# Patient Record
Sex: FEMALE | Race: BLACK OR AFRICAN AMERICAN | NOT HISPANIC OR LATINO | Employment: OTHER | URBAN - METROPOLITAN AREA
[De-identification: names, ages, dates, MRNs, and addresses within clinical notes are randomized per-mention and may not be internally consistent; named-entity substitution may affect disease eponyms.]

---

## 2017-01-03 DIAGNOSIS — M16.32 OSTEOARTHRITIS RESULTING FROM LEFT HIP DYSPLASIA: Primary | ICD-10-CM

## 2017-02-14 ENCOUNTER — TRANSFERRED RECORDS (OUTPATIENT)
Dept: HEALTH INFORMATION MANAGEMENT | Facility: CLINIC | Age: 37
End: 2017-02-14

## 2017-02-14 ENCOUNTER — PRE VISIT (OUTPATIENT)
Dept: ORTHOPEDICS | Facility: CLINIC | Age: 37
End: 2017-02-14

## 2017-02-14 NOTE — TELEPHONE ENCOUNTER
1.  Date/reason for appt: 3/23/17 5:30PM L HIP DYSPLASIA, OSTEOARTHRITIS,   2.  Referring provider: DR. SOULEYMANE TOMAS  3.  Call to patient (Yes / No - short description): Yes, call was transferred from .   4.  Previous care at / records requested from:   [facility] - Need DI     XR & MRI (Pt is mailing in a disc to [facility].)     Still need Radiology Reports

## 2017-02-22 NOTE — TELEPHONE ENCOUNTER
Called pt, she states she will drop off CD to the Administrative office  on 2nd floor. No need for DI forms from [facility] since Dr. Jimenez states they are not important. We only need the CD that was made and pt states she will drop it off Monday or Friday of next week. - 2nd attempt.

## 2017-02-24 NOTE — TELEPHONE ENCOUNTER
Records received from Referrals Team from [facility], Dr. Jimenez - forwarded to [facility].      Records include:  -Referral letter 2/14/17 to [facility]  -One office note with Dr. Jimenez @ [facility] on 2/14/17   -Radiology reports   -MRI Pelvis 2/16/16   -MRI Pelvis 10/6/15    -MRI L Hip/Pelvis 6/9/15       The records below were received from [facility] on 11/25/16 and already forwarded to [facility] on 11/25/16:  Included:   - Progress notes with Dr. Alden Jimenez  - Left Hip Injections  - [facility] Notes

## 2017-02-27 NOTE — TELEPHONE ENCOUNTER
Will attempt to fax cover sheet to [facility] to obtain missing recs. Injection 1/27/16

## 2017-02-27 NOTE — TELEPHONE ENCOUNTER
[facility]- Faxed back stating they need DI from pt  Will mail out DI form for [facility] for pt to sign and return to me.

## 2017-02-27 NOTE — TELEPHONE ENCOUNTER
Records received from [facility]. Waiting for images.   Included:  Office notes: 12/3/15, 10/1/15

## 2017-03-03 ENCOUNTER — TRANSFERRED RECORDS (OUTPATIENT)
Dept: HEALTH INFORMATION MANAGEMENT | Facility: CLINIC | Age: 37
End: 2017-03-03

## 2017-03-13 NOTE — TELEPHONE ENCOUNTER
Received 1 Disc from [facility] & completed DI for [facility] - Will send to Film room & fax cover sheet to req. recs Included: XR Pelvis 2/14/17

## 2017-03-13 NOTE — TELEPHONE ENCOUNTER
Called and spoke with pt regarding her DI form for [facility], pt states she completed her DI form and sent a CD along with her form to us. - 3rd attempt

## 2017-03-14 NOTE — TELEPHONE ENCOUNTER
Called [facility] julio cesar Newby from medical recs, she states there are recs and she will fax the recs today.

## 2017-03-14 NOTE — TELEPHONE ENCOUNTER
Records received from [facility]/ [facility].  Included  Office notes:   3/3/17 (not related to hip) & 3/1/17  Other: Labs 3/3/17    Missing/needed records:  1/27/16 injection was not included in these records (injection may have been done with his ortho doctor who per records - gives her injections every 3 months)

## 2017-03-14 NOTE — TELEPHONE ENCOUNTER
Add't rec's below received from [facility]/ [facility].  Office Note: 2/13/15 - had cortisone injection during this appt

## 2017-03-23 ENCOUNTER — OFFICE VISIT (OUTPATIENT)
Dept: ORTHOPEDICS | Facility: CLINIC | Age: 37
End: 2017-03-23

## 2017-03-23 VITALS — WEIGHT: 289.5 LBS | HEIGHT: 65 IN | BODY MASS INDEX: 48.23 KG/M2

## 2017-03-23 DIAGNOSIS — M25.551 PAIN OF BOTH HIP JOINTS: Primary | ICD-10-CM

## 2017-03-23 DIAGNOSIS — E66.01 MORBID OBESITY DUE TO EXCESS CALORIES (H): ICD-10-CM

## 2017-03-23 DIAGNOSIS — M25.552 PAIN OF BOTH HIP JOINTS: Primary | ICD-10-CM

## 2017-03-23 PROBLEM — F50.9 EATING DISORDER: Status: ACTIVE | Noted: 2017-03-13

## 2017-03-23 RX ORDER — PRAZOSIN HYDROCHLORIDE 1 MG/1
2 CAPSULE ORAL AT BEDTIME
Status: ON HOLD | COMMUNITY
End: 2017-10-09

## 2017-03-23 RX ORDER — DIVALPROEX SODIUM 500 MG/1
250 TABLET, DELAYED RELEASE ORAL
Status: ON HOLD | COMMUNITY
Start: 2016-05-31 | End: 2017-10-09

## 2017-03-23 RX ORDER — CETIRIZINE HYDROCHLORIDE 10 MG/1
TABLET ORAL
COMMUNITY
Start: 2017-03-03 | End: 2017-10-04

## 2017-03-23 RX ORDER — ACETAMINOPHEN AND CODEINE PHOSPHATE 120; 12 MG/5ML; MG/5ML
SOLUTION ORAL
COMMUNITY
End: 2017-10-04

## 2017-03-23 RX ORDER — LURASIDONE HYDROCHLORIDE 40 MG/1
80 TABLET, FILM COATED ORAL DAILY
Status: ON HOLD | COMMUNITY
End: 2017-10-09

## 2017-03-23 RX ORDER — OXYBUTYNIN CHLORIDE 10 MG/1
10 TABLET, EXTENDED RELEASE ORAL DAILY
COMMUNITY
Start: 2015-08-12 | End: 2018-11-28

## 2017-03-23 RX ORDER — CLONAZEPAM 0.5 MG/1
0.5 TABLET ORAL AT BEDTIME
Status: ON HOLD | COMMUNITY
Start: 2015-08-12 | End: 2017-10-09

## 2017-03-23 RX ORDER — BUPROPION HYDROCHLORIDE 300 MG/1
300 TABLET ORAL EVERY MORNING
COMMUNITY
Start: 2015-08-12 | End: 2018-11-28

## 2017-03-23 RX ORDER — LISINOPRIL 10 MG/1
20 TABLET ORAL DAILY
COMMUNITY
Start: 2015-08-12 | End: 2018-11-28

## 2017-03-23 RX ORDER — ALBUTEROL SULFATE 90 UG/1
1-2 AEROSOL, METERED RESPIRATORY (INHALATION) EVERY 4 HOURS PRN
COMMUNITY
Start: 2015-08-12 | End: 2023-06-22

## 2017-03-23 RX ORDER — CITALOPRAM HYDROBROMIDE 10 MG/1
20 TABLET ORAL DAILY
COMMUNITY
Start: 2016-12-06 | End: 2018-03-26

## 2017-03-23 RX ORDER — PRAVASTATIN SODIUM 20 MG
10 TABLET ORAL DAILY
COMMUNITY
Start: 2015-08-12 | End: 2020-11-19

## 2017-03-23 ASSESSMENT — ENCOUNTER SYMPTOMS
INSOMNIA: 1
TASTE DISTURBANCE: 0
SORE THROAT: 0
SPEECH CHANGE: 0
MEMORY LOSS: 0
LOSS OF CONSCIOUSNESS: 0
BLOOD IN STOOL: 0
RECTAL BLEEDING: 0
DECREASED CONCENTRATION: 0
WEAKNESS: 1
VOMITING: 0
DECREASED APPETITE: 0
WEIGHT GAIN: 0
DEPRESSION: 1
TINGLING: 1
SMELL DISTURBANCE: 0
NAUSEA: 0
NIGHT SWEATS: 0
FEVER: 0
DISTURBANCES IN COORDINATION: 1
CHILLS: 0
SINUS CONGESTION: 0
NECK MASS: 0
SINUS PAIN: 0
HEADACHES: 0
HEARTBURN: 0
RECTAL PAIN: 0
PARALYSIS: 0
CONSTIPATION: 1
NUMBNESS: 1
HOARSE VOICE: 0
PANIC: 0
BOWEL INCONTINENCE: 0
DIZZINESS: 1
DECREASED LIBIDO: 0
TROUBLE SWALLOWING: 0
JAUNDICE: 0
TREMORS: 0
DIARRHEA: 0
BLOATING: 0
SEIZURES: 0
ALTERED TEMPERATURE REGULATION: 1
WEIGHT LOSS: 0
ABDOMINAL PAIN: 0
HALLUCINATIONS: 0
POLYDIPSIA: 0
INCREASED ENERGY: 1
NERVOUS/ANXIOUS: 0
FATIGUE: 0
HOT FLASHES: 0
POLYPHAGIA: 0

## 2017-03-23 ASSESSMENT — HOOS S4: HOW SEVERE IS YOUR HIP JOINT STIFFNESS AFTER FIRST WAKENING IN THE MORNING?: MODERATE

## 2017-03-23 ASSESSMENT — ACTIVITIES OF DAILY LIVING (ADL)
ADL_SUBSCALE_SCORE: 25
ADL_MEAN: 3
ADL_SUM: 51

## 2017-03-23 NOTE — NURSING NOTE
"Reason For Visit:   Chief Complaint   Patient presents with     Consult     Pt. states that she is here today for Left Hip Pain. She been having ongoing pain for years. Her last injection was on 02/2017, it was affected for 3-4 weeks. She had tried Pool Therapy in the past, it was affected for certain time. Referring:  SOULEYMANE TOMAS        Pain Assessment  Patient Currently in Pain: Yes  0-10 Pain Scale: 8  Primary Pain Location: Hip  Pain Orientation: Left  Pain Descriptors: Discomfort  Alleviating Factors: Rest  Aggravating Factors: Movement, Sitting, Standing, Walking               HEIGHT: 5' 4.567\", WEIGHT: 289 lbs 8 oz, BMI: Body mass index is 48.82 kg/(m^2).      Current Outpatient Prescriptions   Medication Sig Dispense Refill     albuterol (PROAIR HFA/PROVENTIL HFA/VENTOLIN HFA) 108 (90 BASE) MCG/ACT Inhaler Inhale 1-2 puffs into the lungs       buPROPion (WELLBUTRIN XL) 300 MG 24 hr tablet Take 300 mg by mouth       cetirizine (ZYRTEC) 10 MG tablet        clonazePAM (KLONOPIN) 0.5 MG tablet Take 0.5 mg by mouth       divalproex (DEPAKOTE) 500 MG EC tablet Take 250 mg by mouth       lisinopril (PRINIVIL/ZESTRIL) 10 MG tablet Take 10 mg by mouth       lurasidone (LATUDA) 40 MG TABS tablet Take 80 mg by mouth       norethindrone (MICRONOR) 0.35 MG per tablet        oxybutynin (DITROPAN-XL) 10 MG 24 hr tablet Take 10 mg by mouth       pravastatin (PRAVACHOL) 20 MG tablet Take 10 mg by mouth       prazosin (MINIPRESS) 1 MG capsule Take 2 mg by mouth       citalopram (CELEXA) 10 MG tablet Take 10 mg by mouth            Allergies   Allergen Reactions     Cephalexin Hives     Other reaction(s): *Unknown     Erythromycin Anaphylaxis and Hives     Other reaction(s): Unknown     Penicillins Hives     hives  Other reaction(s): Unknown       "

## 2017-03-23 NOTE — PROGRESS NOTES
Chief Complaint: Consult (Pt. states that she is here today for Left Hip Pain. She been having ongoing pain for years. Her last injection was on 02/2017, it was affected for 3-4 weeks. She had tried Pool Therapy in the past, it was affected for certain time. Referring:  SOULEYMANE TOMAS )    Physician:  Souleymane Tomas    HPI: Ronel Ryan is a 36 year old female who presents today for evaluation of her hips.     Symptom Profile  Location of symptoms:  Left groin greater than right.  Onset: insidious  Duration of symptoms: greater than 5 years   Quality of symptoms: aching and sharp/stabbing  Severity:severe  Alleviate:activity modificatin   Exacerbating:activities    Previous Treatments: Previous treatments include activity modification, oral pain medication. Has had both intra-articular steroid injection with about a week of symptom improvement and lateral steroid injection.     Current Status:  Results of the patient s Hip Disability and Osteoarthritis Outcome Score (HOOS)  are as follows (0-100 scales with 100 being the theoretical best):  Pain: 12.5  Symptoms: 35  ADLs:25  Sports/Recreation:0  Quality of Life:18.75  (http://koos.nu/)    MEDICAL HISTORY: No past medical history on file.    Pertinent negatives:  Patient has no history of DVT or PE. Discussed risk factors.    Medications:     Current Outpatient Prescriptions:      albuterol (PROAIR HFA/PROVENTIL HFA/VENTOLIN HFA) 108 (90 BASE) MCG/ACT Inhaler, Inhale 1-2 puffs into the lungs, Disp: , Rfl:      buPROPion (WELLBUTRIN XL) 300 MG 24 hr tablet, Take 300 mg by mouth, Disp: , Rfl:      cetirizine (ZYRTEC) 10 MG tablet, , Disp: , Rfl:      clonazePAM (KLONOPIN) 0.5 MG tablet, Take 0.5 mg by mouth, Disp: , Rfl:      divalproex (DEPAKOTE) 500 MG EC tablet, Take 250 mg by mouth, Disp: , Rfl:      lisinopril (PRINIVIL/ZESTRIL) 10 MG tablet, Take 10 mg by mouth, Disp: , Rfl:      lurasidone (LATUDA) 40 MG TABS tablet, Take 80 mg by mouth, Disp: ,  "Rfl:      norethindrone (MICRONOR) 0.35 MG per tablet, , Disp: , Rfl:      oxybutynin (DITROPAN-XL) 10 MG 24 hr tablet, Take 10 mg by mouth, Disp: , Rfl:      pravastatin (PRAVACHOL) 20 MG tablet, Take 10 mg by mouth, Disp: , Rfl:      prazosin (MINIPRESS) 1 MG capsule, Take 2 mg by mouth, Disp: , Rfl:      citalopram (CELEXA) 10 MG tablet, Take 10 mg by mouth, Disp: , Rfl:     Allergies: Cephalexin; Erythromycin; and Penicillins    SURGICAL HISTORY: No past surgical history on file.    FAMILY HISTORY: No family history on file.    SOCIAL HISTORY:   Social History   Substance Use Topics     Smoking status: Never Smoker     Smokeless tobacco: Not on file     Alcohol use Yes       REVIEW OF SYSTEMS:  The comprehensive review of systems from the intake form was reviewed with the patient.  No fever, weight change or fatigue. No dry eyes. No oral ulcers, sore throat or voice change. No palpitations, syncope, angina or edema.  No chest pain, excessive sleepiness, shortness of breath or hemoptysis.   No abdominal pain, nausea, vomiting, diarrhea or heartburn.  No skin rash. No focal weakness or numbness. No bleeding or lymphadenopathy. No rhinitis or hives.     Exam:  On physical examination the patient appears the stated age, is in no acute distress, affectThe is appropriate, and breathing is non-labored.  Vitals are documented in the EMR and have been reviewed:    Ht 1.64 m (5' 4.57\")  Wt 131.3 kg (289 lb 8 oz)  BMI 48.82 kg/m2  5' 4.567\"  Body mass index is 48.82 kg/(m^2).    Rises from chair: with some effort   Gait: significant limp on the left   Trendelenburg test:unable   Gains the exam table: with effort     RIGHT hip subjective: not irritated   Abd: 30  Add: 10  PFC:  Flexion: 100  IRF:1  ERF:30  Impingement test: + mild    LEFT hip subjective: irritated  Abd: 20  Add:  PFC:  Flexion: 80  IRF: 0  ERF:25  Impingement test: ++ and reproduces her groin pain    Distally, the circulatory, motor, and sensation exam is " intact with 5/5 EHL, gastroc-soleus, and tibialis anterior.  Sensation to light touch is intact.  Dorsalis pedis and posterior tibialis pulses are palpable.  There are no sores on the feet, no bruising, and no lymphedema.    X-rays:   Left hip osteoarthritis, advnaced, associated with severe acetabular dysplasia. Right hip also with dysplasia though the Tonnis grade remains 1-2    Assessment and Plan: This is a 36 year old with Class III+ obesity and severe left hip OA assocaited with dysplasia and severe pain. Discussed walking program with assist device. PT for assist device training. Risks and benefits of surgery and how they are influenced with BMI.  Plan: continued non-operative management          Answers for HPI/ROS submitted by the patient on 3/23/2017   General Symptoms: Yes  Skin Symptoms: No  HENT Symptoms: Yes  EYE SYMPTOMS: No  HEART SYMPTOMS: No  LUNG SYMPTOMS: No  INTESTINAL SYMPTOMS: Yes  URINARY SYMPTOMS: No  GYNECOLOGIC SYMPTOMS: Yes  BREAST SYMPTOMS: No  SKELETAL SYMPTOMS: No  BLOOD SYMPTOMS: No  NERVOUS SYSTEM SYMPTOMS: Yes  MENTAL HEALTH SYMPTOMS: Yes  Fever: No  Loss of appetite: No  Weight loss: No  Weight gain: No  Fatigue: No  Night sweats: No  Chills: No  Increased stress: Yes  Excessive hunger: No  Excessive thirst: No  Feeling hot or cold when others believe the temperature is normal: Yes  Loss of height: No  Post-operative complications: No  Surgical site pain: No  Hallucinations: No  Change in or Loss of Energy: Yes  Hyperactivity: No  Confusion: No  Ear pain: No  Ear discharge: No  Hearing loss: No  Tinnitus: No  Nosebleeds: No  Congestion: No  Sinus pain: No  Trouble swallowing: No   Voice hoarseness: No  Mouth sores: No  Sore throat: No  Tooth pain: No  Gum tenderness: No  Bleeding gums: No  Change in taste: No  Change in sense of smell: No  Dry mouth: No  Hearing aid used: No  Neck lump: No  Heart burn or indigestion: No  Nausea: No  Vomiting: No  Abdominal pain: No  Bloating:  No  Constipation: Yes  Diarrhea: No  Blood in stool: No  Black stools: No  Rectal or Anal pain: No  Fecal incontinence: No  Rectal bleeding: No  Yellowing of skin or eyes: No  Vomit with blood: No  Change in stools: No  Hemorrhoids: No  Trouble with coordination: Yes  Dizziness or trouble with balance: Yes  Fainting or black-out spells: No  Memory loss: No  Headache: No  Seizures: No  Speech problems: No  Tingling: Yes  Tremor: No  Weakness: Yes  Difficulty walking: Yes  Paralysis: No  Numbness: Yes  Bleeding or spotting between periods: No  Heavy or painful periods: No  Irregular periods: No  Vaginal discharge: Yes  Hot flashes: No  Vaginal dryness: No  Genital ulcers: No  Reduced libido: No  Painful intercourse: No  Difficulty with sexual arousal: No  Post-menopausal bleeding: No  Nervous or Anxious: No  Depression: Yes  Trouble sleeping: Yes  Trouble thinking or concentrating: No  Mood changes: Yes  Panic attacks: No

## 2017-03-23 NOTE — MR AVS SNAPSHOT
After Visit Summary   3/23/2017    Ronel Ryan    MRN: [id number]           Patient Information     Date Of Birth          1980        Visit Information        Provider Department      3/23/2017 5:30 PM Ender Joaquin MD [facility]        Today's Diagnoses     Pain of both hip joints    -  1    Morbid obesity due to excess calories (H)           Follow-ups after your visit        Additional Services     PHYSICAL THERAPY REFERRAL (External-Prints)       Physical Therapy Referral for assistive device teaching.                  Who to contact     Please call your clinic at [phone] to:    Ask questions about your health    Make or cancel appointments    Discuss your medicines    Learn about your test results    Speak to your doctor   If you have compliments or concerns about an experience at your clinic, or if you wish to file a complaint, please contact [facility] Patient Relations at [phone] or email us at [email or web address]         Additional Information About Your Visit        MyChart Information     [employer] is an electronic gateway that provides easy, online access to your medical records. With [employer], you can request a clinic appointment, read your test results, renew a prescription or communicate with your care team.     To sign up for [employer] visit the website at [email or web address]   You will be asked to enter the access code listed below, as well as some personal information. Please follow the directions to create your username and password.     Your access code is: [email or web address]  Expires: 2017  7:30 AM     Your access code will  in 90 days. If you need help or a new code, please contact your [facility] Clinic or call [phone] for assistance.        Care EveryWhere ID     This is your Care EveryWhere ID. This could be used by other organizations to access your [place]  "medical records  [id number]        Your Vitals Were     Height BMI (Body Mass Index)                1.64 m (5' 4.57\") 48.82 kg/m2           Blood Pressure from Last 3 Encounters:   No data found for BP    Weight from Last 3 Encounters:   03/23/17 131.3 kg (289 lb 8 oz)              We Performed the Following     PHYSICAL THERAPY REFERRAL (External-Prints)        Primary Care Provider    None Specified       No primary provider on file.        Thank you!     Thank you for choosing [facility]  for your care. Our goal is always to provide you with excellent care. Hearing back from our patients is one way we can continue to improve our services. Please take a few minutes to complete the written survey that you may receive in the mail after your visit with us. Thank you!             Your Updated Medication List - Protect others around you: Learn how to safely use, store and throw away your medicines at www.disposemymeds.org.          This list is accurate as of: 3/23/17  6:30 PM.  Always use your most recent med list.                   Brand Name Dispense Instructions for use    albuterol 108 (90 BASE) MCG/ACT Inhaler    PROAIR HFA/PROVENTIL HFA/VENTOLIN HFA     Inhale 1-2 puffs into the lungs       buPROPion 300 MG 24 hr tablet    WELLBUTRIN XL     Take 300 mg by mouth       cetirizine 10 MG tablet    zyrTEC         citalopram 10 MG tablet    celeXA     Take 10 mg by mouth       clonazePAM 0.5 MG tablet    klonoPIN     Take 0.5 mg by mouth       divalproex 500 MG EC tablet    DEPAKOTE     Take 250 mg by mouth       lisinopril 10 MG tablet    PRINIVIL/ZESTRIL     Take 10 mg by mouth       lurasidone 40 MG Tabs tablet    LATUDA     Take 80 mg by mouth       norethindrone 0.35 MG per tablet    MICRONOR         oxybutynin 10 MG 24 hr tablet    DITROPAN-XL     Take 10 mg by mouth       pravastatin 20 MG tablet    PRAVACHOL     Take 10 mg by mouth       prazosin 1 MG capsule    MINIPRESS     Take 2 mg by " mouth

## 2017-03-23 NOTE — LETTER
3/23/2017       RE: Ronel Ryan  [address]    [place] [address]     Dear Colleague,    Thank you for referring your patient, Ronel Ryan, to the [facility] at [facility]. Please see a copy of my visit note below.    Chief Complaint: Consult (Pt. states that she is here today for Left Hip Pain. She been having ongoing pain for years. Her last injection was on 02/2017, it was affected for 3-4 weeks. She had tried Pool Therapy in the past, it was affected for certain time. Referring:  SOULEYMANE TOMAS )    Physician:  Souleymane Tomas    HPI: Ronel Ryan is a 36 year old female who presents today for evaluation of her hips.     Symptom Profile  Location of symptoms:  Left groin greater than right.  Onset: insidious  Duration of symptoms: greater than 5 years   Quality of symptoms: aching and sharp/stabbing  Severity:severe  Alleviate:activity modificatin   Exacerbating:activities    Previous Treatments: Previous treatments include activity modification, oral pain medication. Has had both intra-articular steroid injection with about a week of symptom improvement and lateral steroid injection.     Current Status:  Results of the patient s Hip Disability and Osteoarthritis Outcome Score (HOOS)  are as follows (0-100 scales with 100 being the theoretical best):  Pain: 12.5  Symptoms: 35  ADLs:25  Sports/Recreation:0  Quality of Life:18.75  (http://koos.nu/)    MEDICAL HISTORY: No past medical history on file.    Pertinent negatives:  Patient has no history of DVT or PE. Discussed risk factors.    Medications:     Current Outpatient Prescriptions:      albuterol (PROAIR HFA/PROVENTIL HFA/VENTOLIN HFA) 108 (90 BASE) MCG/ACT Inhaler, Inhale 1-2 puffs into the lungs, Disp: , Rfl:      buPROPion (WELLBUTRIN XL) 300 MG 24 hr tablet, Take 300 mg by mouth, Disp: , Rfl:      cetirizine (ZYRTEC) 10 MG tablet, , Disp: , Rfl:      clonazePAM  "(KLONOPIN) 0.5 MG tablet, Take 0.5 mg by mouth, Disp: , Rfl:      divalproex (DEPAKOTE) 500 MG EC tablet, Take 250 mg by mouth, Disp: , Rfl:      lisinopril (PRINIVIL/ZESTRIL) 10 MG tablet, Take 10 mg by mouth, Disp: , Rfl:      lurasidone (LATUDA) 40 MG TABS tablet, Take 80 mg by mouth, Disp: , Rfl:      norethindrone (MICRONOR) 0.35 MG per tablet, , Disp: , Rfl:      oxybutynin (DITROPAN-XL) 10 MG 24 hr tablet, Take 10 mg by mouth, Disp: , Rfl:      pravastatin (PRAVACHOL) 20 MG tablet, Take 10 mg by mouth, Disp: , Rfl:      prazosin (MINIPRESS) 1 MG capsule, Take 2 mg by mouth, Disp: , Rfl:      citalopram (CELEXA) 10 MG tablet, Take 10 mg by mouth, Disp: , Rfl:     Allergies: Cephalexin; Erythromycin; and Penicillins    SURGICAL HISTORY: No past surgical history on file.    FAMILY HISTORY: No family history on file.    SOCIAL HISTORY:   Social History   Substance Use Topics     Smoking status: Never Smoker     Smokeless tobacco: Not on file     Alcohol use Yes       REVIEW OF SYSTEMS:  The comprehensive review of systems from the intake form was reviewed with the patient.  No fever, weight change or fatigue. No dry eyes. No oral ulcers, sore throat or voice change. No palpitations, syncope, angina or edema.  No chest pain, excessive sleepiness, shortness of breath or hemoptysis.   No abdominal pain, nausea, vomiting, diarrhea or heartburn.  No skin rash. No focal weakness or numbness. No bleeding or lymphadenopathy. No rhinitis or hives.     Exam:  On physical examination the patient appears the stated age, is in no acute distress, affectThe is appropriate, and breathing is non-labored.  Vitals are documented in the EMR and have been reviewed:    Ht 1.64 m (5' 4.57\")  Wt 131.3 kg (289 lb 8 oz)  BMI 48.82 kg/m2  5' 4.567\"  Body mass index is 48.82 kg/(m^2).    Rises from chair: with some effort   Gait: significant limp on the left   Trendelenburg test:unable   Gains the exam table: with effort     RIGHT hip " subjective: not irritated   Abd: 30  Add: 10  PFC:  Flexion: 100  IRF:1  ERF:30  Impingement test: + mild    LEFT hip subjective: irritated  Abd: 20  Add:  PFC:  Flexion: 80  IRF: 0  ERF:25  Impingement test: ++ and reproduces her groin pain    Distally, the circulatory, motor, and sensation exam is intact with 5/5 EHL, gastroc-soleus, and tibialis anterior.  Sensation to light touch is intact.  Dorsalis pedis and posterior tibialis pulses are palpable.  There are no sores on the feet, no bruising, and no lymphedema.    X-rays:   Left hip osteoarthritis, advnaced, associated with severe acetabular dysplasia. Right hip also with dysplasia though the Tonnis grade remains 1-2    Assessment and Plan: This is a 36 year old with Class III+ obesity and severe left hip OA assocaited with dysplasia and severe pain. Discussed walking program with assist device. PT for assist device training. Risks and benefits of surgery and how they are influenced with BMI.  Plan: continued non-operative management          Again, thank you for allowing me to participate in the care of your patient.      Sincerely,    Ender Joaquin MD

## 2017-03-30 ENCOUNTER — THERAPY VISIT (OUTPATIENT)
Dept: PHYSICAL THERAPY | Facility: CLINIC | Age: 37
End: 2017-03-30
Payer: MEDICARE

## 2017-03-30 ENCOUNTER — MEDICAL CORRESPONDENCE (OUTPATIENT)
Dept: HEALTH INFORMATION MANAGEMENT | Facility: CLINIC | Age: 37
End: 2017-03-30

## 2017-03-30 DIAGNOSIS — M25.552 HIP PAIN, LEFT: Primary | ICD-10-CM

## 2017-03-30 PROCEDURE — 97162 PT EVAL MOD COMPLEX 30 MIN: CPT | Mod: GP | Performed by: PHYSICAL THERAPIST

## 2017-03-30 PROCEDURE — G8978 MOBILITY CURRENT STATUS: HCPCS | Mod: GP | Performed by: PHYSICAL THERAPIST

## 2017-03-30 PROCEDURE — 97110 THERAPEUTIC EXERCISES: CPT | Mod: GP | Performed by: PHYSICAL THERAPIST

## 2017-03-30 PROCEDURE — G8979 MOBILITY GOAL STATUS: HCPCS | Mod: GP | Performed by: PHYSICAL THERAPIST

## 2017-03-30 PROCEDURE — 97530 THERAPEUTIC ACTIVITIES: CPT | Mod: GP | Performed by: PHYSICAL THERAPIST

## 2017-03-30 NOTE — LETTER
DEPARTMENT OF HEALTH AND HUMAN SERVICES  CENTERS FOR MEDICARE & MEDICAID SERVICES    PLAN/UPDATED PLAN OF PROGRESS FOR OUTPATIENT REHABILITATION    PATIENTS NAME:  Ronel Ryan     : 1980    PROVID[facility] NUMBER:    [id number]    [facility]  [id number]     PROVIDER NAME: [facility] PHYSICAL THERAPY    MEDICAL RECORD NUMBER: [id number]     START OF CARE DATE:  SOC Date: 17   TYPE:  PT    PRIMARY/TREATMENT DIAGNOSIS: (Pertinent Medical Diagnosis)  Hip pain, left    VISITS FROM START OF CARE:  Rxs Used: 1   Physical Therapy Initial Evaluation   17   Precautions/Restrictions/MD instructions:    Therapist Impression:   Pt is a 37 y/o female. Pt presents with L hip pain, strength and ROM deficits. These impairments limit their ability to ambulate and negotiate stairs. Skilled PT services necessary in order to reduce impairments and improve independent function.  Subjective:   Chief Complaint: severe L hip pain secondary to hip dysplasia   DOI/onset: 2015 DOS:   Location: L groin Quality: burning, pin/needles/sharp  Frequency: constant  Radiates: localized at hip   Pain scale: 9/10   Time of day: activity dependent  Sleeping: unable to sleep secondary to pain   Exacerbated by: WB activities Relieved by: n/a Progression: unchanged  Previous Treatment: n/a Effect of prior treatment: n/a   PMH and/or surgical history:    Imaging:     Occupation:  Job duties:    Current HEP/exercise regimen:  Patient's goals:   Medications:   General health as reported by patient:   Return to MD:   Pertinent medical history includes:  Osteoarthritis, history of fractures, diabetes, overweight, high blood pressure, mental illness, depression, asthma, incontinence and sleep disorder/apnea.  Medical allergies: no.  Other surgeries include:  Orthopedic surgery.  Current medications:  High blood pressure medication and anti-depressants.  Current occupation is .    Primary job tasks  include:  Prolonged sitting, lifting and repetitive tasks.    Hip Examination  Gait: Severely antalgic gait   Hip PROM:    Flex  Abd  IR  ER    Left  50% loss 20% loss 10% loss 50% loss     Hip Strength:    Flex  Ext  Abd  ER    Left  unable to assess secondary to pain and guarding unable to assess secondary to pain and guarding unable to assess secondary to pain and guarding unable to assess secondary to pain and guarding     Special Testing:   MITCHELL HERNANDEZ   Left  + +     Assessment/Plan:      Patient is a 36 year old female with left side hip complaints.    Patient has the following significant findings with corresponding treatment plan.                Diagnosis 1:  L hip pain  Pain -  hot/cold therapy, electric stimulation, manual therapy, splint/taping/bracing/orthotics, self management, education, directional preference exercise and home program  Decreased ROM/flexibility - manual therapy and therapeutic exercise  Decreased joint mobility - manual therapy and therapeutic exercise  Decreased strength - therapeutic exercise and therapeutic activities  Impaired gait - gait training  Impaired muscle performance - neuro re-education  Decreased function - therapeutic activities    Therapy Evaluation Codes:   1) History comprised of:   Personal factors that impact the plan of care:      Time since onset of symptoms.    Comorbidity factors that impact the plan of care are:      Asthma, Bowel/bladder changes, Diabetes, Depression, Dizziness, Mental illness, Numbness/tingling, Osteoarthritis, Overweight, Pain at night/rest, Sleep disorder/apnea and Weakness.     Medications impacting care: Anti-depressant and High blood pressure.  2) Examination of Body Systems comprised of:   Body structures and functions that impact the plan of care:      Hip.   Activity limitations that impact the plan of care are:      Bending, Lifting, Squatting/kneeling, Stairs, Standing, Walking and Sleeping.  3) Clinical presentation  "characteristics are:   Evolving/Changing.  4) Decision-Making    Moderate complexity using standardized patient assessment instrument and/or measureable assessment of functional outcome.  Cumulative Therapy Evaluation is: Moderate complexity.  Previous and current functional limitations:  (See Goal Flow Sheet for this information)    Short term and Long term goals: (See Goal Flow Sheet for this information)   Communication ability:  Patient appears to be able to clearly communicate and understand verbal and written communication and follow directions correctly.  Treatment Explanation - The following has been discussed with the patient:   RX ordered/plan of care  Anticipated outcomes  Possible risks and side effects  This patient would benefit from PT intervention to resume normal activities.   Rehab potential is fair.  Frequency:  1 X week, once daily  Duration:  for 4 weeks  Discharge Plan:  Achieve all LTG.  Independent in home treatment program.  Reach maximal therapeutic benefit.                      Caregiver Signature/Credentials _____________________________ Date ________       Treating Provider: Margarita Combs PT    I have reviewed and certified the need for these services and plan of treatment while under my care.        PHYSICIAN'S SIGNATURE:   _________________________________________  Date___________   Ender Joaquin    Certification period:  Beginning of Cert date period: 03/30/17 to  End of Cert period date: 06/27/17     Functional Level Progress Report: Please see attached \"Goal Flow sheet for Functional level.\"    ____X____ Continue Services or       ________ DC Services                Service dates: From  SOC Date: 03/30/17 date to present                         "

## 2017-03-30 NOTE — MR AVS SNAPSHOT
"              After Visit Summary   3/30/2017    Ronel Ryan    MRN: [id number]           Patient Information     Date Of Birth          1980        Visit Information        Provider Department      3/30/2017 2:00 PM Margarita Combs, PT [facility] Physical [facility]        Today's Diagnoses     Hip pain, left    -  1       Follow-ups after your visit        Your next 10 appointments already scheduled     Apr 05, 2017  8:50 AM CDT   RHONA Extremity with Leana Geller PTA   [facility] Physical [facility] ([address] [address] [phone]            Apr 14, 2017  8:50 AM CDT   RHONA Extremity with Leana Geller PTA   [facility] Physical Therapy ([address] [address] [phone]              Who to contact     If you have questions or need follow up information about today's clinic visit or your schedule please contact [facility] PHYSICAL [facility] directly at [phone].  Normal or non-critical lab and imaging results will be communicated to you by [employer], letter or phone within 4 business days after the clinic has received the results. If you do not hear from us within 7 days, please contact the clinic through [employer] or phone. If you have a critical or abnormal lab result, we will notify you by phone as soon as possible.  Submit refill requests through [employer] or call your pharmacy and they will forward the refill request to us. Please allow 3 business days for your refill to be completed.          Additional Information About Your Visit        MyChart Information     [employer] lets you send messages to your doctor, view your test results, renew your prescriptions, schedule appointments and more. To sign up, go to [email or web address] . Click on \"Log in\" on the left side of the screen, which " "will take you to the Welcome page. Then click on \"Sign up Now\" on the right side of the page.     You will be asked to enter the access code listed below, as well as some personal information. Please follow the directions to create your username and password.     Your access code is: [email or web address]  Expires: 2017  7:30 AM     Your access code will  in 90 days. If you need help or a new code, please call your [facility] clinic or [phone].        Care EveryWhere ID     This is your Care EveryWhere ID. This could be used by other organizations to access your [facility] medical records  [id number]         Blood Pressure from Last 3 Encounters:   No data found for BP    Weight from Last 3 Encounters:   17 131.3 kg (289 lb 8 oz)              We Performed the Following     HC PT EVAL, MODERATE COMPLEXITY     RHONA CERT REPORT     RHONA INITIAL EVAL REPORT     THERAPEUTIC ACTIVITIES     THERAPEUTIC EXERCISES        Primary Care Provider    None Specified       No primary provider on file.        Thank you!     Thank you for choosing [facility] PHYSICAL THERAP[facility]  for your care. Our goal is always to provide you with excellent care. Hearing back from our patients is one way we can continue to improve our services. Please take a few minutes to complete the written survey that you may receive in the mail after your visit with us. Thank you!             Your Updated Medication List - Protect others around you: Learn how to safely use, store and throw away your medicines at www.disposemymeds.org.          This list is accurate as of: 3/30/17 11:59 PM.  Always use your most recent med list.                   Brand Name Dispense Instructions for use    albuterol 108 (90 BASE) MCG/ACT Inhaler    PROAIR HFA/PROVENTIL HFA/VENTOLIN HFA     Inhale 1-2 puffs into the lungs       buPROPion 300 MG 24 hr tablet    WELLBUTRIN XL     Take 300 mg by mouth       cetirizine 10 MG tablet    zyrTEC      "    citalopram 10 MG tablet    celeXA     Take 10 mg by mouth       clonazePAM 0.5 MG tablet    klonoPIN     Take 0.5 mg by mouth       divalproex 500 MG EC tablet    DEPAKOTE     Take 250 mg by mouth       lisinopril 10 MG tablet    PRINIVIL/ZESTRIL     Take 10 mg by mouth       lurasidone 40 MG Tabs tablet    LATUDA     Take 80 mg by mouth       norethindrone 0.35 MG per tablet    MICRONOR         oxybutynin 10 MG 24 hr tablet    DITROPAN-XL     Take 10 mg by mouth       pravastatin 20 MG tablet    PRAVACHOL     Take 10 mg by mouth       prazosin 1 MG capsule    MINIPRESS     Take 2 mg by mouth

## 2017-03-30 NOTE — PROGRESS NOTES
Physical Therapy Initial Evaluation   03/30/17   Precautions/Restrictions/MD instructions:    Therapist Impression:   Pt is a 37 y/o female. Pt presents with L hip pain, strength and ROM deficits. These impairments limit their ability to ambulate and negotiate stairs. Skilled PT services necessary in order to reduce impairments and improve independent function.    Subjective:   Chief Complaint: severe L hip pain secondary to hip dysplasia   DOI/onset: MD order: 3/23/17 DOS:   Location: L groin Quality: burning, pin/needles/sharp  Frequency: constant  Radiates: localized at hip   Pain scale: 9/10   Time of day: activity dependent  Sleeping: unable to sleep secondary to pain   Exacerbated by: WB activities Relieved by: n/a Progression: unchanged  Previous Treatment: n/a Effect of prior treatment: n/a   PMH and/or surgical history:    Imaging:     Occupation:  Job duties:    Current HEP/exercise regimen:  Patient's goals:   Medications:   General health as reported by patient:   Return to MD:     Hip Examination  Gait: Severely antalgic gait       Hip PROM:    Flex  Abd  IR  ER    Left  50% loss 20% loss 10% loss 50% loss     Hip Strength:    Flex  Ext  Abd  ER    Left  unable to assess secondary to pain and guarding unable to assess secondary to pain and guarding unable to assess secondary to pain and guarding unable to assess secondary to pain and guarding     Special Testing:   GRAZYNAIR  MARY   Left  + +           Subjective:    HPI                    Objective:    System    Physical Exam    General     ROS    Assessment/Plan:      Patient is a 36 year old female with left side hip complaints.    Patient has the following significant findings with corresponding treatment plan.                Diagnosis 1:  L hip pain  Pain -  hot/cold therapy, electric stimulation, manual therapy, splint/taping/bracing/orthotics, self management, education, directional preference exercise and home program  Decreased ROM/flexibility -  manual therapy and therapeutic exercise  Decreased joint mobility - manual therapy and therapeutic exercise  Decreased strength - therapeutic exercise and therapeutic activities  Impaired gait - gait training  Impaired muscle performance - neuro re-education  Decreased function - therapeutic activities    Therapy Evaluation Codes:   1) History comprised of:   Personal factors that impact the plan of care:      Time since onset of symptoms.    Comorbidity factors that impact the plan of care are:      Asthma, Bowel/bladder changes, Diabetes, Depression, Dizziness, Mental illness, Numbness/tingling, Osteoarthritis, Overweight, Pain at night/rest, Sleep disorder/apnea and Weakness.     Medications impacting care: Anti-depressant and High blood pressure.  2) Examination of Body Systems comprised of:   Body structures and functions that impact the plan of care:      Hip.   Activity limitations that impact the plan of care are:      Bending, Lifting, Squatting/kneeling, Stairs, Standing, Walking and Sleeping.  3) Clinical presentation characteristics are:   Evolving/Changing.  4) Decision-Making    Moderate complexity using standardized patient assessment instrument and/or measureable assessment of functional outcome.  Cumulative Therapy Evaluation is: Moderate complexity.    Previous and current functional limitations:  (See Goal Flow Sheet for this information)    Short term and Long term goals: (See Goal Flow Sheet for this information)     Communication ability:  Patient appears to be able to clearly communicate and understand verbal and written communication and follow directions correctly.  Treatment Explanation - The following has been discussed with the patient:   RX ordered/plan of care  Anticipated outcomes  Possible risks and side effects  This patient would benefit from PT intervention to resume normal activities.   Rehab potential is fair.    Frequency:  1 X week, once daily  Duration:  for 4 weeks  Discharge  Plan:  Achieve all LTG.  Independent in home treatment program.  Reach maximal therapeutic benefit.    Please refer to the daily flowsheet for treatment today, total treatment time and time spent performing 1:1 timed codes.

## 2017-04-03 PROBLEM — M25.552 HIP PAIN, LEFT: Status: ACTIVE | Noted: 2017-04-03

## 2017-04-03 ASSESSMENT — ACTIVITIES OF DAILY LIVING (ADL)
PUTTING_ON_SOCKS_AND_SHOES: EXTREME DIFFICULTY
HOS_ADL_SCORE(%): 35.29
WALKING_INITIALLY: MODERATE DIFFICULTY
HOS_ADL_HIGHEST_POTENTIAL_SCORE: 68
STEPPING_UP_AND_DOWN_CURBS: MODERATE DIFFICULTY
STANDING_FOR_15_MINUTES: MODERATE DIFFICULTY
HOS_ADL_ITEM_SCORE_TOTAL: 24
RECREATIONAL_ACTIVITIES: EXTREME DIFFICULTY
TWISTING/PIVOTING_ON_INVOLVED_LEG: EXTREME DIFFICULTY
WALKING_UP_STEEP_HILLS: EXTREME DIFFICULTY
HEAVY_WORK: MODERATE DIFFICULTY
WALKING_APPROXIMATELY_10_MINUTES: MODERATE DIFFICULTY
GETTING_INTO_AND_OUT_OF_AN_AVERAGE_CAR: MODERATE DIFFICULTY
GETTING_INTO_AND_OUT_OF_A_BATHTUB: EXTREME DIFFICULTY
DEEP_SQUATTING: EXTREME DIFFICULTY
WALKING_15_MINUTES_OR_GREATER: EXTREME DIFFICULTY
WALKING_DOWN_STEEP_HILLS: EXTREME DIFFICULTY
GOING_DOWN_1_FLIGHT_OF_STAIRS: EXTREME DIFFICULTY
ROLLING_OVER_IN_BED: EXTREME DIFFICULTY
LIGHT_TO_MODERATE_WORK: MODERATE DIFFICULTY
GOING_UP_1_FLIGHT_OF_STAIRS: EXTREME DIFFICULTY
HOS_ADL_COUNT: 17
SITTING_FOR_15_MINUTES: NO DIFFICULTY AT ALL

## 2017-04-03 NOTE — PROGRESS NOTES
Subjective:                                       Pertinent medical history includes:  Osteoarthritis, history of fractures, diabetes, overweight, high blood pressure, mental illness, depression, asthma, incontinence and sleep disorder/apnea.  Medical allergies: no.  Other surgeries include:  Orthopedic surgery.  Current medications:  High blood pressure medication and anti-depressants.  Current occupation is .    Primary job tasks include:  Prolonged sitting, lifting and repetitive tasks.                                Objective:    System    Physical Exam    General     ROS    Assessment/Plan:

## 2017-04-05 ENCOUNTER — THERAPY VISIT (OUTPATIENT)
Dept: PHYSICAL THERAPY | Facility: CLINIC | Age: 37
End: 2017-04-05
Payer: MEDICARE

## 2017-04-05 DIAGNOSIS — M25.552 HIP PAIN, LEFT: ICD-10-CM

## 2017-04-05 PROCEDURE — 97110 THERAPEUTIC EXERCISES: CPT | Mod: GP

## 2017-04-05 PROCEDURE — 97530 THERAPEUTIC ACTIVITIES: CPT | Mod: GP

## 2017-04-14 ENCOUNTER — THERAPY VISIT (OUTPATIENT)
Dept: PHYSICAL THERAPY | Facility: CLINIC | Age: 37
End: 2017-04-14
Payer: MEDICARE

## 2017-04-14 DIAGNOSIS — M25.552 HIP PAIN, LEFT: Primary | ICD-10-CM

## 2017-04-14 PROCEDURE — 97112 NEUROMUSCULAR REEDUCATION: CPT | Mod: GP

## 2017-04-14 PROCEDURE — 97110 THERAPEUTIC EXERCISES: CPT | Mod: GP

## 2017-04-21 ENCOUNTER — THERAPY VISIT (OUTPATIENT)
Dept: PHYSICAL THERAPY | Facility: CLINIC | Age: 37
End: 2017-04-21
Payer: MEDICARE

## 2017-04-21 DIAGNOSIS — M25.552 HIP PAIN, LEFT: ICD-10-CM

## 2017-04-21 PROCEDURE — 97530 THERAPEUTIC ACTIVITIES: CPT | Mod: GP

## 2017-04-21 PROCEDURE — 97112 NEUROMUSCULAR REEDUCATION: CPT | Mod: GP

## 2017-04-21 PROCEDURE — 97110 THERAPEUTIC EXERCISES: CPT | Mod: GP

## 2017-04-21 NOTE — MR AVS SNAPSHOT
"              After Visit Summary   2017    Ronel Ryan    MRN: [id number]           Patient Information     Date Of Birth          1980        Visit Information        Provider Department      2017 10:10 AM Leana Geller PTA [facility] Physical Therapy        Today's Diagnoses     Hip pain, left           Follow-ups after your visit        Who to contact     If you have questions or need follow up information about today's clinic visit or your schedule please [facility] directly at [phone].  Normal or non-critical lab and imaging results will be communicated to you by [employer], letter or phone within 4 business days after the clinic has received the results. If you do not hear from us within 7 days, please contact the clinic through [employer] or phone. If you have a critical or abnormal lab result, we will notify you by phone as soon as possible.  Submit refill requests through [employer] or call your pharmacy and they will forward the refill request to us. Please allow 3 business days for your refill to be completed.          Additional Information About Your Visit        MyChart Information     [employer] lets you send messages to your doctor, view your test results, renew your prescriptions, schedule appointments and more. To sign up, go to [email or web address] . Click on \"Log in\" on the left side of the screen, which will take you to the Welcome page. Then click on \"Sign up Now\" on the right side of the page.     You will be asked to enter the access code listed below, as well as some personal information. Please follow the directions to create your username and password.     Your access code is: [email or web address]  Expires: 2017  7:30 AM     Your access code will  in 90 days. If you need help or a new code, please call your [facility] clinic or [phone].        Care EveryWhere ID     This is your " Care EveryWhere ID. This could be used by other organizations to access your [facility] medical records  [id number]         Blood Pressure from Last 3 Encounters:   No data found for BP    Weight from Last 3 Encounters:   03/23/17 131.3 kg (289 lb 8 oz)              We Performed the Following     Neuromuscular Re-Education     Therapeutic Activities     Therapeutic Exercises        Primary Care Provider    None Specified       No primary provider on file.        Thank you!     Thank you for [facility] PHYSICAL THERAPY  for your care. Our goal is always to provide you with excellent care. Hearing back from our patients is one way we can continue to improve our services. Please take a few minutes to complete the written survey that you may receive in the mail after your visit with us. Thank you!             Your Updated Medication List - Protect others around you: Learn how to safely use, store and throw away your medicines at www.disposemymeds.org.          This list is accurate as of: 4/21/17 10:59 AM.  Always use your most recent med list.                   Brand Name Dispense Instructions for use    albuterol 108 (90 BASE) MCG/ACT Inhaler    PROAIR HFA/PROVENTIL HFA/VENTOLIN HFA     Inhale 1-2 puffs into the lungs       buPROPion 300 MG 24 hr tablet    WELLBUTRIN XL     Take 300 mg by mouth       cetirizine 10 MG tablet    zyrTEC         citalopram 10 MG tablet    celeXA     Take 10 mg by mouth       clonazePAM 0.5 MG tablet    klonoPIN     Take 0.5 mg by mouth       divalproex 500 MG EC tablet    DEPAKOTE     Take 250 mg by mouth       lisinopril 10 MG tablet    PRINIVIL/ZESTRIL     Take 10 mg by mouth       lurasidone 40 MG Tabs tablet    LATUDA     Take 80 mg by mouth       norethindrone 0.35 MG per tablet    MICRONOR         oxybutynin 10 MG 24 hr tablet    DITROPAN-XL     Take 10 mg by mouth       pravastatin 20 MG tablet    PRAVACHOL     Take 10 mg by mouth        prazosin 1 MG capsule    MINIPRESS     Take 2 mg by mouth

## 2017-04-21 NOTE — PROGRESS NOTES
Subjective:    HPI                    Objective:    System    Physical Exam    General     ROS    Assessment/Plan:      PROGRESS  REPORT    Progress reporting period is from 3/30/17 to 4/21/17.       SUBJECTIVE  Subjective changes noted by patient:   A few days ago pt had spent more time on feet/walking and had a hard time lifting leg into car due to pain. Then couldn't lift it into bed. Still limps and is sore.  Notes pain in L groin on bike every time leg extends down. Hip pain is better for 1 day following PT sessions.     Current pain level is 6/10  .     Previous pain level was  7/10  .   Changes in function:  None  Adverse reaction to treatment or activity: None    OBJECTIVE  Changes noted in objective findings:  Yes,   Objective: L hip flexion 90 pain, ER ~10 pain, IR 45-50 in supine. Deconditioned, puts forth good effort in PT. L hip flexor 3/5, quad 4-/5. Good glute max and med contraction in standing and SL.       ASSESSMENT/PLAN  Updated problem list and treatment plan: Diagnosis 1:  L hip pain, dysplasia  Pain -  hot/cold therapy, manual therapy, education and home program  Decreased ROM/flexibility - manual therapy, therapeutic exercise and home program  Decreased strength - therapeutic exercise, therapeutic activities and home program  Impaired muscle performance - neuro re-education and home program  STG/LTGs have been met or progress has been made towards goals:  Yes (See Goal flow sheet completed today.)  Assessment of Progress: The patient's condition has potential to improve.  Self Management Plans:  Patient has been instructed in a home treatment program.  I have re-evaluated this patient and find that the nature, scope, duration and intensity of the therapy is appropriate for the medical condition of the patient.  Ronel continues to require the following intervention to meet STG and LTG's:  PT    Recommendations:  This patient would benefit from continued therapy.     Frequency:  1 X week, once  daily  Duration:  for 4-6 visits      The progress note/discharge summary was written in collaboration with and reviewed by the physical therapist.    Please refer to the daily flowsheet for treatment today, total treatment time and time spent performing 1:1 timed codes.

## 2017-08-03 ENCOUNTER — HOSPITAL ENCOUNTER (EMERGENCY)
Facility: CLINIC | Age: 37
Discharge: HOME | End: 2017-08-03
Attending: EMERGENCY MEDICINE | Admitting: EMERGENCY MEDICINE
Payer: MEDICARE

## 2017-08-03 VITALS
OXYGEN SATURATION: 99 % | DIASTOLIC BLOOD PRESSURE: 94 MMHG | TEMPERATURE: 98.6 F | HEIGHT: 64 IN | SYSTOLIC BLOOD PRESSURE: 140 MMHG | WEIGHT: 293 LBS | HEART RATE: 92 BPM | RESPIRATION RATE: 16 BRPM | BODY MASS INDEX: 50.02 KG/M2

## 2017-08-03 DIAGNOSIS — L50.9 HIVES: ICD-10-CM

## 2017-08-03 PROCEDURE — 25000125 ZZHC RX 250: Performed by: EMERGENCY MEDICINE

## 2017-08-03 PROCEDURE — 96374 THER/PROPH/DIAG INJ IV PUSH: CPT

## 2017-08-03 PROCEDURE — 25000128 H RX IP 250 OP 636: Performed by: EMERGENCY MEDICINE

## 2017-08-03 PROCEDURE — 96375 TX/PRO/DX INJ NEW DRUG ADDON: CPT

## 2017-08-03 PROCEDURE — 99284 EMERGENCY DEPT VISIT MOD MDM: CPT | Mod: 25

## 2017-08-03 PROCEDURE — S0028 INJECTION, FAMOTIDINE, 20 MG: HCPCS | Performed by: EMERGENCY MEDICINE

## 2017-08-03 RX ORDER — DIPHENHYDRAMINE HYDROCHLORIDE 50 MG/ML
50 INJECTION INTRAMUSCULAR; INTRAVENOUS ONCE
Status: COMPLETED | OUTPATIENT
Start: 2017-08-03 | End: 2017-08-03

## 2017-08-03 RX ORDER — PREDNISONE 20 MG/1
60 TABLET ORAL ONCE
Status: COMPLETED | OUTPATIENT
Start: 2017-08-03 | End: 2017-08-03

## 2017-08-03 RX ORDER — EPINEPHRINE 0.3 MG/.3ML
0.3 INJECTION SUBCUTANEOUS
Qty: 0.6 ML | Refills: 0 | Status: SHIPPED | OUTPATIENT
Start: 2017-08-03

## 2017-08-03 RX ORDER — CETIRIZINE HYDROCHLORIDE 10 MG/1
10 TABLET ORAL DAILY
Qty: 7 TABLET | Refills: 0 | Status: SHIPPED | OUTPATIENT
Start: 2017-08-03 | End: 2018-11-28

## 2017-08-03 RX ORDER — PREDNISONE 50 MG/1
50 TABLET ORAL DAILY
Qty: 4 TABLET | Refills: 0 | Status: SHIPPED | OUTPATIENT
Start: 2017-08-04 | End: 2017-08-08

## 2017-08-03 RX ORDER — METHYLPREDNISOLONE SODIUM SUCCINATE 125 MG/2ML
125 INJECTION, POWDER, LYOPHILIZED, FOR SOLUTION INTRAMUSCULAR; INTRAVENOUS ONCE
Status: COMPLETED | OUTPATIENT
Start: 2017-08-03 | End: 2017-08-03

## 2017-08-03 RX ADMIN — METHYLPREDNISOLONE SODIUM SUCCINATE 125 MG: 125 INJECTION, POWDER, FOR SOLUTION INTRAMUSCULAR; INTRAVENOUS at 13:38

## 2017-08-03 RX ADMIN — PREDNISONE 60 MG: 20 TABLET ORAL at 16:04

## 2017-08-03 RX ADMIN — FAMOTIDINE 20 MG: 10 INJECTION, SOLUTION INTRAVENOUS at 13:38

## 2017-08-03 RX ADMIN — DIPHENHYDRAMINE HYDROCHLORIDE 50 MG: 50 INJECTION, SOLUTION INTRAMUSCULAR; INTRAVENOUS at 13:38

## 2017-08-03 ASSESSMENT — ENCOUNTER SYMPTOMS
HEADACHES: 1
SHORTNESS OF BREATH: 1

## 2017-08-03 NOTE — ED PROVIDER NOTES
"  History     Chief Complaint:  Allergic Reaction    HPI   Ronel Ryan is a 36 year old female who presents to the emergency department today for evaluation of an allergic reaction. The patient states that she noticed she had hives around 0900 this morning. She states that she went to work, and around 1230  She additionally started experiencing a headache and trouble breathing so she used her Epi Pen for the first time and she was still experiencing allergic symptoms so she came to the ED . She denies taking anything for her symptoms. The patient notes that she has had a lot of allergic reactions over the course of the past 2 months. Last week she went to [facility] for lip swelling and she was given Benadryl and Steroids and sent home with this Epi Pen.     Allergies:  Cephalexin, hives  Erythromycin, anaphylaxis and hives  Penicillins, hives     Medications:    Albuterol Inhaler  Wellbutrin  Zyrtec  Klonopin  Depakote  Lisinopril  Norethindrone  Oxybutynin  Pravastatin  Minipress  Celexa     Past Medical History:    Arthralgia of the left hip  Trochanteric Bursitis  Asthma  Hyperlipidemia  Hypertension  Depression   Morbid Obesity    Past Surgical History:    History reviewed.  No significant past surgical history.     Family History:    History reviewed.  No significant family history.     Social History:  Smoking Status: negative    Marital Status:  Unknown [6]     Review of Systems   Respiratory: Positive for shortness of breath.    Skin:        Positive for hives on the chest, face, and abdomen.   Neurological: Positive for headaches.   All other systems reviewed and are negative.      Physical Exam   First Vitals:  BP: 140/94  Temp: 98.6  F (37  C)  Temp src: Oral  Pulse: 92  Resp: 16  SpO2: 99 %  Height: 162.6 cm (5' 4\")  Weight: 145.2 kg (320 lb) (08/03 1317-08/03 1551)     Physical Exam  Nursing note and vitals reviewed.  Constitutional:  Appears well-developed and well-nourished.   HENT:   Head: "    Atraumatic.   Mouth/Throat:   Oropharynx is clear and moist. No oropharyngeal exudate.      No lip, tongue, or oral swelling. Uvula appears normal.   Eyes:    Pupils are equal, round, and reactive to light.   Neck:    Normal range of motion. Neck supple.      No tracheal deviation present. No thyromegaly present.   Cardiovascular:  Normal rate, regular rhythm, no murmur   Pulmonary/Chest: Breath sounds are clear and equal without wheezes or crackles.  Abdominal:   Soft. Bowel sounds are normal. Exhibits no distension and      no mass. There is no tenderness.      There is no rebound and no guarding.   Musculoskeletal:  Exhibits no edema.   Lymphadenopathy:  No cervical adenopathy.   Neurological:   Alert and oriented to person, place, and time.   Skin:    Erythematous raised hives over the faces, upper chest, and upper arms.     Emergency Department Course   Interventions:  1338 Benadryl 50 mg IV   Pepcid 20 mg IV   Methylprednisolone Sodium Succinate 125 mg IV    Emergency Department Course:  Nursing notes and vitals reviewed. I performed an exam of the patient as documented above.     The patient felt better with the above interventions administered here in the ED.    1550 I reevaluated the patient and provided an update in regards to her ED course.      Findings and plan explained to the Patient. Patient discharged home with instructions regarding supportive care, medications, and reasons to return. The importance of close follow-up was reviewed.       Impression & Plan    Medical Decision Making:  Ronel Ryan is a 36 year old female who presents with an allergic reaction. I found the patient to have hives consistent with possible allergic reaction. She had improvement after using her own Epi Pen. She was feeling much improved, and I felt that she could be safely discharged with the medications below and to continue Benadryl. She was instructed to return if symptoms worsen. She has her appointment with her  doctor to have her current hives worked up.      Critical Care time:  none    Diagnosis:    ICD-10-CM    1. Hives L50.9        Disposition:  Discharged to home.     Discharge Medications:  New Prescriptions    CETIRIZINE (ZYRTEC) 10 MG TABLET    Take 1 tablet (10 mg) by mouth daily    EPINEPHRINE (EPIPEN/ADRENACLICK/OR ANY BX GENERIC EQUIV) 0.3 MG/0.3ML INJECTION 2-PACK    Inject 0.3 mLs (0.3 mg) into the muscle once as needed for anaphylaxis    PREDNISONE (DELTASONE) 50 MG TABLET    Take 1 tablet (50 mg) by mouth daily for 4 days    RANITIDINE (ZANTAC) 150 MG TABLET    Take 1 tablet (150 mg) by mouth 2 times daily for 5 days     Cherelle MALDONADO, steve serving as a scribe on 8/3/2017 at 1:17 PM to personally document services performed by Neva Ramirez MD based on my observations and the provider's statements to me.     Cherelle Patricio  8/3/2017    EMERGENCY DEPARTMENT       Neva Ramirez MD  08/03/17 3680

## 2017-08-03 NOTE — DISCHARGE INSTRUCTIONS
Continue taking Benadryl 50 mg (2 tablets) every 4 hours until tomorrow, then if rash stays resolved you can decrease to 50 mg every 6 hours, then 25 mg (1 tablet) every 6 hours then stop it.

## 2017-08-03 NOTE — ED AVS SNAPSHOT
Emergency Department    [address] [facility] [address]    Phone:  [phone]    Fax:  [phone]                                       Ronel Ryan   MRN: [id number]    Department:   Emergency Department   Date of Visit:  8/3/2017           Patient Information     Date Of Birth          1980        Your diagnoses for this visit were:     Hives        You were seen by Neva Ramirez MD.      Follow-up Information     Follow up with  Emergency Department.    Specialty:  EMERGENCY MEDICINE    Why:  As needed, If symptoms worsen    Contact information:    [address] [facility] [address] [phone]        Discharge Instructions       Continue taking Benadryl 50 mg (2 tablets) every 4 hours until tomorrow, then if rash stays resolved you can decrease to 50 mg every 6 hours, then 25 mg (1 tablet) every 6 hours then stop it.    Discharge References/Attachments     ALLERGIC REACTION, OTHER (GENERAL) (ENGLISH)    HIVES (ADULT) (ENGLISH)      24 Hour Appointment Hotline       To make an appointment at any [facility] clinic, call [phone] ([phone]). If you don't have a family doctor or clinic, we will help you find one. [facility] clinics are conveniently located to serve the needs of you and your family.             Review of your medicines      START taking        Dose / Directions Last dose taken    EPINEPHrine 0.3 MG/0.3ML injection 2-pack   Commonly known as:  EPIPEN/ADRENACLICK/or ANY BX GENERIC EQUIV   Dose:  0.3 mg   Quantity:  0.6 mL        Inject 0.3 mLs (0.3 mg) into the muscle once as needed for anaphylaxis   Refills:  0        predniSONE 50 MG tablet   Commonly known as:  DELTASONE   Dose:  50 mg   Quantity:  4 tablet   Start taking on:  8/4/2017        Take 1 tablet (50 mg) by mouth daily for 4 days   Refills:  0        ranitidine 150 MG tablet   Commonly known as:  ZANTAC   Dose:  150 mg   Quantity:  10 tablet        Take 1 tablet (150 mg)  by mouth 2 times daily for 5 days   Refills:  0          Our records show that you are taking the medicines listed below. If these are incorrect, please call your family doctor or clinic.        Dose / Directions Last dose taken    albuterol 108 (90 BASE) MCG/ACT Inhaler   Commonly known as:  PROAIR HFA/PROVENTIL HFA/VENTOLIN HFA   Dose:  1-2 puff        Inhale 1-2 puffs into the lungs   Refills:  0        buPROPion 300 MG 24 hr tablet   Commonly known as:  WELLBUTRIN XL   Dose:  300 mg        Take 300 mg by mouth   Refills:  0        citalopram 10 MG tablet   Commonly known as:  celeXA   Dose:  10 mg        Take 10 mg by mouth   Refills:  0        clonazePAM 0.5 MG tablet   Commonly known as:  klonoPIN   Dose:  0.5 mg        Take 0.5 mg by mouth   Refills:  0        divalproex 500 MG EC tablet   Commonly known as:  DEPAKOTE   Dose:  250 mg        Take 250 mg by mouth   Refills:  0        lisinopril 10 MG tablet   Commonly known as:  PRINIVIL/ZESTRIL   Dose:  10 mg        Take 10 mg by mouth   Refills:  0        lurasidone 40 MG Tabs tablet   Commonly known as:  LATUDA   Dose:  80 mg        Take 80 mg by mouth   Refills:  0        norethindrone 0.35 MG per tablet   Commonly known as:  MICRONOR        Refills:  0        oxybutynin 10 MG 24 hr tablet   Commonly known as:  DITROPAN-XL   Dose:  10 mg        Take 10 mg by mouth   Refills:  0        pravastatin 20 MG tablet   Commonly known as:  PRAVACHOL   Dose:  10 mg        Take 10 mg by mouth   Refills:  0        prazosin 1 MG capsule   Commonly known as:  MINIPRESS   Dose:  2 mg        Take 2 mg by mouth   Refills:  0          ASK your doctor about these medications        Dose / Directions Last dose taken    * cetirizine 10 MG tablet   Commonly known as:  zyrTEC   What changed:  Another medication with the same name was added. Make sure you understand how and when to take each.   Ask about: Which instructions should I use?        Refills:  0        * cetirizine 10  MG tablet   Commonly known as:  zyrTEC   Dose:  10 mg   What changed:  You were already taking a medication with the same name, and this prescription was added. Make sure you understand how and when to take each.   Quantity:  7 tablet   Ask about: Which instructions should I use?        Take 1 tablet (10 mg) by mouth daily   Refills:  0        * Notice:  This list has 2 medication(s) that are the same as other medications prescribed for you. Read the directions carefully, and ask your doctor or other care provider to review them with you.            Prescriptions were sent or printed at these locations (4 Prescriptions)                   Other Prescriptions                Printed at Department/Unit printer (4 of 4)         predniSONE (DELTASONE) 50 MG tablet               EPINEPHrine (EPIPEN/ADRENACLICK/OR ANY BX GENERIC EQUIV) 0.3 MG/0.3ML injection 2-pack               cetirizine (ZYRTEC) 10 MG tablet               ranitidine (ZANTAC) 150 MG tablet                Orders Needing Specimen Collection     None      Pending Results     No orders found from 8/1/2017 to 8/4/2017.            Pending Culture Results     No orders found from 8/1/2017 to 8/4/2017.            Pending Results Instructions     If you had any lab results that were not finalized at the time of your Discharge, you can call the ED Lab Result RN at [phone]. You will be contacted by this team for any positive Lab results or changes in treatment. The nurses are available 7 days a week from 10A to 6:30P.  You can leave a message 24 hours per day and they will return your call.        Test Results From Your Hospital Stay               Clinical Quality Measure: Blood Pressure Screening     Your blood pressure was checked while you were in the emergency department today. The last reading we obtained was  BP: (!) 140/94 . Please read the guidelines below about what these numbers mean and what you should do about them.  If your systolic blood pressure  "(the top number) is less than 120 and your diastolic blood pressure (the bottom number) is less than 80, then your blood pressure is normal. There is nothing more that you need to do about it.  If your systolic blood pressure (the top number) is 120-139 or your diastolic blood pressure (the bottom number) is 80-89, your blood pressure may be higher than it should be. You should have your blood pressure rechecked within a year by a primary care provider.  If your systolic blood pressure (the top number) is 140 or greater or your diastolic blood pressure (the bottom number) is 90 or greater, you may have high blood pressure. High blood pressure is treatable, but if left untreated over time it can put you at risk for heart attack, stroke, or kidney failure. You should have your blood pressure rechecked by a primary care provider within the next 4 weeks.  If your provider in the emergency department today gave you specific instructions to follow-up with your doctor or provider even sooner than that, you should follow that instruction and not wait for up to 4 weeks for your follow-up visit.        Thank you for choosing [facility]       Thank you for choosing [facility] for your care. Our goal is always to provide you with excellent care. Hearing back from our patients is one way we can continue to improve our services. Please take a few minutes to complete the written survey that you may receive in the mail after you visit with us. Thank you!        [employer] Information     Stand Offer lets you send messages to your doctor, view your test results, renew your prescriptions, schedule appointments and more. To sign up, go to [email or web address] . Click on \"Log in\" on the left side of the screen, which will take you to the Welcome page. Then click on \"Sign up Now\" on the right side of the page.     You will be asked to enter the access code listed below, as well as some personal information. Please follow the directions to " create your username and password.     Your access code is: [email or web address]  Expires: 2017  4:09 PM     Your access code will  in 90 days. If you need help or a new code, please call your [facility] clinic or [phone].        Care EveryWhere ID     This is your Care EveryWhere ID. This could be used by other organizations to access your [facility] medical records  [id number]        Equal Access to Services     DAINA LOPEZ : Hadii mary yao hadasho Soomaali, waaxda luqadaha, qaybta kaalmada adeegyada, bola wilkins . So [facility] [phone].    ATENCIÓN: Si habla español, tiene a hussein disposición servicios gratuitos de asistencia lingüística. Llame al [phone].    We comply with applicable federal civil rights laws and [place] laws. We do not discriminate on the basis of race, color, national origin, age, disability sex, sexual orientation or gender identity.            After Visit Summary       This is your record. Keep this with you and show to your community pharmacist(s) and doctor(s) at your next visit.

## 2017-08-03 NOTE — LETTER
To Whom it may concern:      Ronel Ryan was seen in our Emergency Department today, 08/03/17.  Please excuse her from work for the next 2 days.    Sincerely,      Neva Ramirez MD

## 2017-08-03 NOTE — ED AVS SNAPSHOT
Emergency Department    [address] [place] [address]    Phone:  [phone]    Fax:  [phone]                                       Ronel Ryan   MRN: [id number]    Department:   Emergency Department   Date of Visit:  8/3/2017           After Visit Summary Signature Page     I have received my discharge instructions, and my questions have been answered. I have discussed any challenges I see with this plan with the nurse or doctor.    ..........................................................................................................................................  Patient/Patient Representative Signature      ..........................................................................................................................................  Patient Representative Print Name and Relationship to Patient    ..................................................               ................................................  Date                                            Time    ..........................................................................................................................................  Reviewed by Signature/Title    ...................................................              ..............................................  Date                                                            Time

## 2017-08-17 ENCOUNTER — HOSPITAL ENCOUNTER (EMERGENCY)
Facility: CLINIC | Age: 37
Discharge: HOME | End: 2017-08-17
Attending: EMERGENCY MEDICINE | Admitting: EMERGENCY MEDICINE
Payer: MEDICARE

## 2017-08-17 ENCOUNTER — APPOINTMENT (OUTPATIENT)
Dept: GENERAL RADIOLOGY | Facility: CLINIC | Age: 37
End: 2017-08-17
Attending: EMERGENCY MEDICINE
Payer: MEDICARE

## 2017-08-17 VITALS
SYSTOLIC BLOOD PRESSURE: 150 MMHG | RESPIRATION RATE: 22 BRPM | BODY MASS INDEX: 54.93 KG/M2 | TEMPERATURE: 97.1 F | DIASTOLIC BLOOD PRESSURE: 80 MMHG | WEIGHT: 293 LBS | OXYGEN SATURATION: 99 %

## 2017-08-17 DIAGNOSIS — K21.9 GASTROESOPHAGEAL REFLUX DISEASE WITHOUT ESOPHAGITIS: ICD-10-CM

## 2017-08-17 DIAGNOSIS — R07.9 CHEST PAIN, UNSPECIFIED TYPE: ICD-10-CM

## 2017-08-17 LAB
ALBUMIN SERPL-MCNC: 2.9 G/DL (ref 3.4–5)
ALP SERPL-CCNC: 53 U/L (ref 40–150)
ALT SERPL W P-5'-P-CCNC: 19 U/L (ref 0–50)
ANION GAP SERPL CALCULATED.3IONS-SCNC: 7 MMOL/L (ref 3–14)
AST SERPL W P-5'-P-CCNC: 14 U/L (ref 0–45)
BILIRUB SERPL-MCNC: 0.5 MG/DL (ref 0.2–1.3)
BUN SERPL-MCNC: 7 MG/DL (ref 7–30)
CALCIUM SERPL-MCNC: 7.9 MG/DL (ref 8.5–10.1)
CHLORIDE SERPL-SCNC: 105 MMOL/L (ref 94–109)
CO2 SERPL-SCNC: 26 MMOL/L (ref 20–32)
CREAT SERPL-MCNC: 0.87 MG/DL (ref 0.52–1.04)
ERYTHROCYTE [DISTWIDTH] IN BLOOD BY AUTOMATED COUNT: 13.2 % (ref 10–15)
GFR SERPL CREATININE-BSD FRML MDRD: 73 ML/MIN/1.7M2
GLUCOSE SERPL-MCNC: 125 MG/DL (ref 70–99)
HCG SERPL QL: NEGATIVE
HCT VFR BLD AUTO: 36.3 % (ref 35–47)
HGB BLD-MCNC: 12.1 G/DL (ref 11.7–15.7)
INTERPRETATION ECG - MUSE: NORMAL
LIPASE SERPL-CCNC: 136 U/L (ref 73–393)
MCH RBC QN AUTO: 29.2 PG (ref 26.5–33)
MCHC RBC AUTO-ENTMCNC: 33.3 G/DL (ref 31.5–36.5)
MCV RBC AUTO: 88 FL (ref 78–100)
PLATELET # BLD AUTO: 265 10E9/L (ref 150–450)
POTASSIUM SERPL-SCNC: 3.5 MMOL/L (ref 3.4–5.3)
PROT SERPL-MCNC: 6.2 G/DL (ref 6.8–8.8)
RBC # BLD AUTO: 4.14 10E12/L (ref 3.8–5.2)
SODIUM SERPL-SCNC: 138 MMOL/L (ref 133–144)
TROPONIN I SERPL-MCNC: <0.015 UG/L (ref 0–0.04)
WBC # BLD AUTO: 8.2 10E9/L (ref 4–11)

## 2017-08-17 PROCEDURE — 99285 EMERGENCY DEPT VISIT HI MDM: CPT | Mod: 25

## 2017-08-17 PROCEDURE — 85027 COMPLETE CBC AUTOMATED: CPT | Performed by: EMERGENCY MEDICINE

## 2017-08-17 PROCEDURE — 84703 CHORIONIC GONADOTROPIN ASSAY: CPT | Performed by: EMERGENCY MEDICINE

## 2017-08-17 PROCEDURE — S0028 INJECTION, FAMOTIDINE, 20 MG: HCPCS | Performed by: EMERGENCY MEDICINE

## 2017-08-17 PROCEDURE — 71020 XR CHEST 2 VW: CPT

## 2017-08-17 PROCEDURE — 93005 ELECTROCARDIOGRAM TRACING: CPT

## 2017-08-17 PROCEDURE — 25000128 H RX IP 250 OP 636: Performed by: EMERGENCY MEDICINE

## 2017-08-17 PROCEDURE — 25000125 ZZHC RX 250: Performed by: EMERGENCY MEDICINE

## 2017-08-17 PROCEDURE — 96375 TX/PRO/DX INJ NEW DRUG ADDON: CPT

## 2017-08-17 PROCEDURE — 84484 ASSAY OF TROPONIN QUANT: CPT | Performed by: EMERGENCY MEDICINE

## 2017-08-17 PROCEDURE — 83690 ASSAY OF LIPASE: CPT | Performed by: EMERGENCY MEDICINE

## 2017-08-17 PROCEDURE — 25000132 ZZH RX MED GY IP 250 OP 250 PS 637: Mod: GY | Performed by: EMERGENCY MEDICINE

## 2017-08-17 PROCEDURE — 80053 COMPREHEN METABOLIC PANEL: CPT | Performed by: EMERGENCY MEDICINE

## 2017-08-17 PROCEDURE — A9270 NON-COVERED ITEM OR SERVICE: HCPCS | Mod: GY | Performed by: EMERGENCY MEDICINE

## 2017-08-17 PROCEDURE — 96374 THER/PROPH/DIAG INJ IV PUSH: CPT

## 2017-08-17 RX ORDER — MORPHINE SULFATE 4 MG/ML
4 INJECTION, SOLUTION INTRAMUSCULAR; INTRAVENOUS ONCE
Status: COMPLETED | OUTPATIENT
Start: 2017-08-17 | End: 2017-08-17

## 2017-08-17 RX ADMIN — FAMOTIDINE 20 MG: 10 INJECTION, SOLUTION INTRAVENOUS at 09:16

## 2017-08-17 RX ADMIN — MORPHINE SULFATE 4 MG: 4 INJECTION, SOLUTION INTRAMUSCULAR; INTRAVENOUS at 09:45

## 2017-08-17 RX ADMIN — LIDOCAINE HYDROCHLORIDE 30 ML: 20 SOLUTION ORAL; TOPICAL at 09:21

## 2017-08-17 ASSESSMENT — ENCOUNTER SYMPTOMS
ABDOMINAL PAIN: 1
ARTHRALGIAS: 1
NAUSEA: 0

## 2017-08-17 NOTE — ED PROVIDER NOTES
History   Chief Complaint:  Chest Pain     HPI   Ronel yRan is a 36 year old female with a history of hypertension, hyperlipidemia, asthma, obesity, GERD, diabetes, and hip pain who comes to the ED today for evaluation of epigastric pain and bilateral hip pain. The patient reports she began having a burning sensation in her epigastric region; she decided to wait it out and took benadryl and her night medication and went to sleep, but when she woke up with morning the burning sensation was still present. She states she has been on 2 courses of prednisone; her last dose of prednisone was last week. The patient most recently saw an allergist who prescribed her prednisone, epi pen and zyrtec; the allergist wants the patient back on an 8 day prednisone course for her hives. Here in the ED, the patient currently has hives. She notes she has been using her inhaler more often. She denies ibuprofen abuse, smoking, recent illness, nausea, or history of heart problems/blood clots.     The patient is also complaining of bilateral hip pain. She states she has been having hip pain for some time and was recently referred to a rheumatologist to evaluate for rheumatoid arthritis. The patient states she purchased a cane because she is having difficulty lifting her legs due to the pain. No change in chronic hip pain. No numbness/tingling.    CARDIAC RISK FACTORS:  Sex:    female  Tobacco:   no  Hypertension:   yes  Hyperlipidemia:  yes  Diabetes:   yes  Family History:  no    PE/DVT RISK FACTORS:  Sex:    female  Hormones:   no  Tobacco:   no  Cancer:   no  Travel:   no  Surgery:   no  Other immobilization: no  Personal history:  no  Family history:  no    Allergies:  Cephalexin  Erythromycin  Penicillins    Medications:    EPINEPHrine (EPIPEN/ADRENACLICK/OR ANY BX GENERIC EQUIV) 0.3 MG/0.3ML injection 2-pack   cetirizine (ZYRTEC) 10 MG tablet   albuterol (PROAIR HFA/PROVENTIL HFA/VENTOLIN HFA) 108 (90 BASE) MCG/ACT Inhaler    buPROPion (WELLBUTRIN XL) 300 MG 24 hr tablet   cetirizine (ZYRTEC) 10 MG tablet   clonazePAM (KLONOPIN) 0.5 MG tablet   divalproex (DEPAKOTE) 500 MG EC tablet   lisinopril (PRINIVIL/ZESTRIL) 10 MG tablet   lurasidone (LATUDA) 40 MG TABS tablet   norethindrone (MICRONOR) 0.35 MG per tablet   oxybutynin (DITROPAN-XL) 10 MG 24 hr tablet   pravastatin (PRAVACHOL) 20 MG tablet   prazosin (MINIPRESS) 1 MG capsule   citalopram (CELEXA) 10 MG tablet     Past Medical History:    Asthma  Morbid obesity  ADD  Binge eating  Trochanteric bursitis  Hypertension  Major depressive disorder  Hyperlipidemia  Hip pain  GERD    Past Surgical History:    cholecystectomy    Family History:    History reviewed. No pertinent family history.     Social History:  Marital Status:  Single [1]  Smoking status: no  Alcohol use: yes    Review of Systems   Cardiovascular: Positive for chest pain.   Gastrointestinal: Positive for abdominal pain. Negative for nausea.   Musculoskeletal: Positive for arthralgias.   All other systems reviewed and are negative.    Physical Exam     Patient Vitals for the past 24 hrs:   BP Temp Temp src Heart Rate Resp SpO2 Weight   08/17/17 0816 164/86 97.1  F (36.2  C) Oral 95 22 98 % (!) 145.2 kg (320 lb)     Physical Exam   General: Resting comfortably on the gurney  Eyes:  The pupils are equal and round  ENT:    Moist mucous membranes  Neck:  Normal range of motion  CV:  Regular rate and rhythm    Lower mid line chest tenderness    Skin warm and well perfused   Resp:  Lungs are clear    Non-labored    No rales    No wheezing   GI:  Abdomen is soft, there is no rigidity    No distension    No rebound tenderness     Minimal epigastric tenderness  MS:  Normal muscular tone    Moving bilateral LE  Skin:  No rash or acute skin lesions noted  Neuro:   Awake, alert.      Speech is normal and fluent.    Face is symmetric.     Moves all extremities  Psych:  Normal affect.  Appropriate interactions.    Emergency Department  Course   ECG (8:23:10):  Rate 93 bpm. AR interval 152. QRS duration 86. QT/QTc 384/477. P-R-T axes 53 -10 3. Normal sinus rhythm. Minimal voltage criteria for LVH, may be normal variant. Borderline ECG. Interpreted at 825 by Odalys Romero MD*.    Imaging:  Radiographic findings were communicated with the patient who voiced understanding of the findings.  Chest XR, PA & LAT:  PA and lateral views of the chest. Lungs are clear. Heart  is normal in size. No effusions are evident. No pneumothorax.  As read by Radiology.    Laboratory:  CBC: WNL (WBC 8.2, HGB 12.1, )   CMP: glucose 125(H), calcium 7.9(L), albumin 2.9(L), protein total 6.2(L) o/w WNL (Creatinine 0.87)  HCG qual: Negative  Lipase: 136  Troponin: <0.015    Interventions:  0916 Pepcide 20 mg IV  0921 GI Cocktail - Maalox 15 mL, Viscous Lidocaine 15 mL, 30 mL suspension PO  0945 morphine 4 mg IV    Emergency Department Course:  Past medical records, nursing notes, and vitals reviewed.  0823 ECG was obtained  0830: I performed an exam of the patient and obtained history, as documented above.  0850 IV inserted and blood drawn. The patient was placed on continuous cardiac monitoring and pulse oximetry.  0850 The patient was sent for a chest xray while in the emergency department, findings above.  0938: I rechecked the patient. Explained findings to labs and imaging.   0916 pepcide 20 mg IV  0921 GI Cocktail - Maalox 15 mL, Viscous Lidocaine 15 mL, 30 mL suspension PO  0945 morphine 4 mg IV  1038 I rechecked the patient and she feels good.   1045: I rechecked the patient.  Findings and plan explained to the Patient. Patient discharged home with instructions regarding supportive care, medications, and reasons to return. The importance of close follow-up was reviewed.     Impression & Plan    Medical Decision Making:  Ronel Ryan is a 36 year old female who comes to the emergency department with abdominal pain. minimally tender in epigastric  region. No peritoneal signs. Given her description of the pain, I do think this is consistent with a GI pathology such as GERD, gastritis, esophagitis, possibly ulcer. She has been on several different courses of prednisone for her allergies and I suspect this is related to this pain. She does have a history of acid reflux as well, but is not taking this medication. Given her medical problems, I did pursue further work up such as EKG, labs, and troponin which were all unremarkable. She was feeling better after interventions here in the ED. I have low suspicion for PE and do not think further work up for this is indicated. Doubt dissection and do not think this is indicated to be worked up today. On repeat exam, feeling better with minimal epigastric tenderness. Don't think imaging of abdomen is indicated given normal labs, already has had cholecystectomy, and no lower abdominal tenderness to suggest appendicitis, ovarian torsion or other acute intra-abdominal pathology. I recommended that she start PPI and use TUMS and Maalox as well and follow up with primary care provider. I disused that she should avoid steroid use as much as possible unless absolutely needed. She understands these instructions. Reasons to return to the ED discussed with patient.       Diagnosis:    ICD-10-CM   1. Chest pain, unspecified type R07.9   2. Gastroesophageal reflux disease without esophagitis K21.9       Disposition:  Discharged to home    Discharge Medications:  New Prescriptions    OMEPRAZOLE (PRILOSEC) 20 MG CR CAPSULE    Take 1 capsule (20 mg) by mouth daily Take 30-60 minutes before a meal.     Ana Melgar  8/17/2017    EMERGENCY DEPARTMENT    IAna, steve serving as a scribe at 8:28 AM on 8/17/2017 to document services personally performed by Odalys Romero MD* based on my observations and the provider's statements to me.        Odalys Romero MD  08/17/17 1551

## 2017-08-17 NOTE — ED AVS SNAPSHOT
Emergency Department    [address] [facility] [address]    Phone:  [phone]    Fax:  [phone]                                       Ronel Ryan   MRN: [id number]    Department:   Emergency Department   Date of Visit:  8/17/2017           Patient Information     Date Of Birth          1980        Your diagnoses for this visit were:     Chest pain, unspecified type     Gastroesophageal reflux disease without esophagitis        You were seen by Odalys Romero MD.      Follow-up Information     Schedule an appointment as soon as possible for a visit with Clinic, [facility].    Contact information:    [address] [address] [phone]          Follow up with  Emergency Department.    Specialty:  EMERGENCY MEDICINE    Why:  If symptoms worsen    Contact information:    [address] [facility] [address] [phone]        Discharge Instructions         Follow up with primary care provider if not improving  The steroids may be worsening this pain so try not to take regularly  Start the omeprazole daily  Can also try tums or maalox as needed  Tips to Control Acid Reflux    To control acid reflux, you ll need to make some basic diet and lifestyle changes. The simple steps outlined below may be all you ll need to ease discomfort.  Watch what you eat    Avoid fatty foods and spicy foods.    Eat fewer acidic foods, such as citrus and tomato-based foods. These can increase symptoms.    Limit drinking alcohol, caffeine, and fizzy beverages. All increase acid reflux.    Try limiting chocolate, peppermint, and spearmint. These can worsen acid reflux in some people.  Watch when you eat    Avoid lying down for 3 hours after eating.    Do not snack before going to bed.  Raise your head  Raising your head and upper body by 4 to 6 inches helps limit reflux when you re lying down. Put blocks under the head of your bed frame to raise it.  Other  changes    Lose weight, if you need to    Don t exercise near bedtime    Avoid tight-fitting clothes    Limit aspirin and ibuprofen    Stop smoking   Date Last Reviewed: 7/1/2016 2000-2017 [employer]. [address] [place] [address], [place] [address]. All rights reserved. This information is not intended as a substitute for professional medical care. Always follow your healthcare professional's instructions.          24 Hour Appointment Hotline       To make an appointment at any [facility], call [phone] ([phone]). If you don't have a family doctor or clinic, we will help you find one. [facility] clinics are conveniently located to serve the needs of you and your family.             Review of your medicines      START taking        Dose / Directions Last dose taken    omeprazole 20 MG CR capsule   Commonly known as:  priLOSEC   Dose:  20 mg   Quantity:  30 capsule        Take 1 capsule (20 mg) by mouth daily Take 30-60 minutes before a meal.   Refills:  0          Our records show that you are taking the medicines listed below. If these are incorrect, please call your family doctor or clinic.        Dose / Directions Last dose taken    albuterol 108 (90 BASE) MCG/ACT Inhaler   Commonly known as:  PROAIR HFA/PROVENTIL HFA/VENTOLIN HFA   Dose:  1-2 puff        Inhale 1-2 puffs into the lungs   Refills:  0        buPROPion 300 MG 24 hr tablet   Commonly known as:  WELLBUTRIN XL   Dose:  300 mg        Take 300 mg by mouth   Refills:  0        * cetirizine 10 MG tablet   Commonly known as:  zyrTEC        Refills:  0        * cetirizine 10 MG tablet   Commonly known as:  zyrTEC   Dose:  10 mg   Quantity:  7 tablet        Take 1 tablet (10 mg) by mouth daily   Refills:  0        citalopram 10 MG tablet   Commonly known as:  celeXA   Dose:  10 mg        Take 10 mg by mouth   Refills:  0        clonazePAM 0.5 MG tablet   Commonly known as:  klonoPIN   Dose:  0.5 mg        Take 0.5 mg by mouth    Refills:  0        divalproex 500 MG EC tablet   Commonly known as:  DEPAKOTE   Dose:  250 mg        Take 250 mg by mouth   Refills:  0        EPINEPHrine 0.3 MG/0.3ML injection 2-pack   Commonly known as:  EPIPEN/ADRENACLICK/or ANY BX GENERIC EQUIV   Dose:  0.3 mg   Quantity:  0.6 mL        Inject 0.3 mLs (0.3 mg) into the muscle once as needed for anaphylaxis   Refills:  0        lisinopril 10 MG tablet   Commonly known as:  PRINIVIL/ZESTRIL   Dose:  10 mg        Take 10 mg by mouth   Refills:  0        lurasidone 40 MG Tabs tablet   Commonly known as:  LATUDA   Dose:  80 mg        Take 80 mg by mouth   Refills:  0        norethindrone 0.35 MG per tablet   Commonly known as:  MICRONOR        Refills:  0        oxybutynin 10 MG 24 hr tablet   Commonly known as:  DITROPAN-XL   Dose:  10 mg        Take 10 mg by mouth   Refills:  0        pravastatin 20 MG tablet   Commonly known as:  PRAVACHOL   Dose:  10 mg        Take 10 mg by mouth   Refills:  0        prazosin 1 MG capsule   Commonly known as:  MINIPRESS   Dose:  2 mg        Take 2 mg by mouth   Refills:  0        * Notice:  This list has 2 medication(s) that are the same as other medications prescribed for you. Read the directions carefully, and ask your doctor or other care provider to review them with you.            Prescriptions were sent or printed at these locations (1 Prescription)                   Other Prescriptions                Printed at Department/Unit printer (1 of 1)         omeprazole (PRILOSEC) 20 MG CR capsule                Procedures and tests performed during your visit     CBC (+ platelets, no diff)    Chest XR,  PA & LAT    Comprehensive metabolic panel    EKG 12-lead, tracing only    HCG qualitative    Lipase    Troponin I (now)      Orders Needing Specimen Collection     None      Pending Results     Date and Time Order Name Status Description    8/17/2017 0817 EKG 12-lead, tracing only Preliminary             Pending Culture Results      No orders found from 8/15/2017 to 8/18/2017.            Pending Results Instructions     If you had any lab results that were not finalized at the time of your Discharge, you can call the ED Lab Result RN at [phone]. You will be contacted by this team for any positive Lab results or changes in treatment. The nurses are available 7 days a week from 10A to 6:30P.  You can leave a message 24 hours per day and they will return your call.        Test Results From Your Hospital Stay        8/17/2017  9:33 AM      Component Results     Component Value Ref Range & Units Status    Sodium 138 133 - 144 mmol/L Final    Potassium 3.5 3.4 - 5.3 mmol/L Final    Chloride 105 94 - 109 mmol/L Final    Carbon Dioxide 26 20 - 32 mmol/L Final    Anion Gap 7 3 - 14 mmol/L Final    Glucose 125 (H) 70 - 99 mg/dL Final    Urea Nitrogen 7 7 - 30 mg/dL Final    Creatinine 0.87 0.52 - 1.04 mg/dL Final    GFR Estimate 73 >60 mL/min/1.7m2 Final    Non  GFR Calc    GFR Estimate If Black 88 >60 mL/min/1.7m2 Final    African American GFR Calc    Calcium 7.9 (L) 8.5 - 10.1 mg/dL Final    Bilirubin Total 0.5 0.2 - 1.3 mg/dL Final    Albumin 2.9 (L) 3.4 - 5.0 g/dL Final    Protein Total 6.2 (L) 6.8 - 8.8 g/dL Final    Alkaline Phosphatase 53 40 - 150 U/L Final    ALT 19 0 - 50 U/L Final    AST 14 0 - 45 U/L Final         8/17/2017  9:22 AM      Component Results     Component Value Ref Range & Units Status    WBC 8.2 4.0 - 11.0 10e9/L Final    RBC Count 4.14 3.8 - 5.2 10e12/L Final    Hemoglobin 12.1 11.7 - 15.7 g/dL Final    Hematocrit 36.3 35.0 - 47.0 % Final    MCV 88 78 - 100 fl Final    MCH 29.2 26.5 - 33.0 pg Final    MCHC 33.3 31.5 - 36.5 g/dL Final    RDW 13.2 10.0 - 15.0 % Final    Platelet Count 265 150 - 450 10e9/L Final         8/17/2017  9:28 AM      Component Results     Component Value Ref Range & Units Status    Lipase 136 73 - 393 U/L Final         8/17/2017  9:33 AM      Component Results     Component Value  Ref Range & Units Status    Troponin I ES <0.015 0.000 - 0.045 ug/L Final    The 99th percentile for upper reference range is 0.045 ug/L.  Troponin values   in the range of 0.045 - 0.120 ug/L may be associated with risks of adverse   clinical events.           8/17/2017  9:30 AM      Narrative     CHEST TWO VIEWS 8/17/2017 9:12 AM     HISTORY: Chest pain.         Impression     IMPRESSION: PA and lateral views of the chest. Lungs are clear. Heart  is normal in size. No effusions are evident. No pneumothorax.    LILIANA ALEMAN MD         8/17/2017  9:21 AM      Component Results     Component Value Ref Range & Units Status    HCG Qualitative Serum Negative NEG^Negative Final    This test is for screening purposes.  Results should be interpreted along with   the clinical picture.  Confirmation testing is available if warranted by   ordering AIF359, HCG Quantitative Pregnancy.                  Clinical Quality Measure: Blood Pressure Screening     Your blood pressure was checked while you were in the emergency department today. The last reading we obtained was  BP: 150/80 . Please read the guidelines below about what these numbers mean and what you should do about them.  If your systolic blood pressure (the top number) is less than 120 and your diastolic blood pressure (the bottom number) is less than 80, then your blood pressure is normal. There is nothing more that you need to do about it.  If your systolic blood pressure (the top number) is 120-139 or your diastolic blood pressure (the bottom number) is 80-89, your blood pressure may be higher than it should be. You should have your blood pressure rechecked within a year by a primary care provider.  If your systolic blood pressure (the top number) is 140 or greater or your diastolic blood pressure (the bottom number) is 90 or greater, you may have high blood pressure. High blood pressure is treatable, but if left untreated over time it can put you at risk for heart  "attack, stroke, or kidney failure. You should have your blood pressure rechecked by a primary care provider within the next 4 weeks.  If your provider in the emergency department today gave you specific instructions to follow-up with your doctor or provider even sooner than that, you should follow that instruction and not wait for up to 4 weeks for your follow-up visit.        Thank you for choosing [facility]       Thank you for choosing [facility] for your care. Our goal is always to provide you with excellent care. Hearing back from our patients is one way we can continue to improve our services. Please take a few minutes to complete the written survey that you may receive in the mail after you visit with us. Thank you!        [facility] Information     [facility] lets you send messages to your doctor, view your test results, renew your prescriptions, schedule appointments and more. To sign up, go to [email or web address] . Click on \"Log in\" on the left side of the screen, which will take you to the Welcome page. Then click on \"Sign up Now\" on the right side of the page.     You will be asked to enter the access code listed below, as well as some personal information. Please follow the directions to create your username and password.     Your access code is: [email or web address]  Expires: 2017  4:09 PM     Your access code will  in 90 days. If you need help or a new code, please call your [facility] clinic or [phone].        Care EveryWhere ID     This is your Care EveryWhere ID. This could be used by other organizations to access your [facility] medical records  [id number]        Equal Access to Services     DAINA LOPEZ : Hadii mary Brooke, waaxda arpan, qaybta kaalbola [place]. So [facility] [phone].    ATENCIÓN: Si habla español, tiene a hussein disposición servicios gratuitos de asistencia lingüística. Llame al [phone].    We comply with applicable " federal civil rights laws and [place] laws. We do not discriminate on the basis of race, color, national origin, age, disability sex, sexual orientation or gender identity.            After Visit Summary       This is your record. Keep this with you and show to your community pharmacist(s) and doctor(s) at your next visit.

## 2017-08-17 NOTE — LETTER
To Whom it may concern:      Ronel Ryan was seen in our Emergency Department today, 08/17/17. She may return to work on 8/18/17.    Sincerely,  Odalys Romero MD

## 2017-08-17 NOTE — DISCHARGE INSTRUCTIONS
Follow up with primary care provider if not improving  The steroids may be worsening this pain so try not to take regularly  Start the omeprazole daily  Can also try tums or maalox as needed  Tips to Control Acid Reflux    To control acid reflux, you ll need to make some basic diet and lifestyle changes. The simple steps outlined below may be all you ll need to ease discomfort.  Watch what you eat    Avoid fatty foods and spicy foods.    Eat fewer acidic foods, such as citrus and tomato-based foods. These can increase symptoms.    Limit drinking alcohol, caffeine, and fizzy beverages. All increase acid reflux.    Try limiting chocolate, peppermint, and spearmint. These can worsen acid reflux in some people.  Watch when you eat    Avoid lying down for 3 hours after eating.    Do not snack before going to bed.  Raise your head  Raising your head and upper body by 4 to 6 inches helps limit reflux when you re lying down. Put blocks under the head of your bed frame to raise it.  Other changes    Lose weight, if you need to    Don t exercise near bedtime    Avoid tight-fitting clothes    Limit aspirin and ibuprofen    Stop smoking   Date Last Reviewed: 7/1/2016 2000-2017 [employer]. [address] [place] [address], [place] [address]. All rights reserved. This information is not intended as a substitute for professional medical care. Always follow your healthcare professional's instructions.

## 2017-08-17 NOTE — ED AVS SNAPSHOT
Emergency Department    [address] [place] [address]    Phone:  [phone]    Fax:  [phone]                                       Ronel Ryan   MRN: [id number]    Department:   Emergency Department   Date of Visit:  8/17/2017           After Visit Summary Signature Page     I have received my discharge instructions, and my questions have been answered. I have discussed any challenges I see with this plan with the nurse or doctor.    ..........................................................................................................................................  Patient/Patient Representative Signature      ..........................................................................................................................................  Patient Representative Print Name and Relationship to Patient    ..................................................               ................................................  Date                                            Time    ..........................................................................................................................................  Reviewed by Signature/Title    ...................................................              ..............................................  Date                                                            Time

## 2017-09-26 ENCOUNTER — APPOINTMENT (OUTPATIENT)
Dept: GENERAL RADIOLOGY | Facility: CLINIC | Age: 37
End: 2017-09-26
Attending: EMERGENCY MEDICINE
Payer: MEDICARE

## 2017-09-26 ENCOUNTER — HOSPITAL ENCOUNTER (EMERGENCY)
Facility: CLINIC | Age: 37
Discharge: HOME | End: 2017-09-26
Attending: EMERGENCY MEDICINE | Admitting: EMERGENCY MEDICINE
Payer: MEDICARE

## 2017-09-26 ENCOUNTER — APPOINTMENT (OUTPATIENT)
Dept: ULTRASOUND IMAGING | Facility: CLINIC | Age: 37
End: 2017-09-26
Attending: EMERGENCY MEDICINE
Payer: MEDICARE

## 2017-09-26 VITALS
SYSTOLIC BLOOD PRESSURE: 136 MMHG | OXYGEN SATURATION: 99 % | WEIGHT: 293 LBS | RESPIRATION RATE: 30 BRPM | DIASTOLIC BLOOD PRESSURE: 81 MMHG | TEMPERATURE: 99 F | HEART RATE: 68 BPM | HEIGHT: 64 IN | BODY MASS INDEX: 50.02 KG/M2

## 2017-09-26 DIAGNOSIS — R07.89 ATYPICAL CHEST PAIN: ICD-10-CM

## 2017-09-26 LAB
ALBUMIN SERPL-MCNC: 3.3 G/DL (ref 3.4–5)
ALP SERPL-CCNC: 55 U/L (ref 40–150)
ALT SERPL W P-5'-P-CCNC: 20 U/L (ref 0–50)
ANION GAP SERPL CALCULATED.3IONS-SCNC: 5 MMOL/L (ref 3–14)
AST SERPL W P-5'-P-CCNC: 20 U/L (ref 0–45)
BASOPHILS # BLD AUTO: 0 10E9/L (ref 0–0.2)
BASOPHILS NFR BLD AUTO: 0.2 %
BILIRUB SERPL-MCNC: 0.3 MG/DL (ref 0.2–1.3)
BUN SERPL-MCNC: 9 MG/DL (ref 7–30)
CALCIUM SERPL-MCNC: 7.9 MG/DL (ref 8.5–10.1)
CHLORIDE SERPL-SCNC: 105 MMOL/L (ref 94–109)
CO2 SERPL-SCNC: 30 MMOL/L (ref 20–32)
CREAT SERPL-MCNC: 0.95 MG/DL (ref 0.52–1.04)
DIFFERENTIAL METHOD BLD: NORMAL
EOSINOPHIL # BLD AUTO: 0.5 10E9/L (ref 0–0.7)
EOSINOPHIL NFR BLD AUTO: 5.9 %
ERYTHROCYTE [DISTWIDTH] IN BLOOD BY AUTOMATED COUNT: 13.1 % (ref 10–15)
GFR SERPL CREATININE-BSD FRML MDRD: 66 ML/MIN/1.7M2
GLUCOSE BLDC GLUCOMTR-MCNC: 113 MG/DL (ref 70–99)
GLUCOSE SERPL-MCNC: 94 MG/DL (ref 70–99)
HCT VFR BLD AUTO: 39.9 % (ref 35–47)
HGB BLD-MCNC: 13.5 G/DL (ref 11.7–15.7)
IMM GRANULOCYTES # BLD: 0 10E9/L (ref 0–0.4)
IMM GRANULOCYTES NFR BLD: 0.4 %
INTERPRETATION ECG - MUSE: NORMAL
INTERPRETATION ECG - MUSE: NORMAL
LIPASE SERPL-CCNC: 124 U/L (ref 73–393)
LYMPHOCYTES # BLD AUTO: 2.5 10E9/L (ref 0.8–5.3)
LYMPHOCYTES NFR BLD AUTO: 31.1 %
MCH RBC QN AUTO: 29 PG (ref 26.5–33)
MCHC RBC AUTO-ENTMCNC: 33.8 G/DL (ref 31.5–36.5)
MCV RBC AUTO: 86 FL (ref 78–100)
MONOCYTES # BLD AUTO: 0.6 10E9/L (ref 0–1.3)
MONOCYTES NFR BLD AUTO: 7.2 %
NEUTROPHILS # BLD AUTO: 4.5 10E9/L (ref 1.6–8.3)
NEUTROPHILS NFR BLD AUTO: 55.2 %
NRBC # BLD AUTO: 0 10*3/UL
NRBC BLD AUTO-RTO: 0 /100
PLATELET # BLD AUTO: 291 10E9/L (ref 150–450)
POTASSIUM SERPL-SCNC: 3.4 MMOL/L (ref 3.4–5.3)
PROT SERPL-MCNC: 6.8 G/DL (ref 6.8–8.8)
RBC # BLD AUTO: 4.65 10E12/L (ref 3.8–5.2)
SODIUM SERPL-SCNC: 140 MMOL/L (ref 133–144)
TROPONIN I SERPL-MCNC: <0.015 UG/L (ref 0–0.04)
WBC # BLD AUTO: 8.1 10E9/L (ref 4–11)

## 2017-09-26 PROCEDURE — A9270 NON-COVERED ITEM OR SERVICE: HCPCS | Mod: GY | Performed by: EMERGENCY MEDICINE

## 2017-09-26 PROCEDURE — 93005 ELECTROCARDIOGRAM TRACING: CPT

## 2017-09-26 PROCEDURE — 25000132 ZZH RX MED GY IP 250 OP 250 PS 637: Mod: GY | Performed by: EMERGENCY MEDICINE

## 2017-09-26 PROCEDURE — 80053 COMPREHEN METABOLIC PANEL: CPT | Performed by: EMERGENCY MEDICINE

## 2017-09-26 PROCEDURE — 00000146 ZZHCL STATISTIC GLUCOSE BY METER IP

## 2017-09-26 PROCEDURE — 84484 ASSAY OF TROPONIN QUANT: CPT | Performed by: EMERGENCY MEDICINE

## 2017-09-26 PROCEDURE — 25000125 ZZHC RX 250: Performed by: EMERGENCY MEDICINE

## 2017-09-26 PROCEDURE — 71020 XR CHEST 2 VW: CPT

## 2017-09-26 PROCEDURE — 85025 COMPLETE CBC W/AUTO DIFF WBC: CPT | Performed by: EMERGENCY MEDICINE

## 2017-09-26 PROCEDURE — 83690 ASSAY OF LIPASE: CPT | Performed by: EMERGENCY MEDICINE

## 2017-09-26 PROCEDURE — 76705 ECHO EXAM OF ABDOMEN: CPT

## 2017-09-26 PROCEDURE — 99285 EMERGENCY DEPT VISIT HI MDM: CPT | Mod: 25

## 2017-09-26 RX ORDER — ASPIRIN 81 MG/1
324 TABLET, CHEWABLE ORAL ONCE
Status: COMPLETED | OUTPATIENT
Start: 2017-09-26 | End: 2017-09-26

## 2017-09-26 RX ADMIN — ASPIRIN 81 MG 324 MG: 81 TABLET ORAL at 17:30

## 2017-09-26 RX ADMIN — LIDOCAINE HYDROCHLORIDE 30 ML: 20 SOLUTION ORAL; TOPICAL at 19:26

## 2017-09-26 ASSESSMENT — ENCOUNTER SYMPTOMS
FREQUENCY: 1
SHORTNESS OF BREATH: 1

## 2017-09-26 NOTE — ED PROVIDER NOTES
"  History     Chief Complaint:  Chest Pain     HPI   Ronel Ryan is a 37 year old female who presents to the emergency department today for evaluation of chest pain.    (Pt reports she was sitting at work when she developed sudden onset chest pain 20 minutes prior to arrival. Pt reports crushing feeling at center of chest and pain radiates down into R side. Pt reports hx of htn)    Allergies:  Cephalexin  Erythromycin  Penicillins    Medications:    ***    EPINEPHrine (EPIPEN/ADRENACLICK/OR ANY BX GENERIC EQUIV) 0.3 MG/0.3ML injection 2-pack   cetirizine (ZYRTEC) 10 MG tablet   albuterol (PROAIR HFA/PROVENTIL HFA/VENTOLIN HFA) 108 (90 BASE) MCG/ACT Inhaler   buPROPion (WELLBUTRIN XL) 300 MG 24 hr tablet   cetirizine (ZYRTEC) 10 MG tablet   clonazePAM (KLONOPIN) 0.5 MG tablet   divalproex (DEPAKOTE) 500 MG EC tablet   lisinopril (PRINIVIL/ZESTRIL) 10 MG tablet   lurasidone (LATUDA) 40 MG TABS tablet   norethindrone (MICRONOR) 0.35 MG per tablet   oxybutynin (DITROPAN-XL) 10 MG 24 hr tablet   pravastatin (PRAVACHOL) 20 MG tablet   prazosin (MINIPRESS) 1 MG capsule   citalopram (CELEXA) 10 MG tablet     Past Medical History:    Allergic state   Anxiety   Depressive disorder   Elevated cholesterol   Hypertension   Uncomplicated asthma     Past Surgical History:    Cholecystectomy  Orthopedic surgery    Family History:    ***  No family history on file.     Social History:  The patient was accompanied to the ED by ***.  Smoking Status: Never Smoker***  Smokeless Tobacco: Never Used***  Alcohol Use: Positive***  Marital Status:  Single [1]  Social History   Substance Use Topics     Smoking status: Never Smoker     Smokeless tobacco: Never Used     Alcohol use No        Review of Systems  ***    Physical Exam     Patient Vitals for the past 24 hrs:   BP Temp Temp src Pulse Heart Rate Resp SpO2 Height Weight   09/26/17 1644 140/70 99  F (37.2  C) Oral 91 91 18 99 % 1.626 m (5' 4\") (!) 147.4 kg (325 lb)       Physical " "Exam  ***    Emergency Department Course     ECG:  ECG taken at ***, ECG read at ***  Normal sinus rhythm***  Normal ECG***  Rate *** bpm. NJ interval *** ms. QRS duration *** ms. QT/QTc *** ms. P-R-T axes ***.    Imaging:  Radiology findings were communicated with the *** who voiced understanding of the findings.    No orders to display     Laboratory:  Laboratory findings were communicated with the *** who voiced understanding of the findings.    ***  CBC: WBC ***, HGB ***, PLT ***  ***MP: *** o/w WNL (Creatinine ***)  ***    Procedures:  ***    Interventions:  ***  Medications - No data to display  Emergency Department Course:    *** Nursing notes and vitals reviewed.    *** I performed an exam of the patient as documented above.     I personally reviewed the *** results with the *** and answered all related questions prior to ***.    Impression & Plan      Medical Decision Making:  Ronel Ryan is a 37 year old female who presents to the emergency department today for evaluation of ***    Diagnosis:  ***  No diagnosis found.  Disposition:   ***    Critical Care time was *** minutes for this patient excluding procedures.     CMS Diagnoses: {Sepsis/Septic Shock/Stemi/Stroke:746011::\" \"}      {trauma activation?:005048::\" \"}    Discharge Medications:  ***  New Prescriptions    No medications on file       Scribe Disclosure:  IRadha, am serving as a scribe at 4:58 PM on 9/26/2017 to document services personally performed by Deanna Quiroz MD*** based on my observations and the provider's statements to me.       [facility] EMERGENCY DEPARTMENT    "

## 2017-09-26 NOTE — ED PROVIDER NOTES
History     Chief Complaint:  Chest Pain    HPI   Ronel yRan is a 37 year old female who presents with chest pain. The patient reports she developed some chest pressure last night, that lasted for 20 minutes. The pain went away, but returned at 1400 while the patient was taking a break at work. She describes the pain as stabbing, and is located at her mid chest. The episode today lasted about 10 minutes. The pain radiated to her right side, and she describes this as a sharp pain under her ribs. The chest pressure is currently better than it was earlier. Patient was short of breath during this episode. She also reports two of her brothers have had a history of heart disease. She states she had diabetes in the past, but has not been on meds for about 2 years. She has been urinating frequently. Of note, the patient has episodes of chest pain every week or so.     The patient reports she also has a history of depression. She has had no recent thoughts of hurting herself or others. She has cut herself in the past, though not recently. She does think she gets chest pain more frequently when she feels depressed.     Allergies:  Cephalexin  Erythromycin  Penicillins     Medications:    Epinephrine  Zyrtec  Albuterol  Wellbutrin  Zyrtec  Klonopin  Depakote  Lisinopril  Latuda  Micronor  Ditropan  Pravachol  Minipress  Celexa     Past Medical History:    Anxiety  Depression  HLD  HTN  Asthma  DM    Past Surgical History:    Cholecystectomy  Ankle surgery  ACL surgery    Family History:    CAD    Social History:  Relationship status: Single  Tobacco use: Negative  Alcohol use: Negative  The patient presents alone.     Review of Systems   Respiratory: Positive for shortness of breath.    Cardiovascular: Positive for chest pain.   Genitourinary: Positive for frequency.   All other systems reviewed and are negative.      Physical Exam   First Vitals:  BP: 140/70  Pulse: 91  Heart Rate: 91  Temp: 99  F (37.2  C)  Resp:  "18  Height: 162.6 cm (5' 4\")  Weight: (!) 147.4 kg (325 lb)  SpO2: 99 %    Physical Exam  Physical Exam   General: Resting on the bed.  Head: No obvious trauma to head.  Ears, Nose, Throat:  External ears normal.  Nose normal.    Eyes:  Conjunctivae and EOM are normal.  Pupils are equal, round, and reactive.   Neck: Normal range of motion.  Neck supple.   CV: Regular rate and rhythm.  No murmurs.      Respiratory: Effort normal and breath sounds normal.  No wheezing or crackles.   Gastrointestinal: Soft.  No distension. There is no tenderness.   Neuro: Alert. Moving all extremities appropriately.  Normal speech.    MSK: soft posterior calves, non tender  Skin: Skin is warm and dry.  No rash noted.   Psych: tearful, mood flat but appropriate.  No SI or HI. Behavior is normal.       Emergency Department Course   ECG (18:00:41):  Indication: Chest pain.   Rate 72 bpm. MO interval 162. QRS duration 88. QT/QTc 424/464. P-R-T axes 1.   Interpretation: Normal sinus rhythm. Cannot rule out anterior infarct, age undetermined. III and aVL flattening seen previously.  Agree with computer interpretation.   Interpreted at 1802 by Dr. Quiroz.     Imaging:  Radiographic findings were communicated with the patient who voiced understanding of the findings.    Chest XR, per radiology:  Negative.    Abdomen US, per radiology:   1 Prior cholecystectomy but no dilated bile ducts.  2. Diffuse fatty infiltration of a normal-sized liver.    Laboratory:  CBC: WNL (WBC 8.1, HGB 13.5, )  CMP: Calcium 7.9 (L), Albumin 3.3 (L), o/w WNL (Creatinine 0.95)  1730: Troponin I: <0.015  1932: Troponin I: <0.015  2133: Troponin I: <0.015   Lipase: 124  1714: Glucose by Meter: 113 (H)     Interventions:  1730: Aspirin, 324 mg, PO   1926: GI cocktail, 30 mL, PO    Emergency Department Course:  Nursing notes and vitals reviewed.  I performed an exam of the patient as documented above.  The above workup was undertaken.   I rechecked the patient and " discussed results.    Findings and plan explained to the Patient. Patient discharged home, status improved, with instructions regarding supportive care, medications, and reasons to return as well as the importance of close follow-up was reviewed.      Impression & Plan      Medical Decision Making:  Ronel Ryan is a 37 year old female with a history of HTN, HLD, obesity, who presents with chest pain. Vitals reviewed and unremarkable. Broad differential pursued, and included, but not limited to, pancreatitis, gastritis, cholecystitis, hepatitis, ACS, PE, pneumonia, pneumothorax, dissection, anxiety, depression, etc. Patient has multitude of signs and symptoms. She has some focal RUQ and epigastric pain to palpation. Lipase WNL. Hepatic function WNL. Does not suggest acute obstructive biliary pathology or pancreatitis. Remainder of electrolytes WNL. No acute renal or metabolic abnormality. CBC unremarkable, without acute leukocytosis or anemia. RUQ ultrasound shows fatty liver, but no bile duct dilatation or evidence of obstruction. Chest XR obtained without abnormality of mediastinum, pneumothorax, pneumonia, or other acute etiologies. She has symmetric pulses, with normal mediastinum, and no neurologic symptoms, do not suspect dissection. Considered PE, patient is PERC negative, overall very low concern for PE at this time. No pleuritic pain or hypoxia. Do not feel that further workup with PE is necessary. EKG reviewed. No acute ST changes or ischemic changes. This was repeated twice in the ED, as initial one was difficult to read. Repeat was improved, and showed no acute changes from prior. Troponin x3 were negative (additionally obtained incidentally prior to delta 3 hour being obtained). With significant family history, patient is a heart score of 3. Therefore plan to do outpatient stress. Unfortunately, patient is unable to ambulate due to hip problems, therefore she will require dobutamine stress test. This  will be ordered. Discussed she needs PCP follow up to discuss results.    Of note, she also was rather tearful on assessment, and having some difficulty with depression. DEC assessment performed. Denies suicidal or homicidal ideation. She does not appear to be an acute safety risk to herself or others. Therefore, she seems appropriate for discharge home. She reports compliance with mental health medications. I encouraged her to continue using these, and follow up closely with her therapist. She voiced understanding, and was discharged in stable condition.       Diagnosis:     ICD-10-CM   1. Atypical chest pain R07.89     Disposition:  Discharge to home with primary care and stress test follow up.    I, Adriano Cabrera, am serving as a scribe on 9/26/2017 at 4:58 PM to personally document services performed by Deanna Quiroz MD, based on my observations and the provider's statements to me.     EMERGENCY DEPARTMENT       Deanna Quiroz MD  09/26/17 3547

## 2017-09-26 NOTE — ED AVS SNAPSHOT
Emergency Department    [address] [facility] [address]    Phone:  [phone]    Fax:  [phone]                                       Ronel Ryan   MRN: [id number]    Department:   Emergency Department   Date of Visit:  9/26/2017           Patient Information     Date Of Birth          1980        Your diagnoses for this visit were:     Atypical chest pain        You were seen by Deanna Quiroz MD.      Follow-up Information     Follow up with Clinic, [facility]. Schedule an appointment as soon as possible for a visit in 2 days.    Contact information:    [address] [address] [phone]          Discharge Instructions       Please return to the ED if you have active chest pain, shortness of breath, nausea, sweatiness, or other acute changes.  Please follow up with your PCP in the next 2-3 days.      Discharge Instructions  Chest Pain    You have been seen today for chest pain or discomfort.  At this time, your provider has found no signs that your chest pain is due to a serious or life-threatening condition, (or you have declined more testing and/or admission to the hospital). However, sometimes there is a serious problem that does not show up right away. Your evaluation today may not be complete and you may need further testing and evaluation.     Generally, every Emergency Department visit should have a follow-up clinic visit with either a primary or a specialty clinic/provider. Please follow-up as instructed by your emergency provider today.  Return to the Emergency Department if:    Your chest pain changes, gets worse, starts to happen more often, or comes with less activity.    You are newly short of breath.    You get very weak or tired.    You pass out or faint.    You have any new symptoms, like fever, cough, numb legs, or you cough up blood.    You have anything else that worries you.    Until you follow-up with your regular provider,  please do the following:    Take one aspirin daily unless you have an allergy or are told not to by your provider.    If a stress test appointment has been made, go to the appointment.    If you have questions, contact your regular provider.    Follow-up with your regular provider/clinic as directed; this is very important.    If you were given a prescription for medicine here today, be sure to read all of the information (including the package insert) that comes with your prescription.  This will include important information about the medicine, its side effects, and any warnings that you need to know about.  The pharmacist who fills the prescription can provide more information and answer questions you may have about the medicine.  If you have questions or concerns that the pharmacist cannot address, please call or return to the Emergency Department.       Remember that you can always come back to the Emergency Department if you are not able to see your regular provider in the amount of time listed above, if you get any new symptoms, or if there is anything that worries you.      24 Hour Appointment Hotline       To make an appointment at any [facility], call [phone] ([phone]). If you don't have a family doctor or clinic, we will help you find one. [facility] clinics are conveniently located to serve the needs of you and your family.          ED Discharge Orders     Dobutamine Stress Echocardiogram       Administration of IV contrast will be tailored to this examination per the appropriate written protocol listed in the Echocardiography department Protocol Book, or by the supervising Cardiologist. This may result in an order change.    Use of contrast is at the discretion of the supervising Cardiologist.                     Review of your medicines      Our records show that you are taking the medicines listed below. If these are incorrect, please call your family doctor or clinic.        Dose /  Directions Last dose taken    albuterol 108 (90 BASE) MCG/ACT Inhaler   Commonly known as:  PROAIR HFA/PROVENTIL HFA/VENTOLIN HFA   Dose:  1-2 puff        Inhale 1-2 puffs into the lungs   Refills:  0        buPROPion 300 MG 24 hr tablet   Commonly known as:  WELLBUTRIN XL   Dose:  300 mg        Take 300 mg by mouth   Refills:  0        * cetirizine 10 MG tablet   Commonly known as:  zyrTEC        Refills:  0        * cetirizine 10 MG tablet   Commonly known as:  zyrTEC   Dose:  10 mg   Quantity:  7 tablet        Take 1 tablet (10 mg) by mouth daily   Refills:  0        citalopram 10 MG tablet   Commonly known as:  celeXA   Dose:  10 mg        Take 10 mg by mouth   Refills:  0        clonazePAM 0.5 MG tablet   Commonly known as:  klonoPIN   Dose:  0.5 mg        Take 0.5 mg by mouth   Refills:  0        divalproex 500 MG EC tablet   Commonly known as:  DEPAKOTE   Dose:  250 mg        Take 250 mg by mouth   Refills:  0        EPINEPHrine 0.3 MG/0.3ML injection 2-pack   Commonly known as:  EPIPEN/ADRENACLICK/or ANY BX GENERIC EQUIV   Dose:  0.3 mg   Quantity:  0.6 mL        Inject 0.3 mLs (0.3 mg) into the muscle once as needed for anaphylaxis   Refills:  0        lisinopril 10 MG tablet   Commonly known as:  PRINIVIL/ZESTRIL   Dose:  10 mg        Take 10 mg by mouth   Refills:  0        lurasidone 40 MG Tabs tablet   Commonly known as:  LATUDA   Dose:  80 mg        Take 80 mg by mouth   Refills:  0        norethindrone 0.35 MG per tablet   Commonly known as:  MICRONOR        Refills:  0        oxybutynin 10 MG 24 hr tablet   Commonly known as:  DITROPAN-XL   Dose:  10 mg        Take 10 mg by mouth   Refills:  0        pravastatin 20 MG tablet   Commonly known as:  PRAVACHOL   Dose:  10 mg        Take 10 mg by mouth   Refills:  0        prazosin 1 MG capsule   Commonly known as:  MINIPRESS   Dose:  2 mg        Take 2 mg by mouth   Refills:  0        * Notice:  This list has 2 medication(s) that are the same as other  medications prescribed for you. Read the directions carefully, and ask your doctor or other care provider to review them with you.            Procedures and tests performed during your visit     Procedure/Test Number of Times Performed    Abdomen US, limited (RUQ only) 1    CBC with platelets differential 1    Comprehensive metabolic panel 1    EKG 12 lead 1    EKG 12-lead, tracing only 1    Glucose by meter 1    Lipase 1    Troponin I 1    Troponin I (now) 2    XR Chest 2 Views 1      Orders Needing Specimen Collection     None      Pending Results     No orders found from 9/24/2017 to 9/27/2017.            Pending Culture Results     No orders found from 9/24/2017 to 9/27/2017.            Pending Results Instructions     If you had any lab results that were not finalized at the time of your Discharge, you can call the ED Lab Result RN at [phone]. You will be contacted by this team for any positive Lab results or changes in treatment. The nurses are available 7 days a week from 10A to 6:30P.  You can leave a message 24 hours per day and they will return your call.        Test Results From Your Hospital Stay        9/26/2017  5:58 PM      Component Results     Component Value Ref Range & Units Status    WBC 8.1 4.0 - 11.0 10e9/L Final    RBC Count 4.65 3.8 - 5.2 10e12/L Final    Hemoglobin 13.5 11.7 - 15.7 g/dL Final    Hematocrit 39.9 35.0 - 47.0 % Final    MCV 86 78 - 100 fl Final    MCH 29.0 26.5 - 33.0 pg Final    MCHC 33.8 31.5 - 36.5 g/dL Final    RDW 13.1 10.0 - 15.0 % Final    Platelet Count 291 150 - 450 10e9/L Final    Diff Method Automated Method  Final    % Neutrophils 55.2 % Final    % Lymphocytes 31.1 % Final    % Monocytes 7.2 % Final    % Eosinophils 5.9 % Final    % Basophils 0.2 % Final    % Immature Granulocytes 0.4 % Final    Nucleated RBCs 0 0 /100 Final    Absolute Neutrophil 4.5 1.6 - 8.3 10e9/L Final    Absolute Lymphocytes 2.5 0.8 - 5.3 10e9/L Final    Absolute Monocytes 0.6 0.0 - 1.3  10e9/L Final    Absolute Eosinophils 0.5 0.0 - 0.7 10e9/L Final    Absolute Basophils 0.0 0.0 - 0.2 10e9/L Final    Abs Immature Granulocytes 0.0 0 - 0.4 10e9/L Final    Absolute Nucleated RBC 0.0  Final         9/26/2017  6:20 PM      Component Results     Component Value Ref Range & Units Status    Troponin I ES <0.015 0.000 - 0.045 ug/L Final    The 99th percentile for upper reference range is 0.045 ug/L.  Troponin values   in the range of 0.045 - 0.120 ug/L may be associated with risks of adverse   clinical events.           9/26/2017  6:20 PM      Component Results     Component Value Ref Range & Units Status    Sodium 140 133 - 144 mmol/L Final    Potassium 3.4 3.4 - 5.3 mmol/L Final    Chloride 105 94 - 109 mmol/L Final    Carbon Dioxide 30 20 - 32 mmol/L Final    Anion Gap 5 3 - 14 mmol/L Final    Glucose 94 70 - 99 mg/dL Final    Urea Nitrogen 9 7 - 30 mg/dL Final    Creatinine 0.95 0.52 - 1.04 mg/dL Final    GFR Estimate 66 >60 mL/min/1.7m2 Final    Non  GFR Calc    GFR Estimate If Black 80 >60 mL/min/1.7m2 Final    African American GFR Calc    Calcium 7.9 (L) 8.5 - 10.1 mg/dL Final    Bilirubin Total 0.3 0.2 - 1.3 mg/dL Final    Albumin 3.3 (L) 3.4 - 5.0 g/dL Final    Protein Total 6.8 6.8 - 8.8 g/dL Final    Alkaline Phosphatase 55 40 - 150 U/L Final    ALT 20 0 - 50 U/L Final    AST 20 0 - 45 U/L Final         9/26/2017  6:16 PM      Component Results     Component Value Ref Range & Units Status    Lipase 124 73 - 393 U/L Final         9/26/2017  5:51 PM      Narrative     XR CHEST 2 VW   9/26/2017 5:49 PM     HISTORY: Chest pain    COMPARISON: 8/17/2017.    FINDINGS: Heart size normal. Lungs are clear. No interval change.        Impression     IMPRESSION: Negative.    ANGELA LLAMAS MD         9/26/2017  6:34 PM      Narrative     LIMITED ABDOMEN ULTRASOUND   9/26/2017 6:21 PM     HISTORY: Right upper quadrant pain, post cholecystectomy.    COMPARISON: None.    FINDINGS: Gallbladder  is surgically absent. No dilated bile ducts.    Liver appears normal in size but has diffuse fatty infiltration.  Pancreas is not seen due to bowel gas. Right kidney measures 12 cm  [id number] and appears normal. No mass or obstruction.        Impression     IMPRESSION:   1 Prior cholecystectomy but no dilated bile ducts.  2. Diffuse fatty infiltration of a normal-sized liver.    ANGELA LLAMAS MD         9/26/2017  5:21 PM      Component Results     Component Value Ref Range & Units Status    Glucose 113 (H) 70 - 99 mg/dL Final         9/26/2017  8:09 PM      Component Results     Component Value Ref Range & Units Status    Troponin I ES <0.015 0.000 - 0.045 ug/L Final    The 99th percentile for upper reference range is 0.045 ug/L.  Troponin values   in the range of 0.045 - 0.120 ug/L may be associated with risks of adverse   clinical events.           9/26/2017 10:06 PM      Component Results     Component Value Ref Range & Units Status    Troponin I ES <0.015 0.000 - 0.045 ug/L Final    The 99th percentile for upper reference range is 0.045 ug/L.  Troponin values   in the range of 0.045 - 0.120 ug/L may be associated with risks of adverse   clinical events.                  Clinical Quality Measure: Blood Pressure Screening     Your blood pressure was checked while you were in the emergency department today. The last reading we obtained was  BP: 136/81 . Please read the guidelines below about what these numbers mean and what you should do about them.  If your systolic blood pressure (the top number) is less than 120 and your diastolic blood pressure (the bottom number) is less than 80, then your blood pressure is normal. There is nothing more that you need to do about it.  If your systolic blood pressure (the top number) is 120-139 or your diastolic blood pressure (the bottom number) is 80-89, your blood pressure may be higher than it should be. You should have your blood pressure rechecked within a year by a  "primary care provider.  If your systolic blood pressure (the top number) is 140 or greater or your diastolic blood pressure (the bottom number) is 90 or greater, you may have high blood pressure. High blood pressure is treatable, but if left untreated over time it can put you at risk for heart attack, stroke, or kidney failure. You should have your blood pressure rechecked by a primary care provider within the next 4 weeks.  If your provider in the emergency department today gave you specific instructions to follow-up with your doctor or provider even sooner than that, you should follow that instruction and not wait for up to 4 weeks for your follow-up visit.        Thank you for choosing [facility]       Thank you for choosing [facility] for your care. Our goal is always to provide you with excellent care. Hearing back from our patients is one way we can continue to improve our services. Please take a few minutes to complete the written survey that you may receive in the mail after you visit with us. Thank you!        [employer] Information     [employer] lets you send messages to your doctor, view your test results, renew your prescriptions, schedule appointments and more. To sign up, go to [email or web address] . Click on \"Log in\" on the left side of the screen, which will take you to the Welcome page. Then click on \"Sign up Now\" on the right side of the page.     You will be asked to enter the access code listed below, as well as some personal information. Please follow the directions to create your username and password.     Your access code is: [email or web address]  Expires: 2017  4:09 PM     Your access code will  in 90 days. If you need help or a new code, please call your [facility] clinic or [phone].        Care EveryWhere ID     This is your Care EveryWhere ID. This could be used by other organizations to access your [facility] medical records  [id number]        Equal Access to Services     DAINA LOPEZ AH: " Hadii mary Brooke, wakaroline antony, qasusanta kaalbola hudson. So [facility] [phone].    ATENCIÓN: Si habla español, tiene a hussein disposición servicios gratuitos de asistencia lingüística. Llame al [phone].    We comply with applicable federal civil rights laws and [place] laws. We do not discriminate on the basis of race, color, national origin, age, disability sex, sexual orientation or gender identity.            After Visit Summary       This is your record. Keep this with you and show to your community pharmacist(s) and doctor(s) at your next visit.

## 2017-09-26 NOTE — ED AVS SNAPSHOT
Emergency Department    [address] [place] [address]    Phone:  [phone]    Fax:  [phone]                                       Ronel Ryan   MRN: [id number]    Department:   Emergency Department   Date of Visit:  9/26/2017           After Visit Summary Signature Page     I have received my discharge instructions, and my questions have been answered. I have discussed any challenges I see with this plan with the nurse or doctor.    ..........................................................................................................................................  Patient/Patient Representative Signature      ..........................................................................................................................................  Patient Representative Print Name and Relationship to Patient    ..................................................               ................................................  Date                                            Time    ..........................................................................................................................................  Reviewed by Signature/Title    ...................................................              ..............................................  Date                                                            Time

## 2017-09-27 NOTE — DISCHARGE INSTRUCTIONS
Please return to the ED if you have active chest pain, shortness of breath, nausea, sweatiness, or other acute changes.  Please follow up with your PCP in the next 2-3 days.      Discharge Instructions  Chest Pain    You have been seen today for chest pain or discomfort.  At this time, your provider has found no signs that your chest pain is due to a serious or life-threatening condition, (or you have declined more testing and/or admission to the hospital). However, sometimes there is a serious problem that does not show up right away. Your evaluation today may not be complete and you may need further testing and evaluation.     Generally, every Emergency Department visit should have a follow-up clinic visit with either a primary or a specialty clinic/provider. Please follow-up as instructed by your emergency provider today.  Return to the Emergency Department if:    Your chest pain changes, gets worse, starts to happen more often, or comes with less activity.    You are newly short of breath.    You get very weak or tired.    You pass out or faint.    You have any new symptoms, like fever, cough, numb legs, or you cough up blood.    You have anything else that worries you.    Until you follow-up with your regular provider, please do the following:    Take one aspirin daily unless you have an allergy or are told not to by your provider.    If a stress test appointment has been made, go to the appointment.    If you have questions, contact your regular provider.    Follow-up with your regular provider/clinic as directed; this is very important.    If you were given a prescription for medicine here today, be sure to read all of the information (including the package insert) that comes with your prescription.  This will include important information about the medicine, its side effects, and any warnings that you need to know about.  The pharmacist who fills the prescription can provide more information and answer  questions you may have about the medicine.  If you have questions or concerns that the pharmacist cannot address, please call or return to the Emergency Department.       Remember that you can always come back to the Emergency Department if you are not able to see your regular provider in the amount of time listed above, if you get any new symptoms, or if there is anything that worries you.

## 2017-10-02 ENCOUNTER — ANESTHESIA (OUTPATIENT)
Dept: MEDSURG UNIT | Facility: CLINIC | Age: 37
End: 2017-10-02
Payer: MEDICARE

## 2017-10-02 ENCOUNTER — HOSPITAL ENCOUNTER (OUTPATIENT)
Dept: CARDIOLOGY | Facility: CLINIC | Age: 37
End: 2017-10-02
Attending: EMERGENCY MEDICINE | Admitting: INTERNAL MEDICINE
Payer: MEDICARE

## 2017-10-02 ENCOUNTER — ANESTHESIA EVENT (OUTPATIENT)
Dept: MEDSURG UNIT | Facility: CLINIC | Age: 37
End: 2017-10-02
Payer: MEDICARE

## 2017-10-02 ENCOUNTER — HOSPITAL ENCOUNTER (OUTPATIENT)
Facility: CLINIC | Age: 37
Discharge: HOME | End: 2017-10-02
Attending: INTERNAL MEDICINE | Admitting: INTERNAL MEDICINE
Payer: MEDICARE

## 2017-10-02 VITALS
DIASTOLIC BLOOD PRESSURE: 61 MMHG | OXYGEN SATURATION: 100 % | SYSTOLIC BLOOD PRESSURE: 155 MMHG | RESPIRATION RATE: 18 BRPM

## 2017-10-02 DIAGNOSIS — R07.89 ATYPICAL CHEST PAIN: ICD-10-CM

## 2017-10-02 PROCEDURE — 25000128 H RX IP 250 OP 636: Performed by: INTERNAL MEDICINE

## 2017-10-02 PROCEDURE — 25500064 ZZH RX 255 OP 636: Performed by: EMERGENCY MEDICINE

## 2017-10-02 PROCEDURE — 25000125 ZZHC RX 250: Performed by: INTERNAL MEDICINE

## 2017-10-02 PROCEDURE — 40000855 ZZH STATISTIC ECHO STRESS OR NM NPI

## 2017-10-02 PROCEDURE — 93350 STRESS TTE ONLY: CPT | Mod: 52 | Performed by: INTERNAL MEDICINE

## 2017-10-02 PROCEDURE — 93325 DOPPLER ECHO COLOR FLOW MAPG: CPT | Mod: 52 | Performed by: INTERNAL MEDICINE

## 2017-10-02 PROCEDURE — 93016 CV STRESS TEST SUPVJ ONLY: CPT | Mod: 52 | Performed by: INTERNAL MEDICINE

## 2017-10-02 PROCEDURE — 93018 CV STRESS TEST I&R ONLY: CPT | Mod: 52 | Performed by: INTERNAL MEDICINE

## 2017-10-02 PROCEDURE — 37000008 ZZH ANESTHESIA TECHNICAL FEE, 1ST 30 MIN

## 2017-10-02 PROCEDURE — 40000010 ZZH STATISTIC ANES STAT CODE-CRNA PER MINUTE

## 2017-10-02 PROCEDURE — 93321 DOPPLER ECHO F-UP/LMTD STD: CPT | Mod: 52 | Performed by: INTERNAL MEDICINE

## 2017-10-02 PROCEDURE — 93325 DOPPLER ECHO COLOR FLOW MAPG: CPT | Mod: TC,52

## 2017-10-02 PROCEDURE — 40000257 ZZH STATISTIC CONSULT NO CHARGE VASC ACCESS

## 2017-10-02 RX ORDER — DOBUTAMINE HYDROCHLORIDE 200 MG/100ML
10 INJECTION INTRAVENOUS CONTINUOUS
Status: DISCONTINUED | OUTPATIENT
Start: 2017-10-02 | End: 2017-10-02 | Stop reason: HOSPADM

## 2017-10-02 RX ORDER — METOPROLOL TARTRATE 1 MG/ML
2.5 INJECTION, SOLUTION INTRAVENOUS ONCE
Status: COMPLETED | OUTPATIENT
Start: 2017-10-02 | End: 2017-10-02

## 2017-10-02 RX ORDER — REGADENOSON 0.08 MG/ML
0.4 INJECTION, SOLUTION INTRAVENOUS ONCE
Status: DISCONTINUED | OUTPATIENT
Start: 2017-10-02 | End: 2017-10-02 | Stop reason: HOSPADM

## 2017-10-02 RX ADMIN — SULFUR HEXAFLUORIDE 5 ML: KIT at 15:17

## 2017-10-02 RX ADMIN — DOBUTAMINE IN DEXTROSE 10 MCG/KG/MIN: 200 INJECTION, SOLUTION INTRAVENOUS at 14:55

## 2017-10-02 RX ADMIN — METOPROLOL TARTRATE 2.5 MG: 5 INJECTION INTRAVENOUS at 15:06

## 2017-10-02 NOTE — ANESTHESIA CARE TRANSFER NOTE
Patient: Ronel Ryan    * No procedures listed *    Diagnosis: * No pre-op diagnosis entered *  Diagnosis Additional Information: No value filed.    Anesthesia Type:   No value filed.     Note:      Comments: IV start end 1443      Vitals: (Last set prior to Anesthesia Care Transfer)              Electronically Signed By: FEMI Montiel CRNA  October 2, 2017  2:50 PM

## 2017-10-02 NOTE — PROGRESS NOTES
Again Dobutamine drip stopped barely into 10 mcg  C/.o severe HA  Monitor > SR with freq pvcs occ pacs ? Elongation of LA interval  TEST aborted

## 2017-10-02 NOTE — PROGRESS NOTES
[facility]   [address]  [place] [address]  [phone]    7/19/2017      Robinson Jones Jr.      This letter is to inform you that Robinson was seen in our clinic today. His ailment has improved to the point of resolution and he is able to return to work without restriction as of today.      Sincerely,        Junior Ellsworth MD             Anesthesia here - was able to get 22 in wrist   Dobutamine started > only at 10 mcg > pt began to c/o severe throbbing HA - yelling -= moaning   Dr Campbell in -  Dobutamine gtt stopped   2.5 mg iv metoprolol given > BP much better - sys -150's  HA resolving

## 2017-10-04 ENCOUNTER — HOSPITAL ENCOUNTER (INPATIENT)
Facility: CLINIC | Age: 37
LOS: 6 days | Discharge: HOME | DRG: 885 | End: 2017-10-10
Attending: EMERGENCY MEDICINE | Admitting: INTERNAL MEDICINE
Payer: MEDICARE

## 2017-10-04 DIAGNOSIS — R07.9 CHEST PAIN, UNSPECIFIED TYPE: ICD-10-CM

## 2017-10-04 DIAGNOSIS — R45.851 SUICIDAL IDEATION: ICD-10-CM

## 2017-10-04 DIAGNOSIS — F33.2 SEVERE EPISODE OF RECURRENT MAJOR DEPRESSIVE DISORDER, WITHOUT PSYCHOTIC FEATURES (H): Primary | ICD-10-CM

## 2017-10-04 DIAGNOSIS — R07.89 ATYPICAL CHEST PAIN: ICD-10-CM

## 2017-10-04 LAB
ALBUMIN SERPL-MCNC: 3 G/DL (ref 3.4–5)
ALBUMIN UR-MCNC: NEGATIVE MG/DL
ALP SERPL-CCNC: 57 U/L (ref 40–150)
ALT SERPL W P-5'-P-CCNC: 14 U/L (ref 0–50)
AMPHETAMINES UR QL SCN: NEGATIVE
ANION GAP SERPL CALCULATED.3IONS-SCNC: 10 MMOL/L (ref 3–14)
APPEARANCE UR: CLEAR
AST SERPL W P-5'-P-CCNC: 11 U/L (ref 0–45)
BACTERIA #/AREA URNS HPF: ABNORMAL /HPF
BARBITURATES UR QL: NEGATIVE
BASOPHILS # BLD AUTO: 0 10E9/L (ref 0–0.2)
BASOPHILS NFR BLD AUTO: 0.2 %
BENZODIAZ UR QL: NEGATIVE
BILIRUB SERPL-MCNC: 0.7 MG/DL (ref 0.2–1.3)
BILIRUB UR QL STRIP: NEGATIVE
BUN SERPL-MCNC: 13 MG/DL (ref 7–30)
CALCIUM SERPL-MCNC: 8 MG/DL (ref 8.5–10.1)
CANNABINOIDS UR QL SCN: NEGATIVE
CHLORIDE SERPL-SCNC: 104 MMOL/L (ref 94–109)
CO2 SERPL-SCNC: 23 MMOL/L (ref 20–32)
COCAINE UR QL: NEGATIVE
COLOR UR AUTO: ABNORMAL
CREAT SERPL-MCNC: 1 MG/DL (ref 0.52–1.04)
DIFFERENTIAL METHOD BLD: ABNORMAL
EOSINOPHIL # BLD AUTO: 0.8 10E9/L (ref 0–0.7)
EOSINOPHIL NFR BLD AUTO: 5.5 %
ERYTHROCYTE [DISTWIDTH] IN BLOOD BY AUTOMATED COUNT: 13.2 % (ref 10–15)
GFR SERPL CREATININE-BSD FRML MDRD: 62 ML/MIN/1.7M2
GLUCOSE SERPL-MCNC: 95 MG/DL (ref 70–99)
GLUCOSE UR STRIP-MCNC: NEGATIVE MG/DL
HCG UR QL: NEGATIVE
HCT VFR BLD AUTO: 40.3 % (ref 35–47)
HGB BLD-MCNC: 13.6 G/DL (ref 11.7–15.7)
HGB UR QL STRIP: ABNORMAL
IMM GRANULOCYTES # BLD: 0.1 10E9/L (ref 0–0.4)
IMM GRANULOCYTES NFR BLD: 0.4 %
KETONES UR STRIP-MCNC: NEGATIVE MG/DL
LEUKOCYTE ESTERASE UR QL STRIP: NEGATIVE
LYMPHOCYTES # BLD AUTO: 3.8 10E9/L (ref 0.8–5.3)
LYMPHOCYTES NFR BLD AUTO: 26.5 %
MCH RBC QN AUTO: 28.7 PG (ref 26.5–33)
MCHC RBC AUTO-ENTMCNC: 33.7 G/DL (ref 31.5–36.5)
MCV RBC AUTO: 85 FL (ref 78–100)
MONOCYTES # BLD AUTO: 0.9 10E9/L (ref 0–1.3)
MONOCYTES NFR BLD AUTO: 6 %
NEUTROPHILS # BLD AUTO: 8.9 10E9/L (ref 1.6–8.3)
NEUTROPHILS NFR BLD AUTO: 61.4 %
NITRATE UR QL: NEGATIVE
OPIATES UR QL SCN: NEGATIVE
PCP UR QL SCN: NEGATIVE
PH UR STRIP: 6 PH (ref 5–7)
PLATELET # BLD AUTO: 282 10E9/L (ref 150–450)
POTASSIUM SERPL-SCNC: 3.7 MMOL/L (ref 3.4–5.3)
PROT SERPL-MCNC: 6.5 G/DL (ref 6.8–8.8)
RBC # BLD AUTO: 4.74 10E12/L (ref 3.8–5.2)
RBC #/AREA URNS AUTO: 1 /HPF (ref 0–2)
SODIUM SERPL-SCNC: 137 MMOL/L (ref 133–144)
SOURCE: ABNORMAL
SP GR UR STRIP: 1 (ref 1–1.03)
TSH SERPL DL<=0.005 MIU/L-ACNC: 2.38 MU/L (ref 0.4–4)
UROBILINOGEN UR STRIP-MCNC: NORMAL MG/DL (ref 0–2)
VALPROATE SERPL-MCNC: 21 MG/L (ref 50–100)
WBC # BLD AUTO: 14.4 10E9/L (ref 4–11)
WBC #/AREA URNS AUTO: 1 /HPF (ref 0–2)

## 2017-10-04 PROCEDURE — 25000132 ZZH RX MED GY IP 250 OP 250 PS 637: Mod: GY | Performed by: PSYCHIATRY & NEUROLOGY

## 2017-10-04 PROCEDURE — 90791 PSYCH DIAGNOSTIC EVALUATION: CPT

## 2017-10-04 PROCEDURE — 36415 COLL VENOUS BLD VENIPUNCTURE: CPT | Performed by: PSYCHIATRY & NEUROLOGY

## 2017-10-04 PROCEDURE — 80053 COMPREHEN METABOLIC PANEL: CPT | Performed by: PSYCHIATRY & NEUROLOGY

## 2017-10-04 PROCEDURE — 90853 GROUP PSYCHOTHERAPY: CPT

## 2017-10-04 PROCEDURE — 80307 DRUG TEST PRSMV CHEM ANLYZR: CPT | Performed by: EMERGENCY MEDICINE

## 2017-10-04 PROCEDURE — A9270 NON-COVERED ITEM OR SERVICE: HCPCS | Mod: GY | Performed by: PSYCHIATRY & NEUROLOGY

## 2017-10-04 PROCEDURE — 81025 URINE PREGNANCY TEST: CPT | Performed by: EMERGENCY MEDICINE

## 2017-10-04 PROCEDURE — 84443 ASSAY THYROID STIM HORMONE: CPT | Performed by: PSYCHIATRY & NEUROLOGY

## 2017-10-04 PROCEDURE — 80164 ASSAY DIPROPYLACETIC ACD TOT: CPT | Performed by: PSYCHIATRY & NEUROLOGY

## 2017-10-04 PROCEDURE — 81001 URINALYSIS AUTO W/SCOPE: CPT | Performed by: EMERGENCY MEDICINE

## 2017-10-04 PROCEDURE — 12400006 ZZH R&B MH INTERMEDIATE

## 2017-10-04 PROCEDURE — 85025 COMPLETE CBC W/AUTO DIFF WBC: CPT | Performed by: PSYCHIATRY & NEUROLOGY

## 2017-10-04 PROCEDURE — 99285 EMERGENCY DEPT VISIT HI MDM: CPT | Mod: 25

## 2017-10-04 RX ORDER — CITALOPRAM HYDROBROMIDE 20 MG/1
20 TABLET ORAL DAILY
Status: DISCONTINUED | OUTPATIENT
Start: 2017-10-04 | End: 2017-10-10 | Stop reason: HOSPADM

## 2017-10-04 RX ORDER — DIVALPROEX SODIUM 250 MG/1
250 TABLET, DELAYED RELEASE ORAL AT BEDTIME
Status: DISCONTINUED | OUTPATIENT
Start: 2017-10-04 | End: 2017-10-05

## 2017-10-04 RX ORDER — CETIRIZINE HYDROCHLORIDE 10 MG/1
10 TABLET ORAL DAILY
Status: DISCONTINUED | OUTPATIENT
Start: 2017-10-04 | End: 2017-10-10 | Stop reason: HOSPADM

## 2017-10-04 RX ORDER — BUPROPION HYDROCHLORIDE 300 MG/1
300 TABLET ORAL EVERY MORNING
Status: DISCONTINUED | OUTPATIENT
Start: 2017-10-05 | End: 2017-10-10 | Stop reason: HOSPADM

## 2017-10-04 RX ORDER — OXYBUTYNIN CHLORIDE 10 MG/1
10 TABLET, EXTENDED RELEASE ORAL DAILY
Status: DISCONTINUED | OUTPATIENT
Start: 2017-10-04 | End: 2017-10-10 | Stop reason: HOSPADM

## 2017-10-04 RX ORDER — CLONAZEPAM 0.5 MG/1
0.5 TABLET ORAL DAILY PRN
Status: DISCONTINUED | OUTPATIENT
Start: 2017-10-04 | End: 2017-10-05

## 2017-10-04 RX ORDER — ALBUTEROL SULFATE 90 UG/1
1-2 AEROSOL, METERED RESPIRATORY (INHALATION) EVERY 4 HOURS PRN
Status: DISCONTINUED | OUTPATIENT
Start: 2017-10-04 | End: 2017-10-10 | Stop reason: HOSPADM

## 2017-10-04 RX ORDER — PRAZOSIN HYDROCHLORIDE 2 MG/1
2 CAPSULE ORAL AT BEDTIME
Status: DISCONTINUED | OUTPATIENT
Start: 2017-10-04 | End: 2017-10-05

## 2017-10-04 RX ORDER — HYDROXYZINE HYDROCHLORIDE 25 MG/1
25-50 TABLET, FILM COATED ORAL EVERY 4 HOURS PRN
Status: DISCONTINUED | OUTPATIENT
Start: 2017-10-04 | End: 2017-10-10 | Stop reason: HOSPADM

## 2017-10-04 RX ORDER — LISINOPRIL 10 MG/1
20 TABLET ORAL DAILY
Status: DISCONTINUED | OUTPATIENT
Start: 2017-10-04 | End: 2017-10-10 | Stop reason: HOSPADM

## 2017-10-04 RX ORDER — LURASIDONE HYDROCHLORIDE 80 MG/1
80 TABLET, FILM COATED ORAL
Status: DISCONTINUED | OUTPATIENT
Start: 2017-10-04 | End: 2017-10-05

## 2017-10-04 RX ORDER — PRAVASTATIN SODIUM 10 MG
10 TABLET ORAL DAILY
Status: DISCONTINUED | OUTPATIENT
Start: 2017-10-04 | End: 2017-10-10 | Stop reason: HOSPADM

## 2017-10-04 RX ORDER — CLONAZEPAM 0.5 MG/1
0.5 TABLET ORAL AT BEDTIME
Status: DISCONTINUED | OUTPATIENT
Start: 2017-10-04 | End: 2017-10-09

## 2017-10-04 RX ADMIN — CETIRIZINE HYDROCHLORIDE 10 MG: 10 TABLET, FILM COATED ORAL at 16:23

## 2017-10-04 RX ADMIN — HYDROXYZINE HYDROCHLORIDE 50 MG: 25 TABLET ORAL at 21:15

## 2017-10-04 RX ADMIN — DIVALPROEX SODIUM 250 MG: 250 TABLET, DELAYED RELEASE ORAL at 21:11

## 2017-10-04 RX ADMIN — PRAZOSIN HYDROCHLORIDE 2 MG: 2 CAPSULE ORAL at 21:11

## 2017-10-04 RX ADMIN — LURASIDONE HYDROCHLORIDE 80 MG: 80 TABLET, FILM COATED ORAL at 17:28

## 2017-10-04 RX ADMIN — CLONAZEPAM 0.5 MG: 0.5 TABLET ORAL at 21:11

## 2017-10-04 RX ADMIN — LISINOPRIL 20 MG: 10 TABLET ORAL at 16:24

## 2017-10-04 RX ADMIN — PRAVASTATIN SODIUM 10 MG: 10 TABLET ORAL at 21:11

## 2017-10-04 ASSESSMENT — ENCOUNTER SYMPTOMS: DYSPHORIC MOOD: 1

## 2017-10-04 NOTE — ED NOTES
Walked pt to restroom for urine sample. Pt came out of restroom and informed me that she had a BM, which contaminated the urine. I gave her a large container of water and encouraged her to drink

## 2017-10-04 NOTE — IP AVS SNAPSHOT
[place] [address] [place] [address]    Phone:  [phone]                                       After Visit Summary   10/4/2017    Ronel Ryan    MRN: [id number]           After Visit Summary Signature Page     I have received my discharge instructions, and my questions have been answered. I have discussed any challenges I see with this plan with the nurse or doctor.    ..........................................................................................................................................  Patient/Patient Representative Signature      ..........................................................................................................................................  Patient Representative Print Name and Relationship to Patient    ..................................................               ................................................  Date                                            Time    ..........................................................................................................................................  Reviewed by Signature/Title    ...................................................              ..............................................  Date                                                            Time

## 2017-10-04 NOTE — ED PROVIDER NOTES
History     Chief Complaint:  Suicidal ideation    HPI   Ronel Ryan is a 37 year old female who presents voluntarily with suicidal ideation. Patient reports that she has been feeling depressed and suicidal for the past week. She is undergoing a lot of stress with her life. She has not recently harmed herself, but has noted that she has previously cut herself and has had thoughts of jumping off a bridge. She also also tried to overdose on Benadryl 2 weeks ago, 125 mg. Patient noted that she went to see her primary care provider today to check her A1c but did not mention her suicidal ideation. Patient also saw her therapist two days ago but did not mention about her condition because she knew she would be sent to the hospital and she did not want to go at that time. Patient lives alone, but states that she does not feel safe at home because she may cut herself. Patient denies alcohol or drug use.     Allergies:  Cephalexin  Erythromycin  Penicillins     Medications:    cetirizine (ZYRTEC) 10 MG tablet  albuterol (PROAIR HFA/PROVENTIL HFA/VENTOLIN HFA) 108 (90 BASE) MCG/ACT Inhaler  buPROPion (WELLBUTRIN XL) 300 MG 24 hr tablet  cetirizine (ZYRTEC) 10 MG tablet  clonazePAM (KLONOPIN) 0.5 MG tablet  divalproex (DEPAKOTE) 500 MG EC tablet  lisinopril (PRINIVIL/ZESTRIL) 10 MG tablet  lurasidone (LATUDA) 40 MG TABS tablet  norethindrone (MICRONOR) 0.35 MG per tablet  pravastatin (PRAVACHOL) 20 MG tablet  prazosin (MINIPRESS) 1 MG capsule  citalopram (CELEXA) 10 MG tablet    Past Medical History:    Allergic state  Anxiety  Depressive disorder  Elevated cholesterol  Hypertension  Asthma  Morbid obesity  Attention deficit disorder  Borderline personality disorder  Eating disorder    Past Surgical History:    Cholecystectomy  Orthopedic surgery    Family History:    History reviewed. No pertinent family history.     Social History:  Smoking status: None  Alcohol use: None  Marital Status:  Single [1]     Review of  "Systems   Psychiatric/Behavioral: Positive for dysphoric mood, self-injury and suicidal ideas.   All other systems reviewed and are negative.    Physical Exam   Patient Vitals for the past 24 hrs:   BP Temp Temp src Pulse Resp SpO2 Height Weight   10/04/17 1132 126/62 97.5  F (36.4  C) Oral 68 22 100 % 1.626 m (5' 4\") (!) 149.2 kg (329 lb)     Physical Exam  Nursing note and vitals reviewed.  Constitutional:  Oriented to person, place, and time. Vital signs are normal. Cooperative.   HENT:   Mouth/Throat:   Oropharynx is clear and moist and mucous membranes are normal.   Eyes:    Conjunctivae are normal.      Pupils are equal, round, and reactive to light.   Neck:    Trachea normal and normal range of motion.   Cardiovascular:  Normal rate, regular rhythm and normal heart sounds.    Pulses:   Radial pulses are 2+ bilaterally. Dorsalis pedis pulses are 2+ bilaterally.   Pulmonary/Chest:  Effort normal.   Abdominal:   Soft. Normal appearance and bowel sounds are normal.      Exhibits no distension. There is no tenderness.      There is no rebound and no CVA tenderness.   Musculoskeletal:  Extremities atraumatic x 4.   Lymphadenopathy:  No cervical adenopathy.   Neurological:   Alert and oriented to person, place, and time. Normal strength and normal reflexes. Not disoriented. No cranial nerve deficit or sensory      deficit. Displays a negative Romberg sign. Coordination and gait normal. GCS eye subscore is 4. GCS verbal subscore is 5. GCS motor      subscore is 6. Visual fields intact. No pronator drift. Normal Finger-to-Nose and Rapid Alternating Movement.      Normal Heel-to-shin.   Skin:    Skin is warm, dry and intact.      No rash noted.   Psychiatric:   Depressed and tearful with ongoing suicidal ideation.     Emergency Department Course   Emergency Department Course:  Past medical records, nursing notes, and vitals reviewed.  1139: I performed an exam of the patient and obtained history, as documented above. "   1145: I spoke with DEC and discussed the patient.  The patient will be signed out to Dr. Desir.    Impression & Plan      Medical Decision Making:  Ronel Ryan is a 37 year old female who came in voluntarily for worsening depression and suicidal ideation. She indicates that she does not believe that she would be safe at home. She has tried to hurt herself in the past on a few occasions but nothing today or in the last few days. I felt it was reasonable to obtain a urine for a UA, UPT, and urine drug screen, but she has yet to provide a urine sample. She is here voluntarily and wants to stay. I spoke with [facility] our DEC , who will see the patient as well. The patient does need to stay and wants to stay. All of the appropriate paper work has been filled out in case she is transferred to another facility. I do not feel the need to place her on a 72 hour hold at this time, again given the fact that she is voluntary. At this point we are still awaiting a bed assignment and an accepting physician. Therefore I will be signing her out to partner Dr. Desir, who will be assuming her care.     Diagnosis:    ICD-10-CM   1. Suicidal ideation R45.851     Disposition:  Patient will be signed out to my partner, Dr. Desir.    Shellie Gee  10/4/2017    EMERGENCY DEPARTMENT  I, Shellie Gee, am serving as a scribe at 11:39 AM on 10/4/2017 to document services personally performed by Subhash Sim MD based on my observations and the provider's statements to me.      Subhash Sim MD  10/04/17 1738

## 2017-10-04 NOTE — PHARMACY-ADMISSION MEDICATION HISTORY
Admission medication history interview status for the 10/4/2017  admission is complete. See EPIC admission navigator for prior to admission medications     Medication history source reliability:Good    Actions taken by pharmacist (provider contacted, etc):called [facility] to verify lisinopril strength     Additional medication history information not noted on PTA med list :None    Medication reconciliation/reorder completed by provider prior to medication history? No    Time spent in this activity: 20 minutes    Prior to Admission medications    Medication Sig Last Dose Taking? Auth Provider   CLONAZEPAM PO Take 0.5 mg by mouth daily as needed for anxiety  Yes Unknown, Entered By History   EPINEPHrine (EPIPEN/ADRENACLICK/OR ANY BX GENERIC EQUIV) 0.3 MG/0.3ML injection 2-pack Inject 0.3 mLs (0.3 mg) into the muscle once as needed for anaphylaxis  Yes Neva Ramirez MD   cetirizine (ZYRTEC) 10 MG tablet Take 1 tablet (10 mg) by mouth daily  Yes Neva Ramirez MD   albuterol (PROAIR HFA/PROVENTIL HFA/VENTOLIN HFA) 108 (90 BASE) MCG/ACT Inhaler Inhale 1-2 puffs into the lungs Past Week at Unknown time Yes Reported, Patient   buPROPion (WELLBUTRIN XL) 300 MG 24 hr tablet Take 300 mg by mouth every morning  10/3/2017 at Unknown time Yes Reported, Patient   clonazePAM (KLONOPIN) 0.5 MG tablet Take 0.5 mg by mouth At Bedtime  10/3/2017 at Unknown time Yes Reported, Patient   divalproex (DEPAKOTE) 500 MG EC tablet Take 250 mg by mouth 10/3/2017 at Unknown time Yes Reported, Patient   lisinopril (PRINIVIL/ZESTRIL) 10 MG tablet Take 20 mg by mouth daily  10/3/2017 at Unknown time Yes Reported, Patient   lurasidone (LATUDA) 40 MG TABS tablet Take 80 mg by mouth daily  10/3/2017 at Unknown time Yes Reported, Patient   oxybutynin (DITROPAN-XL) 10 MG 24 hr tablet Take 10 mg by mouth daily  10/3/2017 at Unknown time Yes Reported, Patient   pravastatin (PRAVACHOL) 20 MG tablet Take 10 mg by mouth daily  10/3/2017 at  Unknown time Yes Reported, Patient   prazosin (MINIPRESS) 1 MG capsule Take 2 mg by mouth At Bedtime  10/3/2017 at Unknown time Yes Reported, Patient   citalopram (CELEXA) 10 MG tablet Take 20 mg by mouth daily  10/3/2017 at Unknown time Yes Reported, Patient

## 2017-10-04 NOTE — IP AVS SNAPSHOT
MRN:[id number]                      After Visit Summary   10/4/2017    Ronel Ryan    MRN: [id number]           Thank you!     Thank you for choosing [facility] for your care. Our goal is always to provide you with excellent care.        Patient Information     Date Of Birth          1980        Designated Caregiver       Most Recent Value    Caregiver    Will someone help with your care after discharge? no      About your hospital stay     You were admitted on:  October 4, 2017 You last received care in the:  [facility]    You were discharged on:  October 10, 2017       Who to Call     For medical emergencies, please call 911.  For non-urgent questions about your medical care, please call your primary care provider or clinic, [phone]          Attending Provider     Provider Specialty    Subhash Sim MD Emergency Medicine    Zenon Woodruff MD Psychiatry       Primary Care Provider Office Phone # Fax #    [facility] [phone]      Further instructions from your care team       Behavioral Discharge Planning and Instructions    Summary: Admitted to hospital with suicidal ideation.    Main Diagnosis: Major depressive disorder; Anxiety NOS; PTSD     Major Treatments, Procedures and Findings: psychiatric assessment; medication adjustment    Symptoms to Report: mood getting worse or thoughts of suicide    Lifestyle Adjustment: Develop and follow safety plan. Follow up with mental health providers.Work on anxiety management skills.    Psychiatry Follow-up:     Dr. Woodruff - appointment on Wednesday 10/25/17 @ 1:15 pm.       [facility]  [address]  [place]  [address]  Phone:  [phone]  Fax:  [phone]    Therapy appointment with Erin Trinh on Monday 10/16/17 @ 11 am.  [address]  [place] [address]  [phone] fax: [phone]    , Zora  "Leonides, is also through [employer]- use same phone #.    Call your primary care Physician to set up an outpatient Cat Scan of the chest for your history of chest pain.    Resources:   [facility] Crisis Service- [phone]  Crisis Intervention: 734.482.7411 or 153-903-3708 (TTY: [phone]).  Call anytime for help.  National [place] Mental Illness (www.[place].maycol.org): 439.180.4855 or 930-271-9407.  National Suicide Prevention Line (www.mentalhealthmn.org): 944-760-EGBI (1548)    General Medication Instructions:   See your medication sheet(s) for instructions.   Take all medicines as directed.  Make no changes unless your doctor suggests them.   Go to all your doctor visits.  Be sure to have all your required lab tests. This way, your medicines can be refilled on time.  Do not use any drugs not prescribed by your doctor.  Avoid alcohol.      Pending Results     No orders found from 10/2/2017 to 10/5/2017.            Statement of Approval     Ordered          10/09/17 1059  I have reviewed and agree with all the recommendations and orders detailed in this document.  EFFECTIVE NOW     Approved and electronically signed by:  Zenon Woodruff MD             Admission Information     Date & Time Department Dept. Phone    10/4/2017 [facility] [phone]      Your Vitals Were     Blood Pressure Pulse Temperature Respirations Height Weight    116/65 77 98  F (36.7  C) (Oral) 18 1.626 m (5' 4\") 149.1 kg (328 lb 12.8 oz)    Pulse Oximetry BMI (Body Mass Index)                99% 56.44 kg/m2          Cap That Information     [employer] That lets you send messages to your doctor, view your test results, renew your prescriptions, schedule appointments and more. To sign up, go to [email or web address] . Click on \"Log in\" on the left side of the screen, which will take you to the Welcome page. Then click on \"Sign up Now\" on the right side of the page.     You will be asked to enter the access code " listed below, as well as some personal information. Please follow the directions to create your username and password.     Your access code is: [email or web address]  Expires: 2017  4:09 PM     Your access code will  in 90 days. If you need help or a new code, please call your [facility] clinic or [phone].        Care EveryWhere ID     This is your Care EveryWhere ID. This could be used by other organizations to access your [facility] medical records  [id number]        Equal Access to Services     [facility]: Hadpam oakes Soklever, waaxtitus luqadaha, qaybta kaalmatitus osorio, bola wilkins . So [facility] [phone].    ATENCIÓN: Si habla español, tiene a hussein disposición servicios gratuitos de asistencia lingüística. Llame al [phone].    We comply with applicable federal civil rights laws and [place] laws. We do not discriminate on the basis of race, color, national origin, age, disability, sex, sexual orientation, or gender identity.               Review of your medicines      START taking        Dose / Directions    vortioxetine 10 MG tablet   Commonly known as:  TRINTELLIX/BRINTELLIX   Used for:  Severe episode of recurrent major depressive disorder, without psychotic features (H)        Dose:  10 mg   Take 1 tablet (10 mg) by mouth daily   Quantity:  30 tablet   Refills:  0         CONTINUE these medicines which may have CHANGED, or have new prescriptions. If we are uncertain of the size of tablets/capsules you have at home, strength may be listed as something that might have changed.        Dose / Directions    lurasidone 40 MG Tabs tablet   Commonly known as:  LATUDA   This may have changed:    - how much to take  - when to take this   Used for:  Severe episode of recurrent major depressive disorder, without psychotic features (H)        Dose:  40 mg   Take 1 tablet (40 mg) by mouth daily (with dinner)   Quantity:  30 tablet   Refills:  0       prazosin 2 MG capsule    Commonly known as:  MINIPRESS   This may have changed:    - medication strength  - how much to take   Used for:  Severe episode of recurrent major depressive disorder, without psychotic features (H)        Dose:  4 mg   Take 2 capsules (4 mg) by mouth At Bedtime   Quantity:  60 capsule   Refills:  0         CONTINUE these medicines which have NOT CHANGED        Dose / Directions    albuterol 108 (90 BASE) MCG/ACT Inhaler   Commonly known as:  PROAIR HFA/PROVENTIL HFA/VENTOLIN HFA        Dose:  1-2 puff   Inhale 1-2 puffs into the lungs   Refills:  0       buPROPion 300 MG 24 hr tablet   Commonly known as:  WELLBUTRIN XL        Dose:  300 mg   Take 300 mg by mouth every morning   Refills:  0       cetirizine 10 MG tablet   Commonly known as:  zyrTEC        Dose:  10 mg   Take 1 tablet (10 mg) by mouth daily   Quantity:  7 tablet   Refills:  0       citalopram 10 MG tablet   Commonly known as:  celeXA        Dose:  20 mg   Take 20 mg by mouth daily   Refills:  0       EPINEPHrine 0.3 MG/0.3ML injection 2-pack   Commonly known as:  EPIPEN/ADRENACLICK/or ANY BX GENERIC EQUIV        Dose:  0.3 mg   Inject 0.3 mLs (0.3 mg) into the muscle once as needed for anaphylaxis   Quantity:  0.6 mL   Refills:  0       lisinopril 10 MG tablet   Commonly known as:  PRINIVIL/ZESTRIL        Dose:  20 mg   Take 20 mg by mouth daily   Refills:  0       oxybutynin 10 MG 24 hr tablet   Commonly known as:  DITROPAN-XL        Dose:  10 mg   Take 10 mg by mouth daily   Refills:  0       pravastatin 20 MG tablet   Commonly known as:  PRAVACHOL        Dose:  10 mg   Take 10 mg by mouth daily   Refills:  0         STOP taking     clonazePAM 0.5 MG tablet   Commonly known as:  klonoPIN           CLONAZEPAM PO           divalproex 500 MG EC tablet   Commonly known as:  DEPAKOTE                Where to get your medicines      These medications were sent to [facility] [place], [place] - [address]  [address] [place] [address], [place]  [place] [address]     Phone:  [phone]     lurasidone 40 MG Tabs tablet    prazosin 2 MG capsule    vortioxetine 10 MG tablet                Protect others around you: Learn how to safely use, store and throw away your medicines at www.disposemymeds.org.             Medication List: This is a list of all your medications and when to take them. Check marks below indicate your daily home schedule. Keep this list as a reference.      Medications           Morning Afternoon Evening Bedtime As Needed    albuterol 108 (90 BASE) MCG/ACT Inhaler   Commonly known as:  PROAIR HFA/PROVENTIL HFA/VENTOLIN HFA   Inhale 1-2 puffs into the lungs                                buPROPion 300 MG 24 hr tablet   Commonly known as:  WELLBUTRIN XL   Take 300 mg by mouth every morning   Last time this was given:  300 mg on 10/10/2017  8:43 AM                                   cetirizine 10 MG tablet   Commonly known as:  zyrTEC   Take 1 tablet (10 mg) by mouth daily   Last time this was given:  10 mg on 10/10/2017  8:43 AM                                   citalopram 10 MG tablet   Commonly known as:  celeXA   Take 20 mg by mouth daily   Last time this was given:  20 mg on 10/10/2017  8:43 AM                                   EPINEPHrine 0.3 MG/0.3ML injection 2-pack   Commonly known as:  EPIPEN/ADRENACLICK/or ANY BX GENERIC EQUIV   Inject 0.3 mLs (0.3 mg) into the muscle once as needed for anaphylaxis                                   lisinopril 10 MG tablet   Commonly known as:  PRINIVIL/ZESTRIL   Take 20 mg by mouth daily   Last time this was given:  20 mg on 10/10/2017  8:42 AM                                   lurasidone 40 MG Tabs tablet   Commonly known as:  LATUDA   Take 1 tablet (40 mg) by mouth daily (with dinner)   Last time this was given:  40 mg on 10/9/2017  5:26 PM                    With Evening meal 5- 6 PM               oxybutynin 10 MG 24 hr tablet   Commonly known as:  DITROPAN-XL   Take 10 mg by mouth daily   Last time  this was given:  10 mg on 10/10/2017  8:43 AM                                   pravastatin 20 MG tablet   Commonly known as:  PRAVACHOL   Take 10 mg by mouth daily   Last time this was given:  10 mg on 10/9/2017  9:09 PM                    8-9 PM               prazosin 2 MG capsule   Commonly known as:  MINIPRESS   Take 2 capsules (4 mg) by mouth At Bedtime   Last time this was given:  4 mg on 10/9/2017  9:09 PM                                   vortioxetine 10 MG tablet   Commonly known as:  TRINTELLIX/BRINTELLIX   Take 1 tablet (10 mg) by mouth daily   Last time this was given:  10 mg on 10/10/2017  8:43 AM

## 2017-10-04 NOTE — PROGRESS NOTES
10/04/17 1447   Patient Belongings   Did you bring any home meds/supplements to the hospital?  No   Patient Belongings other (see comments)   Disposition of Belongings In locker/safe   Belongings Search Yes   Clothing Search Yes   Second Staff Sourav     Pants  Shirt  Fleece  Shoes w/ strings    In backpack:  Smartphone  Loose change  Two stickers  Headphones  5 pens  Lipstick  Crystal light packet  [place] ID  Business cards  Rewards cards  [employer] transit cards  Misc. Papers  Housiing inspection letter  One sock  Receipts  Brown wallet  3 books  2 magazines  Mail  Hair binders  Hair brush  Tea bag    Security Envelope #[id number]    Epi Pen (2)  Inhaler  SS Card    Admission:  I am responsible for any personal items that are not sent to the safe or pharmacy. [facility] is not responsible for loss, theft or damage of any property in my possession.        Patient Signature: ___________________________________________      Date/Time:__________________________     Staff Signature: __________________________________      Date/Time:__________________________     [facility] Staff person, if patient is unable/unwilling to sign:      __________________________________________________________      Date/Time: __________________________        Discharge:  [facility] has returned all of my personal belongings:     Patient Signature: ________________________________________     Date/Time: ____________________________________     Staff Signature: ______________________________________     Date/Time:_____________________________________

## 2017-10-04 NOTE — ED NOTES
Bed: [facility]  Expected date:   Expected time:   Means of arrival:   Comments:  Triage

## 2017-10-05 PROCEDURE — 90853 GROUP PSYCHOTHERAPY: CPT

## 2017-10-05 PROCEDURE — 25000132 ZZH RX MED GY IP 250 OP 250 PS 637: Mod: GY | Performed by: PHYSICIAN ASSISTANT

## 2017-10-05 PROCEDURE — 12400006 ZZH R&B MH INTERMEDIATE

## 2017-10-05 PROCEDURE — 90791 PSYCH DIAGNOSTIC EVALUATION: CPT

## 2017-10-05 PROCEDURE — A9270 NON-COVERED ITEM OR SERVICE: HCPCS | Mod: GY | Performed by: PHYSICIAN ASSISTANT

## 2017-10-05 PROCEDURE — A9270 NON-COVERED ITEM OR SERVICE: HCPCS | Mod: GY | Performed by: PSYCHIATRY & NEUROLOGY

## 2017-10-05 PROCEDURE — 97150 GROUP THERAPEUTIC PROCEDURES: CPT | Mod: GO

## 2017-10-05 PROCEDURE — 99223 1ST HOSP IP/OBS HIGH 75: CPT | Mod: AI | Performed by: PHYSICIAN ASSISTANT

## 2017-10-05 PROCEDURE — 25000132 ZZH RX MED GY IP 250 OP 250 PS 637: Mod: GY | Performed by: PSYCHIATRY & NEUROLOGY

## 2017-10-05 RX ORDER — FAMOTIDINE 20 MG/1
20 TABLET, FILM COATED ORAL 2 TIMES DAILY
Status: DISCONTINUED | OUTPATIENT
Start: 2017-10-05 | End: 2017-10-10 | Stop reason: HOSPADM

## 2017-10-05 RX ORDER — LURASIDONE HYDROCHLORIDE 40 MG/1
40 TABLET, FILM COATED ORAL
Status: DISCONTINUED | OUTPATIENT
Start: 2017-10-05 | End: 2017-10-10 | Stop reason: HOSPADM

## 2017-10-05 RX ORDER — PRAZOSIN HYDROCHLORIDE 2 MG/1
4 CAPSULE ORAL AT BEDTIME
Status: DISCONTINUED | OUTPATIENT
Start: 2017-10-05 | End: 2017-10-10 | Stop reason: HOSPADM

## 2017-10-05 RX ORDER — ACETAMINOPHEN 500 MG
1000 TABLET ORAL EVERY 6 HOURS PRN
Status: DISCONTINUED | OUTPATIENT
Start: 2017-10-05 | End: 2017-10-10 | Stop reason: HOSPADM

## 2017-10-05 RX ORDER — MONTELUKAST SODIUM 10 MG/1
10 TABLET ORAL AT BEDTIME
Status: DISCONTINUED | OUTPATIENT
Start: 2017-10-05 | End: 2017-10-10 | Stop reason: HOSPADM

## 2017-10-05 RX ORDER — BUDESONIDE AND FORMOTEROL FUMARATE DIHYDRATE 160; 4.5 UG/1; UG/1
2 AEROSOL RESPIRATORY (INHALATION)
Status: DISCONTINUED | OUTPATIENT
Start: 2017-10-05 | End: 2017-10-05 | Stop reason: CLARIF

## 2017-10-05 RX ORDER — NAPROXEN 250 MG/1
250 TABLET ORAL 2 TIMES DAILY PRN
Status: DISCONTINUED | OUTPATIENT
Start: 2017-10-05 | End: 2017-10-10 | Stop reason: HOSPADM

## 2017-10-05 RX ADMIN — FLUTICASONE FUROATE AND VILANTEROL TRIFENATATE 1 PUFF: 200; 25 POWDER RESPIRATORY (INHALATION) at 12:57

## 2017-10-05 RX ADMIN — OXYBUTYNIN CHLORIDE 10 MG: 10 TABLET, EXTENDED RELEASE ORAL at 08:36

## 2017-10-05 RX ADMIN — MONTELUKAST SODIUM 10 MG: 10 TABLET, FILM COATED ORAL at 21:21

## 2017-10-05 RX ADMIN — CETIRIZINE HYDROCHLORIDE 10 MG: 10 TABLET, FILM COATED ORAL at 08:36

## 2017-10-05 RX ADMIN — ACETAMINOPHEN 1000 MG: 500 TABLET, FILM COATED ORAL at 21:21

## 2017-10-05 RX ADMIN — CLONAZEPAM 0.5 MG: 0.5 TABLET ORAL at 21:21

## 2017-10-05 RX ADMIN — PRAZOSIN HYDROCHLORIDE 4 MG: 2 CAPSULE ORAL at 21:21

## 2017-10-05 RX ADMIN — ACETAMINOPHEN 1000 MG: 500 TABLET, FILM COATED ORAL at 08:51

## 2017-10-05 RX ADMIN — LURASIDONE HYDROCHLORIDE 40 MG: 40 TABLET, FILM COATED ORAL at 17:24

## 2017-10-05 RX ADMIN — PRAVASTATIN SODIUM 10 MG: 10 TABLET ORAL at 21:21

## 2017-10-05 RX ADMIN — BUPROPION HYDROCHLORIDE 300 MG: 300 TABLET, FILM COATED, EXTENDED RELEASE ORAL at 08:36

## 2017-10-05 RX ADMIN — LISINOPRIL 20 MG: 10 TABLET ORAL at 14:37

## 2017-10-05 RX ADMIN — FAMOTIDINE 20 MG: 20 TABLET, FILM COATED ORAL at 12:56

## 2017-10-05 RX ADMIN — CITALOPRAM HYDROBROMIDE 20 MG: 20 TABLET ORAL at 08:36

## 2017-10-05 RX ADMIN — FAMOTIDINE 20 MG: 20 TABLET, FILM COATED ORAL at 21:21

## 2017-10-05 ASSESSMENT — ACTIVITIES OF DAILY LIVING (ADL)
ORAL_HYGIENE: INDEPENDENT
LAUNDRY: WITH SUPERVISION
DRESS: SCRUBS (BEHAVIORAL HEALTH)
GROOMING: SHOWER;INDEPENDENT

## 2017-10-05 NOTE — PHARMACY
Trintellix covered at $0 please send script to [facility] pharmacy to fill at [facility]    Thank you,  Logan Santiago CPhT  [facility] Discharge Pharmacy Liaison  [phone]
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
## 2017-10-05 NOTE — PLAN OF CARE
Problem: General Plan of Care (Inpatient Behavioral)  Goal: Discharge Planning  Patient attended Process Group and participated appropriately. Patient demonstrated good insight into the topic of boundaries.
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
## 2017-10-05 NOTE — H&P
DATE OF SERVICE:  10/05/2017      PRIMARY CARE PROVIDER:  [facility].       CHIEF COMPLAINT:  Suicidal ideation.      HISTORY OF PRESENT ILLNESS:  Ronel Ryan is a 37-year-old female with a past medical history significant for allergic rhinitis, moderate persistent asthma, morbid obesity, borderline personality disorder, bipolar disorder, osteoarthritis of the hips, hyperlipidemia and type 2 diabetes who presented to the Emergency Department for evaluation of suicidal ideation.  The patient reported that she has been feeling depressed and suicidal over the past week or so.  She has had a lot of recent stressors and has had thoughts of hurting herself.  She has had 22 mental health admissions in the past for similar presentation.  Per ER note, she reported that she tried to overdose on Benadryl a few weeks ago.  She was concerned for her safety at home today and decided to present to the ER.      The patient has had a number of health issues she is being followed for as an outpatient.  First, she has had issues with urticaria and pruritus thought related to allergies.  She recently saw an allergist on 09/12 who started her on Singular, Symbicort, Pepcid.  She has not started any of these medications yet.  She was also referred to Rheumatology.  She reports she continues to have some itching, however, denies any face or tongue swelling.  Secondly, she is being followed by Orthopedics for bilateral hip pain and osteoarthritis.  She receives injections with her most recent injection being 09/29.  She was instructed that she needs to lose over 100 pounds before she will be a candidate for a hip replacement.  She uses Aleve at home for this.  Lastly, the patient was recently seen in the Emergency Department on 09/26 for evaluation of atypical chest pain.  She reports several months of intermittent chest discomfort which she describes as a stabbing pressure substernally and on the right side of her chest  "under her rib cage.  During her ED evaluation, her troponin was followed x3 and remained negative.  EKG is nonischemic.  She was referred for an outpatient stress test and underwent a dobutamine stress echo on 10/02.  She was unable to achieve target heart rate and the test was terminated early due to headache.  There was no echocardiographic evidence of ischemia noted.  A CT coronary angiogram was recommended, which is being scheduled as an outpatient through her primary care provider.  She denies any recent symptoms of cough, shortness of breath, nausea, vomiting, fever.  She has had chest pain on and off with no relation to exertion.  She does note she is quite deconditioned.  She denies any dysuria but has had urinary frequency for \"quite some time.\"      REVIEW OF SYSTEMS:  A 10-point review of systems was conducted and is negative aside from the information in the HPI.      PAST MEDICAL HISTORY:   1.  Allergic rhinitis.  As above, she recently saw an allergist and has yet to start recommended medications.  She was referred to Rheumatology as well.   2.  Moderate persistent asthma.  She uses her rescue inhaler twice a day and has not been hospitalized as an adult and has yet to start Symbicort.   3.  Morbid obesity.   4.  Borderline personality disorder.   5.  Bipolar disorder.   6.  Osteoarthritis of bilateral hips with recent steroid injection 09/29/2017.     7.  Hyperlipidemia.   8.  Type 2 diabetes.  The patient's prior A1c on file from 03/2016 was normal at 5.3; however, A1c has been elevated in the past as high as 6.6.  She had her A1c drawn with her primary care provider on 10/04 and is awaiting results.  She has been on metformin in the past but has not taken this for 2 years or so.        PAST SURGICAL HISTORY:     1.  ACL repair.    2.  Right ankle surgery.   3.  Cholecystectomy.      ALLERGIES:   1.  Cephalexin.   2.  Erythromycin.   3.  Penicillin.      SOCIAL HISTORY:  The patient reports very rare " alcohol use, denies drug use.  She has no smoking history.  She lives alone with her cat.      FAMILY HISTORY:  The patient reports her brother had an MI at age 19.      LABORATORY DATA:  CMP is within normal limits aside from a calcium of 8.0, albumin 3.0, total protein of 6.5.  TSH is within normal limits.  Glucose 95.  White blood cell count is elevated at 14.4, hemoglobin 13.6, hematocrit 40.3, platelets of 282,000.  UA is largely unremarkable.  Valproic acid level 21.  Tox screen is negative.      PHYSICAL EXAMINATION:   VITAL SIGNS:  Temperature 97.9, heart rate 70, blood pressure 85/43, respiratory rate 16, oxygen saturation is 99% on room air.   GENERAL:  Alert and oriented female lying down in bed, appears comfortable, appropriately conversant.   HEENT:  Pupils are equal and reactive to light, EOMI.   ENT:  Mucous membranes are moist.   CARDIOVASCULAR:  Regular rate and rhythm.  Heart tones are distant, no murmurs appreciated.   RESPIRATORY:  Lungs are clear to auscultation bilaterally, no increased work of breathing, no wheezing.   GASTROINTESTINAL:  Positive bowel sounds.  Abdomen is soft and nontender to palpation.  Abdomen is morbidly obese.     SKIN:  Warm and dry, no rash, no urticaria.   MUSCULOSKELETAL:  The patient moves all 4 extremities.  The patient does have tenderness to palpation of her chest wall on the right.      ASSESSMENT AND PLAN:     1.  Ronel Ryan is a 37-year-old female with a past medical history significant for allergic rhinitis, moderate persistent asthma, morbid obesity, borderline personality disorder, bipolar disorder, osteoarthritis, hyperlipidemia and type 2 diabetes who presented to the Emergency Department for evaluation of suicidal ideation.  Hospitalist Service was requested for medical evaluation.  The patient has had intermittent thoughts of suicide without a specific plan.  She reportedly tried to overdose on Benadryl a couple of weeks ago but does not remember  how much she took.  Laboratory evaluation is unremarkable aside from what is discussed below:     -- Primary management per inpatient psychiatry.   2.  Moderate persistent asthma.  The patient has been using her albuterol inhaler twice a day.  Denies any hospitalizations for asthma as an adult.  Denies any wheezing at present.  I do note that it was recommended that she start twice daily Symbicort about a month ago.  She has not yet filled this prescription.   -- Continue prior to admission albuterol p.r.n.   -- Will start b.i.d. Symbicort.   3.  Allergic rhinitis.  The patient has numerous allergies including an allergy to cats.  She has a cat at home who sleeps with her.  She was recently seen by an allergist as an outpatient on 09/12 who recommended b.i.d. Pepcid, Singular and daily Zyrtec.  The patient has not been compliant with any of these medications.   -- Daily Zyrtec.   -- Start Singulair.   -- b.i.d. Pepcid.    -- Followup as an outpatient.   4.  Atypical chest pain.  The patient reports several months of intermittent substernal and right-sided chest pain.  She was recently evaluated in the Emergency Department on 09/26 with negative troponin x3, normal EKG, chest x-ray.  Ultrasound of the abdomen was also pursued which showed fatty liver infiltration.  She underwent a dobutamine stress echo on 10/02, which was nondiagnostic due to inability to achieve a target heart rate.  However, there are no echocardiographic changes to suggest ischemia.  CT coronary angiography was recommended, which is being scheduled as an outpatient by the patient's primary care provider.  On exam today, she has some reproducible chest discomfort on the right side of her chest.  She denies shortness of breath, diaphoresis or active chest pain at this time.   -- Will defer a CT coronary angiogram to the outpatient setting.   -- If the patient's chest pain becomes more severe, please give us a call.  Would repeat EKG at that time.    5.  Type 2 diabetes.  The patient has not been on any medications for this for 2 years as her A1c improved with weight loss.  She has gained some of the weight back and had an A1c rechecked yesterday at 10/04 with her primary care provider.  Results are not available to me.   -- Follow up primary care physician for A1c results and recommendations regarding metformin.  Blood sugar was 95 on admission.   6.  Hypertension.  Continue prior to admission lisinopril with hold parameters.   7.  Leukocytosis.  White blood cell count is elevated on admission at 14.4.  I do note a normal white count on  when she was evaluated in the ER.  The patient is afebrile with normal urinalysis.  No cough/fevers, shortness of breath to suggest pneumonia.  No recent oral steroid use.   -- Recheck CBC in the a.m.   8.  Bilateral hip pain, osteoarthritis.     -- Tylenol and Ibuprofen p.r.n.   9.  Deep venous thrombosis prophylaxis:  Ambulation.      Hospitalist Service will follow along with chart check tomorrow.  We appreciate the consultation.        The patient was seen and discussed with Dr. Мария Dumont, who agrees with the above plan.         МАРИЯ DUMONT MD       As dictated by LILIAN PAN PA-C            D: 10/05/2017 09:17   T: 10/05/2017 10:53   MT: ASHWIN      Name:     JAMES CHRISTOPHER   MRN:      [id number]        Account:      [id number]   :      1980           Admitted:     [id number]      Document: [id number]       cc: [facility]

## 2017-10-05 NOTE — PLAN OF CARE
Problem: Depressive Symptoms  Goal: Depressive Symptoms  Signs and symptoms of listed problems will be absent or manageable.   Outcome: No Change  Pt was isolative/withdrawn this morning on SDU. Quiet and polite. Blunt/flat affect. Remained in room most of shift. Met with . During 1:1, pt reported feeling depressed and wants to lose weight. Pt has been hospitalized for MH 22 times.

## 2017-10-05 NOTE — PLAN OF CARE
"Problem: Patient Care Overview  Goal: Plan of Care/Patient Progress Review  OT: Initial OT eval. Pt presented to 1/3 OT groups today. Poor hygiene noted. Pt hyper verbal and tangential during conversation needing verbal cues for redirection. Pt attended well during ~20 minute meditation during group. Pt reports living in alone in apt in [place]. Pt very prideful with her cat \"However he needs exercise just like me. 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 Pt uses [employer] as she no longer drives. Pt pleasant; however, poor self-esteem demonstrated.        "
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
## 2017-10-05 NOTE — PROGRESS NOTES
"SPIRITUAL HEALTH SERVICES  Spiritual Assessment Progress Note  [facility]  Pt was very open to conversation with .  Pt stated that she doesn't feel God very close to her and thought chaplains \"had a hotline to God\".  Pt shared he suicidal feelings but does not believe she would go to [facility] if she commit suicide.  These beliefs have made her think about ways to get the police to shoot and kill her.  Pt states that she feels safe here in the hospital.    Pt explored her thoughts about God healing her from all that is troubling her - both physical and emotional issues.   encouraged pt to looks at what part she plays in her healing. We discussed weight loss and new friendships.     listened as pt shared her story. SH shared with pt  A reading and had a prayer.    Pt would like to have communion on Sunday if she is still here.                                                                                                                                                Angela Fox M.Div., [facility]  Staff    Pager [phone]  "

## 2017-10-06 LAB
ERYTHROCYTE [DISTWIDTH] IN BLOOD BY AUTOMATED COUNT: 13.4 % (ref 10–15)
HCT VFR BLD AUTO: 39.5 % (ref 35–47)
HGB BLD-MCNC: 13.1 G/DL (ref 11.7–15.7)
MCH RBC QN AUTO: 28.7 PG (ref 26.5–33)
MCHC RBC AUTO-ENTMCNC: 33.2 G/DL (ref 31.5–36.5)
MCV RBC AUTO: 87 FL (ref 78–100)
PLATELET # BLD AUTO: 254 10E9/L (ref 150–450)
RBC # BLD AUTO: 4.56 10E12/L (ref 3.8–5.2)
WBC # BLD AUTO: 9.2 10E9/L (ref 4–11)

## 2017-10-06 PROCEDURE — A9270 NON-COVERED ITEM OR SERVICE: HCPCS | Mod: GY | Performed by: PSYCHIATRY & NEUROLOGY

## 2017-10-06 PROCEDURE — 25000132 ZZH RX MED GY IP 250 OP 250 PS 637: Mod: GY | Performed by: PHYSICIAN ASSISTANT

## 2017-10-06 PROCEDURE — A9270 NON-COVERED ITEM OR SERVICE: HCPCS | Mod: GY | Performed by: PHYSICIAN ASSISTANT

## 2017-10-06 PROCEDURE — 25000132 ZZH RX MED GY IP 250 OP 250 PS 637: Mod: GY | Performed by: PSYCHIATRY & NEUROLOGY

## 2017-10-06 PROCEDURE — 36415 COLL VENOUS BLD VENIPUNCTURE: CPT | Performed by: PHYSICIAN ASSISTANT

## 2017-10-06 PROCEDURE — 12400006 ZZH R&B MH INTERMEDIATE

## 2017-10-06 PROCEDURE — 99207 ZZC NON-BILLABLE SERV PER CHARTING: CPT | Performed by: PHYSICIAN ASSISTANT

## 2017-10-06 PROCEDURE — 85027 COMPLETE CBC AUTOMATED: CPT | Performed by: PHYSICIAN ASSISTANT

## 2017-10-06 RX ADMIN — PRAZOSIN HYDROCHLORIDE 4 MG: 2 CAPSULE ORAL at 20:46

## 2017-10-06 RX ADMIN — BUPROPION HYDROCHLORIDE 300 MG: 300 TABLET, FILM COATED, EXTENDED RELEASE ORAL at 09:06

## 2017-10-06 RX ADMIN — CLONAZEPAM 0.5 MG: 0.5 TABLET ORAL at 20:46

## 2017-10-06 RX ADMIN — CETIRIZINE HYDROCHLORIDE 10 MG: 10 TABLET, FILM COATED ORAL at 09:05

## 2017-10-06 RX ADMIN — CITALOPRAM HYDROBROMIDE 20 MG: 20 TABLET ORAL at 09:05

## 2017-10-06 RX ADMIN — LISINOPRIL 20 MG: 10 TABLET ORAL at 09:06

## 2017-10-06 RX ADMIN — VORTIOXETINE 5 MG: 5 TABLET, FILM COATED ORAL at 11:45

## 2017-10-06 RX ADMIN — LURASIDONE HYDROCHLORIDE 40 MG: 40 TABLET, FILM COATED ORAL at 17:35

## 2017-10-06 RX ADMIN — FAMOTIDINE 20 MG: 20 TABLET, FILM COATED ORAL at 20:46

## 2017-10-06 RX ADMIN — FLUTICASONE FUROATE AND VILANTEROL TRIFENATATE 1 PUFF: 200; 25 POWDER RESPIRATORY (INHALATION) at 09:05

## 2017-10-06 RX ADMIN — OXYBUTYNIN CHLORIDE 10 MG: 10 TABLET, EXTENDED RELEASE ORAL at 09:05

## 2017-10-06 RX ADMIN — MONTELUKAST SODIUM 10 MG: 10 TABLET, FILM COATED ORAL at 20:46

## 2017-10-06 RX ADMIN — FAMOTIDINE 20 MG: 20 TABLET, FILM COATED ORAL at 09:06

## 2017-10-06 RX ADMIN — PRAVASTATIN SODIUM 10 MG: 10 TABLET ORAL at 20:46
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
## 2017-10-06 NOTE — PROGRESS NOTES
[facility] Psychiatric Progress Note       Interim History     The patient's care was discussed with the treatment team and chart notes were reviewed. Pt seen on SDU. Tolerating medications without side effects. Side effects, risks, and benefits of medications reviewed with patient. Patient is currently negative for suicide ideation, negative for plan or intent, able to contract no self harm and identify barriers to suicide.  Negative for obsessions, compulsions or psychosis.   Pt reports she has low self esteem, she does not care about herself, and she cannot accept compliments. She has been participating actively in group, however, and discussed that she is clearly working very hard to feel better. Asked her to make a list of things she is doing right. Discussed returning to DBT.      Hospital Course     On 10/5 Pt presented due to SI. Ruled out Bipolar II disorder and Borderline personality disorder. Planned to titrate Pt off of Klonopin and Latuda. Discontinued Depakote and PRN Klonopin. Started Trintellix 5mg qd. Increase Prazosin to 4mg. On 10/6 Pt remained overwhelmed, though she was participating actively in group and was working hard to improve. Discussed referral to another course of DBT.      Medications     Current Facility-Administered Medications Ordered in Epic   Medication Dose Route Frequency Last Rate Last Dose     acetaminophen (TYLENOL) tablet 1,000 mg  1,000 mg Oral Q6H PRN   1,000 mg at 10/05/17 2121     naproxen (NAPROSYN) tablet 250 mg  250 mg Oral BID PRN         montelukast (SINGULAIR) tablet 10 mg  10 mg Oral At Bedtime   10 mg at 10/05/17 2121     famotidine (PEPCID) tablet 20 mg  20 mg Oral BID   20 mg at 10/06/17 0906     fluticasone-vilanterol (BREO ELLIPTA) 200-25 MCG/INH oral inhaler 1 puff  1 puff Inhalation Daily   1 puff at 10/06/17 0905     prazosin (MINIPRESS) capsule 4 mg  4 mg Oral At Bedtime   4 mg at 10/05/17 2121     lurasidone (LATUDA) tablet 40 mg  40 mg  "Oral Daily with supper   40 mg at 10/05/17 1724     albuterol (PROAIR HFA/PROVENTIL HFA/VENTOLIN HFA) Inhaler 1-2 puff  1-2 puff Inhalation Q4H PRN         buPROPion (WELLBUTRIN XL) 24 hr tablet 300 mg  300 mg Oral QAM   300 mg at 10/06/17 0906     cetirizine (zyrTEC) tablet 10 mg  10 mg Oral Daily   10 mg at 10/06/17 0905     citalopram (celeXA) tablet 20 mg  20 mg Oral Daily   20 mg at 10/06/17 0905     clonazePAM (klonoPIN) tablet 0.5 mg  0.5 mg Oral At Bedtime   0.5 mg at 10/05/17 2121     lisinopril (PRINIVIL/ZESTRIL) tablet 20 mg  20 mg Oral Daily   20 mg at 10/06/17 0906     oxybutynin (DITROPAN-XL) 24 hr tablet 10 mg  10 mg Oral Daily   10 mg at 10/06/17 0905     pravastatin (PRAVACHOL) tablet 10 mg  10 mg Oral Daily   10 mg at 10/05/17 2121     hydrOXYzine (ATARAX) tablet 25-50 mg  25-50 mg Oral Q4H PRN   50 mg at 10/04/17 2115     No current Epic-ordered outpatient prescriptions on file.         Allergies      Allergies   Allergen Reactions     Cephalexin Hives     Other reaction(s): *Unknown     Erythromycin Anaphylaxis and Hives     Other reaction(s): Unknown     Penicillins Hives     hives  Other reaction(s): Unknown        Medical Review of Systems     /62  Pulse 89  Temp 98.5  F (36.9  C)  Resp 16  Ht 1.626 m (5' 4\")  Wt (!) 149.1 kg (328 lb 12.8 oz)  SpO2 99%  BMI 56.44 kg/m2  Body mass index is 56.44 kg/(m^2).  A 10-point review of systems was performed by Zenon Woodruff MD and is negative, no new findings.      Psychiatric Examination     Appearance Sitting in chair, dressed in scrubs. Appears stated age.   Attitude Cooperative   Orientation Oriented to person, place, time   Eye Contact Poor   Speech Regular rate, rhythm, volume and tone   Language Normal   Psychomotor Behavior Normal   Mood Dysphoric, anxious   Affect Limited range   Thought Process Goal-Oriented, Intact   Associations Intact   Thought Content Patient is currently negative for suicide ideation, negative for " plan or intent, able to contract no self harm and identify barriers to suicide.  Negative for obsessions, compulsions or psychosis.      Fund of Knowledge Adequate   Insight Fair   Judgement Impaired   Attention Span & Concentration Alert   Recent & Remote Memory Intact   Gait Normal   Muscle Tone Intact        Labs     Labs reviewed.  Recent Results (from the past 24 hour(s))   CBC with platelets    Collection Time: 10/06/17  8:15 AM   Result Value Ref Range    WBC 9.2 4.0 - 11.0 10e9/L    RBC Count 4.56 3.8 - 5.2 10e12/L    Hemoglobin 13.1 11.7 - 15.7 g/dL    Hematocrit 39.5 35.0 - 47.0 %    MCV 87 78 - 100 fl    MCH 28.7 26.5 - 33.0 pg    MCHC 33.2 31.5 - 36.5 g/dL    RDW 13.4 10.0 - 15.0 %    Platelet Count 254 150 - 450 10e9/L        Impression     Ronel Ryan is a 37-year-old female with a past medical history significant for Depression, Anxiety, PTSD, and ADHD who presented to the Emergency Department for evaluation of suicidal ideation. She has a history of 22 past psychiatric hospitalizations, treatment with various medications, and DBT. She is currently maintained on Latuda, Wellbutrin, Celexa, Klonopin, Depakote 250mg, and Prazosin 2mg. At this point I do not think her symptoms are consistent with Bipolar disorder, and her Borderline traits are well controlled following DBT. She is participating actively in groups and is working hard to improve. Will likely refer her to another course of DBT as she will likely benefit from that.      Diagnoses     1. Major depressive disorder, recurrent, severe  2. Anxiety, NOS  3. ADHD, inattentive type  4. PTSD     Plan     1. Explained side effects, benefits, and complications of medications to the patient, Pt gave verbal consent.  2. Medication changes: continue medications  3. Discussed treatment plan with patient and team.  4. Projected length of stay: until Pt is stabilized  5. Asked her to make a list of things she is doing right  6. Consider referring her to  DBT      Attestation:   Patient has been seen and evaluated by me, Zenon Woodruff MD.    Patient ID:  Name: Ronel Ryan    MRN: [id number]  Admission: 10/4/2017     YOB: 1980

## 2017-10-06 NOTE — PLAN OF CARE
Problem: Depressive Symptoms  Goal: Depressive Symptoms  Signs and symptoms of listed problems will be absent or manageable.   Patient has bright affect 1:1 but states she is depressed with some SI but much better and contracts for safety. Pt states she has had periods in her life where she has increased energy,with racing thoughts and pressured speech and one day she decided to impulsively buy  a house. But when she got there she realized that she shouldn't be doing this. She states she has a Hx of making poor decisions. She remains calm in mood and social with peers tonight

## 2017-10-06 NOTE — PROGRESS NOTES
Chart check. WBC normalized on repeat CBC today. Order for CT angio chest placed yesterday with unknown ordering provider? Seems it may have been intended as an outpatient order and somehow was placed inpatient. I called patient's PCP clinic and discussed with staff there that I discontinued this order and it will need to be rescheduled as an outpatient. Staff will let PCP know and  will randi MsLizz Connor to discuss details.     Hospitalist service will sign off.     Please do not hesitate to call with questions or concerns.     Mai Silva PA-C

## 2017-10-07 PROCEDURE — 25000132 ZZH RX MED GY IP 250 OP 250 PS 637: Mod: GY | Performed by: PHYSICIAN ASSISTANT

## 2017-10-07 PROCEDURE — 12400006 ZZH R&B MH INTERMEDIATE

## 2017-10-07 PROCEDURE — A9270 NON-COVERED ITEM OR SERVICE: HCPCS | Mod: GY | Performed by: PHYSICIAN ASSISTANT

## 2017-10-07 PROCEDURE — 25000132 ZZH RX MED GY IP 250 OP 250 PS 637: Mod: GY | Performed by: PSYCHIATRY & NEUROLOGY

## 2017-10-07 PROCEDURE — A9270 NON-COVERED ITEM OR SERVICE: HCPCS | Mod: GY | Performed by: PSYCHIATRY & NEUROLOGY

## 2017-10-07 RX ADMIN — LURASIDONE HYDROCHLORIDE 40 MG: 40 TABLET, FILM COATED ORAL at 17:51

## 2017-10-07 RX ADMIN — FAMOTIDINE 20 MG: 20 TABLET, FILM COATED ORAL at 09:32

## 2017-10-07 RX ADMIN — VORTIOXETINE 5 MG: 5 TABLET, FILM COATED ORAL at 09:32

## 2017-10-07 RX ADMIN — MONTELUKAST SODIUM 10 MG: 10 TABLET, FILM COATED ORAL at 21:18

## 2017-10-07 RX ADMIN — FAMOTIDINE 20 MG: 20 TABLET, FILM COATED ORAL at 21:17

## 2017-10-07 RX ADMIN — PRAVASTATIN SODIUM 10 MG: 10 TABLET ORAL at 21:17

## 2017-10-07 RX ADMIN — CETIRIZINE HYDROCHLORIDE 10 MG: 10 TABLET, FILM COATED ORAL at 09:33

## 2017-10-07 RX ADMIN — BUPROPION HYDROCHLORIDE 300 MG: 300 TABLET, FILM COATED, EXTENDED RELEASE ORAL at 09:31

## 2017-10-07 RX ADMIN — OXYBUTYNIN CHLORIDE 10 MG: 10 TABLET, EXTENDED RELEASE ORAL at 09:32

## 2017-10-07 RX ADMIN — CITALOPRAM HYDROBROMIDE 20 MG: 20 TABLET ORAL at 09:32

## 2017-10-07 RX ADMIN — PRAZOSIN HYDROCHLORIDE 4 MG: 2 CAPSULE ORAL at 21:17

## 2017-10-07 RX ADMIN — CLONAZEPAM 0.5 MG: 0.5 TABLET ORAL at 21:18

## 2017-10-07 RX ADMIN — FLUTICASONE FUROATE AND VILANTEROL TRIFENATATE 1 PUFF: 200; 25 POWDER RESPIRATORY (INHALATION) at 09:33

## 2017-10-07 RX ADMIN — LISINOPRIL 20 MG: 10 TABLET ORAL at 09:31

## 2017-10-07 NOTE — PLAN OF CARE
"Problem: Depressive Symptoms  Goal: Depressive Symptoms  Signs and symptoms of listed problems will be absent or manageable.   Outcome: No Change  Pt is pleasant and cooperative, brightening upon approach. Pt was visible in the unit and social with peers/staff. Pt verbalized \"feeling better now then initially when she first came \". Pt also stated that she is planning to loose weight in order to show a good example to her family since most of her family is overweight. Pt also attended group acitivities.     "

## 2017-10-07 NOTE — PLAN OF CARE
Problem: Depressive Symptoms  Goal: Depressive Symptoms  Signs and symptoms of listed problems will be absent or manageable.   Pt was out in the lounge, and active at the start of shift. She was in a good mood, stated she went to all of the the day time groups. Did not attend evening groups. Instead, pt spent time in bed, coloring and listening to music. Pt is social with staff/peers, and very helpful to her roommate.

## 2017-10-08 PROCEDURE — A9270 NON-COVERED ITEM OR SERVICE: HCPCS | Mod: GY | Performed by: PSYCHIATRY & NEUROLOGY

## 2017-10-08 PROCEDURE — 25000132 ZZH RX MED GY IP 250 OP 250 PS 637: Mod: GY | Performed by: PHYSICIAN ASSISTANT

## 2017-10-08 PROCEDURE — A9270 NON-COVERED ITEM OR SERVICE: HCPCS | Mod: GY | Performed by: PHYSICIAN ASSISTANT

## 2017-10-08 PROCEDURE — 12400006 ZZH R&B MH INTERMEDIATE

## 2017-10-08 PROCEDURE — 25000132 ZZH RX MED GY IP 250 OP 250 PS 637: Mod: GY | Performed by: PSYCHIATRY & NEUROLOGY

## 2017-10-08 RX ADMIN — PRAZOSIN HYDROCHLORIDE 4 MG: 2 CAPSULE ORAL at 20:19

## 2017-10-08 RX ADMIN — CLONAZEPAM 0.5 MG: 0.5 TABLET ORAL at 20:19

## 2017-10-08 RX ADMIN — VORTIOXETINE 5 MG: 5 TABLET, FILM COATED ORAL at 10:45

## 2017-10-08 RX ADMIN — FLUTICASONE FUROATE AND VILANTEROL TRIFENATATE 1 PUFF: 200; 25 POWDER RESPIRATORY (INHALATION) at 10:45

## 2017-10-08 RX ADMIN — CETIRIZINE HYDROCHLORIDE 10 MG: 10 TABLET, FILM COATED ORAL at 10:46

## 2017-10-08 RX ADMIN — CITALOPRAM HYDROBROMIDE 20 MG: 20 TABLET ORAL at 10:45

## 2017-10-08 RX ADMIN — FAMOTIDINE 20 MG: 20 TABLET, FILM COATED ORAL at 20:19

## 2017-10-08 RX ADMIN — MONTELUKAST SODIUM 10 MG: 10 TABLET, FILM COATED ORAL at 20:20

## 2017-10-08 RX ADMIN — FAMOTIDINE 20 MG: 20 TABLET, FILM COATED ORAL at 10:45

## 2017-10-08 RX ADMIN — OXYBUTYNIN CHLORIDE 10 MG: 10 TABLET, EXTENDED RELEASE ORAL at 10:45

## 2017-10-08 RX ADMIN — BUPROPION HYDROCHLORIDE 300 MG: 300 TABLET, FILM COATED, EXTENDED RELEASE ORAL at 10:46

## 2017-10-08 RX ADMIN — LURASIDONE HYDROCHLORIDE 40 MG: 40 TABLET, FILM COATED ORAL at 17:35

## 2017-10-08 RX ADMIN — PRAVASTATIN SODIUM 10 MG: 10 TABLET ORAL at 20:19

## 2017-10-08 RX ADMIN — LISINOPRIL 20 MG: 10 TABLET ORAL at 10:45

## 2017-10-08 NOTE — PLAN OF CARE
Problem: Depressive Symptoms  Goal: Depressive Symptoms  Signs and symptoms of listed problems will be absent or manageable.   Outcome: No Change  Pt presented with a full-range affect, and was bright in conversation. She was visible on the unit, and social with peers. She spent most of the shift in the lounge watching TV and attended morning groups with proper participation. During 1:1 she stated that she feels like she wants to discharge, but is worried that if she discharges too soon she will end up coming back, but feels if she stays she will appear weak. She also expressed feeling lonely, because she has not had visitors, and her friend is not answering her calls. She wrote down her thoughts, and also wrote down six things she likes about herself, as suggested by her doctor. She is emphatic about following Dr. Woodruff out patient, and is aggreable to the distance she would have to drive to make that happen.

## 2017-10-08 NOTE — PROGRESS NOTES
SPIRITUAL HEALTH SERVICES  Spiritual Assessment Progress Note  [facility]   provided the sacrament of communion to pt.                                                                                                                                            Angela Fox M.Div., [facility]  Staff    Pager [phone]

## 2017-10-09 PROCEDURE — 25000132 ZZH RX MED GY IP 250 OP 250 PS 637: Mod: GY | Performed by: PHYSICIAN ASSISTANT

## 2017-10-09 PROCEDURE — 12400006 ZZH R&B MH INTERMEDIATE

## 2017-10-09 PROCEDURE — 25000132 ZZH RX MED GY IP 250 OP 250 PS 637: Mod: GY | Performed by: PSYCHIATRY & NEUROLOGY

## 2017-10-09 PROCEDURE — A9270 NON-COVERED ITEM OR SERVICE: HCPCS | Mod: GY | Performed by: PHYSICIAN ASSISTANT

## 2017-10-09 PROCEDURE — 97150 GROUP THERAPEUTIC PROCEDURES: CPT | Mod: GO

## 2017-10-09 PROCEDURE — A9270 NON-COVERED ITEM OR SERVICE: HCPCS | Mod: GY | Performed by: PSYCHIATRY & NEUROLOGY

## 2017-10-09 PROCEDURE — 90853 GROUP PSYCHOTHERAPY: CPT

## 2017-10-09 RX ORDER — PRAZOSIN HYDROCHLORIDE 2 MG/1
4 CAPSULE ORAL AT BEDTIME
Qty: 60 CAPSULE | Refills: 0 | Status: SHIPPED | OUTPATIENT
Start: 2017-10-09 | End: 2020-11-19

## 2017-10-09 RX ORDER — LURASIDONE HYDROCHLORIDE 40 MG/1
40 TABLET, FILM COATED ORAL
Qty: 30 TABLET | Refills: 0 | Status: SHIPPED | OUTPATIENT
Start: 2017-10-09 | End: 2018-11-28

## 2017-10-09 RX ADMIN — LISINOPRIL 20 MG: 10 TABLET ORAL at 08:34

## 2017-10-09 RX ADMIN — MONTELUKAST SODIUM 10 MG: 10 TABLET, FILM COATED ORAL at 21:10

## 2017-10-09 RX ADMIN — PRAZOSIN HYDROCHLORIDE 4 MG: 2 CAPSULE ORAL at 21:09

## 2017-10-09 RX ADMIN — CITALOPRAM HYDROBROMIDE 20 MG: 20 TABLET ORAL at 08:35

## 2017-10-09 RX ADMIN — OXYBUTYNIN CHLORIDE 10 MG: 10 TABLET, EXTENDED RELEASE ORAL at 08:35

## 2017-10-09 RX ADMIN — VORTIOXETINE 5 MG: 5 TABLET, FILM COATED ORAL at 08:35

## 2017-10-09 RX ADMIN — LURASIDONE HYDROCHLORIDE 40 MG: 40 TABLET, FILM COATED ORAL at 17:26

## 2017-10-09 RX ADMIN — FAMOTIDINE 20 MG: 20 TABLET, FILM COATED ORAL at 21:10

## 2017-10-09 RX ADMIN — PRAVASTATIN SODIUM 10 MG: 10 TABLET ORAL at 21:09

## 2017-10-09 RX ADMIN — BUPROPION HYDROCHLORIDE 300 MG: 300 TABLET, FILM COATED, EXTENDED RELEASE ORAL at 08:35

## 2017-10-09 RX ADMIN — FLUTICASONE FUROATE AND VILANTEROL TRIFENATATE 1 PUFF: 200; 25 POWDER RESPIRATORY (INHALATION) at 08:34

## 2017-10-09 RX ADMIN — FAMOTIDINE 20 MG: 20 TABLET, FILM COATED ORAL at 08:35

## 2017-10-09 RX ADMIN — CETIRIZINE HYDROCHLORIDE 10 MG: 10 TABLET, FILM COATED ORAL at 08:35

## 2017-10-09 ASSESSMENT — ACTIVITIES OF DAILY LIVING (ADL)
GROOMING: HANDWASHING;INDEPENDENT
ORAL_HYGIENE: INDEPENDENT
DRESS: SCRUBS (BEHAVIORAL HEALTH);INDEPENDENT

## 2017-10-09 NOTE — PROGRESS NOTES
[facility] Psychiatric Progress Note       Interim History     The patient's care was discussed with the treatment team and chart notes were reviewed. Pt seen on SDU. Tolerating medications without side effects. Side effects, risks, and benefits of medications reviewed with patient. Patient is currently negative for suicide ideation, negative for plan or intent, able to contract no self harm and identify barriers to suicide.  Negative for obsessions, compulsions or psychosis. She reports she is doing well and is experiencing no side effects on Trintellix. Plan to increase Trintellix to 10mg qd. She asked to follow up with Dr. Woodruff at [facility]. Pt has a therapist but is looking for a change. She will discharge tomorrow.      Hospital Course     On 10/5 Pt presented due to SI. Ruled out Bipolar II disorder and Borderline personality disorder. Planned to titrate Pt off of Klonopin and Latuda. Discontinued Depakote and PRN Klonopin. Started Trintellix 5mg qd. Increase Prazosin to 4mg. On 10/6 Pt remained overwhelmed, though she was participating actively in group and was working hard to improve. Discussed referral to another course of DBT. On 10/9 Pt was doing well, her mood had stabilized and she was no longer depressed. Increased Trintellix to 10mg qd. Planned for her to follow up with Dr. Woodruff at [facility] within 4 weeks of discharge.       Medications     Current Facility-Administered Medications Ordered in Epic   Medication Dose Route Frequency Last Rate Last Dose     vortioxetine (TRINTELLIX/BRINTELLIX) tablet 5 mg  5 mg Oral Daily   5 mg at 10/09/17 0835     acetaminophen (TYLENOL) tablet 1,000 mg  1,000 mg Oral Q6H PRN   1,000 mg at 10/05/17 2121     naproxen (NAPROSYN) tablet 250 mg  250 mg Oral BID PRN         montelukast (SINGULAIR) tablet 10 mg  10 mg Oral At Bedtime   10 mg at 10/08/17 2020     famotidine (PEPCID) tablet 20 mg  20 mg Oral BID   20 mg  "at 10/09/17 0835     fluticasone-vilanterol (BREO ELLIPTA) 200-25 MCG/INH oral inhaler 1 puff  1 puff Inhalation Daily   1 puff at 10/09/17 0834     prazosin (MINIPRESS) capsule 4 mg  4 mg Oral At Bedtime   4 mg at 10/08/17 2019     lurasidone (LATUDA) tablet 40 mg  40 mg Oral Daily with supper   40 mg at 10/08/17 1735     albuterol (PROAIR HFA/PROVENTIL HFA/VENTOLIN HFA) Inhaler 1-2 puff  1-2 puff Inhalation Q4H PRN         buPROPion (WELLBUTRIN XL) 24 hr tablet 300 mg  300 mg Oral QAM   300 mg at 10/09/17 0835     cetirizine (zyrTEC) tablet 10 mg  10 mg Oral Daily   10 mg at 10/09/17 0835     citalopram (celeXA) tablet 20 mg  20 mg Oral Daily   20 mg at 10/09/17 0835     clonazePAM (klonoPIN) tablet 0.5 mg  0.5 mg Oral At Bedtime   0.5 mg at 10/08/17 2019     lisinopril (PRINIVIL/ZESTRIL) tablet 20 mg  20 mg Oral Daily   20 mg at 10/09/17 0834     oxybutynin (DITROPAN-XL) 24 hr tablet 10 mg  10 mg Oral Daily   10 mg at 10/09/17 0835     pravastatin (PRAVACHOL) tablet 10 mg  10 mg Oral Daily   10 mg at 10/08/17 2019     hydrOXYzine (ATARAX) tablet 25-50 mg  25-50 mg Oral Q4H PRN   50 mg at 10/04/17 2115     No current [facility]-ordered outpatient prescriptions on file.         Allergies      Allergies   Allergen Reactions     Cephalexin Hives     Other reaction(s): *Unknown     Erythromycin Anaphylaxis and Hives     Other reaction(s): Unknown     Penicillins Hives     hives  Other reaction(s): Unknown        Medical Review of Systems     /60  Pulse 80  Temp 97.7  F (36.5  C) (Oral)  Resp 15  Ht 1.626 m (5' 4\")  Wt (!) 149.1 kg (328 lb 12.8 oz)  SpO2 99%  BMI 56.44 kg/m2  Body mass index is 56.44 kg/(m^2).  A 10-point review of systems was performed by Zenon Woodruff MD and is negative, no new findings.      Psychiatric Examination     Appearance Sitting in chair, dressed in scrubs. Appears stated age.   Attitude Cooperative   Orientation Oriented to person, place, time   Eye Contact Poor   Speech " Regular rate, rhythm, volume and tone   Language Normal   Psychomotor Behavior Normal   Mood Good   Affect Broad range   Thought Process Goal-Oriented, Intact   Associations Intact   Thought Content Patient is currently negative for suicide ideation, negative for plan or intent, able to contract no self harm and identify barriers to suicide.  Negative for obsessions, compulsions or psychosis.      Fund of Knowledge Appropriate   Insight Fair to good   Judgement Intact   Attention Span & Concentration Alert   Recent & Remote Memory Intact   Gait Normal   Muscle Tone Intact        Labs     Labs reviewed.  No results found for this or any previous visit (from the past 24 hour(s)).     Impression     Ronel Ryan is a 37-year-old female with a past medical history significant for Depression, Anxiety, PTSD, and ADHD who presented to the Emergency Department for evaluation of suicidal ideation. She has a history of 22 past psychiatric hospitalizations, treatment with various medications, and DBT. She is doing much better, she is no longer depressed and withdrawn. Plan to increase Trintellix to 10mg qd. She will follow up with Dr. Woodruff at [facility] within 4 weeks of discharge. Pt to discharge tomorrow.      Diagnoses     1. Major depressive disorder, recurrent, severe  2. Anxiety, NOS  3. ADHD, inattentive type  4. PTSD     Plan     1. Explained side effects, benefits, and complications of medications to the patient, Pt gave verbal consent.  2. Medication changes: increase Trintellix to 10mg qd  3. Discussed treatment plan with patient and team.  4. Projected length of stay: discharge tomorrow  5. Pt to follow up with Dr. Woodruff at [facility] within 4 weeks of discharge  6. Pt to follow up with her therapist in [place]      Attestation:   Patient has been seen and evaluated by me, Zenon Woodruff MD.    Patient ID:  Name: Ronel Ryan    MRN: [id number]  Admission:  10/4/2017     YOB: 1980

## 2017-10-09 NOTE — PLAN OF CARE
Problem: Depressive Symptoms  Goal: Depressive Symptoms  Signs and symptoms of listed problems will be absent or manageable.   Outcome: Improving  Patient is attending groups.  She plans on her discharge tomorrow and she will arrange a ride today for tomorrow.  She is pleasant , calm and cooperative.

## 2017-10-09 NOTE — DISCHARGE INSTRUCTIONS
Behavioral Discharge Planning and Instructions    Summary: Admitted to hospital with suicidal ideation.    Main Diagnosis: Major depressive disorder; Anxiety NOS; PTSD     Major Treatments, Procedures and Findings: psychiatric assessment; medication adjustment    Symptoms to Report: mood getting worse or thoughts of suicide    Lifestyle Adjustment: Develop and follow safety plan. Follow up with mental health providers.Work on anxiety management skills.    Psychiatry Follow-up:     Dr. Woodruff - appointment on Wednesday 10/25/17 @ 1:15 pm.       [facility]  [address], [address]  [place]  [address]  Phone:  [phone]  Fax:  [phone]    Therapy appointment with Erin Trinh on Monday 10/16/17 @ 11 am.  [facility]  [address]  [place] [address] [phone] fax: [phone]    , Zora Coyle, is also through [facility]- use same phone #.    Call your primary care Physician to set up an outpatient Cat Scan of the chest for your history of chest pain.    Resources:   [facility] Crisis Service- [phone]  Crisis Intervention: 702.363.8254 or [phone] (TTY: [phone]).  Call anytime for help.  National [place] on Mental Illness (www.[place].maycol.org): 991.614.5653 or 540-693-2762.  National Suicide Prevention Line (www.mentalhealthmn.org): 977-265-DRTL (0808)    General Medication Instructions:   See your medication sheet(s) for instructions.   Take all medicines as directed.  Make no changes unless your doctor suggests them.   Go to all your doctor visits.  Be sure to have all your required lab tests. This way, your medicines can be refilled on time.  Do not use any drugs not prescribed by your doctor.  Avoid alcohol.

## 2017-10-09 NOTE — PLAN OF CARE
Problem: Depressive Symptoms  Goal: Depressive Symptoms  Signs and symptoms of listed problems will be absent or manageable.   Pt has been listening to music, journaling, and talking to her roommate in bed for most of the evening. States that she has been hanging out in her room, because she was bored of watching the game in the lounge. Attended OT, in street clothes, worked on a "[employer]et, and socialized with a peer.

## 2017-10-10 VITALS
RESPIRATION RATE: 18 BRPM | HEIGHT: 64 IN | BODY MASS INDEX: 50.02 KG/M2 | HEART RATE: 77 BPM | OXYGEN SATURATION: 99 % | SYSTOLIC BLOOD PRESSURE: 116 MMHG | TEMPERATURE: 98 F | WEIGHT: 293 LBS | DIASTOLIC BLOOD PRESSURE: 65 MMHG

## 2017-10-10 PROCEDURE — 25000132 ZZH RX MED GY IP 250 OP 250 PS 637: Mod: GY | Performed by: PHYSICIAN ASSISTANT

## 2017-10-10 PROCEDURE — 25000132 ZZH RX MED GY IP 250 OP 250 PS 637: Mod: GY | Performed by: PSYCHIATRY & NEUROLOGY

## 2017-10-10 PROCEDURE — A9270 NON-COVERED ITEM OR SERVICE: HCPCS | Mod: GY | Performed by: PHYSICIAN ASSISTANT

## 2017-10-10 PROCEDURE — A9270 NON-COVERED ITEM OR SERVICE: HCPCS | Mod: GY | Performed by: PSYCHIATRY & NEUROLOGY

## 2017-10-10 RX ADMIN — OXYBUTYNIN CHLORIDE 10 MG: 10 TABLET, EXTENDED RELEASE ORAL at 08:43

## 2017-10-10 RX ADMIN — CETIRIZINE HYDROCHLORIDE 10 MG: 10 TABLET, FILM COATED ORAL at 08:43

## 2017-10-10 RX ADMIN — FLUTICASONE FUROATE AND VILANTEROL TRIFENATATE 1 PUFF: 200; 25 POWDER RESPIRATORY (INHALATION) at 08:43

## 2017-10-10 RX ADMIN — FAMOTIDINE 20 MG: 20 TABLET, FILM COATED ORAL at 08:43

## 2017-10-10 RX ADMIN — LISINOPRIL 20 MG: 10 TABLET ORAL at 08:42

## 2017-10-10 RX ADMIN — BUPROPION HYDROCHLORIDE 300 MG: 300 TABLET, FILM COATED, EXTENDED RELEASE ORAL at 08:43

## 2017-10-10 RX ADMIN — CITALOPRAM HYDROBROMIDE 20 MG: 20 TABLET ORAL at 08:43

## 2017-10-10 RX ADMIN — VORTIOXETINE 10 MG: 10 TABLET, FILM COATED ORAL at 08:43

## 2017-10-10 NOTE — DISCHARGE SUMMARY
[facility] Psychiatric Discharge Summary      DATE OF ADMISSION: 10/4/2017     DATE OF DISCHARGE: 10/10/2017    PRIMARY CARE PHYSICIAN: [facility]    IDENTIFICATION: Ronel Ryan is a 37-year-old female with a past medical history significant for Depression, Anxiety, PTSD, and ADHD who presented to the Emergency Department for evaluation of suicidal ideation. For history, see dictation by Dr. Woodruff on 10/5/2017. For physical summary, see dictation by Mai Silva PA-C on 10/5/2017.     HOSPITAL COURSE: Pt's depression has been worsening over the last several weeks. Per ED note Pt reported that she tried to overdose on Benadryl a few weeks ago. On this occasion she was concerned for her safety at home and decided to present to the ED. Pt has a long history of mental illness and has had 22 past hospitalizations. She has had numerous past suicide attempts. Pt has a history of nightmares due to PSTD, 2mg originally eliminated nightmares but more recently they have returned. Increase Prazosin to 4mg.  Discussed Trintellix. Reviewed the side effects, benefits, and complications of medication. Pt gave verbal agreement to begin Trintellix 5mg qd. Pt is currently taking Klonopin. Dr. Woodruff discussed the immediate negative effects of benzodiazapine use including increased fall risk and lowered IQ. Discussed addiction risk. Also mentioned the long-term detrimental effects including increased risk of dementia. Pt verbalized understanding. Pt does not appear to have symptoms of Bipolar disorder or Borderline personality disorder. Plan to titrate Pt off of Klonopin, Depakote, and Latuda.      On 10/5 Pt presented due to SI. Ruled out Bipolar II disorder and Borderline personality disorder. Planned to titrate Pt off of Klonopin and Latuda. Discontinued Depakote and PRN Klonopin. Started Trintellix 5mg qd. Increase Prazosin to 4mg. On 10/6 Pt remained overwhelmed, though she was  "participating actively in group and was working hard to improve. Discussed referral to another course of DBT. On 10/9 Pt was doing well, her mood had stabilized and she was no longer depressed. Increased Trintellix to 10mg qd. Planned for her to follow up with Dr. Woodruff at [facility] within 4 weeks of discharge. On 10/10 Pt was stable and appropriate for discharge. Tolerating medications without side effects. Side effects, risks, and benefits of medications reviewed with patient. Patient is currently negative for suicide ideation, negative for plan or intent, able to contract no self harm and identify barriers to suicide.  Negative for obsessions, compulsions or psychosis.       DISCHARGE MENTAL STATUS EXAMINATION:    Appearance Sitting in chair, dressed in scrubs. Appears stated age.   Attitude Cooperative   Orientation Oriented to person, place, time   Eye Contact Poor   Speech Regular rate, rhythm, volume and tone   Language Normal   Psychomotor Behavior Normal   Mood Good   Affect Broad range   Thought Process Goal-Oriented, Intact   Associations Intact   Thought Content Patient is currently negative for suicide ideation, negative for plan or intent, able to contract no self harm and identify barriers to suicide.  Negative for obsessions, compulsions or psychosis.      Fund of Knowledge Appropriate   Insight Good   Judgement Intact   Attention Span & Concentration Alert   Recent & Remote Memory Intact   Gait Normal   Muscle Tone Intact         LABORATORY DATA:    Refer to hospitalist admission dictation.  No results found for this or any previous visit (from the past 24 hour(s)).     /65  Pulse 77  Temp 98  F (36.7  C) (Oral)  Resp 18  Ht 1.626 m (5' 4\")  Wt (!) 149.1 kg (328 lb 12.8 oz)  SpO2 99%  BMI 56.44 kg/m2     DISCHARGE MEDICATIONS:      Review of your medicines      START taking       Dose / Directions    vortioxetine 10 MG tablet   Commonly known as:  TRINTELLIX/BRINTELLIX "   Used for:  Severe episode of recurrent major depressive disorder, without psychotic features (H)        Dose:  10 mg   Take 1 tablet (10 mg) by mouth daily   Quantity:  30 tablet   Refills:  0         CONTINUE these medicines which may have CHANGED, or have new prescriptions. If we are uncertain of the size of tablets/capsules you have at home, strength may be listed as something that might have changed.       Dose / Directions    lurasidone 40 MG Tabs tablet   Commonly known as:  LATUDA   This may have changed:    - how much to take  - when to take this   Used for:  Severe episode of recurrent major depressive disorder, without psychotic features (H)        Dose:  40 mg   Take 1 tablet (40 mg) by mouth daily (with dinner)   Quantity:  30 tablet   Refills:  0       prazosin 2 MG capsule   Commonly known as:  MINIPRESS   This may have changed:    - medication strength  - how much to take   Used for:  Severe episode of recurrent major depressive disorder, without psychotic features (H)        Dose:  4 mg   Take 2 capsules (4 mg) by mouth At Bedtime   Quantity:  60 capsule   Refills:  0         CONTINUE these medicines which have NOT CHANGED       Dose / Directions    albuterol 108 (90 BASE) MCG/ACT Inhaler   Commonly known as:  PROAIR HFA/PROVENTIL HFA/VENTOLIN HFA        Dose:  1-2 puff   Inhale 1-2 puffs into the lungs   Refills:  0       buPROPion 300 MG 24 hr tablet   Commonly known as:  WELLBUTRIN XL        Dose:  300 mg   Take 300 mg by mouth every morning   Refills:  0       cetirizine 10 MG tablet   Commonly known as:  zyrTEC        Dose:  10 mg   Take 1 tablet (10 mg) by mouth daily   Quantity:  7 tablet   Refills:  0       citalopram 10 MG tablet   Commonly known as:  celeXA        Dose:  20 mg   Take 20 mg by mouth daily   Refills:  0       EPINEPHrine 0.3 MG/0.3ML injection 2-pack   Commonly known as:  EPIPEN/ADRENACLICK/or ANY BX GENERIC EQUIV        Dose:  0.3 mg   Inject 0.3 mLs (0.3 mg) into the  muscle once as needed for anaphylaxis   Quantity:  0.6 mL   Refills:  0       lisinopril 10 MG tablet   Commonly known as:  PRINIVIL/ZESTRIL        Dose:  20 mg   Take 20 mg by mouth daily   Refills:  0       oxybutynin 10 MG 24 hr tablet   Commonly known as:  DITROPAN-XL        Dose:  10 mg   Take 10 mg by mouth daily   Refills:  0       pravastatin 20 MG tablet   Commonly known as:  PRAVACHOL        Dose:  10 mg   Take 10 mg by mouth daily   Refills:  0         STOP taking          clonazePAM 0.5 MG tablet   Commonly known as:  klonoPIN           CLONAZEPAM PO           divalproex 500 MG EC tablet   Commonly known as:  DEPAKOTE                Where to get your medicines      These medications were sent to [facility] - [address]  [address] [place] [address], [place] [address]     Phone:  [phone]      lurasidone 40 MG Tabs tablet     prazosin 2 MG capsule     vortioxetine 10 MG tablet             DISCHARGE DIAGNOSES:    1. Major depressive disorder, recurrent, severe  2. Anxiety, NOS  3. ADHD, inattentive type  4. PTSD    DISCHARGE PLAN:     5. Explained side effects, benefits, and complications of medications to the patient, Pt gave verbal consent.  6. Medication changes: continue medications  7. Discussed treatment plan with patient and team.  8. Projected length of stay: discharge today  9. Pt to follow up with Dr. Woodruff at [facility] within 4 weeks of discharge  10. Pt to follow up with her therapist in [place]  11. Pt to be referred to DBT    DISCHARGE FOLLOW-UP:    Dr. Woodruff - appointment on Wednesday 10/25/17 @ 1:15 pm.       [facility]  [address] [facility], [address]  [place]  [address]  Phone:  [phone]  Fax:  [phone]     Therapy appointment with Erin Trinh on Monday 10/16/17 @ 11 am.  [address]  [place] [address] [phone] fax: [phone]     , Zora Coyle, is also  through [facility] use same phone #.    Call your primary care Physician to set up an outpatient Cat Scan of the chest for your history of chest pain.    Attestation:   Patient has been seen and evaluated by me, Zenon Woodruff MD.    Patient ID:  Name: Ronel Ryan    MRN: [id number]  Admission: 10/4/2017     YOB: 1980

## 2017-10-10 NOTE — PLAN OF CARE
Problem: Depressive Symptoms  Goal: Depressive Symptoms  Signs and symptoms of listed problems will be absent or manageable.   Outcome: Adequate for Discharge Date Met:  10/09/17  Patient is bright in mood & feels ready for discharge tomorrow.  She denies any thoughts of suicidal ideation or self harm.  Participated in all milieu activities.  Social with peers.

## 2017-10-10 NOTE — PROGRESS NOTES
Medications sent with pt, Belongings returned to pt. Pt was escorted to taxi by [facility] Staff. Pt discharged to self.

## 2017-10-10 NOTE — PROGRESS NOTES
Discharge teaching done. Pt denied SI.  Pt verbalized understand of medication and out pt f/u [place] plan.  Medication filled here. Pt is calling for a ride.

## 2017-10-10 NOTE — PROGRESS NOTES
[facility] Psychiatric Progress Note       Interim History     The patient's care was discussed with the treatment team and chart notes were reviewed. Pt seen on SDU. Tolerating medications without side effects. Side effects, risks, and benefits of medications reviewed with patient. Patient is currently negative for suicide ideation, negative for plan or intent, able to contract no self harm and identify barriers to suicide.  Negative for obsessions, compulsions or psychosis. She reports she is doing well and is experiencing no side effects on Trintellix. Pt to follow up with Dr. Woodruff at [facility] within 30 days of discharge, she scheduled an appointment already. She has an appointment scheduled with her therapist as well. She is planning to attend DBT, suggested [facility].      Hospital Course     On 10/5 Pt presented due to SI. Ruled out Bipolar II disorder and Borderline personality disorder. Planned to titrate Pt off of Klonopin and Latuda. Discontinued Depakote and PRN Klonopin. Started Trintellix 5mg qd. Increase Prazosin to 4mg. On 10/6 Pt remained overwhelmed, though she was participating actively in group and was working hard to improve. Discussed referral to another course of DBT. On 10/9 Pt was doing well, her mood had stabilized and she was no longer depressed. Increased Trintellix to 10mg qd. Planned for her to follow up with Dr. Woodruff at [facility] within 4 weeks of discharge. On 10/10 Pt was stable and appropriate for discharge. Tolerating medications without side effects. Side effects, risks, and benefits of medications reviewed with patient. Patient is currently negative for suicide ideation, negative for plan or intent, able to contract no self harm and identify barriers to suicide.  Negative for obsessions, compulsions or psychosis.        Medications     Current Facility-Administered Medications  Ordered in Epic   Medication Dose Route Frequency Last Rate Last Dose     vortioxetine (TRINTELLIX/BRINTELLIX) tablet 10 mg  10 mg Oral Daily   10 mg at 10/10/17 0843     acetaminophen (TYLENOL) tablet 1,000 mg  1,000 mg Oral Q6H PRN   1,000 mg at 10/05/17 2121     naproxen (NAPROSYN) tablet 250 mg  250 mg Oral BID PRN         montelukast (SINGULAIR) tablet 10 mg  10 mg Oral At Bedtime   10 mg at 10/09/17 2110     famotidine (PEPCID) tablet 20 mg  20 mg Oral BID   20 mg at 10/10/17 0843     fluticasone-vilanterol (BREO ELLIPTA) 200-25 MCG/INH oral inhaler 1 puff  1 puff Inhalation Daily   1 puff at 10/10/17 0843     prazosin (MINIPRESS) capsule 4 mg  4 mg Oral At Bedtime   4 mg at 10/09/17 2109     lurasidone (LATUDA) tablet 40 mg  40 mg Oral Daily with supper   40 mg at 10/09/17 1726     albuterol (PROAIR HFA/PROVENTIL HFA/VENTOLIN HFA) Inhaler 1-2 puff  1-2 puff Inhalation Q4H PRN         buPROPion (WELLBUTRIN XL) 24 hr tablet 300 mg  300 mg Oral QAM   300 mg at 10/10/17 0843     cetirizine (zyrTEC) tablet 10 mg  10 mg Oral Daily   10 mg at 10/10/17 0843     citalopram (celeXA) tablet 20 mg  20 mg Oral Daily   20 mg at 10/10/17 0843     lisinopril (PRINIVIL/ZESTRIL) tablet 20 mg  20 mg Oral Daily   20 mg at 10/10/17 0842     oxybutynin (DITROPAN-XL) 24 hr tablet 10 mg  10 mg Oral Daily   10 mg at 10/10/17 0843     pravastatin (PRAVACHOL) tablet 10 mg  10 mg Oral Daily   10 mg at 10/09/17 2109     hydrOXYzine (ATARAX) tablet 25-50 mg  25-50 mg Oral Q4H PRN   50 mg at 10/04/17 2115     Current Outpatient Prescriptions Ordered in Epic   Medication     vortioxetine (TRINTELLIX/BRINTELLIX) 10 MG tablet     prazosin (MINIPRESS) 2 MG capsule     lurasidone (LATUDA) 40 MG TABS tablet         Allergies      Allergies   Allergen Reactions     Cephalexin Hives     Other reaction(s): *Unknown     Erythromycin Anaphylaxis and Hives     Other reaction(s): Unknown     Penicillins Hives     hives  Other reaction(s): Unknown  "       Medical Review of Systems     /65  Pulse 77  Temp 98  F (36.7  C) (Oral)  Resp 18  Ht 1.626 m (5' 4\")  Wt (!) 149.1 kg (328 lb 12.8 oz)  SpO2 99%  BMI 56.44 kg/m2  Body mass index is 56.44 kg/(m^2).  A 10-point review of systems was performed by Zenon Woodruff MD and is negative, no new findings.      Psychiatric Examination     Appearance Sitting in chair, dressed in scrubs. Appears stated age.   Attitude Cooperative   Orientation Oriented to person, place, time   Eye Contact Poor   Speech Regular rate, rhythm, volume and tone   Language Normal   Psychomotor Behavior Normal   Mood Good   Affect Broad range   Thought Process Goal-Oriented, Intact   Associations Intact   Thought Content Patient is currently negative for suicide ideation, negative for plan or intent, able to contract no self harm and identify barriers to suicide.  Negative for obsessions, compulsions or psychosis.      Fund of Knowledge Appropriate   Insight Good   Judgement Intact   Attention Span & Concentration Alert   Recent & Remote Memory Intact   Gait Normal   Muscle Tone Intact        Labs     Labs reviewed.  No results found for this or any previous visit (from the past 24 hour(s)).     Impression     Ronel Ryan is a 37-year-old female with a past medical history significant for Depression, Anxiety, PTSD, and ADHD who presented to the Emergency Department for evaluation of suicidal ideation. She has a history of 22 past psychiatric hospitalizations, treatment with various medications, and DBT. She is doing much better, she is no longer depressed and withdrawn. She scheduled follow up with Dr. Woodruff at [facility] within 4 weeks of discharge, and scheduled an appointment with her therapist as well. She will be referred to DBT. Pt is appropriate for discharge today.      Diagnoses     1. Major depressive disorder, recurrent, severe  2. Anxiety, NOS  3. ADHD, inattentive type  4. PTSD     Plan "     1. Explained side effects, benefits, and complications of medications to the patient, Pt gave verbal consent.  2. Medication changes: continue medications  3. Discussed treatment plan with patient and team.  4. Projected length of stay: discharge today  5. Pt to follow up with Dr. Woodruff at [facility] within 4 weeks of discharge  6. Pt to follow up with her therapist in [place]  7. Pt to be referred to DBT      Attestation:   Patient has been seen and evaluated by me, Zenon Woodruff MD.    Patient ID:  Name: Ronel Ryan    MRN: [id number]  Admission: 10/4/2017     YOB: 1980

## 2017-10-20 ENCOUNTER — HOSPITAL ENCOUNTER (OUTPATIENT)
Dept: CARDIOLOGY | Facility: CLINIC | Age: 37
Discharge: HOME | End: 2017-10-20
Attending: NURSE PRACTITIONER | Admitting: NURSE PRACTITIONER
Payer: MEDICARE

## 2017-10-20 DIAGNOSIS — R07.9 CHEST PAIN, UNSPECIFIED TYPE: ICD-10-CM

## 2017-10-20 PROCEDURE — 25000128 H RX IP 250 OP 636

## 2017-10-20 PROCEDURE — 40000141 ZZH STATISTIC PERIPHERAL IV START W/O US GUIDANCE

## 2017-10-20 PROCEDURE — A9270 NON-COVERED ITEM OR SERVICE: HCPCS | Mod: GY | Performed by: INTERNAL MEDICINE

## 2017-10-20 PROCEDURE — 25000132 ZZH RX MED GY IP 250 OP 250 PS 637: Mod: GY | Performed by: INTERNAL MEDICINE

## 2017-10-20 PROCEDURE — 75574 CT ANGIO HRT W/3D IMAGE: CPT

## 2017-10-20 PROCEDURE — 75574 CT ANGIO HRT W/3D IMAGE: CPT | Mod: 26 | Performed by: INTERNAL MEDICINE

## 2017-10-20 RX ORDER — IOPAMIDOL 755 MG/ML
150 INJECTION, SOLUTION INTRAVASCULAR ONCE
Status: COMPLETED | OUTPATIENT
Start: 2017-10-20 | End: 2017-10-20

## 2017-10-20 RX ORDER — ACYCLOVIR 200 MG/1
0-1 CAPSULE ORAL
Status: DISCONTINUED | OUTPATIENT
Start: 2017-10-20 | End: 2017-10-21 | Stop reason: HOSPADM

## 2017-10-20 RX ORDER — METOPROLOL TARTRATE 50 MG
50-100 TABLET ORAL
Status: COMPLETED | OUTPATIENT
Start: 2017-10-20 | End: 2017-10-20

## 2017-10-20 RX ORDER — METOPROLOL TARTRATE 1 MG/ML
5-15 INJECTION, SOLUTION INTRAVENOUS
Status: DISCONTINUED | OUTPATIENT
Start: 2017-10-20 | End: 2017-10-21 | Stop reason: HOSPADM

## 2017-10-20 RX ORDER — NITROGLYCERIN 0.4 MG/1
0.4 TABLET SUBLINGUAL
Status: DISCONTINUED | OUTPATIENT
Start: 2017-10-20 | End: 2017-10-21 | Stop reason: HOSPADM

## 2017-10-20 RX ADMIN — IOPAMIDOL 125 ML: 755 INJECTION, SOLUTION INTRAVENOUS at 12:02

## 2017-10-20 RX ADMIN — SODIUM CHLORIDE 100 ML: 9 INJECTION, SOLUTION INTRAVENOUS at 12:02

## 2017-10-20 RX ADMIN — METOPROLOL TARTRATE 100 MG: 50 TABLET ORAL at 10:59

## 2017-10-20 RX ADMIN — NITROGLYCERIN 0.4 MG: 0.4 TABLET SUBLINGUAL at 11:53

## 2018-03-26 ENCOUNTER — HOSPITAL ENCOUNTER (EMERGENCY)
Facility: CLINIC | Age: 38
Discharge: HOME | End: 2018-03-26
Attending: EMERGENCY MEDICINE | Admitting: EMERGENCY MEDICINE
Payer: MEDICARE

## 2018-03-26 VITALS
OXYGEN SATURATION: 100 % | RESPIRATION RATE: 22 BRPM | BODY MASS INDEX: 50.02 KG/M2 | DIASTOLIC BLOOD PRESSURE: 47 MMHG | WEIGHT: 293 LBS | SYSTOLIC BLOOD PRESSURE: 125 MMHG | HEIGHT: 64 IN | TEMPERATURE: 98 F

## 2018-03-26 DIAGNOSIS — G89.29 CHRONIC HIP PAIN, UNSPECIFIED LATERALITY: ICD-10-CM

## 2018-03-26 DIAGNOSIS — M25.559 CHRONIC HIP PAIN, UNSPECIFIED LATERALITY: ICD-10-CM

## 2018-03-26 PROCEDURE — 99284 EMERGENCY DEPT VISIT MOD MDM: CPT | Mod: 25

## 2018-03-26 PROCEDURE — 25000125 ZZHC RX 250: Performed by: EMERGENCY MEDICINE

## 2018-03-26 PROCEDURE — 27210995 ZZH RX 272

## 2018-03-26 PROCEDURE — 96372 THER/PROPH/DIAG INJ SC/IM: CPT

## 2018-03-26 RX ORDER — OLANZAPINE 10 MG/2ML
10 INJECTION, POWDER, FOR SOLUTION INTRAMUSCULAR DAILY PRN
Status: DISCONTINUED | OUTPATIENT
Start: 2018-03-26 | End: 2018-03-26 | Stop reason: HOSPADM

## 2018-03-26 RX ORDER — WATER 10 ML/10ML
INJECTION INTRAMUSCULAR; INTRAVENOUS; SUBCUTANEOUS
Status: COMPLETED
Start: 2018-03-26 | End: 2018-03-26

## 2018-03-26 RX ADMIN — OLANZAPINE 10 MG: 10 INJECTION, POWDER, FOR SOLUTION INTRAMUSCULAR at 14:13

## 2018-03-26 RX ADMIN — WATER: 1 INJECTION INTRAMUSCULAR; INTRAVENOUS; SUBCUTANEOUS at 14:13

## 2018-03-26 ASSESSMENT — ENCOUNTER SYMPTOMS: MYALGIAS: 1

## 2018-03-26 NOTE — ED AVS SNAPSHOT
Emergency Department    [address] [place] [address]    Phone:  [phone]    Fax:  [phone]                                       Ronel Ryan   MRN: [id number]    Department:   Emergency Department   Date of Visit:  3/26/2018           After Visit Summary Signature Page     I have received my discharge instructions, and my questions have been answered. I have discussed any challenges I see with this plan with the nurse or doctor.    ..........................................................................................................................................  Patient/Patient Representative Signature      ..........................................................................................................................................  Patient Representative Print Name and Relationship to Patient    ..................................................               ................................................  Date                                            Time    ..........................................................................................................................................  Reviewed by Signature/Title    ...................................................              ..............................................  Date                                                            Time

## 2018-03-26 NOTE — ED NOTES
Bed: ED12  Expected date:   Expected time:   Means of arrival:   Comments:  Yimi 89 Bilat leg pain 77 female

## 2018-03-26 NOTE — ED AVS SNAPSHOT
Emergency Department    [address] [facility] [address]    Phone:  [phone]    Fax:  [phone]                                       Ronel Ryan   MRN: [id number]    Department:   Emergency Department   Date of Visit:  3/26/2018           Patient Information     Date Of Birth          1980        Your diagnoses for this visit were:     Chronic hip pain, unspecified laterality        You were seen by Trierweiler, Chad A, MD.      Follow-up Information     Follow up with Clinic, [facility] In 1 week.    Contact information:    [address] [address] [phone]          Follow up with  Emergency Department.    Specialty:  EMERGENCY MEDICINE    Why:  If symptoms worsen    Contact information:    [address] [facility] [id number] [phone]        Discharge Instructions       Discharge Instructions  Chronic Pain Management  You were seen today for an issue regarding chronic or recurrent pain. You may have a condition that gives you pain every day, or a condition that causes pain that keeps coming back, or several conditions involving pain.   We will evaluate you for your symptoms to ensure that there is no medical emergency. This evaluation usually takes the form of a good medical history (interview) and physical examination. Rarely, imaging (x-rays or CAT scans) and lab work (blood tests) may be necessary in addition to the history and examination. This approach is similar to our approach to other chronic/recurrent diseases where we evaluate for emergencies but leave much of the management of the problem to the regular provider/clinic.  We will offer suggestions to manage your pain but we do not generally utilize opiate pain medications for recurrent or chronic pain. There is an ongoing public health crisis with respect to opiates and we are aware of the risks to our patients from opiate pain medications. Opiates are strong pain  relievers but they carry significant risks including sedation, confusion, constipation, falls, as well as dependence, addiction, and overdose-related deaths. These medications represent a significant risk to your health and are therefore reserved for the management of select conditions associated with acute, severe pain. For many conditions, the risks of opiate treatment simply outweigh the benefits. Additionally, for patients with chronic/recurrent pain or who frequently use opiates, management of pain needs to be performed by a single physician/provider who can follow their care consistently. As a result, we generally do not provide refills of opiates or treat chronic pain with injected opiates in the Emergency Department. It is with your best interests in mind that we adhere to these widely accepted best practices.    As with other chronic diseases like diabetes and asthma, management of chronic pain is best performed by regular visits to the same provider. In the case of chronic pain, this may be your primary physician/provider, your neurologist or other specialist, or with a chronic pain clinic/provider.  If you have concerns about our policy, please discuss them with your primary care provider or pain specialist, who can contact us if necessary for further information or clarification.  If you were given a prescription for medicine here today, be sure to read all of the information (including the package insert) that comes with your prescription.  This will include important information about the medicine, its side effects, and any warnings that you need to know about.  The pharmacist who fills the prescription can provide more information and answer questions you may have about the medicine.  If you have questions or concerns that the pharmacist cannot address, please call or return to the Emergency Department.   Remember that you can always come back to the Emergency Department if you develop any new  symptoms or if there is anything that worries you.      24 Hour Appointment Hotline       To make an appointment at any [facility] clinic, call [phone] ([phone]). If you don't have a family doctor or clinic, we will help you find one. [facility] clinics are conveniently located to serve the needs of you and your family.             Review of your medicines      Our records show that you are taking the medicines listed below. If these are incorrect, please call your family doctor or clinic.        Dose / Directions Last dose taken    albuterol 108 (90 BASE) MCG/ACT Inhaler   Commonly known as:  PROAIR HFA/PROVENTIL HFA/VENTOLIN HFA   Dose:  1-2 puff        Inhale 1-2 puffs into the lungs   Refills:  0        buPROPion 300 MG 24 hr tablet   Commonly known as:  WELLBUTRIN XL   Dose:  300 mg        Take 300 mg by mouth every morning   Refills:  0        cetirizine 10 MG tablet   Commonly known as:  zyrTEC   Dose:  10 mg   Quantity:  7 tablet        Take 1 tablet (10 mg) by mouth daily   Refills:  0        EPINEPHrine 0.3 MG/0.3ML injection 2-pack   Commonly known as:  EPIPEN/ADRENACLICK/or ANY BX GENERIC EQUIV   Dose:  0.3 mg   Quantity:  0.6 mL        Inject 0.3 mLs (0.3 mg) into the muscle once as needed for anaphylaxis   Refills:  0        lisinopril 10 MG tablet   Commonly known as:  PRINIVIL/ZESTRIL   Dose:  20 mg        Take 20 mg by mouth daily   Refills:  0        lurasidone 40 MG Tabs tablet   Commonly known as:  LATUDA   Dose:  40 mg   Quantity:  30 tablet        Take 1 tablet (40 mg) by mouth daily (with dinner)   Refills:  0        oxybutynin 10 MG 24 hr tablet   Commonly known as:  DITROPAN-XL   Dose:  10 mg        Take 10 mg by mouth daily   Refills:  0        pravastatin 20 MG tablet   Commonly known as:  PRAVACHOL   Dose:  10 mg        Take 10 mg by mouth daily   Refills:  0        prazosin 2 MG capsule   Commonly known as:  MINIPRESS   Dose:  4 mg   Quantity:  60 capsule        Take 2 capsules  (4 mg) by mouth At Bedtime   Refills:  0                Orders Needing Specimen Collection     None      Pending Results     No orders found from 3/24/2018 to 3/27/2018.            Pending Culture Results     No orders found from 3/24/2018 to 3/27/2018.            Pending Results Instructions     If you had any lab results that were not finalized at the time of your Discharge, you can call the ED Lab Result RN at [phone]. You will be contacted by this team for any positive Lab results or changes in treatment. The nurses are available 7 days a week from 10A to 6:30P.  You can leave a message 24 hours per day and they will return your call.        Test Results From Your Hospital Stay               Clinical Quality Measure: Blood Pressure Screening     Your blood pressure was checked while you were in the emergency department today. The last reading we obtained was  BP: (!) 108/94 . Please read the guidelines below about what these numbers mean and what you should do about them.  If your systolic blood pressure (the top number) is less than 120 and your diastolic blood pressure (the bottom number) is less than 80, then your blood pressure is normal. There is nothing more that you need to do about it.  If your systolic blood pressure (the top number) is 120-139 or your diastolic blood pressure (the bottom number) is 80-89, your blood pressure may be higher than it should be. You should have your blood pressure rechecked within a year by a primary care provider.  If your systolic blood pressure (the top number) is 140 or greater or your diastolic blood pressure (the bottom number) is 90 or greater, you may have high blood pressure. High blood pressure is treatable, but if left untreated over time it can put you at risk for heart attack, stroke, or kidney failure. You should have your blood pressure rechecked by a primary care provider within the next 4 weeks.  If your provider in the emergency department today gave  "you specific instructions to follow-up with your doctor or provider even sooner than that, you should follow that instruction and not wait for up to 4 weeks for your follow-up visit.        Thank you for choosing [facility]       Thank you for choosing [facility] for your care. Our goal is always to provide you with excellent care. Hearing back from our patients is one way we can continue to improve our services. Please take a few minutes to complete the written survey that you may receive in the mail after you visit with us. Thank you!        [employer] Information     [employer] lets you send messages to your doctor, view your test results, renew your prescriptions, schedule appointments and more. To sign up, go to [email or web address] . Click on \"Log in\" on the left side of the screen, which will take you to the Welcome page. Then click on \"Sign up Now\" on the right side of the page.     You will be asked to enter the access code listed below, as well as some personal information. Please follow the directions to create your username and password.     Your access code is: [email or web address]  Expires: 2018  3:36 PM     Your access code will  in 90 days. If you need help or a new code, please call your [facility] clinic or [phone].        Care EveryWhere ID     This is your Care EveryWhere ID. This could be used by other organizations to access your [facility] medical records  [id number]        Equal Access to Services     DAINA LOPEZ : Hadii mary oakes Soklever, waaxda luqadaha, qaybta kaalmada jo, bola wilkins . So [facility] [phone].    ATENCIÓN: Si habla español, tiene a hussein disposición servicios gratuitos de asistencia lingüística. Estefanyame al [phone].    We comply with applicable federal civil rights laws and [place] laws. We do not discriminate on the basis of race, color, national origin, age, disability, sex, sexual orientation, or gender identity.            After " Visit Summary       This is your record. Keep this with you and show to your community pharmacist(s) and doctor(s) at your next visit.

## 2018-03-26 NOTE — ED PROVIDER NOTES
History     Chief Complaint:  Leg Pain    HPI   Ronel Ryan is a 37 year old female with a history of hip dysplasia and morbid obesity who presents with leg pain. The patient reports that last night she was cleaning for a long time and felt pain as usual with her hip dysphagia, afterwards she took a nap, and then she woke up with worsening pain with tingling while still being able to ambulate, prompting the patient's presentation to the ER. She describes her excruciating pain as originating bilaterally from both hips with radiation to both knees and notes this is unusual as only bilateral hip pain is common to her. The patient states that at 1130 hours today she took Aleve with no difference in pain level when Aleve has helped in the past for her bilateral hip pain. She also mentioned that she must lose weight and reach 209 lbs from current 340 lbs for hip surgery.     Allergies:  Cephalexin  Erythromycin  Penicillins    Medications:    Prazosin  Lurasidone  Epinephrine Injection 2 pack  Cetirizine  Albuterol  Bupropion  Lisinopril  Oxybutynin  Pravastatin    Past Medical History:    Suicidal ideation  Hip pain, left  Eating disorder  History of urinary anomaly  Multiple-type hyperlipidemia  Arthralgia of hip  Hypertension  Trochanteric bursitis  Binge eating  Borderline personality disorder  Attention deficit disorder  Severe episode of recurrent major depressive disorder  Morbid obesity   Asthma  Allergic state  Anxiety  Bipolar 2 disorder  Elevated cholesterol  PTSD  Uncomplicated asthma    Past Surgical History:    Cholecystectomy  Ankle and ACL Orthopedic Surgery    Family History:    History reviewed. No pertinent family history.     Social History:  Marital Status:  Single   Tobacco Status: Never Used  Alcohol Use: No  Presents: Alone         Review of Systems   Genitourinary: Positive for pelvic pain.   Musculoskeletal: Positive for myalgias.   Neurological:        Positive: Tingling Sensation,  "Bilaterally from Hip to Legs     Physical Exam     Patient Vitals for the past 24 hrs:   BP Temp Temp src Heart Rate Resp SpO2 Height Weight   03/26/18 1539 - - - - - 100 % - -   03/26/18 1537 125/47 - - - - - - -   03/26/18 1320 (!) 108/94 98  F (36.7  C) Oral 95 22 100 % 1.626 m (5' 4\") (!) 154.2 kg (340 lb)     Physical Exam  General: Corpulent young woman writhing and screaming on the bed, complaining of hip pain    Eye:  Pupils are equal, round, and reactive.  Extraocular movements intact.    ENT:  No rhinorrhea.  Moist mucus membranes.  Normal tongue and tonsil.    Cardiac:  Regular rate and rhythm.  No murmurs, gallops, or rubs.    Pulmonary:  Clear to auscultation bilaterally.  No wheezes, rales, or rhonchi.    Abdomen:  Positive bowel sounds.  Abdomen is soft and non-distended, without focal tenderness.    MSK: Focused exam of the lower extremities showed no evidence of asymmetry.  She is able to range the hip, knee, and ankle.  She complains of diffuse discomfort through the hips though is able to range normally and bear weight.    SKIN:  Warm and dry with strong DP pulse and normal capillary refill.    NEURO:  Normal sensation throughout the thighs, legs, and ankles.. 5 out of 5 strength with dorsiflexion and plantar flexion.    PSYCH: Patient is markedly anxious, moaning and writhing on the bed with severe hyperventilation.  .      Emergency Department Course     Interventions:  1413: Zyprexa Injection 10 mg IM  1413: Sterile Water Injection    Emergency Department Course:  Past medical records, nursing notes, and vitals reviewed.  1403: I performed an exam of the patient and obtained history, as documented above.   1531: I rechecked the patient. Findings and plan explained to the Patient. Patient discharged home with instructions regarding supportive care, medications, and reasons to return. The importance of close follow-up was reviewed.     Impression & Plan      Medical Decision Making:  This " unfortunate morbidly obese young woman presents with an exacerbation of her chronic hip pain.  She notes that she may have used her legs more than usual yesterday while cleaning her house.  She describes taking a nap and upon waking, finding that her hip pain was markedly worse.  She did not find improvement with Aleve.  She presents here in a hysterical fashion, complaining of this discomfort.  Her physical exam is unremarkable.  She was given 1 dose of Zyprexa with almost complete and total resolution of her pain.  I feel there was a large psychiatric component to her discomfort.  Nonetheless, at this juncture I feel she is safe for discharge home.  She was advised to continue to work with her orthopedist and to otherwise return to us for any other emergent concerns.    Diagnosis:    ICD-10-CM    1. Chronic hip pain, unspecified laterality M25.559     G89.29        Disposition:  discharged to home    Og Mccabe  3/26/2018    EMERGENCY DEPARTMENT  I, Og Mccabe, am serving as a scribe at 2:03 PM on 3/26/2018 to document services personally performed by Trierweiler, Chad A, MD based on my observations and the provider's statements to me.         Trierweiler, Chad A, MD  03/27/18 0017

## 2018-04-03 ENCOUNTER — TELEPHONE (OUTPATIENT)
Dept: SURGERY | Facility: CLINIC | Age: 38
End: 2018-04-03

## 2018-04-03 NOTE — TELEPHONE ENCOUNTER
Client referral from [employer] website.  No longer interested in the [employer].   Discussed other options.    Plan:  New Weight Management with Dr Coronel at 9:40am and Ann Connolly RD at 10:30am.  To resign her up for [email or web address].

## 2018-10-08 ENCOUNTER — TRANSFERRED RECORDS (OUTPATIENT)
Dept: HEALTH INFORMATION MANAGEMENT | Facility: CLINIC | Age: 38
End: 2018-10-08

## 2018-11-21 ENCOUNTER — OFFICE VISIT (OUTPATIENT)
Dept: FAMILY MEDICINE | Facility: CLINIC | Age: 38
End: 2018-11-21
Payer: MEDICARE

## 2018-11-21 VITALS
BODY MASS INDEX: 59.12 KG/M2 | RESPIRATION RATE: 20 BRPM | TEMPERATURE: 98.7 F | HEART RATE: 78 BPM | OXYGEN SATURATION: 100 % | DIASTOLIC BLOOD PRESSURE: 71 MMHG | SYSTOLIC BLOOD PRESSURE: 116 MMHG | WEIGHT: 293 LBS

## 2018-11-21 DIAGNOSIS — E66.01 MORBID OBESITY (H): ICD-10-CM

## 2018-11-21 DIAGNOSIS — R63.1 POLYDIPSIA: ICD-10-CM

## 2018-11-21 DIAGNOSIS — E11.8 TYPE 2 DIABETES MELLITUS WITH COMPLICATION, WITHOUT LONG-TERM CURRENT USE OF INSULIN (H): ICD-10-CM

## 2018-11-21 DIAGNOSIS — R35.89 POLYURIA: Primary | ICD-10-CM

## 2018-11-21 LAB
BACTERIA: NORMAL
BILIRUBIN UR: NEGATIVE
BLOOD UR: ABNORMAL
BUN SERPL-MCNC: 4.9 MG/DL (ref 7–19)
CALCIUM SERPL-MCNC: 9.6 MG/DL (ref 8.5–10.1)
CASTS: NORMAL /LPF
CHLORIDE SERPLBLD-SCNC: 103.4 MMOL/L (ref 98–110)
CO2 SERPL-SCNC: 25.5 MMOL/L (ref 20–32)
CREAT SERPL-MCNC: 0.9 MG/DL (ref 0.5–1)
CRYSTAL URINE: NORMAL /LPF
EPITHELIAL CELLS UR: NORMAL /LPF (ref 0–2)
GFR SERPL CREATININE-BSD FRML MDRD: 74.1 ML/MIN/1.7 M2
GLUCOSE SERPL-MCNC: 143.5 MG'DL (ref 70–99)
GLUCOSE URINE: NEGATIVE
HBA1C MFR BLD: 7 % (ref 4.1–5.7)
KETONES UR QL: NEGATIVE
LEUKOCYTE ESTERASE UR: ABNORMAL
MUCOUS URINE: NORMAL LPF
NITRITE UR QL STRIP: NEGATIVE
PH UR STRIP: 6 [PH] (ref 5–7)
POTASSIUM SERPL-SCNC: 3.8 MMOL/DL (ref 3.2–4.6)
PROTEIN UR: NEGATIVE
RBC URINE: NORMAL /HPF
SODIUM SERPL-SCNC: 136.6 MMOL/L (ref 132–142)
SP GR UR STRIP: 1.01
UROBILINOGEN UR STRIP-ACNC: ABNORMAL
WBC URINE: NORMAL /HPF

## 2018-11-21 RX ORDER — METFORMIN HCL 500 MG
500 TABLET, EXTENDED RELEASE 24 HR ORAL 2 TIMES DAILY WITH MEALS
Qty: 60 TABLET | Refills: 1 | Status: SHIPPED | OUTPATIENT
Start: 2018-11-21 | End: 2020-11-19

## 2018-11-21 NOTE — MR AVS SNAPSHOT
After Visit Summary   11/21/2018    Ronel Ryan    MRN: [id number]           Patient Information     Date Of Birth          1980        Visit Information        Provider Department      11/21/2018 9:00 AM Lorena Avelar MD [facility]        Today's Diagnoses     Polyuria    -  1    Polydipsia        Morbid obesity (H)        Type 2 diabetes mellitus with complication, without long-term current use of insulin (H)          Care Instructions    Dear Ronel Ryan,    It was a pleasure seeing you in clinic today.   Labs today.   Start Metformin today 500 mg twice a day.  Please schedule a follow up visit for next week.  Please do not hesitate to call or return to clinic if you have an questions or concerns.      Sincerely,    Dr. Avelar            Follow-ups after your visit        Additional Services     BARIATRIC ADULT REFERRAL       Patient to stop at the [facility] Desk    Reason for Referral: Morbid Obesity     needed: No  Language: English    May leave message on voicemail: Yes    ([facility] Only) Referral should be tracked (Yes/No)?                  Future tests that were ordered for you today     Open Future Orders        Priority Expected Expires Ordered    BARIATRIC ADULT REFERRAL Routine  12/21/2018 11/21/2018            Who to contact     Please call your clinic at [phone] to:    Ask questions about your health    Make or cancel appointments    Discuss your medicines    Learn about your test results    Speak to your doctor            Additional Information About Your Visit        MyChart Information     [employer] is an electronic gateway that provides easy, online access to your medical records. With [employer], you can request a clinic appointment, read your test results, renew a prescription or communicate with your care team.     To sign up for [employer] visit the website at [email or web address]   You will be asked to enter the access code listed below,  as well as some personal information. Please follow the directions to create your username and password.     Your access code is: [email or web address]  Expires: 2019  9:47 AM     Your access code will  in 90 days. If you need help or a new code, please contact your [facility] or call [phone] for assistance.        Care EveryWhere ID     This is your Care EveryWhere ID. This could be used by other organizations to access your [facility] medical records  [id number]        Your Vitals Were     Pulse Temperature Respirations Pulse Oximetry BMI (Body Mass Index)       78 98.7  F (37.1  C) (Oral) 20 100% 59.12 kg/m2        Blood Pressure from Last 3 Encounters:   18 116/71   18 125/47   10/10/17 116/65    Weight from Last 3 Encounters:   18 (!) 344 lb 6.4 oz (156.2 kg)   18 (!) 340 lb (154.2 kg)   10/04/17 (!) 328 lb 12.8 oz (149.1 kg)              We Performed the Following     Basic Metabolic Panel ([place])     Hemoglobin A1c ([facility])     Urinalysis, Micro If ([facility])          Today's Medication Changes          These changes are accurate as of 18  9:47 AM.  If you have any questions, ask your nurse or doctor.               Start taking these medicines.        Dose/Directions    metFORMIN 500 MG 24 hr tablet   Commonly known as:  GLUCOPHAGE-XR   Used for:  Type 2 diabetes mellitus with complication, without long-term current use of insulin (H)   Started by:  Lorena Avelar MD        Dose:  500 mg   Take 1 tablet (500 mg) by mouth 2 times daily (with meals)   Quantity:  60 tablet   Refills:  1            Where to get your medicines      These medications were sent to [facility] - [place], [place] - [facility] [address]  [facility] [address] [place], [place] [address]     Phone:  [phone]     metFORMIN 500 MG 24 hr tablet                Primary Care Provider Office Phone # Fax #    Lorena Avelar [phone]        [facility]  [address] [place] [address]        Equal Access to Services     DAINA LOPEZ : Hadii aad ku hadnereydaphyllis Soklever, waaxda arpan, qaybta kacandidotitus osorio, bola wesleysymonedanni harris. So [facility] [phone].    ATENCIÓN: Si habla español, tiene a hussein disposición servicios gratuitos de asistencia lingüística. Llame al [phone].    We comply with applicable federal civil rights laws and [place] laws. We do not discriminate on the basis of race, color, national origin, age, disability, sex, sexual orientation, or gender identity.            Thank you!     Thank you for choosing [facility]  for your care. Our goal is always to provide you with excellent care. Hearing back from our patients is one way we can continue to improve our services. Please take a few minutes to complete the written survey that you may receive in the mail after your visit with us. Thank you!             Your Updated Medication List - Protect others around you: Learn how to safely use, store and throw away your medicines at www.disposemymeds.org.          This list is accurate as of 11/21/18  9:47 AM.  Always use your most recent med list.                   Brand Name Dispense Instructions for use Diagnosis    albuterol 108 (90 Base) MCG/ACT inhaler    PROAIR HFA/PROVENTIL HFA/VENTOLIN HFA     Inhale 1-2 puffs into the lungs        buPROPion 300 MG 24 hr tablet    WELLBUTRIN XL     Take 300 mg by mouth every morning        cetirizine 10 MG tablet    zyrTEC    7 tablet    Take 1 tablet (10 mg) by mouth daily        EPINEPHrine 0.3 MG/0.3ML injection 2-pack    EPIPEN/ADRENACLICK/or ANY BX GENERIC EQUIV    0.6 mL    Inject 0.3 mLs (0.3 mg) into the muscle once as needed for anaphylaxis        lisinopril 10 MG tablet    PRINIVIL/ZESTRIL     Take 20 mg by mouth daily        lurasidone 40 MG Tabs tablet    LATUDA    30 tablet    Take 1 tablet (40 mg) by mouth daily (with dinner)    Severe episode of  recurrent major depressive disorder, without psychotic features (H)       metFORMIN 500 MG 24 hr tablet    GLUCOPHAGE-XR    60 tablet    Take 1 tablet (500 mg) by mouth 2 times daily (with meals)    Type 2 diabetes mellitus with complication, without long-term current use of insulin (H)       oxybutynin 10 MG 24 hr tablet    DITROPAN-XL     Take 10 mg by mouth daily        pravastatin 20 MG tablet    PRAVACHOL     Take 10 mg by mouth daily        prazosin 2 MG capsule    MINIPRESS    60 capsule    Take 2 capsules (4 mg) by mouth At Bedtime    Severe episode of recurrent major depressive disorder, without psychotic features (H)

## 2018-11-21 NOTE — PROGRESS NOTES
Preceptor attestation:  Vital signs reviewed: /71  Pulse 78  Temp 98.7  F (37.1  C) (Oral)  Resp 20  Wt (!) 344 lb 6.4 oz (156.2 kg)  SpO2 100%  BMI 59.12 kg/m2    Patient seen, evaluated, and discussed with the resident.  I have verified the content of the note, which accurately reflects my assessment of the patient and the plan of care.    Supervising physician: Dana Connor MD  [facility]

## 2018-11-21 NOTE — PROGRESS NOTES
SUBJECTIVE  HPI: Ronel Ryan is a 38 year old female who presents with complaints of:     Polyuria and Incontinence: started in . But resolved for several years then started again last month. Increased urination frequency. Increased sense of urgency. When she goes to the bathroom she has a large volume of urine.  Also reports loss of urine with laughing, coughing. Previously has taken oxybutinin in the past. Stopped taking this about a year or two ago.    Polydipsia: reports increased thirst. Drinking a lot of fluids. Drinks juice, soda, crystal light. Feels constantly thirsty.     Previously went to [facility] in the past.    Reports hx of Type 2 DM. She preiously has taken Metformin in . Recheck A1C normal in . She stopped metformin.  She reports at the time she had lost 100 lbs. She has since gained this back.     She reports a PMH of dyslipidemia and hypertension.    She reports she was hospitalized for mental health cirsis in October and has been following up with [facility]. She sees a psychiatrist there. She reports her mental health is stable.     Fam Hx: mother has Type I DM, brother with Type II DM    No dysuria.   Normal bowel movements. No nausea, no vomiting. No abdominal pain. No hematuria.    Never been pregnant. .    ROS: No CP, SOB    PMH:   Patient Active Problem List    Diagnosis Date Noted     Suicidal ideation 10/04/2017     Priority: Medium     Hip pain, left 2017     Priority: Medium     Eating disorder 2017     Priority: Medium     History of urinary anomaly 2016     Priority: Medium     Multiple-type hyperlipidemia 2016     Priority: Medium     Arthralgia of hip 2016     Priority: Medium     Hypertension 2016     Priority: Medium     Trochanteric bursitis 10/27/2015     Priority: Medium     Binge eating 2013     Priority: Medium     Borderline personality disorder (H) 2013     Priority: Medium     Attention deficit  disorder 11/27/2013     Priority: Medium     Severe episode of recurrent major depressive disorder (H) 10/04/2012     Priority: Medium     Morbid obesity (H) 12/22/2010     Priority: Medium     Asthma 12/22/2010     Priority: Medium       Bb                           Social Hx:  Social History     Social History Narrative     History   Smoking Status     Never Smoker   Smokeless Tobacco     Never Used       OBJECTIVE:  Vitals: /71  Pulse 78  Temp 98.7  F (37.1  C) (Oral)  Resp 20  Wt (!) 344 lb 6.4 oz (156.2 kg)  SpO2 100%  BMI 59.12 kg/m2  General: Pleasant. Young woman. No distress. Morbidly obese.   Heart: Regular rate and rhythm. No murmurs, rubs, or gallops.  Extremities: Extremities warm and well-perfused.  Lungs: Clear to auscultation bilaterally. No wheezes or crackles. Good air movement.  GI: Abdomen normal to inspection. No ridigidity, distension, or guarding. Normoactive bowel sounds. Soft and non-tender to palpation throughout abdomen. No CVA tenderness.      ASSESSMENT & PLAN:    Polyuria;  Polydipsia: Most likely due to diabetes mellitus. Will check A1c and BMP today. Will check UA to rule out infection.   -     Hemoglobin A1c ([facility])  -     Urinalysis, Micro If ([facility])  -     Basic Metabolic Panel ([facility])  -     Urine Microscopic ([facility])    Type 2 diabetes mellitus with complication, without long-term current use of insulin (H): On chart review, it looks like patient had an elevated a1c of 7.2 during her recent hospitalization. Patient states she was not informed of these results. Will start metformin today at 500 mg BID, with plan to titrate dose at follow up appt.   -     metFORMIN (GLUCOPHAGE-XR) 500 MG 24 hr tablet; Take 1 tablet (500 mg) by mouth 2 times daily (with meals)    Morbid obesity (H): Patient with BMI of 59. Hx of 100 lb weight loss but regained since then. Will refer to Bariatric for nutrition and counseling, per patient request. At this time patient reports she is  not interested in surgical intervention.   -     BARIATRIC ADULT REFERRAL; Future          Orders Placed This Encounter   Procedures     Hemoglobin A1c ([facility])     Urinalysis, Micro If ([facility])     Basic Metabolic Panel ([facility])     Urine Microscopic ([facility])     BARIATRIC ADULT REFERRAL       Return to clinic in one week for follow up.       The patient was actively involved in the decision making process, and all the questions were answered to their satisfaction prior to leaving.       Patient precepted with attending physician, Dr. Connor.    Lorena Avelar DO   PGY-3 [facility]  [facility]  Pager: [phone]

## 2018-11-21 NOTE — PATIENT INSTRUCTIONS
Dear Ronel Ryan,    It was a pleasure seeing you in clinic today.   Labs today.   Start Metformin today 500 mg twice a day.  Please schedule a follow up visit for next week.  Please do not hesitate to call or return to clinic if you have an questions or concerns.      Sincerely,    Dr. Avelar    BARIATRIC ADULT REFERRAL  November 26, 2018 at 2:19 pm spoke with Ronel - she states she received a call and is scheduled to go to the [facility]. No additional concerns or questions at this time. ajay

## 2018-11-26 ENCOUNTER — HEALTH MAINTENANCE LETTER (OUTPATIENT)
Age: 38
End: 2018-11-26

## 2018-11-28 ENCOUNTER — OFFICE VISIT (OUTPATIENT)
Dept: FAMILY MEDICINE | Facility: CLINIC | Age: 38
End: 2018-11-28
Payer: MEDICARE

## 2018-11-28 VITALS
OXYGEN SATURATION: 99 % | SYSTOLIC BLOOD PRESSURE: 131 MMHG | BODY MASS INDEX: 58.64 KG/M2 | RESPIRATION RATE: 18 BRPM | HEART RATE: 77 BPM | DIASTOLIC BLOOD PRESSURE: 85 MMHG | WEIGHT: 293 LBS | TEMPERATURE: 98.7 F

## 2018-11-28 DIAGNOSIS — E11.9 TYPE 2 DIABETES MELLITUS WITHOUT COMPLICATION, WITHOUT LONG-TERM CURRENT USE OF INSULIN (H): Primary | ICD-10-CM

## 2018-11-28 DIAGNOSIS — I10 ESSENTIAL HYPERTENSION: ICD-10-CM

## 2018-11-28 RX ORDER — FLUTICASONE PROPIONATE 50 MCG
1-2 SPRAY, SUSPENSION (ML) NASAL DAILY PRN
Qty: 1 BOTTLE | Refills: 11 | COMMUNITY
Start: 2018-11-28 | End: 2020-11-19

## 2018-11-28 RX ORDER — LITHIUM CARBONATE 300 MG/1
900 CAPSULE ORAL AT BEDTIME
COMMUNITY
Start: 2018-11-28 | End: 2020-11-19

## 2018-11-28 RX ORDER — VERAPAMIL HYDROCHLORIDE 120 MG/1
120 CAPSULE, EXTENDED RELEASE ORAL AT BEDTIME
Qty: 90 CAPSULE | Refills: 3 | COMMUNITY
Start: 2018-11-28 | End: 2020-11-19

## 2018-11-28 RX ORDER — TRAZODONE HYDROCHLORIDE 100 MG/1
100 TABLET ORAL AT BEDTIME
Qty: 30 TABLET | Refills: 1 | COMMUNITY
Start: 2018-11-28 | End: 2022-03-23

## 2018-11-28 NOTE — MR AVS SNAPSHOT
After Visit Summary   11/28/2018    Ronel Ryan    MRN: [id number]           Patient Information     Date Of Birth          1980        Visit Information        Provider Department      11/28/2018 1:50 PM Lorena Avelar MD [facility]        Today's Diagnoses     Type 2 diabetes mellitus without complication, without long-term current use of insulin (H)    -  1    Essential hypertension           Follow-ups after your visit        Your next 10 appointments already scheduled     Dec 11, 2018 11:20 AM CST   Return Visit with Lorena Avelar MD   [facility] ([facility])    [address] [place] [id number]   [phone]            Dec 31, 2018  9:00 AM CST   (Arrive by 8:45 AM)   Return Weight Management Nutrition with Sarah Hong RD   [facility] ([facility])    [address] [place] [id number] [phone]            Mar 01, 2019  9:20 AM CST   (Arrive by 9:05 AM)   Return Weight Management Visit with Nolvia Salgado MD   [facility] ([facility])    [address] [place] [id number] [phone]              Who to contact     Please call your clinic at [phone] to:    Ask questions about your health    Make or cancel appointments    Discuss your medicines    Learn about your test results    Speak to your doctor            Additional Information About Your Visit        [employer] Information     [employer] gives you secure access to your electronic health record. If you see a primary care provider, you can also send messages to your care team and make appointments. If you have questions, please call your primary care clinic.  If you do not have a primary care provider, please call [phone] and they will assist you.      [employer] is an electronic gateway that provides easy, online access to your  medical records. With [employer], you can request a clinic appointment, read your test results, renew a prescription or communicate with your care team.     To access your existing account, please contact your [facility] or call [phone] for assistance.        Care EveryWhere ID     This is your Care EveryWhere ID. This could be used by other organizations to access your [place] medical records  [id number]        Your Vitals Were     Pulse Temperature Respirations Pulse Oximetry BMI (Body Mass Index)       77 98.7  F (37.1  C) (Oral) 18 99% 58.64 kg/m2        Blood Pressure from Last 3 Encounters:   11/30/18 111/51   11/28/18 131/85   11/21/18 116/71    Weight from Last 3 Encounters:   11/30/18 (!) 344 lb 6.4 oz (156.2 kg)   11/28/18 (!) 341 lb 9.6 oz (154.9 kg)   11/21/18 (!) 344 lb 6.4 oz (156.2 kg)              Today, you had the following     No orders found for display         Today's Medication Changes          These changes are accurate as of 11/28/18 11:59 PM.  If you have any questions, ask your nurse or doctor.               Start taking these medicines.        Dose/Directions    blood glucose lancets standard   Commonly known as:  NO BRAND SPECIFIED   Used for:  Type 2 diabetes mellitus without complication, without long-term current use of insulin (H)   Started by:  Lorena Avelar MD        Use to test blood sugar 2 times daily or as directed.   Quantity:  100 each   Refills:  11       blood glucose monitoring meter device kit   Commonly known as:  NO BRAND SPECIFIED   Used for:  Type 2 diabetes mellitus without complication, without long-term current use of insulin (H)   Started by:  Lorena Avelar MD        Use to test blood sugar 2 times daily or as directed.   Quantity:  1 kit   Refills:  0            Where to get your medicines      These medications were sent to [facility] [place], [place] - [address]  [address], [place]  [facility] [address]     Phone:  [phone]     blood glucose lancets standard    blood glucose monitoring meter device kit                Primary Care Provider Office Phone # Fax #    Lorena Eugenio Avelar [phone]       [facility] [address] [place] [address]        Equal Access to Services     DAINA LOPEZ : Hadii aad ku hadasho Soomaali, waaxda luqadaha, qaybta kaalmada adeegyada, waxay idiin hayaan adeeg lisbet lajimbo . So [facility] [place] [phone].    ATENCIÓN: Si habla español, tiene a hussein disposición servicios gratuitos de asistencia lingüística. Shahida al [phone].    We comply with applicable federal civil rights laws and [place] laws. We do not discriminate on the basis of race, color, national origin, age, disability, sex, sexual orientation, or gender identity.            Thank you!     Thank you for choosing [facility]  for your care. Our goal is always to provide you with excellent care. Hearing back from our patients is one way we can continue to improve our services. Please take a few minutes to complete the written survey that you may receive in the mail after your visit with us. Thank you!             Your Updated Medication List - Protect others around you: Learn how to safely use, store and throw away your medicines at www.disposemymeds.org.          This list is accurate as of 11/28/18 11:59 PM.  Always use your most recent med list.                   Brand Name Dispense Instructions for use Diagnosis    albuterol 108 (90 Base) MCG/ACT inhaler    PROAIR HFA/PROVENTIL HFA/VENTOLIN HFA     Inhale 1-2 puffs into the lungs        beclomethasone HFA 80 MCG/ACT inhaler    QVAR REDIHALER     Inhale 1 puff into the lungs 2 times daily        blood glucose lancets standard    NO BRAND SPECIFIED    100 each    Use to test blood sugar 2 times daily or as directed.    Type 2 diabetes mellitus without complication, without long-term current use of insulin (H)        blood glucose monitoring meter device kit    NO BRAND SPECIFIED    1 kit    Use to test blood sugar 2 times daily or as directed.    Type 2 diabetes mellitus without complication, without long-term current use of insulin (H)       EPINEPHrine 0.3 MG/0.3ML injection 2-pack    EPIPEN/ADRENACLICK/or ANY BX GENERIC EQUIV    0.6 mL    Inject 0.3 mLs (0.3 mg) into the muscle once as needed for anaphylaxis        fluticasone 50 MCG/ACT nasal spray    FLONASE    1 Bottle    Spray 1-2 sprays into both nostrils daily as needed for rhinitis or allergies        lithium 300 MG capsule      Take 3 capsules (900 mg) by mouth At Bedtime        metFORMIN 500 MG 24 hr tablet    GLUCOPHAGE-XR    60 tablet    Take 1 tablet (500 mg) by mouth 2 times daily (with meals)    Type 2 diabetes mellitus with complication, without long-term current use of insulin (H)       pravastatin 20 MG tablet    PRAVACHOL     Take 10 mg by mouth daily        prazosin 2 MG capsule    MINIPRESS    60 capsule    Take 2 capsules (4 mg) by mouth At Bedtime    Severe episode of recurrent major depressive disorder, without psychotic features (H)       traZODone 50 MG tablet    DESYREL    30 tablet    Take 2 tablets (100 mg) by mouth nightly as needed for sleep        verapamil  MG 24 hr capsule    VERELAN    90 capsule    Take 1 capsule (120 mg) by mouth At Bedtime

## 2018-11-28 NOTE — PROGRESS NOTES
Medication Management Note                                                       Ronel was referred by Dr. Avelar for pharmacy services for medication reconciliation.     MEDICATION REVIEW:  Discussed all medication indications, dosage and effectiveness, adverse effects, and adherence with patient/caregiver.    Pt had meds with them: yes, some  Pt had med list with them: no  Pt was knowledgeable about meds: yes  Medications set up by: self  Pt uses a medication box or automated dispenser: Did not address    Medication Discrepancies  Medications on EMR med list that pt is NOT taking:  yes, bupropion, cetirizine, lisinopril, lurasidone, oxybutynin  Medications pt IS taking that are NOT on EMR med list (e.g., from specialist, hospital): yes, verapamil, trazodone, lithium, QVAR, flonase  OTC meds/ dietary supplements pt taking on own that are NOT on EMR med list:  yes, advil  Dosage listed differently than how patient is taking: yes, prazosin  Frequency listed differently than how patient is taking: yes, prazosin  Duplicate medication on list (two occurrences of the same medication):  none  TOTAL NUMBER OF MEDICATION DISCREPANCIES:  13    Subjective                                                       Patient reports the following problems or concerns with their medications:  Yes. Concerned with taking metformin with meals each day because she does not eat regular meals.  Patient reports the following adverse reactions to medications:  none  Pt reports missing doses:  never  Additional subjective information (e.g., reason for visit, frequency of PRNs, reasons meds were D/C ed):    Using albuterol inhaler 2-3 times a day.    Has an EpiPen on hand, but has never needed to use it. Was prescribed due to hives of unknown origin a few months ago.    Expressed concerns with taking metformin with meals because she has not had much of an appetite lately (only eating ~1 meal a day).     States that she has been feeling dizzy  which she believes is due to hypotension from verapamil. She believes the verapamil corrects her blood pressure too much.     States she used to take meclizine chewable tablets for vertigo    While she was taking lisinopril she developed hives for several months. Claims that her doctors thought that she was allergic to the lisinopril so they discontinued it. However, she said that the hives did not improve after discontinuing the medication. Hives did eventually go away but she doesn't think it is due to stopping lisinopril, and she doesn't think she has an allergy to lisinopril.    Pt has been taking Advil gel caps for hip pain, but was advised by her psychiatrist to discontinue; Has also tried Biofreeze topical in the past, but was not effective    Objective                                                       Patient Active Problem List   Diagnosis     Binge eating     Eating disorder     Morbid obesity (H)     Multiple-type hyperlipidemia     Severe episode of recurrent major depressive disorder (H)     Arthralgia of hip     Hypertension     Trochanteric bursitis     History of urinary anomaly     Borderline personality disorder (H)     Attention deficit disorder     Asthma     Hip pain, left     Suicidal ideation       Social History   Substance Use Topics     Smoking status: Never Smoker     Smokeless tobacco: Never Used     Alcohol use No       Estimated Creatinine Clearance: 125.6 mL/min (based on Cr of 0.9).    Lab Results   Component Value Date    A1C 7.0 11/21/2018     Last Comprehensive Metabolic Panel:  Sodium   Date Value Ref Range Status   11/21/2018 136.6 132.0 - 142.0 mmol/L Final     Potassium   Date Value Ref Range Status   11/21/2018 3.8 3.2 - 4.6 mmol/dL Final     Chloride   Date Value Ref Range Status   11/21/2018 103.4 98.0 - 110.0 mmol/L Final     Carbon Dioxide   Date Value Ref Range Status   11/21/2018 25.5 20.0 - 32.0 mmol/L Final     Anion Gap   Date Value Ref Range Status   10/04/2017  10 3 - 14 mmol/L Final     Glucose   Date Value Ref Range Status   11/21/2018 143.5 (H) 70.0 - 99.0 mg'dL Final     Urea Nitrogen   Date Value Ref Range Status   11/21/2018 4.9 (L) 7.0 - 19.0 mg/dL Final     Creatinine   Date Value Ref Range Status   11/21/2018 0.9 0.5 - 1.0 mg/dL Final     GFR Estimate   Date Value Ref Range Status   11/21/2018 74.1 >60.0 mL/min/1.7 m2 Final     Calcium   Date Value Ref Range Status   11/21/2018 9.6 8.5 - 10.1 mg/dL Final       BP Readings from Last 3 Encounters:   11/28/18 131/85   11/21/18 116/71   03/26/18 125/47       The ASCVD Risk score (Mya FOX Jr, et al., 2013) failed to calculate for the following reasons:    The 2013 ASCVD risk score is only valid for ages 40 to 79    PHQ-9 score:  No flowsheet data found.      Assessment                                                       Diabetes    Taking metformin  mg BID    On 11/21/18, A1c 7%    Hip Pain -  uncontrolled     Using Advil gel caps for bilateral hip pain; interacts with Lithium, can possibly increase Li levels    Asthma -  uncontrolled     Using controller inhaler (QVAR) BID but still needing albuterol rescue inhaler 2-3 times a day    Hypertension -  uncontrolled     Taking verapamil, no hx of migraines    BP today 159/76, 131/85    History of urinary anomaly - uncontrolled    Has tried Oxybutynin in the past, but was not effective    Still experiencing symptoms of urgency    Polypharmacy -  controlled     Medications up to date    Plan/Recommendations                                                       Updated medication list in the EMR; deleted meds patient no longer taking and added meds patient is now taking, and changed doses where there was a dose discrepancy.    All medications were reviewed and found to be indicated, effective, safe and convenient/ affordable unless drug therapy problem(s) was/were identified, as are described below.      Completed at this visit     Diabetes    Instructed patient to  "take 2 tablets of the 500 mg metformin XR once daily with her largest meal of the day    Hip Pain    Per recommendations of her psychiatrist advised against the use of Advil or Aleve for pain    Instructed patient to try taking acetaminophen 500-1000 mg TID PRN     Educated on max daily dose of 3000 mg    Asthma    Referred to Dr. Avelar/future visit    Hypertension    Referred to Dr. Avelar/future visit    History of urinary anomaly    Referred to Dr. Avelar/future visit    Polypharmacy    Continue current medications    To be completed at a future visit  Diabetes    Based on tolerability, increase metformin XR to the goal of 2000 mg/day for maximum efficacy    Hip Pain    She is not interested in topical pain relievers; stating \"there is too much fat and I need the relief in my bones\"    Discussed trying acetaminophen for pain currently, and potentially trying a lidocaine patch in the future if no relief from the acetaminophen.    Asthma    Assess inhaler technique and control of symptoms    Potentially needing step up in therapy of her controller inhaler     Hypertension    Patient believes that the verapamil is too effective for her blood pressure and that it causes her to be dizzy and have hypotension.    Patient is willing to re-try an ACE inhibitor for blood pressure and kidney protection, as she does not believe she has an allergy to lisinopril    History of urinary anomaly    Assess symptoms at next visit    Consider starting new therapy    Options for treatment and/or follow-up care were reviewed with the patient.  Ronel was engaged and actively involved in the decision making process, verbalized understanding of the options discussed, and was satisfied with the final plan.    Follow-up                                                       Patient should follow up with Dr. Avelar.  Patient was provided with written instructions/medication list via AVS.     Dr. Avelar was provided the " recommendations above  in clinic today and Dr. Connor was available for supervision during this visit and is the authorizing prescriber for this visit through the pharmacist collaborative practice agreement.    Sofía Thorpe, RasheedD Student  Kim Panda, PharmD Resident    Drug therapy problems identified  1. Med: Metformin - Convenience - Pt unable to administer correctly - Resolution: Educate patient; resolved  2. Med: Acetaminophen - Indication - Needs additional drug therapy - Resolution: Intiate Drug; resolved   3. Med: QVAR - Efficacy  - Partially effective- Resolution: Change dose; deferred to future visit   4. Med: Metformin - Efficacy - Partially effective - Resolution: Change dose; deferred to future visit    # of medical conditions addressed: 6  # of medications addressed: 16  # of medication discrepancies identified: 13  # of DTP identified: 4  Time spent: 20 minutes  Level of service: 5NC    I was present with the pharmacy student who participated in the service and in the documentation of this note. I have verified the history, personally performed the medical decision making, and have verified the content of the note, which accurately reflects my assessment of the patient and the plan of care.   Kim Panda, PharmD

## 2018-11-28 NOTE — PROGRESS NOTES
SUBJECTIVE  HPI: Ronel Ryan is a 38 year old female who presents for follow up of lab results. Discussed lab results. Discussed elevated A1c of 7.0. She picked up her metformin last week and has started taking it. Patient with hx of T2DM. Previously took oral medications for this but stopped due to normal A1c. She reports she has previously checked her blood sugars regularly. She was referred to bariatrics at her last visit and has an appt with the bariatric nutrionist on Friday. Currently she is eating only one meal day. Continues to have increased thirst and frequent urination. No burning with urination. No abdominal pain.     Patient reports she missed her verapamil dose the past couple days.    ROS: no CP or SOB.    PMH:   Patient Active Problem List    Diagnosis Date Noted     Suicidal ideation 10/04/2017     Priority: Medium     Hip pain, left 04/03/2017     Priority: Medium     Eating disorder 03/13/2017     Priority: Medium     History of urinary anomaly 12/01/2016     Priority: Medium     Multiple-type hyperlipidemia 03/21/2016     Priority: Medium     Arthralgia of hip 03/21/2016     Priority: Medium     Hypertension 03/21/2016     Priority: Medium     Trochanteric bursitis 10/27/2015     Priority: Medium     Binge eating 11/27/2013     Priority: Medium     Borderline personality disorder (H) 11/27/2013     Priority: Medium     Attention deficit disorder 11/27/2013     Priority: Medium     Severe episode of recurrent major depressive disorder (H) 10/04/2012     Priority: Medium     Morbid obesity (H) 12/22/2010     Priority: Medium     Asthma 12/22/2010     Priority: Medium       Social Hx:  Social History     Social History Narrative     History   Smoking Status     Never Smoker   Smokeless Tobacco     Never Used       OBJECTIVE:  Vitals: /85  Pulse 77  Temp 98.7  F (37.1  C) (Oral)  Resp 18  Wt (!) 341 lb 9.6 oz (154.9 kg)  SpO2 99%  BMI 58.64 kg/m2  General: Pleasant. Middle-aged woman.  Obese. No distress.  Heart: Regular rate and rhythm. No murmurs, rubs, or gallops.  Extremities: Extremities warm and well-perfused.  Lungs: Clear to auscultation bilaterally.  GI:  Normoactive bowel sounds. Soft and non-tender to palpation throughout abdomen.    Results for orders placed or performed in visit on 11/21/18   Hemoglobin A1c ([facility])   Result Value Ref Range    Hemoglobin A1C 7.0 (H) 4.1 - 5.7 %   Urinalysis, Micro If ([facility])   Result Value Ref Range    Specific Gravity Urine 1.015 1.005 - 1.030    pH Urine 6.0 4.5 - 8.0    Leukocyte Esterase UR Trace (A) NEGATIVE    Nitrite Urine Negative NEGATIVE    Protein UR Negative NEGATIVE    Glucose Urine Negative NEGATIVE    Ketones Urine Negative NEGATIVE    Urobilinogen mg/dL 0.2 E.U./dL 0.2 E.U./dL    Bilirubin UR Negative NEGATIVE    Blood UR 3+ (A) NEGATIVE   Basic Metabolic Panel ([facility])   Result Value Ref Range    Urea Nitrogen 4.9 (L) 7.0 - 19.0 mg/dL    Calcium 9.6 8.5 - 10.1 mg/dL    Chloride 103.4 98.0 - 110.0 mmol/L    Carbon Dioxide 25.5 20.0 - 32.0 mmol/L    Creatinine 0.9 0.5 - 1.0 mg/dL    Glucose 143.5 (H) 70.0 - 99.0 mg'dL    Potassium 3.8 3.2 - 4.6 mmol/dL    Sodium 136.6 132.0 - 142.0 mmol/L    GFR Estimate 74.1 >60.0 mL/min/1.7 m2    GFR Estimate If Black >90 >60.0 mL/min/1.7 m2   Urine Microscopic ([facility])   Result Value Ref Range    WBC Urine 2-5 <5 /hpf    RBC Urine 5-10 <5 /hpf    Epithelial Cells UR 10-25 0 - 2 /lpf    Mucous Urine Few NONE lpf    Casts Urine None NONE /lpf    Crystal Urine None NONE /lpf    Bacteria Wet Prep Moderate None         ASSESSMENT & PLAN:    Patient met with pharmacy today. Complete med rec done. Please seen pharmacist's note.    Note, patient was in a rush to leave in the middle of visit and left suddenly.       1. Type 2 diabetes mellitus without complication, without long-term current use of insulin (H): Discussed diet and exercise. Discussed eating more frequent small meals instead of one large  meal. Encouraged patient to attend her appt with bariatric nutritionist this Friday. Discussed monitoring blood glucoses at home. Asked patient to check a fasting glucose and 2 hour post prandial glucose daily for the next two weeks. Provided patient with BG monitoring log. Prescribed glucose monitor and test strips.   - blood glucose (NO BRAND SPECIFIED) lancets standard; Use to test blood sugar 2 times daily or as directed.  Dispense: 100 each; Refill: 11    2. Essential hypertension: BP elevated today. Unclear why patient is on verapamil as this is not typically a first line tx. Patient reports that she is actually not allergic to lisinopril as listed in chart. At this time, advised patient to continue taking verapamil. Plan to discuss changing to different antihypertensive at next visit when patient has time to discuss.       Return to clinic in 2 weeks.      The patient was actively involved in the decision making process, and all the questions were answered to their satisfaction prior to leaving.       Patient precepted with attending physician, Dr. Connor.    Lorena Avelar DO   PGY-3 [facility]  Pager: [phone]

## 2018-11-28 NOTE — PROGRESS NOTES
Preceptor attestation:  Vital signs reviewed: /85  Pulse 77  Temp 98.7  F (37.1  C) (Oral)  Resp 18  Wt (!) 341 lb 9.6 oz (154.9 kg)  SpO2 99%  BMI 58.64 kg/m2    Patient seen, evaluated, and discussed with the resident.  I have verified the content of the note, which accurately reflects my assessment of the patient and the plan of care.    Supervising physician: Dana Connor MD  [facility]

## 2018-11-29 NOTE — PROGRESS NOTES
I have verified the content of the note, which accurately reflects my assessment of the patient and the plan of care.   Monica Ventura, PharmD

## 2018-11-30 ENCOUNTER — ALLIED HEALTH/NURSE VISIT (OUTPATIENT)
Dept: SURGERY | Facility: CLINIC | Age: 38
End: 2018-11-30
Payer: MEDICARE

## 2018-11-30 ENCOUNTER — OFFICE VISIT (OUTPATIENT)
Dept: ENDOCRINOLOGY | Facility: CLINIC | Age: 38
End: 2018-11-30
Payer: MEDICARE

## 2018-11-30 VITALS
RESPIRATION RATE: 18 BRPM | WEIGHT: 293 LBS | HEIGHT: 64 IN | TEMPERATURE: 98.7 F | BODY MASS INDEX: 50.02 KG/M2 | SYSTOLIC BLOOD PRESSURE: 111 MMHG | OXYGEN SATURATION: 100 % | DIASTOLIC BLOOD PRESSURE: 51 MMHG | HEART RATE: 79 BPM

## 2018-11-30 DIAGNOSIS — E66.01 MORBID OBESITY (H): ICD-10-CM

## 2018-11-30 RX ORDER — NALTREXONE HYDROCHLORIDE 50 MG/1
TABLET, FILM COATED ORAL
Qty: 30 TABLET | Refills: 11 | Status: SHIPPED | OUTPATIENT
Start: 2018-11-30 | End: 2021-08-06

## 2018-11-30 ASSESSMENT — PAIN SCALES - GENERAL: PAINLEVEL: NO PAIN (0)

## 2018-11-30 NOTE — PATIENT INSTRUCTIONS
Nutrition Goals  Pt to follow 1500 calorie/day diet    Relating To Eating:  Eat slowly (20-30 minutes per meal), chewing foods well (25 chews per bite/applesauce consistency) - set timer at meals  Focus on lean protein and non-starchy vegetables/whole fruit at each meal with no more than 1 cup of carbs or 2 slices of bread  Try munching on vegetables rather than chips (bell peppers, carrot sticks, celery, etc)  Eat 3 meals per day    Follow up with RD in one month    Sarah Hong MS, RD, LD  If you need to schedule or reschedule with a dietitian please call [phone].
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
## 2018-11-30 NOTE — PROGRESS NOTES
"Ronel Ryan is a 38 year old female presents today for new weight management nutrition consultation. Referred by Dr. Salgado 11/30/18.    Admit Height: 5' 4\"    Admit Weight: 344 lbs    Admit BMI: 59.24 kg/m2    Pt signed Medicare ABN form today (11/30/18) which is good for 1 year.    Nutrition history  Pt has history of eating disorders and dispatterned eating. Was anxious during visit today and emotional about losing weight and her struggles with weight loss in the past.     Per Pt Report:  - Pt is afraid of eating food which stems from her fear of diabetes worsening and increasing blood sugars  - She is Vegetarian  - Often drinks more than eating - soda sanpelligrino - 6 cans/day  - Will go days without eating - dispatterned eating  - Cheese and pasta and carbohydrates (bagels) and chips are her cravings  - DM2 diagnosis in 2014 which brought on exercise bulemia - has not seen a diabetes educator  - Began binge eating due to depression with finding out she had hip displasia   - Participated in [facility] 2012/13 and [facility] program 2016/17  - Needs to lose weight for hip sugery    Recent Diet Recall:  B - skips  L - oatmeal or cereal  D - will binge on snacks  Snacks - pickles, chips, bagels  Beverages - soda 6+ cans/day  Eating Out - will avoid eating out with friends r/t guilt about overeating     Nutrition Prescription  1500 calories/day (per MD)    Nutrition Diagnosis  Food and nutrition related knowledge deficit r/t lack of prior exposure to calorie counting and nutrition education aeb pt unable to verbalize understanding of 1500 calorie/day diet for weight loss.    Nutrition Intervention  Materials/education provided on 1500 calorie/day diet with portion control and healthy food choices (plate method handout), sources of protein, mindful eating. Encouraged pt to make small goals to work towards not only weight loss, but feeling healthier. Needs to work on small goals one at a time so she does not " feel anxious. Discussed slowing down eating and patient set own goal of setting a timer to help. Also discussed plate method - including more vegetables, protein, and watching portions of starches. Encouraged pt to eat 3 times per day to help reduce snacking. This will be difficult for her given she is afraid to eat. Provided some diabetes education and recommended pt schedule an appointment with a diabetes educator to further discuss diabetes management. Will plan to follow up on soda in next visit. Provided pt with list of goals and RD contact information.    Patient Understanding: Good  Expected Compliance: Fair-good  Follow-Up Plans: 1 month or PRN     Nutrition Goals  Pt to follow 1500 calorie/day diet    Relating To Eating:  Eat slowly (20-30 minutes per meal), chewing foods well (25 chews per bite/applesauce consistency) - set timer at meals  Focus on lean protein and non-starchy vegetables/whole fruit at each meal with no more than 1 cup of carbs or 2 slices of bread  Try munching on vegetables rather than chips (bell peppers, carrot sticks, celery, etc)  Eat 3 meals per day    Follow-Up:  1 month or PRN     Time spent with patient: 30 minutes.  Sarah Hong MS, RD, LD

## 2018-11-30 NOTE — MR AVS SNAPSHOT
After Visit Summary   11/30/2018    Ronel Ryan    MRN: [id number]           Patient Information     Date Of Birth          1980        Visit Information        Provider Department      11/30/2018 9:40 AM Nolvia Salgado MD [facility]        Today's Diagnoses     Morbid obesity (H)          Care Instructions    Please stop drinking beverages with calories in them (soda, pop, etc)    Start naltrexone 50 mg about 1-2 hour prior to worst food cravings    Medications that maybe associated with weight gain: trazadone, lithium    1,500 calorie meal plan to lose 1.5 lbs weekly without exercise based on REE calculated of 2,220  Use meal replacements such as Isabela's meals, Lean Cuisines, Healthy Choice, Smart Ones, Weight Watchers Meals, and Slim Fast and Glucerna shakes and supplement with fresh fruits and vegetables  Please drink a lot of water daily. Most people typically need about 2 liters of water daily and more if they are exercising, have a large weight, or have a fever or illness. Add Crystal Light for flavoring if desired. But no pop with calories in it.  Please keep a food journal of what you eat, calories in what you eat, and mood and bring the journal with you to your next appointment.  Consider using applications for smart phones such as GlassesOff, ABC Live, Wikisway, LoseIt, Tap&Track, and RelaxM.  Focus on wet volumetrics, meaning, eat more foods that are high in water and fiber such as fruits and vegetables in order to get that full feeling. These are also good for your overall health as well.  Check out Dr. Brynn Ritchie from [facility] - she has cookbooks with low calorie volumetric recipes  You can try Let's Dish to help you prepare meals for you and your family. Often times, the caloric and nutrition data and serving sizes are available for this food. This can be a time saving maneuver. The website can give you more information  http://www.Viamericas.Zwittle/  [employer] has Let's Dish & fresh low calorie salads  Check out Hello Fresh at https://www.LIFX.Zwittle/food-boxes/classic-box/  Try Cooking Light recipes for low calorie meal preparation and planning  Other food plan options you can search for on the internet and check out include: Nila COBURN, [facility]  Please consider health psychologist to discuss how mood, depression and/or anxiety impact your eating.    Scheduling with a Health Psychologist  *Our Health Psychologists prefer that the patient reaches out to them directly to schedule an appointment. After choosing one of the providers listed below, you will more than likely reach their voicemail, as they are typically seeing patients during normal business hours. Make sure to leave your name, contact number, and the name of the doctor who referred you. They will try to get back to you within 24-48 business hours.     Dr. Edward Hurley: [phone]  Dr. Jhonathan Cardoso: [phone]  Dr. Leigh Yarbrough: [phone]    Progressively increase physical activity to 60 minutes, including combination of cardio & resistance training x 5 days weekly.  Plan to go to the [place] [facility] to get an exercise assessment and recommendation. You can either plan to complete the entire program there or take your new skills to the gym of your choice. If you do not hear from an exercise professional from the program within a week, please call our clinic nurse at [phone].              Follow-ups after your visit        Additional Services     Exercise Program at Cardiac/Pulmonary Rehab       Weight Management Exercise Program evaluation and re-evaluations, including six-minute walk test, or modified sub-max stress test.  Includes comprehensive exercise evaluation, instruction, and counseling.    Scheduling Phone #:  [phone]  Fax #: [phone]                  Follow-up notes from your care team     Return in about 3  "months (around 2/28/2019).      Future tests that were ordered for you today     Open Future Orders        Priority Expected Expires Ordered    Exercise Program at Cardiac/Pulmonary Rehab Routine  11/30/2019 11/30/2018            Who to contact     Please call your clinic at [phone] to:    Ask questions about your health    Make or cancel appointments    Discuss your medicines    Learn about your test results    Speak to your doctor            Additional Information About Your Visit        [employer] Information     [employer] gives you secure access to your electronic health record. If you see a primary care provider, you can also send messages to your care team and make appointments. If you have questions, please call your primary care clinic.  If you do not have a primary care provider, please call [phone] and they will assist you.      [employer] is an electronic gateway that provides easy, online access to your medical records. With [employer], you can request a clinic appointment, read your test results, renew a prescription or communicate with your care team.     To access your existing account, please contact your [facility] or call [phone] for assistance.        Care EveryWhere ID     This is your Care EveryWhere ID. This could be used by other organizations to access your [place] medical records  [id number]        Your Vitals Were     Pulse Temperature Respirations Height Pulse Oximetry BMI (Body Mass Index)    79 98.7  F (37.1  C) (Oral) 18 1.626 m (5' 4\") 100% 59.12 kg/m2       Blood Pressure from Last 3 Encounters:   11/30/18 111/51   11/28/18 131/85   11/21/18 116/71    Weight from Last 3 Encounters:   11/30/18 (!) 156.2 kg (344 lb 6.4 oz)   11/28/18 (!) 154.9 kg (341 lb 9.6 oz)   11/21/18 (!) 156.2 kg (344 lb 6.4 oz)              We Performed the Following     BARIATRIC ADULT REFERRAL          Today's Medication Changes          These changes are accurate as of " 11/30/18 10:40 AM.  If you have any questions, ask your nurse or doctor.               Start taking these medicines.        Dose/Directions    naltrexone 50 MG tablet   Commonly known as:  DEPADE/REVIA   Used for:  Morbid obesity (H)   Started by:  Nolvia Salgado MD        Take 1 tablet daily about 2 hours prior to worst food cravings, daily   Quantity:  30 tablet   Refills:  11            Where to get your medicines      These medications were sent to [facility] - [place], [place] - [address]  [address], [place] [address]     Phone:  [phone]     naltrexone 50 MG tablet                Primary Care Provider Office Phone # Fax #    Lorena Eugenio Avelar [phone]       [facility]  [address] [place] [address]        Equal Access to Services     DAINA LOPEZ : Hadii mary yao hadasho Soomaali, waaxda luqadaha, qaybta kaalmada adeegyada, waxstephen wilkins . So [facility] [phone].    ATENCIÓN: Si habla español, tiene a hussein disposición servicios gratuitos de asistencia lingüística. [facility] [phone].    We comply with applicable federal civil rights laws and [place] laws. We do not discriminate on the basis of race, color, national origin, age, disability, sex, sexual orientation, or gender identity.            Thank you!     Thank you for choosing [facility]  for your care. Our goal is always to provide you with excellent care. Hearing back from our patients is one way we can continue to improve our services. Please take a few minutes to complete the written survey that you may receive in the mail after your visit with us. Thank you!             Your Updated Medication List - Protect others around you: Learn how to safely use, store and throw away your medicines at www.disposemymeds.org.          This list is accurate as of 11/30/18 10:40 AM.  Always use your most recent med list.                    Brand Name Dispense Instructions for use Diagnosis    albuterol 108 (90 Base) MCG/ACT inhaler    PROAIR HFA/PROVENTIL HFA/VENTOLIN HFA     Inhale 1-2 puffs into the lungs        beclomethasone HFA 80 MCG/ACT inhaler    QVAR REDIHALER     Inhale 1 puff into the lungs 2 times daily        blood glucose lancets standard    NO BRAND SPECIFIED    100 each    Use to test blood sugar 2 times daily or as directed.    Type 2 diabetes mellitus without complication, without long-term current use of insulin (H)       blood glucose monitoring meter device kit    NO BRAND SPECIFIED    1 kit    Use to test blood sugar 2 times daily or as directed.    Type 2 diabetes mellitus without complication, without long-term current use of insulin (H)       EPINEPHrine 0.3 MG/0.3ML injection 2-pack    EPIPEN/ADRENACLICK/or ANY BX GENERIC EQUIV    0.6 mL    Inject 0.3 mLs (0.3 mg) into the muscle once as needed for anaphylaxis        fluticasone 50 MCG/ACT nasal spray    FLONASE    1 Bottle    Spray 1-2 sprays into both nostrils daily as needed for rhinitis or allergies        lithium 300 MG capsule      Take 3 capsules (900 mg) by mouth At Bedtime        metFORMIN 500 MG 24 hr tablet    GLUCOPHAGE-XR    60 tablet    Take 1 tablet (500 mg) by mouth 2 times daily (with meals)    Type 2 diabetes mellitus with complication, without long-term current use of insulin (H)       naltrexone 50 MG tablet    DEPADE/REVIA    30 tablet    Take 1 tablet daily about 2 hours prior to worst food cravings, daily    Morbid obesity (H)       pravastatin 20 MG tablet    PRAVACHOL     Take 10 mg by mouth daily        prazosin 2 MG capsule    MINIPRESS    60 capsule    Take 2 capsules (4 mg) by mouth At Bedtime    Severe episode of recurrent major depressive disorder, without psychotic features (H)       traZODone 50 MG tablet    DESYREL    30 tablet    Take 2 tablets (100 mg) by mouth nightly as needed for sleep        verapamil  MG 24 hr capsule    VERELAN     90 capsule    Take 1 capsule (120 mg) by mouth At Bedtime

## 2018-11-30 NOTE — NURSING NOTE
"Chief Complaint   Patient presents with     Weight Problem     Pt here for weight management consult       Vitals:    11/30/18 0945   BP: 111/51   BP Location: Left arm   Patient Position: Sitting   Cuff Size: Adult Large   Pulse: 79   Resp: 18   Temp: 98.7  F (37.1  C)   TempSrc: Oral   SpO2: 100%   Weight: (!) 344 lb 6.4 oz   Height: 5' 4\"       Body mass index is 59.12 kg/(m^2).      LADARIUS Diallo, EMT                      "

## 2018-11-30 NOTE — MR AVS SNAPSHOT
MRN:[id number]                      After Visit Summary   11/30/2018    Ronel Ryan    MRN: [id number]           Visit Information        Provider Department      11/30/2018 10:30 AM Sarah Hong RD M [facility]        Care Instructions    Nutrition Goals  Pt to follow 1500 calorie/day diet    Relating To Eating:  Eat slowly (20-30 minutes per meal), chewing foods well (25 chews per bite/applesauce consistency) - set timer at meals  Focus on lean protein and non-starchy vegetables/whole fruit at each meal with no more than 1 cup of carbs or 2 slices of bread  Try munching on vegetables rather than chips (bell peppers, carrot sticks, celery, etc)  Eat 3 meals per day    Follow up with JOESPH in one month    Sarah Hong MS, JOESPH, LD  If you need to schedule or reschedule with a dietitian please call [phone].         [facility] Information     [facility] gives you secure access to your electronic health record. If you see a primary care provider, you can also send messages to your care team and make appointments. If you have questions, please call your primary care clinic.  If you do not have a primary care provider, please call [phone] and they will assist you.      [employer] is an electronic gateway that provides easy, online access to your medical records. With [employer], you can request a clinic appointment, read your test results, renew a prescription or communicate with your care team.     To access your existing account, please contact your [facility] or call [phone] for assistance.        Care EveryWhere ID     This is your Care EveryWhere ID. This could be used by other organizations to access your [place] medical records  [id number]        Equal Access to Services     DAINA LOPEZ : amauri Figueroa qaybta kaalmada adeegyada, waxay idiin hayaan adeeg kharash la'aan ah. So [facility]  [phone].    ATENCIÓN: Si habla español, tiene a hussein disposición servicios gratuitos de asistencia lingüística. Llame al [phone].    We comply with applicable federal civil rights laws and [place] laws. We do not discriminate on the basis of race, color, national origin, age, disability, sex, sexual orientation, or gender identity.

## 2018-11-30 NOTE — PATIENT INSTRUCTIONS
Please stop drinking beverages with calories in them (soda, pop, etc)    Start naltrexone 50 mg about 1-2 hour prior to worst food cravings    Medications that maybe associated with weight gain: trazadone, lithium    1,500 calorie meal plan to lose 1.5 lbs weekly without exercise based on REE calculated of 2,220  Use meal replacements such as Isabela's meals, Lean Cuisines, Healthy Choice, Smart Ones, Weight Watchers Meals, and Slim Fast and Glucerna shakes and supplement with fresh fruits and vegetables  Please drink a lot of water daily. Most people typically need about 2 liters of water daily and more if they are exercising, have a large weight, or have a fever or illness. Add Crystal Light for flavoring if desired. But no pop with calories in it.  Please keep a food journal of what you eat, calories in what you eat, and mood and bring the journal with you to your next appointment.  Consider using applications for smart phones such as STEARCLEAR, BlackBridge, eLong.comReciCareem, LoseIt, Tap&Track, and RelaxM.  Focus on wet volumetrics, meaning, eat more foods that are high in water and fiber such as fruits and vegetables in order to get that full feeling. These are also good for your overall health as well.  Check out Dr. Brynn Ritchie from [facility] - she has cookbooks with low calorie volumetric recipes  You can try Let's Dish to help you prepare meals for you and your family. Often times, the caloric and nutrition data and serving sizes are available for this food. This can be a time saving maneuver. The website can give you more information http://www.Thoora.Surfingbird/  [employer] has Let's Dish & fresh low calorie salads  Check out Hello Fresh at https://www.CAPPTURE/food-boxes/classic-box/  Try Cooking Light recipes for low calorie meal preparation and planning  Other food plan options you can search for on the internet and check out include: Nila COBURN, Serafin Redton  Please consider health psychologist to  discuss how mood, depression and/or anxiety impact your eating.    Scheduling with a Health Psychologist  *Our Health Psychologists prefer that the patient reaches out to them directly to schedule an appointment. After choosing one of the providers listed below, you will more than likely reach their voicemail, as they are typically seeing patients during normal business hours. Make sure to leave your name, contact number, and the name of the doctor who referred you. They will try to get back to you within 24-48 business hours.     Dr. Edward Hurley: [phone]  Dr. Jhonathan Cardoso: [phone]  Dr. Leigh Yarbrough: [phone]    Progressively increase physical activity to 60 minutes, including combination of cardio & resistance training x 5 days weekly.  Plan to go to the [place] [facility] Program to get an exercise assessment and recommendation. You can either plan to complete the entire program there or take your new skills to the gym of your choice. If you do not hear from an exercise professional from the program within a week, please call our clinic nurse at [phone].

## 2018-12-03 ENCOUNTER — TELEPHONE (OUTPATIENT)
Dept: FAMILY MEDICINE | Facility: CLINIC | Age: 38
End: 2018-12-03

## 2018-12-03 DIAGNOSIS — E11.9 TYPE 2 DIABETES MELLITUS WITHOUT COMPLICATION, WITHOUT LONG-TERM CURRENT USE OF INSULIN (H): ICD-10-CM

## 2018-12-03 DIAGNOSIS — E11.9 DIABETES MELLITUS, TYPE 2 (H): Primary | ICD-10-CM

## 2018-12-03 NOTE — TELEPHONE ENCOUNTER
Called pharmacy and notified about RX. Ask Dr. Bond to send a new RX for meter kit with the code. Called pt and notified pt of information. Lexie Romero MA

## 2018-12-03 NOTE — TELEPHONE ENCOUNTER
Pt states every time she eats, her body hurts and she would shake. Pt also has HAs. Symptoms started a month ago and gradually getting worse. Pt also c/o extreme thirst and frequent urination. Pt recently dx w/ DM, advised to be evaluated in ED given recent dx and symptoms. Pt verbalizes understanding of the directions and information. Gave pt opportunity to ask additional questions or address concerns. Advised to schedule an appt in clinic after ED visit.     Routed to Dr. Avelar. PCP. /ERNESTINA Mckay

## 2018-12-03 NOTE — TELEPHONE ENCOUNTER
[facility] phone call message- general phone call:    Reason for call: She needs a call back re some weird symptoms sh is concerned about.    Return call needed: Yes    OK to leave a message on voice mail? Yes    Primary language: English      needed? No    Call taken on December 3, 2018 at 2:29 PM by Malika Maldonado

## 2018-12-03 NOTE — TELEPHONE ENCOUNTER
[facility] phone call message- general phone call:    Reason for call: Pt was seen on 11/28 and Dr Avelar was going to send in a prescription for a DM meter and supplies to [facility] and nothing has been received to so far.  Can you please check on this and call patient.    Return call needed: Yes    OK to leave a message on voice mail? Yes    Primary language: English      needed? No    Call taken on December 3, 2018 at 11:41 AM by Florencio Bacon

## 2018-12-04 ENCOUNTER — TRANSFERRED RECORDS (OUTPATIENT)
Dept: HEALTH INFORMATION MANAGEMENT | Facility: CLINIC | Age: 38
End: 2018-12-04

## 2018-12-17 ENCOUNTER — MEDICAL CORRESPONDENCE (OUTPATIENT)
Dept: HEALTH INFORMATION MANAGEMENT | Facility: CLINIC | Age: 38
End: 2018-12-17

## 2018-12-17 ENCOUNTER — OFFICE VISIT (OUTPATIENT)
Dept: FAMILY MEDICINE | Facility: CLINIC | Age: 38
End: 2018-12-17
Payer: MEDICARE

## 2018-12-17 ENCOUNTER — TRANSFERRED RECORDS (OUTPATIENT)
Dept: HEALTH INFORMATION MANAGEMENT | Facility: CLINIC | Age: 38
End: 2018-12-17

## 2018-12-17 VITALS
DIASTOLIC BLOOD PRESSURE: 80 MMHG | HEART RATE: 97 BPM | RESPIRATION RATE: 12 BRPM | HEIGHT: 64 IN | TEMPERATURE: 98.9 F | WEIGHT: 293 LBS | OXYGEN SATURATION: 97 % | BODY MASS INDEX: 50.02 KG/M2 | SYSTOLIC BLOOD PRESSURE: 141 MMHG

## 2018-12-17 DIAGNOSIS — R45.851 SUICIDAL IDEATION: Primary | ICD-10-CM

## 2018-12-17 ASSESSMENT — MIFFLIN-ST. JEOR: SCORE: 2206.77

## 2018-12-17 NOTE — PROGRESS NOTES
"SUBJECTIVE  HPI: Ronel Ryan is a 38 year old female who presents for follow up of hospitalization. She was hospitalized at [facility] from 12/4 - 12/6. Since discharge she states she has been mostly at home in her apartment alone. She reports she cannot \"take the full doses of my meds\" because she does not tolerate them. Unclear which medications she has been omitting. She notes she has only taken taken naltrexone once which was recently prescribed by bariatrics at the [facility].  She states that she has only taken her metformin once or twice since she was discharged from the hospital.  She has not had follow-up with her psychiatrist or her therapist yet. Has an appointmet with Chela Peralta trauma therapist [facility] on 12/20/18, Thursday. Has not seen psychiatrist yet and doesn't have an appointment for several months.    She states that she has had recurring thoughts of burning herself.  She reports that 4 years ago, at a [employer], she was told that she was going to burn in hell.  She notes that a few days ago a neighbor in her apartment building told her that she is going to burn in hell because she is rogers.  He states that she felt like \"her back was on fire yesterday.\"  Pt states that she told her friend yesterday that she thought she might have to go back to the hospital because she may hurt herself.  She states that she feels like her mind is racing and feels like a \"NASCAR.\"    She cannot contract for safety.  She states that she plans to harm herself tomorrow.  She states that she would either cut or burn herself tomorrow.  She does not want to go back to the hospital because \"she feels like she would be a failure.\" She reports a history of 3 suicide attempts in the past.  She states that she has had suicidal ideation for a long time, and right now she does not have intent to end her life but does think she will harm herself soon.     She lives alone.     She states she reported in the hospital that " "she \"wanted to just die\" because she was having abdominal pain. Abdominal pain is intermittent. Diffuse. Associated with meals. About 1-2 months duration. No nausea or vomiting. Reports normal soft BM's about 3 times a day. At one point she reports she has been eating 3 meals a day, then states she has been eating only small bites here and there.  Reports her highest fasting BG was 207. She is taking metformin only intermittently.    Went to [facility] for bariatrics and was started on naltrexone. However, hasn't been taking Naltrexone. Only took it one time.    Chief Complaint   Patient presents with     Hospital F/U     Seen at [facility] for      Diabetes       ROS: per HPI.     PMH:   Patient Active Problem List    Diagnosis Date Noted     Suicidal ideation 10/04/2017     Priority: Medium     Hip pain, left 04/03/2017     Priority: Medium     Eating disorder 03/13/2017     Priority: Medium     History of urinary anomaly 12/01/2016     Priority: Medium     Multiple-type hyperlipidemia 03/21/2016     Priority: Medium     Arthralgia of hip 03/21/2016     Priority: Medium     Hypertension 03/21/2016     Priority: Medium     Trochanteric bursitis 10/27/2015     Priority: Medium     Binge eating 11/27/2013     Priority: Medium     Borderline personality disorder (H) 11/27/2013     Priority: Medium     Attention deficit disorder 11/27/2013     Priority: Medium     Severe episode of recurrent major depressive disorder (H) 10/04/2012     Priority: Medium     Morbid obesity (H) 12/22/2010     Priority: Medium     Asthma 12/22/2010     Priority: Medium       Social Hx:  Social History     Social History Narrative     Not on file     History   Smoking Status     Never Smoker   Smokeless Tobacco     Never Used       OBJECTIVE:  Vitals: /80 (BP Location: Left arm, Patient Position: Sitting, Cuff Size: Adult Large)   Pulse 97   Temp 98.9  F (37.2  C) (Oral)   Resp 12   Ht 1.626 m (5' 4\")   Wt (!) 154.7 kg (341 " lb)   SpO2 97%   BMI 58.53 kg/m    General: Pleasant. Middle-aged woman. Obese. No distress.  HEENT: Moist mucous membranes. Extraocular movements intact. Sclera non-injected.  Extremities: Extremities warm and well-perfused.  Psych: Speech normal rate. Thoughts are disorganized, tangential, scattered. Tearful. Depressed mood, labile affect.       ASSESSMENT & PLAN:    Suicidal ideation  38-year-old female with extensive mental health history presented for follow-up after recent psychiatric hospitalization for suicidal ideation. Reports medication noncompliance. Patient reporting intent and plan for self-harm although denies intent for suicide specifically.  States that she plans to harm herself by burning or cutting herself tomorrow.  However given high risk for suicide with a history of multiple suicide attempts, recent hospitalization and history of impulsivity feel that it is unsafe for patient to be sent home.  It is unlikely that the patient will be able to call crisis numbers as advised as she seems to have scattered disorganized thoughts today.  Initially patient did not want to go to hospital however she was ultimately agreeable and expressed understanding of the reasoning.  EMS was called.  Initiation of  hold was done in clinic. Patient filled out release of information for [facility].  Patient was taken by EMS to [facility].  Plan to follow-up here in clinic after discharge.    The patient was actively involved in the decision making process, and all the questions were answered to their satisfaction prior to leaving.       Patient precepted with attending physician, Dr. Jones.    Lorena Avelar, DO   PGY-3 [facility]  Pager: [phone]

## 2018-12-17 NOTE — PROGRESS NOTES
Preceptor Attestation:   Patient seen, evaluated and discussed with the resident. I have verified the content of the note, which accurately reflects my assessment of the patient and the plan of care.   Supervising Physician:  Alex Jones MD

## 2018-12-18 ENCOUNTER — TELEPHONE (OUTPATIENT)
Dept: PSYCHOLOGY | Facility: CLINIC | Age: 38
End: 2018-12-18

## 2018-12-18 NOTE — TELEPHONE ENCOUNTER
"Behavioral Health Telephone Consult  Purpose of call: ED transport follow up  Time on phone: 10 minutes    Topics discussed/Interventions Provided:  Placed outreach call to patient to follow-up after emergency transport to [facility] yesterday for suicidal ideation/plan. See Dr. Avelar's documentation from yesterday. Patient states she is doing well, denies current SI. She expresses frustration that she was sent to the hospital yesterday, stating \"my therapist would not have sent me there for this. She is upset that her diabetes and urinary issues were not addressed during the appointment yesterday. Provided empathic listening and support. Assisted patient in rescheduling appointment with Dr. Avelar for this Friday 12/21. She confirmed attendance at her therapy appointment at [facility] on 12/20.     Plan:   1. Patient to follow-up in clinic with Dr. Avelar on Friday  2. Routing to Dr. Singh and GT HERNANDEZ for informational/treatment planning purposes. Patient may need help from  to re-establish or better utilize existing community support services, e.g., PCA.   3. Will consult with Dr. Avelar prior to visit.   4. Recommend getting DI for [facility] therapist if patient amenable. None on file.       Christelle Fortune, PhD  Behavioral Health Fellow    "

## 2018-12-27 ENCOUNTER — HOSPITAL ENCOUNTER (OUTPATIENT)
Dept: CARDIAC REHAB | Facility: CLINIC | Age: 38
Setting detail: THERAPIES SERIES
End: 2018-12-27
Attending: INTERNAL MEDICINE
Payer: MEDICARE

## 2018-12-27 PROCEDURE — 94618 PULMONARY STRESS TESTING: CPT | Performed by: REHABILITATION PRACTITIONER

## 2018-12-27 PROCEDURE — 40000243 ZZH STATISTIC VISIT NEW WEIGHT MGMT PATIENT: Performed by: REHABILITATION PRACTITIONER

## 2018-12-31 ENCOUNTER — TELEPHONE (OUTPATIENT)
Dept: PSYCHOLOGY | Facility: CLINIC | Age: 38
End: 2018-12-31

## 2018-12-31 NOTE — TELEPHONE ENCOUNTER
"Placed outreach call to patient as she no-showed most recent PCP appointment. She states she is \"doing fine\" though declines follow-up care at [facility]. States, \"I'm going to go to a different clinic from now on.\" Explained to patient that she could call and reschedule here whenever she needed. She states understanding.       Christelle Fortune, PhD  Behavioral Health Fellow    "

## 2019-02-11 ENCOUNTER — DOCUMENTATION ONLY (OUTPATIENT)
Dept: CARE COORDINATION | Facility: CLINIC | Age: 39
End: 2019-02-11

## 2019-02-12 ENCOUNTER — THERAPY VISIT (OUTPATIENT)
Dept: PHYSICAL THERAPY | Facility: CLINIC | Age: 39
End: 2019-02-12
Payer: MEDICARE

## 2019-02-12 DIAGNOSIS — M25.552 PAIN OF BOTH HIP JOINTS: Primary | ICD-10-CM

## 2019-02-12 DIAGNOSIS — M25.551 PAIN OF BOTH HIP JOINTS: Primary | ICD-10-CM

## 2019-02-12 PROBLEM — M25.559 HIP PAIN: Status: ACTIVE | Noted: 2017-04-03

## 2019-02-12 PROCEDURE — 97530 THERAPEUTIC ACTIVITIES: CPT | Mod: GP | Performed by: PHYSICAL THERAPIST

## 2019-02-12 PROCEDURE — 97163 PT EVAL HIGH COMPLEX 45 MIN: CPT | Mod: GP | Performed by: PHYSICAL THERAPIST

## 2019-02-12 PROCEDURE — G8978 MOBILITY CURRENT STATUS: HCPCS | Mod: GP | Performed by: PHYSICAL THERAPIST

## 2019-02-12 PROCEDURE — 97110 THERAPEUTIC EXERCISES: CPT | Mod: GP | Performed by: PHYSICAL THERAPIST

## 2019-02-12 PROCEDURE — G8979 MOBILITY GOAL STATUS: HCPCS | Mod: GP | Performed by: PHYSICAL THERAPIST

## 2019-02-12 ASSESSMENT — ACTIVITIES OF DAILY LIVING (ADL)
ROLLING_OVER_IN_BED: MODERATE DIFFICULTY
HOS_ADL_HIGHEST_POTENTIAL_SCORE: 40
HOS_ADL_ITEM_SCORE_TOTAL: 13
SITTING_FOR_15_MINUTES: SLIGHT DIFFICULTY
STEPPING_UP_AND_DOWN_CURBS: MODERATE DIFFICULTY
GOING_UP_1_FLIGHT_OF_STAIRS: EXTREME DIFFICULTY
GETTING_INTO_AND_OUT_OF_AN_AVERAGE_CAR: MODERATE DIFFICULTY
HOS_ADL_COUNT: 10
WALKING_INITIALLY: EXTREME DIFFICULTY
LIGHT_TO_MODERATE_WORK: EXTREME DIFFICULTY
RECREATIONAL_ACTIVITIES: EXTREME DIFFICULTY
GOING_DOWN_1_FLIGHT_OF_STAIRS: EXTREME DIFFICULTY
STANDING_FOR_15_MINUTES: EXTREME DIFFICULTY
WALKING_APPROXIMATELY_10_MINUTES: EXTREME DIFFICULTY

## 2019-02-12 NOTE — PROGRESS NOTES
[facility] Initial Evaluation  Physical Therapy Initial Examination/Evaluation    February 12, 2019    Ronel Ryan  is a 38 year old  female referred to physical therapy by Brett Osgood, PAC for treatment of bilateral hip pain.      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  R hip bone on bone.    DOS none  Prior treatment doing all I can do to take the weight off (pool therapy and PT; have started pool therapy yesterday), injections bilateral hip - it started to help some.  Effect of prior treatment fair    Chief Complaint:   Walking, moving, sitting, putting on socks.   Pain location: L hip groin area, R hip on the lateral hip itself.    Quality: burning sitting, sharp walking  Constant/Intermittent: intermittent   Time of day: unknown, some days are better than others  Symptoms have worsened since onset.    Current pain 7/10.  Pain at best 6/10.  Pain at worst 10/10.    Symptoms aggravated by walking 6 min, sitting >15 min.    Symptoms improved with standing, injections.     Social history:  Pt is single, lives in an apartment.  Pt has a cat named Boots.  Pt loves doing squats for exercise.     Occupation: disabled.  Job duties:  Self cares.    Patient having difficulty with ADLs: sitting, mobility.    Patient's goals are improve pain.  Would like to be able to put her socks on.    Patient reports general health as fair.  Related medical history Asthma, Diabetes, Depression, Dizziness, High blood pressure, Mental illness, Numbness/tingling, Osteoarthritis, Overweight, Sleep disorder/apnea and changes in bowel/bladder, chest pain, cold/hot extremity, pain at night/rest.  .    Surgical History:  Ankle, tonsil, basess, ACL, gallbaldder.    Imaging: x-ray.    Medications:  High blood pressure, sleep, atlivan, cholestrol, metformin, prozosin.       Outcome measure:   HOOS  Return to MD:  As needed.      Clinical Impression: Ronel presents to [facility]  [place] with primary complaint of bilateral hip pain.  Per clinical examination, pt demonstrates decreased ROM, muscular strength and pain.  Pt responded well to basic ROM and isometric strengthening today in clinic.  Encouraged outside use of bike and pool for cardiovascular component.  See plan of care outlined below.         HPI                    Objective:  Transfers:  Sit to stand: use of UE to rise, wide base of support    Gait:   Significant compensated Trendelenburg/trunk lean L in stance phase.  Mild trunk lean R.  Wide base of support with decr stride length bilaterally.    Standing:   - genu valgus, slight knee flexion.  Equal illiac crest.  Body type; overweight.      Squat:   - ~40 deg.  Reports ok going down, pain in the knees upon rising        System                                           Hip Evaluation  HIP AROM:    Flexion: Left: 100 deg     Right:  110 deg           Internal Rotation: Left: 20 deg     Right: 45 deg  External Rotation: Left: 46 sharp pain     Right: 65 deg         Hip Strength:    Flexion:   Left: 3+/5    Pain: weak/painful  Right: 4/5    Pain: weak/painful                        Adduction:  Right: 4+/5   Pain:  Internal Rotation:  Left: 4+/5    Pain:  External Rotation:  Left:  3+/5   +++  Pain: weak/painful  Right:  4-/5   +  Pain: weak/painful  Knee Flexion:  Left: 4/5   Pain:Right: 5-/5   Pain:  Knee Extension:  Left: 5-/5   Pain:Right: 5/5    Pain:        Hip Special Testing:    Left hip positive for the following special tests:  Geni; Fadir/Labrum and Distraction   Right hip positive for the following special tests:  FabreRight hip negative for the following special tests:  Fadir/Labrum                 General     ROS    Assessment/Plan:      Patient is a 38 year old female with both sides hip complaints.    Patient has the following significant findings with corresponding treatment plan.                Diagnosis 1:  B hip pain    Pain -  hot/cold therapy, US, manual  therapy, self management and home program  Decreased ROM/flexibility - manual therapy and therapeutic exercise  Decreased strength - therapeutic exercise and therapeutic activities  Impaired balance - neuro re-education and therapeutic activities  Impaired gait - gait training  Impaired muscle performance - neuro re-education  Decreased function - therapeutic activities  Impaired posture - neuro re-education    Therapy Evaluation Codes:   1) History comprised of:   Personal factors that impact the plan of care:      Overall behavior pattern, Past/current experiences and Time since onset of symptoms.    Comorbidity factors that impact the plan of care are:      Asthma, Diabetes, Depression, Dizziness, High blood pressure, Mental illness, Numbness/tingling, Osteoarthritis, Overweight, Sleep disorder/apnea and changes in bowel/bladder, chest pain, cold/hot extremity, pain at night/rest.     Medications impacting care: High blood pressure, Sleep and other, see chart.  2) Examination of Body Systems comprised of:   Body structures and functions that impact the plan of care:      Hip.   Activity limitations that impact the plan of care are:      Bathing, Bending, Cooking, Driving, Dressing, Running, Sitting, Sports, Squatting/kneeling, Stairs, Standing and Walking.  3) Clinical presentation characteristics are:   Evolving/Changing.  4) Decision-Making    High complexity using standardized patient assessment instrument and/or measureable assessment of functional outcome.  Cumulative Therapy Evaluation is: High complexity.    Previous and current functional limitations:  (See Goal Flow Sheet for this information)    Short term and Long term goals: (See Goal Flow Sheet for this information)     Communication ability:  Patient appears to be able to clearly communicate and understand verbal and written communication and follow directions correctly.  Treatment Explanation - The following has been discussed with the patient:    RX ordered/plan of care  Anticipated outcomes  Possible risks and side effects  This patient would benefit from PT intervention to resume normal activities.   Rehab potential is fair.    Frequency:  1 X week, once daily  Duration:  for 3 months  Discharge Plan:  Achieve all LTG.  Independent in home treatment program.  Reach maximal therapeutic benefit.    Please refer to the daily flowsheet for treatment today, total treatment time and time spent performing 1:1 timed codes.

## 2019-02-12 NOTE — LETTER
DEPARTMENT OF HEALTH AND HUMAN SERVICES  CENTERS FOR MEDICARE & MEDICAID SERVICES    PLAN/UPDATED PLAN OF PROGRESS FOR OUTPATIENT REHABILITATION    PATIENTS NAME:  Ronel Ryan     : 1980    PROVIDER NUMBER:    [id number]    HICN: [id number]    PROVIDER NAME: [facility] PHYSICAL THERAPY    MEDICAL RECORD NUMBER: [id number]     START OF CARE DATE:  SOC Date: 19   TYPE:  PT    PRIMARY/TREATMENT DIAGNOSIS: (Pertinent Medical Diagnosis)  Pain of both hip joints    VISITS FROM START OF CARE:  Rxs Used: 1     [facility] Initial Evaluation  Physical Therapy Initial Examination/Evaluation    2019    Ronel Ryan  is a 38 year old  female referred to physical therapy by Brett Osgood, PAC for treatment of bilateral hip pain.      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  R hip bone on bone.    DOS none  Prior treatment doing all I can do to take the weight off (pool therapy and PT; have started pool therapy yesterday), injections bilateral hip - it started to help some.  Effect of prior treatment fair    Chief Complaint:   Walking, moving, sitting, putting on socks.   Pain location: L hip groin area, R hip on the lateral hip itself.    Quality: burning sitting, sharp walking  Constant/Intermittent: intermittent   Time of day: unknown, some days are better than others  Symptoms have worsened since onset.    Current pain 7/10.  Pain at best 6/10.  Pain at worst 10/10.    Symptoms aggravated by walking 6 min, sitting >15 min.    Symptoms improved with standing, injections.     Social history:  Pt is single, lives in an apartment.  Pt has a cat named Boots.  Pt loves doing squats for exercise.     Occupation: disabled.  Job duties:  Self cares.    Patient having difficulty with ADLs: sitting, mobility.    Patient's goals are improve pain.  Would like to be able to put her socks on.      RE: Connor  Ronel    Patient reports general health as fair.  Related medical history Asthma, Diabetes, Depression, Dizziness, High blood pressure, Mental illness, Numbness/tingling, Osteoarthritis, Overweight, Sleep disorder/apnea and changes in bowel/bladder, chest pain, cold/hot extremity, pain at night/rest.  .    Surgical History:  Ankle, tonsil, basess, ACL, gallbaldder.    Imaging: x-ray.    Medications:  High blood pressure, sleep, atlivan, cholestrol, metformin, prozosin.       Outcome measure:   HOOS  Return to MD:  As needed.      Clinical Impression: Ronel presents to [facility] with primary complaint of bilateral hip pain.  Per clinical examination, pt demonstrates decreased ROM, muscular strength and pain.  Pt responded well to basic ROM and isometric strengthening today in clinic.  Encouraged outside use of bike and pool for cardiovascular component.  See plan of care outlined below.     Objective:  Transfers:  Sit to stand: use of UE to rise, wide base of support    Gait:   Significant compensated Trendelenburg/trunk lean L in stance phase.  Mild trunk lean R.  Wide base of support with decr stride length bilaterally.    Standing:   - genu valgus, slight knee flexion.  Equal illiac crest.  Body type; overweight.      Squat:   - ~40 deg.  Reports ok going down, pain in the knees upon rising      Hip Evaluation  HIP AROM:    Flexion: Left: 100 deg     Right:  110 deg   Internal Rotation: Left: 20 deg     Right: 45 deg  External Rotation: Left: 46 sharp pain     Right: 65 deg         Hip Strength:    Flexion:   Left: 3+/5    Pain: weak/painful  Right: 4/5    Pain: weak/painful  Adduction:  Right: 4+/5   Pain:  Internal Rotation:  Left: 4+/5    Pain:  External Rotation:  Left:  3+/5   +++  Pain: weak/painful  Right:  4-/5   +  Pain: weak/painful  Knee Flexion:  Left: 4/5   Pain:Right: 5-/5   Pain:  Knee Extension:  Left: 5-/5   Pain:Right: 5/5    Pain:      Hip Special Testing:    Left hip positive for the  following special tests:  Geni; Fadir/Labrum and Distraction   Right hip positive for the following special tests:  FabreRight hip negative for the following special tests:  Fadir/Labrum    RE: Ronel Ryan    Assessment/Plan:      Patient is a 38 year old female with both sides hip complaints.    Patient has the following significant findings with corresponding treatment plan.                Diagnosis 1:  B hip pain    Pain -  hot/cold therapy, US, manual therapy, self management and home program  Decreased ROM/flexibility - manual therapy and therapeutic exercise  Decreased strength - therapeutic exercise and therapeutic activities  Impaired balance - neuro re-education and therapeutic activities  Impaired gait - gait training  Impaired muscle performance - neuro re-education  Decreased function - therapeutic activities  Impaired posture - neuro re-education    Therapy Evaluation Codes:   1) History comprised of:   Personal factors that impact the plan of care:      Overall behavior pattern, Past/current experiences and Time since onset of symptoms.    Comorbidity factors that impact the plan of care are:      Asthma, Diabetes, Depression, Dizziness, High blood pressure, Mental illness, Numbness/tingling, Osteoarthritis, Overweight, Sleep disorder/apnea and changes in bowel/bladder, chest pain, cold/hot extremity, pain at night/rest.     Medications impacting care: High blood pressure, Sleep and other, see chart.  2) Examination of Body Systems comprised of:   Body structures and functions that impact the plan of care:      Hip.   Activity limitations that impact the plan of care are:      Bathing, Bending, Cooking, Driving, Dressing, Running, Sitting, Sports, Squatting/kneeling, Stairs, Standing and Walking.  3) Clinical presentation characteristics are:   Evolving/Changing.  4) Decision-Making    High complexity using standardized patient assessment instrument and/or measureable assessment of functional  "outcome.  Cumulative Therapy Evaluation is: High complexity.    Previous and current functional limitations:  (See Goal Flow Sheet for this information)    Short term and Long term goals: (See Goal Flow Sheet for this information)     Communication ability:  Patient appears to be able to clearly communicate and understand verbal and written communication and follow directions correctly.  Treatment Explanation - The following has been discussed with the patient:   RX ordered/plan of care  Anticipated outcomes  Possible risks and side effects  This patient would benefit from PT intervention to resume normal activities.   Rehab potential is fair.      RE: Ronel Ryan    Frequency:  1 X week, once daily  Duration:  for 3 months  Discharge Plan:  Achieve all LTG.  Independent in home treatment program.  Reach maximal therapeutic benefit.    Please refer to the daily flowsheet for treatment today, total treatment time and time spent performing 1:1 timed codes.     Caregiver Signature/Credentials _____________________________ Date ________       Treating Provider: Deanna Alberts DPT    I have reviewed and certified the need for these services and plan of treatment while under my care.        PHYSICIAN'S SIGNATURE:   _________________________________________  Date___________   Brett Osgood, PA-C    Certification period:  Beginning of Cert date period: 02/12/19 to  End of Cert period date: 05/12/19     Functional Level Progress Report: Please see attached \"Goal Flow sheet for Functional level.\"    ____X____ Continue Services or       ________ DC Services                Service dates: From  SOC Date: 02/12/19 date to present                         "

## 2019-03-06 ENCOUNTER — THERAPY VISIT (OUTPATIENT)
Dept: PHYSICAL THERAPY | Facility: CLINIC | Age: 39
End: 2019-03-06
Payer: MEDICARE

## 2019-03-06 DIAGNOSIS — M25.552 PAIN OF BOTH HIP JOINTS: Primary | ICD-10-CM

## 2019-03-06 DIAGNOSIS — M25.551 PAIN OF BOTH HIP JOINTS: Primary | ICD-10-CM

## 2019-03-06 PROCEDURE — 97530 THERAPEUTIC ACTIVITIES: CPT | Mod: GP

## 2019-03-06 PROCEDURE — 97110 THERAPEUTIC EXERCISES: CPT | Mod: GP

## 2019-03-19 ENCOUNTER — THERAPY VISIT (OUTPATIENT)
Dept: PHYSICAL THERAPY | Facility: CLINIC | Age: 39
End: 2019-03-19
Payer: MEDICARE

## 2019-03-19 DIAGNOSIS — M25.552 PAIN OF BOTH HIP JOINTS: Primary | ICD-10-CM

## 2019-03-19 DIAGNOSIS — M25.551 PAIN OF BOTH HIP JOINTS: Primary | ICD-10-CM

## 2019-03-19 PROCEDURE — 97110 THERAPEUTIC EXERCISES: CPT | Mod: GP

## 2019-03-19 PROCEDURE — 97530 THERAPEUTIC ACTIVITIES: CPT | Mod: GP

## 2019-03-19 NOTE — PROGRESS NOTES
Subjective:  HPI                    Objective:  System    Physical Exam    General     ROS    Assessment/Plan:    SUBJECTIVE  Subjective changes as noted by pt:   Reports being really sore after last PT visit. Pt completes pool therapy next week. c/o more hip pain today. Has had erratic weight gain lately, lost few poiunds then gained 8#. On new meds from psychiatrist Brother has been in hospital and dx with schizphrenia which has made her depressed   Current pain level: 6/10 Current Pain level: 6/10(B hips sitting burning pain 6/10, walking sharp 10/10)   Changes in function:  None     Adverse reaction to treatment or activity:  None    OBJECTIVE  Changes in objective findings:  Yes,   Objective: Sig limp side to side due to inc L hip pain. Issued cane which helps gt pattern a dn steadiness. L quad 4-/5 shaky with seated leg extensions. Cont basic strength ex as tolerated.      ASSESSMENT  Ronel continues to require intervention to meet STG and LTG's: PT  Patient is not progressing as expected.  Response to therapy has shown lack of progress in  pain level and gait  Progress made towards STG/LTG?  Yes (See Goal flowsheet attached for updates on achievement of STG and LTG)    PLAN  Continue current treatment plan until patient demonstrates readiness to progress to higher level exercises.    PTA/ATC plan:  Will continue with present plan of care.    Please refer to the daily flowsheet for treatment today, total treatment time and time spent performing 1:1 timed codes.

## 2019-04-02 ENCOUNTER — THERAPY VISIT (OUTPATIENT)
Dept: PHYSICAL THERAPY | Facility: CLINIC | Age: 39
End: 2019-04-02
Payer: MEDICARE

## 2019-04-02 DIAGNOSIS — M25.552 HIP PAIN, LEFT: Primary | ICD-10-CM

## 2019-04-02 DIAGNOSIS — M25.551 HIP PAIN, RIGHT: ICD-10-CM

## 2019-04-02 PROCEDURE — 97110 THERAPEUTIC EXERCISES: CPT | Mod: GP

## 2019-04-02 PROCEDURE — 97112 NEUROMUSCULAR REEDUCATION: CPT | Mod: GP

## 2019-04-03 PROBLEM — M25.559 HIP PAIN: Status: RESOLVED | Noted: 2017-04-03 | Resolved: 2019-04-03

## 2019-04-03 NOTE — PROGRESS NOTES
Pt did not return to PT following this date of service. Progress note will serve as discontinue note.

## 2019-04-03 NOTE — PROGRESS NOTES
Subjective:  HPI                    Objective:  System    Physical Exam    General     ROS    Assessment/Plan:    SUBJECTIVE  Subjective changes as noted by pt:  R hip is so much better, L still hurts today. Has now decreased pool therapy to 1x/week.    Current pain level: 6/10     Changes in function:  Yes (See Goal flowsheet attached for changes in current functional level)     Adverse reaction to treatment or activity:  None    OBJECTIVE  Changes in objective findings:  Yes,   Objective: L hip ER min tight sitting, but in hooklying butterfly stretch is WNL, just pain at end. Very difficult to actively lift L leg while supine, but this improves after several reps. Put forth good effort in session.       ASSESSMENT  Ronel continues to require intervention to meet STG and LTG's: PT  Patient is progressing as expected.  Response to therapy has shown an improvement in  pain level and muscle control  Progress made towards STG/LTG?  None    PLAN  Continue current treatment plan until patient demonstrates readiness to progress to higher level exercises.    PTA/ATC plan:  Will continue with present plan of care.    Please refer to the daily flowsheet for treatment today, total treatment time and time spent performing 1:1 timed codes.

## 2019-04-12 ENCOUNTER — THERAPY VISIT (OUTPATIENT)
Dept: PHYSICAL THERAPY | Facility: CLINIC | Age: 39
End: 2019-04-12
Payer: MEDICARE

## 2019-04-12 DIAGNOSIS — M25.551 PAIN OF BOTH HIP JOINTS: Primary | ICD-10-CM

## 2019-04-12 DIAGNOSIS — M25.552 PAIN OF BOTH HIP JOINTS: Primary | ICD-10-CM

## 2019-04-12 PROCEDURE — 97530 THERAPEUTIC ACTIVITIES: CPT | Mod: GP

## 2019-04-12 PROCEDURE — 97112 NEUROMUSCULAR REEDUCATION: CPT | Mod: GP

## 2019-04-12 PROCEDURE — 97110 THERAPEUTIC EXERCISES: CPT | Mod: GP

## 2019-04-17 ENCOUNTER — THERAPY VISIT (OUTPATIENT)
Dept: PHYSICAL THERAPY | Facility: CLINIC | Age: 39
End: 2019-04-17
Payer: MEDICARE

## 2019-04-17 DIAGNOSIS — M25.551 PAIN OF BOTH HIP JOINTS: Primary | ICD-10-CM

## 2019-04-17 DIAGNOSIS — M25.552 PAIN OF BOTH HIP JOINTS: Primary | ICD-10-CM

## 2019-04-17 PROCEDURE — 97110 THERAPEUTIC EXERCISES: CPT | Mod: GP

## 2019-04-17 PROCEDURE — 97530 THERAPEUTIC ACTIVITIES: CPT | Mod: GP

## 2019-04-17 PROCEDURE — 97112 NEUROMUSCULAR REEDUCATION: CPT | Mod: GP

## 2019-04-26 ENCOUNTER — THERAPY VISIT (OUTPATIENT)
Dept: PHYSICAL THERAPY | Facility: CLINIC | Age: 39
End: 2019-04-26
Payer: MEDICARE

## 2019-04-26 DIAGNOSIS — M25.552 HIP PAIN, LEFT: Primary | ICD-10-CM

## 2019-04-26 DIAGNOSIS — M25.551 HIP PAIN, RIGHT: ICD-10-CM

## 2019-04-26 PROCEDURE — 97112 NEUROMUSCULAR REEDUCATION: CPT | Mod: GP | Performed by: PHYSICAL THERAPIST

## 2019-04-26 PROCEDURE — G8979 MOBILITY GOAL STATUS: HCPCS | Mod: GP | Performed by: PHYSICAL THERAPIST

## 2019-04-26 PROCEDURE — G8978 MOBILITY CURRENT STATUS: HCPCS | Mod: GP | Performed by: PHYSICAL THERAPIST

## 2019-04-26 PROCEDURE — 97110 THERAPEUTIC EXERCISES: CPT | Mod: GP | Performed by: PHYSICAL THERAPIST

## 2019-04-26 NOTE — LETTER
DEPARTMENT OF HEALTH AND HUMAN SERVICES  [facility] & MEDICA[facility]    PLAN/UPDATED PLAN OF PROGRESS FOR OUTPATIENT REHABILITATION    PATIENTS NAME:  Ronel Ryan     : 1980    PROVIDER NUMBER:    [id number]    HICN: [id number]     PROVIDER NAME: [facility] PHYSICAL THERAPY    MEDICAL RECORD NUMBER: [id number]     START OF CARE DATE:  SOC Date: 19   TYPE:  PT    PRIMARY/TREATMENT DIAGNOSIS: (Pertinent Medical Diagnosis)   Hip pain, left  Hip pain, right    VISITS FROM START OF CARE:  Rxs Used: 7      PROGRESS  REPORT  Progress reporting period is from 2019 to 2019.       SUBJECTIVE  Overall, things are going well.  Some days are better than others.  I did pool therapy on Monday and they did a lot of new exercises.  I did the treadmill, squatting and I was sore the next day.  Normally it is not so bad.  But then I have been as good with my exercises.   Last time I was here I was doing so well and I was not even in pain.      Current pain level is 7/10, last week it was not hurting with the PT session.      Previous pain level was  7/10  .   Changes in function:  Yes (See Goal flowsheet attached for changes in current functional level)  Adverse reaction to treatment or activity: None    OBJECTIVE  Changes noted in objective findings:  The objective findings below are from DOS 2019.  Objective: Gait: ambulates with SEC R UE.  Antalgic with incr lateral motion bilatearlly.     PROM: Hip  L: flexion 90 deg, ER 45 deg, IR 15 deg.    R: flexion 120 deg, ER 75 deg, IR 40 deg.      MMT: hip flexion 4/5 R, L 4-/5 painful, knee ext 5/5 B; knee flexion 4+/5 B; hip ER R 4/5, L 4-/5 sharp pain; IR 5/5 R; 4/5 L.  Ankle WFL.        Modified HEP to hold on hip hike.  Reviewed importance of researching self management with gym program.  Pt to follow up on sliver sneakers program.  Responded well to stability ball and supine exercises as an alternate today  due to increases in pain.     ASSESSMENT/PLAN  Updated problem list and treatment plan: Diagnosis 1:  B hip OA; R>L pain     Pain -  hot/cold therapy, manual therapy, self management, education and home program  Decreased ROM/flexibility - manual therapy and therapeutic exercise  PATIENTS NAME:  Ronel Ryan     Decreased joint mobility - manual therapy and therapeutic exercise  Decreased strength - therapeutic exercise and therapeutic activities  Impaired balance - neuro re-education and therapeutic activities  Impaired gait - gait training  Impaired muscle performance - neuro re-education  Decreased function - therapeutic activities  Impaired posture - neuro re-education  STG/LTGs have been met or progress has been made towards goals:  Yes (See Goal flow sheet completed today.)  Assessment of Progress: The patient's condition has potential to improve.  Self Management Plans:  Patient has been instructed in a home treatment program.  Patient  has been instructed in self management of symptoms.  I have re-evaluated this patient and find that the nature, scope, duration and intensity of the therapy is appropriate for the medical condition of the patient.  Ronel continues to require the following intervention to meet STG and LTG's:  PT    Recommendations:  This patient would benefit from continued therapy.     Frequency:  2 X week, once daily  Duration:  for 1 months    Please refer to the daily flowsheet for treatment today, total treatment time and time spent performing 1:1 timed codes.      Caregiver Signature/Credentials _____________________________ Date ________       Treating Provider: Deanna Alberts DPT    I have reviewed and certified the need for these services and plan of treatment while under my care.        PHYSICIAN'S SIGNATURE:   _________________________________________  Date___________   Brett Osgood, MD    Certification period:  Beginning of Cert date period: 04/26/19 to  End of Cert period  "date: 07/25/19     Functional Level Progress Report: Please see attached \"Goal Flow sheet for Functional level.\"    ____X____ Continue Services or       ________ DC Services                Service dates: From  SOC Date: 02/12/19 date to present                         "

## 2019-04-26 NOTE — LETTER
[facility] PHYSICAL THERAPY  [address] [place] [id number]  [phone]    2019    Re: Ronel Ryan   :   1980  MRN:  [id number]   REFERRING PHYSICIAN:   Brett Osgood    [facility] PHYSICAL THERAPY  Date of Initial Evaluation:  19  Visits:  Rxs Used: 7  Reason for Referral:   Hip pain, left  Hip pain, right    PROGRESS  REPORT  Progress reporting period is from 2019 to 2019.       SUBJECTIVE  Overall, things are going well.  Some days are better than others.  I did pool therapy on Monday and they did a lot of new exercises.  I did the treadmill, squatting and I was sore the next day.  Normally it is not so bad.  But then I have been as good with my exercises.   Last time I was here I was doing so well and I was not even in pain.      Current pain level is 7/10, last week it was not hurting with the PT session.      Previous pain level was  7/10  .   Changes in function:  Yes (See Goal flowsheet attached for changes in current functional level)  Adverse reaction to treatment or activity: None    OBJECTIVE  Changes noted in objective findings:  The objective findings below are from DOS 2019.  Objective: Gait: ambulates with SEC R UE.  Antalgic with incr lateral motion bilatearlly.     PROM: Hip  L: flexion 90 deg, ER 45 deg, IR 15 deg.    R: flexion 120 deg, ER 75 deg, IR 40 deg.      MMT: hip flexion 4/5 R, L 4-/5 painful, knee ext 5/5 B; knee flexion 4+/5 B; hip ER R 4/5, L 4-/5 sharp pain; IR 5/5 R; 4/5 L.  Ankle WFL.        Modified HEP to hold on hip hike.  Reviewed importance of researching self management with gym program.  Pt to follow up on sliver sneakers program.  Responded well to stability ball and supine exercises as an alternate today due to increases in pain.     ASSESSMENT/PLAN  Updated problem list and treatment plan: Diagnosis 1:  B hip OA; R>L pain     Pain -  hot/cold  therapy, manual therapy, self management, education and home program  Decreased ROM/flexibility - manual therapy and therapeutic exercise  Decreased joint mobility - manual therapy and therapeutic exercise  Decreased strength - therapeutic exercise and therapeutic activities  Impaired balance - neuro re-education and therapeutic activities  Impaired gait - gait training  Impaired muscle performance - neuro re-education  Decreased function - therapeutic activities  Impaired posture - neuro re-education    Re: Ronel Ryan       STG/LTGs have been met or progress has been made towards goals:  Yes (See Goal flow sheet completed today.)  Assessment of Progress: The patient's condition has potential to improve.  Self Management Plans:  Patient has been instructed in a home treatment program.  Patient  has been instructed in self management of symptoms.  I have re-evaluated this patient and find that the nature, scope, duration and intensity of the therapy is appropriate for the medical condition of the patient.  Ronel continues to require the following intervention to meet STG and LTG's:  PT    Recommendations:  This patient would benefit from continued therapy.     Frequency:  2 X week, once daily  Duration:  for 1 months    Please refer to the daily flowsheet for treatment today, total treatment time and time spent performing 1:1 timed codes.    Thank you for your referral.    INQUIRIES  Therapist: Deanna Alberts DPT   [facility] PHYSICAL THERAPY  [address] [place] [id number]  Phone: [phone]  Fax: [phone]

## 2019-04-26 NOTE — PROGRESS NOTES
Subjective:  HPI                    Objective:  System    Physical Exam    General     ROS    Assessment/Plan:    PROGRESS  REPORT    Progress reporting period is from 4/2/2019 to 4/26/2019.       SUBJECTIVE  Overall, things are going well.  Some days are better than others.  I did pool therapy on Monday and they did a lot of new exercises.  I did the treadmill, squatting and I was sore the next day.  Normally it is not so bad.  But then I have been as good with my exercises.   Last time I was here I was doing so well and I was not even in pain.      Current pain level is 7/10, last week it was not hurting with the PT session.      Previous pain level was  7/10  .   Changes in function:  Yes (See Goal flowsheet attached for changes in current functional level)  Adverse reaction to treatment or activity: None    OBJECTIVE  Changes noted in objective findings:  The objective findings below are from DOS 4/26/2019.  Objective: Gait: ambulates with SEC R UE.  Antalgic with incr lateral motion bilatearlly.     PROM: Hip  L: flexion 90 deg, ER 45 deg, IR 15 deg.    R: flexion 120 deg, ER 75 deg, IR 40 deg.      MMT: hip flexion 4/5 R, L 4-/5 painful, knee ext 5/5 B; knee flexion 4+/5 B; hip ER R 4/5, L 4-/5 sharp pain; IR 5/5 R; 4/5 L.  Ankle WFL.        Modified HEP to hold on hip hike.  Reviewed importance of researching self management with gym program.  [facility].  Responded well to stability ball and supine exercises as an alternate today due to increases in pain.     ASSESSMENT/PLAN  Updated problem list and treatment plan: Diagnosis 1:  B hip OA; R>L pain     Pain -  hot/cold therapy, manual therapy, self management, education and home program  Decreased ROM/flexibility - manual therapy and therapeutic exercise  Decreased joint mobility - manual therapy and therapeutic exercise  Decreased strength - therapeutic exercise and therapeutic activities  Impaired balance - neuro  re-education and therapeutic activities  Impaired gait - gait training  Impaired muscle performance - neuro re-education  Decreased function - therapeutic activities  Impaired posture - neuro re-education  STG/LTGs have been met or progress has been made towards goals:  Yes (See Goal flow sheet completed today.)  Assessment of Progress: The patient's condition has potential to improve.  Self Management Plans:  Patient has been instructed in a home treatment program.  Patient  has been instructed in self management of symptoms.  I have re-evaluated this patient and find that the nature, scope, duration and intensity of the therapy is appropriate for the medical condition of the patient.  Ronel continues to require the following intervention to meet STG and LTG's:  PT    Recommendations:  This patient would benefit from continued therapy.     Frequency:  2 X week, once daily  Duration:  for 1 months        Please refer to the daily flowsheet for treatment today, total treatment time and time spent performing 1:1 timed codes.

## 2019-05-15 ENCOUNTER — THERAPY VISIT (OUTPATIENT)
Dept: PHYSICAL THERAPY | Facility: CLINIC | Age: 39
End: 2019-05-15
Payer: MEDICARE

## 2019-05-15 DIAGNOSIS — M25.552 PAIN OF BOTH HIP JOINTS: Primary | ICD-10-CM

## 2019-05-15 DIAGNOSIS — M25.551 PAIN OF BOTH HIP JOINTS: Primary | ICD-10-CM

## 2019-05-15 PROCEDURE — 97530 THERAPEUTIC ACTIVITIES: CPT | Mod: GP

## 2019-05-15 PROCEDURE — 97110 THERAPEUTIC EXERCISES: CPT | Mod: GP

## 2019-05-22 ENCOUNTER — THERAPY VISIT (OUTPATIENT)
Dept: PHYSICAL THERAPY | Facility: CLINIC | Age: 39
End: 2019-05-22
Payer: MEDICARE

## 2019-05-22 DIAGNOSIS — M25.552 PAIN OF BOTH HIP JOINTS: Primary | ICD-10-CM

## 2019-05-22 DIAGNOSIS — M25.551 PAIN OF BOTH HIP JOINTS: Primary | ICD-10-CM

## 2019-05-22 PROCEDURE — 97112 NEUROMUSCULAR REEDUCATION: CPT | Mod: GP

## 2019-05-22 PROCEDURE — 97110 THERAPEUTIC EXERCISES: CPT | Mod: GP

## 2019-05-22 PROCEDURE — 97530 THERAPEUTIC ACTIVITIES: CPT | Mod: GP

## 2019-07-31 ENCOUNTER — MEDICAL CORRESPONDENCE (OUTPATIENT)
Dept: ADMINISTRATIVE | Facility: CLINIC | Age: 39
End: 2019-07-31

## 2019-07-31 ENCOUNTER — COMMUNICATION - HEALTHEAST (OUTPATIENT)
Dept: SURGERY | Facility: CLINIC | Age: 39
End: 2019-07-31

## 2019-07-31 ENCOUNTER — OFFICE VISIT - HEALTHEAST (OUTPATIENT)
Dept: SURGERY | Facility: CLINIC | Age: 39
End: 2019-07-31

## 2019-07-31 DIAGNOSIS — M25.552 CHRONIC HIP PAIN, BILATERAL: ICD-10-CM

## 2019-07-31 DIAGNOSIS — E66.01 OBESITY, MORBID, BMI 50 OR HIGHER (H): ICD-10-CM

## 2019-07-31 DIAGNOSIS — M25.551 CHRONIC HIP PAIN, BILATERAL: ICD-10-CM

## 2019-07-31 DIAGNOSIS — G89.29 CHRONIC HIP PAIN, BILATERAL: ICD-10-CM

## 2019-07-31 DIAGNOSIS — Z86.59 HX OF BIPOLAR DISORDER: ICD-10-CM

## 2019-07-31 DIAGNOSIS — R79.89 LOW VITAMIN D LEVEL: ICD-10-CM

## 2019-07-31 DIAGNOSIS — E11.9 TYPE 2 DIABETES MELLITUS WITHOUT COMPLICATION, WITHOUT LONG-TERM CURRENT USE OF INSULIN (H): ICD-10-CM

## 2019-07-31 ASSESSMENT — MIFFLIN-ST. JEOR: SCORE: 2214.91

## 2019-08-23 ENCOUNTER — AMBULATORY - HEALTHEAST (OUTPATIENT)
Dept: LAB | Facility: CLINIC | Age: 39
End: 2019-08-23

## 2019-08-23 ENCOUNTER — OFFICE VISIT - HEALTHEAST (OUTPATIENT)
Dept: SURGERY | Facility: CLINIC | Age: 39
End: 2019-08-23

## 2019-08-23 DIAGNOSIS — E66.01 OBESITY, MORBID, BMI 50 OR HIGHER (H): ICD-10-CM

## 2019-08-23 DIAGNOSIS — E11.9 TYPE 2 DIABETES MELLITUS WITHOUT COMPLICATION, WITHOUT LONG-TERM CURRENT USE OF INSULIN (H): ICD-10-CM

## 2019-08-23 DIAGNOSIS — E66.01 MORBID OBESITY WITH BMI OF 50.0-59.9, ADULT (H): ICD-10-CM

## 2019-08-23 DIAGNOSIS — Z71.3 NUTRITIONAL COUNSELING: ICD-10-CM

## 2019-08-23 LAB
HBA1C MFR BLD: 6.7 % (ref 3.5–6)
PTH-INTACT SERPL-MCNC: 77 PG/ML (ref 10–86)
TSH SERPL DL<=0.005 MIU/L-ACNC: 1.27 UIU/ML (ref 0.3–5)
VIT B12 SERPL-MCNC: 484 PG/ML (ref 213–816)

## 2019-08-23 ASSESSMENT — MIFFLIN-ST. JEOR: SCORE: 2179.98

## 2019-08-26 LAB — 25(OH)D3 SERPL-MCNC: 7.2 NG/ML (ref 30–80)

## 2019-08-28 LAB — VIT B1 PYROPHOSHATE BLD-SCNC: 108 NMOL/L (ref 70–180)

## 2019-08-29 ENCOUNTER — COMMUNICATION - HEALTHEAST (OUTPATIENT)
Dept: SURGERY | Facility: CLINIC | Age: 39
End: 2019-08-29

## 2019-08-29 ENCOUNTER — AMBULATORY - HEALTHEAST (OUTPATIENT)
Dept: SURGERY | Facility: CLINIC | Age: 39
End: 2019-08-29

## 2019-08-29 DIAGNOSIS — E55.9 VITAMIN D DEFICIENCY: ICD-10-CM

## 2019-09-24 ENCOUNTER — COMMUNICATION - HEALTHEAST (OUTPATIENT)
Dept: SURGERY | Facility: CLINIC | Age: 39
End: 2019-09-24

## 2019-10-08 ENCOUNTER — OFFICE VISIT - HEALTHEAST (OUTPATIENT)
Dept: SURGERY | Facility: CLINIC | Age: 39
End: 2019-10-08

## 2019-10-08 DIAGNOSIS — F50.819 BINGE EATING DISORDER: ICD-10-CM

## 2019-10-08 DIAGNOSIS — E11.9 TYPE 2 DIABETES MELLITUS WITHOUT COMPLICATION, WITHOUT LONG-TERM CURRENT USE OF INSULIN (H): ICD-10-CM

## 2019-10-08 DIAGNOSIS — E66.01 MORBID OBESITY WITH BMI OF 50.0-59.9, ADULT (H): ICD-10-CM

## 2019-10-08 DIAGNOSIS — Z71.3 NUTRITIONAL COUNSELING: ICD-10-CM

## 2019-10-08 ASSESSMENT — MIFFLIN-ST. JEOR: SCORE: 2200.85

## 2019-11-07 ENCOUNTER — HEALTH MAINTENANCE LETTER (OUTPATIENT)
Age: 39
End: 2019-11-07

## 2019-11-19 ENCOUNTER — COMMUNICATION - HEALTHEAST (OUTPATIENT)
Dept: SURGERY | Facility: CLINIC | Age: 39
End: 2019-11-19

## 2019-11-21 ENCOUNTER — AMBULATORY - HEALTHEAST (OUTPATIENT)
Dept: SURGERY | Facility: CLINIC | Age: 39
End: 2019-11-21

## 2019-11-21 DIAGNOSIS — E55.9 VITAMIN D DEFICIENCY: ICD-10-CM

## 2019-11-26 ENCOUNTER — OFFICE VISIT - HEALTHEAST (OUTPATIENT)
Dept: SURGERY | Facility: CLINIC | Age: 39
End: 2019-11-26

## 2019-11-26 ENCOUNTER — AMBULATORY - HEALTHEAST (OUTPATIENT)
Dept: SURGERY | Facility: CLINIC | Age: 39
End: 2019-11-26

## 2019-11-26 DIAGNOSIS — E11.9 TYPE 2 DIABETES MELLITUS WITHOUT COMPLICATION, WITHOUT LONG-TERM CURRENT USE OF INSULIN (H): ICD-10-CM

## 2019-11-26 DIAGNOSIS — F50.819 BINGE EATING DISORDER: ICD-10-CM

## 2019-11-26 DIAGNOSIS — E66.01 MORBID OBESITY WITH BMI OF 50.0-59.9, ADULT (H): ICD-10-CM

## 2019-11-26 DIAGNOSIS — F50.9 EATING DISORDER, UNSPECIFIED TYPE: ICD-10-CM

## 2019-11-26 DIAGNOSIS — E66.01 OBESITY, MORBID, BMI 50 OR HIGHER (H): ICD-10-CM

## 2019-11-26 DIAGNOSIS — Z71.3 NUTRITIONAL COUNSELING: ICD-10-CM

## 2019-11-26 ASSESSMENT — MIFFLIN-ST. JEOR: SCORE: 2172.27

## 2019-12-13 ENCOUNTER — OFFICE VISIT - HEALTHEAST (OUTPATIENT)
Dept: SURGERY | Facility: CLINIC | Age: 39
End: 2019-12-13

## 2019-12-13 DIAGNOSIS — E66.01 OBESITY, MORBID, BMI 50 OR HIGHER (H): ICD-10-CM

## 2019-12-13 DIAGNOSIS — E88.810 METABOLIC SYNDROME X: ICD-10-CM

## 2019-12-13 ASSESSMENT — MIFFLIN-ST. JEOR: SCORE: 2119.65

## 2020-01-14 ENCOUNTER — RECORDS - HEALTHEAST (OUTPATIENT)
Dept: ADMINISTRATIVE | Facility: OTHER | Age: 40
End: 2020-01-14

## 2020-01-14 ENCOUNTER — OFFICE VISIT - HEALTHEAST (OUTPATIENT)
Dept: SURGERY | Facility: CLINIC | Age: 40
End: 2020-01-14

## 2020-01-14 DIAGNOSIS — F50.819 BINGE EATING DISORDER: ICD-10-CM

## 2020-01-14 DIAGNOSIS — E11.9 TYPE 2 DIABETES MELLITUS WITHOUT COMPLICATION, WITHOUT LONG-TERM CURRENT USE OF INSULIN (H): ICD-10-CM

## 2020-01-14 DIAGNOSIS — E66.01 MORBID OBESITY WITH BMI OF 50.0-59.9, ADULT (H): ICD-10-CM

## 2020-01-14 ASSESSMENT — MIFFLIN-ST. JEOR: SCORE: 2061.58

## 2020-01-20 ENCOUNTER — RECORDS - HEALTHEAST (OUTPATIENT)
Dept: ADMINISTRATIVE | Facility: OTHER | Age: 40
End: 2020-01-20

## 2020-01-21 ENCOUNTER — RECORDS - HEALTHEAST (OUTPATIENT)
Dept: ADMINISTRATIVE | Facility: OTHER | Age: 40
End: 2020-01-21

## 2020-02-23 ENCOUNTER — HEALTH MAINTENANCE LETTER (OUTPATIENT)
Age: 40
End: 2020-02-23

## 2020-03-25 ENCOUNTER — OFFICE VISIT - HEALTHEAST (OUTPATIENT)
Dept: SURGERY | Facility: CLINIC | Age: 40
End: 2020-03-25

## 2020-03-25 DIAGNOSIS — G89.29 CHRONIC HIP PAIN, BILATERAL: ICD-10-CM

## 2020-03-25 DIAGNOSIS — E66.01 MORBID OBESITY (H): ICD-10-CM

## 2020-03-25 DIAGNOSIS — M25.552 CHRONIC HIP PAIN, BILATERAL: ICD-10-CM

## 2020-03-25 DIAGNOSIS — E66.01 OBESITY, MORBID, BMI 50 OR HIGHER (H): ICD-10-CM

## 2020-03-25 DIAGNOSIS — M25.551 CHRONIC HIP PAIN, BILATERAL: ICD-10-CM

## 2020-04-09 ENCOUNTER — OFFICE VISIT - HEALTHEAST (OUTPATIENT)
Dept: SURGERY | Facility: CLINIC | Age: 40
End: 2020-04-09

## 2020-04-09 DIAGNOSIS — E11.9 TYPE 2 DIABETES MELLITUS WITHOUT COMPLICATION, WITHOUT LONG-TERM CURRENT USE OF INSULIN (H): ICD-10-CM

## 2020-04-09 DIAGNOSIS — F50.819 BINGE EATING DISORDER: ICD-10-CM

## 2020-04-09 DIAGNOSIS — Z71.3 NUTRITIONAL COUNSELING: ICD-10-CM

## 2020-04-09 DIAGNOSIS — E66.01 OBESITY, MORBID, BMI 50 OR HIGHER (H): ICD-10-CM

## 2020-04-09 ASSESSMENT — MIFFLIN-ST. JEOR: SCORE: 2042.08

## 2020-05-07 ENCOUNTER — HOSPITAL ENCOUNTER (OUTPATIENT)
Dept: MAMMOGRAPHY | Facility: CLINIC | Age: 40
Discharge: HOME | End: 2020-05-07

## 2020-05-07 DIAGNOSIS — Z80.3 FAMILY HISTORY OF BREAST CANCER: ICD-10-CM

## 2020-05-07 DIAGNOSIS — Z12.31 VISIT FOR SCREENING MAMMOGRAM: ICD-10-CM

## 2020-05-13 ENCOUNTER — AMBULATORY - HEALTHEAST (OUTPATIENT)
Dept: MAMMOGRAPHY | Facility: CLINIC | Age: 40
End: 2020-05-13

## 2020-05-13 DIAGNOSIS — Z11.59 ENCOUNTER FOR SCREENING FOR OTHER VIRAL DISEASES: ICD-10-CM

## 2020-05-19 ENCOUNTER — NURSE TRIAGE (OUTPATIENT)
Dept: NURSING | Facility: CLINIC | Age: 40
End: 2020-05-19

## 2020-05-19 ENCOUNTER — COMMUNICATION - HEALTHEAST (OUTPATIENT)
Dept: SCHEDULING | Facility: CLINIC | Age: 40
End: 2020-05-19

## 2020-05-19 ENCOUNTER — RECORDS - HEALTHEAST (OUTPATIENT)
Dept: ADMINISTRATIVE | Facility: OTHER | Age: 40
End: 2020-05-19

## 2020-05-27 ENCOUNTER — RECORDS - HEALTHEAST (OUTPATIENT)
Dept: ADMINISTRATIVE | Facility: OTHER | Age: 40
End: 2020-05-27

## 2020-06-12 ENCOUNTER — OFFICE VISIT - HEALTHEAST (OUTPATIENT)
Dept: SURGERY | Facility: CLINIC | Age: 40
End: 2020-06-12

## 2020-06-12 ENCOUNTER — AMBULATORY - HEALTHEAST (OUTPATIENT)
Dept: FAMILY MEDICINE | Facility: CLINIC | Age: 40
End: 2020-06-12

## 2020-06-12 DIAGNOSIS — E66.01 MORBID OBESITY WITH BMI OF 50.0-59.9, ADULT (H): ICD-10-CM

## 2020-06-12 DIAGNOSIS — Z71.3 NUTRITIONAL COUNSELING: ICD-10-CM

## 2020-06-12 DIAGNOSIS — Z11.59 ENCOUNTER FOR SCREENING FOR OTHER VIRAL DISEASES: ICD-10-CM

## 2020-06-12 DIAGNOSIS — E11.9 TYPE 2 DIABETES MELLITUS WITHOUT COMPLICATION, WITHOUT LONG-TERM CURRENT USE OF INSULIN (H): ICD-10-CM

## 2020-06-12 ASSESSMENT — MIFFLIN-ST. JEOR: SCORE: 2014.86

## 2020-06-13 ENCOUNTER — COMMUNICATION - HEALTHEAST (OUTPATIENT)
Dept: FAMILY MEDICINE | Facility: CLINIC | Age: 40
End: 2020-06-13

## 2020-06-15 ENCOUNTER — HOSPITAL ENCOUNTER (OUTPATIENT)
Dept: MAMMOGRAPHY | Facility: CLINIC | Age: 40
Discharge: HOME | End: 2020-06-15

## 2020-06-15 DIAGNOSIS — R92.0 MICROCALCIFICATION OF BREAST: ICD-10-CM

## 2020-06-15 DIAGNOSIS — N64.89 BREAST ASYMMETRY: ICD-10-CM

## 2020-06-16 ENCOUNTER — COMMUNICATION - HEALTHEAST (OUTPATIENT)
Dept: MAMMOGRAPHY | Facility: CLINIC | Age: 40
End: 2020-06-16

## 2020-06-16 LAB
LAB AP CHARGES (HE HISTORICAL CONVERSION): NORMAL
PATH REPORT.COMMENTS IMP SPEC: NORMAL
PATH REPORT.COMMENTS IMP SPEC: NORMAL
PATH REPORT.FINAL DX SPEC: NORMAL
PATH REPORT.GROSS SPEC: NORMAL
PATH REPORT.RELEVANT HX SPEC: NORMAL
RESULT FLAG (HE HISTORICAL CONVERSION): NORMAL

## 2020-08-07 ENCOUNTER — OFFICE VISIT - HEALTHEAST (OUTPATIENT)
Dept: SURGERY | Facility: CLINIC | Age: 40
End: 2020-08-07

## 2020-08-07 DIAGNOSIS — E11.9 TYPE 2 DIABETES MELLITUS WITHOUT COMPLICATION, WITHOUT LONG-TERM CURRENT USE OF INSULIN (H): ICD-10-CM

## 2020-08-07 DIAGNOSIS — E66.01 MORBID OBESITY WITH BMI OF 50.0-59.9, ADULT (H): ICD-10-CM

## 2020-08-07 DIAGNOSIS — F50.819 BINGE EATING DISORDER: ICD-10-CM

## 2020-08-07 DIAGNOSIS — Z71.3 NUTRITIONAL COUNSELING: ICD-10-CM

## 2020-08-07 ASSESSMENT — MIFFLIN-ST. JEOR: SCORE: 1983.11

## 2020-09-09 ENCOUNTER — OFFICE VISIT - HEALTHEAST (OUTPATIENT)
Dept: SURGERY | Facility: CLINIC | Age: 40
End: 2020-09-09

## 2020-09-09 DIAGNOSIS — Z87.898 HISTORY OF BORDERLINE DIABETES MELLITUS: ICD-10-CM

## 2020-09-09 DIAGNOSIS — E55.9 VITAMIN D DEFICIENCY: ICD-10-CM

## 2020-09-09 DIAGNOSIS — E66.01 OBESITY, MORBID, BMI 50 OR HIGHER (H): ICD-10-CM

## 2020-09-09 DIAGNOSIS — E09.69: ICD-10-CM

## 2020-10-13 ENCOUNTER — COMMUNICATION - HEALTHEAST (OUTPATIENT)
Dept: SURGERY | Facility: CLINIC | Age: 40
End: 2020-10-13

## 2020-10-23 PROBLEM — Z86.59 HISTORY OF ANXIETY DISORDER: Status: ACTIVE | Noted: 2020-10-23

## 2020-10-23 PROBLEM — M19.90 OSTEOARTHRITIS: Status: ACTIVE | Noted: 2020-10-23

## 2020-10-23 PROBLEM — E78.9 BORDERLINE HIGH CHOLESTEROL: Status: ACTIVE | Noted: 2020-10-23

## 2020-10-23 PROBLEM — Z63.9 RELATIONSHIP PROBLEMS: Status: ACTIVE | Noted: 2020-10-23

## 2020-10-23 PROBLEM — R42 VERTIGO: Status: ACTIVE | Noted: 2017-03-10

## 2020-10-23 PROBLEM — F43.21 ADJUSTMENT DISORDER WITH DEPRESSED MOOD: Status: ACTIVE | Noted: 2018-12-05

## 2020-10-23 PROBLEM — R26.9 GAIT DIFFICULTY: Status: ACTIVE | Noted: 2019-02-04

## 2020-11-11 ENCOUNTER — OFFICE VISIT - HEALTHEAST (OUTPATIENT)
Dept: SURGERY | Facility: CLINIC | Age: 40
End: 2020-11-11

## 2020-11-11 DIAGNOSIS — E55.9 VITAMIN D DEFICIENCY DISEASE: ICD-10-CM

## 2020-11-11 DIAGNOSIS — E66.01 OBESITY, MORBID, BMI 50 OR HIGHER (H): ICD-10-CM

## 2020-11-11 ASSESSMENT — MIFFLIN-ST. JEOR: SCORE: 2001.26

## 2020-11-17 PROBLEM — M16.0 PRIMARY OSTEOARTHRITIS OF BOTH HIPS: Status: ACTIVE | Noted: 2020-10-30

## 2020-11-18 ENCOUNTER — OFFICE VISIT - HEALTHEAST (OUTPATIENT)
Dept: SURGERY | Facility: CLINIC | Age: 40
End: 2020-11-18

## 2020-11-18 DIAGNOSIS — E66.01 MORBID OBESITY WITH BMI OF 50.0-59.9, ADULT (H): ICD-10-CM

## 2020-11-18 DIAGNOSIS — Z71.3 NUTRITIONAL COUNSELING: ICD-10-CM

## 2020-11-18 DIAGNOSIS — E11.9 TYPE 2 DIABETES MELLITUS WITHOUT COMPLICATION, WITHOUT LONG-TERM CURRENT USE OF INSULIN (H): ICD-10-CM

## 2020-11-18 ASSESSMENT — MIFFLIN-ST. JEOR: SCORE: 1974.04

## 2020-11-19 ENCOUNTER — OFFICE VISIT (OUTPATIENT)
Dept: NEUROLOGY | Facility: CLINIC | Age: 40
End: 2020-11-19
Payer: COMMERCIAL

## 2020-11-19 VITALS
HEART RATE: 88 BPM | WEIGHT: 293 LBS | RESPIRATION RATE: 16 BRPM | DIASTOLIC BLOOD PRESSURE: 68 MMHG | SYSTOLIC BLOOD PRESSURE: 101 MMHG | HEIGHT: 64 IN | BODY MASS INDEX: 50.02 KG/M2

## 2020-11-19 DIAGNOSIS — G25.3 MYOCLONUS: ICD-10-CM

## 2020-11-19 DIAGNOSIS — R20.0 THIGH NUMBNESS: Primary | ICD-10-CM

## 2020-11-19 PROCEDURE — 99204 OFFICE O/P NEW MOD 45 MIN: CPT | Performed by: PSYCHIATRY & NEUROLOGY

## 2020-11-19 RX ORDER — TOPIRAMATE 25 MG/1
50 TABLET, FILM COATED ORAL 2 TIMES DAILY
Status: ON HOLD | COMMUNITY
Start: 2020-11-16 | End: 2021-09-09

## 2020-11-19 RX ORDER — BUPROPION HYDROCHLORIDE 300 MG/1
300 TABLET ORAL DAILY
COMMUNITY
Start: 2020-06-23

## 2020-11-19 RX ORDER — CETIRIZINE HYDROCHLORIDE 10 MG/1
10 TABLET ORAL DAILY
Status: ON HOLD | COMMUNITY
End: 2021-09-09

## 2020-11-19 RX ORDER — LORAZEPAM 0.5 MG/1
0.5 TABLET ORAL DAILY PRN
Status: ON HOLD | COMMUNITY
Start: 2020-09-28 | End: 2021-09-15

## 2020-11-19 RX ORDER — DULAGLUTIDE 0.75 MG/.5ML
1.5 INJECTION, SOLUTION SUBCUTANEOUS WEEKLY
COMMUNITY
Start: 2020-11-02 | End: 2021-11-08 | Stop reason: DRUGHIGH

## 2020-11-19 RX ORDER — ATORVASTATIN CALCIUM 40 MG/1
1 TABLET, FILM COATED ORAL EVERY EVENING
COMMUNITY
Start: 2020-10-23 | End: 2023-06-22

## 2020-11-19 RX ORDER — RISPERIDONE 2 MG/1
2 TABLET ORAL AT BEDTIME
COMMUNITY
Start: 2020-06-23 | End: 2022-11-10

## 2020-11-19 RX ORDER — ERGOCALCIFEROL 1.25 MG/1
50000 CAPSULE ORAL DAILY
Status: ON HOLD | COMMUNITY
End: 2021-09-09

## 2020-11-19 RX ORDER — HYDROCHLOROTHIAZIDE 25 MG/1
25 TABLET ORAL DAILY
Status: ON HOLD | COMMUNITY
Start: 2019-04-09 | End: 2021-09-09

## 2020-11-19 ASSESSMENT — MIFFLIN-ST. JEOR: SCORE: 1979.04

## 2020-11-19 NOTE — PROGRESS NOTES
[facility] Neurology  [place]    Ronel Ryan MRN# [id number]   Age: 40 year old YOB: 1980               Assessment and Plan:   Assessment:   Numbness in the right medial thigh (saphenous or L3 territory)  Intermittent myoclonus        Plan:   Orders Placed This Encounter   Procedures     MR Lumbar Spine w/o Contrast     Comprehensive metabolic panel     Ammonia     CBC with platelets and differential     As I discussed with Ronel, the MRI will tell us if there is a nerve root getting pinched in the lower back to explain the numbness in the right leg.  The symptoms are in the wrong territory for meralgia paresthetica.  As noted below, she is not on opiate pain medications or gabapentin to explain the myoclonus.  We will check the blood work.  If that is unremarkable then some low oxygen such as from obstructive sleep apnea may be a contributor.             Chief Complaint/HPI:     I saw this patient for neurologic evaluation today in our [place] office.  This is a 40-year-old woman with a couple different neurologic symptoms.  From June up until a few weeks ago she noticed numbness in the medial right thigh.  This would come and go.  It felt like it was asleep.  It was not painful.  She did not notice any new weakness in the right leg.  Her gait and balance are affected mainly by significant hip pain.  She also mentions tremors.  When I asked more about that,  she shows me a monophasic jerking movement especially in the arms.  This would happen in bed at night.  Sometimes it would wake her up, sometimes she was already awake.  It would feel like it was starting in the lower back and then spreading out.  She was on gabapentin up until late August, she did still have 1 of those myoclonic jerking episodes a couple nights ago.  She is not on any opiate pain medications.            Past Medical History:    has a past medical history of Allergic state, Anxiety, Arthritis, Bipolar 2 disorder  (H), Depressive disorder, Diabetes (H), Elevated cholesterol, Hypertension, Personality disorder (H), PTSD (post-traumatic stress disorder), and Uncomplicated asthma.          Past Surgical History:    has a past surgical history that includes orthopedic surgery and Cholecystectomy.          Social History:     Social History     Tobacco Use     Smoking status: Never Smoker     Smokeless tobacco: Never Used   Substance Use Topics     Alcohol use: No             Family History:     Family History   Problem Relation Age of Onset     Diabetes Mother      Cancer Mother      Diabetes Brother      No Known Problems Father      Heart Disease No family hx of                 Allergies:     Allergies   Allergen Reactions     Cephalexin Hives     Other reaction(s): *Unknown     Erythromycin Anaphylaxis and Hives     Other reaction(s): Unknown     Penicillins Hives     hives  Other reaction(s): Unknown             Medications:     Current Outpatient Medications:      albuterol (PROAIR HFA/PROVENTIL HFA/VENTOLIN HFA) 108 (90 BASE) MCG/ACT Inhaler, Inhale 1-2 puffs into the lungs, Disp: , Rfl:      atorvastatin (LIPITOR) 40 MG tablet, Take 1 tablet by mouth daily, Disp: , Rfl:      blood glucose (NO BRAND SPECIFIED) lancets standard, Use to test blood sugar 2 times daily or as directed., Disp: 100 each, Rfl: 11     blood glucose monitoring (NO BRAND SPECIFIED) meter device kit, Use to test blood sugar 2 times daily or as directed., Disp: 1 kit, Rfl: 0     blood glucose monitoring (NO BRAND SPECIFIED) test strip, Use to test blood sugars 2 times daily or as directed, Disp: 100 strip, Rfl: 3     buPROPion (WELLBUTRIN XL) 300 MG 24 hr tablet, Take 1 tablet by mouth daily, Disp: , Rfl:      cetirizine (ZYRTEC) 10 MG tablet, Take 10 mg by mouth daily, Disp: , Rfl:      Cholecalciferol 125 MCG (5000 UT) TABS, Take 1 tablet by mouth daily, Disp: , Rfl:      EPINEPHrine (EPIPEN/ADRENACLICK/OR ANY BX GENERIC EQUIV) 0.3 MG/0.3ML injection  "2-pack, Inject 0.3 mLs (0.3 mg) into the muscle once as needed for anaphylaxis, Disp: 0.6 mL, Rfl: 0     ergocalciferol (ERGOCALCIFEROL) 1.25 MG (42204 UT) capsule, Take 50,000 Units by mouth daily, Disp: , Rfl:      hydrochlorothiazide (HYDRODIURIL) 25 MG tablet, Take 25 mg by mouth daily, Disp: , Rfl:      LORazepam (ATIVAN) 0.5 MG tablet, Take 0.5 mg by mouth as needed, Disp: , Rfl:      naltrexone (DEPADE/REVIA) 50 MG tablet, Take 1 tablet daily about 2 hours prior to worst food cravings, daily, Disp: 30 tablet, Rfl: 11     risperiDONE (RISPERDAL) 3 MG tablet, Take 1 tablet by mouth daily, Disp: , Rfl:      topiramate (TOPAMAX) 25 MG tablet, Take 50 mg by mouth 2 times daily, Disp: , Rfl:      traZODone (DESYREL) 50 MG tablet, Take 2 tablets (100 mg) by mouth nightly as needed for sleep, Disp: 30 tablet, Rfl: 1     TRULICITY 0.75 MG/0.5ML pen, Inject 0.75 mg Subcutaneous once a week, Disp: , Rfl:            Review of Systems:   Other than the above-mentioned symptoms a complete systems review is negative except for joint pain bladder symptoms dizziness daytime fatigue anxiety depression bloating bowel problems.            Physical Exam:     /68 (BP Location: Left arm, Patient Position: Sitting, Cuff Size: Adult Regular)   Pulse 88   Resp 16   Ht 1.626 m (5' 4\")   Wt 132.9 kg (293 lb)   LMP  (LMP Unknown)   Breastfeeding No   BMI 50.29 kg/m        Vitals: /68 (BP Location: Left arm, Patient Position: Sitting, Cuff Size: Adult Regular)   Pulse 88   Resp 16   Ht 1.626 m (5' 4\")   Wt 132.9 kg (293 lb)   LMP  (LMP Unknown)   Breastfeeding No   BMI 50.29 kg/m    BMI= Body mass index is 50.29 kg/m .     Patient is alert and oriented x 3 in no acute distress. Neck was supple, she has severe obesity, no carotid bruits, thyromegaly, lymphadenopathy or JVD noted.   Neurological Exam:   Mental status: Patient is alert and oriented x 3. Speech is clear and fluent with good repetition, comprehension " and naming. Patient recalls 3/3 objects at 5 minutes.   Cranial nerves:    CN II: Visual fields are full to confrontation. Fundoscopic exam is normal. PERRLA.   CN III, IV, VI: EOMI.    CN V: Facial sensation intact to pinprick in all 3 divisions bilaterally.    CN VII: Face is symmetric with normal eye closure and smile.   CN VII: Hearing is normal to rubbing fingers   CN IX, X: Palate elevates symmetrically. Phonation is normal. Gag present.   CN XI: Head turning and shoulder shrug are intact   CN XII: Tongue is midline with normal movements and no atrophy or fasciculations.   Motor: Muscle bulk and tone are normal. No pronator drift. Strength is 5/5 bilaterally. No fasciculations noted.   Reflexes: Reflexes are absent in the legs, normal in the arms   Sensory: Light touch, pinprick, and vibration sense are intact bilaterally down through both feet, pinprick sensation intact in both saphenous nerve territories   Coordination: Rapid alternating movements and fine finger movements are intact. There is no dysmetria on finger-to-nose.    Gait: Posture is normal. Gait is significantly limited by hip pain on both sides            Blair Garza MD

## 2020-11-19 NOTE — LETTER
11/19/2020         RE: Ronel Ryan  [address]  [place] [address]        Dear Colleague,    Thank you for referring your patient, Ronel Ryan, to the [facility] [place]. Please see a copy of my visit note below.    [facility] Neurology  [place]    Ronel Ryan MRN# [id number]   Age: 40 year old YOB: 1980               Assessment and Plan:   Assessment:   Numbness in the right medial thigh (saphenous or L3 territory)  Intermittent myoclonus        Plan:   Orders Placed This Encounter   Procedures     MR Lumbar Spine w/o Contrast     Comprehensive metabolic panel     Ammonia     CBC with platelets and differential     As I discussed with Ronel, the MRI will tell us if there is a nerve root getting pinched in the lower back to explain the numbness in the right leg.  The symptoms are in the wrong territory for meralgia paresthetica.  As noted below, she is not on opiate pain medications or gabapentin to explain the myoclonus.  We will check the blood work.  If that is unremarkable then some low oxygen such as from obstructive sleep apnea may be a contributor.             Chief Complaint/HPI:     I saw this patient for neurologic evaluation today in our [place] office.  This is a 40-year-old woman with a couple different neurologic symptoms.  From June up until a few weeks ago she noticed numbness in the medial right thigh.  This would come and go.  It felt like it was asleep.  It was not painful.  She did not notice any new weakness in the right leg.  Her gait and balance are affected mainly by significant hip pain.  She also mentions tremors.  When I asked more about that,  she shows me a monophasic jerking movement especially in the arms.  This would happen in bed at night.  Sometimes it would wake her up, sometimes she was already awake.  It would feel like it was starting in the lower back and then spreading out.  She was on gabapentin  up until late August, she did still have 1 of those myoclonic jerking episodes a couple nights ago.  She is not on any opiate pain medications.            Past Medical History:    has a past medical history of Allergic state, Anxiety, Arthritis, Bipolar 2 disorder (H), Depressive disorder, Diabetes (H), Elevated cholesterol, Hypertension, Personality disorder (H), PTSD (post-traumatic stress disorder), and Uncomplicated asthma.          Past Surgical History:    has a past surgical history that includes orthopedic surgery and Cholecystectomy.          Social History:     Social History     Tobacco Use     Smoking status: Never Smoker     Smokeless tobacco: Never Used   Substance Use Topics     Alcohol use: No             Family History:     Family History   Problem Relation Age of Onset     Diabetes Mother      Cancer Mother      Diabetes Brother      No Known Problems Father      Heart Disease No family hx of                 Allergies:     Allergies   Allergen Reactions     Cephalexin Hives     Other reaction(s): *Unknown     Erythromycin Anaphylaxis and Hives     Other reaction(s): Unknown     Penicillins Hives     hives  Other reaction(s): Unknown             Medications:     Current Outpatient Medications:      albuterol (PROAIR HFA/PROVENTIL HFA/VENTOLIN HFA) 108 (90 BASE) MCG/ACT Inhaler, Inhale 1-2 puffs into the lungs, Disp: , Rfl:      atorvastatin (LIPITOR) 40 MG tablet, Take 1 tablet by mouth daily, Disp: , Rfl:      blood glucose (NO BRAND SPECIFIED) lancets standard, Use to test blood sugar 2 times daily or as directed., Disp: 100 each, Rfl: 11     blood glucose monitoring (NO BRAND SPECIFIED) meter device kit, Use to test blood sugar 2 times daily or as directed., Disp: 1 kit, Rfl: 0     blood glucose monitoring (NO BRAND SPECIFIED) test strip, Use to test blood sugars 2 times daily or as directed, Disp: 100 strip, Rfl: 3     buPROPion (WELLBUTRIN XL) 300 MG 24 hr tablet, Take 1 tablet by mouth daily,  "Disp: , Rfl:      cetirizine (ZYRTEC) 10 MG tablet, Take 10 mg by mouth daily, Disp: , Rfl:      Cholecalciferol 125 MCG (5000 UT) TABS, Take 1 tablet by mouth daily, Disp: , Rfl:      EPINEPHrine (EPIPEN/ADRENACLICK/OR ANY BX GENERIC EQUIV) 0.3 MG/0.3ML injection 2-pack, Inject 0.3 mLs (0.3 mg) into the muscle once as needed for anaphylaxis, Disp: 0.6 mL, Rfl: 0     ergocalciferol (ERGOCALCIFEROL) 1.25 MG (25341 UT) capsule, Take 50,000 Units by mouth daily, Disp: , Rfl:      hydrochlorothiazide (HYDRODIURIL) 25 MG tablet, Take 25 mg by mouth daily, Disp: , Rfl:      LORazepam (ATIVAN) 0.5 MG tablet, Take 0.5 mg by mouth as needed, Disp: , Rfl:      naltrexone (DEPADE/REVIA) 50 MG tablet, Take 1 tablet daily about 2 hours prior to worst food cravings, daily, Disp: 30 tablet, Rfl: 11     risperiDONE (RISPERDAL) 3 MG tablet, Take 1 tablet by mouth daily, Disp: , Rfl:      topiramate (TOPAMAX) 25 MG tablet, Take 50 mg by mouth 2 times daily, Disp: , Rfl:      traZODone (DESYREL) 50 MG tablet, Take 2 tablets (100 mg) by mouth nightly as needed for sleep, Disp: 30 tablet, Rfl: 1     TRULICITY 0.75 MG/0.5ML pen, Inject 0.75 mg Subcutaneous once a week, Disp: , Rfl:            Review of Systems:   Other than the above-mentioned symptoms a complete systems review is negative except for joint pain bladder symptoms dizziness daytime fatigue anxiety depression bloating bowel problems.            Physical Exam:     /68 (BP Location: Left arm, Patient Position: Sitting, Cuff Size: Adult Regular)   Pulse 88   Resp 16   Ht 1.626 m (5' 4\")   Wt 132.9 kg (293 lb)   LMP  (LMP Unknown)   Breastfeeding No   BMI 50.29 kg/m        Vitals: /68 (BP Location: Left arm, Patient Position: Sitting, Cuff Size: Adult Regular)   Pulse 88   Resp 16   Ht 1.626 m (5' 4\")   Wt 132.9 kg (293 lb)   LMP  (LMP Unknown)   Breastfeeding No   BMI 50.29 kg/m    BMI= Body mass index is 50.29 kg/m .     Patient is alert and oriented " x 3 in no acute distress. Neck was supple, she has severe obesity, no carotid bruits, thyromegaly, lymphadenopathy or JVD noted.   Neurological Exam:   Mental status: Patient is alert and oriented x 3. Speech is clear and fluent with good repetition, comprehension and naming. Patient recalls 3/3 objects at 5 minutes.   Cranial nerves:    CN II: Visual fields are full to confrontation. Fundoscopic exam is normal. PERRLA.   CN III, IV, VI: EOMI.    CN V: Facial sensation intact to pinprick in all 3 divisions bilaterally.    CN VII: Face is symmetric with normal eye closure and smile.   CN VII: Hearing is normal to rubbing fingers   CN IX, X: Palate elevates symmetrically. Phonation is normal. Gag present.   CN XI: Head turning and shoulder shrug are intact   CN XII: Tongue is midline with normal movements and no atrophy or fasciculations.   Motor: Muscle bulk and tone are normal. No pronator drift. Strength is 5/5 bilaterally. No fasciculations noted.   Reflexes: Reflexes are absent in the legs, normal in the arms   Sensory: Light touch, pinprick, and vibration sense are intact bilaterally down through both feet, pinprick sensation intact in both saphenous nerve territories   Coordination: Rapid alternating movements and fine finger movements are intact. There is no dysmetria on finger-to-nose.    Gait: Posture is normal. Gait is significantly limited by hip pain on both sides            Blair Garza MD             Again, thank you for allowing me to participate in the care of your patient.        Sincerely,        Blair Garza MD

## 2020-11-19 NOTE — NURSING NOTE
Chief Complaint   Patient presents with     Consult     New patient consult for right thigh numbness-she states that the symptoms have stopped but she wanted to keep her appt just in case it came back. Symptoms had initially started back in June 2020 and subsided a few weeks ago      Atul Miguel CMA on 11/19/2020 at 8:54 AM

## 2020-11-23 ENCOUNTER — RECORDS - HEALTHEAST (OUTPATIENT)
Dept: SCHEDULING | Facility: CLINIC | Age: 40
End: 2020-11-23

## 2020-11-23 DIAGNOSIS — R20.0 THIGH NUMBNESS: ICD-10-CM

## 2020-11-23 DIAGNOSIS — G25.3 MYOCLONUS: ICD-10-CM

## 2020-11-29 ENCOUNTER — HEALTH MAINTENANCE LETTER (OUTPATIENT)
Age: 40
End: 2020-11-29

## 2021-01-06 ENCOUNTER — OFFICE VISIT - HEALTHEAST (OUTPATIENT)
Dept: SURGERY | Facility: CLINIC | Age: 41
End: 2021-01-06

## 2021-01-06 DIAGNOSIS — R73.03 BORDERLINE DIABETES: ICD-10-CM

## 2021-01-06 DIAGNOSIS — E55.9 VITAMIN D DEFICIENCY: ICD-10-CM

## 2021-01-06 ASSESSMENT — MIFFLIN-ST. JEOR: SCORE: 1937.75

## 2021-02-23 ENCOUNTER — OFFICE VISIT - HEALTHEAST (OUTPATIENT)
Dept: SURGERY | Facility: CLINIC | Age: 41
End: 2021-02-23

## 2021-02-23 DIAGNOSIS — E11.9 TYPE 2 DIABETES MELLITUS WITHOUT COMPLICATION, WITHOUT LONG-TERM CURRENT USE OF INSULIN (H): ICD-10-CM

## 2021-02-23 DIAGNOSIS — Z71.3 NUTRITIONAL COUNSELING: ICD-10-CM

## 2021-02-23 DIAGNOSIS — E66.01 OBESITY, CLASS III, BMI 40-49.9 (MORBID OBESITY) (H): ICD-10-CM

## 2021-02-23 ASSESSMENT — MIFFLIN-ST. JEOR: SCORE: 1883.32

## 2021-02-24 ENCOUNTER — RECORDS - HEALTHEAST (OUTPATIENT)
Dept: ADMINISTRATIVE | Facility: OTHER | Age: 41
End: 2021-02-24

## 2021-02-27 ENCOUNTER — AMBULATORY - HEALTHEAST (OUTPATIENT)
Dept: GASTROENTEROLOGY | Facility: CLINIC | Age: 41
End: 2021-02-27

## 2021-02-27 DIAGNOSIS — Z11.59 ENCOUNTER FOR SCREENING FOR OTHER VIRAL DISEASES: ICD-10-CM

## 2021-03-13 ENCOUNTER — OFFICE VISIT (OUTPATIENT)
Dept: URGENT CARE | Facility: URGENT CARE | Age: 41
End: 2021-03-13
Payer: COMMERCIAL

## 2021-03-13 VITALS
SYSTOLIC BLOOD PRESSURE: 128 MMHG | DIASTOLIC BLOOD PRESSURE: 88 MMHG | TEMPERATURE: 98.2 F | BODY MASS INDEX: 50.29 KG/M2 | HEART RATE: 102 BPM | OXYGEN SATURATION: 98 % | WEIGHT: 293 LBS

## 2021-03-13 DIAGNOSIS — K59.00 CONSTIPATION, UNSPECIFIED CONSTIPATION TYPE: Primary | ICD-10-CM

## 2021-03-13 PROCEDURE — 99213 OFFICE O/P EST LOW 20 MIN: CPT | Performed by: PHYSICIAN ASSISTANT

## 2021-03-13 RX ORDER — SENNOSIDES A AND B 8.6 MG/1
1 TABLET, FILM COATED ORAL DAILY
Qty: 7 TABLET | Refills: 0 | Status: ON HOLD | OUTPATIENT
Start: 2021-03-13 | End: 2021-09-09

## 2021-03-13 RX ORDER — DOCUSATE SODIUM 100 MG/1
100 CAPSULE, LIQUID FILLED ORAL 2 TIMES DAILY
Qty: 14 CAPSULE | Refills: 0 | Status: SHIPPED | OUTPATIENT
Start: 2021-03-13 | End: 2021-03-20

## 2021-03-13 ASSESSMENT — ENCOUNTER SYMPTOMS
CARDIOVASCULAR NEGATIVE: 1
DIARRHEA: 0
ANAL BLEEDING: 0
RESPIRATORY NEGATIVE: 1
RECTAL PAIN: 0
NEUROLOGICAL NEGATIVE: 1
MUSCULOSKELETAL NEGATIVE: 1
EYES NEGATIVE: 1
CONSTITUTIONAL NEGATIVE: 1
NAUSEA: 0
VOMITING: 0
ABDOMINAL PAIN: 0
BLOOD IN STOOL: 0
CONSTIPATION: 1
ABDOMINAL DISTENTION: 0
PSYCHIATRIC NEGATIVE: 1

## 2021-03-14 NOTE — PROGRESS NOTES
Constipation, unspecified constipation type  - docusate sodium (COLACE) 100 MG capsule; Take 1 capsule (100 mg) by mouth 2 times daily for 7 days  - senna (SENOKOT) 8.6 MG tablet; Take 1 tablet by mouth daily    20 minutes spent on the date of the encounter doing chart review, history and exam, documentation and further activities as noted above     See Patient Instructions  Patient Instructions     Patient Education     Constipation (Adult)  Constipation means that you have bowel movements that are less frequent than usual. Stools often become very hard and difficult to pass.  Constipation is very common. At some point in life, it affects almost everyone. Since everyone's bowel habits are different, what is constipation to one person may not be to another. Your healthcare provider may do tests to diagnose constipation. It depends on what he or she finds when evaluating you.    Symptoms of constipation include:    Abdominal pain    Bloating    Vomiting    Painful bowel movements    Itching, swelling, bleeding, or pain around the anus  Causes  Constipation can have many causes. These include:    Diet low in fiber    Too much dairy    Not drinking enough liquids    Lack of exercise or physical activity (especially true for older adults)    Changes in lifestyle or daily routine, including pregnancy, aging, work, and travel    Frequent use or misuse of laxatives    Ignoring the urge to have a bowel movement or delaying it until later    Medicines, such as certain prescription pain medicines, iron supplements, antacids, certain antidepressants, and calcium supplements    Diseases like irritable bowel syndrome, bowel obstructions, stroke, diabetes, thyroid disease, Parkinson disease, hemorrhoids, and colon cancer  Complications  Potential complications of constipation can include:    Hemorrhoids    Rectal bleeding from hemorrhoids or anal fissures (skin tears)    Hernias    Dependency on laxatives    Chronic  constipation    Fecal impaction, a severe form of constipation in which a large amount of hard stool is in your rectum that you can't pass    Bowel obstruction or perforation  Home care  All treatment should be done after talking with your healthcare provider. This is especially true if you have another medical problems, are taking prescription medicines, or are an older adult. Treatment most often involves lifestyle changes. You may also need medicines. Your healthcare provider will tell you which will work best for you. Follow the advice below to help avoid this problem in the future.  Lifestyle changes  These lifestyle changes can help prevent constipation:    Diet. Eat a high-fiber diet, with fresh fruit and vegetables, and reduce dairy intake, meats, and processed foods    Fluids. It's important to get enough fluids each day. Drink plenty of water when you eat more fiber. If you are on diet that limits the amount of fluid you can have, talk about this with your healthcare provider.    Regular exercise. Check with your healthcare provider first.  Medicines  Take any medicines as directed. Some laxatives are safe to use only every now and then. Others can be taken on a regular basis. While laxatives don't cause bowel dependence, they are treating the symptoms. So your constipation may return if you don't make other changes. Talk with your healthcare provider or pharmacist if you have questions.  Prescription pain medicines can cause constipation. If you are taking this kind of medicine, ask your healthcare provider if you should also take a stool softener.  Medicines you may take to treat constipation include:    Fiber supplements    Stool softeners    Laxatives    Enemas    Rectal suppositories  Follow-up care  Follow up with your healthcare provider if symptoms don't get better in the next few days. You may need to have more tests or see a specialist.  Call 911  Call 911 if any of these occur:    Trouble  breathing    Stiff, rigid abdomen that is severely painful to touch    Confusion    Fainting or loss of consciousness    Rapid heart rate    Chest pain  When to seek medical advice  Call your healthcare provider right away if any of these occur:    Fever of 100.4 F (38 C) or higher, or as directed by your healthcare provider    Failure to resume normal bowel movements    Pain in your abdomen or back gets worse    Nausea or vomiting    Swelling in your abdomen    Blood in the stool    Black, tarry stool    Involuntary weight loss    Weakness  [employer] last reviewed this educational content on 6/1/2018 2000-2020 [employer]. All rights reserved. This information is not intended as a substitute for professional medical care. Always follow your healthcare professional's instructions.               Guerrero Soto PA-C  [facility]    Subjective   40 year old year old who presents to clinic today for the following health issues:    Urgent Care and Constipation       HPI     Constipation for last 2 months on and off. Miralax mildly helpful. Patient does not have much of an appetite and is trying to lose weight. She has not been eating much due to this. Denies abdominal pain, fever, nausea, or vomiting. Patient states that she has a colonoscopy scheduled for the 19th to work up this ongoing issue.       Review of Systems   Review of Systems   Constitutional: Negative.    HENT: Negative.    Eyes: Negative.    Respiratory: Negative.    Cardiovascular: Negative.    Gastrointestinal: Positive for constipation. Negative for abdominal distention, abdominal pain, anal bleeding, blood in stool, diarrhea, nausea, rectal pain and vomiting.   Genitourinary: Negative.    Musculoskeletal: Negative.    Neurological: Negative.    Psychiatric/Behavioral: Negative.         Objective    Temp: 98.2  F (36.8  C) Temp src: Tympanic BP: 128/88 Pulse: 102     SpO2: 98 %       Physical Exam   Physical  Exam  Constitutional:       Appearance: She is obese.   HENT:      Head: Normocephalic and atraumatic.   Cardiovascular:      Rate and Rhythm: Normal rate and regular rhythm.      Pulses: Normal pulses.      Heart sounds: Normal heart sounds. No murmur. No friction rub. No gallop.    Pulmonary:      Effort: Pulmonary effort is normal. No respiratory distress.      Breath sounds: Normal breath sounds. No stridor. No wheezing, rhonchi or rales.   Chest:      Chest wall: No tenderness.   Abdominal:      General: Abdomen is flat. Bowel sounds are normal. There is no distension.      Palpations: Abdomen is soft. There is no mass.      Tenderness: There is no abdominal tenderness. There is no right CVA tenderness, left CVA tenderness, guarding or rebound.      Hernia: No hernia is present.   Neurological:      General: No focal deficit present.      Mental Status: She is alert and oriented to person, place, and time. Mental status is at baseline.      Cranial Nerves: No cranial nerve deficit.   Psychiatric:         Mood and Affect: Mood normal.         Behavior: Behavior normal.         Thought Content: Thought content normal.         Judgment: Judgment normal.

## 2021-03-14 NOTE — PATIENT INSTRUCTIONS
Patient Education     Constipation (Adult)  Constipation means that you have bowel movements that are less frequent than usual. Stools often become very hard and difficult to pass.  Constipation is very common. At some point in life, it affects almost everyone. Since everyone's bowel habits are different, what is constipation to one person may not be to another. Your healthcare provider may do tests to diagnose constipation. It depends on what he or she finds when evaluating you.    Symptoms of constipation include:    Abdominal pain    Bloating    Vomiting    Painful bowel movements    Itching, swelling, bleeding, or pain around the anus  Causes  Constipation can have many causes. These include:    Diet low in fiber    Too much dairy    Not drinking enough liquids    Lack of exercise or physical activity (especially true for older adults)    Changes in lifestyle or daily routine, including pregnancy, aging, work, and travel    Frequent use or misuse of laxatives    Ignoring the urge to have a bowel movement or delaying it until later    Medicines, such as certain prescription pain medicines, iron supplements, antacids, certain antidepressants, and calcium supplements    Diseases like irritable bowel syndrome, bowel obstructions, stroke, diabetes, thyroid disease, Parkinson disease, hemorrhoids, and colon cancer  Complications  Potential complications of constipation can include:    Hemorrhoids    Rectal bleeding from hemorrhoids or anal fissures (skin tears)    Hernias    Dependency on laxatives    Chronic constipation    Fecal impaction, a severe form of constipation in which a large amount of hard stool is in your rectum that you can't pass    Bowel obstruction or perforation  Home care  All treatment should be done after talking with your healthcare provider. This is especially true if you have another medical problems, are taking prescription medicines, or are an older adult. Treatment most often involves  lifestyle changes. You may also need medicines. Your healthcare provider will tell you which will work best for you. Follow the advice below to help avoid this problem in the future.  Lifestyle changes  These lifestyle changes can help prevent constipation:    Diet. Eat a high-fiber diet, with fresh fruit and vegetables, and reduce dairy intake, meats, and processed foods    Fluids. It's important to get enough fluids each day. Drink plenty of water when you eat more fiber. If you are on diet that limits the amount of fluid you can have, talk about this with your healthcare provider.    Regular exercise. Check with your healthcare provider first.  Medicines  Take any medicines as directed. Some laxatives are safe to use only every now and then. Others can be taken on a regular basis. While laxatives don't cause bowel dependence, they are treating the symptoms. So your constipation may return if you don't make other changes. Talk with your healthcare provider or pharmacist if you have questions.  Prescription pain medicines can cause constipation. If you are taking this kind of medicine, ask your healthcare provider if you should also take a stool softener.  Medicines you may take to treat constipation include:    Fiber supplements    Stool softeners    Laxatives    Enemas    Rectal suppositories  Follow-up care  Follow up with your healthcare provider if symptoms don't get better in the next few days. You may need to have more tests or see a specialist.  Call 911  Call 911 if any of these occur:    Trouble breathing    Stiff, rigid abdomen that is severely painful to touch    Confusion    Fainting or loss of consciousness    Rapid heart rate    Chest pain  When to seek medical advice  Call your healthcare provider right away if any of these occur:    Fever of 100.4 F (38 C) or higher, or as directed by your healthcare provider    Failure to resume normal bowel movements    Pain in your abdomen or back gets  worse    Nausea or vomiting    Swelling in your abdomen    Blood in the stool    Black, tarry stool    Involuntary weight loss    Weakness  [employer] last reviewed this educational content on 6/1/2018 2000-2020 [employer]. All rights reserved. This information is not intended as a substitute for professional medical care. Always follow your healthcare professional's instructions.

## 2021-04-06 ENCOUNTER — OFFICE VISIT - HEALTHEAST (OUTPATIENT)
Dept: SURGERY | Facility: CLINIC | Age: 41
End: 2021-04-06

## 2021-04-06 ENCOUNTER — COMMUNICATION - HEALTHEAST (OUTPATIENT)
Dept: SURGERY | Facility: CLINIC | Age: 41
End: 2021-04-06

## 2021-04-06 DIAGNOSIS — Z71.3 NUTRITIONAL COUNSELING: ICD-10-CM

## 2021-04-06 DIAGNOSIS — E11.9 TYPE 2 DIABETES MELLITUS WITHOUT COMPLICATION, WITHOUT LONG-TERM CURRENT USE OF INSULIN (H): ICD-10-CM

## 2021-04-06 DIAGNOSIS — E66.01 OBESITY, CLASS III, BMI 40-49.9 (MORBID OBESITY) (H): ICD-10-CM

## 2021-04-06 ASSESSMENT — MIFFLIN-ST. JEOR: SCORE: 1874.25

## 2021-04-10 ENCOUNTER — HEALTH MAINTENANCE LETTER (OUTPATIENT)
Age: 41
End: 2021-04-10

## 2021-04-14 ENCOUNTER — TELEPHONE (OUTPATIENT)
Dept: NEUROLOGY | Facility: CLINIC | Age: 41
End: 2021-04-14

## 2021-04-14 NOTE — TELEPHONE ENCOUNTER
Patient: Ronel Ryan  YOB: 1980  Sex: Female  Phone: [phone]    [facility] MRN: [id number]  Exam Date: 11/30/2020     EXAM: MR LUMBAR SPINE WITHOUT CONTRAST 1.2T OPEN RECUMBENT    CLINICAL INFORMATION: Right L3 numbness, without clonus, radiculopathy.    CORRELATIVE IMAGES: No prior imaging submitted.    TECHNICAL INFORMATION: T1, T2 and STIR sagittal through the lumbar spine with T1 coronal and T1/T2 axial at selected levels.    CONTRAST: None.    SEDATION: None.    INTERPRETATION: Segmentation and Alignment: No segmentation anomaly evident. Lordotic alignment of lumbar spine.    Disc Degeneration/Operative changes: Mild L4-5 and L5-S1 disc desiccation.    Sacrum and Sacroiliac joints: Limited visualized sacrum and sacroiliac joints demonstrate no significant pathology.    Conus/distal cord: No gross lower spinal cord signal pathology evident. No intradural mass or arachnoidal adhesions. Conus terminates at L1.    Osseous and paraspinous structures: Bone marrow signal is unremarkable. Paraspinal soft tissues are unremarkable.    L5-S1: Normal disc height. No bulge or herniation. No significant foraminal compromise. Facet joints are unremarkable.    L4-L5: Mild posterior annular fissuring and slight annular bulging without central stenosis. No significant foraminal compromise. Facet joints are unremarkable.    L3-L4: Normal disc height. No bulge or herniation. No significant foraminal compromise. Facet joints are unremarkable.    L2-L3: Normal disc height. No bulge or herniation. No significant foraminal compromise. Facet joints are unremarkable.    L1-L2: Normal disc height. No bulge or herniation. No significant foraminal compromise. Facet joints are unremarkable.    Lower thoracic: No significant lower thoracic disc pathology.    CONCLUSION:    1. Mild L5-S1 and L4-5 disc degeneration without neural compression.    2. No L3-4 disease; no impingement of the right L3 nerve root.    3. Otherwise  negative study.    RSP        Electronically signed on 11/30/2020 5:42:00 PM by Sriram Mendoza M.D.  [facility]

## 2021-04-14 NOTE — TELEPHONE ENCOUNTER
Pt calling for MRI results she had months ago. She believes it was done in [place] at [facility]. [phone]

## 2021-04-16 ENCOUNTER — RECORDS - HEALTHEAST (OUTPATIENT)
Dept: ADMINISTRATIVE | Facility: OTHER | Age: 41
End: 2021-04-16

## 2021-04-18 ENCOUNTER — AMBULATORY - HEALTHEAST (OUTPATIENT)
Dept: SURGERY | Facility: CLINIC | Age: 41
End: 2021-04-18

## 2021-04-18 DIAGNOSIS — Z11.59 ENCOUNTER FOR SCREENING FOR OTHER VIRAL DISEASES: ICD-10-CM

## 2021-05-06 ENCOUNTER — OFFICE VISIT - HEALTHEAST (OUTPATIENT)
Dept: SURGERY | Facility: CLINIC | Age: 41
End: 2021-05-06

## 2021-05-06 ENCOUNTER — COMMUNICATION - HEALTHEAST (OUTPATIENT)
Dept: SURGERY | Facility: CLINIC | Age: 41
End: 2021-05-06

## 2021-05-06 DIAGNOSIS — E66.01 OBESITY, CLASS III, BMI 40-49.9 (MORBID OBESITY) (H): ICD-10-CM

## 2021-05-06 DIAGNOSIS — E88.810 METABOLIC SYNDROME: ICD-10-CM

## 2021-05-06 DIAGNOSIS — T50.905A WEIGHT GAIN DUE TO MEDICATION: ICD-10-CM

## 2021-05-06 DIAGNOSIS — R63.5 WEIGHT GAIN DUE TO MEDICATION: ICD-10-CM

## 2021-05-06 ASSESSMENT — MIFFLIN-ST. JEOR: SCORE: 1847.03

## 2021-05-21 ENCOUNTER — RECORDS - HEALTHEAST (OUTPATIENT)
Dept: LAB | Facility: CLINIC | Age: 41
End: 2021-05-21

## 2021-05-21 LAB
ALBUMIN SERPL-MCNC: 3.8 G/DL (ref 3.5–5)
ALP SERPL-CCNC: 57 U/L (ref 45–120)
ALT SERPL W P-5'-P-CCNC: 16 U/L (ref 0–45)
ANION GAP SERPL CALCULATED.3IONS-SCNC: 11 MMOL/L (ref 5–18)
AST SERPL W P-5'-P-CCNC: 12 U/L (ref 0–40)
BASOPHILS # BLD AUTO: 0 THOU/UL (ref 0–0.2)
BASOPHILS NFR BLD AUTO: 1 % (ref 0–2)
BILIRUB SERPL-MCNC: 0.6 MG/DL (ref 0–1)
BUN SERPL-MCNC: 13 MG/DL (ref 8–22)
CALCIUM SERPL-MCNC: 9.3 MG/DL (ref 8.5–10.5)
CHLORIDE BLD-SCNC: 107 MMOL/L (ref 98–107)
CHOLEST SERPL-MCNC: 162 MG/DL
CO2 SERPL-SCNC: 20 MMOL/L (ref 22–31)
CREAT SERPL-MCNC: 1.12 MG/DL (ref 0.6–1.1)
EOSINOPHIL # BLD AUTO: 0.2 THOU/UL (ref 0–0.4)
EOSINOPHIL NFR BLD AUTO: 4 % (ref 0–6)
ERYTHROCYTE [DISTWIDTH] IN BLOOD BY AUTOMATED COUNT: 13.2 % (ref 11–14.5)
FASTING STATUS PATIENT QL REPORTED: ABNORMAL
GFR SERPL CREATININE-BSD FRML MDRD: 54 ML/MIN/1.73M2
GLUCOSE BLD-MCNC: 102 MG/DL (ref 70–125)
HCT VFR BLD AUTO: 41.8 % (ref 35–47)
HDLC SERPL-MCNC: 34 MG/DL
HGB BLD-MCNC: 13.7 G/DL (ref 12–16)
IMM GRANULOCYTES # BLD: 0 THOU/UL
IMM GRANULOCYTES NFR BLD: 0 %
LDLC SERPL CALC-MCNC: 105 MG/DL
LYMPHOCYTES # BLD AUTO: 2.1 THOU/UL (ref 0.8–4.4)
LYMPHOCYTES NFR BLD AUTO: 41 % (ref 20–40)
MCH RBC QN AUTO: 30.6 PG (ref 27–34)
MCHC RBC AUTO-ENTMCNC: 32.8 G/DL (ref 32–36)
MCV RBC AUTO: 94 FL (ref 80–100)
MONOCYTES # BLD AUTO: 0.2 THOU/UL (ref 0–0.9)
MONOCYTES NFR BLD AUTO: 5 % (ref 2–10)
NEUTROPHILS # BLD AUTO: 2.6 THOU/UL (ref 2–7.7)
NEUTROPHILS NFR BLD AUTO: 50 % (ref 50–70)
PLATELET # BLD AUTO: 283 THOU/UL (ref 140–440)
PMV BLD AUTO: 9.9 FL (ref 8.5–12.5)
POTASSIUM BLD-SCNC: 4.1 MMOL/L (ref 3.5–5)
PROLACTIN SERPL-MCNC: 51.7 NG/ML (ref 0–20)
PROT SERPL-MCNC: 6.7 G/DL (ref 6–8)
RBC # BLD AUTO: 4.47 MILL/UL (ref 3.8–5.4)
SODIUM SERPL-SCNC: 138 MMOL/L (ref 136–145)
TRIGL SERPL-MCNC: 115 MG/DL
TSH SERPL DL<=0.005 MIU/L-ACNC: 1.14 UIU/ML (ref 0.3–5)
WBC: 5.2 THOU/UL (ref 4–11)

## 2021-05-26 ENCOUNTER — AMBULATORY - HEALTHEAST (OUTPATIENT)
Dept: SURGERY | Facility: CLINIC | Age: 41
End: 2021-05-26

## 2021-05-26 DIAGNOSIS — Z11.59 ENCOUNTER FOR SCREENING FOR OTHER VIRAL DISEASES: ICD-10-CM

## 2021-05-27 ENCOUNTER — RECORDS - HEALTHEAST (OUTPATIENT)
Dept: ADMINISTRATIVE | Facility: OTHER | Age: 41
End: 2021-05-27

## 2021-05-28 ENCOUNTER — COMMUNICATION - HEALTHEAST (OUTPATIENT)
Dept: SURGERY | Facility: CLINIC | Age: 41
End: 2021-05-28

## 2021-05-28 DIAGNOSIS — E66.01 OBESITY, MORBID, BMI 50 OR HIGHER (H): ICD-10-CM

## 2021-05-28 DIAGNOSIS — Z87.898 HISTORY OF BORDERLINE DIABETES MELLITUS: ICD-10-CM

## 2021-05-31 NOTE — PROGRESS NOTES
Medical  Weight Loss Initial Diet Evaluation    Ronel is presenting today for a new weight management nutrition consultation. Pt has also had an initial appointment with Bariatrician Dr. Dunn.   Patient is hoping to figure out what she can eat, grocery shopping tips.   Nutrition Assessment:   Anthropometrics:  Pt's Initial Weight: 345 lbs  Weight: (!) 337 lb 4.8 oz (153 kg)  Weight loss from initial: 7.7  % Weight loss: 2.23 %    BMI: Body mass index is 57.9 kg/m .  IBW: 120-130lb  Estimated RMR ([place]-St Jeor equation): 2193kcal  Recommended protein needs: 90-120g    Medical History:  Patient Active Problem List   Diagnosis     Bipolar 1 disorder, mixed, severe (H)     Anxiety     Borderline personality disorder (H)     Bipolar I disorder, most recent episode (or current) mixed, moderate     Relationship problems     History of anxiety disorder     Asthma     Morbid obesity with BMI of 50.0-59.9, adult (H)     Hx of eating disorder     Hyperlipidemia     Arthralgia of hip     Attention deficit disorder     Bipolar II disorder, most recent episode hypomanic (H)     Disorder of pelvis     Eating disorder     Hypertension     Morbid obesity (H)     Osteoarthritis of hip     Recurrent major depressive disorder (H)     Secondary physiologic amenorrhea     Soft tissue lesion of shoulder region     Trochanteric bursitis     Type 2 diabetes mellitus (H)     Diabetes: Yes  Testing blood sugars: Yes two weeks ago  DM Meds currently taking: trulicity, metformin    Nutrition History:   Food allergies/intolerances/restrictions: Yes pork and cow's milk  Biggest weight loss struggle per pt: lack of exercise- patient needs two hip replacements, sweet tooth  Vitamins/Mineral Supplementation: none    Dietary Recall: wake up at 7-8a (shops at [employer])  Breakfast: 9-10a- watermelon  Snack: none  Lunch: madelines (cookies)  Snack: none  Dinner: rick and cheese sandwich  Snacks: cookies, licorice, animal crackers  Overnight  eating: Yes- nightly, piece of fruit    Hydration (type/oz. per day):  Water: 6 bottles per day, ice brar  Caffeine/carbonation: 2 bruno lattes in the past two weeks, recently quit dr. martinez  Juice: none  Alcohol: none    Exercise:  Routine exercise established: No  Nutrition Diagnosis (PES statement):   (NI-5.7.1) Inadequate protein intake related to Food and nutrition related knowledge deficit concerning appropriate amount of protein or Not ready for diet/lifestyle change as evidenced by Estimated intake of protein insufficient to meet requirements    (NI-1.3)Excessive energy intake related to Food and nutrition related knowledge deficit concerning excessive energy/oral intake as evidenced by Intake of high caloric density foods/beverages (juice, soda, alcohol) at meals and/or snacks; large portion; frequent grazing; Estimated intake that exceeds estimated daily energy intake; Binge eating patterns; Frequent excessive fast food or restaurant intake; and BMI 57.9    Nutrition Intervention:  1. Discussed with patient how to build a meal: the importance of including a lean/low fat protein at each meal, include a source of vegetables at a minimum of lunch and dinner, and limiting carbohydrate intake to 1 serving (15 grams) per meal.  2. Placed emphasis on importance of developing a healthy eating routine, aiming for 3 meals a day and no snacks.   3. Educated on sources of lean protein, portion sizes, and the amount of grams found in each source. Recommend pt to aim for 20-30 grams of protein at each meal; 60-80 grams per day.  4. Educated on how to read a food label: keeping total fat < 10 grams and sugar < 10 grams per serving.   5. Discussed importance of adequate hydration, with emphasis on drinking 64 oz of water or zero calorie containing beverages per day.   Handouts provided:  Plate Method  List of Lean Protein Sources  Nutrition Monitoring/Evaluation:   Nutrition Goal Established by Patient:  1. Eat protein  at three meals per day  2. Eliminate snacking unless physically hungry   Follow up/Monitoring:  Will monitor weight change, protein intake, hydration, fruit and vegetable intake and exercise for next visit.  Follow up with RD in 1 month      Time Spent with patient 30 minutes  ABN: Yes

## 2021-05-31 NOTE — TELEPHONE ENCOUNTER
Prior Authorization Request  Who s requesting:  Pharmacy  Pharmacy Name and Location: [facility] [place]  Medication Name:  Trulicity 0.75mg/0.5ml pen                  Insurance Plan:  [employer] Private  Insurance Member ID Number:  [id number]  Informed patient that prior authorizations can take up to 10 business days for response:   No  Okay to leave a detailed message: Yes

## 2021-05-31 NOTE — PROGRESS NOTES
"[facility]:  Non-Surgical Weight Loss Intake Appointment   Date of visit: 7/31/2019  Physician: Kerwin Dunn MD  Primary Care is Rianna Arnett CNP.  Ronel Ryan   38 y.o.  female  Ronel is a 38 y.o. year old female who is here today for consultation regarding non-surgical weight loss.   she was referred to the [facility] by her orthopedist who requested she get her weight under 260 lbs prior to being considered for hip replacements.    I last saw her 2.5 years ago for consideration of our surgical program but her mental health wasn't suitable for the rigors of the program and she cannot be deemed a viable candidate for that tool at this point in time. Since that visit, she's gained about 40 lbs.     She was recently offered naltrexone by her outside clinic but didn't understand how to take it and so has not started it. I've instructed her to start half tab w/ supper to start and will follow tolerance/efficacy. She lost the other meds and needs refill today.    Weight History:   Wt Readings from Last 3 Encounters:   07/31/19 (!) 345 lb (156.5 kg)   07/25/19 (!) 338 lb (153.3 kg)   08/25/16 (!) 303 lb 8 oz (137.7 kg)     Body mass index is 59.22 kg/m .       Assessment and Plan   Assessment: Ronel Ryan is a 38 y.o.,female presenting for assistance with medical weight loss.  Identified issues contributing to her excess weight include:     With her history of significant mental illness/bipolar disease/severe depression, she's been on a multitude of medications over the years which can affect hunger/satiety and contribute to her weight gain.  In combination with a sedentary lifestyle centered on Netflix and Card games, she's struggled to find any meaningful weight reduction over the years and never was able to settle into a quality eating plan routine with past attempts of self guided weight loss, alternating between \"starving and binging\".     With her " bipolar disease and medication profile, her options for appetite suppressants are limited to Naltrexone and GLP1 agonists with the option of a trial of Plenity when it becomes available this fall/early winter.  We'll attempt a prior authorization for GLP1 agonist which given her A1c of 7% last year is a good combination option for both her diabetes, improving satiety and appetite suppression.      Plan:  1.  Welcome back to get started, we'll aim for 3 meals daily with 25-30 grams of protein per meal (see sheet) and follow up with dietician. Stop the soda and Jay Latte. Continue excellent hydration w/ 100 oz daily of water.    2. Start back on your Naltrexone, use half a tablet with supper for a week and then if tolerated increase to twice daily dosing.   3. Given your diabetes/metformin use and BMI of 59, we'll see about getting Trulicity approved and start once weekly injections to help both blood sugar and appetite and combat some of the obesogenic nature of your mental health medications.  We'll start a prior authorization today.  4. Start back up in the pool at [employer], 2-3 times weekly or more. Continue your gatherings.   5. Consider the use of Plenity when available later this year.  6. Check labs today on your way out, I'll message when I see the results.   7. We'll consider a recheck sleep study if struggling but given previous mild sx and not amenable to cpap will hold off for now.   8. Hip pain: needing weight reduction to qualify for replacement.   9. Bipolar history: good affect/manner and tracking today. No psychotic features. Current medications do impact satiety but given improvements in mood, would recommend any regimen that keeps her mentally stable and minimizing risks of weight gain with mindful eating and some of the naltrexone/GLP1 agonist/metformin benefits.   10. Hx of low vitamin D. Supplementation ongoing, will recheck levels (undetectable levels in 2015).   1. Type 2 diabetes mellitus  "without complication, without long-term current use of insulin (H)  Vitamin D, Total (25-Hydroxy)    Parathyroid Hormone Intact    Vitamin B12    Vitamin B1 (Thiamine), Whole Blood (VIT B1 WB)    Glycosylated Hemoglobin A1c    Amb Referral to Bariatric Dietician    dulaglutide (TRULICITY) 0.75 mg/0.5 mL PnIj    pen needle, diabetic (PEN NEEDLE) 32 gauge x 5/32\" Ndle    Thyroid Stimulating Hormone (TSH)   2. Obesity, morbid, BMI 50 or higher (H)  Vitamin D, Total (25-Hydroxy)    Parathyroid Hormone Intact    Vitamin B12    Vitamin B1 (Thiamine), Whole Blood (VIT B1 WB)    Glycosylated Hemoglobin A1c    Amb Referral to Bariatric Dietician    dulaglutide (TRULICITY) 0.75 mg/0.5 mL PnIj    pen needle, diabetic (PEN NEEDLE) 32 gauge x 5/32\" Ndle    naltrexone (DEPADE) 50 mg tablet   3. Hx of bipolar disorder     4. Chronic hip pain, bilateral     5. Low vitamin D level            Return in about 6 weeks (around 9/11/2019).     Weight and Lifestyle History    Sleep history:  Goes to sleep around 9:30pm takes meds and falls asleep around 10:30pm (TV watcher). Tosses/turns frequently. Wakes around 3am, may get back to bed around 4:30am listening to music. No snacks. Wakes up at 8am to an alarm may have been laying in bed around 6:30am. Once awake. Currently on disability and will work seasonal shifts at [employer] (July to December: 3-4 days weekly, 9 to 2:45).  On work days, feeds cat may eat occasional breakfast (yogurt/blueberries and granola). Starbucks occasionally Jay Latte. Heads to work, gets 15 min break 2-3 hours and has sweets/salty snack. May have Dr. Pepper. Off work around 3pm, gets home, feeds cat again and heads to bedroom and has some chips/sandwich and Netflix. No regular exercise. Will play magic the gathering/cribbage. Her apartment has a small fitness room and membership at [employer].  Feels too tired usually to work out.   Hours per night: see above  Snoring/apnea/insomnia? Previous TOMA but unsure now "   Restful/refreshed? no  Shift work? seasonal  CPAP/BiPAP? No previous mask needs.   Sleep study previously? Unsure.  TV in bedroom? Yes.  Sleep aids/pills? yes      STOP-BANG score for sleep apnea : n/a  For general population   TOMA - Low Risk : Yes to 0 - 2 questions  TOMA - Intermediate Risk : Yes to 3 - 4 questions  TOMA - High Risk : Yes to 5 - 8 questions  or Yes to 2 or more of 4 STOP questions + male gender  or Yes to 2 or more of 4 STOP questions + BMI > 35kg/m2  or Yes to 2 or more of 4 STOP questions + neck circumference 17 inches / 43cm in male or 16 inches / 41cm in female    Weight History:  In what way is your excess weight affecting your wellness/health? Hip pains/ needs replacements eventually but can't at her current weight  Heaviest weight: 386 lbs (2013).   Any Previous weight loss, what was the most lost and method: yes.  Birth weIight, High School graduation weight: 199 lbs.  Lowest adult weight: low 300s.   Maternal health/smoking/weight: nonsmoker  When did you start gaining weight and what were the circumstances? Last 2years gained another 30-40 lbs.   Is anyone else in your immediate family overweight? yes  Finally,  Is there a weight you desire to be, what is your goal weight that would define successful weight loss for you? Under 300 lbs.   I would like to lose weight so I can  :  Have better self esteem   .     Barriers to weight loss:  Is anyone else at home/work/family a barrier to your weight loss? no  Work schedule/location? See above.   Current stressors include: pain in hips.  Mobility problems: hip pain. Can walk 5 minutes before hurts.     Counseled on health benefits of weight loss, goals for metabolic/diabetic risk reduction, HTN reduction, improved sleep and mobility, cancer reduction, longevity.    Fitness History:  Were you ever an athlete?yes     Which sports? Tennis and football  When was the last time you walked/hiked a mile?years ago  Do you have any limitations for  activity?hip pain  Do you have access to a gym, pool, club, fitness center, or ?yes                Do you have any fitness goals/dreams that could motivate your weight loss?na    On a scale from 0-10,  how willing are you to start some sort of movement/exercise regimen? na    Counseled on physiologic benefits of exercise and for long term weight maintenance, goal working towards 200-300 minutes weekly.     Food History:  Who buys groceries?  She does  Do you eat at dinner table, is the TV on or off, family present? In her room, watching TV.   Any soda, how much? occ Dr. Cavanaugh/Jay Pollack: 3 weekly  Meals per day? 2, breakfast comes and goes  Snacks per day? Several day  Breakfast typically is: see above, often skipped  Lunch typically is: na  Dinner  is: na  Weekly Fast Food/dining out: 3  Snacks consist of: grazing junk food    Food sensitivities/allergies/intolerances/avoidances: avoids pork  Water per day? About 100 oz.    Any binging behavior?yes  Any induced vomiting/purging? no  Any history of anorexia/bulimia? In her teens and some adult. Last in 2014.   Any night eating issues? no  Stress induced eating? Always eating.     Goal Setting:  Short Term Goals: 10% reduction is 34 lbs, 311 lbs  Long Term Goals under 260 lbs is her orthopedists target.         Patient Profile   Social History     Social History Narrative     Not on file     Family History   Problem Relation Age of Onset     Breast cancer Mother      Diabetes Mother      Hypertension Mother      Hyperlipidemia Mother      Arthritis Mother      Obesity Mother      Stroke Mother      Diabetes Father      Mental illness Brother      Mental illness Brother      Diabetes Brother      Hypertension Brother      Arthritis Brother      Obesity Brother      Hyperlipidemia Brother      Obesity Sister      Cancer Maternal Grandmother      Obesity Maternal Grandmother      Hypertension Maternal Grandmother      Arthritis Maternal Grandmother      Stroke  Maternal Grandmother      Obesity Maternal Grandfather      Drug abuse Maternal Grandfather      Heart disease Maternal Grandfather      Hypertension Maternal Grandfather      Hyperlipidemia Maternal Grandfather      Stroke Maternal Grandfather      Pacemaker Maternal Grandfather      Seizures Maternal Grandfather           Past Medical History   Patient Active Problem List   Diagnosis     Bipolar 1 disorder, mixed, severe (H)     Anxiety     Borderline personality disorder (H)     Bipolar I disorder, most recent episode (or current) mixed, moderate     Relationship problems     History of anxiety disorder     Asthma     Morbid obesity with BMI of 50.0-59.9, adult (H)     Hx of eating disorder     Hyperlipidemia     Arthralgia of hip     Attention deficit disorder     Bipolar II disorder, most recent episode hypomanic (H)     Disorder of pelvis     Eating disorder     Hypertension     Morbid obesity (H)     Osteoarthritis of hip     Recurrent major depressive disorder (H)     Secondary physiologic amenorrhea     Soft tissue lesion of shoulder region     Trochanteric bursitis     Type 2 diabetes mellitus (H)     Keflex [cephalexin]; Erythromycin; and Penicillins  Current Outpatient Medications   Medication Sig Note     albuterol (PROVENTIL HFA;VENTOLIN HFA) 90 mcg/actuation inhaler Inhale 2 puffs as needed. 8/25/2016: Received from: [facility] Received Sig: Inhale 2 Puffs by mouth every 2 hours if needed.     beclomethasone (QVAR) 80 mcg/actuation HFAB inhaler Inhale 1 puff.      budesonide-formoterol (SYMBICORT) 160-4.5 mcg/actuation inhaler Inhale 2 puffs 2 (two) times a day.      EMBRACE TALK TEST STRIPS strips USE TO TEST BLOOD SUGARS 2 TIMES DAILY OR AS DIRECTED      EPINEPHrine (EPIPEN/ADRENACLICK/AUVI-Q) 0.3 mg/0.3 mL injection Inject 0.3 mg into the shoulder, thigh, or buttocks.      hydroCHLOROthiazide (HYDRODIURIL) 25 MG tablet Take 25 mg by mouth daily.      ibuprofen  "(ADVIL,MOTRIN) 800 MG tablet Take 800 mg by mouth as needed.      ketoconazole (NIZORAL) 2 % cream 1 APPLICATION TWICE A DAY AS NEEDED TOPICALLY APPLY TO FACE. MIX 1:1 WITH HYDROCORTISONE CREAM.      lidocaine 4 % patch Place 1 patch on the skin daily. Remove and discard patch with 12 hours or as directed by MD.      LORazepam (ATIVAN) 0.5 MG tablet Take 0.5 mg by mouth.      meclizine (ANTIVERT) 25 mg tablet Take 25 mg by mouth as needed.      metFORMIN (GLUCOPHAGE-XR) 500 MG 24 hr tablet Take 1,000 mg by mouth 2 (two) times a day.      metroNIDAZOLE (FLAGYL) 500 MG tablet Take 1 tablet (500 mg total) by mouth 2 (two) times a day for 7 days.      mometasone (ELOCON) 0.1 % cream 1 APPLICATION AT BEDTIME TOPICALLY MON, WED, FRI TO FACE      oxybutynin (DITROPAN XL) 10 MG ER tablet Take 10 mg by mouth daily.      risperiDONE (RISPERDAL) 2 MG tablet at bedtime.      selenium sulfide (SELSUN) 2.5 % lotion APPLY TO WET SCALP TWICE WEEKLY. LEAVE ON 2-3 MINUTES THEN RINSE THOROUGHLY      traZODone (DESYREL) 100 MG tablet Take 100 mg by mouth at bedtime.      dulaglutide (TRULICITY) 0.75 mg/0.5 mL PnIj Inject 0.75 mg under the skin every 7 days.      naltrexone (DEPADE) 50 mg tablet Start half tab daily for 10 days then increase to half tab twice daily (breakfast and supper).      pen needle, diabetic (PEN NEEDLE) 32 gauge x 5/32\" Ndle For use with pen injector.        Past Surgical History  She has a past surgical history that includes Tonsillectomy; Cholecystectomy; Cyst Removal; Knee arthroscopy w/ ACL reconstruction (Left); Ankle fracture surgery (Right); and Colonoscopy.     Examination   /84 (Patient Site: Right Arm, Patient Position: Sitting, Cuff Size: Adult Large)   Pulse 79   Ht 5' 4\" (1.626 m)   Wt (!) 345 lb (156.5 kg)   SpO2 99%   Breastfeeding? No   BMI 59.22 kg/m    Height: 5' 4\" (1.626 m) (7/31/2019  8:50 AM)  Initial Weight: 345 lbs (7/31/2019  8:50 AM)  Weight: (!) 345 lb (156.5 kg) (7/31/2019  " 8:50 AM)  Weight loss from initial: 0 (7/31/2019  8:50 AM)  % Weight loss: 0 % (7/31/2019  8:50 AM)  BMI (Calculated): 59.2 (7/31/2019  8:50 AM)  SpO2: 99 % (7/31/2019  8:50 AM)  Waist Circumference (In): 54.75 Inches (7/31/2019  8:50 AM)  Hip Circumference (In): 61 Inches (7/31/2019  8:50 AM)  Neck Circumference (In): 17.75 Inches (7/31/2019  8:50 AM)    General:  Alert and ambulatory, dyed hair  HEENT:  No conjunctival pallor, moist mucous Membranes, neck is thick. No nodules palpable.  Pulmonary:  Normal respiratory effort, no cough, no audible wheezes/crackles.  CV:  Regular rate and Rhythm, no murmurs, pulses 2 plus  Abdominal: obese, non tender. No guarding.  Extremities: no tremor or edema.   Skin:  No pallor or jaundice evident in this  female  Pscyh/Mood: pleasant/smiling. No psychotic features today.                                    LABS: ordered, distant labs in our system as below. A1c of 7% 11/21/18 on chart review. D levels undetectable in 2015.    Last recorded labs include:  Lab Results   Component Value Date    WBC 6.3 07/25/2019    HGB 12.6 07/25/2019    HCT 39.3 07/25/2019    MCV 85 07/25/2019     07/25/2019      No results found for: [id number] Lab Results   Component Value Date    HGBA1C 6.0 12/31/2010      Lab Results   Component Value Date    CHOL 149 12/31/2010    No results found for: PTH      No results found for: FERRITIN   Lab Results   Component Value Date    HDL 24 (L) 12/31/2010      No results found for: [id number] No results found for: 68579   Lab Results   Component Value Date    LDLCALC 102 12/31/2010    Lab Results   Component Value Date    TSH 3.10 05/09/2016    No results found for: FOLATE   Lab Results   Component Value Date    TRIG 119 12/31/2010    Lab Results   Component Value Date    ALT 22 07/25/2019    AST 22 07/25/2019    ALKPHOS 57 07/25/2019    BILITOT 0.4 07/25/2019    No results found for: TESTOSTERONE     No components found for: CHOLHDL  No results found for: 7597   @FAB BAG(vitamin a: 1)@       Other Notables/imaging:     Counselin minutes spent in direct consultation with the patient regarding conditions contributing to excess weight accumulation, with over 50% of the time spent in counseling, goal setting and initiating a plan to lose their excess weight.    Kerwin Dunn MD  [facility].  2019

## 2021-05-31 NOTE — TELEPHONE ENCOUNTER
[facility] PA team  [phone]  Pool: HE PA MED ([id number])          PA has been initiated.       PA form completed and faxed insurance via Cover My Meds     Key:  JAMES CHRISTOPHER (Key: [id number])      Medication:  dulaglutide (TRULICITY) 0.75 mg/0.5 mL PnIj    Insurance:  [employer] D        Response will be received via fax and may take up to 5-10 business days depending on plan

## 2021-05-31 NOTE — PATIENT INSTRUCTIONS - HE
"Plan:  1. Welcome back to get started, we'll aim for 3 meals daily with 25-30 grams of protein per meal (see sheet) and follow up with dietician. Stop the soda and Jay Latte. Continue excellent hydration w/ 100 oz daily of water.    2. Start back on your Naltrexone, use half a tablet with supper for a week and then if tolerated increase to twice daily dosing.   3. Given your diabetes/metformin use and BMI of 59, we'll see about getting Trulicity approved and start once weekly injections to help both blood sugar and appetite and combat some of the obesogenic nature of your mental health medications.  We'll start a prior authorization today.  4. Start back up in the pool at [employer], 2-3 times weekly or more. Continue your gatherings.   5. Consider the use of Plenity when available later this year.  6. Check labs today on your way out, I'll message when I see the results.   7. We'll consider a recheck sleep study if struggling but given previous mild sx and not amenable to cpap will hold off for now.         What makes a person succeed with dramatic and sustained weight loss?    It's being at the right point in your life where you feel the need to lose the weight, not because anyone told you so but because of a voice inside of you that says, \"I am ready for this\".  You're now at a point where you may be feeling anxious, irritable and when you look in the mirror you do not recognize the person looking back.  Your true self is buried somewhere in that reflexion and you want to free it again.  This is the sort of motivation that leads to success, and it comes from you.    Because the only person that can lose that extra weight is you, I like my patients to focus on the mindset of being in Weight Loss Season.  This gives you permission to make the changes necessary to be consistent with the diet/activity and behavior changes that lead to successful, healthy weight loss.  Nearly any diet plan can work for weight loss, but " "keeping it healthy and nutrient based to prevent deficiencies/hair loss/fatigue or irritability is vital.  If you have a plan you want to try, we'll work with you to make sure no adjustments are needed to keep you healthy through your weight loss season and working with our Bariatric Nutritionists you'll be given expert guidance to customize your diet plan to suit your particular needs. If you don't have your own ideas in mind, we are always happy to suggest well researched and validated plans that provide enough food to prevent hunger but still tap into your excess fat reserves and lose weight in a sustainable fashion.  There is great evidence that lean protein/healthy fat intake with good fiber intake while minimizing simple starches/carbs produces reliable/sustainable weight loss in most people. But some feel more connected to an intermittent fasting/fast mimicking or ketogenic diet.  These protocols can be hard for many to stick with and that's why we prefer the protein/healthy fat focused diets but if these alternative strategies appeal to you, we can work with you to optimize your knowledge and results with these tools.    Losing weight is a temporary commitment, but you need to be \"All In\" to have a good weight loss season.  To avoid frustration, you have to be willing to be nearly perfect 19 out of 20 days or even better than that. But, weight loss season is generally only 4-8 months in length. After that length of time, it can be hard to maintain a negative calorie balance and our brain, motivations and metabolism will usually bring you to a plateau that cannot be broken in this modern world where other commitments start to take priority. That's when we look to stabilize the weight loss you've achieved.  If you've reached your goal by that time, fantastic, and job well done.  If there is more to go, then after a few months of stabilization, we can usually attack that previous plateau and break into new " "territory.    Because of this time limitation, we want to really get to work right away and get into a sustainable routine ASAP.  One of the best predictors of how much weight you're going to lose throughout the season is how much you lose in the first 6 weeks, so prepare well and jump in with both feet.      Occasionally, people may feel like they cannot commit fully to the changes necessary and may want to change one thing at a time and \"get used to\" the idea of losing weight.  That is OK because that is where they are in their life, and they cannot fully commit for any number of reasons.  It's part of that internal motivation and they just haven't reached PRIORTY NUMBER ONE status yet. It's possible that what they need is more time to reach that point and I am always willing to work with people that want to \"dabble\", but understand, the amount of success obtained with half measures, is much less than half results. But Behavior Change cannot occur until a person goes through the contemplation and preparation phases necessary to have successful action and we are more than willing to give you targets to move along the spectrum and get to that point where you feel ready to  commit fully to the weight loss season.      As you go through your plan, look for things to keep your motivation rolling.  The most successful people have a goal or target/reward that they are working towards.  Having a reward that celebrates your new fitness, mobility and energy is the best sort because it will encourage you to do well with the weight maintenance phase and long term lifestyle changes that promotes keeping the weight off for the long term.  Usually, \"getting healthier\" or improving blood tests or losing weight so your clothes fit better is not as internally motivating as having a tangible reward.  A more motivating reward is one that isn't food based, is affordable, but something special:  Something you won't be getting unless " you achieve your goal.   It s important to keep to the rule of success:  in order to get the reward, your goal MUST be achieved. Write this reward down, where you can look at it daily and keep it in the front of your mind as you go through your weight loss season and it will help keep you on track.    Tools that help change behavior are vital for success. The most studied and most supported tool for weight loss is nothing more than writing down your food and weight every day.  Every Day.  Accurately and completely.  When you commit to weight loss season, this information tells you whether you're getting ENOUGH food to fuel your weight loss properly as well as teaches you the interaction between different foods you eat and how your body responds with weight loss.  You'll see that sometimes after a heavy workout you don't see the scale move until 2-3 days later.  How saltier meals (chili for example) may make you retain water for 4-5 days before you see the weight come off, you'll get used to the mini-plateaus that develop after a good 3-8 lb drop in weight as well as how you break through if you keep working the diet as you should.    Weight loss is not a linear process, there are mini ups and downs.  Learning how your body loses weight and getting comfortable with that is very rewarding. The act of writing words on paper also solidifies your will power and commitment to the season of weight loss and that by itself changes your brain chemistry/appetite, motivation and prepares you for maximal success.      Behavior change is all about getting into a new routine.  The old habits and routine have to change because without changing the circumstances of how you gained your weight, it's unlikely you'll enjoy satisfying results. If you have snacking habits, like every time you walk through the kitchen you grab a little something, well, that habit has to change and be replaced by a new habit.  It can be something as simple  "as keeping a doodle pad on the counter that you make a few scribbles and then walk through the kitchen having not opened the cupboard, or starting with a glass of water and leaving the kitchen without anything else, or checking your food journal to see how many calories you have left for the day.  Boredom is the enemy as are the old habits. Break new ground and try to push those old habits into a deep hole.      Finally, exercise always helps.  While not mandatory to lose weight, every little bit helps and exercise has so many other benefits that to not work it into your plan is to miss out on all the mood, sleep, stress and general health benefits that come from making yourself a little short of breath and sweaty at least 3-4 days out of the week.  The metabolism and calorie burn benefits aside, almost every chronic ailment in medicine gets better with proper, aerobic exercise.  Allow yourself to start slow and let your body prepare itself to accept harder training 4-6 weeks down the road, but start now and commit to a plan.  Whether you have the means to hire a , join a gym or just walk out your front door or go down to your basement for a video workout, get into a exercise routine and  after 3 weeks of at least 3 times a week exercise you should be at a point where you can slowly start ramping up 10% each week to our goal of at least 150-300minutes weekly of aerobic exercise and at least twice weekly resistance training/strenghtening with weights/bands or body weight exercises.     I am a big fan of modifying the free training plan, \"Couch to 5k\" for almost all of my patients. Just type it into [employer] or look it up on your smart phone nia store.  To modify the plan,  you can use the training plan for whatever aerobic activity you do (bike/treadmill/elliptical/rower/pool/etc). During the \"jog\" intervals you just move a little faster or harder, or increase the tension or incline.  You use those little " "intervals to switch up the workout and recruit more muscles and pump the blood a little more and then recover again in the \"walk\" intervals by slowing down, decreasing the incline or turning down the tension.  3-4 days a week is not that much to ask and the benefits are enormous.  Start slow and develop the base from which you can then build on and reduce the risk of injury.  It's much more important 2-4 months from now to be enjoying your exercise then it is to over exert yourself at the start and hurting yourself.  Starting slowly allows your body to accept the training better down the road when the exercise becomes crucial for weight maintenance.  Without exercise down the road during your maintenance phase, all this hard work you are about to put in can be undone. It usually takes about 100-300 calories a day of exercise to maintain a weight loss and our focus during weight loss season is to generate the routine/activities and hobbies that make that enjoyable/sustainable.    Thanks for taking this first and most important step in your weight loss season.  Commit to it and we will cheerlead you all the way to success.  When things get tough or off track we'll offer guidance and analysis and when you reach your goal we'll celebrate your success.  In the end, it is all about your success and what you do with it.      Kerwin Dunn MD  [facility]  [phone]      Weight Loss Shopping list:    Having the proper food on hand and ready to go is very important in losing weight.  Everybody has their own preferences/tastes so modify this list as you need but the following are foods that have been found to be helpful in following a calorie restricted diet.   If you are a person that doesn't \"like to eat my vegetables\", I recommend making smoothies and throwing the veggies into the  with some fruit and protein powder.      Fruits:  Generally limit to 2-3 whole fruits a day.  Do not " "buy Juice.  We depend on the fiber and chewing to slow us down and get phytonutrients/antioxidants available in the skins of fruits for health benefits.  Chewing also signals our stomach to send less hunger signals to our brains:    Apples (1-2 daily), Bananas (no more than one full banana a day), Grapes (2 handfuls a day), Clementines/oranges (one daily), berries (blue/rasp/straw:  2 handfuls a day). Watermelon (cubed for easy access, small bowl).    Vegetables:  Eating raw gets most nutrient and fiber benefits but cooked veggies are fine as well, using frozen for cooking or in smoothies is fine as well.  The more chewing/crunchiness the better the hunger control.  No limit to how many a day, but you should at least get 4 handfuls a day or more minimum.  Wash and cut up your vegetables into easy to carry, on the go sizes that are easy to put in plastic bags/pyrex and carry to work/school/etc.  Frozen veggies are just fine as well.     Broccoli, Carrots (no more than 3 a day large carrots or 2 handfuls of baby carrots, as they are very sweet), celery, cucumbers, green peppers, green beans (canned or fresh/frozen), Lettuce(all types), Beets(for roasting, will likely turn urine and stool a bit purple), asparagus.  Go crazy with the veggies, just wash well prior to eating.    Protein:  Women need at least  grams of protein a day and men at least  grams a day, more is fine.  Protein is our \"brain food\" and hunger suppressor and getting enough ensures maintenance of muscle mass during weight loss.  Vegetarians should look to balance their protein intake to get all essential amino acids and discuss with dietician if help is needed (mix of beans/soy/etc).  Protein powders or drinks count and can be a great way to control appetite during the day and easy to grab and go/carry to work/etc.  Premier Protein, BiPro, TarasWhey are all brands with high quality whey protein. For whey sensitive people, rice protein is " an option as well.    Canned tuna/sardines or anchovies.  Fresh fish/salmon the day you plan to make it.  Chicken breasts/thighs (skinless while losing weight), filet mignon steak (leaner but ) or marinade sirloin (wipe off before cooking). Eggs cooked in olive oil for breakfast is a good way to get protein and good fat in the a.m. (cook on low heat to prevent transformation of olive oil into less healthy fat).  Greek Yogurt with at least 20 grams of protein per serving and less than 11 grams of carbs/sugars.  String Cheese  Cottage Cheese: 2% or fat free per your preference.            Bupropion/Naltrexone Patient Information (trade name, CONTRAVE)    This medication is used for weight loss.  In studies people lost an averaged 5-8% of their starting weight compared to placebo (0-1%).    Often, this medication will be written as 2 separate prescriptions to improve cost to the patient. The trade name drug CONTRAVE comes as a combination of 8mg naltrexone/90mg bupropion and a 3 week escalating dose regimen going from one tablet daily up to a max of 2 tabs twice daily:  Week 1: one tablet in the daytime.  Week 2: one tablet A.M.  And One tablet in the PM.  Week 3: 2 tabs AM  And One tablet in the PM.  Week 4: 2 tabs AM and 2 tabs PM.     The generic Rx I write for is as follows:  I recommend starting One 75 mg bupropion and 1/2 a tablet of naltrexone (25mg) daily for 10 days and then moving up to One 75 mg bupropion tablet twice daily and half a naltrexone tablet twice daily.  Depending on your results/tolerance, we may increase the Bupropion up to a maximum of 150 mg twice daily of the immediate release medication or one Bupropion  mg-300 mg daily. Sometimes we'll have to go slower with the dose escalation.    Like any weight loss medication, showing results and having tolerable or no side effects is vital to continuing therapy and if either no significant weight loss after 8-12 weeks or too many  side effects then we would discontinue therapy and look for alternative options/assistance.    AVOID taking with high fat meals as this increases bupropion levels, but some food in the stomach can help decrease nausea side effects from the Naltrexone.    People who take Metoprolol may need to decrease their dose of metoprolol as the bupropion increases the effect of metoprolol 2-4 fold in some cases and low blood pressure/low heart rate could occur.    Patients should STOP CONTRAVE if they have a severe allergic reaction to CONTRAVE.  Patients should be told that CONTRAVE should be discontinued and not restarted if they experience a seizure while on treatment.    Patients should be advised that the excessive use or abrupt discontinuation of alcohol,benzodiazepines, antiepileptic drugs, or sedatives/hypnotics can increase the risk of seizure.    Patients should be advised to minimize or avoid use of alcohol.    Patients should be advised that if they previously used opioids, they may be more sensitive to lower doses of opioids and at risk of accidental overdose should they use opioids after CONTRAVE treatment is discontinued or temporarily interrupted.  Patients should be advised that because naltrexone, a component of CONTRAVE, can block the effects of opioids, they will not perceive any effect if they attempt to self-administer anyopioid drug in small doses while on CONTRAVE. Further advise patients that the attempt to administer large doses of any opioid or to bypass the blockade while on CONTRAVE may lead to serious injury, coma, or death.    Patients should be off all opioids for a minimum of 7 to 10 days before starting CONTRAVE in order to avoid precipitation of withdrawal. Advise patients they should not take CONTRAVE if they have any symptoms of opioid withdrawal.   Patients should be advised to call their healthcare provider if they experience increased blood pressure or heart rate.  Patients should be  advised to notify their healthcare provider if they are taking, or plan to take, any prescription or over-the-counter drugs. Concern is warranted because CONTRAVE and other drugs may affect each other s metabolism.  Patients should be advised to notify their healthcare provider if they become pregnant, intend to become pregnant, or are breastfeeding during therapy.  CONTRAVE should NOT be taken if pregnant or nursing.    Patients with type 2 diabetes mellitus on antidiabetic therapy should be advised to monitor their blood glucose levels and report symptoms of hypoglycemia to their healthcare provider(s).    Patients should be advised to swallow CONTRAVE tablets whole so that the release rate is not altered. Do not chew, divide, or crush tablets if using the Trade drug Contrave, dividing the generic naltrexone is fine.    As always, if any questions/concerns, don't hesitate to call us at the [facility] clinic.    Kerwin Dunn MD  [phone].  7/31/2019          Example Meal Plan for a 9164-6058 Calorie Diet:    In order to fuel your weight loss properly and avoid hunger-induced overeating later in the day, you will enjoy the most success by following the diet below or similar with adjustments based on your particular tastes and preferences.      I recommend getting into a meal routine and keeping it similar day to day in the beginning so you don t have to think too hard about what you re going to make/eat.  Keep snacks healthy, ideally containing protein.  Non-processed food is preferable to packaged items.  Eat at least a few crunchy green vegetables at snack times, which should be 2-3 hours after your mealtimes(prepare these ahead of time) once or twice daily if very active.  Drink 64 oz -90 oz of water daily.  During a diet we often mistake thirst for hunger or just have some grazing habits we have to break ('bored/mindless eating') and a glass of water and reconsideration of our  hunger is often all that is needed.      If you re having hunger problems, add a protein drink to your morning hours or afternoon snack with at least 20grams of protein and not too much sugar.  A carton of higher protein/low sugar yogurt can work as well.  If the urge to snack is overwhelming and not satiated, try going for a 10 minute walk/exercise, come home and drink a glass of water and if still hungry, have a  calorie snack (handful of raw/sprouted nuts, veggies and string cheese, protein bar, etc).      It is better to have a large breakfast, a moderate lunch and a smaller dinner to fuel your day.  People lose 10-15% more weight during their weight loss season with this strategy.    To make sure you re getting adequate vitamins and minerals during weight loss, I recommend one complete multivitamin a day of your choice.  Consider a probiotic and taking some vitamin D 2000 IU daily.    Let supper be your last meal of the day and ideally try to have at least 12 hours between supper and breakfast the next day to tap into some beneficial overnight fasting dynamics.  Water in the evening is fine, unsweetened herbal teas as well.  Consolidating your meals within a 8-12 hour period of your day will help tap into these additional metabolic benefits and tends to keep your appetite up for breakfast, further helping to stay on track.  For most of my patients I don't recommend an intermittent fasting style diet (many find it hard to fit in their lifestyle) but an overnight fast is very doable for most patients and helps regulate our hunger drives a little better.  This makes it very important to nail good intake at all three meals to feel satisfied/energized and still lose weight.      Example Meal Plan:  Breakfast: 450-475 Calories  1 egg cooked on low in olive oil:   calories.  5oz Greek Yogurt (Fage plain classic: ~150 randi)  Handful of Berries of your choice (about a calorie per berry or 20-40cal per  handful)    cup(cooked) of  old fashioned oatmeal or 1/2 cup(cooked) steel cut oats. (150 randi)  Sprinkle amount of brown sugar and a pat of butter. (40 randi)  Glass of  Water  Black coffee or unsweetened Tea (0calories).      2-3 hours Later Snack: (195 calories).  Glass of water  One string Cheese (80 calories) or 4 oz creamed cottage cheese (115 calories) with  Crunchy Celery sticks (less than 10 calories per large stalk) 2 stalks. (20 calories)    of a  Large Banana or   of a Large Apple (60 calories):  eat second half at lunch or afternoon snack.     Lunch:300 -350 calories   Chicken Breast  (baked/broiled/roasted/grilled)  4-6 oz.  (125-180 randi), BBQ sauce/hot sauce/mustard/seasoning is free. Just use a reasonable amount. Or a can of tuna with 1 tablespoon mayonnaise.  Salad: lettuce, any other veggies (cucumbers, green peppers/celery you like and a small drizzle of dressing to just flavor.  Go as big on the veggies as you like,  as they are practically calorie free.   A whole, 8 inch cucumber is 45 calories, a whole green pepper is 23 calories, a stalk of celery is 9 calories.  [place] Dressing is 60 calories per tablespoon..so moderate your desired dressing or do a drizzle of olive oil and splash of balsamic vinegar on top,  Total calories unlikely to be over 150 even with dressing.  Glass of Water.    Option for lunch is meal replacement protein drink/smoothie.  Need at least 20 grams of protein and eat the rest of your apple/banana from the morning snack.      Afternoon Snack: 150-200 calories   Cheese Stick or cottage cheese again  and a fresh fruit OR  Granola Bar (protein Bar acceptable if under 200 calories OR  Homemade smoothies:  8oz skim milk,  a handful of berries (fresh or frozen and a serving of protein powder such as BiPro or Taniya sWhey for example.  If you don't like dairy, make with 8oz water, one small banana, handful of berries and the protein powder, add any veggies you want as well:   roughly 200 calories.   Glass of Water    Dinner: 325 calories  4oz of fresh, Atlantic salmon.  Broiled (salt/pepper/dill) for about 8-8.5 minutes (200calories) or  4oz filet mignon steak or sirloin steak  Salad or vegetable sautéed lightly in olive oil or   Broccoli 1.5  cups chopped and steamed  or micro-waved in a little water (75 calories)  Glass of Water,    Cup of herbal tea (unsweetened, caffeine free)      Herbs and seasonings are free and encouraged to flavor your foods/vegetables.  Make your food delicious.    Tips for Success:  1.  Prepare proteins ahead of time (broil chicken breasts in bulk so you can grab and go), steel cut oats can be stored in casserole dish/bowl in the fridge for quick scoop in the morning and rewarm in microwave, make use of crock pot recipes (watch salt content).  Making meals that cover 3-4 future meals is an easy way to stay on track.  2.  Drink a 8-12 oz glass of water every 2-3 hours when awake.  We often mistake hunger for thirst, especially when losing weight.  3. Remember your Reward and Motivation when things get hard.  4.  Weigh yourself every morning and record, you'll stay on track better and learn how our biorhythms, diet and elimination patterns show up on the scale. Don't worry about 1 or 2 day patterns, but when on track you'll notice good trend downward of weight over 3-4 day segments.  Plateaus tend to resolve after 4-8 days in most cases if you stay consistent with your plan.  These are natural and part of weight loss, even if you're perfect with your plan execution.  5. Call if problems/concerns.  Spacious is a great tool to stay in touch and provide weekly outside accountability.   6.  Find a handful of meals/foods that keep you on track and feeling good and get into a routine that is sustainable for you.  7.  Take a complete multivitamin just to make sure all micronutrients are adequate during weight loss.  8. If losing hair/brittle nails it usually means you  "are not taking enough protein.  Minimum goal is 60 grams daily of protein for smaller women, 80 grams a day for men. Consider taking Biotin as supplement or a \"Hair and Nail\" multivitamin.        MEDICATIONS FOR WEIGHT LOSS      Liraglutide (Victoza/Saxenda):  Similar to Trulicity.   Part of the family of Glucagon Like Peptide Agonists, liraglutide directly suppresses appetite and is often used by diabetic patients due to decrease liver production of glucose, thereby improving glucose levels.  It also slows how quickly the stomach empties. May be hard to get covered for non diabetics and is an injectable medication delivered via a injector pen. It can be very costly without insurance coverage (over $500/month).  Small risk for pancreatitis and dose should be held if increased mid abdominal pain/burning. It is not to be used if previous Multiple Endocrine Neoplasia. In rodents, may increase risk of thyroid tumors and not indicated for anyone with hx of medullary thyroid cancer as a result.  If changes in voice/swallowing should be discontinued. Reliable birth control required in women.        "

## 2021-06-02 ENCOUNTER — RECORDS - HEALTHEAST (OUTPATIENT)
Dept: ADMINISTRATIVE | Facility: CLINIC | Age: 41
End: 2021-06-02

## 2021-06-02 NOTE — PROGRESS NOTES
Medical  Weight Loss Follow-Up Diet Evaluation  Assessment:  Ronel is presenting today for a follow up weight management nutrition consultation. Pt has had an initial appointment with Dr. Dunn  Weight loss medication: Trulicity and metformin.   +In the last 5 minutes of our visit together, patient admitted that her history of eating disorder has returned in the form of bulemia and taking laxatives. She reports that she has an intake evaluation at [facility] on the 11th of this month.   Pt's Initial Weight: 345 lbs  Weight: (!) 341 lb 14.4 oz (155.1 kg)  Weight loss from initial: 3.1  % Weight loss: 0.9 %    BMI: Body mass index is 58.69 kg/m .  IBW: 120-130 lbs    Estimated RMR (Brock-St Jeor equation): 2208kcal   Recommended Protein Intake:  grams of protein/day  Patient Active Problem List:  Patient Active Problem List   Diagnosis     Bipolar 1 disorder, mixed, severe (H)     Anxiety     Borderline personality disorder (H)     Bipolar I disorder, most recent episode (or current) mixed, moderate     Relationship problems     History of anxiety disorder     Asthma     Morbid obesity with BMI of 50.0-59.9, adult (H)     Hx of eating disorder     Hyperlipidemia     Arthralgia of hip     Attention deficit disorder     Bipolar II disorder, most recent episode hypomanic (H)     Disorder of pelvis     Eating disorder     Hypertension     Morbid obesity (H)     Osteoarthritis of hip     Recurrent major depressive disorder (H)     Secondary physiologic amenorrhea     Soft tissue lesion of shoulder region     Trochanteric bursitis     Type 2 diabetes mellitus (H)     Diabetes: Yes     Progress on goals from last visit: patient continues to eat large portions of food with frequent snacking and night time eating.     Dietary Recall:  Breakfast: yogurt, apple  Snack: rice and chili  Lunch: chinese- sesame chicken and fried rice  Snack: flaming hot popcorn  Dinner: left over sesame chicken and fried rice   Snack:  none  Overnight eating: Yes- fruit, ice cream  Eating out (frequency/week): twice every two weeks  Hydration (type/oz. per day):  Water: 64+  Caffeine: sprite rarely  Carbonation: none  Juice: apple cider  Alcohol : none  Exercise:  Routine exercise established: No   Going to start going back to physical therapy    Nutrition Diagnosis:    Overweight/Obesity (NC 3.3) related to overeating and poor lifestyle habits as evidenced by large portions, frequent snacking, high carbohydrate and fat foods and BMI 58.69  Not ready for diet/lifestyle change (NB 1.3) related to unsupported beliefs/attitudes about food, nutrition and nutrition-related topics as evidenced by patient's subjective statements, denial of need to change, inability to meet goals previously set  Disordered Eating Pattern (NB 1.5) related to obsessive desire, intake of food exceeding RMR as evidenced by binge eating habits, frequent grazing, report of vomiting after meals and taking laxatives    Intervention:  1. Nutrition counseling- encouraged the patient to get back on track by measuring portion sizes, focusing on balance and having three meals per day     Monitoring/Evaluation:    Goals:  1. Measure portions for 2 weeks  2. Limit snacking to one per day    Follow up:  Pt will follow up in 1 month(s) with bariatrician and 2 month(s) with dietitian.     Time spent with patient: 30 minutes  Agueda Johns RD     ABN signed: Yes

## 2021-06-03 ENCOUNTER — RECORDS - HEALTHEAST (OUTPATIENT)
Dept: ADMINISTRATIVE | Facility: OTHER | Age: 41
End: 2021-06-03

## 2021-06-03 VITALS — BODY MASS INDEX: 50.02 KG/M2 | HEIGHT: 64 IN | WEIGHT: 293 LBS

## 2021-06-03 VITALS — WEIGHT: 293 LBS | HEIGHT: 64 IN | BODY MASS INDEX: 50.02 KG/M2

## 2021-06-03 VITALS — WEIGHT: 293 LBS | BODY MASS INDEX: 50.02 KG/M2 | HEIGHT: 64 IN

## 2021-06-03 NOTE — PROGRESS NOTES
Medical  Weight Loss Follow-Up Diet Evaluation  Assessment:  Ronel is presenting today for a follow up weight management nutrition consultation. Pt has had an initial appointment with Dr. Dunn.  Weight loss medication: Trulicity and metformin.  +Patient had an intake at [facility], but has not had a follow up since. Inpatient program was recommended.    No incidences of bulimia per patient in the past month. Patient states she has been struggling with her mental health the past month- depression and anxiety. She is managed with medication and therapy.   Pt's Initial Weight: 345 lbs  Weight: (!) 335 lb 9.6 oz (152.2 kg)  Weight loss from initial: 9.4  % Weight loss: 2.72 %    BMI: Body mass index is 57.61 kg/m .  IBW: 120-130 lbs    Estimated RMR (Orange-St Jeor equation): 2180kcal   Recommended Protein Intake: 60-80 grams of protein/day  Patient Active Problem List:  Patient Active Problem List   Diagnosis     Bipolar 1 disorder, mixed, severe (H)     Anxiety     Borderline personality disorder (H)     Bipolar I disorder, most recent episode (or current) mixed, moderate     Relationship problems     History of anxiety disorder     Asthma     Morbid obesity with BMI of 50.0-59.9, adult (H)     Hx of eating disorder     Hyperlipidemia     Arthralgia of hip     Attention deficit disorder     Bipolar II disorder, most recent episode hypomanic (H)     Disorder of pelvis     Eating disorder     Hypertension     Morbid obesity (H)     Osteoarthritis of hip     Recurrent major depressive disorder (H)     Secondary physiologic amenorrhea     Soft tissue lesion of shoulder region     Trochanteric bursitis     Type 2 diabetes mellitus (H)     Diabetes: Yes hasn't really been checking blood glucose levels- fasting a couple days ago was 205    Progress on goals from last visit:   1. Limit snacking to one per day- goal met  2. Measure portions for 2 weeks- goal not met    Dietary Recall:  Wake up time: 8a- not sleeping well  per patient  Breakfast: 9-10a- piece of fried chicken  Snack: none  Lunch: skip  Snack: none  Dinner: fried chicken- 1 piece, bread  Snack: oreo cookies  Overnight eating: No  Hydration (type/oz. per day):  Water: 17oz  Carbonation: one soda per week  Juice: apple juice daily  Exercise:  Routine exercise established: No  Walking up and down the stairs when able     Nutrition Diagnosis:    Overweight/Obesity (NC 3.3) related to overeating and poor lifestyle habits as evidenced by large portions, diet high in carbohydrates and fat and BMI 57.61  Not ready for diet/lifestyle change (NB 1.3) related to unsupported beliefs/attitudes about food, nutrition and nutrition-related topics as evidenced by patient's inability to meet goals previously set  Disordered Eating Pattern (NB 1.5) related to obsessive desire, intake of food exceeding RMR as evidenced by binge eating habits, frequent grazing, skipping meals     Intervention:  1. Nutrition education: educated patient on high protein diet as well as increasing nutrition for mental healthy  2. Nutrition counseling: goal setting    Monitoring/Evaluation:    Goals:  1. Eat protein for a bed time snack with meds  2. Drink 2 bottles of water per day    Follow up:  Pt will follow up in 1 month(s) with bariatrician and 2 month(s) with dietitian.     Time spent with patient: 30 minutes  Agueda Johns RD     ABN signed: Yes

## 2021-06-04 ENCOUNTER — RECORDS - HEALTHEAST (OUTPATIENT)
Dept: ADMINISTRATIVE | Facility: OTHER | Age: 41
End: 2021-06-04

## 2021-06-04 VITALS — WEIGHT: 293 LBS | HEART RATE: 96 BPM | BODY MASS INDEX: 50.02 KG/M2 | HEIGHT: 64 IN | OXYGEN SATURATION: 98 %

## 2021-06-04 VITALS — WEIGHT: 293 LBS | BODY MASS INDEX: 50.02 KG/M2 | HEIGHT: 64 IN

## 2021-06-04 ASSESSMENT — MIFFLIN-ST. JEOR: SCORE: 1833.42

## 2021-06-04 NOTE — PROGRESS NOTES
"Bariatric Clinic Follow-Up Visit:    Ronel Ryan is a 39 y.o.  female with Body mass index is 55.61 kg/m .  presenting here today for follow-up on non-surgical efforts for weight loss. Original Intake visit occurred on 7/31/19 with a weight of 345 lbs and BMI of 59.2.  Along with diet and behavior changes, she has been using no meds at this time to assist her weight loss goals as she quickly developed some re-emergence of disordered eating for which she was referred to [facility].  She did not follow through with the inpatient treatment recommendations and is here today after several visits w/ our dietician since our original intake visit after having several no shows/cancellations.  See her intake visit notes for details on identified contributors to weight gain in the past.    Weight:   Wt Readings from Last 2 Encounters:   12/13/19 (!) 324 lb (147 kg)   11/26/19 (!) 335 lb 9.6 oz (152.2 kg)    pounds  Height: 5' 4\" (1.626 m) (12/13/2019 12:46 PM)  Initial Weight: 345 lbs (11/26/2019  1:00 PM)  Weight: (!) 324 lb (147 kg) (12/13/2019 12:46 PM)  Weight loss from initial: 9.4 (11/26/2019  1:00 PM)  % Weight loss: 2.72 % (11/26/2019  1:00 PM)  BMI (Calculated): 55.6 (12/13/2019 12:46 PM)  SpO2: 98 % (12/13/2019 12:46 PM)  Waist Circumference (In): 54.75 Inches (7/31/2019  8:50 AM)  Hip Circumference (In): 61 Inches (7/31/2019  8:50 AM)  Neck Circumference (In): 17.75 Inches (7/31/2019  8:50 AM)      Comorbidities:  Patient Active Problem List   Diagnosis     Bipolar 1 disorder, mixed, severe (H)     Anxiety     Borderline personality disorder (H)     Bipolar I disorder, most recent episode (or current) mixed, moderate     Relationship problems     History of anxiety disorder     Asthma     Morbid obesity with BMI of 50.0-59.9, adult (H)     Hx of eating disorder     Hyperlipidemia     Arthralgia of hip     Attention deficit disorder     Bipolar II disorder, most recent episode hypomanic (H)     Disorder of pelvis "     Eating disorder     Hypertension     Morbid obesity (H)     Osteoarthritis of hip     Recurrent major depressive disorder (H)     Secondary physiologic amenorrhea     Soft tissue lesion of shoulder region     Trochanteric bursitis     Type 2 diabetes mellitus (H)       Current Outpatient Medications:      albuterol (PROVENTIL HFA;VENTOLIN HFA) 90 mcg/actuation inhaler, Inhale 2 puffs as needed., Disp: , Rfl:      beclomethasone (QVAR) 80 mcg/actuation HFAB inhaler, Inhale 1 puff., Disp: , Rfl:      blood glucose meter (GLUCOMETER), Use to test blood sugar 2 times daily or as directed., Disp: , Rfl:      blood glucose test (GLUCOSE BLOOD) strips, Use to test blood sugars 2 times daily or as directed, Disp: , Rfl:      budesonide-formoterol (SYMBICORT) 160-4.5 mcg/actuation inhaler, Inhale 2 puffs 2 (two) times a day., Disp: , Rfl:      buPROPion (WELLBUTRIN XL) 150 MG 24 hr tablet, TAKE 1 TABLET BY MOUTH EVERY DAY IN THE MORNING, Disp: , Rfl: 1     dulaglutide (TRULICITY) 0.75 mg/0.5 mL PnIj, Inject 0.75 mg under the skin every 7 days., Disp: 4 Syringe, Rfl: 2     EMBRACE TALK TEST STRIPS strips, USE TO TEST BLOOD SUGARS 2 TIMES DAILY OR AS DIRECTED, Disp: , Rfl: 3     EPINEPHrine (EPIPEN/ADRENACLICK/AUVI-Q) 0.3 mg/0.3 mL injection, Inject 0.3 mg into the shoulder, thigh, or buttocks., Disp: , Rfl:      ergocalciferol (VITAMIN D2) 50,000 unit capsule, Take 1 capsule (50,000 Units total) by mouth once a week., Disp: 22 capsule, Rfl: 0     hydroCHLOROthiazide (HYDRODIURIL) 25 MG tablet, Take 25 mg by mouth daily., Disp: , Rfl: 1     hydrOXYzine HCl (ATARAX) 25 MG tablet, Take 25 mg by mouth., Disp: , Rfl:      ibuprofen (ADVIL,MOTRIN) 800 MG tablet, Take 800 mg by mouth as needed., Disp: , Rfl:      ketoconazole (NIZORAL) 2 % cream, 1 APPLICATION TWICE A DAY AS NEEDED TOPICALLY APPLY TO FACE. MIX 1:1 WITH HYDROCORTISONE CREAM., Disp: , Rfl: 1     LANCETS MISC, Use to test blood sugar 2 times daily or as directed.,  "Disp: , Rfl:      lidocaine 4 % patch, Place 1 patch on the skin daily. Remove and discard patch with 12 hours or as directed by MD., Disp: 15 patch, Rfl: 0     LORazepam (ATIVAN) 0.5 MG tablet, Take 0.5 mg by mouth., Disp: , Rfl:      meclizine (ANTIVERT) 25 mg tablet, Take 25 mg by mouth as needed., Disp: , Rfl:      metFORMIN (GLUCOPHAGE-XR) 500 MG 24 hr tablet, Take 1,000 mg by mouth 2 (two) times a day., Disp: , Rfl:      mometasone (ELOCON) 0.1 % cream, 1 APPLICATION AT BEDTIME TOPICALLY MON, WED, FRI TO FACE, Disp: , Rfl: 0     naltrexone (DEPADE) 50 mg tablet, Start half tab daily for 10 days then increase to half tab twice daily (breakfast and supper)., Disp: 60 tablet, Rfl: 0     oxybutynin (DITROPAN XL) 10 MG ER tablet, Take 10 mg by mouth daily., Disp: , Rfl:      pen needle, diabetic (PEN NEEDLE) 32 gauge x 5/32\" Ndle, For use with pen injector., Disp: 30 each, Rfl: 0     risperiDONE (RISPERDAL) 2 MG tablet, at bedtime., Disp: , Rfl: 0     selenium sulfide (SELSUN) 2.5 % lotion, APPLY TO WET SCALP TWICE WEEKLY. LEAVE ON 2-3 MINUTES THEN RINSE THOROUGHLY, Disp: , Rfl: 4     traZODone (DESYREL) 100 MG tablet, Take 100 mg by mouth at bedtime., Disp: , Rfl:       Interim: Since our last visit, she has been working with a therapist regarding her eating disorder. She feels adequately cared for but has ongoing mental health needs that will need to be her focus the next few months before my role kicks in. She's been started on bupropion and Trulicity recently and is tolerating them and noticing some decreased hunger. I wound't recommend adding naltrexone until her mood is stable as her risk of over restriction/binge cycling and purging is still too high for my comfort level and continued good eating disorder therapy is necessary at this point.    Plan:   1.  Continue to follow up with Therapist. Consider Adult Urgent Care for Mental health/hotline numbers if decompensation.  Follow up with dietician " "therapy.  Recheck with me in 3 months.  We discussed [facility] Bariatric Basics including:  -eating 3 meals daily  -eating protein first  -eating slowly, chewing food well  -  Most recent labs:  Lab Results   Component Value Date    WBC 6.3 07/25/2019    HGB 12.6 07/25/2019    HCT 39.3 07/25/2019    MCV 85 07/25/2019     07/25/2019     Lab Results   Component Value Date    CHOL 149 12/31/2010     Lab Results   Component Value Date    HDL 24 (L) 12/31/2010     Lab Results   Component Value Date    LDLCALC 102 12/31/2010     Lab Results   Component Value Date    TRIG 119 12/31/2010     No components found for: CHOLHDL  Lab Results   Component Value Date    ALT 22 07/25/2019    AST 22 07/25/2019    ALKPHOS 57 07/25/2019    BILITOT 0.4 07/25/2019     Lab Results   Component Value Date    HGBA1C 6.7 (H) 08/23/2019     Lab Results   Component Value Date    FOYUBTCT56 484 08/23/2019     Lab Results   Component Value Date    WWFDQKMG33PO 7.2 (L) 08/23/2019     No results found for: FERRITIN  Lab Results   Component Value Date    PTH 77 08/23/2019     No results found for: 58359  No results found for: 7597  Lab Results   Component Value Date    TSH 1.27 08/23/2019     No results found for: TESTOSTERONE    DIETARY HISTORY    Positive Changes Since Last Visit: getting therapty  Struggling With: mood/depression.    Knowledgeable in Reading Food Labels: working with dieticina.  Getting Adequate Protein: improving  Sleeping 7-8 hours/day n/a  Stress management na    REVIEW OF SYSTEMS  GENERAL/CONSTITUTIONAL:  depression  HEENT:   na  CARDIOVASCULAR:   n/a  PULMONARY:   na  GASTROINTESTINAL:  No pain.   UROLOGIC:  na  NEUROLOGIC:  na  PSYCHIATRIC:  na  MUSCULOSKELETAL/RHEUMATOLOGIC   na  ENDOCRINE:  Tolerating trulicity  DERMATOLOGIC:  No rash    PHYSICAL EXAM:  Vitals: Pulse 96   Ht 5' 4\" (1.626 m)   Wt (!) 324 lb (147 kg)   SpO2 98%   BMI 55.61 kg/m    Height: 5' 4\" (1.626 m) (12/13/2019 12:46 PM)  Initial Weight: " 345 lbs (11/26/2019  1:00 PM)  Weight: (!) 324 lb (147 kg) (12/13/2019 12:46 PM)  Weight loss from initial: 9.4 (11/26/2019  1:00 PM)  % Weight loss: 2.72 % (11/26/2019  1:00 PM)  BMI (Calculated): 55.6 (12/13/2019 12:46 PM)  SpO2: 98 % (12/13/2019 12:46 PM)  Waist Circumference (In): 54.75 Inches (7/31/2019  8:50 AM)  Hip Circumference (In): 61 Inches (7/31/2019  8:50 AM)  Neck Circumference (In): 17.75 Inches (7/31/2019  8:50 AM)      GEN: Pleasant, well groomed, in no acute distress, accompanied with her friend. Depressed affect.  EXTREMITIES: 2 plus palpable peripheral pulses, radial.  NEURO: Alert and Oriented X3, normal gait and speech.  SKIN: No visible rashes.        15 minutes was spent in direct consultation, with over 50% of it spent in counseling regarding their plan for excess weight reduction and health modification.  Kerwin Dunn MD  [facility]  12:39 PM

## 2021-06-04 NOTE — PATIENT INSTRUCTIONS - HE
Continue to follow up with Therapist. Consider Adult Urgent Care for Mental health/hotline numbers if decompensation.  Follow up with dietician therapy.  Recheck with me in 3 months.

## 2021-06-05 ENCOUNTER — RECORDS - HEALTHEAST (OUTPATIENT)
Dept: LAB | Facility: CLINIC | Age: 41
End: 2021-06-05

## 2021-06-05 ENCOUNTER — HEALTH MAINTENANCE LETTER (OUTPATIENT)
Age: 41
End: 2021-06-05

## 2021-06-05 LAB
SARS-COV-2 PCR COMMENT: NORMAL
SARS-COV-2 RNA SPEC QL NAA+PROBE: NEGATIVE
SARS-COV-2 VIRUS SPECIMEN SOURCE: NORMAL

## 2021-06-05 NOTE — PROGRESS NOTES
Medical  Weight Loss Follow-Up Diet Evaluation  Assessment:  Ronel is presenting today for a follow up weight management nutrition consultation. Pt has had an initial appointment with Dr. Dunn  Weight loss medication: wellbutrin and trulicity.  +Patient states she is feeling better mentally  Pt's Initial Weight: 345 lbs  Weight: (!) 312 lb 4.8 oz (141.7 kg)  Weight loss from initial: 32.7  % Weight loss: 9.48 %    BMI: Body mass index is 53.61 kg/m .  IBW: 120-130 lbs    Estimated RMR ([place]-St Jeor equation): 2074kcal   Recommended Protein Intake: 72-96 grams of protein/day  Patient Active Problem List:  Patient Active Problem List   Diagnosis     Bipolar 1 disorder, mixed, severe (H)     Anxiety     Borderline personality disorder (H)     Bipolar I disorder, most recent episode (or current) mixed, moderate     Relationship problems     History of anxiety disorder     Asthma     Morbid obesity with BMI of 50.0-59.9, adult (H)     Hx of eating disorder     Hyperlipidemia     Arthralgia of hip     Attention deficit disorder     Bipolar II disorder, most recent episode hypomanic (H)     Disorder of pelvis     Eating disorder     Hypertension     Morbid obesity (H)     Osteoarthritis of hip     Recurrent major depressive disorder (H)     Secondary physiologic amenorrhea     Soft tissue lesion of shoulder region     Trochanteric bursitis     Type 2 diabetes mellitus (H)     Diabetes: Yes does not check blood glucose- states she is fatigued, sleeping during the day    Progress on goals from last visit: patient thinks she is losing weight the wrong way as her medication reduces her appetite  1. 2 bottles of water per day- goal met  2. Protein snack at bedtime- goal not met    Dietary Recall:  Breakfast: chinese food OR saltines  Lunch: cheese stick and an apple  Dinner: garden vegetable rice  Hydration (type/oz. per day):  Water: 4-5 bottles of water per day  Carbonation: eliminated soda  Exercise:  Routine  "exercise established: No   Nutrition Diagnosis:    Overweight/Obesity (NC 3.3) related to overeating and poor lifestyle habits as evidenced by high fat diet, lack of physical activity and BMI 53.61  Disordered Eating Pattern (NB 1.5) related to obsessive desire, intake of food exceeding RMR as evidenced by binge eating habits, frequent grazing, skipping meals  Intervention:  1. Nutrition counseling: motivational interviewing- discussed \"911 meals\" meals that are nutritious to prepare when not feeling energized- we came up with 5 options today. The patient was also encouraged to check her blood glucose at least daily. Goal setting    Monitoring/Evaluation:    Goals:  1. Do exercise DVD once daily  2. Protein at every meal    Follow up:  Pt will follow up in 2 month(s) with bariatrician and 3 month(s) with dietitian.     Time spent with patient: 30 minutes  Agueda Johns RD     ABN signed: Yes      "

## 2021-06-07 ENCOUNTER — RECORDS - HEALTHEAST (OUTPATIENT)
Dept: ADMINISTRATIVE | Facility: OTHER | Age: 41
End: 2021-06-07

## 2021-06-07 NOTE — PROGRESS NOTES
"Ronel Ryan is a 39 y.o. female who is being evaluated via a billable telephone visit.      The patient has been notified of following:     \"This telephone visit will be conducted via a call between you and your physician/provider. We have found that certain health care needs can be provided without the need for a physical exam.  This service lets us provide the care you need with a short phone conversation.  If a prescription is necessary we can send it directly to your pharmacy.  If lab work is needed we can place an order for that and you can then stop by our lab to have the test done at a later time.    If during the course of the call the physician/provider feels a telephone visit is not appropriate, you will not be charged for this service.\"     Ronel Ryan complains of    Chief Complaint   Patient presents with     Nutrition Counseling       I have reviewed and updated the patient's Past Medical History, Social History, Family History and Medication List.    ALLERGIES  Keflex [cephalexin]; Erythromycin; and Penicillins    Additional provider notes:     Medical  Weight Loss Follow-Up Diet Evaluation  Assessment:  Ronel is presenting today for a follow up weight management nutrition consultation. Pt has had an initial appointment with Dr. Dunn  Weight loss medication: naltrexone and trulicity.  Pt's Initial Weight: 345 lbs  Weight: (!) 308 lb (139.7 kg)  Weight loss from initial: 37  % Weight loss: 10.72 %    BMI: Body mass index is 52.87 kg/m .  IBW: 120-130 lbs    Estimated RMR (Riley-St Jeor equation): 2000kcal   Recommended Protein Intake: 72-96 grams of protein/day  Patient Active Problem List:  Patient Active Problem List   Diagnosis     Bipolar 1 disorder, mixed, severe (H)     Anxiety     Borderline personality disorder (H)     Bipolar I disorder, most recent episode (or current) mixed, moderate     Relationship problems     History of anxiety disorder     Asthma     Morbid obesity " with BMI of 50.0-59.9, adult (H)     Hx of eating disorder     Hyperlipidemia     Arthralgia of hip     Attention deficit disorder     Bipolar II disorder, most recent episode hypomanic (H)     Disorder of pelvis     Eating disorder     Hypertension     Morbid obesity (H)     Osteoarthritis of hip     Recurrent major depressive disorder (H)     Secondary physiologic amenorrhea     Soft tissue lesion of shoulder region     Trochanteric bursitis     Type 2 diabetes mellitus (H)     Suicidal ideation     Pain of both hip joints     Gait difficulty     Binge eating     Adjustment disorder with depressed mood     ACP (advance care planning)     Care plan discussed with patient     Diabetes: Yes not checkig- A1c 5.5 per patient report    Progress on goals from last visit: stay away from juices and sweets.  Patient feels as though she is tired and is feeling more isolated then ever.  +Patient sounded very lethargic on the phone, struggling to answer my questions at times.     Dietary Recall:  Breakfast: apple  Snack:none  Lunch:11-12- groceries delivered- cinnamon bread  Snack: cherry turnover  Dinner:chicken and wild rice soup  Hydration (type/oz. per day):  Water: 4-5 bottles water per day  Exercise:  Routine exercise established: No     Nutrition Diagnosis:    Overweight/Obesity (NC 3.3) related to overeating and poor lifestyle habits as evidenced by high fat diet, lack of physical activity and BMI 52.87  Disordered Eating Pattern (NB 1.5) related to obsessive desire, intake of food exceeding RMR as evidenced by binge eating habits, frequent grazing, skipping meals      Intervention:  1. Nutrition Counseling- motivational interviewing- encouraged patient to order nutrient dense foods for delivery rather than sweets, making sure she has lean meats, fruits and veggies on hand.     Monitoring/Evaluation:    Goals:  1. Limit sweets to three times per week  2. Sit and be Fit twice per week     Assessment/Plan:    Patient to  follow up in 2 month(s) with RD    Phone call duration: 19 minutes    Agueda Johns RD

## 2021-06-07 NOTE — PROGRESS NOTES
"Ronel Ryan is a 39 y.o. female who is being evaluated via a billable telephone visit.      The patient has been notified of following:     \"This telephone visit will be conducted via a call between you and your physician/provider. We have found that certain health care needs can be provided without the need for a physical exam.  This service lets us provide the care you need with a short phone conversation.  If a prescription is necessary we can send it directly to your pharmacy.  If lab work is needed we can place an order for that and you can then stop by our lab to have the test done at a later time.    If during the course of the call the physician/provider feels a telephone visit is not appropriate, you will not be charged for this service.\"     Ronel Ryan complains of  No chief complaint on file.  Follow up from medical weight management.    I have reviewed and updated the patient's Past Medical History, Social History, Family History and Medication List.    ALLERGIES  Keflex [cephalexin]; Erythromycin; and Penicillins    Additional provider notes:   She reports eating disorder counseling as ended in December and more balanced.  Has been stuck at 312 lbs. Recently down to 309 lbs.   Needs Victoza refilled (out this last month).  Stopped metformin as well by PCP order  Increased appetite.  Exercise is limited. Feels capable of doing exercise DVD and can use walker for around the block once. [facility] is done. 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  Both hips cause pain and reduced ambulation. Desires pool therapy which I think would be very helpful.    Assessment/Plan:  Plan:  1. Referral to PT for chronic bilateral hip pains that restrict ambulation:  [facility].  2. You've discontinued Trulicity and Metformin as your A1c reportedly was down to 5.5%. Continued good protein intake of 30 grams per meal, every 5 hours with at least 2 meals having good vegetable intake/greens/salad and " limiting carbohydrates to 1 slice of bread daily/half a baked potato or 2/3 cup of rice.  3. Naltrexone can restart: half a tab with breakfast for a week then increase to twice daily if tolerated (breakfast and supper, a half a tablet at each meal). Stop using if any surgery or any need for opiate pain medications.        Phone call duration: 19 minutes    Kerwin Dunn MD

## 2021-06-07 NOTE — PATIENT INSTRUCTIONS - HE
Plan:  1. Referral to PT for chronic bilateral hip pains that restrict ambulation:  [facility].  2. You've discontinued Trulicity and Metformin as your A1c reportedly was down to 5.5%. Continued good protein intake of 30 grams per meal, every 5 hours with at least 2 meals having good vegetable intake/greens/salad and limiting carbohydrates to 1 slice of bread daily/half a baked potato or 2/3 cup of rice.  3. Naltrexone can restart: half a tab with breakfast for a week then increase to twice daily if tolerated (breakfast and supper, a half a tablet at each meal). Stop using if any surgery or any need for opiate pain medications.        Example Meal Plan for a 0230-8189 Calorie Diet:    In order to fuel your weight loss properly and avoid hunger-induced overeating later in the day, you will enjoy the most success by following the diet below or similar with adjustments based on your particular tastes and preferences.      I recommend getting into a meal routine and keeping it similar day to day in the beginning so you don t have to think too hard about what you re going to make/eat.  Keep snacks healthy, ideally containing protein.  Non-processed food is preferable to packaged items.  Eat at least a few crunchy green vegetables if having a snack, which should be 2-3 hours after your mealtimes(prepare these ahead of time) if very active exercising occurs.  Drink 64 oz -90 oz of water daily.  During a diet we often mistake thirst for hunger or just have some grazing habits we have to break ('bored/mindless eating') and a glass of water and reconsideration of our hunger is often all that is needed.      If you re having hunger problems, add a protein drink to your morning hours or afternoon snack with at least 20grams of protein and not too much sugar.  A carton of higher protein/low sugar yogurt can work as well.  If the urge to snack is overwhelming and not satiated, try going for a 10 minute  walk/exercise, come home and drink a glass of water and if still hungry, have a  calorie snack (handful of raw/sprouted nuts, veggies and string cheese, protein bar, etc).      It is better to have a large breakfast, a moderate lunch and a smaller dinner to fuel your day.  People lose 10-15% more weight during their weight loss season with this strategy. Optimizing your protein intake at each meal will further keep you more satisfied while eating less food overall.    To make sure you re getting adequate vitamins and minerals during weight loss, I recommend one complete multivitamin a day of your choice.  Consider a probiotic and taking some vitamin D 2000 IU daily.    Let supper be your last meal of the day and ideally try to have at least 12 hours between supper and breakfast the next day to tap into some beneficial overnight fasting dynamics.  Water in the evening is fine, unsweetened herbal teas as well.  Consolidating your meals within a 8-12 hour period of your day will help tap into these additional metabolic benefits and tends to keep your appetite up for breakfast, further helping to stay on track.  For most of my patients I don't recommend an intermittent fasting style diet (many find it hard to fit in their lifestyle) but an overnight fast is very doable for most patients and helps regulate our hunger drives a little better.  This makes it very important to nail good intake at all three meals to feel satisfied/energized and still lose weight.  If evening snacking desires are high, consider a glass of fiber supplement for some additional fullness. Most of us don't get the 25-35 grams per day of fiber that promotes good gut health/satiety.  Benefiber, metamucil, citrucel are reasonable/affordable options for most people.  Inulin, chicory, psyllium husk are reasonable options but start slow and low to avoid gas/bloating until your gut gets acclimated (ramping up to 5-10 grams per day of supplemental  fiber after 3-4 weeks).       Example Meal Plan:  Breakfast: 450-475 Calories  1 egg cooked on low in olive oil:   calories.  5oz Greek Yogurt (Fage plain classic: ~150 randi)  Handful of Berries of your choice (about a calorie per berry or 20-40cal per handful)    cup(cooked) of  old fashioned oatmeal or 1/2 cup(cooked) steel cut oats. (150 randi)  Sprinkle amount of brown sugar and a pat of butter. (40 randi)  Glass of  Water  Black coffee or unsweetened Tea (0calories).      2-3 hours Later Snack: (195 calories).  Glass of water  One string Cheese (80 calories) or 4 oz creamed cottage cheese (115 calories) with  Crunchy Celery sticks (less than 10 calories per large stalk) 2 stalks. (20 calories)    of a  Large Banana or   of a Large Apple (60 calories):  eat second half at lunch or afternoon snack.     Lunch:300 -350 calories   Chicken Breast  (baked/broiled/roasted/grilled)  4-6 oz.  (125-180 randi), BBQ sauce/hot sauce/mustard/seasoning is free. Just use a reasonable amount. Or a can of tuna with 1 tablespoon mayonnaise.  Salad: lettuce, any other veggies (cucumbers, green peppers/celery you like and a small drizzle of dressing to just flavor.  Go as big on the veggies as you like,  as they are practically calorie free.   A whole, 8 inch cucumber is 45 calories, a whole green pepper is 23 calories, a stalk of celery is 9 calories.  [place] Dressing is 60 calories per tablespoon..so moderate your desired dressing or do a drizzle of olive oil and splash of balsamic vinegar on top,  Total calories unlikely to be over 150 even with dressing.  Glass of Water.    Option for lunch is meal replacement protein drink/smoothie.  Need at least 20 grams of protein and eat the rest of your apple/banana from the morning snack.      Afternoon Snack: 150-200 calories   Cheese Stick or cottage cheese again  and a fresh fruit OR  Granola Bar (protein Bar acceptable if under 200 calories OR  Homemade smoothies:  8oz skim  milk,  a handful of berries (fresh or frozen and a serving of protein powder such as BiPro or Taniya sWhey for example.  If you don't like dairy, make with 8oz water, one small banana, handful of berries and the protein powder, add any veggies you want as well:  roughly 200 calories.   Glass of Water    Dinner: 325 calories  4oz of fresh, Atlantic salmon.  Broiled (salt/pepper/dill) for about 8-8.5 minutes (200calories) or  4oz filet mignon steak or sirloin steak  Salad or vegetable sautéed lightly in olive oil or   Broccoli 1.5  cups chopped and steamed  or micro-waved in a little water (75 calories)  Glass of Water,    Cup of herbal tea (unsweetened, caffeine free)      Herbs and seasonings are free and encouraged to flavor your foods/vegetables.  Make your food delicious.  If evening snacking desires are high, consider a glass of fiber supplement of both soluble an insoluble fiber for some additional fullness. Most of us don't get the 25-35 grams per day of fiber that promotes good gut health.  Benefiber, metamucil, citrucel are reasonable/affordable options for most people.  Those with prebiotic fibers like inulin, chicory, psyllium may also be good options.    Tips for Success:  1.  Prepare proteins ahead of time (broil chicken breasts in bulk so you can grab and go), steel cut oats can be stored in casserole dish/bowl in the fridge for quick scoop in the morning and rewarm in microwave, make use of crock pot recipes (watch salt content).  Making meals that cover 3-4 future meals is an easy way to stay on track.  2.  Drink a 8-12 oz glass of water every 2-3 hours when awake.  We often mistake hunger for thirst, especially when losing weight.  3. Remember your Reward and Motivation when things get hard.  4.  Weigh yourself every morning and record, you'll stay on track better and learn how our biorhythms, diet and elimination patterns show up on the scale. Don't worry about 1 or 2 day patterns, but when on track  "you'll notice good trend downward of weight over 3-4 day segments.  Plateaus tend to resolve after 4-8 days in most cases if you stay consistent with your plan.  These are natural and part of weight loss, even if you're perfect with your plan execution.  5. Call if problems/concerns.  [employer] is a great tool to stay in touch and provide weekly outside accountability.   6.  Find a handful of meals/foods that keep you on track and feeling good and get into a routine that is sustainable for you.  7.  Take a complete multivitamin just to make sure all micronutrients are adequate during weight loss.  8. If losing hair/brittle nails it usually means you are not taking enough protein.  Minimum goal is 60 grams daily of protein for smaller women, 80 grams a day for men. Consider taking Biotin as supplement or a \"Hair and Nail\" multivitamin.        Example Meal Plan for a 4456-5802 Calorie Diet:    In order to fuel your weight loss properly and avoid hunger-induced overeating later in the day, you will enjoy the most success by following the diet below or similar with adjustments based on your particular tastes and preferences.      I recommend getting into a meal routine and keeping it similar day to day in the beginning so you don t have to think too hard about what you re going to make/eat.  Keep snacks healthy, ideally containing protein.  Non-processed food is preferable to packaged items.  Eat at least a few crunchy green vegetables if having a snack, which should be 2-3 hours after your mealtimes(prepare these ahead of time) if very active exercising occurs.  Drink 64 oz -90 oz of water daily.  During a diet we often mistake thirst for hunger or just have some grazing habits we have to break ('bored/mindless eating') and a glass of water and reconsideration of our hunger is often all that is needed.      If you re having hunger problems, add a protein drink to your morning hours or afternoon snack with at least " 20grams of protein and not too much sugar.  A carton of higher protein/low sugar yogurt can work as well.  If the urge to snack is overwhelming and not satiated, try going for a 10 minute walk/exercise, come home and drink a glass of water and if still hungry, have a  calorie snack (handful of raw/sprouted nuts, veggies and string cheese, protein bar, etc).      It is better to have a large breakfast, a moderate lunch and a smaller dinner to fuel your day.  People lose 10-15% more weight during their weight loss season with this strategy. Optimizing your protein intake at each meal will further keep you more satisfied while eating less food overall.    To make sure you re getting adequate vitamins and minerals during weight loss, I recommend one complete multivitamin a day of your choice.  Consider a probiotic and taking some vitamin D 2000 IU daily.    Let supper be your last meal of the day and ideally try to have at least 12 hours between supper and breakfast the next day to tap into some beneficial overnight fasting dynamics.  Water in the evening is fine, unsweetened herbal teas as well.  Consolidating your meals within a 8-12 hour period of your day will help tap into these additional metabolic benefits and tends to keep your appetite up for breakfast, further helping to stay on track.  For most of my patients I don't recommend an intermittent fasting style diet (many find it hard to fit in their lifestyle) but an overnight fast is very doable for most patients and helps regulate our hunger drives a little better.  This makes it very important to nail good intake at all three meals to feel satisfied/energized and still lose weight.  If evening snacking desires are high, consider a glass of fiber supplement for some additional fullness. Most of us don't get the 25-35 grams per day of fiber that promotes good gut health/satiety.  Benefiber, metamucil, citrucel are reasonable/affordable options for most  people.  Inulin, chicory, psyllium husk are reasonable options but start slow and low to avoid gas/bloating until your gut gets acclimated (ramping up to 5-10 grams per day of supplemental fiber after 3-4 weeks).       Example Meal Plan:  Breakfast: 450-475 Calories  1 egg cooked on low in olive oil:   calories.  5oz Greek Yogurt (Fage plain classic: ~150 randi)  Handful of Berries of your choice (about a calorie per berry or 20-40cal per handful)    cup(cooked) of  old fashioned oatmeal or 1/2 cup(cooked) steel cut oats. (150 randi)  Sprinkle amount of brown sugar and a pat of butter. (40 randi)  Glass of  Water  Black coffee or unsweetened Tea (0calories).      2-3 hours Later Snack: (195 calories).  Glass of water  One string Cheese (80 calories) or 4 oz creamed cottage cheese (115 calories) with  Crunchy Celery sticks (less than 10 calories per large stalk) 2 stalks. (20 calories)    of a  Large Banana or   of a Large Apple (60 calories):  eat second half at lunch or afternoon snack.     Lunch:300 -350 calories   Chicken Breast  (baked/broiled/roasted/grilled)  4-6 oz.  (125-180 randi), BBQ sauce/hot sauce/mustard/seasoning is free. Just use a reasonable amount. Or a can of tuna with 1 tablespoon mayonnaise.  Salad: lettuce, any other veggies (cucumbers, green peppers/celery you like and a small drizzle of dressing to just flavor.  Go as big on the veggies as you like,  as they are practically calorie free.   A whole, 8 inch cucumber is 45 calories, a whole green pepper is 23 calories, a stalk of celery is 9 calories.  [place] Dressing is 60 calories per tablespoon..so moderate your desired dressing or do a drizzle of olive oil and splash of balsamic vinegar on top,  Total calories unlikely to be over 150 even with dressing.  Glass of Water.    Option for lunch is meal replacement protein drink/smoothie.  Need at least 20 grams of protein and eat the rest of your apple/banana from the morning  snack.      Afternoon Snack: 150-200 calories   Cheese Stick or cottage cheese again  and a fresh fruit OR  Granola Bar (protein Bar acceptable if under 200 calories OR  Homemade smoothies:  8oz skim milk,  a handful of berries (fresh or frozen and a serving of protein powder such as BiPro or Taniya sWhey for example.  If you don't like dairy, make with 8oz water, one small banana, handful of berries and the protein powder, add any veggies you want as well:  roughly 200 calories.   Glass of Water    Dinner: 325 calories  4oz of fresh, Atlantic salmon.  Broiled (salt/pepper/dill) for about 8-8.5 minutes (200calories) or  4oz filet mignon steak or sirloin steak  Salad or vegetable sautéed lightly in olive oil or   Broccoli 1.5  cups chopped and steamed  or micro-waved in a little water (75 calories)  Glass of Water,    Cup of herbal tea (unsweetened, caffeine free)      Herbs and seasonings are free and encouraged to flavor your foods/vegetables.  Make your food delicious.  If evening snacking desires are high, consider a glass of fiber supplement of both soluble an insoluble fiber for some additional fullness. Most of us don't get the 25-35 grams per day of fiber that promotes good gut health.  Benefiber, metamucil, citrucel are reasonable/affordable options for most people.  Those with prebiotic fibers like inulin, chicory, psyllium may also be good options.    Tips for Success:  1.  Prepare proteins ahead of time (broil chicken breasts in bulk so you can grab and go), steel cut oats can be stored in casserole dish/bowl in the fridge for quick scoop in the morning and rewarm in microwave, make use of crock pot recipes (watch salt content).  Making meals that cover 3-4 future meals is an easy way to stay on track.  2.  Drink a 8-12 oz glass of water every 2-3 hours when awake.  We often mistake hunger for thirst, especially when losing weight.  3. Remember your Reward and Motivation when things get hard.  4.  Weigh  "yourself every morning and record, you'll stay on track better and learn how our biorhythms, diet and elimination patterns show up on the scale. Don't worry about 1 or 2 day patterns, but when on track you'll notice good trend downward of weight over 3-4 day segments.  Plateaus tend to resolve after 4-8 days in most cases if you stay consistent with your plan.  These are natural and part of weight loss, even if you're perfect with your plan execution.  5. Call if problems/concerns.  [employer] is a great tool to stay in touch and provide weekly outside accountability.   6.  Find a handful of meals/foods that keep you on track and feeling good and get into a routine that is sustainable for you.  7.  Take a complete multivitamin just to make sure all micronutrients are adequate during weight loss.  8. If losing hair/brittle nails it usually means you are not taking enough protein.  Minimum goal is 60 grams daily of protein for smaller women, 80 grams a day for men. Consider taking Biotin as supplement or a \"Hair and Nail\" multivitamin.      Exercise Guidance    Nearly everything that bothers us gets better when the proper amount of exercise can be done in the proper amounts.  Getting to that level safely and without injury is the key.  When it comes to weight loss, exercise is especially important in maintaining the weight loss.  Unfortunately, one of the harsh realities is that substantial weight loss slows our metabolism, often anywhere from 5-20%.    Our brain always remembers our heaviest weight and we can return to that if we're not mindful and moving regularly.  Our biology doesn't understand the concept of having too much energy, only not having enough.  As such, when we lose weight, it's thought that the brain interprets this as we're ill or in a famine and dials back our metabolism to limit further weight loss.  This is why exercise is so important in keeping the weight off and is the main reason people have " some weight regain from their low weight point after weight loss.  We have to make up that 10-20% of calories not being burned.Since we can restrict our intake for only so long, exercise becomes very important in our long term healthy weigh maintenance to balance out the occasional indiscretion with our diet.    Generally, for every 5% body weight reduction in a weight loss season, a person needs to add  kilocalories of exercise in their daily routine to keep that weight off for the long term.  This is why it's vital to be starting your fitness regimen during weight loss season, so that routine is well established as you move into your maintenance period.    Additionally, all sorts of good enzymes and genes turn on with exercise and our stress, sleep, mood and bodies feel better when we can get to the point of making ourselves a little sweaty and short of breath 35-50 minutes most days of the week. But we have to start with what we can do first and give ourselves permission to work our way up to this goal.    Who isn't ready for exercise? Well, if you get severe dizziness/palpitations, chest pain or short of breath/faint with even minimal activity like walking across a room or you're having to pause while going up a flight of stairs, then getting your heart and/or lungs fully evaluated prior to starting an exercise regimen is recommended. Everyone else can probably start a program, but everyone may start at a different point:  Some can set a 5-10 minute walking goal and others will be able to ride their bike for an hour.      Start with where you're at and look to add 10% more each week until you're at that 150 minutes or more a week (or 75 minutes/week or more of vigorous exercise). Moderate exercise can be estimated as the pace you can carry on a conversation and vigorous is the pace at which you can get 3-5 words out before having to take a breath.  If you're using heart rate monitoring, Moderate is about  "60% of your maximum heart rate and vigorous about 75%. (Max heart rate estimated as 220 beats minus your age:  Example: 220-age of 43 =176 Beats per minute (BPM) maximum. 0.6X 176= 105 BPM (moderate), 132 BMP(vigorous)).    If you like to count steps, the 10,000 steps per day does correlate well with weight maintenance but try to make at least 20-25% of those steps at a brisk pace (like you are about to miss your bus).    Finally, if you are pressed for time, it's important to know that some exercise is better than none.  High Intensity Interval training (HIIT) is a good way to get as much out of a short period of working out. If you can't walk, use the stairs, bike or swim; you could use a punching/arm workout regimen for your activity.  The idea with HIIT is to have a 3-6 minute warm up period of low intensity and the 3-6 \"intervals\" where you push the intensity up and then recover and start the next interval. One study showed that 3 intervals of 20 seconds at \"Maximum Effort\" while either biking on a stationary bike or going up stairs and then having 100 seconds recovery time before the next Maximum Effort was equally as beneficial on cardiovascular fitness development as doing 30 minutes of moderately paced walking 3 days weekly over a 6 week period of time.  So intensity matters. You just need to be able to safely do your desired exercise without injury. There are many great HIIT exercises/routines out there. IF you're not doing much exercise currently, I recommend giving your self 2-3 weeks of moderate exercise, 3 days weekly minimum to get your bones/tendons/muscles used to exercise before going for High Intensity workouts.    If you like to use Apps on the phone, the couch to 5k nia and 7 minute workout apps are nice places to start if you are reasonably healthy.  There are hundreds of other options out there.  Consider viewing [employer] if gentler exercise/movement is desired. Videos on [employer] and chair yoga " for seniors exist and are free. Check them out and let's get that 3-4 days a week routine going.    Let's move!  Kerwin Dunn MD.

## 2021-06-08 ENCOUNTER — RECORDS - HEALTHEAST (OUTPATIENT)
Dept: ADMINISTRATIVE | Facility: OTHER | Age: 41
End: 2021-06-08

## 2021-06-08 ENCOUNTER — SURGERY - HEALTHEAST (OUTPATIENT)
Dept: SURGERY | Facility: CLINIC | Age: 41
End: 2021-06-08
Payer: COMMERCIAL

## 2021-06-08 ENCOUNTER — ANESTHESIA - HEALTHEAST (OUTPATIENT)
Dept: SURGERY | Facility: CLINIC | Age: 41
End: 2021-06-08

## 2021-06-08 ASSESSMENT — MIFFLIN-ST. JEOR: SCORE: 1838.87

## 2021-06-08 NOTE — PROGRESS NOTES
"Ronel Ryan is a 39 y.o. female who is being evaluated via a billable telephone visit.      The patient has been notified of following:     \"This telephone visit will be conducted via a call between you and your physician/provider. We have found that certain health care needs can be provided without the need for a physical exam.  This service lets us provide the care you need with a short phone conversation.  If a prescription is necessary we can send it directly to your pharmacy.  If lab work is needed we can place an order for that and you can then stop by our lab to have the test done at a later time.    If during the course of the call the physician/provider feels a telephone visit is not appropriate, you will not be charged for this service.\"     Ronel Ryan complains of    Chief Complaint   Patient presents with     Nutrition Counseling       I have reviewed and updated the patient's Past Medical History, Social History, Family History and Medication List.    ALLERGIES  Keflex [cephalexin]; Erythromycin; and Penicillins    Additional provider notes:     Medical  Weight Loss Follow-Up Diet Evaluation  Assessment:  Ronel is presenting today for a follow up weight management nutrition consultation. Pt has had an initial appointment with Dr. Dunn  Weight loss medication: Naltrexone  Pt's Initial Weight: 345 lbs  Weight: (!) 302 lb (137 kg)  Weight loss from initial: 43  % Weight loss: 12.46 %    BMI: Body mass index is 51.84 kg/m .  IBW: 120-130 lbs    Estimated RMR ([place]-St Jeor equation): 2000kcal   Recommended Protein Intake: 72-96 grams of protein/day  Patient Active Problem List:  Patient Active Problem List   Diagnosis     Bipolar 1 disorder, mixed, severe (H)     Anxiety     Borderline personality disorder (H)     Bipolar I disorder, most recent episode (or current) mixed, moderate     Relationship problems     History of anxiety disorder     Asthma     Morbid obesity with BMI of " "50.0-59.9, adult (H)     Hx of eating disorder     Hyperlipidemia     Arthralgia of hip     Attention deficit disorder     Bipolar II disorder, most recent episode hypomanic (H)     Disorder of pelvis     Eating disorder     Hypertension     Morbid obesity (H)     Osteoarthritis of hip     Recurrent major depressive disorder (H)     Secondary physiologic amenorrhea     Soft tissue lesion of shoulder region     Trochanteric bursitis     Type 2 diabetes mellitus (H)     Suicidal ideation     Pain of both hip joints     Gait difficulty     Binge eating     Adjustment disorder with depressed mood     ACP (advance care planning)     Care plan discussed with patient     Diabetes: Yes not testing- last A1c 5.5    Progress on goals from last visit: patient states she is eating good and bad, \"not bad all the time.\" States she has not been eating bread and eating a lot of salad  1. Limit sweets to 3 times per week- goal met- not had any sweets  2. Sit and be fit- has been doing chair exercises on you tube, exercise DVD    Dietary Recall:  Breakfast: none  Snack:grapes  Lunch: beef and broccoli  Snack: none  Dinner: ramen noodles  Snack: none  Eating out (frequency/week): today was the first time in a long time  Hydration (type/oz. per day):  Water: crystal light, ice brar  Exercise:  Routine exercise established: No  Sometimes doing exercise videos     Nutrition Diagnosis:  Overweight/Obesity (NC 3.3) related to overeating and poor lifestyle habits as evidenced by high fat diet, lack of physical activity and BMI 52.87  Disordered Eating Pattern (NB 1.5) related to obsessive desire, intake of food exceeding RMR as evidenced by binge eating habits, frequent grazing, skipping meals    Intervention:  1. Food and/or nutrient delivery: discussed the importance of having protein with each meal or time eating to help regulate blood sugars and prevent malnutrition  2. Nutrition counseling: motivational interviewing for continued " success    Monitoring/Evaluation:    Goals:  1. Intentional movement 2 times per week  2. Have protein each time eating    Assessment/Plan:    Patient to follow up in 6 week with RD    Phone call duration: 20 minutes    Agueda Johns RD

## 2021-06-08 NOTE — TELEPHONE ENCOUNTER
Patient calling reporting she was to have COVID 19 testing done prior to breast biopsy.  Patient stating she would need to have medical transportation take her for testing.   Patient does not have other transportation options at this time.   Connected with [employer] who advised patient would have to arrive in car.  [employer] advising patient to call back after arranging transportation.      Christelle Mejia RN  [facility]

## 2021-06-10 NOTE — PROGRESS NOTES
"Ronel Ryan is a 39 y.o. female who is being evaluated via a billable telephone visit.      The patient has been notified of following:     \"This telephone visit will be conducted via a call between you and your physician/provider. We have found that certain health care needs can be provided without the need for a physical exam.  This service lets us provide the care you need with a short phone conversation.  If a prescription is necessary we can send it directly to your pharmacy.  If lab work is needed we can place an order for that and you can then stop by our lab to have the test done at a later time.    If during the course of the call the physician/provider feels a telephone visit is not appropriate, you will not be charged for this service.\"     Ronel Ryan complains of    Chief Complaint   Patient presents with     Nutrition Counseling       I have reviewed and updated the patient's Past Medical History, Social History, Family History and Medication List.    ALLERGIES  Keflex [cephalexin]; Erythromycin; and Penicillins    Additional provider notes:     Medical  Weight Loss Follow-Up Diet Evaluation  Assessment:  Ronel is presenting today for a follow up weight management nutrition consultation. Pt has had an initial appointment with Dr. Dunn  Weight loss medication: naltrexone- stopped taking a week ago due to bad taste in her mouth  Pt's Initial Weight: 345 lbs  Weight: (!) 295 lb (133.8 kg)  Weight loss from initial: 50  % Weight loss: 14.49 %    BMI: Body mass index is 50.64 kg/m .  IBW: 120-130 lbs    Estimated RMR ([place]-St Jeor equation): 1996kcal   Recommended Protein Intake: 72-96 grams of protein/day  Patient Active Problem List:  Patient Active Problem List   Diagnosis     Bipolar 1 disorder, mixed, severe (H)     Anxiety     Borderline personality disorder (H)     Bipolar I disorder, most recent episode (or current) mixed, moderate     Relationship problems     History of anxiety " disorder     Asthma     Morbid obesity with BMI of 50.0-59.9, adult (H)     Hx of eating disorder     Hyperlipidemia     Arthralgia of hip     Attention deficit disorder     Bipolar II disorder, most recent episode hypomanic (H)     Disorder of pelvis     Eating disorder     Hypertension     Morbid obesity (H)     Osteoarthritis of hip     Recurrent major depressive disorder (H)     Secondary physiologic amenorrhea     Soft tissue lesion of shoulder region     Trochanteric bursitis     Type 2 diabetes mellitus (H)     Suicidal ideation     Pain of both hip joints     Gait difficulty     Binge eating     Adjustment disorder with depressed mood     ACP (advance care planning)     Care plan discussed with patient     Diabetes: Yes is not testing blood glucose    Progress on goals from last visit: patient states she is not a breakfast eater- usually waking up around 12pm. Hasn't been eating bread  1. Protein at each meal- goal met  2. Intentional movement 2 times per week- goal met- patient states she is exercising daily  +exercise video- walking off the weight- is now doing physical therapy, using exercise band    Dietary Recall:  12p- scrambled eggs with cheese and bowl of fruit   6pm- 6 in subway sandwich  Stops eating around 8pm  Hydration (type/oz. per day):  Tea, crystal light  Exercise:  Routine exercise established: Yes  PT exercises daily     Nutrition Diagnosis:    Overweight/Obesity (NC 3.3) related to overeating and poor lifestyle habits as evidenced by high fat diet, lack of physical activity and BMI 50.64  Disordered Eating Pattern (NB 1.5) related to obsessive desire, intake of food exceeding RMR as evidenced by binge eating habits, frequent grazing, skipping meals      Intervention:  1. Nutrition counseling: encouraged patient to continue with her plan as it is working well for her, continued protein at each meal and moving when she can    Monitoring/Evaluation:    Goals:  1. Intentional movement  daily  2. 20-30g protein per meal    Assessment/Plan:    Patient to follow up in 1 months(s) with bariatrician and 2-3 month(s) with RD    Phone call duration: 15 minutes    Agueda Johns RD

## 2021-06-11 NOTE — PATIENT INSTRUCTIONS - HE
Plan:  1. Great work with continued weight loss,you're down over 20% total body weight since July of 2019!  Aiming for 1500 kcal daily, continuing your daily exercises and mindful eating with 80 grams of protein daily and timing meals about every 4.5-5.5 hours should help keep you on track.    2. Given your good use of Trulicity and some of the upset stomach metformin was causing, you requested a restart so a new prescription was sent to your pharmacy today for the 0.75mg strength pen. Use once weekly and we'll reassess in 3 months.  Recheck A1c this month (last year you started with us at an A1c of 6.7%).     3. I'd recommend using 5000IUs of OTC vitamin D3 daily given your history of low levels. Recheck levels as we head into the fall and I'll adjust the recommendations based on that D and PTH level.    4. Continue rehab with your hips and [facility]. Movement will be vital for your weight maintenance phase that I anticipate starting this winter.    5. Recheck with me in 10 weeks.      Example Meal Plan for a 9556-5634 Calorie Diet:    In order to fuel your weight loss properly and avoid hunger-induced overeating later in the day, for your height and weight, you will enjoy the most success by following the diet below or similar with adjustments based on your particular tastes and preferences.  Exercise may influence speed, amount of weight loss further.     I recommend getting into a meal routine and keeping it similar day to day in the beginning so you don t have to think too hard about what you re going to make/eat.  Keep snacks healthy, ideally containing protein and some vegetables.  Non-processed food is preferable to packaged items.  Eat at least a few crunchy green vegetables if having a snack, which should be 2-3 hours after your mealtimes(prepare these ahead of time for ease of use).  Drink 64 oz -80 oz of water daily for most, some of you will need more and we'll discuss it at your visit if that is  the case.  When changing our diet and reducing our intake,  we can often mistake thirst for hunger or just have some grazing habits we have to break ('bored/mindless eating') and a glass of water and reconsideration of our hunger is often all that is needed.  Having the urge is not the problem, but watching it pass by without acting on it is the goal.    If you re having hunger problems, add a protein drink/snack to your morning hours or afternoon snack with at least 20grams of protein and not too much sugar (under 10g).  A carton of higher protein/low sugar yogurt can work as well.  If the urge to snack is overwhelming and not satiated, try going for a 10 minute walk/exercise, come home and drink a glass of water and if still hungry, have a  calorie snack (handful of raw/sprouted nuts, veggies and string cheese, protein bar, etc).  Savor it.    It is better to have a large breakfast, a moderate lunch and a smaller dinner to fuel your day.  People lose 10-15% more weight during their weight loss season with this strategy. Optimizing your protein intake at each meal will further keep you more satisfied while eating less food overall.  Getting exercise in early has also been shown to offer the best results (before breakfast ideally but anytime is the right time to exercise if that is not an option for you).    To make sure you re getting adequate vitamins and minerals during weight loss, I recommend one complete multivitamin a day of your choice.  Consider a probiotic and taking some vitamin D 2000 IU daily.    Let supper be your last meal of the day and ideally try to have at least 12 hours between supper and breakfast the next day to tap into some beneficial overnight fasting dynamics.  Water in the evening is fine, unsweetened, non caffeinated herbal tea is helpful as well.  Consolidating your meals within a 8-12 hour period of your day will help tap into these additional metabolic benefits and tends to keep  your appetite up for breakfast, further helping to stay on track.  For most of my patients I don't recommend an intermittent fasting style diet (many find it hard to fit in their lifestyle) but an overnight fast is very doable for most patients and helps regulate our hunger drives a little better.  This makes it very important to nail good intake at all three meals to feel satisfied/energized and still lose weight.  If evening snacking desires are high, consider a glass of fiber supplement for some additional fullness (metamucil or similar). Most of us don't get the 25-30 grams per day of fiber that promotes good gut health/satiety.  Benefiber, metamucil, citrucel are reasonable/affordable options for most people.  Inulin, chicory, psyllium husk are reasonable options but start slow and low in the dose to avoid gas/bloating until your gut gets acclimated (ramping up to 5-10 grams per day of supplemental fiber after 3-4 weeks if needed).      Example Meal Plan:  Breakfast: 450-475 Calories  1 egg cooked on low in olive oil:   calories.  5oz Greek Yogurt (Fage plain classic: ~150 randi)  Handful of Berries of your choice (about a calorie per berry or 20-40cal per handful)    cup(cooked) of  old fashioned oatmeal or 1/2 cup(cooked) steel cut oats. (150 randi)  Sprinkle amount of brown sugar and a pat of butter. (40 randi)  Glass of  Water  Black coffee or unsweetened Tea (0calories).      2-3 hours Later Snack: (195 calories).  Glass of water  One string Cheese (80 calories) or 4 oz creamed cottage cheese (115 calories) with  Crunchy Celery sticks (less than 10 calories per large stalk) 2 stalks. (20 calories)    of a  Large Banana or   of a Large Apple (60 calories):  eat second half at lunch or afternoon snack.     Lunch:300 -350 calories   Chicken Breast  (baked/broiled/roasted/grilled)  4-6 oz.  (125-180 randi), BBQ sauce/hot sauce/mustard/seasoning is free. Just use a reasonable amount. Or a can of tuna with 1  tablespoon mayonnaise.  Salad: lettuce, any other veggies (cucumbers, green peppers/celery you like and a small drizzle of dressing to just flavor.  Go as big on the veggies as you like,  as they are practically calorie free.   A whole, 8 inch cucumber is 45 calories, a whole green pepper is 23 calories, a stalk of celery is 9 calories.  [place] Dressing is 60 calories per tablespoon..so moderate your desired dressing or do a drizzle of olive oil and splash of balsamic vinegar on top,  Total calories unlikely to be over 150 even with dressing.  Glass of Water.    Option for lunch is meal replacement protein drink/smoothie.  Need at least 20 grams of protein and eat the rest of your apple/banana from the morning snack.      Afternoon Snack: 150-200 calories   Cheese Stick or cottage cheese again  and a fresh fruit OR  Granola Bar (protein Bar acceptable if under 200 calories OR  Homemade smoothies:  8oz skim milk,  a handful of berries (fresh or frozen and a serving of protein powder such as BiPro or Taniya sWhey for example.  If you don't like dairy, make with 8oz water, one small banana, handful of berries and the protein powder, add any veggies you want as well:  roughly 200 calories.   Glass of Water    Dinner: 325 calories  4oz of fresh, Atlantic salmon.  Broiled (salt/pepper/dill) for about 8-8.5 minutes (200calories) or  4oz filet mignon steak or sirloin steak  Salad or vegetable sautéed lightly in olive oil or   Broccoli 1.5  cups chopped and steamed  or micro-waved in a little water (75 calories)  Glass of Water,    Cup of herbal tea (unsweetened, caffeine free)      Herbs and seasonings are encouraged to flavor your foods/vegetables.  Make your food delicious.      Tips for Success:  1.  Prepare proteins ahead of time (broil chicken breasts in bulk so you can grab and go), steel cut oats/lentils can be stored in casserole dish/bowl in the fridge for quick scoop in the morning and rewarm in microwave,  "make use of crock pot recipes (watch salt content).  Making meals that cover 3-4 future meals is an easy way to stay on track.  2.  Drink a 8-12 oz glass of water every 2-3 hours when awake.  We often mistake hunger for thirst, especially when losing weight.  3. Remember your Reward and Motivation when things get hard.  4.  Weigh yourself every morning and record, you'll stay on track better and learn how our biorhythms, diet and elimination patterns show up on the scale. Don't worry about 1 or 2 day patterns, but when on track you'll notice good trend downward of weight over 3-4 day segments.  Plateaus tend to resolve after 4-8 days in most cases if you stay consistent with your plan.  These are natural and part of weight loss, even if you're perfect with your plan execution.  5. Call if problems/concerns.  [employer] is a great tool to stay in touch and provide weekly outside accountability. Check in with questions or if you want to brag.  6.  Find a handful of meals/foods that keep you on track and feeling good and get into a routine that is sustainable for you.  It's OK to have a routine that works for you.  7.  Consider taking a complete multivitamin just to make sure all micronutrients are adequate during weight loss.  8. If losing hair/brittle nails it usually means you are not taking enough protein.  Minimum goal is 60 grams daily of protein for smaller women, 80 grams a day for men. Consider taking Biotin as supplement or a \"Hair and Nail\" multivitamin.      "

## 2021-06-11 NOTE — PROGRESS NOTES
"Ronel Ryan is a 40 y.o. female who is being evaluated via a billable telephone visit.      The patient has been notified of following:     \"This telephone visit will be conducted via a call between you and your physician/provider. We have found that certain health care needs can be provided without the need for a physical exam.  This service lets us provide the care you need with a short phone conversation.  If a prescription is necessary we can send it directly to your pharmacy.  If lab work is needed we can place an order for that and you can then stop by our lab to have the test done at a later time.    Telephone visits are billed at different rates depending on your insurance coverage. During this emergency period, for some insurers they may be billed the same as an in-person visit.  Please reach out to your insurance provider with any questions.    If during the course of the call the physician/provider feels a telephone visit is not appropriate, you will not be charged for this service.\"    Patient has given verbal consent to a Telephone visit? Yes    What phone number would you like to be contacted at? [phone]    Patient would like to receive their AVS by AVS Preference: Michelle.    Additional provider notes:   Getting bad taste in her mouth with naltrexone a couple months ago so stopped. Getting PT for hips currently and liking it, has appt today. Nustep Bike helps. Home fitness involves exercise at home. DVDs as well. Not using dD3 currently, due for recheck. Not using trulicity but would like t restart, due for A1c heck (was at 6.7% last year). Metformin wasn't agreeing with her so stoped that as well.Phone call duration: 20 minutes reports a weight of 291 lbs, 54 lbs down from her starting weight of 345 lbs, BMI of 59.2 July of 2019.   Plan:  1. Great work with continued weight loss,you're down over 20% total body weight since July of 2019!  Aiming for 1500 kcal daily, continuing your daily " exercises and mindful eating with 80 grams of protein daily and timing meals about every 4.5-5.5 hours should help keep you on track.    2. Given your good use of Trulicity and some of the upset stomach metformin was causing, you requested a restart so a new prescription was sent to your pharmacy today for the 0.75mg strength pen. Use once weekly and we'll reassess in 3 months.  Recheck A1c this month (last year you started with us at an A1c of 6.7%).     3. I'd recommend using 5000IUs of OTC vitamin D3 daily given your history of low levels. Recheck levels as we head into the fall and I'll adjust the recommendations based on that D and PTH level.    4. Continue rehab with your hips and [facility]. Movement will be vital for your weight maintenance phase that I anticipate starting this winter.    5. Recheck with me in 10 weeks.    Winter Barkley LPN

## 2021-06-13 NOTE — PATIENT INSTRUCTIONS - HE
Plan:  1. Great work with increased mindfulness/tracking food and measuring weight. The excess carbs yesterday from [employer] likely caused some extra fluid retention today and I'd expect by the weekend those 3-4 lbs to have come off again.    2. Restart naltrexone:  Half tablet with supper for 5 days then increase to twice daily dosing (breakfast and supper). Stop using if any need for opiate pain medication as this medication makes opiate pain medications not work as well.    3. Restart metformin: 500mg taken with supper for a week then increase to twice daily dosing with breakfast and supper). Your weight loss has greatly improved your A1c (going from 6.7% last year to your recent report of 5.6% last month at your primary clinic). Continued good use of meds/weight management and dietary therapy/exercise will help control your diabetic disease.  The Trulicity helps as well. Continue good use.    4. You were very low in vitamin D last year and since boosting levels haven't been using regular D3, I'd recommend starting 5000 IUs daily and rechecking levels at the end of the year.  I'll send an Rx but this is available OTC everywhere vitamins are sold, just check the strength for the 125mcg/5000IU dose.    5. 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       Example Meal Plan for a 5396-5445 Calorie Diet:    In order to fuel your weight loss properly and avoid hunger-induced overeating later in the day, for your height and weight, you will enjoy the most success by following the diet below or similar with adjustments based on your particular tastes and preferences.  Exercise may influence speed, amount of weight loss further.     I recommend getting into a meal routine and keeping it similar day to day in the beginning so you don t have to think too hard about what you re going to make/eat.  Keep snacks healthy, ideally containing protein and some vegetables.   Non-processed food is preferable to packaged items.  Eat at least a few crunchy green vegetables if having a snack, which should be 2-3 hours after your mealtimes(prepare these ahead of time for ease of use).  Drink 64 oz -80 oz of water daily for most, some of you will need more and we'll discuss it at your visit if that is the case.  When changing our diet and reducing our intake,  we can often mistake thirst for hunger or just have some grazing habits we have to break ('bored/mindless eating') and a glass of water and reconsideration of our hunger is often all that is needed.  Having the urge is not the problem, but watching it pass by without acting on it is the goal.    If you re having hunger problems, add a protein drink/snack to your morning hours or afternoon snack with at least 20grams of protein and not too much sugar (under 10g).  A carton of higher protein/low sugar yogurt can work as well.  If the urge to snack is overwhelming and not satiated, try going for a 10 minute walk/exercise, come home and drink a glass of water and if still hungry, have a  calorie snack (handful of raw/sprouted nuts, veggies and string cheese, protein bar, etc).  Savor it.    It is better to have a large breakfast, a moderate lunch and a smaller dinner to fuel your day.  People lose 10-15% more weight during their weight loss season with this strategy. Optimizing your protein intake at each meal will further keep you more satisfied while eating less food overall.  Getting exercise in early has also been shown to offer the best results (before breakfast ideally but anytime is the right time to exercise if that is not an option for you).    To make sure you re getting adequate vitamins and minerals during weight loss, I recommend one complete multivitamin a day of your choice.  Consider a probiotic and taking some vitamin D 2000 IU daily.    Let supper be your last meal of the day and ideally try to have at least 12 hours  between supper and breakfast the next day to tap into some beneficial overnight fasting dynamics.  Water in the evening is fine, unsweetened, non caffeinated herbal tea is helpful as well.  Consolidating your meals within a 8-12 hour period of your day will help tap into these additional metabolic benefits and tends to keep your appetite up for breakfast, further helping to stay on track.  For most of my patients I don't recommend an intermittent fasting style diet (many find it hard to fit in their lifestyle) but an overnight fast is very doable for most patients and helps regulate our hunger drives a little better.  This makes it very important to nail good intake at all three meals to feel satisfied/energized and still lose weight.  If evening snacking desires are high, consider a glass of fiber supplement for some additional fullness (metamucil or similar). Most of us don't get the 25-30 grams per day of fiber that promotes good gut health/satiety.  Benefiber, metamucil, citrucel are reasonable/affordable options for most people.  Inulin, chicory, psyllium husk are reasonable options but start slow and low in the dose to avoid gas/bloating until your gut gets acclimated (ramping up to 5-10 grams per day of supplemental fiber after 3-4 weeks if needed).      Example Meal Plan:  Breakfast: 450-475 Calories  1 egg cooked on low in olive oil:   calories.  5oz Greek Yogurt (Fage plain classic: ~150 randi)  Handful of Berries of your choice (about a calorie per berry or 20-40cal per handful)    cup(cooked) of  old fashioned oatmeal or 1/2 cup(cooked) steel cut oats. (150 randi)  Sprinkle amount of brown sugar and a pat of butter. (40 randi)  Glass of  Water  Black coffee or unsweetened Tea (0calories).      2-3 hours Later Snack: (195 calories).  Glass of water  One string Cheese (80 calories) or 4 oz creamed cottage cheese (115 calories) with  Crunchy Celery sticks (less than 10 calories per large stalk) 2 stalks.  (20 calories)    of a  Large Banana or   of a Large Apple (60 calories):  eat second half at lunch or afternoon snack.     Lunch:300 -350 calories   Chicken Breast  (baked/broiled/roasted/grilled)  4-6 oz.  (125-180 randi), BBQ sauce/hot sauce/mustard/seasoning is free. Just use a reasonable amount. Or a can of tuna with 1 tablespoon mayonnaise.  Salad: lettuce, any other veggies (cucumbers, green peppers/celery you like and a small drizzle of dressing to just flavor.  Go as big on the veggies as you like,  as they are practically calorie free.   A whole, 8 inch cucumber is 45 calories, a whole green pepper is 23 calories, a stalk of celery is 9 calories.  [place] Dressing is 60 calories per tablespoon..so moderate your desired dressing or do a drizzle of olive oil and splash of balsamic vinegar on top,  Total calories unlikely to be over 150 even with dressing.  Glass of Water.    Option for lunch is meal replacement protein drink/smoothie.  Need at least 20 grams of protein and eat the rest of your apple/banana from the morning snack.      Afternoon Snack: 150-200 calories   Cheese Stick or cottage cheese again  and a fresh fruit OR  Granola Bar (protein Bar acceptable if under 200 calories OR  Homemade smoothies:  8oz skim milk,  a handful of berries (fresh or frozen and a serving of protein powder such as BiPro or Taniya sWhey for example.  If you don't like dairy, make with 8oz water, one small banana, handful of berries and the protein powder, add any veggies you want as well:  roughly 200 calories.   Glass of Water    Dinner: 325 calories  4oz of fresh, [place] salmon.  Broiled (salt/pepper/dill) for about 8-8.5 minutes (200calories) or  4oz filet mignon steak or sirloin steak  Salad or vegetable sautéed lightly in olive oil or   Broccoli 1.5  cups chopped and steamed  or micro-waved in a little water (75 calories)  Glass of Water,    Cup of herbal tea (unsweetened, caffeine free)      Herbs and  "seasonings are encouraged to flavor your foods/vegetables.  Make your food delicious.      Tips for Success:  1.  Prepare proteins ahead of time (broil chicken breasts in bulk so you can grab and go), steel cut oats/lentils can be stored in casserole dish/bowl in the fridge for quick scoop in the morning and rewarm in microwave, make use of crock pot recipes (watch salt content).  Making meals that cover 3-4 future meals is an easy way to stay on track.  2.  Drink a 8-12 oz glass of water every 2-3 hours when awake.  We often mistake hunger for thirst, especially when losing weight.  3. Remember your Reward and Motivation when things get hard.  4.  Weigh yourself every morning and record, you'll stay on track better and learn how our biorhythms, diet and elimination patterns show up on the scale. Don't worry about 1 or 2 day patterns, but when on track you'll notice good trend downward of weight over 3-4 day segments.  Plateaus tend to resolve after 4-8 days in most cases if you stay consistent with your plan.  These are natural and part of weight loss, even if you're perfect with your plan execution.  5. Call if problems/concerns.  [employer] is a great tool to stay in touch and provide weekly outside accountability. Check in with questions or if you want to brag.  6.  Find a handful of meals/foods that keep you on track and feeling good and get into a routine that is sustainable for you.  It's OK to have a routine that works for you.  7.  Consider taking a complete multivitamin just to make sure all micronutrients are adequate during weight loss.  8. If losing hair/brittle nails it usually means you are not taking enough protein.  Minimum goal is 60 grams daily of protein for smaller women, 80 grams a day for men. Consider taking Biotin as supplement or a \"Hair and Nail\" multivitamin.          LEAN PROTEIN SOURCES  Getting 20-30 grams of protein, 3 meals daily, is appropriate for most people, some need more but more " than about 40 grams per meal is not useful.  General rule is drinking one ounce of water per gram of protein eaten over the course of the day:  70 grams of protein each day, drink 70 oz of water.  Protein Source Portion Calories Grams of Protein                           Nonfat, plain Greek yogurt    (10 grams sugar or less) 3/4 cup (6 oz)  12-17   Light Yogurt (10 grams sugar or less) 3/4 cup (6 oz)  6-8   Protein Shake 1 shake 110-180 15-30   Skim/1% Milk or lactose-free milk 1 cup ( 8 oz)  8   Plain or light, flavored soymilk 1 cup  7-8   Plain or light, hemp milk 1 cup 110 6   Fat Free or 1% Cottage Cheese 1/2 cup 90 15   Part skim ricotta cheese 1/2 cup 100 14   Part skim or reduced fat cheese slices 1 ounce 65-80 8     Mozzarella String Cheese 1 80 8   Canned tuna, chicken, crab or salmon  (canned in water)  1/2 cup 100 15-20   White fish (broiled, grilled, baked) 3 ounces 100 21   [place]/Tuna (broiled, grilled, baked) 3 ounces 150-180 21   Shrimp, Scallops, Lobster, Crab 3 ounces 100 21   Pork loin, Pork Tenderloin 3 ounces 150 21   Boneless, skinless chicken /turkey breast                          (broiled, grilled, baked) 3 ounces 120 21   [place], [place], [place], and Venison 3 ounces 120 21   Lean cuts of red meat and pork (sirloin,   round, tenderloin, flank, ground 93%-96%) 3 ounces 170 21   Lean or Extra Lean Ground Turkey 1/2 cup 150 20   90-95% Lean [place] Burger 1 mckinley 140-180 21   Low-fat casserole with lean meat 3/4 cup 200 17   Luncheon Meats                                                        (turkey, lean ham, roast beef, chicken) 3 ounces 100 21   Egg (boiled, poached, scrambled) 1 Egg 60 7   Egg Substitute 1/2 cup 70 10   Nuts (limit to 1 serving per day)  3 Tbsp. 150 7   Nut [place] (peanut, almond)  Limit to 1 serving or less daily 1 Tbsp. 90 4   Soy Burger (varies) 1  15   Garbanzo, Black, [place] Beans 1/2 cup 110 7   Refried Beans 1/2 cup 100 7   Kidney and  [place]ima beans 1/2 cup 110 7   Tempeh 3 oz 175 18   Vegan crumbles 1/2 cup 100 14   Tofu 1/2 cup 110 14   Chili (beans and extra lean beef or turkey) 1 cup 200 23   Lentil Stew/Soup 1 cup 150 12   Black Bean Soup 1 cup 175 12             50 Things to do Instead of Snacking  We'll all have urges to snack sometimes. Hunger, mood, stress, boredom and distraction are common triggers so being mindful and thinking about what is driving a particular urge to snack at a particular time can be helpful for reducing the urge in the future.  Remember, the urge to snack doesn't have to be obeyed. The urge exists, but you can watch it pass. Over time, you will get good at the exercise of experiencing an urge, acknowledging it, but letting it pass you by and float away.  Until you get there, here are some activities to pass the 3-5 minutes that most urges last. Good hydration is always helpful.  If you do struggle with impulse/urge control, consider some therapy based on cognitive behavioral therapy or ACT (Acceptance and Commitment Therapy).  Apps/subscriptions like [employer] emphasize some of these tools and can be of help for those able to afford the nia/subscription.  1. Imagine the new healthier you   2. Walk around the block   3. Call a friend   4. Make a list of your Top Ten Reasons to Lose Weight   5. Make a To Do list   6. Turn on music and dance   7. Jot a thank you note to someone   8. Go to bed early or take a nap  9. Read a book   10. Blog or journal  11. Give yourself a manicure or pedicure   12. Plan a healthy meal for your family   13. Surf the Internet   14. Finish an unfinished project   15. Walk your dog, pet your cat, feed your fish  16. Brush your teeth   17. Balance your checkbook   18. Say a prayer   19. Chop veggies for later use.  20. Give a massage   21. Clean out a junk drawer   22. Play a game with your kids   23. Try a new route on your walk     24. Drink a glass of water   25. Kiss someone   26. Try on some of  your clothes   27. Look at old pictures   28. Rent a video   29. Wash your car   30. Take a hot, soothing bath   31. Update your calendar   32. Work in your yard   33. Start your holiday shopping list   34. Count your blessings   35. Write a letter   36. Fold some laundry   37. Check your e-mail   38. Give your dog a bath   39. Send a birthday card   40. Meditate   41. Hug someone   42. Rearrange some furniture   43. Light a fire or some candles   44. Put your pictures in an album   45. Plan a trip (real or imaginary)  46. Straighten a closet   47. Clean out a files   48. Visit a friend  49. Clean out your trunk  50. Do something nice for someone     High Intensity Interval Training (HIIT):  Ronel, you could do this in the pool with easy/intense walking or on a recumbent bike at the [facility] or [facility].    To feel our best, our bodies need to move. Our mental health, sleep, memory, immune function, stress coping and metabolic health depend on exercise and its benefits for optimizing how our body works best. In the modern world, most do not get nearly enough exercise.  However, it's important to understand that ANYTHING is better than nothing and if you can get started with a routine for fitness, even a little bit has some benefit compared to none.  The more you do, the better (up to 20 hours weekly, beyond that the benefit tails off), but the NIH guidelines for all adults in [place] is to work up to 150-200 minutes of moderate aerobic activity each week to improve our health.  If you're a person that struggles with time pressure/lack of interest then those 2.5-3 hours weekly may be too much to ask.  So, we have to be very efficient in how we exercise to reap the benefits. It turns out that INTENSITY matters. When we use Vigorous rather than Moderate aerobic activity (Vigorous defined by a heart rate of 70-85% of calculated maximum heart rate; max heart rate equals 220 beats minus your age or the level of effort  "where you could talk 2-4 words before needing to take a breath), the amount of time required to reap the benefits of exercise is cut in half:  75-90 minutes/week.      A recent study showed that a 10 minute interval workout using a 3-4 minute warm up and then 20 second \"maximum effort\" followed by 1 minute, 40 seconds of recovery and then repeated two more times was as effective in improving fitness as a 30 minute brisk walk.  They utilized exercise bikes or stairs for the effort but you could adapt to whatever geography/gym/pool/bike/hill/staircase that you may have as long as you can safely do the effort without injury.   As your fitness improves, intervals of 30 seconds to 2 minutes of increased effort followed by 1-2 minutes of recovery are options as well. The fitter you become, the harder and more intervals you'll be able to do.  Start with what you CAN do, not what you WANT to do and that will allow your body to adapt/develop fitness down the road to reach your goals.  Give yourself permission to develop a base of fitness the first 3 weeks and then you should be able to ramp up from there nicely and progressively each week.    Jumping right into a High Intensity Interval workout if you've not been having some level of exercise/fitness recently can increase your risk of injury.  To help develop some base fitness here are some options that would give you a 3-4 week base development, assuming you can walk or ride a stationary bike but haven't been doing much the last few months. 3-4 sessions each week of:      Week Warm up Interval: 3-6 repeats Cooldown   1 3-5 minutes easy walk/spin 20 seconds brisk but doable pace then recover with 90 seconds easy 2-3 minute easy walk/spin   2 3-5 minutes easy walk/spin 25 seconds brisk, 90 seconds recovery 2-3 minute easy walk/spin   3 3-5 minutes easy walk/spin 30 seconds brisk, 90 seconds recovery 2-3 minute easy walk/spin   4 3-5 minutes easy walk/spin 40 seconds brisk, 60 " seconds recovery 2-3 minute easy walk/spin     *for base development, keep resistance steady and just  the speed. Once you've completed base phase, feel free to add a little extra resistance to the faster phase to increase vigorousness/heart rate.  During base phase you should be still able to talk comfortable/sing during faster pedaling/walking.     After completing the base phase, start ramping up workouts on the bike/walking. Have one easy pace workout but of longer duration (add 2-3 minutes each week to the time) each week.  One workout weekly should have an interval workout where you push your intensity working up to those 15-30 second maximum efforts and recovering fully and then repeating for at least 3-4 intervals.  Cool down/easy pedal at the end for 1-2 minutes.     Consider a spin class if you have the means/access and remember, it's OK to rest if needed. Make the workout yours and don't focus on other people's abilities, getting started will develop your own fitness every week.  Jogging plans such as the Couch to 10k or any aerobic training program make use of similar intervals and can help you reach a fitter and more capable body regardless of where/how you perform the intervals (walking/biking/swimming/elliptical/row machine/erg arm machine, peddler, raking, lawn mowing,etc).  Go for it.

## 2021-06-13 NOTE — PROGRESS NOTES
"Ronel Ryan is a 40 y.o. female who is being evaluated via a billable telephone visit.      The patient has been notified of following:     \"This telephone visit will be conducted via a call between you and your physician/provider. We have found that certain health care needs can be provided without the need for a physical exam.  This service lets us provide the care you need with a short phone conversation.  If a prescription is necessary we can send it directly to your pharmacy.  If lab work is needed we can place an order for that and you can then stop by our lab to have the test done at a later time.    Telephone visits are billed at different rates depending on your insurance coverage. During this emergency period, for some insurers they may be billed the same as an in-person visit.  Please reach out to your insurance provider with any questions.    If during the course of the call the physician/provider feels a telephone visit is not appropriate, you will not be charged for this service.\"    Patient has given verbal consent to a Telephone visit? Yes    What phone number would you like to be contacted at? [phone]     Patient would like to receive their AVS by AVS Preference: Michelle.    Additional provider notes: Since last visit, weight is now around 299 lbs today. This is up about 8 lbs from September. Started GERD medication and appetite increased some.   Monday revamped diet again, still a bit high carb. Lunch salad and dinner grilled chicken strips and TV dinner.  Yesterday: toast/butter, starwberries. Chipotle bowl: rice/steak/beans/cheese and hot sauce, veggies. Discussed cutting back on rice content.    Rechecked w/ PCP her A1c and she reports 5.6% (down from 6.7% in Aug of 2019).     Had been getting [place] [facility] [place] [facility]\"" location is closed for good. Next in a week. Not using nustep bike. [place] membership.  Plan:  1. Great work with increased " mindfulness/tracking food and measuring weight. The excess carbs yesterday from Chipotle likely caused some extra fluid retention today and I'd expect by the weekend those 3-4 lbs to have come off again.    2. Restart naltrexone:  Half tablet with supper for 5 days then increase to twice daily dosing (breakfast and supper). Stop using if any need for opiate pain medication as this medication makes opiate pain medications not work as well.    3. Restart metformin: 500mg taken with supper for a week then increase to twice daily dosing with breakfast and supper). Your weight loss has greatly improved your A1c (going from 6.7% last year to your recent report of 5.6% last month at your primary clinic). Continued good use of meds/weight management and dietary therapy/exercise will help control your diabetic disease.  The Trulicity helps as well. Continue good use.    4. You were very low in vitamin D last year and since boosting levels haven't been using regular D3, I'd recommend starting 5000 IUs daily and rechecking levels at the end of the year.  I'll send an Rx but this is available OTC everywhere vitamins are sold, just check the strength for the 125mcg/5000IU dose.    5. 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   10:07 AM      Phone call duration: 25 minutes    JANE Brito MD

## 2021-06-13 NOTE — PROGRESS NOTES
"Ronel Ryan is a 40 y.o. female who is being evaluated via a billable telephone visit.      The patient has been notified of following:     \"This telephone visit will be conducted via a call between you and your physician/provider. We have found that certain health care needs can be provided without the need for a physical exam.  This service lets us provide the care you need with a short phone conversation.  If a prescription is necessary we can send it directly to your pharmacy.  If lab work is needed we can place an order for that and you can then stop by our lab to have the test done at a later time.    Telephone visits are billed at different rates depending on your insurance coverage. During this emergency period, for some insurers they may be billed the same as an in-person visit.  Please reach out to your insurance provider with any questions.    If during the course of the call the physician/provider feels a telephone visit is not appropriate, you will not be charged for this service.\"    Patient has given verbal consent to a Telephone visit? Yes    What phone number would you like to be contacted at? [phone]    Patient would like to receive their AVS by AVS Preference: Michelle.    Phone call duration: 25 minutes    Agueda Johns RD        Medical  Weight Loss Follow-Up Diet Evaluation  Assessment:  Ronel is presenting today for a follow up weight management nutrition consultation. Pt has had an initial appointment with Dr. Dunn  Weight loss medication: Naltrexone and metformin, topamax  Pt's Initial Weight: 345 lbs  Weight: (!) 299 lb (135.6 kg)  Weight loss from initial: 46  % Weight loss: 13.33 %    BMI: Body mass index is 50.29 kg/m .  Ideal body weight: 54.7 kg (120 lb 9.5 oz)  Adjusted ideal body weight: 86 kg (189 lb 8.9 oz)    Estimated RMR (Williams-St Jeor equation):   1982 kcals  Recommended Protein Intake: 72-96 grams of protein/day  Patient Active Problem List:  Patient Active " "Problem List   Diagnosis     Bipolar 1 disorder, mixed, severe (H)     Anxiety     Borderline personality disorder (H)     Bipolar I disorder, most recent episode (or current) mixed, moderate     Relationship problems     History of anxiety disorder     Asthma     Morbid obesity with BMI of 50.0-59.9, adult (H)     Hx of eating disorder     Hyperlipidemia     Arthralgia of hip     Attention deficit disorder     Bipolar II disorder, most recent episode hypomanic (H)     Disorder of pelvis     Eating disorder     Hypertension     Morbid obesity (H)     Osteoarthritis of hip     Recurrent major depressive disorder (H)     Secondary physiologic amenorrhea     Soft tissue lesion of shoulder region     Trochanteric bursitis     Type 2 diabetes mellitus (H)     Suicidal ideation     Pain of both hip joints     Gait difficulty     Binge eating     Adjustment disorder with depressed mood     ACP (advance care planning)     Care plan discussed with patient     Vertigo     Osteoarthritis     Hx of diabetes mellitus     Borderline high cholesterol     Diabetes: yes, not testing  A1c 5.6 not too long ago    Progress on goals from last visit: patient states she has been struggling with constipation for 3 weeks, taking miralax and magnesium citrate. Was able to go last night.   States she started a \"new\" diet at the beginning of the month, adding in oatmeal when she is hungry    Dietary Recall:  Breakfast: banana, strawberry and soy yogurt  Lunch:salad  Dinner:grilled chicken and smart ones meal  Beverages:  Water sometimes- states she doesn't drink much throughout the day  2 tall cans [place] tea  Exercise: will start pool therapy on Thursday and Friday    Nutrition Diagnosis:    Overweight/Obesity (NC 3.3) related to overeating and poor lifestyle habits as evidenced by high fat diet, lack of physical activity and BMI 50.29  Disordered Eating Pattern (NB 1.5) related to obsessive desire, intake of food exceeding RMR as evidenced " by binge eating habits, frequent grazing, skipping meals      Intervention:  1. Food and/or nutrient delivery: discussed the importance of increasing water intake for constipation, also discussed ways to slowly incorporate fiber into diet. Spent some time reviewing portion sizes for foods such as rice  2. Nutrition counseling: goal setting    Monitoring/Evaluation:    Goals:  1. Increase water intake- aiming for at least 8 cups per day    Patient to follow up in 2 months(s) with bariatrician and 3 month(s) with RD

## 2021-06-14 NOTE — PROGRESS NOTES
"Ronel Ryan is a 40 y.o. female who is being evaluated via a billable telephone visit.      The patient has been notified of following:     \"This telephone visit will be conducted via a call between you and your physician/provider. We have found that certain health care needs can be provided without the need for a physical exam.  This service lets us provide the care you need with a short phone conversation.  If a prescription is necessary we can send it directly to your pharmacy.  If lab work is needed we can place an order for that and you can then stop by our lab to have the test done at a later time.    Telephone visits are billed at different rates depending on your insurance coverage. During this emergency period, for some insurers they may be billed the same as an in-person visit.  Please reach out to your insurance provider with any questions.    If during the course of the call the physician/provider feels a telephone visit is not appropriate, you will not be charged for this service.\"    Patient has given verbal consent to a Telephone visit? Yes    What phone number would you like to be contacted at? [phone]    Patient would like to receive their AVS by AVS Preference: Michelle.    Additional provider notes:   Hasn't started metformin but tolerating naltrexone well and finding it helpful.   Not much appetite.  Portion control and diverse diet has been helpful. Not getting 3 meals daily regularly. Skipping breakfast, Lunch is around noon to one pm.  May have evening snack around 7pm.  Supper is later 6-7:30 pm most nights. May wake up in the night to eat some, may ave beverage of tea/OJ. 2 glasses.  Pool therapy ongoing, twice weekly. Cardio workouts in pool at [facility].  Exam  Height: 5' 4\" (1.626 m) (1/6/2021  9:00 AM)  Initial Weight: 345 lbs (1/6/2021  9:00 AM)  Weight: (!) 285 lb (129.3 kg) (1/6/2021  9:00 AM)  Weight loss from initial: 60 (1/6/2021  9:00 AM)  % Weight loss: " 17.39 % (1/6/2021  9:00 AM)  BMI (Calculated): 48.9 (1/6/2021  9:00 AM)  slightly sleepy sounding on phone. No cough/dyspnea. Blunted affect  Plan:  1. Great work on continued weight reduction to start the new year, now down 60 pounds from July of 2019.  Well done.  2. Be mindful about trying to start your day with a meal within 2-3 hours of waking up to help reduce the chance of waking from sleep with hunger/desire to eat.  3. Naltrexone can be taken with 2 meals daily, a half a tablet at each meal or only one full tablet with supper.   4. You can try to ease into metformin by using half a tablet with supper for 2-3 weeks then increase to one full tablet if tolerated.  In the past this upset your stomach but slowly increasing dose may help you tolerate it better.  We'll check A1c and kidney function with vitamin D labs as well this month.  5. Aim for 60-90 grams of protein daily, about 25g per meal every 4-5 hours for good appetite control and reducing the risk of awakening in the night with hunger.  6. Until we see your labs back, continue vitamin D3 at 5000IUs (125mcg) daily.   Phone call duration: 22 minutes    JANE Brito MD

## 2021-06-15 NOTE — PROGRESS NOTES
"Ronel Ryan is a 40 y.o. female who is being evaluated via a billable telephone visit.      The patient has been notified of following:     \"This telephone visit will be conducted via a call between you and your physician/provider. We have found that certain health care needs can be provided without the need for a physical exam.  This service lets us provide the care you need with a short phone conversation.  If a prescription is necessary we can send it directly to your pharmacy.  If lab work is needed we can place an order for that and you can then stop by our lab to have the test done at a later time.    Telephone visits are billed at different rates depending on your insurance coverage. During this emergency period, for some insurers they may be billed the same as an in-person visit.  Please reach out to your insurance provider with any questions.    If during the course of the call the physician/provider feels a telephone visit is not appropriate, you will not be charged for this service.\"    Patient has given verbal consent to a Telephone visit? Yes    What phone number would you like to be contacted at? [phone]    Patient would like to receive their AVS by AVS Preference: Michelle.      Phone call duration: 27 minutes    Agueda Johns RD          Medical  Weight Loss Follow-Up Diet Evaluation  Assessment:  Ronel is presenting today for a follow up weight management nutrition consultation. Pt has had an initial appointment with Dr. Cantu  Weight loss medication: Naltrexone.  Pt's Initial Weight: 345 lbs  Weight: (!) 273 lb (123.8 kg)  Weight loss from initial: 72  % Weight loss: 20.87 %    BMI: Body mass index is 46.86 kg/m .  Ideal body weight: 54.7 kg (120 lb 9.5 oz)  Adjusted ideal body weight: 82.4 kg (181 lb 8.9 oz)    Estimated RMR (Harnett-St Jeor equation):   1892 kcals x 1.2 (sedentary) = 2270 kcals (for weight maintenance)  Recommended Protein Intake: 72-96 grams of protein/day  Patient " Active Problem List:  Patient Active Problem List   Diagnosis     Bipolar 1 disorder, mixed, severe (H)     Anxiety     Borderline personality disorder (H)     Bipolar I disorder, most recent episode (or current) mixed, moderate     Relationship problems     History of anxiety disorder     Asthma     Morbid obesity with BMI of 50.0-59.9, adult (H)     Hx of eating disorder     Hyperlipidemia     Arthralgia of hip     Attention deficit disorder     Bipolar II disorder, most recent episode hypomanic (H)     Disorder of pelvis     Eating disorder     Hypertension     Morbid obesity (H)     Osteoarthritis of hip     Recurrent major depressive disorder (H)     Secondary physiologic amenorrhea     Soft tissue lesion of shoulder region     Trochanteric bursitis     Type 2 diabetes mellitus (H)     Suicidal ideation     Pain of both hip joints     Gait difficulty     Binge eating     Adjustment disorder with depressed mood     ACP (advance care planning)     Care plan discussed with patient     Vertigo     Osteoarthritis     Hx of diabetes mellitus     Borderline high cholesterol     Diabetes: yes    Progress on goals from last visit: Patient is frustrated as her orthopedic surgeon changed her weight goal from 260 to 240lb and this resulted in a binge.   Getting hip injections, doing pool PT  Continues to eat 2 meals per day  Takes medication at 9am and then will go back to sleep until 12pm    Dietary Recall:  Lunch: 12pm- [employer]- double cheeseburger and french fries  Dinner: Double cheeseburger and french fries  Typical snacks: none  Beverages: drinking more water- 64oz per day- adding crystal light, sweetened iced tea- 4 cups per day  Exercise: pool therapy 2 times per week, started PT for shoulders as well  Nutrition Diagnosis:    Overweight/Obesity (NC 3.3) related to overeating and poor lifestyle habits as evidenced by high fat diet, lack of physical activity and BMI 46.86  Disordered Eating Pattern (NB 1.5) related  to obsessive desire, intake of food exceeding RMR as evidenced by binge eating habits, frequent grazing, skipping meals      Intervention:  1. Food and/or nutrient delivery: encouraged her to switch to a diet or unsweetened tea instead of regular. Also discussed the importance of consistent meals, reviewed options for a small meal upon waking such as yogurt or a granola bar  2. Nutrition counseling: motivational interviewing and support for continued success    Monitoring/Evaluation:    Goals:  1. Try to eat a small something when waking   2. Eliminate sweet tea- switch to diet    Patient to follow up in 6 weeks with JOESPH

## 2021-06-16 NOTE — PROGRESS NOTES
"Ronel Ryan is a 40 y.o. female who is being evaluated via a billable telephone visit.      The patient has been notified of following:     \"This telephone visit will be conducted via a call between you and your physician/provider. We have found that certain health care needs can be provided without the need for a physical exam.  This service lets us provide the care you need with a short phone conversation.  If a prescription is necessary we can send it directly to your pharmacy.  If lab work is needed we can place an order for that and you can then stop by our lab to have the test done at a later time.    Telephone visits are billed at different rates depending on your insurance coverage. During this emergency period, for some insurers they may be billed the same as an in-person visit.  Please reach out to your insurance provider with any questions.    If during the course of the call the physician/provider feels a telephone visit is not appropriate, you will not be charged for this service.\"    Patient has given verbal consent to a Telephone visit? Yes    What phone number would you like to be contacted at? [phone]    Patient would like to receive their AVS by AVS Preference: Michelle.    Phone call duration: 23 minutes    Agueda Johns RD          Medical  Weight Loss Follow-Up Diet Evaluation  Assessment:  Ronel is presenting today for a follow up weight management nutrition consultation. Pt has had an initial appointment with Dr. Dunn  Weight loss medication: Naltrexone. Not taking as she ran out  Pt's Initial Weight: 345 lbs  Weight: (!) 271 lb (122.9 kg)  Weight loss from initial: 74  % Weight loss: 21.45 %    BMI: Body mass index is 46.52 kg/m .  Ideal body weight: 54.7 kg (120 lb 9.5 oz)  Adjusted ideal body weight: 82 kg (180 lb 12.1 oz)    Estimated RMR (Mobile-St Jeor equation):   1882 kcals x 1.2 (sedentary) = 2258 kcals (for weight maintenance)    Recommended Protein Intake: 72-96 grams " of protein/day  Patient Active Problem List:  Patient Active Problem List   Diagnosis     Bipolar 1 disorder, mixed, severe (H)     Anxiety     Borderline personality disorder (H)     Bipolar I disorder, most recent episode (or current) mixed, moderate     Relationship problems     History of anxiety disorder     Asthma     Morbid obesity with BMI of 50.0-59.9, adult (H)     Hx of eating disorder     Hyperlipidemia     Arthralgia of hip     Attention deficit disorder     Bipolar II disorder, most recent episode hypomanic (H)     Disorder of pelvis     Eating disorder     Hypertension     Morbid obesity (H)     Osteoarthritis of hip     Recurrent major depressive disorder (H)     Secondary physiologic amenorrhea     Soft tissue lesion of shoulder region     Trochanteric bursitis     Type 2 diabetes mellitus (H)     Suicidal ideation     Pain of both hip joints     Gait difficulty     Binge eating     Adjustment disorder with depressed mood     ACP (advance care planning)     Care plan discussed with patient     Vertigo     Osteoarthritis     Hx of diabetes mellitus     Borderline high cholesterol     Diabetes: blood glucose is looking good     Progress on goals from last visit: is watching what she is eating- has a colonoscopy on the 19th  +trying to eat three consistent meals per day  1. Eliminate sweet tea- goal not met- drinking a lot of sweet juices  2. Pool therapy- last session is next week  +started weight watchers so is tracking food    Dietary Recall:  Breakfast: shrimp   Lunch: chipotle/turkey burger  Dinner: saltine crackers  +fruit juice drinks  Nutrition Diagnosis:    Overweight/Obesity (NC 3.3) related to overeating and poor lifestyle habits as evidenced by high fat diet, intake of high sugar beverages, lack of physical activity and BMI 46.52  Disordered Eating Pattern (NB 1.5) related to obsessive desire, intake of food exceeding RMR as evidenced by binge eating habits, frequent grazing, skipping  meals      Intervention:  1. Food and/or nutrient delivery: discussed eating three meals per day, aiming for at least 20g protein per meal- discussed using a protein shake as a meal replacement for 1 meal per day  2. Encouraged her to eliminate sugary beverages   3. Nutrition counseling: goal setting    Monitoring/Evaluation:    Goals:  1. Weight loss to under 270lb- stop drinking sugary beverages    Patient to follow up in 1 months(s) with bariatrician and 2-3 month(s) with RD

## 2021-06-16 NOTE — TELEPHONE ENCOUNTER
Telephone Encounter by Kizzy Lopez at 8/7/2019  5:41 PM     Author: Kizzy Lopez Service: -- Author Type: --    Filed: 8/7/2019  5:43 PM Encounter Date: 7/31/2019 Status: Signed    : Kizzy Lopez MED NOT NEEDED    I did call and confirm that the medication was covered through the patient's plan with the pharmacy. They processed it on 07/31/2019

## 2021-06-17 NOTE — PROGRESS NOTES
"Ronel Ryan is a 40 y.o. female who is being evaluated via a billable telephone visit.      The patient has been notified of following:     \"This telephone visit will be conducted via a call between you and your physician/provider. We have found that certain health care needs can be provided without the need for a physical exam.  This service lets us provide the care you need with a short phone conversation.  If a prescription is necessary we can send it directly to your pharmacy.  If lab work is needed we can place an order for that and you can then stop by our lab to have the test done at a later time.    Telephone visits are billed at different rates depending on your insurance coverage. During this emergency period, for some insurers they may be billed the same as an in-person visit.  Please reach out to your insurance provider with any questions.    If during the course of the call the physician/provider feels a telephone visit is not appropriate, you will not be charged for this service.\"    Patient has given verbal consent to a Telephone visit? Yes    What phone number would you like to be contacted at? [phone]    Patient would like to receive their AVS by AVS Preference: Michelle.    Additional provider notes: 10:11 AM  Nonsurgical phone visit follow up for weight loss. 7/31/19 IW of 345 lbs, BMI 59.2 with psychiatric medication weight gain history due to bipolar and some hx of eating disorder/bulimia issues in the past. She had some initial counseling at [place] but couldn't get inpatient coverage. She's doing better this past year with her relationship with food and works with therapy. Her weight loss has been progressing very well with dramatic reduction over the nearly 2 years of nearly 80lbs, reporting a weight today of 265 lbs.  Medical therapy with naltrexone and metformin was discontinued. topamax ramped up with her pain clinic and continues Trulicity for her metabolic syndrome/hx DMII (A1c " "7% 2018). Metformin had bad taste to her and wasn't tolerated. RMR of 1892kcal/day w/ 60-90g lean protein daily goal.   She's feeling her hip pain limits her a bit today.  Her ortho in the past wanted her weight under 260 initially, now under 240 lbs. Looking for second opinion soon.   Finds she's reduced her intake well, hydrating well and more mindful. Topamax dose was increased by her pain   Appetite reduced and has been skipping some meals lately so we discussed proper fueling strategy for continued success and the importance of getting enough food through the day to keep metabolically active and having a sense of control.     Height: 5' 4\" (1.626 m) (5/6/2021 10:00 AM)  Initial Weight: 345 lbs (5/6/2021 10:00 AM)  Weight: (!) 265 lb (120.2 kg) (5/6/2021 10:00 AM)  Weight loss from initial: 80 (5/6/2021 10:00 AM)  % Weight loss: 23.19 % (5/6/2021 10:00 AM)  BMI (Calculated): 45.5 (5/6/2021 10:00 AM)      Plan:  1. Great work with 23% total body weight reduction since July of 2019, down 80 lbs. On review today, it sounded like you had a bit of over restriction that could limit energy/metabolism/mood and lead to large swings in food intake. To even things out a little more, try to get your first meal in within 2-3 hours of waking and then have 2 additional meals about every 4-5 hours and getting to bed within 4-5 hours of supper. Each meal should have about 400-500kcal and 25g of lean protein for optimal satisfaction. Half a plate of veggies at lunch and supper and a small fruit/berries or fiber rich carbohydrate snack in mid afternoon of 80-120kcal should help sense of satiety.    2. You can continue to discontinue the naltrexone as your recent topamax increase is providing adequate appetite regulation in combination with your Trulicity.    3. Follow up as planned with your orthopedist regarding hip needs/pain. Continued weight loss will make any intervention more durable so keep up the great work. Arm " exercises/yoga/floor exercises/pool exercise may be a nice way to stay active while limited by your hip pain.    4. It would be reasonable to repeat your metabolic labs this summer. I'll place orders today.        Phone call duration: 25 minutes    Atul Bartlett [facility]   Kerwin Dunn MD

## 2021-06-18 NOTE — PATIENT INSTRUCTIONS - HE
Patient Instructions by Kerwin Dunn MD at 1/6/2021  9:45 AM     Author: Kerwin Dunn MD Service: -- Author Type: Physician    Filed: 1/6/2021 10:06 AM Encounter Date: 1/6/2021 Status: Signed    : Kerwin Dunn MD (Physician)       Plan:  1. Great work on continued weight reduction to start the new year, now down 60 pounds from July of 2019.  Well done.  2. Be mindful about trying to start your day with a meal within 2-3 hours of waking up to help reduce the chance of waking from sleep with hunger/desire to eat.  3. Naltrexone can be taken with 2 meals daily, a half a tablet at each meal or only one full tablet with supper.   4. You can try to ease into metformin by using half a tablet with supper for 2-3 weeks then increase to one full tablet if tolerated.  In the past this upset your stomach but slowly increasing dose may help you tolerate it better.  We'll check A1c and kidney function with vitamin D labs as well this month.  5. Aim for 60-90 grams of protein daily, about 25g per meal every 4-5 hours for good appetite control and reducing the risk of awakening in the night with hunger.  6. Until we see your labs back, continue vitamin D3 at 5000IUs (125mcg) daily.          LEAN PROTEIN SOURCES  Getting 20-30 grams of protein, 3 meals daily, is appropriate for most people, some need more but more than about 40 grams per meal is not useful.  General rule is drinking one ounce of water per gram of protein eaten over the course of the day:  70 grams of protein each day, drink 70 oz of water.  Protein Source Portion Calories Grams of Protein                           Nonfat, plain Greek yogurt    (10 grams sugar or less) 3/4 cup (6 oz)  12-17   Light Yogurt (10 grams sugar or less) 3/4 cup (6 oz)  6-8   Protein Shake 1 shake 110-180 15-30   Skim/1% Milk or lactose-free milk 1 cup ( 8 oz)  8   Plain or light, flavored soymilk 1 cup  7-8   Plain or light, hemp milk 1 cup 110 6    Fat Free or 1% Cottage Cheese 1/2 cup 90 15   Part skim ricotta cheese 1/2 cup 100 14   Part skim or reduced fat cheese slices 1 ounce 65-80 8     Mozzarella String Cheese 1 80 8   Canned tuna, chicken, crab or salmon  (canned in water)  1/2 cup 100 15-20   White fish (broiled, grilled, baked) 3 ounces 100 21   [place]/Tuna (broiled, grilled, baked) 3 ounces 150-180 21   Shrimp, Scallops, Lobster, Crab 3 ounces 100 21   Pork loin, Pork Tenderloin 3 ounces 150 21   Boneless, skinless chicken /turkey breast                          (broiled, grilled, baked) 3 ounces 120 21   [place], [place], [place], and Venison 3 ounces 120 21   Lean cuts of red meat and pork (sirloin,   round, tenderloin, flank, ground 93%-96%) 3 ounces 170 21   Lean or Extra Lean Ground Turkey 1/2 cup 150 20   90-95% Lean [place] Burger 1 mckinley 140-180 21   Low-fat casserole with lean meat 3/4 cup 200 17   Luncheon Meats                                                        (turkey, lean ham, roast beef, chicken) 3 ounces 100 21   Egg (boiled, poached, scrambled) 1 Egg 60 7   Egg Substitute 1/2 cup 70 10   Nuts (limit to 1 serving per day)  3 Tbsp. 150 7   Nut [place] (peanut, almond)  Limit to 1 serving or less daily 1 Tbsp. 90 4   Soy Burger (varies) 1  15   Garbanzo, Black, [place] Beans 1/2 cup 110 7   Refried Beans 1/2 cup 100 7   Kidney and [place]ima beans 1/2 cup 110 7   Tempeh 3 oz 175 18   Vegan crumbles 1/2 cup 100 14   Tofu 1/2 cup 110 14   Chili (beans and extra lean beef or turkey) 1 cup 200 23   Lentil Stew/Soup 1 cup 150 12   Black Bean Soup 1 cup 175 12         Example Meal Plan for a 5576-4016 Calorie Diet:    In order to fuel your weight loss properly and avoid hunger-induced overeating later in the day, for your height and weight, you will enjoy the most success by following the diet below or similar with adjustments based on your particular tastes and preferences.  Exercise may influence speed, amount of weight loss further.     I  recommend getting into a meal routine and keeping it similar day to day in the beginning so you dont have to think too hard about what youre going to make/eat.  Keep snacks healthy, ideally containing protein and some vegetables.  Non-processed food is preferable to packaged items.  Eat at least a few crunchy green vegetables if having a snack, which should be 2-3 hours after your mealtimes(prepare these ahead of time for ease of use).  Drink 64 oz -80 oz of water daily for most, some of you will need more and we'll discuss it at your visit if that is the case.  When changing our diet and reducing our intake,  we can often mistake thirst for hunger or just have some grazing habits we have to break ('bored/mindless eating') and a glass of water and reconsideration of our hunger is often all that is needed.  Having the urge is not the problem, but watching it pass by without acting on it is the goal.    If youre having hunger problems, add a protein drink/snack to your morning hours or afternoon snack with at least 20grams of protein and not too much sugar (under 10g).  A carton of higher protein/low sugar yogurt can work as well.  If the urge to snack is overwhelming and not satiated, try going for a 10 minute walk/exercise, come home and drink a glass of water and if still hungry, have a  calorie snack (handful of raw/sprouted nuts, veggies and string cheese, protein bar, etc).  Savor it.    It is better to have a large breakfast, a moderate lunch and a smaller dinner to fuel your day.  People lose 10-15% more weight during their weight loss season with this strategy. Optimizing your protein intake at each meal will further keep you more satisfied while eating less food overall.  Getting exercise in early has also been shown to offer the best results (before breakfast ideally but anytime is the right time to exercise if that is not an option for you).    To make sure youre getting adequate vitamins and  minerals during weight loss, I recommend one complete multivitamin a day of your choice.  Consider a probiotic and taking some vitamin D 2000 IU daily.    Let supper be your last meal of the day and ideally try to have at least 12 hours between supper and breakfast the next day to tap into some beneficial overnight fasting dynamics.  Water in the evening is fine, unsweetened, non caffeinated herbal tea is helpful as well.  Consolidating your meals within a 8-12 hour period of your day will help tap into these additional metabolic benefits and tends to keep your appetite up for breakfast, further helping to stay on track.  For most of my patients I don't recommend an intermittent fasting style diet (many find it hard to fit in their lifestyle) but an overnight fast is very doable for most patients and helps regulate our hunger drives a little better.  This makes it very important to nail good intake at all three meals to feel satisfied/energized and still lose weight.  If evening snacking desires are high, consider a glass of fiber supplement for some additional fullness (metamucil or similar). Most of us don't get the 25-30 grams per day of fiber that promotes good gut health/satiety.  Benefiber, metamucil, citrucel are reasonable/affordable options for most people.  Inulin, chicory, psyllium husk are reasonable options but start slow and low in the dose to avoid gas/bloating until your gut gets acclimated (ramping up to 5-10 grams per day of supplemental fiber after 3-4 weeks if needed).      Example Meal Plan:  Breakfast: 450-475 Calories  1 egg cooked on low in olive oil:   calories.  5oz Greek Yogurt ([place]age plain classic: ~150 randi)  Handful of Berries of your choice (about a calorie per berry or 20-40cal per handful)    cup(cooked) of  old fashioned oatmeal or 1/2 cup(cooked) steel cut oats. (150 randi)  Sprinkle amount of brown sugar and a pat of butter. (40 randi)  Glass of  Water  Black coffee or unsweetened  Tea (0calories).      2-3 hours Later Snack: (195 calories).  Glass of water  One string Cheese (80 calories) or 4 oz creamed cottage cheese (115 calories) with  Crunchy Celery sticks (less than 10 calories per large stalk) 2 stalks. (20 calories)    of a  Large Banana or   of a Large Apple (60 calories):  eat second half at lunch or afternoon snack.     Lunch:300 -350 calories   Chicken Breast  (baked/broiled/roasted/grilled)  4-6 oz.  (125-180 randi), BBQ sauce/hot sauce/mustard/seasoning is free. Just use a reasonable amount. Or a can of tuna with 1 tablespoon mayonnaise.  Salad: lettuce, any other veggies (cucumbers, green peppers/celery you like and a small drizzle of dressing to just flavor.  Go as big on the veggies as you like,  as they are practically calorie free.   A whole, 8 inch cucumber is 45 calories, a whole green pepper is 23 calories, a stalk of celery is 9 calories.  [place] Dressing is 60 calories per tablespoon..so moderate your desired dressing or do a drizzle of olive oil and splash of balsamic vinegar on top,  Total calories unlikely to be over 150 even with dressing.  Glass of Water.    Option for lunch is meal replacement protein drink/smoothie.  Need at least 20 grams of protein and eat the rest of your apple/banana from the morning snack.      Afternoon Snack: 150-200 calories   Cheese Stick or cottage cheese again  and a fresh fruit OR  Granola Bar (protein Bar acceptable if under 200 calories OR  Homemade smoothies:  8oz skim milk,  a handful of berries (fresh or frozen and a serving of protein powder such as BiPro or TarasWhey for example.  If you don't like dairy, make with 8oz water, one small banana, handful of berries and the protein powder, add any veggies you want as well:  roughly 200 calories.   Glass of Water    Dinner: 325 calories  4oz of fresh, [place] salmon.  Broiled (salt/pepper/dill) for about 8-8.5 minutes (200calories) or  4oz filet mignon steak or sirloin  steak  Salad or vegetable sautéed lightly in olive oil or   Broccoli 1.5  cups chopped and steamed  or micro-waved in a little water (75 calories)  Glass of Water,    Cup of herbal tea (unsweetened, caffeine free)      Herbs and seasonings are encouraged to flavor your foods/vegetables.  Make your food delicious.      Tips for Success:  1.  Prepare proteins ahead of time (broil chicken breasts in bulk so you can grab and go), steel cut oats/lentils can be stored in casserole dish/bowl in the fridge for quick scoop in the morning and rewarm in microwave, make use of crock pot recipes (watch salt content).  Making meals that cover 3-4 future meals is an easy way to stay on track.  2.  Drink a 8-12 oz glass of water every 2-3 hours when awake.  We often mistake hunger for thirst, especially when losing weight.  3. Remember your Reward and Motivation when things get hard.  4.  Weigh yourself every morning and record, you'll stay on track better and learn how our biorhythms, diet and elimination patterns show up on the scale. Don't worry about 1 or 2 day patterns, but when on track you'll notice good trend downward of weight over 3-4 day segments.  Plateaus tend to resolve after 4-8 days in most cases if you stay consistent with your plan.  These are natural and part of weight loss, even if you're perfect with your plan execution.  5. Call if problems/concerns.  [employer] is a great tool to stay in touch and provide weekly outside accountability. Check in with questions or if you want to brag.  6.  Find a handful of meals/foods that keep you on track and feeling good and get into a routine that is sustainable for you.  It's OK to have a routine that works for you.  7.  Consider taking a complete multivitamin just to make sure all micronutrients are adequate during weight loss.  8. If losing hair/brittle nails it usually means you are not taking enough protein.  Minimum goal is 60 grams daily of protein for smaller women,  "80 grams a day for men. Consider taking Biotin as supplement or a \"Hair and Nail\" multivitamin.                 "

## 2021-06-20 NOTE — LETTER
Letter by Isabel Pozo RN at      Author: Isabel Pozo RN Service: -- Author Type: --    Filed:  Encounter Date: 6/13/2020 Status: (Other)       6/13/2020        Ronel Ryan  [address]  [facility] [address]    This letter provides a written record that you were tested for COVID-19 on 6/12/20.     Your result was negative.    This means that we didnt find the virus that causes COVID-19 in your sample. A test may show negative when you do actually have the virus. This can happen when the virus is in the early stages of infection, before you feel illness symptoms.    Even if you dont have symptoms, they may still appear. For safety, its very important to follow these rules.    Keep yourself away from others (self-isolation):      Stay home. Dont go to work, school or anywhere else.     Stay in your own room (and use your own bathroom), if you can.    Stay away from others in your home. No hugging, kissing or shaking hands. No visitors.    Clean high touch surfaces often (doorknobs, counters, handles, etc.). Use a household cleaning spray or wipes.    Cover your mouth and nose with a mask, tissue or washcloth to avoid spreading germs.    Wash your hands and face often with soap and water.    Stay in self-isolation until you meet ALL of the guidelines below:    1. You have had no fever for at least 72 hours (that is 3 full days of no fever without the use of medicine that reduces fevers), AND  2. other symptoms (such as cough, shortness of breath) have gotten better, AND  3. at least 10 days have passed since your symptoms first appeared.    Going back to work  Check with your employer for any guidelines to follow for going back to work.    Employers: This document serves as formal notice that your employee tested negative for COVID-19, as of the testing date shown above.    For questions regarding this letter or your Negative COVID-19 result, call [phone] between 8A to 6:30P  (M-F) and 10A to 6:30P (weekends).

## 2021-06-24 ENCOUNTER — SURGERY - HEALTHEAST (OUTPATIENT)
Dept: SURGERY | Facility: CLINIC | Age: 41
End: 2021-06-24
Payer: COMMERCIAL

## 2021-06-26 NOTE — ANESTHESIA POSTPROCEDURE EVALUATION
Patient: Ronel Ryan  Procedure(s):  COLONOSCOPY  Anesthesia type: general    Patient location: Phase II Recovery  Last vitals:   Vitals Value Taken Time   /85 06/08/21 1014   Temp 36.3  C (97.3  F) 06/08/21 1014   Pulse 91 06/08/21 1017   Resp 16 06/08/21 1014   SpO2 98 % 06/08/21 1017   Vitals shown include unvalidated device data.  Post vital signs: stable  Level of consciousness: awake and responds to simple questions  Post-anesthesia pain: pain controlled  Post-anesthesia nausea and vomiting: no  Pulmonary: unassisted, return to baseline  Cardiovascular: stable and blood pressure at baseline  Hydration: adequate  Anesthetic events: no    QCDR Measures:  ASA# 11 - Marcela-op Cardiac Arrest: ASA11B - Patient did NOT experience unanticipated cardiac arrest  ASA# 12 - Marcela-op Mortality Rate: ASA12B - Patient did NOT die  ASA# 13 - PACU Re-Intubation Rate: ASA13B - Patient did NOT require a new airway mgmt  ASA# 10 - Composite Anes Safety: ASA10A - No serious adverse event    Additional Notes:

## 2021-06-26 NOTE — ANESTHESIA PREPROCEDURE EVALUATION
Anesthesia Evaluation      Patient summary reviewed   No history of anesthetic complications     Airway   Mallampati: III  Neck ROM: full   Pulmonary - normal exam    breath sounds clear to auscultation  (+) asthma  mild,                          Cardiovascular - normal exam  (+) hypertension, , hypercholesterolemia,     Rhythm: regular  Rate: normal,         Neuro/Psych    (+) depression, anxiety/panic attacks, chronic pain    Comments: Bipolar disorder    Endo/Other    (+) diabetes mellitus type 2, arthritis, obesity,      GI/Hepatic/Renal    (+) GERD poorly controlled,   chronic renal disease CRI,           Dental - normal exam                        Anesthesia Plan  Planned anesthetic: general endotracheal    ASA 3   Induction: intravenous   Anesthetic plan and risks discussed with: patient  Anesthesia plan special considerations: video-assisted, rapid sequence induction, antiemetics,   Post-op plan: routine recovery

## 2021-06-26 NOTE — ANESTHESIA CARE TRANSFER NOTE
Last vitals:   Vitals:    06/08/21 0909   BP: 135/77   Pulse: 96   Resp: 14   Temp: (!) 35.6  C (96  F)   SpO2: 99%     Patient's level of consciousness is drowsy  Spontaneous respirations: yes  Maintains airway independently: yes  Dentition unchanged: yes  Oropharynx: oropharynx clear of all foreign objects    QCDR Measures:  ASA# 20 - Surgical Safety Checklist: WHO surgical safety checklist completed prior to induction    PQRS# 430 - Adult PONV Prevention: 4558F - Pt received => 2 anti-emetic agents (different classes) preop & intraop  ASA# 8 - Peds PONV Prevention: NA - Not pediatric patient, not GA or 2 or more risk factors NOT present  PQRS# 424 - Marcela-op Temp Management: NA - MAC anesthesia or case < 60 minutes  PQRS# 426 - PACU Transfer Protocol: - Transfer of care checklist used  ASA# 14 - Acute Post-op Pain: ASA14B - Patient did NOT experience pain >= 7 out of 10

## 2021-07-03 NOTE — ADDENDUM NOTE
Addendum Note by Jennifer Arrington RN at 5/13/2020 11:27 AM     Author: Jennifer Arrington RN Service: -- Author Type: Registered Nurse    Filed: 5/19/2020  3:06 PM Encounter Date: 5/13/2020 Status: Signed    : Jennifer Arrington RN (Registered Nurse)    Addended by: JENNIFER ARRINGTON on: 5/19/2020 03:06 PM        Modules accepted: Orders

## 2021-07-06 VITALS
BODY MASS INDEX: 44.97 KG/M2 | HEIGHT: 64 IN | BODY MASS INDEX: 43.98 KG/M2 | BODY MASS INDEX: 44.97 KG/M2 | HEIGHT: 66 IN | WEIGHT: 256.2 LBS | BODY MASS INDEX: 41.35 KG/M2 | WEIGHT: 262 LBS

## 2021-08-06 ENCOUNTER — TRANSFERRED RECORDS (OUTPATIENT)
Dept: HEALTH INFORMATION MANAGEMENT | Facility: CLINIC | Age: 41
End: 2021-08-06

## 2021-08-06 ENCOUNTER — VIRTUAL VISIT (OUTPATIENT)
Dept: SURGERY | Facility: CLINIC | Age: 41
End: 2021-08-06
Payer: COMMERCIAL

## 2021-08-06 VITALS — WEIGHT: 249 LBS | HEIGHT: 64 IN | BODY MASS INDEX: 42.51 KG/M2

## 2021-08-06 DIAGNOSIS — Z87.898 HISTORY OF BORDERLINE DIABETES MELLITUS: Primary | ICD-10-CM

## 2021-08-06 DIAGNOSIS — E66.01 MORBID OBESITY (H): ICD-10-CM

## 2021-08-06 DIAGNOSIS — R63.5 WEIGHT GAIN DUE TO MEDICATION: ICD-10-CM

## 2021-08-06 DIAGNOSIS — T50.905A WEIGHT GAIN DUE TO MEDICATION: ICD-10-CM

## 2021-08-06 PROCEDURE — 99442 PR PHYSICIAN TELEPHONE EVALUATION 11-20 MIN: CPT | Performed by: EMERGENCY MEDICINE

## 2021-08-06 RX ORDER — MIRABEGRON 25 MG/1
25 TABLET, FILM COATED, EXTENDED RELEASE ORAL DAILY
COMMUNITY
Start: 2021-06-20 | End: 2022-03-23

## 2021-08-06 RX ORDER — TOPIRAMATE 100 MG/1
100 TABLET, FILM COATED ORAL 2 TIMES DAILY
COMMUNITY
Start: 2021-05-18 | End: 2023-10-06

## 2021-08-06 RX ORDER — FAMOTIDINE 10 MG/1
TABLET, FILM COATED ORAL
Status: ON HOLD | COMMUNITY
Start: 2020-11-05 | End: 2021-09-09

## 2021-08-06 ASSESSMENT — MIFFLIN-ST. JEOR: SCORE: 1779.46

## 2021-08-06 NOTE — LETTER
"    8/6/2021         RE: Ronel Ryan  [address]  [facility] [address]        Dear Colleague,    Thank you for referring your patient, Ronel Ryan, to the [facility] [place]. Please see a copy of my visit note below.      The patient has been notified of following:     \"This telephone visit will be conducted via a call between you and your physician/provider. We have found that certain health care needs can be provided without the need for a physical exam.  This service lets us provide the care you need with a short phone conversation.  If a prescription is necessary we can send it directly to your pharmacy.  If lab work is needed we can place an order for that and you can then stop by our lab to have the test done at a later time.    Telephone visits are billed at different rates depending on your insurance coverage. During this emergency period, for some insurers they may be billed the same as an in-person visit.  Please reach out to your insurance provider with any questions.    If during the course of the call the physician/provider feels a telephone visit is not appropriate, you will not be charged for this service.\"    Patient has given verbal consent to a Telephone visit? Yes    What phone number would you like to be contacted at? [phone]    Patient would like to receive their AVS by Michelle    Additional provider notes: 12:26 PM  Phone follow up: visit for nonsurgical weight management:  Moved recent, moved last weekend to new apartment.  Lost weight since last visit, weighed 249# at Orthopedist office. 99#down and will plan to get approval of insurance.  Continues to have problematic hip pain, getting injections. Planning surgery this fall if approved.   Hip limits walking but for exercise   PCA is meal prepping for her right now.   Reviewed timely eating goals. Chicken and mary greens is pretty typical. First meal is oatmeal. " Feels in control of hunger, anxious to get hip pain alleviated next month.  We discussed focusing her attention on hip surgery/recuperation and PT to regain function/strength afterwards and then we can meet up towards the end of the year to assess her weight stablization goals. Meds are well tolerated. Pain clinic is managing her topiramate which has some additional appetite benefits and her Trulcity is helping her DMII. She's been intolerant in the past to metformin. Chart review showed recent random glucoses ranging from 150-180mg/dl.  May labs that I ordered were not yet done and can be done w/ her preop labs next month: A1c, D, PTH (hx of low D of 7 in 2019), TSH and B12.  CMP in May showed reasonable tolerance of the higher topiramate doses w/ CO2 of 20. Creat of 1.12.      (Z87.898) History of borderline diabetes mellitus  (primary encounter diagnosis)      (E66.01) Morbid obesity (H)  Comment: started with BMI of 59.2 in July 2019, now BMI of 42.7 as of 8/6/21 visit.      (R63.5,  T50.905A) Weight gain due to medication  Comment: previous antipsychotics impaired satiety/drove hunger and weight gain prior to mindful eating support.        Plan:  1. Great work on 99 lbs of weight reduction over the last 2 years.  Follow up as planned with your orthopedist regarding hip surgery this Fall. Focus on recovering and regaining strength/endurance and ask for PT help from your ortho clinic once cleared.    2. Continue mindful eating habits, consider a bit of extra protein in the morning w/ your oatmeal. An egg/cheese stick/yogurt/protein drink.     3. Continue Trulicity weekly 3mg.      4. Check labs that I ordered in May: A1c, D, PTH  TSH and B12.          Phone call duration: 20 minutes        Again, thank you for allowing me to participate in the care of your patient.        Sincerely,        Kerwin Dunn MD

## 2021-08-06 NOTE — PROGRESS NOTES
"  The patient has been notified of following:     \"This telephone visit will be conducted via a call between you and your physician/provider. We have found that certain health care needs can be provided without the need for a physical exam.  This service lets us provide the care you need with a short phone conversation.  If a prescription is necessary we can send it directly to your pharmacy.  If lab work is needed we can place an order for that and you can then stop by our lab to have the test done at a later time.    Telephone visits are billed at different rates depending on your insurance coverage. During this emergency period, for some insurers they may be billed the same as an in-person visit.  Please reach out to your insurance provider with any questions.    If during the course of the call the physician/provider feels a telephone visit is not appropriate, you will not be charged for this service.\"    Patient has given verbal consent to a Telephone visit? Yes    What phone number would you like to be contacted at? [phone]    Patient would like to receive their AVS by Michelle    Additional provider notes: 12:26 PM  Phone follow up: visit for nonsurgical weight management:  Moved recent, moved last weekend to new apartment.  Lost weight since last visit, weighed 249# at Orthopedist office. 99#down and will plan to get approval of insurance.  Continues to have problematic hip pain, getting injections. Planning surgery this fall if approved.   Hip limits walking but for exercise   PCA is meal prepping for her right now.   Reviewed timely eating goals. Chicken and mary greens is pretty typical. First meal is oatmeal. Feels in control of hunger, anxious to get hip pain alleviated next month.  We discussed focusing her attention on hip surgery/recuperation and PT to regain function/strength afterwards and then we can meet up towards the end of the year to assess her weight stablization goals. Meds are well " tolerated. Pain clinic is managing her topiramate which has some additional appetite benefits and her Trulcity is helping her DMII. She's been intolerant in the past to metformin. Chart review showed recent random glucoses ranging from 150-180mg/dl.  May labs that I ordered were not yet done and can be done w/ her preop labs next month: A1c, D, PTH (hx of low D of 7 in 2019), TSH and B12.  CMP in May showed reasonable tolerance of the higher topiramate doses w/ CO2 of 20. Creat of 1.12.      (Z87.898) History of borderline diabetes mellitus  (primary encounter diagnosis)      (E66.01) Morbid obesity (H)  Comment: started with BMI of 59.2 in July 2019, now BMI of 42.7 as of 8/6/21 visit.      (R63.5,  T50.905A) Weight gain due to medication  Comment: previous antipsychotics impaired satiety/drove hunger and weight gain prior to mindful eating support.        Plan:  1. Great work on 99 lbs of weight reduction over the last 2 years.  Follow up as planned with your orthopedist regarding hip surgery this Fall. Focus on recovering and regaining strength/endurance and ask for PT help from your ortho clinic once cleared.    2. Continue mindful eating habits, consider a bit of extra protein in the morning w/ your oatmeal. An egg/cheese stick/yogurt/protein drink.     3. Continue Trulicity weekly 3mg.      4. Check labs that I ordered in May: A1c, D, PTH  TSH and B12.          Phone call duration: 20 minutes

## 2021-09-01 ENCOUNTER — VIRTUAL VISIT (OUTPATIENT)
Dept: SURGERY | Facility: CLINIC | Age: 41
End: 2021-09-01
Payer: COMMERCIAL

## 2021-09-01 VITALS — BODY MASS INDEX: 42 KG/M2 | WEIGHT: 246 LBS | HEIGHT: 64 IN

## 2021-09-01 DIAGNOSIS — Z71.3 NUTRITIONAL COUNSELING: ICD-10-CM

## 2021-09-01 DIAGNOSIS — E11.9 TYPE 2 DIABETES MELLITUS WITHOUT COMPLICATION, WITHOUT LONG-TERM CURRENT USE OF INSULIN (H): Primary | ICD-10-CM

## 2021-09-01 DIAGNOSIS — E66.01 OBESITY, CLASS III, BMI 40-49.9 (MORBID OBESITY) (H): ICD-10-CM

## 2021-09-01 PROCEDURE — 98967 PH1 ASSMT&MGMT NQHP 11-20: CPT | Performed by: DIETITIAN, REGISTERED

## 2021-09-01 ASSESSMENT — MIFFLIN-ST. JEOR: SCORE: 1760.85

## 2021-09-01 NOTE — LETTER
9/1/2021         RE: Ronel Ryan  [address] [facility] [place]  [address] [place] [address]        Dear Colleague,    Thank you for referring your patient, Ronel Ryan, to the [facility] [place]. Please see a copy of my visit note below.    Ronel Ryan is a 41 year old who is being evaluated via a billable telephone visit.      What phone number would you like to be contacted at? [phone]  How would you like to obtain your AVS? [facility]      Medical  Weight Loss Follow-Up Diet Evaluation  Assessment:  Ronel is presenting today for a follow up weight management nutrition consultation. Pt has had an initial appointment with Dr. Dunn  Weight loss medication: topamax and trulicity.   Pt's weight is 246 lbs 0 oz  BMI: Body mass index is 42.23 kg/m .  Ideal body weight: 54.7 kg (120 lb 9.5 oz)  Adjusted ideal body weight: 78 kg (171 lb 15.3 oz)    Estimated RMR (Lynn-St Jeor equation):   1764 kcals x 1.2 (sedentary) = 2116 kcals (for weight maintenance)  Recommended Protein Intake: 60-80 grams of protein/day  Patient Active Problem List:  Patient Active Problem List   Diagnosis     Binge eating     Eating disorder     Morbid obesity (H)     Multiple-type hyperlipidemia     Severe episode of recurrent major depressive disorder (H)     Arthralgia of hip     Hypertension     Trochanteric bursitis     History of urinary anomaly     Borderline personality disorder (H)     Attention deficit disorder     Asthma     Suicidal ideation     ACP (advance care planning)     Adjustment disorder with depressed mood     Anxiety     Moderate mixed bipolar I disorder (H)     Bipolar II disorder, most recent episode hypomanic (H)     Borderline high cholesterol     Care plan discussed with patient     Disorder of pelvis     Gait difficulty     Hx of diabetes mellitus     Hx of eating disorder     Morbid obesity with BMI of 50.0-59.9, adult (H)     Osteoarthritis     Pain of  both hip joints     Relationship problems     Secondary amenorrhea     Soft tissue lesion of shoulder region     Vertigo     Greater trochanteric bursitis of both hips     Hip dysplasia     Left hip pain     Osteoarthritis of hip     Primary osteoarthritis of left hip     ADD (attention deficit disorder)     H/O urinary frequency     History of anxiety disorder     Hyperlipidemia     Mixed hyperlipidemia     Recurrent major depressive disorder (H)     Type 2 diabetes mellitus (H)     Primary osteoarthritis of both hips     Diabetes: not testing blood glucose- states she is getting a new meter    Progress on goals from last visit: Sept 27th hip replacement!!  Has a PCA that meal preps her food- focusing on protein  1. Eliminate sugary beverages- no soda in a very long time  +states she is drinking a lot of water and adding in some drink mixes    Dietary Recall:  Breakfast: oatmeal with blueberries or smoothie- store bought bag mix  Lunch:baked chicken and sweet potatoes- portioned out in containers  Dinner: chicken and sweet potatoes  Exercise: recently moved- closer to her mother- walking up and down hallway with her walker  Nutrition Diagnosis:    Overweight/Obesity (NC 3.3) related to overeating and poor lifestyle habits as evidenced by high fat diet, intake of high sugar beverages, lack of physical activity and BMI 42.23    Intervention:  1. Food and/or nutrient delivery: encouraged patient to continue on current plan- focusing on portion control. Did discuss the importance of adding in non-starchy veggies for fiber and nutrients.  2. Nutrition counseling: discussed ways to add variety, encouraged her to check out recipes with her PCA for new foods to try.     Monitoring/Evaluation:    Goals:  1. Try new recipe  2. Add veggies at dinner    Patient to follow up in 2 month(s) with bariatrician and PRN with RD. Patient feels she is on a good track right now, but will reach out if she runs into any  issues        Phone call duration: 20 minutes        Again, thank you for allowing me to participate in the care of your patient.        Sincerely,        DWAIN WADSWORTH RD

## 2021-09-01 NOTE — PROGRESS NOTES
Ronel Ryan is a 41 year old who is being evaluated via a billable telephone visit.      What phone number would you like to be contacted at? [phone]  How would you like to obtain your AVS? Health system      Medical  Weight Loss Follow-Up Diet Evaluation  Assessment:  Ronel is presenting today for a follow up weight management nutrition consultation. Pt has had an initial appointment with Dr. Dunn  Weight loss medication: topamax and trulicity.   Pt's weight is 246 lbs 0 oz  BMI: Body mass index is 42.23 kg/m .  Ideal body weight: 54.7 kg (120 lb 9.5 oz)  Adjusted ideal body weight: 78 kg (171 lb 15.3 oz)    Estimated RMR ([place]-St Jeor equation):   1764 kcals x 1.2 (sedentary) = 2116 kcals (for weight maintenance)  Recommended Protein Intake: 60-80 grams of protein/day  Patient Active Problem List:  Patient Active Problem List   Diagnosis     Binge eating     Eating disorder     Morbid obesity (H)     Multiple-type hyperlipidemia     Severe episode of recurrent major depressive disorder (H)     Arthralgia of hip     Hypertension     Trochanteric bursitis     History of urinary anomaly     Borderline personality disorder (H)     Attention deficit disorder     Asthma     Suicidal ideation     ACP (advance care planning)     Adjustment disorder with depressed mood     Anxiety     Moderate mixed bipolar I disorder (H)     Bipolar II disorder, most recent episode hypomanic (H)     Borderline high cholesterol     Care plan discussed with patient     Disorder of pelvis     Gait difficulty     Hx of diabetes mellitus     Hx of eating disorder     Morbid obesity with BMI of 50.0-59.9, adult (H)     Osteoarthritis     Pain of both hip joints     Relationship problems     Secondary amenorrhea     Soft tissue lesion of shoulder region     Vertigo     Greater trochanteric bursitis of both hips     Hip dysplasia     Left hip pain     Osteoarthritis of hip     Primary osteoarthritis of left hip     ADD (attention  deficit disorder)     H/O urinary frequency     History of anxiety disorder     Hyperlipidemia     Mixed hyperlipidemia     Recurrent major depressive disorder (H)     Type 2 diabetes mellitus (H)     Primary osteoarthritis of both hips     Diabetes: not testing blood glucose- states she is getting a new meter    Progress on goals from last visit: Sept 27th hip replacement!!  Has a PCA that meal preps her food- focusing on protein  1. Eliminate sugary beverages- no soda in a very long time  +states she is drinking a lot of water and adding in some drink mixes    Dietary Recall:  Breakfast: oatmeal with blueberries or smoothie- store bought bag mix  Lunch:baked chicken and sweet potatoes- portioned out in containers  Dinner: chicken and sweet potatoes  Exercise: recently moved- closer to her mother- walking up and down hallway with her walker  Nutrition Diagnosis:    Overweight/Obesity (NC 3.3) related to overeating and poor lifestyle habits as evidenced by high fat diet, intake of high sugar beverages, lack of physical activity and BMI 42.23    Intervention:  1. Food and/or nutrient delivery: encouraged patient to continue on current plan- focusing on portion control. Did discuss the importance of adding in non-starchy veggies for fiber and nutrients.  2. Nutrition counseling: discussed ways to add variety, encouraged her to check out recipes with her PCA for new foods to try.     Monitoring/Evaluation:    Goals:  1. Try new recipe  2. Add veggies at dinner    Patient to follow up in 2 month(s) with bariatrician and PRN with RD. Patient feels she is on a good track right now, but will reach out if she runs into any issues        Phone call duration: 20 minutes

## 2021-09-03 DIAGNOSIS — Z11.59 ENCOUNTER FOR SCREENING FOR OTHER VIRAL DISEASES: ICD-10-CM

## 2021-09-07 ENCOUNTER — LAB REQUISITION (OUTPATIENT)
Dept: LAB | Facility: CLINIC | Age: 41
End: 2021-09-07
Payer: COMMERCIAL

## 2021-09-07 DIAGNOSIS — I10 ESSENTIAL (PRIMARY) HYPERTENSION: ICD-10-CM

## 2021-09-07 DIAGNOSIS — Z01.818 ENCOUNTER FOR OTHER PREPROCEDURAL EXAMINATION: ICD-10-CM

## 2021-09-07 PROCEDURE — 80053 COMPREHEN METABOLIC PANEL: CPT | Mod: ORL | Performed by: NURSE PRACTITIONER

## 2021-09-07 PROCEDURE — U0003 INFECTIOUS AGENT DETECTION BY NUCLEIC ACID (DNA OR RNA); SEVERE ACUTE RESPIRATORY SYNDROME CORONAVIRUS 2 (SARS-COV-2) (CORONAVIRUS DISEASE [COVID-19]), AMPLIFIED PROBE TECHNIQUE, MAKING USE OF HIGH THROUGHPUT TECHNOLOGIES AS DESCRIBED BY CMS-2020-01-R: HCPCS | Mod: ORL | Performed by: NURSE PRACTITIONER

## 2021-09-07 NOTE — PROVIDER NOTIFICATION
She is planning on going home on POD 1 with increased home care services. Talked with Deisy Mathis, Ronel's , and Dr. Damon's Coordinator. Order for overnight PCA care at home will be faxed from Dr. Damon's office to the . Deisy will work on securing overnight care at home for Ronel.     09/07/21 0917   Discharge Planning   Patient/Family Anticipates Transition to home with help/services   Concerns to be Addressed care coordination/care conferences   Living Arrangements   People in home alone   Type of Residence Private Residence   Is your private residence a single family home or apartment? Apartment   Number of Stairs, Within Home, Primary none   Once home, are you able to live on one level? Yes   Which level? Main Level   Bathroom Shower/Tub Walk-in shower   Equipment Currently Used at Home walker, rolling;cane, straight;grab bar, tub/shower;grab bar, toilet;shower chair;raised toilet seat   Support System   Support Systems Home Care Staff  (, Deisy Mathis at [facility] [phone], will set up over night care at Ronel's home. She usually has a PCA that helps her everyday from 9am to 2 pm with meals and daily cares.)   Education   Patient attended total joint pre-op class/received pre-op teaching  email/phone call

## 2021-09-08 ENCOUNTER — ANESTHESIA EVENT (OUTPATIENT)
Dept: SURGERY | Facility: CLINIC | Age: 41
DRG: 470 | End: 2021-09-08
Payer: COMMERCIAL

## 2021-09-08 LAB
ALBUMIN SERPL-MCNC: 3.9 G/DL (ref 3.5–5)
ALP SERPL-CCNC: 80 U/L (ref 45–120)
ALT SERPL W P-5'-P-CCNC: 21 U/L (ref 0–45)
ANION GAP SERPL CALCULATED.3IONS-SCNC: 14 MMOL/L (ref 5–18)
AST SERPL W P-5'-P-CCNC: 16 U/L (ref 0–40)
BILIRUB SERPL-MCNC: 0.6 MG/DL (ref 0–1)
BUN SERPL-MCNC: 8 MG/DL (ref 8–22)
CALCIUM SERPL-MCNC: 9.8 MG/DL (ref 8.5–10.5)
CHLORIDE BLD-SCNC: 111 MMOL/L (ref 98–107)
CO2 SERPL-SCNC: 18 MMOL/L (ref 22–31)
CREAT SERPL-MCNC: 1.28 MG/DL (ref 0.6–1.1)
EJECTION FRACTION: 63 %
GFR SERPL CREATININE-BSD FRML MDRD: 52 ML/MIN/1.73M2
GLUCOSE BLD-MCNC: 93 MG/DL (ref 70–125)
POTASSIUM BLD-SCNC: 4.2 MMOL/L (ref 3.5–5)
PROT SERPL-MCNC: 6.7 G/DL (ref 6–8)
SARS-COV-2 RNA RESP QL NAA+PROBE: NEGATIVE
SODIUM SERPL-SCNC: 143 MMOL/L (ref 136–145)

## 2021-09-08 NOTE — TREATMENT PLAN
Orthopedic Surgery Pre-Op Plan: Ronel Ryan  pre-op review. This is NOT an H&P   Surgeon: Dr. Comfort    Hospital: [facility]  Name of Surgery: Left Total Hip Arthroplasty   Date of Surgery: 9/9/21   H&P: Completed 9/7/21 by Cesia Canales CNP, at [facility].   History of ASA, NSAIDS, vitamin and/or herbal supplements within 10 days: Yes- Etodolac- stopped on 9/6/21. Naproxen- instructed to hold this for 7 days before surgery.    History of blood thinners: No    Plan:   1) Discharge Plan: Home with assist of Home PCA services. Currently has PCA that helps her daily from 9 am to 2 pm. Her , Deisy Mathis at [facility] [phone] is working on setting up overnight care at patient's home for after surgery. Please see Discharge Planning section near bottom of this note for further details.     2) History of Allergic Reaction to: Cephalexin (Keflex)- anaphylaxis, hives, Erythromycin- anaphylaxis and hives, and Penicillin- hives. Has Epi pen. Will notify Dr. Damon's team as this could impact the type of IV antibiotic used for missael-op infection prevention. Addendum: Dr. Damon's team is aware of this allergy and have already ordered IV Vancomycin missael-operatively.     3) Congenital Hip Dysplasia: Left Hip. Now undergoing left total hip arthroplasty.     4) Hyperlipidemia: On atorvastatin.    5) Hypertension: Appears well controlled on hydrochlorothiazide.  Instructed to hold hydrochlorothiazide on the morning of surgery.    6) Type 2 Diabetes Mellitus: Most recent hemoglobin A1c 4.8 on 9/7/21. Shows excellent recent BG control with weight loss and Trulicity. I recommend blood glucose checks at least three times a day and at bedtime during hospital stay. Goal BG < 200 to decrease risk for infection and wound healing complications. Nursing to please notify Hospitalist if BG > 200.      7) Morbid Obesity: BMI 42, Wt: 246 lbs. Patient reports losing 100 lbs working with bariatrician and dietician at   [facility] in [place]. Recommend she continues the excellent work on safe weight loss with help of bariatric medicine.     8) Bipolar 1 Disorder, Anxiety, Depression, Personality Disorder: Follows with [facility]. On Risperdal, bupropion and lorazepam.    9) Asthma: Stable with no recent exacerbations.  Uses albuterol inhaler rarely and only as needed.    10) Mild Renal Insufficiency: Creatinine 1.28, GFR 52, BUN 8 on 9/7/2021.  Mild renal insufficiency that should not affect proceeding with surgery.     Patient appears medically optimized for upcoming surgery, pending results of Covid-19 testing which is pending at time of this note. I would recommend Hospitalist Consult to assist with medical management. Please call me below with any questions on this patient.        Review of Systems Notable for: History of anaphylactic reaction to cephalexin and erythromycin, congenital hip dysplasia, hyperlipidemia, hypertension, type 2 diabetes mellitus, morbid obesity, bipolar 1 disorder, anxiety, depression, personality disorder, asthma, mild renal insufficiency.    Past Medical History:   Past Medical History:   Diagnosis Date     Allergic state      Anxiety      Anxiety      Arthritis      Asthma      Bipolar 2 disorder (H)      Bipolar 2 disorder (H)     mixed, one manic episode when combined wellbutrin and celexa. Now off Celexa.     Borderline personality disorder (H)      Cholelithiasis     2007 lap gil     Depressive disorder      Diabetes (H)      Diabetes mellitus (H)     Dx in 2013, lost weight and in 2015 MD thought she might have been misdiagnosed.     Elevated cholesterol      GERD (gastroesophageal reflux disease)      H/O hypercholesterolemia     on statin therapy     Hip dysplasia      Hypertension      Hypertension      Major depression      Menstrual disorder     heavy and irregular bleeding, improved with IUD.     Obese      Personality disorder (H)      PTSD  (post-traumatic stress disorder)      Uncomplicated asthma      Urinary incontinence     on ditropan (showers/pools and stress incontinence).     Past Surgical History:   Procedure Laterality Date     ANKLE FRACTURE SURGERY Right      ARTHROSCOPIC RECONSTRUCTION ANTERIOR CRUCIATE LIGAMENT Left      CHOLECYSTECTOMY       CHOLECYSTECTOMY       COLONOSCOPY      normal colon at age 40, June 2021 study.     CYST REMOVAL      Back of neck     MAMMO STEREOTACTIC CORE BIOPSY LEFT Left 06/15/2020     ORTHOPEDIC SURGERY      ankle and acl     TONSILLECTOMY       ZZHC COLONOSCOPY W/WO BRUSH/WASH N/A 06/08/2021    Procedure: COLONOSCOPY;  Surgeon: Yoel Siegel MD;  Location: [facility] OR;  Service: Gastroenterology       Current Medications:  Patient's Medications   New Prescriptions    No medications on file   Previous Medications    ALBUTEROL (PROAIR HFA/PROVENTIL HFA/VENTOLIN HFA) 108 (90 BASE) MCG/ACT INHALER    Inhale 1-2 puffs into the lungs    ATORVASTATIN (LIPITOR) 40 MG TABLET    Take 1 tablet by mouth daily    BLOOD GLUCOSE (NO BRAND SPECIFIED) LANCETS STANDARD    Use to test blood sugar 2 times daily or as directed.    BLOOD GLUCOSE MONITORING (NO BRAND SPECIFIED) METER DEVICE KIT    Use to test blood sugar 2 times daily or as directed.    BLOOD GLUCOSE MONITORING (NO BRAND SPECIFIED) TEST STRIP    Use to test blood sugars 2 times daily or as directed    BUPROPION (WELLBUTRIN XL) 300 MG 24 HR TABLET    Take 1 tablet by mouth daily    CETIRIZINE (ZYRTEC) 10 MG TABLET    Take 10 mg by mouth daily    CHOLECALCIFEROL 125 MCG (5000 UT) TABS    Take 1 tablet by mouth daily    [employer] ACID CONTROLLER 10 MG TABLET    TAKE 1 TABLET BY MOUTH TWICE A DAY    EPINEPHRINE (EPIPEN/ADRENACLICK/OR ANY BX GENERIC EQUIV) 0.3 MG/0.3ML INJECTION 2-PACK    Inject 0.3 mLs (0.3 mg) into the muscle once as needed for anaphylaxis    ERGOCALCIFEROL (ERGOCALCIFEROL) 1.25 MG (84130 UT) CAPSULE    Take 50,000 Units by mouth daily     HYDROCHLOROTHIAZIDE (HYDRODIURIL) 25 MG TABLET    Take 25 mg by mouth daily    LORAZEPAM (ATIVAN) 0.5 MG TABLET    Take 0.5 mg by mouth as needed    MYRBETRIQ 25 MG 24 HR TABLET    Take 25 mg by mouth daily    NAPROXEN (NAPROSYN) 375 MG TABLET    TAKE 1 TABLET BY MOUTH TWO TIMES DAILY AS NEEDED.    RISPERIDONE (RISPERDAL) 3 MG TABLET    Take 1 tablet by mouth daily    SENNA (SENOKOT) 8.6 MG TABLET    Take 1 tablet by mouth daily    TOPIRAMATE (TOPAMAX) 100 MG TABLET    TAKE 1 TABLET BY MOUTH TWICE A DAY    TOPIRAMATE (TOPAMAX) 25 MG TABLET    Take 50 mg by mouth 2 times daily    TRAZODONE (DESYREL) 50 MG TABLET    Take 2 tablets (100 mg) by mouth nightly as needed for sleep    TRULICITY 0.75 MG/0.5ML PEN    Inject 0.75 mg Subcutaneous once a week   Modified Medications    No medications on file   Discontinued Medications    No medications on file       ALLERGIES:  Allergies   Allergen Reactions     Cephalexin Hives     Other reaction(s): *Unknown     Erythromycin Anaphylaxis and Hives     Other reaction(s): Unknown     Penicillins Hives     hives  Other reaction(s): Unknown       Social History  Social History     Tobacco Use     Smoking status: Never Smoker     Smokeless tobacco: Never Used   Substance Use Topics     Alcohol use: No     Drug use: No       Any Abnormal Recent Diagnostics? Yes  Hemoglobin A1c 4.8 on 9/7/2021: Shows excellent recent control of type 2 diabetes mellitus with recent weight loss and on Trulicity.  Creatinine 1.28, GFR 52 on 9/7/2021: Shows mild renal insufficiency.  No history of chronic kidney disease.  Should not affect proceeding with surgery.    Discharge Planning:   She is planning on going home on POD 1 with increased home care services.       09/07/21 0917   Discharge Planning   Patient/Family Anticipates Transition to home with help/services   Concerns to be Addressed care coordination/care conferences   Living Arrangements   People in home alone   Type of Residence Private  Residence   Is your private residence a single family home or apartment? Apartment   Number of Stairs, Within Home, Primary none   Once home, are you able to live on one level? Yes   Which level? Main Level   Bathroom Shower/Tub Walk-in shower   Equipment Currently Used at Home walker, rolling;cane, straight;grab bar, tub/shower;grab bar, toilet;shower chair;raised toilet seat   Support System   Support Systems Home Care Staff  (, Deisy Mathis at [facility] [phone], [facility]. She usually has a PCA that helps her everyday from 9am to 2 pm with meals and daily cares.)   Education   Patient attended total joint pre-op class/received pre-op teaching  email/phone call            FEMI Pompa CNP   Advanced Practice Nurse Navigator- Orthopedics  [facility]   Phone: [phone]

## 2021-09-09 ENCOUNTER — ANESTHESIA (OUTPATIENT)
Dept: SURGERY | Facility: CLINIC | Age: 41
DRG: 470 | End: 2021-09-09
Payer: COMMERCIAL

## 2021-09-09 ENCOUNTER — ANESTHESIA EVENT (OUTPATIENT)
Dept: SURGERY | Facility: CLINIC | Age: 41
DRG: 470 | End: 2021-09-09
Payer: COMMERCIAL

## 2021-09-09 ENCOUNTER — APPOINTMENT (OUTPATIENT)
Dept: RADIOLOGY | Facility: CLINIC | Age: 41
DRG: 470 | End: 2021-09-09
Attending: ORTHOPAEDIC SURGERY
Payer: COMMERCIAL

## 2021-09-09 ENCOUNTER — APPOINTMENT (OUTPATIENT)
Dept: PHYSICAL THERAPY | Facility: CLINIC | Age: 41
DRG: 470 | End: 2021-09-09
Attending: ORTHOPAEDIC SURGERY
Payer: COMMERCIAL

## 2021-09-09 ENCOUNTER — APPOINTMENT (OUTPATIENT)
Dept: RADIOLOGY | Facility: CLINIC | Age: 41
DRG: 470 | End: 2021-09-09
Attending: PHYSICIAN ASSISTANT
Payer: COMMERCIAL

## 2021-09-09 ENCOUNTER — HOSPITAL ENCOUNTER (INPATIENT)
Facility: CLINIC | Age: 41
LOS: 5 days | Discharge: NURSING, REHAB OR LONG-TERM CARE | DRG: 470 | End: 2021-09-15
Attending: ORTHOPAEDIC SURGERY | Admitting: ORTHOPAEDIC SURGERY
Payer: COMMERCIAL

## 2021-09-09 DIAGNOSIS — Z96.642 STATUS POST TOTAL HIP REPLACEMENT, LEFT: ICD-10-CM

## 2021-09-09 DIAGNOSIS — T84.021A FAILURE OF LEFT TOTAL HIP ARTHROPLASTY WITH DISLOCATION OF HIP, INITIAL ENCOUNTER (H): Primary | ICD-10-CM

## 2021-09-09 DIAGNOSIS — F41.9 ANXIETY: ICD-10-CM

## 2021-09-09 LAB
GLUCOSE BLDC GLUCOMTR-MCNC: 182 MG/DL (ref 70–99)
GLUCOSE BLDC GLUCOMTR-MCNC: 76 MG/DL (ref 70–125)
INR PPP: 1.15 (ref 0.85–1.15)

## 2021-09-09 PROCEDURE — 250N000009 HC RX 250: Performed by: NURSE ANESTHETIST, CERTIFIED REGISTERED

## 2021-09-09 PROCEDURE — 0SRB03A REPLACEMENT OF LEFT HIP JOINT WITH CERAMIC SYNTHETIC SUBSTITUTE, UNCEMENTED, OPEN APPROACH: ICD-10-PCS | Performed by: ORTHOPAEDIC SURGERY

## 2021-09-09 PROCEDURE — 999N000141 HC STATISTIC PRE-PROCEDURE NURSING ASSESSMENT: Performed by: ORTHOPAEDIC SURGERY

## 2021-09-09 PROCEDURE — 250N000009 HC RX 250: Performed by: PHYSICIAN ASSISTANT

## 2021-09-09 PROCEDURE — 0SSBXZZ REPOSITION LEFT HIP JOINT, EXTERNAL APPROACH: ICD-10-PCS | Performed by: ORTHOPAEDIC SURGERY

## 2021-09-09 PROCEDURE — 250N000011 HC RX IP 250 OP 636: Performed by: NURSE ANESTHETIST, CERTIFIED REGISTERED

## 2021-09-09 PROCEDURE — 36415 COLL VENOUS BLD VENIPUNCTURE: CPT | Performed by: PHYSICIAN ASSISTANT

## 2021-09-09 PROCEDURE — 258N000003 HC RX IP 258 OP 636: Performed by: ANESTHESIOLOGY

## 2021-09-09 PROCEDURE — 250N000011 HC RX IP 250 OP 636: Performed by: ANESTHESIOLOGY

## 2021-09-09 PROCEDURE — 250N000011 HC RX IP 250 OP 636: Performed by: FAMILY MEDICINE

## 2021-09-09 PROCEDURE — 73501 X-RAY EXAM HIP UNI 1 VIEW: CPT

## 2021-09-09 PROCEDURE — C1713 ANCHOR/SCREW BN/BN,TIS/BN: HCPCS | Performed by: ORTHOPAEDIC SURGERY

## 2021-09-09 PROCEDURE — 250N000009 HC RX 250: Performed by: ORTHOPAEDIC SURGERY

## 2021-09-09 PROCEDURE — 250N000013 HC RX MED GY IP 250 OP 250 PS 637: Performed by: ORTHOPAEDIC SURGERY

## 2021-09-09 PROCEDURE — 97110 THERAPEUTIC EXERCISES: CPT | Mod: GP

## 2021-09-09 PROCEDURE — 258N000003 HC RX IP 258 OP 636: Performed by: NURSE ANESTHETIST, CERTIFIED REGISTERED

## 2021-09-09 PROCEDURE — 250N000013 HC RX MED GY IP 250 OP 250 PS 637: Performed by: PHYSICIAN ASSISTANT

## 2021-09-09 PROCEDURE — 250N000011 HC RX IP 250 OP 636: Performed by: PHYSICIAN ASSISTANT

## 2021-09-09 PROCEDURE — 710N000010 HC RECOVERY PHASE 1, LEVEL 2, PER MIN: Performed by: ORTHOPAEDIC SURGERY

## 2021-09-09 PROCEDURE — 360N000077 HC SURGERY LEVEL 4, PER MIN: Performed by: ORTHOPAEDIC SURGERY

## 2021-09-09 PROCEDURE — C1776 JOINT DEVICE (IMPLANTABLE): HCPCS | Performed by: ORTHOPAEDIC SURGERY

## 2021-09-09 PROCEDURE — 370N000017 HC ANESTHESIA TECHNICAL FEE, PER MIN: Performed by: ORTHOPAEDIC SURGERY

## 2021-09-09 PROCEDURE — 99214 OFFICE O/P EST MOD 30 MIN: CPT | Performed by: FAMILY MEDICINE

## 2021-09-09 PROCEDURE — 250N000013 HC RX MED GY IP 250 OP 250 PS 637: Performed by: FAMILY MEDICINE

## 2021-09-09 PROCEDURE — 99207 PR CDG-CODE CATEGORY CHANGED: CPT | Performed by: FAMILY MEDICINE

## 2021-09-09 PROCEDURE — 360N000081 HC SURGERY LEVEL 1 W/ FLUORO, PER MIN: Performed by: ORTHOPAEDIC SURGERY

## 2021-09-09 PROCEDURE — 250N000011 HC RX IP 250 OP 636: Performed by: ORTHOPAEDIC SURGERY

## 2021-09-09 PROCEDURE — 250N000009 HC RX 250: Performed by: ANESTHESIOLOGY

## 2021-09-09 PROCEDURE — 999N000063 XR HIP PORTABLE LEFT 2-3 VIEWS

## 2021-09-09 PROCEDURE — 999N000182 XR SURGERY CARM FLUORO GREATER THAN 5 MIN

## 2021-09-09 PROCEDURE — 85610 PROTHROMBIN TIME: CPT | Performed by: PHYSICIAN ASSISTANT

## 2021-09-09 PROCEDURE — 250N000025 HC SEVOFLURANE, PER MIN: Performed by: ORTHOPAEDIC SURGERY

## 2021-09-09 PROCEDURE — 97162 PT EVAL MOD COMPLEX 30 MIN: CPT | Mod: GP

## 2021-09-09 PROCEDURE — 272N000001 HC OR GENERAL SUPPLY STERILE: Performed by: ORTHOPAEDIC SURGERY

## 2021-09-09 PROCEDURE — 97116 GAIT TRAINING THERAPY: CPT | Mod: GP

## 2021-09-09 PROCEDURE — 258N000001 HC RX 258: Performed by: ORTHOPAEDIC SURGERY

## 2021-09-09 PROCEDURE — 999N000063 XR HIP PORT LEFT 1  VIEWS

## 2021-09-09 PROCEDURE — 258N000003 HC RX IP 258 OP 636: Performed by: ORTHOPAEDIC SURGERY

## 2021-09-09 DEVICE — CERAMIC V40 FEMORAL HEAD
Type: IMPLANTABLE DEVICE | Site: HIP | Status: FUNCTIONAL
Brand: BIOLOX

## 2021-09-09 DEVICE — IMP SCR STRK LOW PROFILE HEX 6.5X25MM 7030-6525: Type: IMPLANTABLE DEVICE | Site: HIP | Status: FUNCTIONAL

## 2021-09-09 DEVICE — TRIDENT X3 10 DEGREE POLYETHYLENE INSERT
Type: IMPLANTABLE DEVICE | Site: HIP | Status: FUNCTIONAL
Brand: TRIDENT X3 INSERT

## 2021-09-09 DEVICE — 127 DEGREE NECK ANGLE HIP STEM
Type: IMPLANTABLE DEVICE | Site: HIP | Status: FUNCTIONAL
Brand: ACCOLADE

## 2021-09-09 RX ORDER — SODIUM CHLORIDE, SODIUM LACTATE, POTASSIUM CHLORIDE, CALCIUM CHLORIDE 600; 310; 30; 20 MG/100ML; MG/100ML; MG/100ML; MG/100ML
INJECTION, SOLUTION INTRAVENOUS CONTINUOUS
Status: DISCONTINUED | OUTPATIENT
Start: 2021-09-09 | End: 2021-09-15 | Stop reason: HOSPADM

## 2021-09-09 RX ORDER — ONDANSETRON 2 MG/ML
4 INJECTION INTRAMUSCULAR; INTRAVENOUS EVERY 30 MIN PRN
Status: DISCONTINUED | OUTPATIENT
Start: 2021-09-09 | End: 2021-09-09 | Stop reason: HOSPADM

## 2021-09-09 RX ORDER — HYDROMORPHONE HCL IN WATER/PF 6 MG/30 ML
0.2 PATIENT CONTROLLED ANALGESIA SYRINGE INTRAVENOUS
Status: DISCONTINUED | OUTPATIENT
Start: 2021-09-09 | End: 2021-09-15 | Stop reason: HOSPADM

## 2021-09-09 RX ORDER — FAMOTIDINE 20 MG/1
20 TABLET, FILM COATED ORAL 2 TIMES DAILY
Status: DISCONTINUED | OUTPATIENT
Start: 2021-09-09 | End: 2021-09-15 | Stop reason: HOSPADM

## 2021-09-09 RX ORDER — NALOXONE HYDROCHLORIDE 0.4 MG/ML
0.4 INJECTION, SOLUTION INTRAMUSCULAR; INTRAVENOUS; SUBCUTANEOUS
Status: DISCONTINUED | OUTPATIENT
Start: 2021-09-09 | End: 2021-09-15 | Stop reason: HOSPADM

## 2021-09-09 RX ORDER — DEXAMETHASONE SODIUM PHOSPHATE 4 MG/ML
INJECTION, SOLUTION INTRA-ARTICULAR; INTRALESIONAL; INTRAMUSCULAR; INTRAVENOUS; SOFT TISSUE PRN
Status: DISCONTINUED | OUTPATIENT
Start: 2021-09-09 | End: 2021-09-09

## 2021-09-09 RX ORDER — DIAZEPAM 5 MG
5 TABLET ORAL EVERY 6 HOURS PRN
Status: DISCONTINUED | OUTPATIENT
Start: 2021-09-09 | End: 2021-09-09

## 2021-09-09 RX ORDER — CEFAZOLIN SODIUM 2 G/100ML
2 INJECTION, SOLUTION INTRAVENOUS EVERY 8 HOURS
Status: DISCONTINUED | OUTPATIENT
Start: 2021-09-09 | End: 2021-09-09

## 2021-09-09 RX ORDER — IBUPROFEN 600 MG/1
600 TABLET, FILM COATED ORAL EVERY 6 HOURS PRN
Refills: 0 | COMMUNITY
Start: 2021-09-09 | End: 2021-09-15

## 2021-09-09 RX ORDER — HYDROMORPHONE HYDROCHLORIDE 1 MG/ML
.5-1 INJECTION, SOLUTION INTRAMUSCULAR; INTRAVENOUS; SUBCUTANEOUS
Status: DISCONTINUED | OUTPATIENT
Start: 2021-09-09 | End: 2021-09-09

## 2021-09-09 RX ORDER — ONDANSETRON 2 MG/ML
4 INJECTION INTRAMUSCULAR; INTRAVENOUS EVERY 6 HOURS PRN
Status: DISCONTINUED | OUTPATIENT
Start: 2021-09-09 | End: 2021-09-09

## 2021-09-09 RX ORDER — DEXTROSE MONOHYDRATE 25 G/50ML
25-50 INJECTION, SOLUTION INTRAVENOUS
Status: DISCONTINUED | OUTPATIENT
Start: 2021-09-09 | End: 2021-09-15 | Stop reason: HOSPADM

## 2021-09-09 RX ORDER — MAGNESIUM SULFATE HEPTAHYDRATE 40 MG/ML
2 INJECTION, SOLUTION INTRAVENOUS ONCE
Status: DISCONTINUED | OUTPATIENT
Start: 2021-09-09 | End: 2021-09-09

## 2021-09-09 RX ORDER — HALOPERIDOL 5 MG/ML
1 INJECTION INTRAMUSCULAR
Status: DISCONTINUED | OUTPATIENT
Start: 2021-09-09 | End: 2021-09-09 | Stop reason: HOSPADM

## 2021-09-09 RX ORDER — ATORVASTATIN CALCIUM 40 MG/1
40 TABLET, FILM COATED ORAL EVERY EVENING
Status: DISCONTINUED | OUTPATIENT
Start: 2021-09-09 | End: 2021-09-15 | Stop reason: HOSPADM

## 2021-09-09 RX ORDER — FENTANYL CITRATE 50 UG/ML
INJECTION, SOLUTION INTRAMUSCULAR; INTRAVENOUS PRN
Status: DISCONTINUED | OUTPATIENT
Start: 2021-09-09 | End: 2021-09-09

## 2021-09-09 RX ORDER — ALBUTEROL SULFATE 90 UG/1
1-2 AEROSOL, METERED RESPIRATORY (INHALATION) EVERY 4 HOURS PRN
Status: DISCONTINUED | OUTPATIENT
Start: 2021-09-09 | End: 2021-09-15 | Stop reason: HOSPADM

## 2021-09-09 RX ORDER — MECLIZINE HYDROCHLORIDE 25 MG/1
25 TABLET ORAL 4 TIMES DAILY PRN
Status: DISCONTINUED | OUTPATIENT
Start: 2021-09-09 | End: 2021-09-15 | Stop reason: HOSPADM

## 2021-09-09 RX ORDER — IBUPROFEN 600 MG/1
600 TABLET, FILM COATED ORAL EVERY 6 HOURS PRN
Refills: 0 | COMMUNITY
Start: 2021-09-09 | End: 2022-03-23

## 2021-09-09 RX ORDER — OXYCODONE HYDROCHLORIDE 5 MG/1
5-10 TABLET ORAL EVERY 6 HOURS PRN
Qty: 30 TABLET | Refills: 0 | Status: SHIPPED | OUTPATIENT
Start: 2021-09-09 | End: 2022-03-23

## 2021-09-09 RX ORDER — HYDROMORPHONE HCL IN WATER/PF 6 MG/30 ML
0.2 PATIENT CONTROLLED ANALGESIA SYRINGE INTRAVENOUS EVERY 5 MIN PRN
Status: DISCONTINUED | OUTPATIENT
Start: 2021-09-09 | End: 2021-09-09 | Stop reason: HOSPADM

## 2021-09-09 RX ORDER — ONDANSETRON 2 MG/ML
INJECTION INTRAMUSCULAR; INTRAVENOUS PRN
Status: DISCONTINUED | OUTPATIENT
Start: 2021-09-09 | End: 2021-09-09

## 2021-09-09 RX ORDER — PROCHLORPERAZINE MALEATE 10 MG
10 TABLET ORAL EVERY 6 HOURS PRN
Status: DISCONTINUED | OUTPATIENT
Start: 2021-09-09 | End: 2021-09-09

## 2021-09-09 RX ORDER — SODIUM CHLORIDE, SODIUM LACTATE, POTASSIUM CHLORIDE, CALCIUM CHLORIDE 600; 310; 30; 20 MG/100ML; MG/100ML; MG/100ML; MG/100ML
INJECTION, SOLUTION INTRAVENOUS CONTINUOUS
Status: DISCONTINUED | OUTPATIENT
Start: 2021-09-09 | End: 2021-09-09

## 2021-09-09 RX ORDER — ETODOLAC 400 MG/1
400 TABLET, EXTENDED RELEASE ORAL DAILY
COMMUNITY
End: 2022-03-23

## 2021-09-09 RX ORDER — OXYCODONE HYDROCHLORIDE 5 MG/1
10 TABLET ORAL EVERY 4 HOURS PRN
Status: DISCONTINUED | OUTPATIENT
Start: 2021-09-09 | End: 2021-09-09

## 2021-09-09 RX ORDER — ONDANSETRON 4 MG/1
4 TABLET, ORALLY DISINTEGRATING ORAL EVERY 6 HOURS PRN
Status: DISCONTINUED | OUTPATIENT
Start: 2021-09-09 | End: 2021-09-15 | Stop reason: HOSPADM

## 2021-09-09 RX ORDER — PROCHLORPERAZINE MALEATE 10 MG
10 TABLET ORAL EVERY 6 HOURS PRN
Status: DISCONTINUED | OUTPATIENT
Start: 2021-09-09 | End: 2021-09-15 | Stop reason: HOSPADM

## 2021-09-09 RX ORDER — OXYCODONE HYDROCHLORIDE 5 MG/1
10 TABLET ORAL EVERY 4 HOURS PRN
Status: DISCONTINUED | OUTPATIENT
Start: 2021-09-09 | End: 2021-09-15 | Stop reason: HOSPADM

## 2021-09-09 RX ORDER — ACETAMINOPHEN 325 MG/1
975 TABLET ORAL ONCE
Status: COMPLETED | OUTPATIENT
Start: 2021-09-09 | End: 2021-09-09

## 2021-09-09 RX ORDER — OXYCODONE HYDROCHLORIDE 5 MG/1
5 TABLET ORAL EVERY 4 HOURS PRN
Status: DISCONTINUED | OUTPATIENT
Start: 2021-09-09 | End: 2021-09-15 | Stop reason: HOSPADM

## 2021-09-09 RX ORDER — DIPHENHYDRAMINE HYDROCHLORIDE 50 MG/ML
25 INJECTION INTRAMUSCULAR; INTRAVENOUS EVERY 6 HOURS PRN
Status: DISCONTINUED | OUTPATIENT
Start: 2021-09-09 | End: 2021-09-09 | Stop reason: HOSPADM

## 2021-09-09 RX ORDER — RISPERIDONE 1 MG/1
2 TABLET ORAL AT BEDTIME
Status: DISCONTINUED | OUTPATIENT
Start: 2021-09-09 | End: 2021-09-15 | Stop reason: HOSPADM

## 2021-09-09 RX ORDER — AMOXICILLIN 250 MG
1 CAPSULE ORAL 2 TIMES DAILY
Status: DISCONTINUED | OUTPATIENT
Start: 2021-09-09 | End: 2021-09-15 | Stop reason: HOSPADM

## 2021-09-09 RX ORDER — HYDROMORPHONE HCL IN WATER/PF 6 MG/30 ML
0.2 PATIENT CONTROLLED ANALGESIA SYRINGE INTRAVENOUS
Status: DISCONTINUED | OUTPATIENT
Start: 2021-09-09 | End: 2021-09-09

## 2021-09-09 RX ORDER — POLYETHYLENE GLYCOL 3350 17 G/17G
17 POWDER, FOR SOLUTION ORAL DAILY
Status: DISCONTINUED | OUTPATIENT
Start: 2021-09-10 | End: 2021-09-15 | Stop reason: HOSPADM

## 2021-09-09 RX ORDER — ONDANSETRON 4 MG/1
4 TABLET, ORALLY DISINTEGRATING ORAL EVERY 6 HOURS PRN
Status: DISCONTINUED | OUTPATIENT
Start: 2021-09-09 | End: 2021-09-09

## 2021-09-09 RX ORDER — HYDROXYZINE HYDROCHLORIDE 25 MG/1
25 TABLET, FILM COATED ORAL EVERY 6 HOURS PRN
Status: DISCONTINUED | OUTPATIENT
Start: 2021-09-09 | End: 2021-09-09

## 2021-09-09 RX ORDER — POLYETHYLENE GLYCOL 3350 17 G/17G
17 POWDER, FOR SOLUTION ORAL DAILY
Status: DISCONTINUED | OUTPATIENT
Start: 2021-09-10 | End: 2021-09-09

## 2021-09-09 RX ORDER — BUPROPION HYDROCHLORIDE 150 MG/1
300 TABLET ORAL DAILY
Status: DISCONTINUED | OUTPATIENT
Start: 2021-09-09 | End: 2021-09-15 | Stop reason: HOSPADM

## 2021-09-09 RX ORDER — TOPIRAMATE 50 MG/1
100 TABLET, FILM COATED ORAL 2 TIMES DAILY
Status: DISCONTINUED | OUTPATIENT
Start: 2021-09-09 | End: 2021-09-15 | Stop reason: HOSPADM

## 2021-09-09 RX ORDER — BISACODYL 10 MG
10 SUPPOSITORY, RECTAL RECTAL DAILY PRN
Status: DISCONTINUED | OUTPATIENT
Start: 2021-09-09 | End: 2021-09-09

## 2021-09-09 RX ORDER — LIDOCAINE 40 MG/G
CREAM TOPICAL
Status: DISCONTINUED | OUTPATIENT
Start: 2021-09-09 | End: 2021-09-15 | Stop reason: HOSPADM

## 2021-09-09 RX ORDER — ONDANSETRON 2 MG/ML
4 INJECTION INTRAMUSCULAR; INTRAVENOUS EVERY 6 HOURS PRN
Status: DISCONTINUED | OUTPATIENT
Start: 2021-09-09 | End: 2021-09-15 | Stop reason: HOSPADM

## 2021-09-09 RX ORDER — AMOXICILLIN 250 MG
1-2 CAPSULE ORAL DAILY PRN
Qty: 30 TABLET | Refills: 0
Start: 2021-09-09 | End: 2021-09-15

## 2021-09-09 RX ORDER — MIRABEGRON 25 MG/1
25 TABLET, FILM COATED, EXTENDED RELEASE ORAL DAILY
Status: DISCONTINUED | OUTPATIENT
Start: 2021-09-09 | End: 2021-09-15 | Stop reason: HOSPADM

## 2021-09-09 RX ORDER — SODIUM CHLORIDE, SODIUM LACTATE, POTASSIUM CHLORIDE, CALCIUM CHLORIDE 600; 310; 30; 20 MG/100ML; MG/100ML; MG/100ML; MG/100ML
INJECTION, SOLUTION INTRAVENOUS CONTINUOUS PRN
Status: DISCONTINUED | OUTPATIENT
Start: 2021-09-09 | End: 2021-09-09

## 2021-09-09 RX ORDER — ONDANSETRON 4 MG/1
4 TABLET, ORALLY DISINTEGRATING ORAL EVERY 30 MIN PRN
Status: DISCONTINUED | OUTPATIENT
Start: 2021-09-09 | End: 2021-09-09 | Stop reason: HOSPADM

## 2021-09-09 RX ORDER — ACETAMINOPHEN 325 MG/1
650 TABLET ORAL EVERY 4 HOURS PRN
Status: DISCONTINUED | OUTPATIENT
Start: 2021-09-12 | End: 2021-09-15 | Stop reason: HOSPADM

## 2021-09-09 RX ORDER — SODIUM CHLORIDE, SODIUM LACTATE, POTASSIUM CHLORIDE, CALCIUM CHLORIDE 600; 310; 30; 20 MG/100ML; MG/100ML; MG/100ML; MG/100ML
INJECTION, SOLUTION INTRAVENOUS CONTINUOUS
Status: DISCONTINUED | OUTPATIENT
Start: 2021-09-09 | End: 2021-09-09 | Stop reason: HOSPADM

## 2021-09-09 RX ORDER — DIPHENHYDRAMINE HCL 25 MG
25 TABLET ORAL EVERY 6 HOURS PRN
Status: DISCONTINUED | OUTPATIENT
Start: 2021-09-09 | End: 2021-09-09 | Stop reason: HOSPADM

## 2021-09-09 RX ORDER — LIDOCAINE 40 MG/G
CREAM TOPICAL
Status: DISCONTINUED | OUTPATIENT
Start: 2021-09-09 | End: 2021-09-09 | Stop reason: HOSPADM

## 2021-09-09 RX ORDER — PROPOFOL 10 MG/ML
INJECTION, EMULSION INTRAVENOUS CONTINUOUS PRN
Status: DISCONTINUED | OUTPATIENT
Start: 2021-09-09 | End: 2021-09-09

## 2021-09-09 RX ORDER — ACETAMINOPHEN 325 MG/1
975 TABLET ORAL EVERY 8 HOURS
Status: COMPLETED | OUTPATIENT
Start: 2021-09-09 | End: 2021-09-12

## 2021-09-09 RX ORDER — FENTANYL CITRATE 50 UG/ML
50 INJECTION, SOLUTION INTRAMUSCULAR; INTRAVENOUS EVERY 5 MIN PRN
Status: DISCONTINUED | OUTPATIENT
Start: 2021-09-09 | End: 2021-09-09 | Stop reason: HOSPADM

## 2021-09-09 RX ORDER — HYDROXYZINE HYDROCHLORIDE 25 MG/1
25 TABLET, FILM COATED ORAL EVERY 6 HOURS PRN
Status: DISCONTINUED | OUTPATIENT
Start: 2021-09-09 | End: 2021-09-15 | Stop reason: HOSPADM

## 2021-09-09 RX ORDER — BISACODYL 10 MG
10 SUPPOSITORY, RECTAL RECTAL DAILY PRN
Status: DISCONTINUED | OUTPATIENT
Start: 2021-09-09 | End: 2021-09-15 | Stop reason: HOSPADM

## 2021-09-09 RX ORDER — ACETAMINOPHEN 325 MG/1
1000 TABLET ORAL 3 TIMES DAILY
Refills: 0 | COMMUNITY
Start: 2021-09-09 | End: 2022-03-23

## 2021-09-09 RX ORDER — FAMOTIDINE 20 MG/1
20 TABLET, FILM COATED ORAL 2 TIMES DAILY
Status: DISCONTINUED | OUTPATIENT
Start: 2021-09-09 | End: 2021-09-09

## 2021-09-09 RX ORDER — AMOXICILLIN 250 MG
1 CAPSULE ORAL 2 TIMES DAILY
Status: DISCONTINUED | OUTPATIENT
Start: 2021-09-09 | End: 2021-09-09

## 2021-09-09 RX ORDER — PROPOFOL 10 MG/ML
INJECTION, EMULSION INTRAVENOUS PRN
Status: DISCONTINUED | OUTPATIENT
Start: 2021-09-09 | End: 2021-09-09

## 2021-09-09 RX ORDER — HYDROMORPHONE HCL IN WATER/PF 6 MG/30 ML
0.4 PATIENT CONTROLLED ANALGESIA SYRINGE INTRAVENOUS
Status: DISCONTINUED | OUTPATIENT
Start: 2021-09-09 | End: 2021-09-15 | Stop reason: HOSPADM

## 2021-09-09 RX ORDER — KETAMINE HYDROCHLORIDE 50 MG/ML
INJECTION, SOLUTION INTRAMUSCULAR; INTRAVENOUS PRN
Status: DISCONTINUED | OUTPATIENT
Start: 2021-09-09 | End: 2021-09-09

## 2021-09-09 RX ORDER — LORAZEPAM 0.5 MG/1
0.5 TABLET ORAL DAILY PRN
Status: DISCONTINUED | OUTPATIENT
Start: 2021-09-09 | End: 2021-09-15 | Stop reason: HOSPADM

## 2021-09-09 RX ORDER — NALOXONE HYDROCHLORIDE 0.4 MG/ML
0.2 INJECTION, SOLUTION INTRAMUSCULAR; INTRAVENOUS; SUBCUTANEOUS
Status: DISCONTINUED | OUTPATIENT
Start: 2021-09-09 | End: 2021-09-15 | Stop reason: HOSPADM

## 2021-09-09 RX ORDER — ACETAMINOPHEN 325 MG/1
1000 TABLET ORAL 3 TIMES DAILY
Refills: 0 | COMMUNITY
Start: 2021-09-09 | End: 2021-09-15

## 2021-09-09 RX ORDER — MAGNESIUM SULFATE 4 G/50ML
4 INJECTION INTRAVENOUS ONCE
Status: DISCONTINUED | OUTPATIENT
Start: 2021-09-09 | End: 2021-09-15 | Stop reason: HOSPADM

## 2021-09-09 RX ORDER — OXYCODONE HYDROCHLORIDE 5 MG/1
5 TABLET ORAL EVERY 4 HOURS PRN
Status: DISCONTINUED | OUTPATIENT
Start: 2021-09-09 | End: 2021-09-09 | Stop reason: HOSPADM

## 2021-09-09 RX ORDER — HYDROXYZINE HYDROCHLORIDE 25 MG/1
25 TABLET, FILM COATED ORAL EVERY 6 HOURS PRN
Qty: 30 TABLET | Refills: 0 | Status: ON HOLD | OUTPATIENT
Start: 2021-09-09 | End: 2022-04-05

## 2021-09-09 RX ORDER — MAGNESIUM HYDROXIDE 1200 MG/15ML
LIQUID ORAL PRN
Status: DISCONTINUED | OUTPATIENT
Start: 2021-09-09 | End: 2021-09-09 | Stop reason: HOSPADM

## 2021-09-09 RX ORDER — ACETAMINOPHEN 325 MG/1
650 TABLET ORAL EVERY 4 HOURS PRN
Status: DISCONTINUED | OUTPATIENT
Start: 2021-09-12 | End: 2021-09-09

## 2021-09-09 RX ORDER — MECLIZINE HYDROCHLORIDE 25 MG/1
25 TABLET ORAL 4 TIMES DAILY PRN
COMMUNITY
End: 2023-06-22

## 2021-09-09 RX ORDER — TRAZODONE HYDROCHLORIDE 50 MG/1
100 TABLET, FILM COATED ORAL AT BEDTIME
Status: DISCONTINUED | OUTPATIENT
Start: 2021-09-09 | End: 2021-09-15 | Stop reason: HOSPADM

## 2021-09-09 RX ORDER — LIDOCAINE HYDROCHLORIDE 10 MG/ML
INJECTION, SOLUTION INFILTRATION; PERINEURAL PRN
Status: DISCONTINUED | OUTPATIENT
Start: 2021-09-09 | End: 2021-09-09

## 2021-09-09 RX ORDER — DIAZEPAM 5 MG
5 TABLET ORAL EVERY 6 HOURS PRN
Status: DISCONTINUED | OUTPATIENT
Start: 2021-09-09 | End: 2021-09-15 | Stop reason: HOSPADM

## 2021-09-09 RX ORDER — HYDROMORPHONE HCL IN WATER/PF 6 MG/30 ML
0.4 PATIENT CONTROLLED ANALGESIA SYRINGE INTRAVENOUS
Status: DISCONTINUED | OUTPATIENT
Start: 2021-09-09 | End: 2021-09-09

## 2021-09-09 RX ORDER — NICOTINE POLACRILEX 4 MG
15-30 LOZENGE BUCCAL
Status: DISCONTINUED | OUTPATIENT
Start: 2021-09-09 | End: 2021-09-15 | Stop reason: HOSPADM

## 2021-09-09 RX ORDER — DIPHENHYDRAMINE HCL 25 MG
25 TABLET ORAL EVERY 6 HOURS PRN
Status: DISCONTINUED | OUTPATIENT
Start: 2021-09-09 | End: 2021-09-15 | Stop reason: HOSPADM

## 2021-09-09 RX ORDER — DOCUSATE SODIUM 100 MG/1
100 CAPSULE, LIQUID FILLED ORAL 3 TIMES DAILY PRN
COMMUNITY
End: 2022-11-10

## 2021-09-09 RX ORDER — LIDOCAINE 40 MG/G
CREAM TOPICAL
Status: DISCONTINUED | OUTPATIENT
Start: 2021-09-09 | End: 2021-09-09

## 2021-09-09 RX ORDER — DOCUSATE SODIUM 100 MG/1
100 CAPSULE, LIQUID FILLED ORAL 3 TIMES DAILY PRN
Status: DISCONTINUED | OUTPATIENT
Start: 2021-09-09 | End: 2021-09-15 | Stop reason: HOSPADM

## 2021-09-09 RX ORDER — KETOROLAC TROMETHAMINE 30 MG/ML
15 INJECTION, SOLUTION INTRAMUSCULAR; INTRAVENOUS EVERY 6 HOURS
Status: DISPENSED | OUTPATIENT
Start: 2021-09-09 | End: 2021-09-10

## 2021-09-09 RX ORDER — BUPIVACAINE HYDROCHLORIDE 5 MG/ML
INJECTION, SOLUTION EPIDURAL; INTRACAUDAL
Status: COMPLETED | OUTPATIENT
Start: 2021-09-09 | End: 2021-09-09

## 2021-09-09 RX ORDER — TRANEXAMIC ACID 650 MG/1
1950 TABLET ORAL ONCE
Status: COMPLETED | OUTPATIENT
Start: 2021-09-09 | End: 2021-09-09

## 2021-09-09 RX ORDER — ACETAMINOPHEN 325 MG/1
975 TABLET ORAL EVERY 8 HOURS
Status: DISCONTINUED | OUTPATIENT
Start: 2021-09-09 | End: 2021-09-09

## 2021-09-09 RX ORDER — DEXAMETHASONE SODIUM PHOSPHATE 10 MG/ML
INJECTION, SOLUTION INTRAMUSCULAR; INTRAVENOUS PRN
Status: DISCONTINUED | OUTPATIENT
Start: 2021-09-09 | End: 2021-09-09

## 2021-09-09 RX ORDER — OXYCODONE HYDROCHLORIDE 5 MG/1
5 TABLET ORAL EVERY 4 HOURS PRN
Status: DISCONTINUED | OUTPATIENT
Start: 2021-09-09 | End: 2021-09-09

## 2021-09-09 RX ADMIN — OXYCODONE HYDROCHLORIDE 5 MG: 5 TABLET ORAL at 14:22

## 2021-09-09 RX ADMIN — PHENYLEPHRINE HYDROCHLORIDE 100 MCG: 10 INJECTION INTRAVENOUS at 08:52

## 2021-09-09 RX ADMIN — ATORVASTATIN CALCIUM 40 MG: 40 TABLET, FILM COATED ORAL at 22:52

## 2021-09-09 RX ADMIN — KETOROLAC TROMETHAMINE 15 MG: 30 INJECTION, SOLUTION INTRAMUSCULAR; INTRAVENOUS at 22:48

## 2021-09-09 RX ADMIN — SODIUM CHLORIDE, POTASSIUM CHLORIDE, SODIUM LACTATE AND CALCIUM CHLORIDE: 600; 310; 30; 20 INJECTION, SOLUTION INTRAVENOUS at 19:02

## 2021-09-09 RX ADMIN — BUPIVACAINE HYDROCHLORIDE 3 ML: 5 INJECTION, SOLUTION EPIDURAL; INTRACAUDAL; PERINEURAL at 07:53

## 2021-09-09 RX ADMIN — PROPOFOL 75 MCG/KG/MIN: 10 INJECTION, EMULSION INTRAVENOUS at 08:12

## 2021-09-09 RX ADMIN — HYDROMORPHONE HYDROCHLORIDE 0.2 MG: 0.2 INJECTION, SOLUTION INTRAMUSCULAR; INTRAVENOUS; SUBCUTANEOUS at 16:38

## 2021-09-09 RX ADMIN — SODIUM CHLORIDE, POTASSIUM CHLORIDE, SODIUM LACTATE AND CALCIUM CHLORIDE: 600; 310; 30; 20 INJECTION, SOLUTION INTRAVENOUS at 22:02

## 2021-09-09 RX ADMIN — ACETAMINOPHEN 975 MG: 325 TABLET ORAL at 06:14

## 2021-09-09 RX ADMIN — HYDROMORPHONE HYDROCHLORIDE 0.5 MG: 1 INJECTION, SOLUTION INTRAMUSCULAR; INTRAVENOUS; SUBCUTANEOUS at 17:30

## 2021-09-09 RX ADMIN — PHENYLEPHRINE HYDROCHLORIDE 100 MCG: 10 INJECTION INTRAVENOUS at 09:10

## 2021-09-09 RX ADMIN — VANCOMYCIN HYDROCHLORIDE 1500 MG: 1 INJECTION, POWDER, LYOPHILIZED, FOR SOLUTION INTRAVENOUS at 07:41

## 2021-09-09 RX ADMIN — PROPOFOL 30 MG: 10 INJECTION, EMULSION INTRAVENOUS at 08:14

## 2021-09-09 RX ADMIN — MIRABEGRON 25 MG: 25 TABLET, FILM COATED, EXTENDED RELEASE ORAL at 14:22

## 2021-09-09 RX ADMIN — ACETAMINOPHEN 975 MG: 325 TABLET ORAL at 22:53

## 2021-09-09 RX ADMIN — FAMOTIDINE 20 MG: 20 TABLET ORAL at 22:52

## 2021-09-09 RX ADMIN — ACETAMINOPHEN 975 MG: 325 TABLET ORAL at 13:16

## 2021-09-09 RX ADMIN — BUPROPION HYDROCHLORIDE 300 MG: 150 TABLET, EXTENDED RELEASE ORAL at 14:22

## 2021-09-09 RX ADMIN — MAGNESIUM SULFATE IN WATER 4 G: 40 INJECTION, SOLUTION INTRAVENOUS at 08:07

## 2021-09-09 RX ADMIN — DEXAMETHASONE SODIUM PHOSPHATE 4 MG: 4 INJECTION, SOLUTION INTRA-ARTICULAR; INTRALESIONAL; INTRAMUSCULAR; INTRAVENOUS; SOFT TISSUE at 08:49

## 2021-09-09 RX ADMIN — FENTANYL CITRATE 100 MCG: 50 INJECTION, SOLUTION INTRAMUSCULAR; INTRAVENOUS at 07:44

## 2021-09-09 RX ADMIN — ONDANSETRON 4 MG: 2 INJECTION INTRAMUSCULAR; INTRAVENOUS at 19:07

## 2021-09-09 RX ADMIN — PHENYLEPHRINE HYDROCHLORIDE 100 MCG: 10 INJECTION INTRAVENOUS at 09:00

## 2021-09-09 RX ADMIN — MIDAZOLAM HYDROCHLORIDE 2 MG: 1 INJECTION, SOLUTION INTRAMUSCULAR; INTRAVENOUS at 07:45

## 2021-09-09 RX ADMIN — TRAZODONE HYDROCHLORIDE 100 MG: 50 TABLET ORAL at 22:54

## 2021-09-09 RX ADMIN — DOCUSATE SODIUM 50MG AND SENNOSIDES 8.6MG 1 TABLET: 8.6; 5 TABLET, FILM COATED ORAL at 22:52

## 2021-09-09 RX ADMIN — FENTANYL CITRATE 100 MCG: 50 INJECTION, SOLUTION INTRAMUSCULAR; INTRAVENOUS at 19:07

## 2021-09-09 RX ADMIN — DOCUSATE SODIUM 50MG AND SENNOSIDES 8.6MG 1 TABLET: 8.6; 5 TABLET, FILM COATED ORAL at 14:22

## 2021-09-09 RX ADMIN — KETAMINE HYDROCHLORIDE 50 MG: 50 INJECTION, SOLUTION INTRAMUSCULAR; INTRAVENOUS at 08:10

## 2021-09-09 RX ADMIN — TRANEXAMIC ACID 1950 MG: 650 TABLET ORAL at 06:14

## 2021-09-09 RX ADMIN — KETOROLAC TROMETHAMINE 15 MG: 30 INJECTION, SOLUTION INTRAMUSCULAR; INTRAVENOUS at 13:16

## 2021-09-09 RX ADMIN — Medication 100 MG: at 19:07

## 2021-09-09 RX ADMIN — PROPOFOL 30 MG: 10 INJECTION, EMULSION INTRAVENOUS at 08:05

## 2021-09-09 RX ADMIN — ONDANSETRON 4 MG: 2 INJECTION INTRAMUSCULAR; INTRAVENOUS at 09:23

## 2021-09-09 RX ADMIN — Medication 125 MCG: at 14:22

## 2021-09-09 RX ADMIN — PHENYLEPHRINE HYDROCHLORIDE 0.3 MCG/KG/MIN: 10 INJECTION INTRAVENOUS at 09:03

## 2021-09-09 RX ADMIN — PROPOFOL 200 MG: 10 INJECTION, EMULSION INTRAVENOUS at 19:07

## 2021-09-09 RX ADMIN — PHENYLEPHRINE HYDROCHLORIDE 100 MCG: 10 INJECTION INTRAVENOUS at 09:30

## 2021-09-09 RX ADMIN — RISPERIDONE 2 MG: 1 TABLET ORAL at 22:52

## 2021-09-09 RX ADMIN — PHENYLEPHRINE HYDROCHLORIDE 50 MCG: 10 INJECTION INTRAVENOUS at 08:47

## 2021-09-09 RX ADMIN — DEXAMETHASONE SODIUM PHOSPHATE 10 MG: 10 INJECTION, SOLUTION INTRAMUSCULAR; INTRAVENOUS at 19:07

## 2021-09-09 RX ADMIN — SODIUM CHLORIDE, POTASSIUM CHLORIDE, SODIUM LACTATE AND CALCIUM CHLORIDE: 600; 310; 30; 20 INJECTION, SOLUTION INTRAVENOUS at 07:30

## 2021-09-09 RX ADMIN — VANCOMYCIN HYDROCHLORIDE 1500 MG: 5 INJECTION, POWDER, LYOPHILIZED, FOR SOLUTION INTRAVENOUS at 22:59

## 2021-09-09 RX ADMIN — LIDOCAINE HYDROCHLORIDE 20 MG: 10 INJECTION, SOLUTION INFILTRATION; PERINEURAL at 19:07

## 2021-09-09 ASSESSMENT — MIFFLIN-ST. JEOR: SCORE: 1742.25

## 2021-09-09 NOTE — ANESTHESIA POSTPROCEDURE EVALUATION
Patient: Ronel Ryan    Procedure(s):  LEFT TOTAL HIP ARTHROPLASTY    Diagnosis:Degenerative joint disease of left hip [M16.12]  Diagnosis Additional Information: No value filed.    Anesthesia Type:  Spinal    Note:  Disposition: Inpatient   Postop Pain Control: Uneventful            Sign Out: Well controlled pain   PONV: No   Neuro/Psych: Uneventful            Sign Out: Acceptable/Baseline neuro status   Airway/Respiratory: Uneventful            Sign Out: Acceptable/Baseline resp. status   CV/Hemodynamics: Uneventful            Sign Out: Acceptable CV status; No obvious hypovolemia; No obvious fluid overload   Other NRE: NONE   DID A NON-ROUTINE EVENT OCCUR? No           Last vitals:  Vitals Value Taken Time   BP 95/50 09/09/21 1040   Temp 35.6  C (96.1  F) 09/09/21 1002   Pulse 76 09/09/21 1042   Resp 23 09/09/21 1042   SpO2 98 % 09/09/21 1042   Vitals shown include unvalidated device data.    Electronically Signed By: Papito Mortensen MD  September 9, 2021  10:44 AM

## 2021-09-09 NOTE — PROGRESS NOTES
This writer had a call from Deisy Mathis of [facility] the Pt's CADI wavier worker([phone]). CM stated that currently they are not able to give out information at this time, however the CM asked Deisy if there was any thing that Deisy wanted to share. Deisy states that currently she is working on getting a night PCA worker for supervision set up for the Pt as needed however the intake appointment for this isn't until 9/14/2021, or Tuesday of the following week. Deisy had stated to this writer that since this is not set by Deisy the Pt would need to stay in the hospital until this is done.  DARBY stated with the information that Deisy shared with this type surgery that this sounded like it was considered an OutPt surgery and Pts usually discharge the next day and may not need a TCU at discharge. CM also stated to Deisy that Pts only stay in the hospital if it is medically needed. CM asked if there were other supports or services that the Pt gets and Deisy states that the Pt has 6.5hrs daily of PCA who is a friend, an ILS worker, HMK 3hrs weekly, RN with [facility] for med support, family who lives next door, and a lifeline.     Deisy stated that she will be on PTO on Friday and her coverage worker is Sun Tirado Phone: [phone]. Deisy states that she would like a copy of the discharge paperwork sent to Fax [phone].    CM attempted to meet with the Pt however the Pt was currently working with another provider.     CM spoke with Care Navigator who confirms that the CADI worker confirmed that a night PCA would start start on 9/10/2021. CM and Care Navigator were told two different outcomes from the CADI worker. Pt's hospital CM will follow up in the morning to discuss discharge planning.    Mac Casillas, [facility]

## 2021-09-09 NOTE — PROGRESS NOTES
09/09/21 1505   Quick Adds   Type of Visit Initial PT Evaluation   Living Environment   People in home alone   Current Living Arrangements apartment   Home Accessibility no concerns   Living Environment Comments has PCA help normally from 9-am to 2 pm   Self-Care   Usual Activity Tolerance fair   Activity/Exercise/Self-Care Comment walks only short distances in apartment   General Information   Onset of Illness/Injury or Date of Surgery 09/09/21   Pertinent History of Current Problem (include personal factors and/or comorbidities that impact the POC) L VALENTIN   Existing Precautions/Restrictions no hip ADD past midline;no hip hyperextension;90 degree hip flexion;no hip IR   Cognition   Affect/Mental Status (Cognition) WFL   Follows Commands (Cognition) WFL   Strength   Strength Comments general weakness   Bed Mobility   Bed Mobility Limitations decreased ability to use arms for pushing/pulling;decreased ability to use legs for bridging/pushing   Impairments Contributing to Impaired Bed Mobility decreased strength;pain   Assistive Device (Bed Mobility) bed rails   Transfers   Transfers sit-stand transfer   Transfer Safety Concerns Noted decreased weight-shifting ability   Impairments Contributing to Impaired Transfers decreased strength;impaired coordination;impaired balance;pain   Sit-Stand Transfer   Sit-Stand [place] (Transfers) contact guard;verbal cues   Assistive Device (Sit-Stand Transfers) walker, front-wheeled   Sit/Stand Transfer Comments vc for sequencing, hip precautions   Gait/Stairs (Locomotion)   [place] Level (Gait) contact guard   Assistive Device (Gait) walker, front-wheeled   Distance in Feet (Required for LE Total Joints) 6, 5, 4 ,4   Pattern (Gait) step-to   Deviations/Abnormal Patterns (Gait) sandrine decreased;weight shifting decreased;left sided deviations   Sensory Examination   Sensory Perception patient reports no sensory changes   Clinical Impression   Criteria for Skilled  Therapeutic Intervention yes, treatment indicated   PT Diagnosis (PT) impaired functional mobiity   Influenced by the following impairments pain, weakness   Functional limitations due to impairments transfers, gait   Clinical Presentation Evolving/Changing   Clinical Presentation Rationale Pt. presents as medically diagnosed   Clinical Decision Making (Complexity) moderate complexity   Therapy Frequency (PT) Daily   Predicted Duration of Therapy Intervention (days/wks) 3   Planned Therapy Interventions (PT) gait training;home exercise program;patient/family education;bed mobility training   Anticipated Equipment Needs at Discharge (PT) walker, rolling   Risk & Benefits of therapy have been explained evaluation/treatment results reviewed;patient   PT Discharge Planning    PT Discharge Recommendation (DC Rec) home with assist;home with home care physical therapy   PT Rationale for DC Rec Nirmal has PCA help lined up , but not sure if it will be 24/7 initially or not. Has good home set up, limited mobility prior   PT Brief overview of current status  Patient is moving well but is having creaking noise coming from her hip joint that she is concerned about . RN contacted ortho to have them take a look   Total Evaluation Time   Total Evaluation Time (Minutes) 15   Skin WDL   Skin WDL WDL

## 2021-09-09 NOTE — OP NOTE
Total Hip Arthoplasty Operative Note        PLAN:  Weight bearing status: Weight bearing as tolerated   Activity: Activity as tolerated  Patient may move about with assist as indicated or with supervision   Anticoagulation plan:                  mg daily  for 42 days  Follow up plan                           Follow up in 2 week(s)        Name: Ronel Ryan    PCP: Lexie Rodriguez    Procedure Date: 9/9/2021    Pre-operative diagnosis: Degenerative joint disease of left hip [M16.12]   Post-operative diagnosis: Same   Procedure: Total hip arthoplasty (Left)   Surgeon: Zenon Damon MD     Assistant(s): Franklyn Patino PA-C   Anesthesia: Spinal Anesthesia   Estimated blood loss: 150cc   Drains: Hemovac   Specimens: None       Findings: See full dictated operative note for details   Complications: None       Comments: See dictated operative report for full details     Indications:    The patient has experienced progressive left hip pain despite use of  Analgesics, NSAID's, Injection therapy and physical therapy. Because of the failure of these therapies to control symptoms and limited walking, night pain and altered activities the patient has decided to move ahead with joint replacement surgery.        Procedure and Findings:    After being informed of the risks, benefits, and alternatives to the procedure, the patient desired to proceed. Brought to the main operating suite where she was placed under spinal anesthetic. She was positioned in an Innomed hip oliver. The patient s Left lower extremity was prepped and draped in a manner appropriate for the procedure after a timeout verification step was complete.    Patient received 2 grams of intravenous Ancef. Franklyn Patino PA-C was present for the entire length of the case for the purposes of proper patient positioning, surgical exposure, and patient safety.    A minimally invasive posterior approach to the hip was taken. IT band was divided. Gluteus  fibers divided and the Charnley retractor was placed. Attention was directed towards the external rotators. The piriformis was identified and tagged. Minimus was elevated. The capsule was teed and tagged. Hip leg length and offset were then determined using a Smith and Nephew device with a pin in the innominate and the hip was then dislocated. The neck was resected using the Zayo guide. Full thickness cartilage loss was demonstrated involving the femoral head and acetabulum.     Attention was directed toward the acetabulum. Retractors were placed. Soft tissues were resected. Sequential reaming from 45 to 49 mm was carried out. Good cancellous bleeding bed was demonstrated. The trail 48 mm cup was stable. The final  Trident 2 HA PSL  mm 48 cup was selected and secured with 2 supplemental fixation screws. A 10 degree liner was placed. Attention was directed towards the femur. Box chisel, canal finder, sequential broaching up to 3 was carried out. Trial reductions were carried out and excellent range of motion and stability and restoration of leg length and offset was demonstrated with a 0,32 head. X-ray was obtained which demonstrated appropriate sizing and positioning of components. The trial components were then removed. The final 10 degree liner was secured. Inferior osteophytes were resected from the acetabulum. The implant 3 Accolade 2, 127 degree offset stem was the secured. Trial reductions were carried out and a 0, 32 mm head was selected. The hip was reduced. The joint was copiously irrigated. The joint capsule and piriformis tendon was repaired back to the greater trochanter through drill holes in bone. Soft tissues were infiltrated with anesthetic solution. Care was taken to avoid neurovascular structures.   The IT band was closed with running #1 running Stratafix stitch. Subcutaneous closure With layered 2-0 Vicryl, skin was closed with 3-0 Stratafix and Aquacell dressing. Sterile dressing was applied.  Hip abductor pillow was placed. The patient returned to PAR in stable condition.       Zenon Damon MD    Date: 9/9/2021 Time: 9:40 AM      CONFIDENTIALITY NOTICE This message and any included attachments are from [facility] and are intended only for the addressee. The information contained in this message is confidential and may constitute inside or non-public information under international, federal, or state securities laws. Unauthorized forwarding, printing, copying, distribution, or use of such information is strictly prohibited and may be unlawful. If you are not the addressee, please promptly delete this message and notify the sender of the delivery error by e-mail.

## 2021-09-09 NOTE — ANESTHESIA PREPROCEDURE EVALUATION
Anesthesia Pre-Procedure Evaluation    Patient: Ronel Ryan   MRN: [id number] : 1980        Preoperative Diagnosis: Degenerative joint disease of left hip [M16.12]   Procedure : Procedure(s):  LEFT TOTAL HIP ARTHROPLASTY     Past Medical History:   Diagnosis Date     Allergic state      Anxiety      Anxiety      Arthritis      Asthma      Bipolar 2 disorder (H)      Bipolar 2 disorder (H)     mixed, one manic episode when combined wellbutrin and celexa. Now off Celexa.     Borderline personality disorder (H)      Cholelithiasis      lap gil     Depressive disorder      Diabetes (H)      Diabetes mellitus (H)     Dx in , lost weight and in  MD thought she might have been misdiagnosed.     Elevated cholesterol      GERD (gastroesophageal reflux disease)      H/O hypercholesterolemia     on statin therapy     Hip dysplasia      Hypertension      Hypertension      Major depression      Menstrual disorder     heavy and irregular bleeding, improved with IUD.     Obese      Personality disorder (H)      PTSD (post-traumatic stress disorder)      Uncomplicated asthma      Urinary incontinence     on ditropan (showers/pools and stress incontinence).     vertigo       Past Surgical History:   Procedure Laterality Date     ANKLE FRACTURE SURGERY Right      ARTHROSCOPIC RECONSTRUCTION ANTERIOR CRUCIATE LIGAMENT Left      CHOLECYSTECTOMY       CHOLECYSTECTOMY       COLONOSCOPY      normal colon at age 40, 2021 study.     CYST REMOVAL      Back of neck     MAMMO STEREOTACTIC CORE BIOPSY LEFT Left 06/15/2020     ORTHOPEDIC SURGERY      ankle and acl     TONSILLECTOMY       ZZ COLONOSCOPY W/WO BRUSH/WASH N/A 2021    Procedure: COLONOSCOPY;  Surgeon: Yoel Siegel MD;  Location: [facility] Main OR;  Service: Gastroenterology      Allergies   Allergen Reactions     Cephalexin Hives     Other reaction(s): *Unknown     Erythromycin Anaphylaxis and Hives     Other reaction(s): Unknown      Penicillins Hives     hives  Other reaction(s): Unknown      Social History     Tobacco Use     Smoking status: Never Smoker     Smokeless tobacco: Never Used   Substance Use Topics     Alcohol use: No      Wt Readings from Last 1 Encounters:   09/09/21 109.7 kg (241 lb 14.4 oz)        Anesthesia Evaluation            ROS/MED HX  ENT/Pulmonary:  - neg pulmonary ROS   (+) asthma     Neurologic:  - neg neurologic ROS     Cardiovascular:  - neg cardiovascular ROS   (+) hypertension-----    METS/Exercise Tolerance:     Hematologic:  - neg hematologic  ROS     Musculoskeletal:  - neg musculoskeletal ROS (+) arthritis,     GI/Hepatic:  - neg GI/hepatic ROS   (+) GERD, Asymptomatic on medication,     Renal/Genitourinary:  - neg Renal ROS     Endo:  - neg endo ROS   (+) type II DM, Obesity,     Psychiatric/Substance Use:  - neg psychiatric ROS     Infectious Disease:  - neg infectious disease ROS     Malignancy:  - neg malignancy ROS     Other:  - neg other ROS          Physical Exam    Airway  airway exam normal      Mallampati: I   TM distance: > 3 FB   Neck ROM: full   Mouth opening: > 3 cm    Respiratory Devices and Support         Dental  no notable dental history         Cardiovascular   cardiovascular exam normal       Rhythm and rate: regular     Pulmonary   pulmonary exam normal        breath sounds clear to auscultation           OUTSIDE LABS:  CBC:   Lab Results   Component Value Date    WBC 5.2 05/21/2021    WBC 6.3 07/25/2019    HGB 13.7 05/21/2021    HGB 12.6 07/25/2019    HCT 41.8 05/21/2021    HCT 39.3 07/25/2019     05/21/2021     07/25/2019     BMP:   Lab Results   Component Value Date     09/07/2021     05/21/2021    POTASSIUM 4.2 09/07/2021    POTASSIUM 4.1 05/21/2021    CHLORIDE 111 (H) 09/07/2021    CHLORIDE 107 05/21/2021    CO2 18 (L) 09/07/2021    CO2 20 (L) 05/21/2021    BUN 8 09/07/2021    BUN 13 05/21/2021    CR 1.28 (H) 09/07/2021    CR 1.12 (H) 05/21/2021    GLC 93  09/07/2021     (H) 06/08/2021     COAGS: No results found for: PTT, INR, FIBR  POC:   Lab Results   Component Value Date     (H) 09/26/2017    HCG Negative 10/04/2017    HCGS Negative 06/08/2021     HEPATIC:   Lab Results   Component Value Date    ALBUMIN 3.9 09/07/2021    PROTTOTAL 6.7 09/07/2021    ALT 21 09/07/2021    AST 16 09/07/2021    ALKPHOS 80 09/07/2021    BILITOTAL 0.6 09/07/2021     OTHER:   Lab Results   Component Value Date    A1C 6.7 (H) 08/23/2019    ANGELICA 9.8 09/07/2021    LIPASE 124 09/26/2017    TSH 1.14 05/21/2021       Anesthesia Plan    ASA Status:  3   NPO Status:  NPO Appropriate    Anesthesia Type: Spinal.              Consents    Anesthesia Plan(s) and associated risks, benefits, and realistic alternatives discussed. Questions answered and patient/representative(s) expressed understanding.     - Discussed with:  Patient         Postoperative Care    Pain management: Peripheral nerve block (Single Shot).   PONV prophylaxis: Ondansetron (or other 5HT-3), Dexamethasone or Solumedrol     Comments:    Peripheral nerve block for post op analgesia per surgeon request.            Papito Mortensen MD

## 2021-09-09 NOTE — ANESTHESIA CARE TRANSFER NOTE
Patient: Ronel Ryan    Procedure(s):  LEFT TOTAL HIP ARTHROPLASTY    Diagnosis: Degenerative joint disease of left hip [M16.12]  Diagnosis Additional Information: No value filed.    Anesthesia Type:   Spinal     Note:    Oropharynx: oropharynx clear of all foreign objects and spontaneously breathing  Level of Consciousness: drowsy  Oxygen Supplementation: face mask  Level of Supplemental Oxygen (L/min / FiO2): 8  Independent Airway: airway patency satisfactory and stable  Dentition: dentition unchanged  Vital Signs Stable: post-procedure vital signs reviewed and stable  Report to RN Given: handoff report given  Patient transferred to: PACU    Handoff Report: Set expectations for post-procedure period      Vitals:  Vitals Value Taken Time   BP     Temp     Pulse     Resp     SpO2         Electronically Signed By: FEMI Sierra CRNA  September 9, 2021  10:01 AM

## 2021-09-09 NOTE — ANESTHESIA PREPROCEDURE EVALUATION
Anesthesia Pre-Procedure Evaluation    Patient: Ronel Ryan   MRN: [id number] : 1980        Preoperative Diagnosis: Dislocation of left hip, initial encounter (H) [S73.005A]   Procedure : Procedure(s):  CLOSED REDUCTION, HIP  POSSIBLE, REVISION, TOTAL ARTHROPLASTY, HIP, LEFT     Past Medical History:   Diagnosis Date     Allergic state      Anxiety      Anxiety      Arthritis      Asthma      Bipolar 2 disorder (H)      Bipolar 2 disorder (H)     mixed, one manic episode when combined wellbutrin and celexa. Now off Celexa.     Borderline personality disorder (H)      Cholelithiasis      lap gil     Depressive disorder      Diabetes (H)      Diabetes mellitus (H)     Dx in , lost weight and in  MD thought she might have been misdiagnosed.     Elevated cholesterol      GERD (gastroesophageal reflux disease)      H/O hypercholesterolemia     on statin therapy     Hip dysplasia      Hypertension      Hypertension      Major depression      Menstrual disorder     heavy and irregular bleeding, improved with IUD.     Obese      Personality disorder (H)      PTSD (post-traumatic stress disorder)      Uncomplicated asthma      Urinary incontinence     on ditropan (showers/pools and stress incontinence).     vertigo       Past Surgical History:   Procedure Laterality Date     ANKLE FRACTURE SURGERY Right      ARTHROSCOPIC RECONSTRUCTION ANTERIOR CRUCIATE LIGAMENT Left      CHOLECYSTECTOMY       CHOLECYSTECTOMY       COLONOSCOPY      normal colon at age 40, 2021 study.     CYST REMOVAL      Back of neck     MAMMO STEREOTACTIC CORE BIOPSY LEFT Left 06/15/2020     ORTHOPEDIC SURGERY      ankle and acl     [facility]LECTOMY       [facility]    Procedure: COLONOSCOPY;  Surgeon: Yoel Siegel MD;  Location: [facility] OR;  Service: Gastroenterology      Allergies   Allergen Reactions     Cephalexin Hives     Other reaction(s): *Unknown     Erythromycin  Anaphylaxis and Hives     Other reaction(s): Unknown     Penicillins Hives     hives  Other reaction(s): Unknown      Social History     Tobacco Use     Smoking status: Never Smoker     Smokeless tobacco: Never Used   Substance Use Topics     Alcohol use: No      Wt Readings from Last 1 Encounters:   09/09/21 109.7 kg (241 lb 14.4 oz)        Anesthesia Evaluation   Pt has had prior anesthetic.         ROS/MED HX  ENT/Pulmonary:     (+) TOMA risk factors, obese, Moderate Persistent, asthma     Neurologic:  - neg neurologic ROS     Cardiovascular:     (+) hypertension-----    METS/Exercise Tolerance:     Hematologic:  - neg hematologic  ROS     Musculoskeletal:   (+) arthritis,     GI/Hepatic:  - neg GI/hepatic ROS     Renal/Genitourinary:  - neg Renal ROS     Endo:     (+) type II DM, Obesity,     Psychiatric/Substance Use:  - neg psychiatric ROS     Infectious Disease:  - neg infectious disease ROS     Malignancy:  - neg malignancy ROS     Other:  - neg other ROS          Physical Exam    Airway  airway exam normal      Mallampati: I   TM distance: > 3 FB   Neck ROM: full   Mouth opening: > 3 cm    Respiratory Devices and Support         Dental           Cardiovascular   cardiovascular exam normal       Rhythm and rate: regular and normal     Pulmonary   pulmonary exam normal        breath sounds clear to auscultation       Other findings: Poor dentition    OUTSIDE LABS:  CBC:   Lab Results   Component Value Date    WBC 5.2 05/21/2021    WBC 6.3 07/25/2019    HGB 13.7 05/21/2021    HGB 12.6 07/25/2019    HCT 41.8 05/21/2021    HCT 39.3 07/25/2019     05/21/2021     07/25/2019     BMP:   Lab Results   Component Value Date     09/07/2021     05/21/2021    POTASSIUM 4.2 09/07/2021    POTASSIUM 4.1 05/21/2021    CHLORIDE 111 (H) 09/07/2021    CHLORIDE 107 05/21/2021    CO2 18 (L) 09/07/2021    CO2 20 (L) 05/21/2021    BUN 8 09/07/2021    BUN 13 05/21/2021    CR 1.28 (H) 09/07/2021    CR 1.12 (H)  05/21/2021     (H) 09/09/2021    GLC 76 09/09/2021     COAGS:   Lab Results   Component Value Date    INR 1.15 09/09/2021     POC:   Lab Results   Component Value Date     (H) 09/26/2017    HCG Negative 10/04/2017    HCGS Negative 06/08/2021     HEPATIC:   Lab Results   Component Value Date    ALBUMIN 3.9 09/07/2021    PROTTOTAL 6.7 09/07/2021    ALT 21 09/07/2021    AST 16 09/07/2021    ALKPHOS 80 09/07/2021    BILITOTAL 0.6 09/07/2021     OTHER:   Lab Results   Component Value Date    A1C 6.7 (H) 08/23/2019    ANGELICA 9.8 09/07/2021    LIPASE 124 09/26/2017    TSH 1.14 05/21/2021       Anesthesia Plan    ASA Status:  3, emergent    NPO Status:  NPO Appropriate    Anesthesia Type: General.     - Airway: ETT   Induction: Intravenous, Propofol, RSI.   Maintenance: Balanced.        Consents    Anesthesia Plan(s) and associated risks, benefits, and realistic alternatives discussed. Questions answered and patient/representative(s) expressed understanding.     - Discussed with:  Patient      - Extended Intubation/Ventilatory Support Discussed: Yes.      - Patient is DNR/DNI Status: No    Use of blood products discussed: Yes.     - Discussed with: Patient.     Postoperative Care    Pain management: Multi-modal analgesia.   PONV prophylaxis: Ondansetron (or other 5HT-3), Dexamethasone or Solumedrol     Comments:                Lyndon Robin MD

## 2021-09-09 NOTE — ANESTHESIA PROCEDURE NOTES
Intrathecal injection Procedure Note  Pre-Procedure   Staff -        Anesthesiologist:  Papito Mortensen MD       Performed By: anesthesiologist       Location: OR       Procedure Start/Stop Times: 9/9/2021 7:49 AM and 9/9/2021 7:53 AM       Pre-Anesthestic Checklist: patient identified, IV checked, risks and benefits discussed, informed consent, monitors and equipment checked and pre-op evaluation  Timeout:       Correct Patient: Yes        Correct Procedure: Yes        Correct Site: Yes        Correct Position: Yes   Procedure Documentation  Procedure: intrathecal injection       Patient Position: sitting       Skin prep: Chloraprep       Insertion Site: L3-4. (midline approach).       Needle Gauge: 24.        Needle Length (Inches): 4        Spinal Needle Type: Pencan       Introducer used      # of attempts: 1 and  # of redirects:     Assessment/Narrative         Paresthesias: No.       CSF fluid: clear.    Medication(s) Administered   Medication Administration Time: 9/9/2021 7:53 AM    Comments:  Negative paresthesia or heme

## 2021-09-09 NOTE — CONSULTS
[facility] [place] MEDICINE CONSULT NOTE   Physician requesting consult: Zenon Damon MD    Reason for consult: Postoperative medical management of medical co-morbidities as below    Assessment and Plan    Ronel Ryan is a 41 year old old female with a history of obesity, and bipolar disorder underwent left total hip arthroplasty. [facility] service was asked to evaluate patient for postoperative medical management as follows below. Please resume the home medications as reconciled and further noted below with ordered hold parameters.  Vital signs have been stable post-operatively including hemodynamically stable blood pressure and heart rate. Thank you for this consult; we will continue to follow this patient until discharge.    Left total hip arthroplasty.  Hypertension. Patient states that she was diagnosed with hypertension years ago but has lost 100 pounds over the last 2 years and recently has come off of blood pressure medications due to improvement in her blood pressure. We will manage expectantly here in the hospital.  Type 2 diabetes. Uses Trulicity. Last A1c was in the 5 range. She states that with her weight loss as well her blood sugars have improved dramatically. Will order correction insulin. Hold Trulicity for now.  Obesity. Applauded the patient's weight loss and encouraged her to continue to work on diet exercise and weight loss.  Vertigo/dizziness. Will increase risk of falls. Will use as needed meclizine. Nursing and PT to be aware.  Bipolar disorder/depression. No acute issues. Home meds.  Chronic kidney disease stage IIIa. At increased risk for worsening renal function perioperatively due to medications. Avoid NSAIDs if possible. Trend renal function.  Chronic pain related to osteoarthritis. Monitor response to pain medications. May require assistance of pain team.  VTE prophylaxis. No history. Defer prophylaxis to orthopedics.    Procedure(s):  LEFT TOTAL HIP  ARTHROPLASTY  Post-operative Day: Day of Surgery  Code status:Full Code     Type of Anesthesia     Estimated Blood Loss:  150 mL    Hospital Problem List   No problem-specific Assessment & Plan notes found for this encounter.    Active Problems:    Degenerative joint disease (DJD) of hip      -Reviewed the patient's preoperative H and P and updated missing elements.  -Home medication reconciliation has been reviewed.  Medications have been ordered as noted from the home list and changes are documented above.    HISTORY     Ronel Ryan is a 41 year old old female with a history of obesity and recent weight loss who presents for an elective left total hip arthroplasty. Please see the operative note for details of the surgery. Please see the H&P which was reviewed by me for preoperative course. Role of hospital medicine discussed and questions answered. Patient states that she is starting to have a bit of pain. She is uncomfortable in the bed and does not like the foam brace between her legs. Explained that she needs to stay on her back and to follow the instructions of the nurses if at all possible. Also let her know that I would get her nurse in to get her pain meds. Patient has longstanding obesity but has been working on losing weight since she is lost to 100 pounds over the last 2 years. She is very pleased with this. She is chronic kidney disease stage III and did have a history of hypertension but is no longer on medication in light of her weight loss. She has diabetes and her most recent A1c was in the mid fives. She states her blood sugars have been well controlled. She takes Trulicity weekly. She also has problems with vertigo and uses meclizine at home as needed. She states actually she has been doing pretty well in regards to this and I did have it available for her here in this hospitalization. She has bipolar disorder with depression and follows with psychiatry for this. She states that she is  disabled.    Past Medical History     Patient Active Problem List    Diagnosis Date Noted     Degenerative joint disease (DJD) of hip 09/09/2021     Priority: Medium     Primary osteoarthritis of both hips 10/30/2020     Priority: Medium     Added automatically from request for surgery [id number]       Borderline high cholesterol 10/23/2020     Priority: Medium     Osteoarthritis 10/23/2020     Priority: Medium     Relationship problems 10/23/2020     Priority: Medium     History of anxiety disorder 10/23/2020     Priority: Medium     Gait difficulty 02/04/2019     Priority: Medium     Adjustment disorder with depressed mood 12/05/2018     Priority: Medium     Suicidal ideation 10/04/2017     Priority: Medium     Eating disorder 03/13/2017     Priority: Medium     Vertigo 03/10/2017     Priority: Medium     History of urinary anomaly 12/01/2016     Priority: Medium     H/O urinary frequency 12/01/2016     Priority: Medium     Hx of eating disorder 08/25/2016     Priority: Medium     Overview:   Reports distant laxative abuse and excessive exercise leading to 100 lb weight loss around 2013.        Morbid obesity with BMI of 50.0-59.9, adult (H) 08/25/2016     Priority: Medium     Overview:   BMI 52 at bariatric clinic intake 8/25/16.       Hyperlipidemia 08/25/2016     Priority: Medium     Overview:   On statin therapy.       Anxiety 05/11/2016     Priority: Medium     Moderate mixed bipolar I disorder (H) 05/10/2016     Priority: Medium     Overview:   Replacement Utility updated for latest IMO load       Multiple-type hyperlipidemia 03/21/2016     Priority: Medium     Arthralgia of hip 03/21/2016     Priority: Medium     Hypertension 03/21/2016     Priority: Medium     Left hip pain 03/21/2016     Priority: Medium     Mixed hyperlipidemia 03/21/2016     Priority: Medium     Trochanteric bursitis 10/27/2015     Priority: Medium     Greater trochanteric bursitis of both hips 10/27/2015     Priority: Medium     Disorder  of pelvis 09/14/2015     Priority: Medium     Hip dysplasia 09/14/2015     Priority: Medium     Pain of both hip joints 06/30/2015     Priority: Medium     Osteoarthritis of hip 06/15/2015     Priority: Medium     Primary osteoarthritis of left hip 06/15/2015     Priority: Medium     Bipolar II disorder, most recent episode hypomanic (H) 04/29/2015     Priority: Medium     Type 2 diabetes mellitus (H) 08/12/2014     Priority: Medium     Binge eating 11/27/2013     Priority: Medium     Borderline personality disorder (H) 11/27/2013     Priority: Medium     Attention deficit disorder 11/27/2013     Priority: Medium     ADD (attention deficit disorder) 11/27/2013     Priority: Medium     Hx of diabetes mellitus 05/21/2013     Priority: Medium     Soft tissue lesion of shoulder region 02/27/2013     Priority: Medium     Secondary amenorrhea 10/22/2012     Priority: Medium     ACP (advance care planning) 10/18/2012     Priority: Medium     Patient has identified Health Care Agent(s): No  Add Health Care Agents: No  Patient has Advance Care Plan Documents (Health Care Directive, POLST): No,  .    Patient has identified Specific Treatment Preferences: Yes   Specific Treatment Preferences: a.) Code Status:  CPR/Attempt Resuscitation     She would like her mother  Calista Fernandes [phone] or Georgie Trejo [phone] to make decisions for her regarding health if she can not - but she has no formal forms for this  Patient has identified Health Care Agent(s): No  Add Health Care Agents: No  Patient has Advance Care Plan Documents (Health Care Directive, POLST): No,  .    Patient has identified Specific Treatment Preferences: Yes   Specific Treatment Preferences: a.) Code Status:  CPR/Attempt Resuscitation     She would like her mother  Calista Fernandes [phone] or Georgie Trejo [phone] to make decisions for her regarding health if she can not - but she has no formal forms for this       Severe episode of  recurrent major depressive disorder (H) 10/04/2012     Priority: Medium     Care plan discussed with patient 02/01/2012     Priority: Medium     This patient's Behavioral Health care has been restricted.  Please direct the patient to speak with the  provider before scheduling.       Morbid obesity (H) 12/22/2010     Priority: Medium     Asthma 12/22/2010     Priority: Medium     Recurrent major depressive disorder (H) 12/22/2010     Priority: Medium        Surgical History   She  has a past surgical history that includes orthopedic surgery; Cholecystectomy; Tonsillectomy; Cholecystectomy; Cyst Removal; Arthroscopic reconstruction anterior cruciate ligament (Left); Ankle Fracture Surgery (Right); Colonoscopy; Mammo Stereotactic Core Biopsy Left (Left, 06/15/2020); and Colonoscopy w/wo Brush **Performed** (N/A, 06/08/2021).     Past Surgical History:   Procedure Laterality Date     ANKLE FRACTURE SURGERY Right      ARTHROSCOPIC RECONSTRUCTION ANTERIOR CRUCIATE LIGAMENT Left      CHOLECYSTECTOMY       CHOLECYSTECTOMY       COLONOSCOPY      normal colon at age 40, June 2021 study.     CYST REMOVAL      Back of neck     MAMMO STEREOTACTIC CORE BIOPSY LEFT Left 06/15/2020     ORTHOPEDIC SURGERY      ankle and acl     TONSILLECTOMY       ZZHC COLONOSCOPY W/WO BRUSH/WASH N/A 06/08/2021    Procedure: COLONOSCOPY;  Surgeon: Yoel Siegel MD;  Location: [facility];  Service: Gastroenterology       Family History    Reviewed, and family history includes Arthritis in her brother, maternal grandmother, and mother; Breast Cancer in her mother; Cancer in her maternal grandmother and mother; Cerebrovascular Disease in her maternal grandfather, maternal grandmother, and mother; Diabetes in her brother, brother, father, and mother; Heart Disease in her maternal grandfather; Hyperlipidemia in her brother, maternal grandfather, and mother; Hypertension in her brother, maternal grandfather, maternal grandmother, and  mother; Mental Illness in her brother and brother; Obesity in her brother, maternal grandfather, maternal grandmother, mother, and sister; Pacemaker in her maternal grandfather; Seizure Disorder in her maternal grandfather; Substance Abuse in her maternal grandfather.    Social History    Reviewed, and she  reports that she has never smoked. She has never used smokeless tobacco. She reports that she does not drink alcohol and does not use drugs.  Social History     Tobacco Use     Smoking status: Never Smoker     Smokeless tobacco: Never Used   Substance Use Topics     Alcohol use: No       Allergies     Allergies   Allergen Reactions     Cephalexin Hives     Other reaction(s): *Unknown     Erythromycin Anaphylaxis and Hives     Other reaction(s): Unknown     Penicillins Hives     hives  Other reaction(s): Unknown       Prior to Admission Medications      Medications Prior to Admission   Medication Sig Dispense Refill Last Dose     albuterol (PROAIR HFA/PROVENTIL HFA/VENTOLIN HFA) 108 (90 BASE) MCG/ACT Inhaler Inhale 1-2 puffs into the lungs every 4 hours as needed    has with her     atorvastatin (LIPITOR) 40 MG tablet Take 1 tablet by mouth every evening    9/8/2021 at Unknown time     buPROPion (WELLBUTRIN XL) 300 MG 24 hr tablet Take 300 mg by mouth daily    9/8/2021 at AM     Cholecalciferol 125 MCG (5000 UT) TABS Take 125 mcg by mouth daily    9/8/2021 at Unknown time     docusate sodium (COLACE) 100 MG capsule Take 100 mg by mouth 3 times daily as needed for constipation        EPINEPHrine (EPIPEN/ADRENACLICK/OR ANY BX GENERIC EQUIV) 0.3 MG/0.3ML injection 2-pack Inject 0.3 mLs (0.3 mg) into the muscle once as needed for anaphylaxis 0.6 mL 0      etodolac (LODINE XL) 400 MG 24 hr tablet Take 400 mg by mouth daily   9/7/2021     LORazepam (ATIVAN) 0.5 MG tablet Take 0.5 mg by mouth daily as needed         meclizine (ANTIVERT) 25 MG tablet Take 25 mg by mouth 4 times daily as needed for dizziness         MYRBETRIQ 25 MG 24 hr tablet Take 25 mg by mouth daily   9/8/2021 at AM     risperiDONE (RISPERDAL) 2 MG tablet Take 2 mg by mouth At Bedtime    9/8/2021 at Unknown time     topiramate (TOPAMAX) 100 MG tablet Take 100 mg by mouth 2 times daily    9/8/2021 at 2200     traZODone (DESYREL) 100 MG tablet Take 100 mg by mouth At Bedtime  30 tablet 1 9/8/2021 at Unknown time     TRULICITY 0.75 MG/0.5ML pen Inject 0.75 mg Subcutaneous once a week (sundays)   9/5/2021     blood glucose (NO BRAND SPECIFIED) lancets standard Use to test blood sugar 2 times daily or as directed. 100 each 11      blood glucose monitoring (NO BRAND SPECIFIED) meter device kit Use to test blood sugar 2 times daily or as directed. 1 kit 0      blood glucose monitoring (NO BRAND SPECIFIED) test strip Use to test blood sugars 2 times daily or as directed 100 strip 3        Review of Systems   A 12 point comprehensive review of systems was negative except as noted above.    OBJECTIVE         Physical Exam   Temp:  [96  F (35.6  C)-97.6  F (36.4  C)] 97.6  F (36.4  C)  Pulse:  [75-87] 83  Resp:  [8-23] 16  BP: ()/(50-73) 110/65  SpO2:  [88 %-100 %] 99 %  Body mass index is 41.52 kg/m .    GENERAL:  Alert, appears comfortable, in no acute distress, appears stated age   HEAD:  Normocephalic, without obvious abnormality, atraumatic   NOSE: Nares normal, septum midline, mucosa normal, no drainage   NECK: Supple, symmetrical, trachea midline   LUNGS:   Clear to auscultation bilaterally, no rales, rhonchi, or wheezing, symmetric chest rise on inhalation, respirations unlabored   HEART:  Regular rate and rhythm, S1 and S2 normal, no murmur, rub, or gallop    ABDOMEN:   Soft, non-tender, bowel sounds active all four quadrants, no masses, no organomegaly, no rebound or guarding   EXTREMITIES: Extremities normal, atraumatic, no cyanosis or edema    SKIN: Dry to touch, no exanthems in the visualized areas   NEURO: Alert, oriented x 4, moves all four  extremities freely/spontaneously   PSYCH: Cooperative, behavior is appropriate          Cardiographics Reviewed Personally By Myself   EKG Results: personally reviewed.     Imaging Reviewed Personally By Myself    Radiology Results:   Recent Results (from the past 24 hour(s))   XR Hip Port Left 1 View    Narrative    EXAM: XR HIP PORT LEFT 1  VIEWS  LOCATION: [facility]  DATE/TIME: 9/9/2021 9:09 AM    INDICATION: Left hip pain.  COMPARISON: None.      Impression    IMPRESSION:   Intraoperative views of the left hip show placement of a left total hip arthroplasty prior to placement of the prosthetic femoral head. No fracture.    XR Hip Port Left G/E 2 Views    Narrative    EXAM: XR HIP PORTABLE LEFT 2-3 VIEWS  LOCATION: [facility]  DATE/TIME: 9/9/2021 10:14 AM    INDICATION: Status post Hip surgery  COMPARISON: 02/14/2017.      Impression    IMPRESSION: Postoperative changes left total hip replacement. No dislocation or acute fracture. Hardware appears well seated.    IUD within the pelvis.    Right hip acetabular dysplasia with moderate joint space narrowing.       Labs Reviewed Personally By Myself     Most Recent 3 CBC's:Recent Labs   Lab Test 05/21/21  1152 07/25/19  1100 10/06/17  0815   WBC 5.2 6.3 9.2   HGB 13.7 12.6 13.1   MCV 94 85 87    309 254     Most Recent 3 BMP's:Recent Labs   Lab Test 09/09/21  0713 09/07/21  1257 06/08/21  0915 05/21/21  1152 07/25/19  1100 07/25/19  1100   NA  --  143  --  138  --  138   POTASSIUM  --  4.2  --  4.1  --  4.5   CHLORIDE  --  111*  --  107  --  105   CO2  --  18*  --  20*  --  23   BUN  --  8  --  13  --  9   CR  --  1.28*  --  1.12*  --  0.82   ANIONGAP  --  14  --  11  --  10   ANGELICA  --  9.8  --  9.3  --  8.9   GLC 76 93 151* 102   < > 117    < > = values in this interval not displayed.     Most Recent 2 LFT's:Recent Labs   Lab Test 09/07/21  1257 05/21/21  1152   AST 16 12   ALT 21 16   ALKPHOS 80 57    BILITOTAL 0.6 0.6       Preoperative Labs Reviewed Personally By Myself   White count 5.8 hemoglobin 14.3 MCV 99 platelets 250 sodium 143 potassium 4.2 chloride 111 bicarb 18 BUN 8 creatinine 1.28 glucose 93  Thank you for this consultation.  Appreciate the opportunity to participate in the care of Ronel Ryan, please feel free to contact us for any questions or concerns.    Antony Tompkins MD  [facility]  Phone: #[phone]

## 2021-09-09 NOTE — BRIEF OP NOTE
[facility]    Brief Operative Note    Pre-operative diagnosis: Degenerative joint disease of left hip [M16.12]  Post-operative diagnosis Same as pre-operative diagnosis    Procedure: Procedure(s):  LEFT TOTAL HIP ARTHROPLASTY  Surgeon: Surgeon(s) and Role:     * Zenon Damon MD - Primary     * Franklyn Patino PA-C - Assisting  Anesthesia: Choice   Estimated blood loss: 150cc  Drains: Hemovac  Specimens: * No specimens in log *  Findings:   None.  Complications: None.  Implants:   Implant Name Type Inv. Item Serial No.  Lot No. LRB No. Used Action   shell     [id number] Left 1 Implanted   IMP SCR STRK LOW PROFILE HEX 6.5X25MM [id number] - [id number] Metallic Hardware/[place] IMP SCR STRK LOW PROFILE HEX 6.5X25MM [id number]  [employer] [id number] Left 1 Implanted   IMP SCR STRK LOW PROFILE HEX 6.5X25MM 4729-1517 - [id number] Metallic Hardware/[place] IMP SCR STRK LOW PROFILE HEX 6.5X25MM [id number]  [employer] [id number] Left 1 Implanted   insert     [id number] Left 1 Implanted   IMP STEM FEMORAL HIP STRK ACCOLADE II 127DEG SZ 3 [id number] - [id number] Total Joint Component/Insert IMP STEM FEMORAL HIP STRK ACCOLADE II 127DEG SZ 3 4880-0890  [employer] [id number] Left 1 Implanted   IMP HEAD FEMORAL STRK BIOLOX DELTA CERAMIC 32MM 0MM - [id number] Total Joint Component/Insert IMP HEAD FEMORAL STRK BIOLOX DELTA CERAMIC 32MM 0MM  Active Storage [id number] Left 1 Implanted

## 2021-09-09 NOTE — PROVIDER NOTIFICATION
"PA Carey Ashton called for \"grinding\" noise in Pts new hip. PT and RN were ambulating the patient for first time and both heard the loud grinding noise. Patient does not complain of pain while ambulating. PA will call back when out of clinic and after she assess postop xrays.    Mariola Gonzales RN    "

## 2021-09-09 NOTE — PHARMACY-ADMISSION MEDICATION HISTORY
Pharmacy Note - Admission Medication History    Pertinent Provider Information:    ______________________________________________________________________    Prior To Admission (PTA) med list completed and updated in EMR.       PTA Med List   Medication Sig Last Dose     albuterol (PROAIR HFA/PROVENTIL HFA/VENTOLIN HFA) 108 (90 BASE) MCG/ACT Inhaler Inhale 1-2 puffs into the lungs every 4 hours as needed  has with her     atorvastatin (LIPITOR) 40 MG tablet Take 1 tablet by mouth every evening  9/8/2021 at Unknown time     buPROPion (WELLBUTRIN XL) 300 MG 24 hr tablet Take 300 mg by mouth daily  9/8/2021 at AM     Cholecalciferol 125 MCG (5000 UT) TABS Take 125 mcg by mouth daily  9/8/2021 at Unknown time     docusate sodium (COLACE) 100 MG capsule Take 100 mg by mouth 3 times daily as needed for constipation      EPINEPHrine (EPIPEN/ADRENACLICK/OR ANY BX GENERIC EQUIV) 0.3 MG/0.3ML injection 2-pack Inject 0.3 mLs (0.3 mg) into the muscle once as needed for anaphylaxis      etodolac (LODINE XL) 400 MG 24 hr tablet Take 400 mg by mouth daily 9/7/2021     LORazepam (ATIVAN) 0.5 MG tablet Take 0.5 mg by mouth daily as needed       meclizine (ANTIVERT) 25 MG tablet Take 25 mg by mouth 4 times daily as needed for dizziness      MYRBETRIQ 25 MG 24 hr tablet Take 25 mg by mouth daily 9/8/2021 at AM     risperiDONE (RISPERDAL) 2 MG tablet Take 2 mg by mouth At Bedtime  9/8/2021 at Unknown time     topiramate (TOPAMAX) 100 MG tablet Take 100 mg by mouth 2 times daily  9/8/2021 at 2200     traZODone (DESYREL) 100 MG tablet Take 100 mg by mouth At Bedtime  9/8/2021 at Unknown time     TRULICITY 0.75 MG/0.5ML pen Inject 0.75 mg Subcutaneous once a week (sundays) 9/5/2021       Information source(s): Patient, Clinic records and [facility]    Method of interview communication: in-person    Patient was asked about OTC/herbal products specifically.  PTA med list reflects this.    Based on the pharmacist's assessment,  the PTA med list information appears reliable    Allergies were reviewed, assessed, and updated with the patient.      Medications available for use during hospital stay: albuterol.      Thank you for the opportunity to participate in the care of this patient.      Monica Balbuena RPH     9/9/2021     7:35 AM

## 2021-09-09 NOTE — ANESTHESIA CARE TRANSFER NOTE
Patient: Ronel Ryan    Procedure(s):  LEFT TOTAL HIP ARTHROPLASTY    Diagnosis: Degenerative joint disease of left hip [M16.12]  Diagnosis Additional Information: No value filed.    Anesthesia Type:   Spinal     Note:  Anesthesia Care Transfer Notewriter  Vitals:  Vitals Value Taken Time   /61 09/09/21 1002   Temp 35.6  C (96.1  F) 09/09/21 1002   Pulse     Resp     SpO2 88 % 09/09/21 1002       Electronically Signed By: FEMI Sierra CRNA  September 9, 2021  10:03 AM

## 2021-09-09 NOTE — INTERVAL H&P NOTE
Addended by: JONH WALTERS on: 6/14/2019 04:34 PM     Modules accepted: Orders     I have reviewed the surgical (or preoperative) H&P that is linked to this encounter, and examined the patient. There are no significant changes

## 2021-09-10 ENCOUNTER — APPOINTMENT (OUTPATIENT)
Dept: OCCUPATIONAL THERAPY | Facility: CLINIC | Age: 41
DRG: 470 | End: 2021-09-10
Attending: ORTHOPAEDIC SURGERY
Payer: COMMERCIAL

## 2021-09-10 ENCOUNTER — APPOINTMENT (OUTPATIENT)
Dept: PHYSICAL THERAPY | Facility: CLINIC | Age: 41
DRG: 470 | End: 2021-09-10
Attending: ORTHOPAEDIC SURGERY
Payer: COMMERCIAL

## 2021-09-10 PROBLEM — Z96.642 STATUS POST TOTAL HIP REPLACEMENT, LEFT: Status: ACTIVE | Noted: 2021-09-10

## 2021-09-10 LAB
ALBUMIN UR-MCNC: NEGATIVE MG/DL
ANION GAP SERPL CALCULATED.3IONS-SCNC: 8 MMOL/L (ref 5–18)
APPEARANCE UR: CLEAR
BILIRUB UR QL STRIP: NEGATIVE
BUN SERPL-MCNC: 10 MG/DL (ref 8–22)
CALCIUM SERPL-MCNC: 8.7 MG/DL (ref 8.5–10.5)
CHLORIDE BLD-SCNC: 109 MMOL/L (ref 98–107)
CO2 SERPL-SCNC: 18 MMOL/L (ref 22–31)
COLOR UR AUTO: ABNORMAL
CREAT SERPL-MCNC: 1 MG/DL (ref 0.6–1.1)
GFR SERPL CREATININE-BSD FRML MDRD: 70 ML/MIN/1.73M2
GLUCOSE BLD-MCNC: 155 MG/DL (ref 70–125)
GLUCOSE BLDC GLUCOMTR-MCNC: 126 MG/DL (ref 70–99)
GLUCOSE BLDC GLUCOMTR-MCNC: 129 MG/DL (ref 70–99)
GLUCOSE BLDC GLUCOMTR-MCNC: 138 MG/DL (ref 70–99)
GLUCOSE BLDC GLUCOMTR-MCNC: 142 MG/DL (ref 70–99)
GLUCOSE UR STRIP-MCNC: NEGATIVE MG/DL
HBA1C MFR BLD: 4.6 %
HGB BLD-MCNC: 11.5 G/DL (ref 11.7–15.7)
HGB UR QL STRIP: NEGATIVE
KETONES UR STRIP-MCNC: NEGATIVE MG/DL
LEUKOCYTE ESTERASE UR QL STRIP: NEGATIVE
MUCOUS THREADS #/AREA URNS LPF: PRESENT /LPF
NITRATE UR QL: NEGATIVE
PH UR STRIP: 5.5 [PH] (ref 5–7)
POTASSIUM BLD-SCNC: 4.3 MMOL/L (ref 3.5–5)
RBC URINE: 0 /HPF
SODIUM SERPL-SCNC: 135 MMOL/L (ref 136–145)
SP GR UR STRIP: 1.01 (ref 1–1.03)
UROBILINOGEN UR STRIP-MCNC: <2 MG/DL
WBC URINE: 0 /HPF

## 2021-09-10 PROCEDURE — 81001 URINALYSIS AUTO W/SCOPE: CPT | Performed by: INTERNAL MEDICINE

## 2021-09-10 PROCEDURE — 250N000011 HC RX IP 250 OP 636: Performed by: ORTHOPAEDIC SURGERY

## 2021-09-10 PROCEDURE — 36415 COLL VENOUS BLD VENIPUNCTURE: CPT | Performed by: ORTHOPAEDIC SURGERY

## 2021-09-10 PROCEDURE — 97164 PT RE-EVAL EST PLAN CARE: CPT | Mod: GP

## 2021-09-10 PROCEDURE — 80048 BASIC METABOLIC PNL TOTAL CA: CPT | Performed by: FAMILY MEDICINE

## 2021-09-10 PROCEDURE — 120N000001 HC R&B MED SURG/OB

## 2021-09-10 PROCEDURE — 85018 HEMOGLOBIN: CPT | Performed by: ORTHOPAEDIC SURGERY

## 2021-09-10 PROCEDURE — 250N000013 HC RX MED GY IP 250 OP 250 PS 637: Performed by: FAMILY MEDICINE

## 2021-09-10 PROCEDURE — 99233 SBSQ HOSP IP/OBS HIGH 50: CPT | Performed by: FAMILY MEDICINE

## 2021-09-10 PROCEDURE — 97530 THERAPEUTIC ACTIVITIES: CPT | Mod: GP

## 2021-09-10 PROCEDURE — 250N000012 HC RX MED GY IP 250 OP 636 PS 637: Performed by: FAMILY MEDICINE

## 2021-09-10 PROCEDURE — 250N000013 HC RX MED GY IP 250 OP 250 PS 637: Performed by: ORTHOPAEDIC SURGERY

## 2021-09-10 PROCEDURE — 83036 HEMOGLOBIN GLYCOSYLATED A1C: CPT | Performed by: FAMILY MEDICINE

## 2021-09-10 PROCEDURE — 96372 THER/PROPH/DIAG INJ SC/IM: CPT | Performed by: FAMILY MEDICINE

## 2021-09-10 RX ADMIN — FAMOTIDINE 20 MG: 20 TABLET ORAL at 09:38

## 2021-09-10 RX ADMIN — RISPERIDONE 2 MG: 1 TABLET ORAL at 21:29

## 2021-09-10 RX ADMIN — Medication 125 MCG: at 09:38

## 2021-09-10 RX ADMIN — ACETAMINOPHEN 975 MG: 325 TABLET ORAL at 06:29

## 2021-09-10 RX ADMIN — ATORVASTATIN CALCIUM 40 MG: 40 TABLET, FILM COATED ORAL at 21:30

## 2021-09-10 RX ADMIN — MIRABEGRON 25 MG: 25 TABLET, FILM COATED, EXTENDED RELEASE ORAL at 09:38

## 2021-09-10 RX ADMIN — BUPROPION HYDROCHLORIDE 300 MG: 150 TABLET, EXTENDED RELEASE ORAL at 09:38

## 2021-09-10 RX ADMIN — TRAZODONE HYDROCHLORIDE 100 MG: 50 TABLET ORAL at 21:29

## 2021-09-10 RX ADMIN — ACETAMINOPHEN 975 MG: 325 TABLET ORAL at 14:41

## 2021-09-10 RX ADMIN — TOPIRAMATE 100 MG: 50 TABLET ORAL at 09:38

## 2021-09-10 RX ADMIN — FAMOTIDINE 20 MG: 20 TABLET ORAL at 21:31

## 2021-09-10 RX ADMIN — POLYETHYLENE GLYCOL 3350 17 G: 17 POWDER, FOR SOLUTION ORAL at 09:37

## 2021-09-10 RX ADMIN — KETOROLAC TROMETHAMINE 15 MG: 30 INJECTION, SOLUTION INTRAMUSCULAR; INTRAVENOUS at 04:35

## 2021-09-10 RX ADMIN — OXYCODONE HYDROCHLORIDE 5 MG: 5 TABLET ORAL at 11:33

## 2021-09-10 RX ADMIN — ASPIRIN 325 MG: 325 TABLET, COATED ORAL at 09:38

## 2021-09-10 RX ADMIN — TOPIRAMATE 100 MG: 50 TABLET ORAL at 21:30

## 2021-09-10 RX ADMIN — DOCUSATE SODIUM 50MG AND SENNOSIDES 8.6MG 1 TABLET: 8.6; 5 TABLET, FILM COATED ORAL at 21:30

## 2021-09-10 RX ADMIN — DOCUSATE SODIUM 50MG AND SENNOSIDES 8.6MG 1 TABLET: 8.6; 5 TABLET, FILM COATED ORAL at 09:37

## 2021-09-10 RX ADMIN — ACETAMINOPHEN 975 MG: 325 TABLET ORAL at 21:30

## 2021-09-10 RX ADMIN — OXYCODONE HYDROCHLORIDE 10 MG: 5 TABLET ORAL at 20:26

## 2021-09-10 RX ADMIN — INSULIN ASPART 1 UNITS: 100 INJECTION, SOLUTION INTRAVENOUS; SUBCUTANEOUS at 06:37

## 2021-09-10 NOTE — ANESTHESIA CARE TRANSFER NOTE
Patient: Ronel Ryan    Procedure(s):  CLOSED REDUCTION, LEFT HIP    Diagnosis: Dislocation of left hip, initial encounter (H) [S73.005A]  Diagnosis Additional Information: No value filed.    Anesthesia Type:   General     Note:    Oropharynx: oropharynx clear of all foreign objects  Level of Consciousness: awake  Oxygen Supplementation: face mask    Independent Airway: airway patency satisfactory and stable  Dentition: dentition unchanged  Vital Signs Stable: post-procedure vital signs reviewed and stable  Report to RN Given: handoff report given  Patient transferred to: PACU    Handoff Report: Identifed the Patient, Identified the Reponsible Provider, Reviewed the pertinent medical history, Discussed the surgical course, Reviewed Intra-OP anesthesia mangement and issues during anesthesia, Set expectations for post-procedure period and Allowed opportunity for questions and acknowledgement of understanding      Vitals:  Vitals Value Taken Time   /64 09/09/21 1942   Temp 37.2  C (98.9  F) 09/09/21 1942   Pulse 94 09/09/21 1945   Resp 17 09/09/21 1945   SpO2 100 % 09/09/21 1945   Vitals shown include unvalidated device data.    Electronically Signed By: FEMI Dietrich CRNA  September 9, 2021  7:47 PM

## 2021-09-10 NOTE — OP NOTE
Total Hip Arthoplasty Operative Note        PLAN:  Weight bearing status: Bedrest overnight. May WBAT with PT supervision in AM.     Activity: Bedrest   Anticoagulation plan:                  mg daily  for 42 days  Follow up plan                           Follow up in 2 week(s)        Name: Ronel Ryan    PCP: Lexie Rodriguez    Procedure Date: 9/9/2021    Pre-operative diagnosis: Dislocation of left hip, initial encounter (H) [S73.005A]   Post-operative diagnosis: Same   Procedure: Closed Reduction of dislocated Left hip.   Surgeon: Zenon Damon MD     Assistant(s): None   Anesthesia: General endotracheal anesthesia   Estimated blood loss: None   Drains: None   Specimens: None       Findings: See full dictated operative note for details   Complications: None       Comments: See dictated operative report for full details     Indications:    Patient with status post uncomplicated left total hip arthroplasty performed in the a.m. postoperative films demonstrated good position of the arthroplasty components with congruous joint.  This afternoon while on the floor she noted onset of mechanical symptoms and progressive pain.  A portable film was obtained which demonstrated a superior dislocation of the left total hip arthroplasty.  Her Aquacel dressing was also noted to be saturated.  A discussion was held with Ms. Tang regarding treatment options with the plan for closed reduction of the total hip arthroplasty versus open reduction or revision.  She understood the options and indicated a willingness to proceed.    Procedure and Findings:    After being informed of the risks, benefits, and alternatives to the procedure, the patient desired to proceed. Brought to the main operating suite where she was placed under general anesthetic. She was positioned supine on the operating table.  The wound was examined the Aquacel dressing was removed as well as saturated Steri-Strips as demonstrated a well  closed incision without gapping.    The C-arm was brought in and the superior dislocation was demonstrated there was no evidence of loss of position of the arthroplasty components.  It was therefore determined that an attempted closed reduction should be pursued.  Countertraction sheet was placed about the opposite limb and gentle continuous traction and manipulation under C arm visualization allowed for relocation of the total hip arthroplasty components.  Final C-arm views were obtained demonstrating a well-seated hip replacement without evidence of fracture or component loosening.    The incision was then cleaned with peroxide saturated 4 x 4's skin was cleansed with hexedine.  This was followed by application of benzoin.  Steri-Strips were then placed across the incision followed by placement of a large Aquacel dressing.  Final C arm views were obtained demonstrating the reduced total hip arthroplasty.  Patient was transferred with abductor pillow in position to the ovary bed.  No additional concerns, complications or blood loss.  The patient returned to [facility] in stable condition.       Zenon Damon MD    Date: 9/9/2021 Time: 7:34 PM      CONFIDENTIALITY NOTICE This message and any included attachments are from [facility] and are intended only for the addressee. The information contained in this message is confidential and may constitute inside or non-public information under international, federal, or state securities laws. Unauthorized forwarding, printing, copying, distribution, or use of such information is strictly prohibited and may be unlawful. If you are not the addressee, please promptly delete this message and notify the sender of the delivery error by e-mail.

## 2021-09-10 NOTE — PROGRESS NOTES
[facility]      OUTPATIENT OCCUPATIONAL THERAPY  EVALUATION  PLAN OF TREATMENT FOR OUTPATIENT REHABILITATION  (COMPLETE FOR INITIAL CLAIMS ONLY)  Patient's Last Name, First Name, M.I.  YOB: 1980  Ronel Ryan                          Provider's Name  [facility] Medical Record No.  [id number]                               Onset Date:  09/09/21   Start of Care Date:  (P) 09/10/21     Type:     ___PT   _X_OT   ___SLP Medical Diagnosis:  (P) VALENTIN                        OT Diagnosis:  Decreased ADL independence d/t L VALENTIN   Visits from SOC:  1   _________________________________________________________________________________  Plan of Treatment/Functional Goals    Planned Interventions: ADL retraining   Goals: See Occupational Therapy Goals on Care Plan in [facility] electronic health record.    Therapy Frequency: Daily  Predicted Duration of Therapy Intervention: 5 days  _________________________________________________________________________________    I CERTIFY THE NEED FOR THESE SERVICES FURNISHED UNDER        THIS PLAN OF TREATMENT AND WHILE UNDER MY CARE     (Physician co-signature of this document indicates review and certification of the therapy plan).                Certification date from: (P) 09/10/21, Certification date to: (P) 10/08/21    Referring Physician: Zenon Damon MD            Initial Assessment        See Occupational Therapy evaluation dated (P) 09/10/21 in Epic electronic health record.    Zenon Damon MD

## 2021-09-10 NOTE — PROVIDER NOTIFICATION
Provider alerted of pt feeling/hearing popping noise when sitting on the toilet. Pain when from mild to severe. Pt hoyered to bed and PA put in stat Xrays. Showed dislocation of new L VALENTIN. Pulses intact and sensation intact to LLE. Pt began to bleed through aquacell dressing, Charge RN and bedside RN stabilized with ABDs and rolled up blankets for pressure. Repeat Systolic BPs were 114 and 117 respectively. Pt maintained consciousness despite blood loss and pain management.  Pain managed with IV dilaudid. Pt went down to surgery at 6:40pm for a closed reduction. Family (mom) notified by bedside RN.     Mariola Gonzales RN

## 2021-09-10 NOTE — PROGRESS NOTES
09/10/21 0820   Quick Adds   Type of Visit Initial Occupational Therapy Evaluation   Living Environment   People in home alone   Current Living Arrangements apartment   Living Environment Comments elevator has building   Self-Care   Usual Activity Tolerance fair   Current Activity Tolerance poor   Equipment Currently Used at Home grab bar, toilet;grab bar, tub/shower;raised toilet seat;shower chair;walker, rolling   Activity/Exercise/Self-Care Comment Receives assistance from PCA duirng the day   Disability/Function   Hearing Difficulty or Deaf no   Wear Glasses or Blind no   General Information   Onset of Illness/Injury or Date of Surgery 09/09/21   Referring Physician Zenon Damon MD   Patient/Family Therapy Goal Statement (OT) to get more therapy   Existing Precautions/Restrictions no hip IR;no hip ADD past midline;no hip hyperextension;90 degree hip flexion   Left Lower Extremity (Weight-bearing Status) weight-bearing as tolerated (WBAT)   Cognitive Status Examination   Orientation Status orientation to person, place and time   Affect/Mental Status (Cognitive) WFL   Follows Commands WFL   Range of Motion Comprehensive   Comment, General Range of Motion unable to assess during this session   Strength Comprehensive (MMT)   General Manual Muscle Testing (MMT) Assessment no strength deficits identified   Coordination   Upper Extremity Coordination No deficits were identified   Bed Mobility   Bed Mobility supine-sit   Supine-Sit [place] (Bed Mobility) maximum assist (25% patient effort)   Assistive Device (Bed Mobility) bed rails;draw sheet   Transfers   Transfers bed-chair transfer;sit-stand transfer   Transfer Skill: Bed to Chair/Chair to Bed   Bed-Chair [place] (Transfers) minimum assist (75% patient effort);2 person assist;verbal cues   Assistive Device (Bed-Chair Transfers) rolling walker   Sit-Stand Transfer   Sit-Stand [place] (Transfers) minimum assist (75% patient effort);2 person  assist;verbal cues   Assistive Device (Sit-Stand Transfers) walker, front-wheeled   Balance   Balance Assessment standing balance: dynamic   Standing Balance: Dynamic WFL   Clinical Impression   Criteria for Skilled Therapeutic Interventions Met (OT) yes   OT Diagnosis Decreased ADL independence d/t L VALENTIN   OT Problem List-Impairments impacting ADL activity tolerance impaired;flexibility;range of motion (ROM);post-surgical precautions   Assessment of Occupational Performance 3-5 Performance Deficits   Identified Performance Deficits dressing, toileting, transfers   Planned Therapy Interventions (OT) ADL retraining   Clinical Decision Making Complexity (OT) moderate complexity   Therapy Frequency (OT) Daily   Predicted Duration of Therapy 5 days   Risk & Benefits of therapy have been explained patient   OT Discharge Planning    OT Discharge Recommendation (DC Rec) Transitional Care Facility   OT Rationale for DC Rec Decreased ADL independence d/t L VALENTIN. Pt not at ADL baseline.   Therapy Certification   Start of Care Date 09/10/21   Certification date from 09/10/21   Certification date to 10/08/21   Medical Diagnosis VALENTIN   Total Evaluation Time (Minutes)   Total Evaluation Time (Minutes) 5

## 2021-09-10 NOTE — BRIEF OP NOTE
[facility]    Brief Operative Note    Pre-operative diagnosis: Dislocation of left hip, initial encounter (H) [S73.005A]  Post-operative diagnosis Same as pre-operative diagnosis    Procedure: Procedure(s):  CLOSED REDUCTION, LEFT HIP  Surgeon: Surgeon(s) and Role:     * Zenon Damon MD - Primary  Anesthesia: Choice   Estimated blood loss: None  Drains: None  Specimens: * No specimens in log *  Findings:   None.  Complications: None.  Implants: * No implants in log *

## 2021-09-10 NOTE — PROGRESS NOTES
"[facility] Progress Note      Post-operative Day: 1 Day Post-Op    Procedure(s):  CLOSED REDUCTION, LEFT HIP      Subjective:    Pain: minimal. Patient asleep upon entering room. She wakes and states pain is controlled.   Chest pain, SOB:  No      Objective:  Blood pressure 111/65, pulse 72, temperature 97.1  F (36.2  C), temperature source Oral, resp. rate 18, height 1.626 m (5' 4\"), weight 109.7 kg (241 lb 14.4 oz), last menstrual period 01/01/2013, SpO2 99 %, not currently breastfeeding.    Patient Vitals for the past 24 hrs:   BP Temp Temp src Pulse Resp SpO2   09/10/21 0330 111/65 97.1  F (36.2  C) Oral 72 18 99 %   09/09/21 2327 105/60 97.2  F (36.2  C) Oral 80 18 100 %   09/09/21 2230 120/68 98.3  F (36.8  C) Oral 80 17 99 %   09/09/21 2130 (!) 89/68 98.5  F (36.9  C) Oral 91 18 99 %   09/09/21 2100 93/56 98.3  F (36.8  C) Oral 88 18 97 %   09/09/21 2048 -- -- -- -- -- 97 %   09/09/21 2030 120/67 98.6  F (37  C) Oral 84 17 95 %   09/09/21 2020 110/70 -- -- 86 16 95 %   09/09/21 2010 108/72 -- -- 91 19 97 %   09/09/21 2000 114/67 97.6  F (36.4  C) Temporal 91 21 96 %   09/09/21 1950 113/67 -- -- 92 19 100 %   09/09/21 1942 117/64 98.9  F (37.2  C) Temporal 92 16 100 %   09/09/21 1600 95/60 97.3  F (36.3  C) Oral 99 18 98 %   09/09/21 1500 114/66 97.6  F (36.4  C) Oral 92 16 100 %   09/09/21 1400 110/65 97.6  F (36.4  C) Oral 83 16 99 %   09/09/21 1330 96/54 97  F (36.1  C) Tympanic 81 16 100 %   09/09/21 1300 109/62 97.1  F (36.2  C) Tympanic 85 18 99 %   09/09/21 1200 -- -- -- -- -- 98 %   09/09/21 1130 109/62 -- -- 77 -- 97 %   09/09/21 1100 104/63 -- -- 75 20 97 %   09/09/21 1050 112/73 (!) 96  F (35.6  C) Axillary 75 21 96 %   09/09/21 1040 95/50 -- -- 75 18 98 %   09/09/21 1030 107/70 -- -- 78 23 98 %   09/09/21 1020 102/67 -- -- 80 22 94 %   09/09/21 1010 101/63 -- -- 80 8 (!) 89 %   09/09/21 1002 105/61 (!) 96.1  F (35.6  C) Temporal -- -- (!) 88 %       Wt Readings from Last 4 Encounters: "   09/09/21 109.7 kg (241 lb 14.4 oz)   09/01/21 111.6 kg (246 lb)   08/06/21 112.9 kg (249 lb)   06/08/21 116.2 kg (256 lb 3.2 oz)         Motor function, sensation, and circulation intact   Yes  Wound status: incisions are clean dry and intact. Yes. New Aquacel placed last night. 1/2 saturated. ERNESTINA Wray states had moderate drainage and reinforced. Discussed Aquacel change after PT.   Calf tenderness: Bilateral  No    Pertinent Labs   Lab Results: personally reviewed.     Recent Labs   Lab Test 09/10/21  0627 09/09/21  0711 09/07/21  1257 05/21/21  1152 07/25/19  1100 11/21/18  0959 10/06/17  0815   INR  --  1.15  --   --   --   --   --    HGB 11.5*  --   --  13.7 12.6  --  13.1   HCT  --   --   --  41.8 39.3  --  39.5   MCV  --   --   --  94 85  --  87   PLT  --   --   --  283 309  --  254   *  --  143 138 138   < >  --     < > = values in this interval not displayed.       Plan: Anticoagulation protocol:  mg daily  x 42  days            Pain medications:  oxycodone and tylenol            Weight bearing status:  WBAT with supervision of PT only.             Disposition:  Based on series of events with dislocation, patient will need another night of hospitalization. PT to work with patient for ambulation.             Continue cares and rehabilitation     Report completed by:  Franklyn Patino PA-C  Date: 9/10/2021  Time: 7:10 AM

## 2021-09-10 NOTE — PROGRESS NOTES
"Per Orthopedics PA progress note 9/10/2021, \" Weight bearing status:  WBAT with supervision of PT only. \"    PT changed recommendation to TCU.     Patient has [facility] for insurance. CM to see.   "

## 2021-09-10 NOTE — PLAN OF CARE
"/65 (BP Location: Right arm)   Pulse 72   Temp 97.1  F (36.2  C) (Oral)   Resp 18   Ht 1.626 m (5' 4\")   Wt 109.7 kg (241 lb 14.4 oz)   LMP 01/01/2013   SpO2 99%   BMI 41.52 kg/m        Problem: Adult Inpatient Plan of Care  Goal: Readiness for Transition of Care  Outcome: No Change     Problem: Adult Inpatient Plan of Care  Goal: Optimal Comfort and Wellbeing  Outcome: Improving     Patient vital signs are at baseline: Yes  Patient able to ambulate as they were prior to admission or with assist devices provided by therapies during their stay:  No,  Reason:  Pt on bedrest per orders until she has scheduled therapy this morning.  Patient MUST void prior to discharge:  No,  Reason:  Per had urinary retention possibly due to bedrest. Pt had to be straight cathed.  Patient able to tolerate oral intake:  Yes  Pain has adequate pain control using Oral analgesics:  Yes    Pt had some moderate drainage from her incision that was reinforced with dry gauze, abd, and pressure tape.     Valentín Garcia RN      "

## 2021-09-10 NOTE — ANESTHESIA POSTPROCEDURE EVALUATION
Patient: Ronel Ryan    Procedure(s):  CLOSED REDUCTION, LEFT HIP    Diagnosis:Dislocation of left hip, initial encounter (H) [S73.005A]  Diagnosis Additional Information: No value filed.    Anesthesia Type:  General    Note:  Disposition: Inpatient   Postop Pain Control: Uneventful            Sign Out: Well controlled pain   PONV: No   Neuro/Psych: Uneventful            Sign Out: Acceptable/Baseline neuro status   Airway/Respiratory: Uneventful            Sign Out: Acceptable/Baseline resp. status   CV/Hemodynamics: Uneventful            Sign Out: Acceptable CV status; No obvious hypovolemia; No obvious fluid overload   Other NRE: NONE   DID A NON-ROUTINE EVENT OCCUR? No           Last vitals:  Vitals Value Taken Time   /67 09/09/21 2000   Temp 36.4  C (97.6  F) 09/09/21 2000   Pulse 92 09/09/21 2009   Resp 16 09/09/21 2009   SpO2 96 % 09/09/21 2009   Vitals shown include unvalidated device data.    Electronically Signed By: Lyndon Robin MD  September 9, 2021  8:10 PM

## 2021-09-10 NOTE — UTILIZATION REVIEW
Admission Status; Secondary Review Determination       Under the authority of the Utilization Management Committee, the utilization review process indicated a secondary review on the above patient. The review outcome is based on review of the medical records, discussions with staff, and applying clinical experience noted on the date of the review.     (x) Inpatient Status Appropriate - This patient's medical care is consistent with medical management for inpatient care and reasonable inpatient medical practice.     RATIONALE FOR DETERMINATION   At the time of admission with the information available to the attending physician more than 2 nights Hospital complex care was anticipated, based on patient risk of adverse outcome if treated as outpatient and complex care required. Inpatient admission is appropriate based on the Medicare guidelines.     SUMMARY:  42 y/o female underwent left total hip arthroplasty.  She had an unexpected complication following surgery with dislocation of her hip last night.  This required additional interventions and extended the complexity of care and delayed her ability to advance activity as initially expected with her VALENTIN.  Orthopedics given her issues feels she needs to stay in the hospital additional days.     The information on this document is developed by the utilization review team in order for the business office to ensure compliance. This only denotes the appropriateness of proper admission status and does not reflect the quality of care rendered.   The definitions of Inpatient Status and Observation Status used in making the determination above are those provided in the CMS Coverage Manual, Chapter 1 and Chapter 6, section 70.4.     Sincerely,     Kuldip Lock DO, [facility]  Utilization Review  Physician Advisor

## 2021-09-10 NOTE — PLAN OF CARE
Problem: Adult Inpatient Plan of Care  Goal: Plan of Care Review  9/10/2021 1238 by Nate Silva, RN  Outcome: Improving  Flowsheets (Taken 9/10/2021 1238)  Plan of Care Reviewed With: patient  Progress: improving  9/10/2021 1217 by Nate Silva, RN  Outcome: Improving  Flowsheets (Taken 9/10/2021 1217)  Plan of Care Reviewed With: patient  Progress: improving     Pt moved well, but hesitantly with PT. Pt cleared by surgical team to ambulate with staff using hip precautions.  Tolerated oxycodone for pain management.  Dressing to hip continues to be reinforced and P.A. stated she will change tomorrow.    Plan: anticipate discharge to TCU when able.

## 2021-09-10 NOTE — PROGRESS NOTES
[facility] MEDICINE PROGRESS NOTE      Identification/Summary: Ronel Ryan is a 41 year old female with a past medical history of hypertension, diabetes, obesity, and chronic kidney disease stage III presented for an elective total hip arthroplasty.  Had dislocation of hip after surgery and had to be taken back for close reduction.    Assessment and Plan:  Left total hip arthroplasty/dislocation.  Patient taken back to the operating room for reduction yesterday.  Will now require TCU.  Acute urinary retention.  Suspect medication related.  Monitor.  Straight cath x1 last night.  Discussed directly with nursing staff will need to bladder scan and manage appropriately.  Hypertension.   Blood pressures have been normal.  Patient is not chronically on medication after weight loss.  Monitor with expectant management.  Type 2 diabetes.   Continue correction insulin for now.  Obesity. Applauded the patient's weight loss and encouraged her to continue to work on diet exercise and weight loss.  Vertigo/dizziness. Will increase risk of falls. Will use as needed meclizine. Nursing and PT to be aware.  Bipolar disorder/depression. No acute issues. Home meds.  Chronic kidney disease stage IIIa.   Creatinine was normal with an improved GFR today.  Chronic pain related to osteoarthritis. Monitor response to pain medications. May require assistance of pain team.  VTE prophylaxis. No history. Defer prophylaxis to orthopedics.    Diet: Regular Diet Adult  Advance Diet as Tolerated: Regular Diet Adult  Regular Diet Adult    Code Status: Full Code    Anticipated possible discharge 1 to 2 days likely to TCU    Interval History/Subjective:  Patient is doing okay.  No fevers or chills.  No nausea or vomiting.  No chest pain.  Frustrated by what happened yesterday.  States she has no pain in her leg if she lays still but standing does have discomfort.  She is scared that she is going to dislocated  again.    Physical Exam/Objective:  Temp:  [96  F (35.6  C)-98.9  F (37.2  C)] 97.5  F (36.4  C)  Pulse:  [72-99] 84  Resp:  [16-23] 16  BP: ()/(50-73) 103/55  SpO2:  [95 %-100 %] 100 %    Body mass index is 41.52 kg/m .    GENERAL:  Alert, appears comfortable, in no acute distress, appears stated age   HEAD:  Normocephalic, without obvious abnormality, atraumatic   EYES:  PERRL, conjunctiva/corneas clear, no scleral icterus, EOM's intact   NECK: Supple, symmetrical, trachea midline   BACK:   Symmetric, no curvature, ROM normal   LUNGS:   Clear to auscultation bilaterally, no rales, rhonchi, or wheezing, symmetric chest rise on inhalation, respirations unlabored   CHEST WALL:  No tenderness or deformity   HEART:  Regular rate and rhythm, S1 and S2 normal, no murmur, rub, or gallop    ABDOMEN:   Soft, non-tender, bowel sounds active all four quadrants, no masses, no organomegaly, no rebound or guarding   EXTREMITIES: Extremities normal, atraumatic, no cyanosis or edema    SKIN: Dry to touch, no exanthems in the visualized areas   NEURO: Alert, oriented x3, moves all four extremities freely   PSYCH: Cooperative, behavior is appropriate      Medications:   Personally Reviewed.  Medications     lactated ringers 100 mL/hr at 09/09/21 2202       acetaminophen  975 mg Oral Q8H     aspirin  325 mg Oral Daily     atorvastatin  40 mg Oral QPM     buPROPion  300 mg Oral Daily     famotidine  20 mg Oral BID    Or     famotidine  20 mg Intravenous BID     insulin aspart  1-7 Units Subcutaneous TID AC     insulin aspart  1-5 Units Subcutaneous At Bedtime     ketorolac  15 mg Intravenous Q6H     magnesium sulfate  4 g Intravenous Once     mirabegron  25 mg Oral Daily     polyethylene glycol  17 g Oral Daily     risperiDONE  2 mg Oral At Bedtime     senna-docusate  1 tablet Oral BID     sodium chloride (PF)  3 mL Intracatheter Q8H     topiramate  100 mg Oral BID     traZODone  100 mg Oral At Bedtime     vitamin D3  125 mcg  Oral Daily       Data reviewed today: I personally reviewed all new medications, labs, imaging/diagnostics reports over the past 24 hours. Pertinent findings include:    Imaging:   Recent Results (from the past 24 hour(s))   XR Hip Port Left G/E 2 Views    Narrative    EXAM: XR HIP PORTABLE LEFT 2-3 VIEWS  LOCATION: [facility]  DATE/TIME: 9/9/2021 10:14 AM    INDICATION: Status post Hip surgery  COMPARISON: 02/14/2017.      Impression    IMPRESSION: Postoperative changes left total hip replacement. No dislocation or acute fracture. Hardware appears well seated.    IUD within the pelvis.    Right hip acetabular dysplasia with moderate joint space narrowing.   XR Hip Port Left 1 View    Narrative    EXAM: XR HIP PORT LEFT 1  VIEWS  LOCATION: [facility]  DATE/TIME: 9/9/2021 4:48 PM    INDICATION: Left hip pain. History of left hip arthroplasty.  COMPARISON: X-ray from earlier today.      Impression    IMPRESSION: Superior dislocation of the femoral component of the left total hip arthroplasty.    XR Surgery LUIS  Fluoro G/T 5 Min    Narrative    This exam was marked as non-reportable because it will not be read by a   radiologist or a [facility] non-radiologist provider.             Labs:  Most Recent 3 CBC's:Recent Labs   Lab Test 09/10/21  0627 05/21/21  1152 07/25/19  1100 10/06/17  0815   WBC  --  5.2 6.3 9.2   HGB 11.5* 13.7 12.6 13.1   MCV  --  94 85 87   PLT  --  283 309 254     Most Recent 3 BMP's:Recent Labs   Lab Test 09/10/21  0631 09/10/21  0627 09/09/21  1646 09/07/21  1257 05/21/21  1152   NA  --  135*  --  143 138   POTASSIUM  --  4.3  --  4.2 4.1   CHLORIDE  --  109*  --  111* 107   CO2  --  18*  --  18* 20*   BUN  --  10  --  8 13   CR  --  1.00  --  1.28* 1.12*   ANIONGAP  --  8  --  14 11   ANGELICA  --  8.7  --  9.8 9.3   * 155* 182* 93 102     Most Recent 2 LFT's:Recent Labs   Lab Test 09/07/21  1257 05/21/21  1152   AST 16 12   ALT 21 16    ALKPHOS 80 57   BILITOTAL 0.6 0.6     Most Recent 3 INR's:Recent Labs   Lab Test 09/09/21  0711   INR 1.15       Antony Tompkins MD  [facility] [place]  Phone: #[phone]

## 2021-09-10 NOTE — CONSULTS
Care Management Follow Up    Length of Stay (days): 1        Patient plan of care discussed at interdisciplinary rounds: Yes     Expected Discharge Date:   9/11 or 9/12/21       Concerns to be Addressed / Barriers to Discharge: medical surgical clearance s/p left hip surgery 9/9/21, post op cares, post op urinary retention, hip precautions     Anticipated Discharge Disposition: pending (TCU)    Education Provided on the Discharge Plan:  yes  Patient/Family in Agreement with the Plan:   yes    Referrals Placed by CM/SW:    Yes      Additional Information:  Discussed discharge planning with patient. Patient currently lives alone in an apartment and only has PCA coverage from 09-14 daily. (see previous CM note re conversation with [employer] worker about potentially increasing   PCA hours) Per patient, she received a call from her insurance, [employer] ([facility]), stating she had been approved or given prior auth for TCU coverage. Patient is in agreement with TCU at discharge. Patient was given [facility] contracted TCU list for the [facility] with Medicare.gov website for ratings and comparisons as well as overview of TCU referral process.      Patient prefers [facility]. Unfortunately, this writer has previously heard from a couple of her selections that they are currently full until next week. Choices are [facility], [facility], and [facility]. Referrals sent.     CM following.

## 2021-09-10 NOTE — PROGRESS NOTES
09/10/21 0815   Quick Adds   Type of Visit PT Re-evaluation   Living Environment   People in home alone   Current Living Arrangements apartment   Home Accessibility no concerns   Number of Stairs, Within Home, Primary none   Living Environment Comments PCA 9am-2pm daily, no overnight assist at this time   Self-Care   Equipment Currently Used at Home walker, rolling  (4WW and FWW)   General Information   Onset of Illness/Injury or Date of Surgery 09/09/21   Referring Physician Dr. Zenon Damon   Pertinent History of Current Problem (include personal factors and/or comorbidities that impact the POC) S/P L posterior VALENTIN, dislocation 9/9 then reduced on 9/9   Existing Precautions/Restrictions no hip IR;no hip ADD past midline;no hip hyperextension;90 degree hip flexion   Weight-Bearing Status - LLE weight-bearing as tolerated   Weight-Bearing Status - RLE full weight-bearing   Bed Mobility   Bed Mobility Limitations decreased ability to use legs for bridging/pushing   Impairments Contributing to Impaired Bed Mobility decreased strength;pain   Assistive Device (Bed Mobility) bed rails;draw sheet   Transfers   Transfers sit-stand transfer   Impairments Contributing to Impaired Transfers decreased strength   Sit-Stand Transfer   Sit-Stand [place] (Transfers) verbal cues;minimum assist (75% patient effort);2 person assist   Assistive Device (Sit-Stand Transfers) walker, front-wheeled   Gait/Stairs (Locomotion)   [place] Level (Gait) contact guard;2 person assist   Assistive Device (Gait) walker, front-wheeled   Distance in Feet (Required for LE Total Joints) SPT right   Pattern (Gait) step-to   Deviations/Abnormal Patterns (Gait) antalgic;gait speed decreased   Clinical Impression   Criteria for Skilled Therapeutic Intervention yes, treatment indicated   PT Diagnosis (PT) Impaired functional mobility   Influenced by the following impairments pain, weakness   Functional limitations due to impairments  transfers, gait, bed mobility   Clinical Presentation Evolving/Changing   Clinical Presentation Rationale Pt presents as medically diagnosed   Clinical Decision Making (Complexity) moderate complexity   Therapy Frequency (PT) 2x/day   Predicted Duration of Therapy Intervention (days/wks) 5 days   Planned Therapy Interventions (PT) gait training;bed mobility training;transfer training;strengthening;home exercise program   Anticipated Equipment Needs at Discharge (PT) walker, rolling   Risk & Benefits of therapy have been explained evaluation/treatment results reviewed;patient   PT Discharge Planning    PT Discharge Recommendation (DC Rec) Transitional Care Facility   PT Rationale for DC Rec Assist with bed mobility and transfers, no overnight assist   Total Evaluation Time   Total Evaluation Time (Minutes) 5

## 2021-09-11 ENCOUNTER — APPOINTMENT (OUTPATIENT)
Dept: PHYSICAL THERAPY | Facility: CLINIC | Age: 41
DRG: 470 | End: 2021-09-11
Attending: ORTHOPAEDIC SURGERY
Payer: COMMERCIAL

## 2021-09-11 LAB
GLUCOSE BLDC GLUCOMTR-MCNC: 101 MG/DL (ref 70–99)
GLUCOSE BLDC GLUCOMTR-MCNC: 103 MG/DL (ref 70–99)
GLUCOSE BLDC GLUCOMTR-MCNC: 94 MG/DL (ref 70–99)
GLUCOSE BLDC GLUCOMTR-MCNC: 99 MG/DL (ref 70–99)
HGB BLD-MCNC: 9 G/DL (ref 11.7–15.7)

## 2021-09-11 PROCEDURE — 36415 COLL VENOUS BLD VENIPUNCTURE: CPT | Performed by: ORTHOPAEDIC SURGERY

## 2021-09-11 PROCEDURE — 250N000013 HC RX MED GY IP 250 OP 250 PS 637: Performed by: ORTHOPAEDIC SURGERY

## 2021-09-11 PROCEDURE — 97530 THERAPEUTIC ACTIVITIES: CPT | Mod: GP

## 2021-09-11 PROCEDURE — 99232 SBSQ HOSP IP/OBS MODERATE 35: CPT | Performed by: FAMILY MEDICINE

## 2021-09-11 PROCEDURE — 120N000001 HC R&B MED SURG/OB

## 2021-09-11 PROCEDURE — 250N000013 HC RX MED GY IP 250 OP 250 PS 637: Performed by: FAMILY MEDICINE

## 2021-09-11 PROCEDURE — 97116 GAIT TRAINING THERAPY: CPT | Mod: GP

## 2021-09-11 PROCEDURE — 85018 HEMOGLOBIN: CPT | Performed by: ORTHOPAEDIC SURGERY

## 2021-09-11 RX ADMIN — ACETAMINOPHEN 975 MG: 325 TABLET ORAL at 21:03

## 2021-09-11 RX ADMIN — DOCUSATE SODIUM 50MG AND SENNOSIDES 8.6MG 1 TABLET: 8.6; 5 TABLET, FILM COATED ORAL at 09:44

## 2021-09-11 RX ADMIN — DOCUSATE SODIUM 50MG AND SENNOSIDES 8.6MG 1 TABLET: 8.6; 5 TABLET, FILM COATED ORAL at 21:03

## 2021-09-11 RX ADMIN — POLYETHYLENE GLYCOL 3350 17 G: 17 POWDER, FOR SOLUTION ORAL at 09:44

## 2021-09-11 RX ADMIN — FAMOTIDINE 20 MG: 20 TABLET ORAL at 09:44

## 2021-09-11 RX ADMIN — ACETAMINOPHEN 975 MG: 325 TABLET ORAL at 06:11

## 2021-09-11 RX ADMIN — ASPIRIN 325 MG: 325 TABLET, COATED ORAL at 09:44

## 2021-09-11 RX ADMIN — RISPERIDONE 2 MG: 1 TABLET ORAL at 21:03

## 2021-09-11 RX ADMIN — FAMOTIDINE 20 MG: 20 TABLET ORAL at 21:03

## 2021-09-11 RX ADMIN — ATORVASTATIN CALCIUM 40 MG: 40 TABLET, FILM COATED ORAL at 21:03

## 2021-09-11 RX ADMIN — OXYCODONE HYDROCHLORIDE 5 MG: 5 TABLET ORAL at 14:02

## 2021-09-11 RX ADMIN — OXYCODONE HYDROCHLORIDE 5 MG: 5 TABLET ORAL at 06:12

## 2021-09-11 RX ADMIN — MIRABEGRON 25 MG: 25 TABLET, FILM COATED, EXTENDED RELEASE ORAL at 09:44

## 2021-09-11 RX ADMIN — TRAZODONE HYDROCHLORIDE 100 MG: 50 TABLET ORAL at 21:03

## 2021-09-11 RX ADMIN — DOCUSATE SODIUM 100 MG: 100 CAPSULE, LIQUID FILLED ORAL at 16:09

## 2021-09-11 RX ADMIN — TOPIRAMATE 100 MG: 50 TABLET ORAL at 21:03

## 2021-09-11 RX ADMIN — Medication 125 MCG: at 09:44

## 2021-09-11 RX ADMIN — ACETAMINOPHEN 975 MG: 325 TABLET ORAL at 14:02

## 2021-09-11 RX ADMIN — TOPIRAMATE 100 MG: 50 TABLET ORAL at 09:45

## 2021-09-11 RX ADMIN — BUPROPION HYDROCHLORIDE 300 MG: 150 TABLET, EXTENDED RELEASE ORAL at 09:44

## 2021-09-11 NOTE — PLAN OF CARE
Problem: Adult Inpatient Plan of Care  Goal: Plan of Care Review  Outcome: Improving  Flowsheets (Taken 9/11/2021 1153)  Plan of Care Reviewed With: patient  Progress: improving   Pt using oxycodone for pain management, pt has refused need for this on this shift as of this time.  Moving much better but is still very cautious with her movements and develops anxiety with ambulation causing dizziness.  Up to bathroom with assist of 2 and walker.  Incision actively bleeding still, Aquacel dressing reinforced.  Using abductor pillow in bed.  Encouraged to be up in chair.      Plan: anticipate discharge to TCU in 1-2 days.

## 2021-09-11 NOTE — PROGRESS NOTES
"                  [facility] Orthopaedics Progress Note      Post-operative Day: 2 Days Post-Op    Procedure(s):  CLOSED REDUCTION, LEFT HIP. S/P VALENTIN      Subjective:    Pain: moderate, lying in bed saying it hurts  Chest pain, SOB:  No      Objective:  Blood pressure 90/53, pulse 85, temperature 98.1  F (36.7  C), temperature source Oral, resp. rate 16, height 1.626 m (5' 4\"), weight 109.7 kg (241 lb 14.4 oz), last menstrual period 01/01/2013, SpO2 97 %, not currently breastfeeding.    Patient Vitals for the past 24 hrs:   BP Temp Temp src Pulse Resp SpO2   09/11/21 0741 90/53 98.1  F (36.7  C) Oral 85 16 97 %   09/11/21 0340 110/55 97.8  F (36.6  C) Oral 83 16 100 %   09/10/21 1546 100/56 98.3  F (36.8  C) Oral 91 16 99 %       Wt Readings from Last 4 Encounters:   09/09/21 109.7 kg (241 lb 14.4 oz)   09/01/21 111.6 kg (246 lb)   08/06/21 112.9 kg (249 lb)   06/08/21 116.2 kg (256 lb 3.2 oz)         Motor function, sensation, and circulation intact   Yes  Wound status: incisions are clean dry and intact. Yes  Calf tenderness: Bilateral  No    Pertinent Labs   Lab Results: personally reviewed.     Recent Labs   Lab Test 09/11/21  0627 09/10/21  0627 09/09/21  0711 09/07/21  1257 05/21/21  1152 07/25/19  1100 11/21/18  0959 10/06/17  0815   INR  --   --  1.15  --   --   --   --   --    HGB 9.0* 11.5*  --   --  13.7 12.6   < > 13.1   HCT  --   --   --   --  41.8 39.3  --  39.5   MCV  --   --   --   --  94 85  --  87   PLT  --   --   --   --  283 309  --  254   NA  --  135*  --  143 138 138   < >  --     < > = values in this interval not displayed.       Plan: Anticoagulation protocol:  mg daily  x 42  days            Pain medications:  Oxycodone and Tylenol            Weight bearing status:  WBAT with supervision and walker            Disposition:  She is slow moving, did have a VALENTIN dislocation requiring another surgery to relocate.   Will continue PT and use abduction pillow.   She will now need a TCU, which is " looking like monday            Continue cares and rehabilitation     Report completed by:  Carolyn Eldridge PA-C  Date: 9/11/2021  Time: 9:27 AM

## 2021-09-11 NOTE — PROGRESS NOTES
[facility] MEDICINE PROGRESS NOTE      Identification/Summary:   Ronel Ryan is a 41 year old female with a past medical history of hypertension, diabetes, obesity, and chronic kidney disease stage III presented for an elective total hip arthroplasty.  Had dislocation of hip after surgery and had to be taken back for close reduction.     Assessment and Plan:  Left total hip arthroplasty/dislocation.  Patient taken back to the operating room for reduction yesterday.  Will now require TCU.  Acute urinary retention.  Suspect medication related.  Monitor.  Straight cath x1 last night.  Discussed directly with nursing staff will need to bladder scan and manage appropriately.  Has now improved and urinating on her own.  Hypertension.   Blood pressures have been normal.  Patient is not chronically on medication after weight loss.  Monitor with expectant management.  Type 2 diabetes.   Continue correction insulin for now.  Obesity. Applauded the patient's weight loss and encouraged her to continue to work on diet exercise and weight loss.  Vertigo/dizziness. Will increase risk of falls. Will use as needed meclizine. Nursing and PT to be aware.  Bipolar disorder/depression. No acute issues. Home meds.  Chronic kidney disease stage IIIa.   Creatinine was normal with an improved GFR today.  Chronic pain related to osteoarthritis. Monitor response to pain medications. May require assistance of pain team.  VTE prophylaxis. No history. Defer prophylaxis to orthopedics.  Diet: Regular Diet Adult  Advance Diet as Tolerated: Regular Diet Adult  Regular Diet Adult    Code Status: Full Code    Anticipated possible discharge in 1-2 once TCU available.    Interval History/Subjective:  Feeling okay.  Scared about getting up but does.  Has been urinating.  Eating without issues.  No shortness of breath or chest pain.  Passing flatus.    Physical Exam/Objective:  Temp:  [97.7  F (36.5  C)-98.1  F (36.7   C)] 97.7  F (36.5  C)  Pulse:  [83-86] 86  Resp:  [16] 16  BP: ()/(53-55) 109/53  SpO2:  [97 %-100 %] 100 %    Body mass index is 41.52 kg/m .    GENERAL:  Alert, appears comfortable, in no acute distress, appears stated age   HEAD:  Normocephalic, without obvious abnormality, atraumatic   EYES:  PERRL, conjunctiva/corneas clear, no scleral icterus, EOM's intact   NECK: Supple, symmetrical, trachea midline   LUNGS:   Clear to auscultation bilaterally, no rales, rhonchi, or wheezing, symmetric chest rise on inhalation, respirations unlabored   CHEST WALL:  No tenderness or deformity   HEART:  Regular rate and rhythm, S1 and S2 normal, no murmur, rub, or gallop    ABDOMEN:   Soft, non-tender, bowel sounds active all four quadrants, no masses, no organomegaly, no rebound or guarding   EXTREMITIES: Extremities normal, atraumatic, no cyanosis or edema    SKIN: Dry to touch, no exanthems in the visualized areas   NEURO: Alert, oriented x3, moves all four extremities freely   PSYCH: Cooperative, behavior is appropriate      Medications:   Personally Reviewed.  Medications     lactated ringers 100 mL/hr at 09/09/21 2202       acetaminophen  975 mg Oral Q8H     aspirin  325 mg Oral Daily     atorvastatin  40 mg Oral QPM     buPROPion  300 mg Oral Daily     famotidine  20 mg Oral BID    Or     famotidine  20 mg Intravenous BID     insulin aspart  1-7 Units Subcutaneous TID AC     insulin aspart  1-5 Units Subcutaneous At Bedtime     magnesium sulfate  4 g Intravenous Once     mirabegron  25 mg Oral Daily     polyethylene glycol  17 g Oral Daily     risperiDONE  2 mg Oral At Bedtime     senna-docusate  1 tablet Oral BID     sodium chloride (PF)  3 mL Intracatheter Q8H     topiramate  100 mg Oral BID     traZODone  100 mg Oral At Bedtime     vitamin D3  125 mcg Oral Daily       Data reviewed today: I personally reviewed all new medications, labs, imaging/diagnostics reports over the past 24 hours. Pertinent findings  include:    Imaging:   No results found for this or any previous visit (from the past 24 hour(s)).    Labs:  Most Recent 3 CBC's:Recent Labs   Lab Test 09/11/21  0627 09/10/21  0627 05/21/21  1152 07/25/19  1100 10/06/17  0815   WBC  --   --  5.2 6.3 9.2   HGB 9.0* 11.5* 13.7 12.6 13.1   MCV  --   --  94 85 87   PLT  --   --  283 309 254     Most Recent 3 BMP's:Recent Labs   Lab Test 09/11/21  1623 09/11/21  1043 09/11/21  0609 09/10/21  0627 09/07/21  1257 05/21/21  1152   NA  --   --   --  135* 143 138   POTASSIUM  --   --   --  4.3 4.2 4.1   CHLORIDE  --   --   --  109* 111* 107   CO2  --   --   --  18* 18* 20*   BUN  --   --   --  10 8 13   CR  --   --   --  1.00 1.28* 1.12*   ANIONGAP  --   --   --  8 14 11   ANGELICA  --   --   --  8.7 9.8 9.3   GLC 94 99 101* 155* 93 102     Most Recent 2 LFT's:Recent Labs   Lab Test 09/07/21  1257 05/21/21  1152   AST 16 12   ALT 21 16   ALKPHOS 80 57   BILITOTAL 0.6 0.6     Most Recent 3 INR's:Recent Labs   Lab Test 09/09/21  0711   INR 1.15       Antony Tompkins MD  [facility] [place]  Phone: #[phone]

## 2021-09-11 NOTE — PLAN OF CARE
PRN oxy given x1 for 6/10 pain to hip. Patient up to bedside commode w/ assist of 1 using gait belt and walker. Voiding adequately, tolerating oral intake. Patient sleeping comfortably for most of shift. No drainage noted through reinforced ABD pads. Am . Possible discharge to TCU today.     Patient vital signs are at baseline: Yes  Patient able to ambulate as they were prior to admission or with assist devices provided by therapies during their stay:  Yes  Patient MUST void prior to discharge:  Yes  Patient able to tolerate oral intake:  Yes  Pain has adequate pain control using Oral analgesics:  Yes

## 2021-09-11 NOTE — PLAN OF CARE
Problem: Diabetes Comorbidity  Goal: Blood Glucose Level Within Targeted Range  Outcome: Improving     Problem: Pain Chronic (Persistent) (Comorbidity Management)  Goal: Acceptable Pain Control and Functional Ability  Outcome: Improving  Intervention: Develop Pain Management Plan  Recent Flowsheet Documentation  Taken 9/10/2021 2100 by Vy Ayala RN  Pain Management Interventions: medication (see MAR)  Taken 9/10/2021 2026 by Vy Ayala RN  Pain Management Interventions: medication (see MAR)  Taken 9/10/2021 1551 by Vy Ayala RN  Pain Management Interventions: declines  Intervention: Manage Persistent Pain  Recent Flowsheet Documentation  Taken 9/10/2021 1530 by Vy Ayala RN  Bowel Elimination Promotion: adequate fluid intake promoted  Medication Review/Management: high-risk medications identified  Intervention: Optimize Psychosocial Wellbeing  Recent Flowsheet Documentation  Taken 9/10/2021 1530 by Vy Ayala RN  Supportive Measures: active listening utilized    Patient vital signs are at baseline: Yes  Patient able to ambulate as they were prior to admission or with assist devices provided by therapies during their stay:  No,  Reason:  Pt is fearful of falling. Pt did successfully get up to the bedside commode this shift with a heavy assist of 2.   Patient MUST void prior to discharge:  Yes  Patient able to tolerate oral intake:  Yes  Pain has adequate pain control using Oral analgesics:  Yes    Pt's pain is controlled with PRN Oxycodone. Pt remains fearful of getting out of bed, transferring and ambulation but was successful in pivot transferring to the bedside commode this shift. Pt spontaneously voided 400 mL of dark tae urine. Dressing to L. Hip incision is CDI. Anticipating to discharge to TCU tomorrow.

## 2021-09-12 ENCOUNTER — APPOINTMENT (OUTPATIENT)
Dept: OCCUPATIONAL THERAPY | Facility: CLINIC | Age: 41
DRG: 470 | End: 2021-09-12
Attending: ORTHOPAEDIC SURGERY
Payer: COMMERCIAL

## 2021-09-12 ENCOUNTER — APPOINTMENT (OUTPATIENT)
Dept: PHYSICAL THERAPY | Facility: CLINIC | Age: 41
DRG: 470 | End: 2021-09-12
Attending: ORTHOPAEDIC SURGERY
Payer: COMMERCIAL

## 2021-09-12 LAB
GLUCOSE BLDC GLUCOMTR-MCNC: 115 MG/DL (ref 70–99)
GLUCOSE BLDC GLUCOMTR-MCNC: 135 MG/DL (ref 70–99)
GLUCOSE BLDC GLUCOMTR-MCNC: 85 MG/DL (ref 70–99)
GLUCOSE BLDC GLUCOMTR-MCNC: 85 MG/DL (ref 70–99)

## 2021-09-12 PROCEDURE — 120N000001 HC R&B MED SURG/OB

## 2021-09-12 PROCEDURE — 97530 THERAPEUTIC ACTIVITIES: CPT | Mod: GP

## 2021-09-12 PROCEDURE — 97535 SELF CARE MNGMENT TRAINING: CPT | Mod: GO

## 2021-09-12 PROCEDURE — 97116 GAIT TRAINING THERAPY: CPT | Mod: GP

## 2021-09-12 PROCEDURE — 250N000013 HC RX MED GY IP 250 OP 250 PS 637: Performed by: FAMILY MEDICINE

## 2021-09-12 PROCEDURE — 97110 THERAPEUTIC EXERCISES: CPT | Mod: GP

## 2021-09-12 PROCEDURE — 250N000013 HC RX MED GY IP 250 OP 250 PS 637: Performed by: ORTHOPAEDIC SURGERY

## 2021-09-12 PROCEDURE — 99232 SBSQ HOSP IP/OBS MODERATE 35: CPT | Performed by: FAMILY MEDICINE

## 2021-09-12 RX ADMIN — OXYCODONE HYDROCHLORIDE 5 MG: 5 TABLET ORAL at 06:47

## 2021-09-12 RX ADMIN — TOPIRAMATE 100 MG: 50 TABLET ORAL at 09:01

## 2021-09-12 RX ADMIN — DOCUSATE SODIUM 50MG AND SENNOSIDES 8.6MG 1 TABLET: 8.6; 5 TABLET, FILM COATED ORAL at 09:00

## 2021-09-12 RX ADMIN — MIRABEGRON 25 MG: 25 TABLET, FILM COATED, EXTENDED RELEASE ORAL at 09:01

## 2021-09-12 RX ADMIN — ATORVASTATIN CALCIUM 40 MG: 40 TABLET, FILM COATED ORAL at 20:33

## 2021-09-12 RX ADMIN — DOCUSATE SODIUM 50MG AND SENNOSIDES 8.6MG 1 TABLET: 8.6; 5 TABLET, FILM COATED ORAL at 20:34

## 2021-09-12 RX ADMIN — BUPROPION HYDROCHLORIDE 300 MG: 150 TABLET, EXTENDED RELEASE ORAL at 09:01

## 2021-09-12 RX ADMIN — Medication 125 MCG: at 09:00

## 2021-09-12 RX ADMIN — OXYCODONE HYDROCHLORIDE 5 MG: 5 TABLET ORAL at 12:42

## 2021-09-12 RX ADMIN — FAMOTIDINE 20 MG: 20 TABLET ORAL at 09:00

## 2021-09-12 RX ADMIN — RISPERIDONE 2 MG: 1 TABLET ORAL at 20:34

## 2021-09-12 RX ADMIN — ACETAMINOPHEN 975 MG: 325 TABLET ORAL at 06:36

## 2021-09-12 RX ADMIN — ACETAMINOPHEN 975 MG: 325 TABLET ORAL at 13:38

## 2021-09-12 RX ADMIN — FAMOTIDINE 20 MG: 20 TABLET ORAL at 20:34

## 2021-09-12 RX ADMIN — TRAZODONE HYDROCHLORIDE 100 MG: 50 TABLET ORAL at 20:34

## 2021-09-12 RX ADMIN — ASPIRIN 325 MG: 325 TABLET, COATED ORAL at 09:00

## 2021-09-12 RX ADMIN — TOPIRAMATE 100 MG: 50 TABLET ORAL at 20:33

## 2021-09-12 NOTE — PLAN OF CARE
Patient vital signs are at baseline: Yes  Patient able to ambulate as they were prior to admission or with assist devices provided by therapies during their stay:  Yes  Patient MUST void prior to discharge:  Yes  Patient able to tolerate oral intake:  Yes  Pain has adequate pain control using Oral analgesics:  Yes     Patient denied pain over night. L hip dressing reinforcement is clean dry and intact, needs to be changed Monday. Patient had a large BM this morning. Bowel sounds normoactive. Assist of 2 with gait belt and walker. Alarms on for safety. Plan for TCU Monday.

## 2021-09-12 NOTE — PLAN OF CARE
Problem: Diabetes Comorbidity  Goal: Blood Glucose Level Within Targeted Range  Outcome: Improving     Problem: Pain Chronic (Persistent) (Comorbidity Management)  Goal: Acceptable Pain Control and Functional Ability  Outcome: Improving  Intervention: Develop Pain Management Plan  Recent Flowsheet Documentation  Taken 9/11/2021 2145 by Vy Ayala RN  Pain Management Interventions: declines  Taken 9/11/2021 2103 by Vy Ayala RN  Pain Management Interventions: medication (see MAR)  Taken 9/11/2021 1500 by Vy Ayala RN  Pain Management Interventions: declines  Intervention: Manage Persistent Pain  Recent Flowsheet Documentation  Taken 9/11/2021 1555 by Vy Ayala RN  Bowel Elimination Promotion: adequate fluid intake promoted  Medication Review/Management: high-risk medications identified  Intervention: Optimize Psychosocial Wellbeing  Recent Flowsheet Documentation  Taken 9/11/2021 1555 by Vy Ayala RN  Supportive Measures: active listening utilized     Patient vital signs are at baseline: Yes  Patient able to ambulate as they were prior to admission or with assist devices provided by therapies during their stay:  Yes  Patient MUST void prior to discharge:  Yes  Patient able to tolerate oral intake:  Yes  Pain has adequate pain control using Oral analgesics:  Yes    Pt's pain is controlled with scheduled Tylenol. Pt's mobility has improved but pt remains fearful about falling. Dressing to L. Hip is intact but reinforced. Requested to change on Monday 9/13/2021. Pt c/o constipation and reports that she hasn't had a BM since 9/5/2021; Bowel sounds are active & pt reports passing flatus. Adm PRN Colace, scheduled Senna & prune juice x 3 with no results. Pt would like to wait until AM to try PRN suppository.

## 2021-09-12 NOTE — PROGRESS NOTES
"                  [facility] Orthopaedics Progress Note      Post-operative Day: 3 Days Post-Op    Procedure(s):  CLOSED REDUCTION, LEFT HIP      Subjective:    Pain: minimal  Chest pain, SOB:  No   Pain is good, sleepy       Objective:  Blood pressure (!) 87/52, pulse 86, temperature 98.4  F (36.9  C), temperature source Oral, resp. rate 18, height 1.626 m (5' 4\"), weight 109.7 kg (241 lb 14.4 oz), last menstrual period 01/01/2013, SpO2 99 %, not currently breastfeeding.    Patient Vitals for the past 24 hrs:   BP Temp Temp src Pulse Resp SpO2   09/12/21 0732 (!) 87/52 98.4  F (36.9  C) Oral 86 18 99 %   09/11/21 2350 98/52 98.5  F (36.9  C) Oral 94 20 100 %   09/11/21 1608 109/53 97.7  F (36.5  C) Oral 86 16 100 %       Wt Readings from Last 4 Encounters:   09/09/21 109.7 kg (241 lb 14.4 oz)   09/01/21 111.6 kg (246 lb)   08/06/21 112.9 kg (249 lb)   06/08/21 116.2 kg (256 lb 3.2 oz)         Motor function, sensation, and circulation intact   Yes  Wound status: incisions are clean dry and intact. Yes. Slight drainage noted  Calf tenderness: Bilateral  No    Pertinent Labs   Lab Results: personally reviewed.     Recent Labs   Lab Test 09/11/21  0627 09/10/21  0627 09/09/21  0711 09/07/21  1257 05/21/21  1152 07/25/19  1100 11/21/18  0959 10/06/17  0815   INR  --   --  1.15  --   --   --   --   --    HGB 9.0* 11.5*  --   --  13.7 12.6   < > 13.1   HCT  --   --   --   --  41.8 39.3  --  39.5   MCV  --   --   --   --  94 85  --  87   PLT  --   --   --   --  283 309  --  254   NA  --  135*  --  143 138 138   < >  --     < > = values in this interval not displayed.       Plan: Anticoagulation protocol:  mg daily  x 42  days            Pain medications:  oxycodone            Weight bearing status:  WBAT with the abduction pillow in place and VALENTIN precautions             Disposition:  Hopeful TCU on Monday             Continue cares and rehabilitation   Can change out the Aquacel if needed today    Report completed by:  " Carolyn Eldridge PA-C  Date: 9/12/2021  Time: 8:32 AM

## 2021-09-12 NOTE — PLAN OF CARE
Patient vital signs are at baseline: Yes  Patient able to ambulate as they were prior to admission or with assist devices provided by therapies during their stay:  Yes  Patient MUST void prior to discharge:  Yes  Patient able to tolerate oral intake:  Yes  Pain has adequate pain control using Oral analgesics:  Yes    Awaiting TCU placement.

## 2021-09-12 NOTE — PROGRESS NOTES
[facility] MEDICINE PROGRESS NOTE      Identification/Summary:   Ronel Ryan is a 41 year old female with a past medical history of hypertension, diabetes, obesity, and chronic kidney disease stage III presented for an elective total hip arthroplasty.  Had dislocation of hip after surgery and had to be taken back for close reduction.     Assessment and Plan:  Left total hip arthroplasty/dislocation.  Patient taken back to the operating room for reduction yesterday.  Will now require TCU.  Acute blood loss anemia.  Acute urinary retention.  Suspect medication related.  Monitor.  Straight cath x1 last night.  Discussed directly with nursing staff will need to bladder scan and manage appropriately.  Has now improved and urinating on her own.  Hypertension.   Blood pressures have been normal.  Patient is not chronically on medication after weight loss.  Monitor with expectant management.  Type 2 diabetes.   Continue correction insulin for now.  Obesity. Applauded the patient's weight loss and encouraged her to continue to work on diet exercise and weight loss.  Vertigo/dizziness. Will increase risk of falls. Will use as needed meclizine. Nursing and PT to be aware.  Bipolar disorder/depression. No acute issues. Home meds.  Chronic kidney disease stage IIIa.   Creatinine was normal with an improved GFR today.  Chronic pain related to osteoarthritis. Monitor response to pain medications. May require assistance of pain team.  VTE prophylaxis. No history. Defer prophylaxis to orthopedics.    Diet: Regular Diet Adult  Regular Diet Adult  Regular Diet Adult  DVT Prophylaxis:   Code Status: Full Code    Anticipated possible discharge in 1-2    Interval History/Subjective:  Pain okay.  Urinating okay.  Had a bowel movement today.  Hopes to get out here soon.  No concerns.    Physical Exam/Objective:  Temp:  [97.7  F (36.5  C)-98.5  F (36.9  C)] 98.4  F (36.9  C)  Pulse:  [86-94] 86  Resp:   [16-20] 18  BP: ()/(52-53) 87/52  SpO2:  [99 %-100 %] 99 %    Body mass index is 41.52 kg/m .    GENERAL:  Alert, appears comfortable, in no acute distress, appears stated age   HEAD:  Normocephalic, without obvious abnormality, atraumatic   NOSE: Nares normal, septum midline, mucosa normal, no drainage   NECK: Supple, symmetrical, trachea midline   BACK:   Symmetric, no curvature, ROM normal   LUNGS:   Clear to auscultation bilaterally, no rales, rhonchi, or wheezing, symmetric chest rise on inhalation, respirations unlabored   CHEST WALL:  No tenderness or deformity   HEART:  Regular rate and rhythm, S1 and S2 normal, no murmur, rub, or gallop    ABDOMEN:   Soft, non-tender, bowel sounds active all four quadrants, no masses, no organomegaly, no rebound or guarding   EXTREMITIES: Extremities normal, atraumatic, no cyanosis or edema    SKIN: Dry to touch, no exanthems in the visualized areas   NEURO: Alert, oriented x3, moves all four extremities freely   PSYCH: Cooperative, behavior is appropriate      Medications:   Personally Reviewed.  Medications     lactated ringers 100 mL/hr at 09/09/21 2202       aspirin  325 mg Oral Daily     atorvastatin  40 mg Oral QPM     buPROPion  300 mg Oral Daily     famotidine  20 mg Oral BID    Or     famotidine  20 mg Intravenous BID     insulin aspart  1-7 Units Subcutaneous TID AC     insulin aspart  1-5 Units Subcutaneous At Bedtime     magnesium sulfate  4 g Intravenous Once     mirabegron  25 mg Oral Daily     polyethylene glycol  17 g Oral Daily     risperiDONE  2 mg Oral At Bedtime     senna-docusate  1 tablet Oral BID     sodium chloride (PF)  3 mL Intracatheter Q8H     topiramate  100 mg Oral BID     traZODone  100 mg Oral At Bedtime     vitamin D3  125 mcg Oral Daily       Data reviewed today: I personally reviewed all new medications, labs, imaging/diagnostics reports over the past 24 hours. Pertinent findings include:    Imaging:   No results found for this or  any previous visit (from the past 24 hour(s)).    Labs:  Most Recent 3 CBC's:Recent Labs   Lab Test 09/11/21  0627 09/10/21  0627 05/21/21  1152 07/25/19  1100 10/06/17  0815   WBC  --   --  5.2 6.3 9.2   HGB 9.0* 11.5* 13.7 12.6 13.1   MCV  --   --  94 85 87   PLT  --   --  283 309 254     Most Recent 3 BMP's:Recent Labs   Lab Test 09/12/21  1125 09/12/21  0633 09/11/21  2058 09/10/21  0627 09/07/21  1257 05/21/21  1152   NA  --   --   --  135* 143 138   POTASSIUM  --   --   --  4.3 4.2 4.1   CHLORIDE  --   --   --  109* 111* 107   CO2  --   --   --  18* 18* 20*   BUN  --   --   --  10 8 13   CR  --   --   --  1.00 1.28* 1.12*   ANIONGAP  --   --   --  8 14 11   ANGELICA  --   --   --  8.7 9.8 9.3   GLC 85 85 103* 155* 93 102     Most Recent 2 LFT's:Recent Labs   Lab Test 09/07/21  1257 05/21/21  1152   AST 16 12   ALT 21 16   ALKPHOS 80 57   BILITOTAL 0.6 0.6     Most Recent 3 INR's:Recent Labs   Lab Test 09/09/21  0711   INR 1.15       Antony Tompkins MD  [facility]  Phone: #[phone]

## 2021-09-13 ENCOUNTER — APPOINTMENT (OUTPATIENT)
Dept: PHYSICAL THERAPY | Facility: CLINIC | Age: 41
DRG: 470 | End: 2021-09-13
Attending: ORTHOPAEDIC SURGERY
Payer: COMMERCIAL

## 2021-09-13 ENCOUNTER — APPOINTMENT (OUTPATIENT)
Dept: OCCUPATIONAL THERAPY | Facility: CLINIC | Age: 41
DRG: 470 | End: 2021-09-13
Attending: ORTHOPAEDIC SURGERY
Payer: COMMERCIAL

## 2021-09-13 LAB
GLUCOSE BLDC GLUCOMTR-MCNC: 101 MG/DL (ref 70–99)
GLUCOSE BLDC GLUCOMTR-MCNC: 110 MG/DL (ref 70–99)
GLUCOSE BLDC GLUCOMTR-MCNC: 117 MG/DL (ref 70–99)
GLUCOSE BLDC GLUCOMTR-MCNC: 129 MG/DL (ref 70–99)

## 2021-09-13 PROCEDURE — 97530 THERAPEUTIC ACTIVITIES: CPT | Mod: GP

## 2021-09-13 PROCEDURE — 97110 THERAPEUTIC EXERCISES: CPT | Mod: GP

## 2021-09-13 PROCEDURE — 97116 GAIT TRAINING THERAPY: CPT | Mod: GP

## 2021-09-13 PROCEDURE — 250N000013 HC RX MED GY IP 250 OP 250 PS 637: Performed by: FAMILY MEDICINE

## 2021-09-13 PROCEDURE — 250N000013 HC RX MED GY IP 250 OP 250 PS 637: Performed by: ORTHOPAEDIC SURGERY

## 2021-09-13 PROCEDURE — 97535 SELF CARE MNGMENT TRAINING: CPT | Mod: GO

## 2021-09-13 PROCEDURE — 120N000001 HC R&B MED SURG/OB

## 2021-09-13 PROCEDURE — 99232 SBSQ HOSP IP/OBS MODERATE 35: CPT | Performed by: FAMILY MEDICINE

## 2021-09-13 RX ADMIN — ASPIRIN 325 MG: 325 TABLET, COATED ORAL at 10:08

## 2021-09-13 RX ADMIN — DOCUSATE SODIUM 50MG AND SENNOSIDES 8.6MG 1 TABLET: 8.6; 5 TABLET, FILM COATED ORAL at 21:11

## 2021-09-13 RX ADMIN — RISPERIDONE 2 MG: 1 TABLET ORAL at 21:17

## 2021-09-13 RX ADMIN — FAMOTIDINE 20 MG: 20 TABLET ORAL at 21:10

## 2021-09-13 RX ADMIN — DOCUSATE SODIUM 50MG AND SENNOSIDES 8.6MG 1 TABLET: 8.6; 5 TABLET, FILM COATED ORAL at 10:08

## 2021-09-13 RX ADMIN — FAMOTIDINE 20 MG: 20 TABLET ORAL at 10:08

## 2021-09-13 RX ADMIN — HYDROXYZINE HYDROCHLORIDE 25 MG: 25 TABLET ORAL at 03:55

## 2021-09-13 RX ADMIN — Medication 125 MCG: at 10:08

## 2021-09-13 RX ADMIN — MIRABEGRON 25 MG: 25 TABLET, FILM COATED, EXTENDED RELEASE ORAL at 10:08

## 2021-09-13 RX ADMIN — TOPIRAMATE 100 MG: 50 TABLET ORAL at 10:07

## 2021-09-13 RX ADMIN — TRAZODONE HYDROCHLORIDE 100 MG: 50 TABLET ORAL at 21:09

## 2021-09-13 RX ADMIN — OXYCODONE HYDROCHLORIDE 10 MG: 5 TABLET ORAL at 06:52

## 2021-09-13 RX ADMIN — BUPROPION HYDROCHLORIDE 300 MG: 150 TABLET, EXTENDED RELEASE ORAL at 10:08

## 2021-09-13 RX ADMIN — TOPIRAMATE 100 MG: 50 TABLET ORAL at 21:11

## 2021-09-13 RX ADMIN — ATORVASTATIN CALCIUM 40 MG: 40 TABLET, FILM COATED ORAL at 21:09

## 2021-09-13 RX ADMIN — OXYCODONE HYDROCHLORIDE 10 MG: 5 TABLET ORAL at 21:38

## 2021-09-13 NOTE — PROGRESS NOTES
"[facility] Progress Note      Post-operative Day: 4 Days Post-Op    Procedure(s):  CLOSED REDUCTION, LEFT HIP      Subjective:    Pain: minimal. Patient is in good spirits and states her pain is minimal when at rest. She states it does increase with PT and movement but is controlled.   Chest pain, SOB:  No      Objective:  Blood pressure 105/72, pulse 97, temperature 98.6  F (37  C), temperature source Oral, resp. rate 18, height 1.626 m (5' 4\"), weight 109.7 kg (241 lb 14.4 oz), last menstrual period 01/01/2013, SpO2 99 %, not currently breastfeeding.    Patient Vitals for the past 24 hrs:   BP Temp Temp src Pulse Resp SpO2   09/13/21 0834 105/72 98.6  F (37  C) Oral 97 18 99 %   09/13/21 0007 102/54 97.8  F (36.6  C) Oral 100 18 99 %   09/12/21 1537 97/53 98.4  F (36.9  C) Oral 88 18 100 %       Wt Readings from Last 4 Encounters:   09/09/21 109.7 kg (241 lb 14.4 oz)   09/01/21 111.6 kg (246 lb)   08/06/21 112.9 kg (249 lb)   06/08/21 116.2 kg (256 lb 3.2 oz)         Motor function, sensation, and circulation intact   Yes  Wound status: incisions are clean dry and intact. Yes. Aquacel saturated and reinforced by ABD. Discussed with ERNESTINA Garnett to change when PT returns and has her up.  Calf tenderness: Bilateral  No    Pertinent Labs   Lab Results: personally reviewed.     Recent Labs   Lab Test 09/11/21  0627 09/10/21  0627 09/09/21  0711 09/07/21  1257 05/21/21  1152 07/25/19  1100 11/21/18  0959 10/06/17  0815   INR  --   --  1.15  --   --   --   --   --    HGB 9.0* 11.5*  --   --  13.7 12.6   < > 13.1   HCT  --   --   --   --  41.8 39.3  --  39.5   MCV  --   --   --   --  94 85  --  87   PLT  --   --   --   --  283 309  --  254   NA  --  135*  --  143 138 138   < >  --     < > = values in this interval not displayed.       Plan: Anticoagulation protocol:  mg daily  x 42  days            Pain medications:  oxycodone and tylenol            Weight bearing status:  WBAT with the abduction pillow " in place and VALENTIN precautions             Disposition:  Discharge to TCU once found placement.   Postop appointment: 9/24 at 9:30 am.            Continue cares and rehabilitation     Report completed by:  Franklyn Patino PA-C  Date: 9/13/2021  Time: 10:12 AM

## 2021-09-13 NOTE — PLAN OF CARE
Problem: Pain (Hip Arthroplasty)  Goal: Acceptable Pain Control  Outcome: Improving     Patient vital signs are at baseline: Yes  Patient able to ambulate as they were prior to admission or with assist devices provided by therapies during their stay:  Yes  Patient MUST void prior to discharge:  Yes  Patient able to tolerate oral intake:  Yes  Pain has adequate pain control using Oral analgesics:  Yes    Pt declines oxycodone need today. Offered on multiple occasions, pt requested to not have any additional oral pain medications. Pain around 3-4/10 after therapies at rest.     Mariola Gonzales RN

## 2021-09-13 NOTE — PROGRESS NOTES
[facility] MEDICINE PROGRESS NOTE      Identification/Summary:    Ronel Ryan is a 41 year old female with a past medical history of hypertension, diabetes, obesity, and chronic kidney disease stage III presented for an elective total hip arthroplasty.  Had dislocation of hip after surgery and had to be taken back for close reduction.     Assessment and Plan:  Left total hip arthroplasty/dislocation.  Patient taken back to the operating room for reduction yesterday.  Will now require TCU.  Acute blood loss anemia.  Acute urinary retention.  Suspect medication related.  Monitor.  Straight cath x1 last night.  Discussed directly with nursing staff will need to bladder scan and manage appropriately.  Has now improved and urinating on her own.  Hypertension.   Blood pressures have been normal.  Patient is not chronically on medication after weight loss.  Monitor with expectant management.  Type 2 diabetes.   Continue correction insulin for now.  Obesity. Applauded the patient's weight loss and encouraged her to continue to work on diet exercise and weight loss.  Vertigo/dizziness. Will increase risk of falls. Will use as needed meclizine. Nursing and PT to be aware.  Bipolar disorder/depression. No acute issues. Home meds.  Chronic kidney disease stage IIIa.   Creatinine was normal with an improved GFR today.  Chronic pain related to osteoarthritis. Monitor response to pain medications. May require assistance of pain team.  VTE prophylaxis. No history. Defer prophylaxis to orthopedics.        Diet: Regular Diet Adult  Regular Diet Adult  Regular Diet Adult    Code Status: Full Code      Interval History/Subjective:  Patient is doing okay.  No fevers or chills.  No nausea or vomiting.  Frustrated that she has not had a place to go yet.  Had some questions about her hip dislocating again explained that we would visit with the orthopedic team in the morning and ask them the  questions.    Physical Exam/Objective:  Temp:  [97.8  F (36.6  C)-99.1  F (37.3  C)] 99.1  F (37.3  C)  Pulse:  [] 95  Resp:  [18] 18  BP: (102-105)/(53-72) 105/53  SpO2:  [98 %-99 %] 98 %    Body mass index is 41.52 kg/m .    GENERAL:  Alert, appears comfortable, in no acute distress, appears stated age   HEAD:  Normocephalic, without obvious abnormality, atraumatic   EYES:  PERRL, conjunctiva/corneas clear, no scleral icterus, EOM's intact   NECK: Supple, symmetrical, trachea midline   BACK:   Symmetric, no curvature, ROM normal   LUNGS:   Clear to auscultation bilaterally, no rales, rhonchi, or wheezing, symmetric chest rise on inhalation, respirations unlabored   HEART:  Regular rate and rhythm, S1 and S2 normal, no murmur, rub, or gallop    ABDOMEN:   Soft, non-tender, bowel sounds active all four quadrants, no masses, no organomegaly, no rebound or guarding   EXTREMITIES: Extremities normal, atraumatic, no cyanosis or edema    SKIN: Dry to touch, no exanthems in the visualized areas   NEURO: Alert, oriented x3, moves all four extremities freely   PSYCH: Cooperative, behavior is appropriate      Medications:   Personally Reviewed.  Medications     lactated ringers 100 mL/hr at 09/09/21 2202       aspirin  325 mg Oral Daily     atorvastatin  40 mg Oral QPM     buPROPion  300 mg Oral Daily     famotidine  20 mg Oral BID    Or     famotidine  20 mg Intravenous BID     insulin aspart  1-7 Units Subcutaneous TID AC     insulin aspart  1-5 Units Subcutaneous At Bedtime     magnesium sulfate  4 g Intravenous Once     mirabegron  25 mg Oral Daily     polyethylene glycol  17 g Oral Daily     risperiDONE  2 mg Oral At Bedtime     senna-docusate  1 tablet Oral BID     sodium chloride (PF)  3 mL Intracatheter Q8H     topiramate  100 mg Oral BID     traZODone  100 mg Oral At Bedtime     vitamin D3  125 mcg Oral Daily       Data reviewed today: I personally reviewed all new medications, labs, imaging/diagnostics  reports over the past 24 hours. Pertinent findings include:    Imaging:   No results found for this or any previous visit (from the past 24 hour(s)).    Labs:  Most Recent 3 CBC's:Recent Labs   Lab Test 09/11/21  0627 09/10/21  0627 05/21/21  1152 07/25/19  1100 10/06/17  0815   WBC  --   --  5.2 6.3 9.2   HGB 9.0* 11.5* 13.7 12.6 13.1   MCV  --   --  94 85 87   PLT  --   --  283 309 254     Most Recent 3 BMP's:Recent Labs   Lab Test 09/13/21  1147 09/13/21  0631 09/12/21  2207 09/10/21  0627 09/07/21  1257 05/21/21  1152   NA  --   --   --  135* 143 138   POTASSIUM  --   --   --  4.3 4.2 4.1   CHLORIDE  --   --   --  109* 111* 107   CO2  --   --   --  18* 18* 20*   BUN  --   --   --  10 8 13   CR  --   --   --  1.00 1.28* 1.12*   ANIONGAP  --   --   --  8 14 11   ANGELICA  --   --   --  8.7 9.8 9.3   * 101* 135* 155* 93 102     Most Recent 2 LFT's:Recent Labs   Lab Test 09/07/21  1257 05/21/21  1152   AST 16 12   ALT 21 16   ALKPHOS 80 57   BILITOTAL 0.6 0.6     Most Recent 3 INR's:Recent Labs   Lab Test 09/09/21  0711   INR 1.15       Antony Tompkins MD  [facility]  Phone: #[phone]

## 2021-09-13 NOTE — PLAN OF CARE
Problem: Diabetes Comorbidity  Goal: Blood Glucose Level Within Targeted Range  Outcome: Improving  Intervention: Monitor and Manage Glycemia  Recent Flowsheet Documentation  Taken 9/12/2021 1557 by Vy Ayala RN  Glycemic Management: blood glucose monitored     Problem: Pain Chronic (Persistent) (Comorbidity Management)  Goal: Acceptable Pain Control and Functional Ability  Outcome: Improving  Intervention: Develop Pain Management Plan  Recent Flowsheet Documentation  Taken 9/12/2021 1557 by Vy Ayala RN  Pain Management Interventions: declines  Intervention: Manage Persistent Pain  Recent Flowsheet Documentation  Taken 9/12/2021 1557 by Vy Ayala RN  Bowel Elimination Promotion: adequate fluid intake promoted  Medication Review/Management: medications reviewed  Intervention: Optimize Psychosocial Wellbeing  Recent Flowsheet Documentation  Taken 9/12/2021 1557 by Vy Ayala RN  Supportive Measures: active listening utilized     Patient vital signs are at baseline: Yes  Patient able to ambulate as they were prior to admission or with assist devices provided by therapies during their stay:  Yes  Patient MUST void prior to discharge:  Yes  Patient able to tolerate oral intake:  Yes  Pain has adequate pain control using Oral analgesics:  Yes    Pt's pain is well controlled this shift with scheduled Tylenol. Up to the bathroom with an assist of 1. ABD dressing to L. Hip is dry & intact. Awaiting TCU placement.

## 2021-09-13 NOTE — PROGRESS NOTES
Care Management Follow Up    Length of Stay (days): 3    Expected Discharge Date: 09/13/2021     Concerns to be Addressed: care coordination/care conferences       Patient plan of care discussed at interdisciplinary rounds: Yes    Anticipated Discharge Disposition:  TCU     Anticipated Discharge Services:  PT/OT    Anticipated Discharge DME:  Undetermined    Patient/family educated on Medicare website which has current facility and service quality ratings:  Yes    Education Provided on the Discharge Plan:  Yes - pt understands that TCU placement is the current plan.    Patient/Family in Agreement with the Plan:  Yes - Will accept TCU placement anywhere other than [facility].    Referrals Placed by CM/SW:  Several TCUs referred to 9/13.    Private pay costs discussed: transportation costs    Additional Information:  Pt had received a denial for TCU acceptance on 9/10 due to no bed availability at referred to TCUs. Since it is now three days later, previous referred TCUs have been sent the referral again. Facilities have been called and either didn't get the referral by fax, didn't answer the phone, or are reviewing currently.    Pt will need w/c transport arranged - confirmed - possible non-coverage equaling private-pay for transport discussed with pt.    CM to follow and continue TCU placement attempt.    Feliciano Chan RN

## 2021-09-13 NOTE — PLAN OF CARE
Patient vital signs are at baseline: Yes, VSS.  Patient able to ambulate as they were prior to admission or with assist devices provided by therapies during their stay:  No,  Reason:  A X 1 using a walker.  A X 2 with repositioning.   Patient MUST void prior to discharge:  Yes  Patient able to tolerate oral intake:  Yes  Pain has adequate pain control using Oral analgesics:  Yes, Pain is being manage with scheduled Tylenol and PRN Atarax.     CMS is intact. Denies numbness/tingling. Dressing is CDI.

## 2021-09-14 ENCOUNTER — APPOINTMENT (OUTPATIENT)
Dept: PHYSICAL THERAPY | Facility: CLINIC | Age: 41
DRG: 470 | End: 2021-09-14
Attending: ORTHOPAEDIC SURGERY
Payer: COMMERCIAL

## 2021-09-14 LAB
GLUCOSE BLDC GLUCOMTR-MCNC: 105 MG/DL (ref 70–99)
GLUCOSE BLDC GLUCOMTR-MCNC: 109 MG/DL (ref 70–99)
GLUCOSE BLDC GLUCOMTR-MCNC: 120 MG/DL (ref 70–99)
GLUCOSE BLDC GLUCOMTR-MCNC: 146 MG/DL (ref 70–99)

## 2021-09-14 PROCEDURE — 97530 THERAPEUTIC ACTIVITIES: CPT | Mod: GP

## 2021-09-14 PROCEDURE — 250N000013 HC RX MED GY IP 250 OP 250 PS 637: Performed by: ORTHOPAEDIC SURGERY

## 2021-09-14 PROCEDURE — 97116 GAIT TRAINING THERAPY: CPT | Mod: GP

## 2021-09-14 PROCEDURE — 250N000013 HC RX MED GY IP 250 OP 250 PS 637: Performed by: FAMILY MEDICINE

## 2021-09-14 PROCEDURE — U0003 INFECTIOUS AGENT DETECTION BY NUCLEIC ACID (DNA OR RNA); SEVERE ACUTE RESPIRATORY SYNDROME CORONAVIRUS 2 (SARS-COV-2) (CORONAVIRUS DISEASE [COVID-19]), AMPLIFIED PROBE TECHNIQUE, MAKING USE OF HIGH THROUGHPUT TECHNOLOGIES AS DESCRIBED BY CMS-2020-01-R: HCPCS | Performed by: FAMILY MEDICINE

## 2021-09-14 PROCEDURE — 97110 THERAPEUTIC EXERCISES: CPT | Mod: GP

## 2021-09-14 PROCEDURE — 99232 SBSQ HOSP IP/OBS MODERATE 35: CPT | Performed by: FAMILY MEDICINE

## 2021-09-14 PROCEDURE — 120N000001 HC R&B MED SURG/OB

## 2021-09-14 RX ADMIN — ATORVASTATIN CALCIUM 40 MG: 40 TABLET, FILM COATED ORAL at 20:17

## 2021-09-14 RX ADMIN — POLYETHYLENE GLYCOL 3350 17 G: 17 POWDER, FOR SOLUTION ORAL at 08:52

## 2021-09-14 RX ADMIN — OXYCODONE HYDROCHLORIDE 10 MG: 5 TABLET ORAL at 13:09

## 2021-09-14 RX ADMIN — HYDROXYZINE HYDROCHLORIDE 25 MG: 25 TABLET ORAL at 14:11

## 2021-09-14 RX ADMIN — Medication 125 MCG: at 08:57

## 2021-09-14 RX ADMIN — FAMOTIDINE 20 MG: 20 TABLET ORAL at 08:52

## 2021-09-14 RX ADMIN — TOPIRAMATE 100 MG: 50 TABLET ORAL at 08:52

## 2021-09-14 RX ADMIN — DOCUSATE SODIUM 50MG AND SENNOSIDES 8.6MG 1 TABLET: 8.6; 5 TABLET, FILM COATED ORAL at 08:52

## 2021-09-14 RX ADMIN — FAMOTIDINE 20 MG: 20 TABLET ORAL at 20:17

## 2021-09-14 RX ADMIN — TRAZODONE HYDROCHLORIDE 100 MG: 50 TABLET ORAL at 20:18

## 2021-09-14 RX ADMIN — ASPIRIN 325 MG: 325 TABLET, COATED ORAL at 08:52

## 2021-09-14 RX ADMIN — MIRABEGRON 25 MG: 25 TABLET, FILM COATED, EXTENDED RELEASE ORAL at 08:52

## 2021-09-14 RX ADMIN — BUPROPION HYDROCHLORIDE 300 MG: 150 TABLET, EXTENDED RELEASE ORAL at 08:52

## 2021-09-14 RX ADMIN — DOCUSATE SODIUM 50MG AND SENNOSIDES 8.6MG 1 TABLET: 8.6; 5 TABLET, FILM COATED ORAL at 20:18

## 2021-09-14 RX ADMIN — RISPERIDONE 2 MG: 1 TABLET ORAL at 20:16

## 2021-09-14 RX ADMIN — TOPIRAMATE 100 MG: 50 TABLET ORAL at 20:18

## 2021-09-14 NOTE — PROGRESS NOTES
[facility] MEDICINE PROGRESS NOTE      Identification/Summary:   Ronel Ryan is a 41 year old female with a past medical history of hypertension, diabetes, obesity, and chronic kidney disease stage III presented for an elective total hip arthroplasty.  Had dislocation of hip after surgery and had to be taken back for close reduction.     Assessment and Plan:  Left total hip arthroplasty/dislocation.  Patient taken back to the operating room for reduction yesterday.  Will now require TCU.  Need to repeat Covid prior to discharge which has been ordered.  Acute blood loss anemia.  Mild and asymptomatic.  Acute urinary retention.  Suspect medication related.  Monitor.  Straight cath x1 last night. Has now improved and urinating on her own.  Hypertension.   Blood pressures have been normal.  Patient is not chronically on medication after weight loss.  Monitor with expectant management.  Type 2 diabetes.   Continue correction insulin for now.  Obesity. Applauded the patient's weight loss and encouraged her to continue to work on diet exercise and weight loss.  Vertigo/dizziness. Will increase risk of falls. Will use as needed meclizine. Nursing and PT to be aware.  Bipolar disorder/depression. No acute issues. Home meds.  Chronic kidney disease stage IIIa.   Creatinine was normal with an improved GFR today.  Chronic pain related to osteoarthritis. Monitor response to pain medications. May require assistance of pain team.  VTE prophylaxis. No history. Defer prophylaxis to orthopedics.    Diet: Regular Diet Adult  Regular Diet Adult  Regular Diet Adult    Code Status: Full Code    Anticipated possible discharge in 1    Interval History/Subjective:  Doing okay.  Pain is controlled.  Aware of plan for TCU tomorrow.  Urinating without issues.  Eating without issues.    Physical Exam/Objective:  Temp:  [98.1  F (36.7  C)-99.1  F (37.3  C)] 98.1  F (36.7  C)  Pulse:  [85-95] 85  Resp:   [16-18] 16  BP: ()/(53-55) 94/53  SpO2:  [98 %-99 %] 98 %    Body mass index is 41.52 kg/m .    GENERAL:  Alert, appears comfortable, in no acute distress, appears stated age   HEAD:  Normocephalic, without obvious abnormality, atraumatic   NOSE: Nares normal, septum midline, mucosa normal, no drainage   BACK:   Symmetric, no curvature, ROM normal   LUNGS:   Clear to auscultation bilaterally, no rales, rhonchi, or wheezing, symmetric chest rise on inhalation, respirations unlabored   HEART:  Regular rate and rhythm, S1 and S2 normal, no murmur, rub, or gallop    ABDOMEN:   Soft, non-tender, bowel sounds active all four quadrants, no masses, no organomegaly, no rebound or guarding   EXTREMITIES: Extremities normal, atraumatic, no cyanosis or edema    SKIN: Dry to touch, no exanthems in the visualized areas   NEURO: Alert, oriented x3, moves all four extremities freely   PSYCH: Cooperative, behavior is appropriate      Medications:   Personally Reviewed.  Medications     lactated ringers 100 mL/hr at 09/09/21 2202       aspirin  325 mg Oral Daily     atorvastatin  40 mg Oral QPM     buPROPion  300 mg Oral Daily     famotidine  20 mg Oral BID    Or     famotidine  20 mg Intravenous BID     insulin aspart  1-7 Units Subcutaneous TID AC     insulin aspart  1-5 Units Subcutaneous At Bedtime     magnesium sulfate  4 g Intravenous Once     mirabegron  25 mg Oral Daily     polyethylene glycol  17 g Oral Daily     risperiDONE  2 mg Oral At Bedtime     senna-docusate  1 tablet Oral BID     sodium chloride (PF)  3 mL Intracatheter Q8H     topiramate  100 mg Oral BID     traZODone  100 mg Oral At Bedtime     vitamin D3  125 mcg Oral Daily       Data reviewed today: I personally reviewed all new medications, labs, imaging/diagnostics reports over the past 24 hours. Pertinent findings include:    Imaging:   No results found for this or any previous visit (from the past 24 hour(s)).    Labs:  Most Recent 3 CBC's:Recent Labs    Lab Test 09/11/21  0627 09/10/21  0627 05/21/21  1152 07/25/19  1100 10/06/17  0815   WBC  --   --  5.2 6.3 9.2   HGB 9.0* 11.5* 13.7 12.6 13.1   MCV  --   --  94 85 87   PLT  --   --  283 309 254     Most Recent 3 BMP's:Recent Labs   Lab Test 09/14/21  1147 09/14/21  0623 09/13/21  2055 09/10/21  0627 09/07/21  1257 05/21/21  1152   NA  --   --   --  135* 143 138   POTASSIUM  --   --   --  4.3 4.2 4.1   CHLORIDE  --   --   --  109* 111* 107   CO2  --   --   --  18* 18* 20*   BUN  --   --   --  10 8 13   CR  --   --   --  1.00 1.28* 1.12*   ANIONGAP  --   --   --  8 14 11   ANGELICA  --   --   --  8.7 9.8 9.3   * 105* 110* 155* 93 102     Most Recent 2 LFT's:Recent Labs   Lab Test 09/07/21  1257 05/21/21  1152   AST 16 12   ALT 21 16   ALKPHOS 80 57   BILITOTAL 0.6 0.6     Most Recent 3 INR's:Recent Labs   Lab Test 09/09/21  0711   INR 1.15       Antony Tompkins MD  [facility]  Phone: #[phone]

## 2021-09-14 NOTE — PLAN OF CARE
Patient vital signs are at baseline: Yes  Patient able to ambulate as they were prior to admission or with assist devices provided by therapies during their stay: Yes  Patient MUST void prior to discharge:  Yes  Patient able to tolerate oral intake:  Yes  Pain has adequate pain control using Oral analgesics:  Yes    Problem: Pain (Hip Arthroplasty)  Goal: Acceptable Pain Control  Outcome: Improving     Problem: Bleeding (Hip Arthroplasty)  Goal: Absence of Bleeding  Outcome: Improving       Patient denied pain throughout shift. VSS on room air. Patient is slow to respond and drowsy. Aquacel CDI. Wedge pillow in place throughout shift. AM bg 105, no insulin given per order parameters.

## 2021-09-14 NOTE — PLAN OF CARE
Patient vital signs are at baseline: Yes  Patient able to ambulate as they were prior to admission or with assist devices provided by therapies during their stay:  Yes  Patient MUST void prior to discharge:  Yes  Patient able to tolerate oral intake:  Yes  Pain has adequate pain control using Oral analgesics:  Yes    PRN Oxy given for severe L hip pain. Dry gauze dressing and abd removed, Aquacel applied. Incision WDL, missael-incision area has 2 in diameter area of redness where layer of skin is missing. Aquacel dressing applied over incision and open area.

## 2021-09-14 NOTE — PROGRESS NOTES
Care Management Follow Up    Length of Stay (days): 4    Expected Discharge Date: 09/15/2021     Concerns to be Addressed: care coordination/care conferences, discharge planning  Pt has a TCU d/c plan in place.  Patient plan of care discussed at interdisciplinary rounds: Yes    Anticipated Discharge Disposition: Transitional Care     Anticipated Discharge Services: None  Anticipated Discharge DME: None    Patient/family educated on Medicare website which has current facility and service quality ratings: yes  Education Provided on the Discharge Plan:  Yes  Patient/Family in Agreement with the Plan: yes    Referrals Placed by CM/SW: Post Acute Facilities  Private pay costs discussed: transportation costs - Pt understands.    Additional Information:  At ~1400hrs [facility] called and stated they received approval of TCU services from [facility].    MD and bedside RN have been informed that a COVID test is needed.    Transport is arranged via [facility] w/c transport at 1000hrs on 9/14.    Pt is in agreement.    PAS done.    CM to follow.    Feliciano Chan, RN

## 2021-09-14 NOTE — PROGRESS NOTES
Care Management Follow Up    Length of Stay (days): 4    Expected Discharge Date: 09/14/2021     Concerns to be Addressed: care coordination/care conferences, discharge planning  Pt has a TCU d/c plan in place.  Patient plan of care discussed at interdisciplinary rounds: Yes    Anticipated Discharge Disposition: Transitional Care     Anticipated Discharge Services: PT/OT  Anticipated Discharge DME: None    Patient/family educated on Medicare website which has current facility and service quality ratings: yes  Education Provided on the Discharge Plan:  Yes - aware of plan to transition to TCU via agency at discharge.  Patient/Family in Agreement with the Plan: yes    Referrals Placed by CM/SW: Post Acute Facilities  Private pay costs discussed: transportation costs    Additional Information:  Pt has been accepted at [facility]) for TCU placement - [phone]. Pt states acceptance of TCU placement as she stated she will accept going anywhere except [facility]. Pt also stated the need to arrange transport as she has no resource for transport otherwise.    [facility] will be starting the [facility] authorization for TCU services request at some point today 9/14.    Pt will need a NEG COVID within 24hrs of placement.    Arranging of transport has not been completed.    PAS#[id number]    CM to follow.    Feliciano Chan RN

## 2021-09-14 NOTE — PLAN OF CARE
Problem: Diabetes Comorbidity  Goal: Blood Glucose Level Within Targeted Range  Outcome: Improving  Intervention: Monitor and Manage Glycemia  Recent Flowsheet Documentation  Taken 9/14/2021 0900 by Vy Ayala RN  Glycemic Management: blood glucose monitored     Problem: Pain Chronic (Persistent) (Comorbidity Management)  Goal: Acceptable Pain Control and Functional Ability  Outcome: Improving  Intervention: Develop Pain Management Plan  Recent Flowsheet Documentation  Taken 9/14/2021 1408 by Vy Ayala RN  Pain Management Interventions: medication (see MAR)  Taken 9/14/2021 1309 by Vy Ayala RN  Pain Management Interventions: medication (see MAR)  Taken 9/14/2021 0751 by Vy Ayala RN  Pain Management Interventions: declines  Intervention: Manage Persistent Pain  Recent Flowsheet Documentation  Taken 9/14/2021 0900 by Vy Ayala RN  Medication Review/Management:   medications reviewed   high-risk medications identified  Intervention: Optimize Psychosocial Wellbeing  Recent Flowsheet Documentation  Taken 9/14/2021 0900 by Vy Ayala RN  Supportive Measures: active listening utilized     Problem: Pain Chronic (Persistent) (Comorbidity Management)  Goal: Acceptable Pain Control and Functional Ability  Outcome: Improving  Intervention: Develop Pain Management Plan  Recent Flowsheet Documentation  Taken 9/14/2021 1408 by Vy Ayala RN  Pain Management Interventions: medication (see MAR)  Taken 9/14/2021 1309 by Vy Ayala RN  Pain Management Interventions: medication (see MAR)  Taken 9/14/2021 0751 by Vy Ayala RN  Pain Management Interventions: declines  Intervention: Manage Persistent Pain  Recent Flowsheet Documentation  Taken 9/14/2021 0900 by Vy Ayala RN  Medication Review/Management:   medications reviewed   high-risk medications identified  Intervention: Optimize Psychosocial Wellbeing  Recent Flowsheet Documentation  Taken 9/14/2021 0900  by Vy Ayala RN  Supportive Measures: active listening utilized     Problem: Pain Chronic (Persistent) (Comorbidity Management)  Goal: Acceptable Pain Control and Functional Ability  Outcome: Improving  Intervention: Develop Pain Management Plan  Recent Flowsheet Documentation  Taken 9/14/2021 1408 by Vy Ayala RN  Pain Management Interventions: medication (see MAR)  Taken 9/14/2021 1309 by Vy Ayala RN  Pain Management Interventions: medication (see MAR)  Taken 9/14/2021 0751 by Vy Ayala RN  Pain Management Interventions: declines  Intervention: Manage Persistent Pain  Recent Flowsheet Documentation  Taken 9/14/2021 0900 by Vy Ayala RN  Medication Review/Management:   medications reviewed   high-risk medications identified  Intervention: Optimize Psychosocial Wellbeing  Recent Flowsheet Documentation  Taken 9/14/2021 0900 by Vy Ayala RN  Supportive Measures: active listening utilized    Patient vital signs are at baseline: Yes  Patient able to ambulate as they were prior to admission or with assist devices provided by therapies during their stay:  Yes  Patient MUST void prior to discharge:  Yes  Patient able to tolerate oral intake:  Yes  Pain has adequate pain control using Oral analgesics:  Yes    Pt's pain is controlled this shift w/ scheduled APAP, PRN Oxycodone and PRN Atarax. Dressing to L. Hip is CDI. Pt will be discharging to TCU tomorrow at 10:00 am

## 2021-09-14 NOTE — PROGRESS NOTES
"[facility]  Orthopedics Progress Note       S:  POD # 5 S/p CLOSED REDUCTION, LEFT HIP.  Patient reports she is doing well.  She feels her pain is controlled at this time.  She is working with PT/OT and feels she is making progress.  She plans to go to [facility] tomorrow morning around 10:00    O:  Blood pressure 94/53, pulse 85, temperature 98.1  F (36.7  C), temperature source Oral, resp. rate 16, height 1.626 m (5' 4\"), weight 109.7 kg (241 lb 14.4 oz), last menstrual period 01/01/2013, SpO2 98 %, not currently breastfeeding.  Lab Results   Component Value Date    HGB 9.0 09/11/2021    HGB 13.1 10/06/2017       Patient is sitting up in a chair with her legs elevated, she is in no acute distress.  Her dressing is clean, dry, and intact, externally, but there is evidence for bloody drainage beneath.  She is able to initiate bending the knee, moving the ankle and wiggling the toes without difficulty.  Neurovascular exam is intact.  Her legs are soft and nontender to palpation.      A/P:  POD # 5 s/p CLOSED REDUCTION, LEFT HIP  Elevate legs at heart level for edema control  WBAT with abduction pillow in place and VALENTIN precautions  Aspirin 325 mg daily x 42 days for DVT prophylaxis  Oral medication as needed for pain--oxycodone and Tylenol  PT/OT for gait, transfers and ADL's  Follow up with Dr Damon's team on 9/24/21 at 9:30 am.      Michael Márquez PA-C    "

## 2021-09-15 ENCOUNTER — APPOINTMENT (OUTPATIENT)
Dept: PHYSICAL THERAPY | Facility: CLINIC | Age: 41
DRG: 470 | End: 2021-09-15
Attending: ORTHOPAEDIC SURGERY
Payer: COMMERCIAL

## 2021-09-15 VITALS
RESPIRATION RATE: 18 BRPM | DIASTOLIC BLOOD PRESSURE: 59 MMHG | HEIGHT: 64 IN | TEMPERATURE: 97.9 F | SYSTOLIC BLOOD PRESSURE: 118 MMHG | HEART RATE: 89 BPM | BODY MASS INDEX: 41.3 KG/M2 | OXYGEN SATURATION: 98 % | WEIGHT: 241.9 LBS

## 2021-09-15 LAB
GLUCOSE BLDC GLUCOMTR-MCNC: 118 MG/DL (ref 70–99)
GLUCOSE BLDC GLUCOMTR-MCNC: 124 MG/DL (ref 70–99)
SARS-COV-2 RNA RESP QL NAA+PROBE: NEGATIVE

## 2021-09-15 PROCEDURE — 97530 THERAPEUTIC ACTIVITIES: CPT | Mod: GP

## 2021-09-15 PROCEDURE — 250N000013 HC RX MED GY IP 250 OP 250 PS 637: Performed by: ORTHOPAEDIC SURGERY

## 2021-09-15 PROCEDURE — 99232 SBSQ HOSP IP/OBS MODERATE 35: CPT | Performed by: FAMILY MEDICINE

## 2021-09-15 PROCEDURE — 250N000013 HC RX MED GY IP 250 OP 250 PS 637: Performed by: FAMILY MEDICINE

## 2021-09-15 PROCEDURE — 97116 GAIT TRAINING THERAPY: CPT | Mod: GP

## 2021-09-15 RX ORDER — AMOXICILLIN 250 MG
1-2 CAPSULE ORAL DAILY PRN
Qty: 30 TABLET | Refills: 0 | Status: SHIPPED | OUTPATIENT
Start: 2021-09-15 | End: 2022-03-23

## 2021-09-15 RX ORDER — POLYETHYLENE GLYCOL 3350 17 G/17G
17 POWDER, FOR SOLUTION ORAL DAILY PRN
Qty: 510 G
Start: 2021-09-15 | End: 2021-10-06

## 2021-09-15 RX ORDER — LORAZEPAM 0.5 MG/1
0.5 TABLET ORAL DAILY PRN
Qty: 15 TABLET | Refills: 0 | Status: SHIPPED | OUTPATIENT
Start: 2021-09-15 | End: 2022-03-23

## 2021-09-15 RX ADMIN — BUPROPION HYDROCHLORIDE 300 MG: 150 TABLET, EXTENDED RELEASE ORAL at 08:54

## 2021-09-15 RX ADMIN — FAMOTIDINE 20 MG: 20 TABLET ORAL at 08:54

## 2021-09-15 RX ADMIN — OXYCODONE HYDROCHLORIDE 5 MG: 5 TABLET ORAL at 09:00

## 2021-09-15 RX ADMIN — ASPIRIN 325 MG: 325 TABLET, COATED ORAL at 08:54

## 2021-09-15 RX ADMIN — POLYETHYLENE GLYCOL 3350 17 G: 17 POWDER, FOR SOLUTION ORAL at 08:54

## 2021-09-15 RX ADMIN — MIRABEGRON 25 MG: 25 TABLET, FILM COATED, EXTENDED RELEASE ORAL at 09:25

## 2021-09-15 RX ADMIN — Medication 125 MCG: at 08:55

## 2021-09-15 RX ADMIN — DOCUSATE SODIUM 50MG AND SENNOSIDES 8.6MG 1 TABLET: 8.6; 5 TABLET, FILM COATED ORAL at 08:54

## 2021-09-15 RX ADMIN — TOPIRAMATE 100 MG: 50 TABLET ORAL at 08:54

## 2021-09-15 RX ADMIN — OXYCODONE HYDROCHLORIDE 5 MG: 5 TABLET ORAL at 16:44

## 2021-09-15 NOTE — PROGRESS NOTES
"[facility]  Orthopedics Progress Note       S:  POD # 6 S/p CLOSED REDUCTION, LEFT HIP.  Patient reports she is doing well.  She feels her pain is controlled at this time but is having some pain in left hip this morning, aching.  She has worked with PT/OT and feels ready to go to [facility] today around 10:00am.     O:  Blood pressure 115/62, pulse 89, temperature 97.8  F (36.6  C), temperature source Oral, resp. rate 16, height 1.626 m (5' 4\"), weight 109.7 kg (241 lb 14.4 oz), last menstrual period 01/01/2013, SpO2 98 %, not currently breastfeeding.  Lab Results   Component Value Date    HGB 9.0 09/11/2021    HGB 13.1 10/06/2017       Patient is lying in bed with her legs in A frame foam, she is in no acute distress.  Her dressing is clean, dry, and intact, externally, but with mild bloody drainage beneath. Her upper left leg is soft and nontender.      A/P:  POD # 6 s/p CLOSED REDUCTION, LEFT HIP  Elevate legs at heart level for edema control  WBAT with abduction pillow in place and VALENTIN precautions  Aspirin 325 mg daily x 42 days for DVT prophylaxis  Oral medication as needed for pain--oxycodone and Tylenol  PT/OT for gait, transfers and ADL's  Follow up with Dr Damon's team on 9/24/21 at 9:30 am.      Atul Pham CNP    "

## 2021-09-15 NOTE — DISCHARGE INSTRUCTIONS
Zenon Damon MD    Attending    Since 9/9/2021    [phone]   To be seen in 2 weeks      Lexie Rodriguez PA-C    General - Family Medicine    [phone]   It is recommended to follow up with your primary care provider 7 days after your TCU stay.

## 2021-09-15 NOTE — DISCHARGE SUMMARY
[facility] Orthopedics Discharge Summary                                  [facility]     JAMES CHRISTOPHER [id number]   Age: 41 year old  PCP: Lexie Rodriguez, [phone] 1980     Date of Admission:  9/9/2021  Date of Discharge::  9/15/2021  Discharge Provider:  Atul Pham NP    Code status:  Full Code    Admission Information:  Admission Diagnosis:  Degenerative joint disease of left hip [M16.12]  Degenerative joint disease (DJD) of hip [M16.9]  Status post total hip replacement, left [Z96.642]    Post-Operative Day: 6 Days Post-Op     Reason for admission:  The patient was admitted for the following:Procedure(s) (LRB):  CLOSED REDUCTION, LEFT HIP (Left)    Active Problems:    Degenerative joint disease (DJD) of hip    Status post total hip replacement, left      Allergies:  Cephalexin, Erythromycin, and Penicillins    Following the procedure noted above the patient was transferred to the post-op floor and started on:    Therapy:  physical therapy and occupational therapy  Anticoagulation Plan: asprin  for 42 days  Pain Management: oxycodone and tylenol  Weight bearing status: Weight bearing as tolerated     The patient was followed by Orthopedics during the inpatient treatment course:  Complications:  Acute Blood loss anemia status post total hip arthroplasty and closed reduction of dislocation.  - Dislocation as an Abnormal Reaction without Misadventure  - Dislocation due to Injury or Fall  Patient had dislocation and was taken back to the OR after ambulating on morning of 9/9/21 and dislocation of operative hip after sitting on toilet. Closed reduction completed the evening of 9/9/21.     Additional consultations:  [facility].    Patient has extensive medical history including HTN, acute blood loss anemia (without severe symptoms), DM type 2, obesity, intermittent vertigo and bipolar disorder. With continuation of home medications and hosptialist team management, these direct above diagnoses were  fortunately not a significant part of hospital course.      Pertinent Labs   Lab Results: personally reviewed.     Recent Labs   Lab Test 09/11/21  0627 09/10/21  0627 09/09/21  0711 09/07/21  1257 05/21/21  1152 07/25/19  1100 07/25/19  1100 11/21/18  0959 10/06/17  0815   INR  --   --  1.15  --   --   --   --   --   --    HGB 9.0* 11.5*  --   --  13.7   < > 12.6  --  13.1   HCT  --   --   --   --  41.8  --  39.3  --  39.5   MCV  --   --   --   --  94  --  85  --  87   PLT  --   --   --   --  283  --  309  --  254   NA  --  135*  --  143 138   < > 138   < >  --     < > = values in this interval not displayed.          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 Take with food. Do not take at same time as other anti-inflammatories, like ibuprofen or celebrex.  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, initial encounter (H)         CONTINUE these medications which have NOT CHANGED    Details   albuterol (PROAIR HFA/PROVENTIL HFA/VENTOLIN HFA) 108 (90 BASE) MCG/ACT Inhaler Inhale 1-2 puffs into the lungs every 4 hours as needed       atorvastatin (LIPITOR) 40 MG tablet Take 1 tablet by mouth every evening       buPROPion (WELLBUTRIN XL) 300 MG 24 hr tablet Take 300 mg by mouth daily       Cholecalciferol 125 MCG (5000 UT) TABS Take 125 mcg by mouth daily       docusate sodium (COLACE) 100 MG capsule Take 100 mg by mouth 3 times daily as needed for constipation      EPINEPHrine (EPIPEN/ADRENACLICK/OR ANY BX GENERIC EQUIV) 0.3 MG/0.3ML injection 2-pack Inject 0.3 mLs (0.3 mg) into the muscle once as needed for anaphylaxis  Qty: 0.6 mL, Refills: 0      etodolac (LODINE XL) 400 MG 24 hr tablet Take 400 mg by mouth daily      LORazepam (ATIVAN) 0.5 MG tablet Take 0.5 mg by mouth daily as needed       meclizine (ANTIVERT) 25 MG tablet Take 25 mg by mouth 4 times daily as needed for dizziness      MYRBETRIQ 25 MG 24 hr tablet Take 25 mg by mouth daily      risperiDONE (RISPERDAL) 2 MG tablet Take 2 mg by mouth At Bedtime       topiramate (TOPAMAX) 100 MG tablet Take 100 mg by mouth 2 times daily       traZODone (DESYREL) 100 MG tablet Take 100 mg by mouth At Bedtime   Qty: 30 tablet, Refills: 1      TRULICITY 0.75 MG/0.5ML pen Inject 0.75 mg Subcutaneous once a week (sundays)      blood glucose (NO BRAND SPECIFIED) lancets standard Use to test blood  sugar 2 times daily or as directed.  Qty: 100 each, Refills: 11    Associated Diagnoses: Type 2 diabetes mellitus without complication, without long-term current use of insulin (H)      blood glucose monitoring (NO BRAND SPECIFIED) meter device kit Use to test blood sugar 2 times daily or as directed.  Qty: 1 kit, Refills: 0    Associated Diagnoses: Type 2 diabetes mellitus without complication, without long-term current use of insulin (H)      blood glucose monitoring (NO BRAND SPECIFIED) test strip Use to test blood sugars 2 times daily or as directed  Qty: 100 strip, Refills: 3    Associated Diagnoses: Diabetes mellitus, type 2 (H)                        Follow-Up Care:  Patient should be seen in the office in 10-14 days by the Orthopedic Surgeon/Physician Assistant.  Call [phone] for appointment or questions.    It was my pleasure to take care of the above patient.  Atul Pham NP

## 2021-09-15 NOTE — PLAN OF CARE
Physical Therapy Discharge Summary    Reason for therapy discharge:    Discharged to transitional care facility.    Progress towards therapy goal(s). See goals on Care Plan in [facility] electronic health record for goal details.  Goals partially met.  Barriers to achieving goals:   discharge from facility.    Therapy recommendation(s):    Continued therapy is recommended.  Rationale/Recommendations:  to improve functional mobility for returning home.

## 2021-09-15 NOTE — PROGRESS NOTES
[facility] MEDICINE PROGRESS NOTE      Identification/Summary:    Ronel Ryan is a 41 year old female with a past medical history of hypertension, diabetes, obesity, and chronic kidney disease stage III presented for an elective total hip arthroplasty.  Had dislocation of hip after surgery and had to be taken back for close reduction.  Hospitalized for several days awaiting placement.  Plan for discharge to TCU today 9/15/2021.     Assessment and Plan:  Left total hip arthroplasty/dislocation.  Patient taken back to the operating room for reduction yesterday.  Will now require TCU.  Need to repeat Covid prior to discharge which has been ordered.  Acute blood loss anemia.  Mild and asymptomatic.  Can be followed as an outpatient.  Acute urinary retention.  Suspect medication related.  Monitor.  Straight cath x1 last night. Has now improved and urinating on her own.  Hypertension.   Blood pressures have been normal.  Patient is not chronically on medication after weight loss.  Monitor with expectant management.  Type 2 diabetes.   Continue correction insulin for now.  Obesity. Applauded the patient's weight loss and encouraged her to continue to work on diet exercise and weight loss.  Vertigo/dizziness. Will increase risk of falls. Will use as needed meclizine. Nursing and PT to be aware.  Bipolar disorder/depression. No acute issues. Home meds.  Chronic kidney disease stage IIIa.   Creatinine was normal with an improved GFR today.  Chronic pain related to osteoarthritis. Monitor response to pain medications. May require assistance of pain team.  VTE prophylaxis. No history. Defer prophylaxis to orthopedics.      Diet: Regular Diet Adult  Regular Diet Adult  Regular Diet Adult    Code Status: Full Code    Anticipated possible discharge 9/15/2021    Interval History/Subjective:  Patient is doing okay.  No fevers or chills.  No nausea or vomiting.  No chest pain.  Eating without issues.   Plan for discharge to TCU today.  Repeat Covid testing performed 9/14/2021.  Pain is okay.  Urinating without any issues and feels like she is emptying.  Bowel movement a couple of days ago.  No history of DVT or PE.    Physical Exam/Objective:  Temp:  [97.8  F (36.6  C)-98.4  F (36.9  C)] 98.4  F (36.9  C)  Pulse:  [85-92] 92  Resp:  [16-18] 18  BP: ()/(52-58) 100/58  SpO2:  [98 %] 98 %    Body mass index is 41.52 kg/m .    GENERAL:  Alert, appears comfortable, in no acute distress, appears stated age   HEAD:  Normocephalic, without obvious abnormality, atraumatic   NOSE: Nares normal, septum midline, mucosa normal, no drainage   BACK:   Symmetric, no curvature, ROM normal   LUNGS:   Clear to auscultation bilaterally, no rales, rhonchi, or wheezing, symmetric chest rise on inhalation, respirations unlabored   CHEST WALL:  No tenderness or deformity   HEART:  Regular rate and rhythm, S1 and S2 normal, no murmur, rub, or gallop    ABDOMEN:   Soft, non-tender, bowel sounds active all four quadrants, no masses, no organomegaly, no rebound or guarding   EXTREMITIES: Extremities normal, atraumatic, no cyanosis or edema    SKIN: Dry to touch, no exanthems in the visualized areas   NEURO: Alert, oriented x3, moves all four extremities freely   PSYCH: Cooperative, behavior is appropriate      Medications:   Personally Reviewed.  Medications     lactated ringers 100 mL/hr at 09/09/21 2202       aspirin  325 mg Oral Daily     atorvastatin  40 mg Oral QPM     buPROPion  300 mg Oral Daily     famotidine  20 mg Oral BID    Or     famotidine  20 mg Intravenous BID     insulin aspart  1-7 Units Subcutaneous TID AC     insulin aspart  1-5 Units Subcutaneous At Bedtime     magnesium sulfate  4 g Intravenous Once     mirabegron  25 mg Oral Daily     polyethylene glycol  17 g Oral Daily     risperiDONE  2 mg Oral At Bedtime     senna-docusate  1 tablet Oral BID     sodium chloride (PF)  3 mL Intracatheter Q8H     topiramate   100 mg Oral BID     traZODone  100 mg Oral At Bedtime     vitamin D3  125 mcg Oral Daily       Data reviewed today: I personally reviewed all new medications, labs, imaging/diagnostics reports over the past 24 hours. Pertinent findings include:    Imaging:   No results found for this or any previous visit (from the past 24 hour(s)).    Labs:  Most Recent 3 CBC's:Recent Labs   Lab Test 09/11/21  0627 09/10/21  0627 05/21/21  1152 07/25/19  1100 07/25/19  1100 10/06/17  0815   WBC  --   --  5.2  --  6.3 9.2   HGB 9.0* 11.5* 13.7   < > 12.6 13.1   MCV  --   --  94  --  85 87   PLT  --   --  283  --  309 254    < > = values in this interval not displayed.     Most Recent 3 BMP's:Recent Labs   Lab Test 09/15/21  0647 09/14/21  2109 09/14/21  1730 09/10/21  0631 09/10/21  0627 09/09/21  0713 09/07/21  1257 06/08/21  0725 05/21/21  1152   NA  --   --   --   --  135*  --  143  --  138   POTASSIUM  --   --   --   --  4.3  --  4.2  --  4.1   CHLORIDE  --   --   --   --  109*  --  111*  --  107   CO2  --   --   --   --  18*  --  18*  --  20*   BUN  --   --   --   --  10  --  8  --  13   CR  --   --   --   --  1.00  --  1.28*  --  1.12*   ANIONGAP  --   --   --   --  8  --  14  --  11   ANGELICA  --   --   --   --  8.7  --  9.8  --  9.3   * 146* 120*   < > 155*   < > 93   < > 102    < > = values in this interval not displayed.     Most Recent 2 LFT's:Recent Labs   Lab Test 09/07/21  1257 05/21/21  1152   AST 16 12   ALT 21 16   ALKPHOS 80 57   BILITOTAL 0.6 0.6     Most Recent 3 INR's:Recent Labs   Lab Test 09/09/21  0711   INR 1.15       Antony Tompkins MD  [facility]  Phone: #[phone]

## 2021-09-15 NOTE — PLAN OF CARE
Problem: Pain Chronic (Persistent) (Comorbidity Management)  Goal: Acceptable Pain Control and Functional Ability  Outcome: Improving  Intervention: Develop Pain Management Plan  Recent Flowsheet Documentation  Taken 9/15/2021 0900 by Vy Ayala RN  Pain Management Interventions: medication (see MAR)  Intervention: Manage Persistent Pain  Recent Flowsheet Documentation  Taken 9/15/2021 0900 by Vy Ayala RN  Medication Review/Management:   high-risk medications identified   medications reviewed  Intervention: Optimize Psychosocial Wellbeing  Recent Flowsheet Documentation  Taken 9/15/2021 0900 by Vy Ayala RN  Supportive Measures: active listening utilized     Problem: Diabetes Comorbidity  Goal: Blood Glucose Level Within Targeted Range  Outcome: Improving  Intervention: Monitor and Manage Glycemia  Recent Flowsheet Documentation  Taken 9/15/2021 0900 by Vy Ayala RN  Glycemic Management: blood glucose monitored     Problem: Pain (Hip Arthroplasty)  Goal: Acceptable Pain Control  Outcome: Improving  Intervention: Prevent or Manage Pain  Recent Flowsheet Documentation  Taken 9/15/2021 0900 by Vy Ayala RN  Pain Management Interventions: medication (see MAR)       Patient vital signs are at baseline: Yes  Patient able to ambulate as they were prior to admission or with assist devices provided by therapies during their stay:  Yes  Patient MUST void prior to discharge:  Yes  Patient able to tolerate oral intake:  Yes  Pain has adequate pain control using Oral analgesics:  Yes    Pt's pain is well controlled with APAP & Oxycodone. Dressing to L. Hip is CDI. Awaiting COVID-19 results prior to discharge to TCU. Transportation scheduled at 1745.

## 2021-09-15 NOTE — PROGRESS NOTES
Care Management Discharge Note    Discharge Date: 09/15/2021  Expected Time of Departure: 1745hrs    Discharge Disposition: Transitional Care    Discharge Services: None    Discharge DME: None    Discharge Transportation: agency (Per pt request.)    Private pay costs discussed: transportation costs    PAS Confirmation Code: [id number]  Patient/family educated on Medicare website which has current facility and service quality ratings: yes    Education Provided on the Discharge Plan:  Yes  Persons Notified of Discharge Plans: Pt and hospital staff  Patient/Family in Agreement with the Plan: yes    Handoff Referral Completed: Yes    Additional Information:  Pt has received [facility] auth for TCU services. [facility]. Transport is arranged with [facility] wheelchair transport at 1745hrs.    Due to waiting on COVID swab results from 9/14, discharge is pending the test resulting prior to transport. If results are not back by time of transport, pt will remain in the hospital and transfer to TCU will commence on 9/16 via CM.    Pt is very understanding of and in agreement with the current state of attempting to transition from the hospital.    TCU states they have all required paperwork other than the COVID results and are ready for pt arrival.    No further CM needs identified.    Feliciano Chan RN

## 2021-09-15 NOTE — PLAN OF CARE
Problem: Pain Chronic (Persistent) (Comorbidity Management)  Goal: Acceptable Pain Control and Functional Ability  Outcome: Improving     Problem: Diabetes Comorbidity  Goal: Blood Glucose Level Within Targeted Range  Outcome: Improving     Patient is A&Ox4 and speaks slowly at baseline. Currently using her wedge while in bed. Patient has been experiencing minimal to no pain throughout the shift, and is tolerating oral medications well. Patient was bladder scanned and understands that she should call for assistance when she is ready to go to the commode. Patient is comfortable and currently resting. Discharge is planned for tomorrow to TCU.

## 2021-09-15 NOTE — PLAN OF CARE
Problem: Adult Inpatient Plan of Care  Goal: Plan of Care Review  Outcome: Improving     Problem: Pain (Hip Arthroplasty)  Goal: Acceptable Pain Control  Outcome: Improving     Patient denies pain.  Vitals stable, CMS intact.  Patient up to void using bedside commode, voided x 1.

## 2021-09-16 NOTE — PLAN OF CARE
Occupational Therapy Discharge Summary    Reason for therapy discharge:    Discharged to transitional care facility.    Progress towards therapy goal(s). See goals on Care Plan in [facility] electronic health record for goal details.  Goals not met.  Barriers to achieving goals:   discharge from facility.    Therapy recommendation(s):    Continued therapy is recommended.  Rationale/Recommendations:  pt would benefit from continued therapy to increased ADL independence.

## 2021-09-17 ENCOUNTER — TRANSITIONAL CARE UNIT VISIT (OUTPATIENT)
Dept: GERIATRICS | Facility: CLINIC | Age: 41
End: 2021-09-17
Payer: COMMERCIAL

## 2021-09-17 ENCOUNTER — LAB REQUISITION (OUTPATIENT)
Dept: LAB | Facility: CLINIC | Age: 41
End: 2021-09-17
Payer: COMMERCIAL

## 2021-09-17 VITALS
BODY MASS INDEX: 44.8 KG/M2 | HEART RATE: 81 BPM | OXYGEN SATURATION: 100 % | WEIGHT: 261 LBS | DIASTOLIC BLOOD PRESSURE: 72 MMHG | RESPIRATION RATE: 18 BRPM | TEMPERATURE: 97.8 F | SYSTOLIC BLOOD PRESSURE: 118 MMHG

## 2021-09-17 DIAGNOSIS — M25.551 PAIN OF BOTH HIP JOINTS: Primary | ICD-10-CM

## 2021-09-17 DIAGNOSIS — J45.909 ASTHMA, UNSPECIFIED ASTHMA SEVERITY, UNSPECIFIED WHETHER COMPLICATED, UNSPECIFIED WHETHER PERSISTENT: ICD-10-CM

## 2021-09-17 DIAGNOSIS — R53.81 PHYSICAL DECONDITIONING: ICD-10-CM

## 2021-09-17 DIAGNOSIS — M25.552 PAIN OF BOTH HIP JOINTS: Primary | ICD-10-CM

## 2021-09-17 DIAGNOSIS — Z11.59 ENCOUNTER FOR SCREENING FOR OTHER VIRAL DISEASES: ICD-10-CM

## 2021-09-17 PROCEDURE — U0003 INFECTIOUS AGENT DETECTION BY NUCLEIC ACID (DNA OR RNA); SEVERE ACUTE RESPIRATORY SYNDROME CORONAVIRUS 2 (SARS-COV-2) (CORONAVIRUS DISEASE [COVID-19]), AMPLIFIED PROBE TECHNIQUE, MAKING USE OF HIGH THROUGHPUT TECHNOLOGIES AS DESCRIBED BY CMS-2020-01-R: HCPCS | Performed by: FAMILY MEDICINE

## 2021-09-17 PROCEDURE — 99310 SBSQ NF CARE HIGH MDM 45: CPT | Performed by: NURSE PRACTITIONER

## 2021-09-17 NOTE — LETTER
9/17/2021        RE: Ronel Ryan  [address]  [facility] [address]         HEALTH GERIATRIC SERVICES  Chief Complaint   Patient presents with     Hospital F/U     [facility] Record Number:  [id number]  Place of Service where encounter took place:  [facility] ([facility]) [[id number]]  Code Status:  FULL    HISTORY:      HPI:  Ronel Ryan  is 41 year old (1980) undergoing physical and occupational therapy. Patient has extensive medical history including HTN, acute blood loss anemia (without severe symptoms), DM type 2, obesity, intermittent vertigo and bipolar disorder. Who underwent a left total hip     Today she is seen in her room to review vital signs, labs, follow-up left total hip, and to establish care.  Patient denied chest pain shortness of breath cough congestion.  She does report is having a small bowel movement yesterday.  No abdominal distention she is passing flatus.  Her pain is controlled with current medications.  She is with a Mepilex dressing left hip.  She does rated her pain 6 out of 10 and that it comes down to 4 out of 10 after medications.    ALLERGIES:Cephalexin, Erythromycin, and Penicillins    PAST MEDICAL HISTORY:   Past Medical History:   Diagnosis Date     Allergic state      Anxiety      Anxiety      Arthritis      Asthma      Bipolar 2 disorder (H)      Bipolar 2 disorder (H)     mixed, one manic episode when combined wellbutrin and celexa. Now off Celexa.     Borderline personality disorder (H)      Cholelithiasis     2007 lap gil     Depressive disorder      Diabetes (H)      Diabetes mellitus (H)     Dx in 2013, lost weight and in 2015 MD thought she might have been misdiagnosed.     Elevated cholesterol      GERD (gastroesophageal reflux disease)      H/O hypercholesterolemia     on statin therapy     Hip dysplasia      Hypertension      Hypertension      Major depression      Menstrual disorder     heavy and irregular bleeding,  improved with IUD.     Obese      Personality disorder (H)      PTSD (post-traumatic stress disorder)      Uncomplicated asthma      Urinary incontinence     on ditropan (showers/pools and stress incontinence).     vertigo        PAST SURGICAL HISTORY:   has a past surgical history that includes orthopedic surgery; Cholecystectomy; Tonsillectomy; Cholecystectomy; Cyst Removal; Arthroscopic reconstruction anterior cruciate ligament (Left); Ankle Fracture Surgery (Right); Colonoscopy; Mammo Stereotactic Core Biopsy Left (Left, 06/15/2020); Colonoscopy w/wo Brush **Performed** (N/A, 06/08/2021); Closed reduction hip (Left, 9/9/2021); and Arthroplasty hip (Left, 9/9/2021).    FAMILY HISTORY: family history includes Arthritis in her brother, maternal grandmother, and mother; Breast Cancer in her mother; Cancer in her maternal grandmother and mother; Cerebrovascular Disease in her maternal grandfather, maternal grandmother, and mother; Diabetes in her brother, brother, father, and mother; Heart Disease in her maternal grandfather; Hyperlipidemia in her brother, maternal grandfather, and mother; Hypertension in her brother, maternal grandfather, maternal grandmother, and mother; Mental Illness in her brother and brother; Obesity in her brother, maternal grandfather, maternal grandmother, mother, and sister; Pacemaker in her maternal grandfather; Seizure Disorder in her maternal grandfather; Substance Abuse in her maternal grandfather.    SOCIAL HISTORY:  reports that she has never smoked. She has never used smokeless tobacco. She reports that she does not drink alcohol and does not use drugs.    ROS:  Constitutional: Negative for activity change, appetite change, fatigue and fever.   HENT: Negative for congestion.    Respiratory: Negative for cough, shortness of breath and wheezing.    Cardiovascular: Negative for chest pain and leg swelling.   Gastrointestinal: Negative for abdominal distention, abdominal pain,  constipation, diarrhea and nausea.   Genitourinary: Negative for dysuria.   Musculoskeletal: Negative for arthralgia. Negative for back pain.  Surgical incision left hip  Skin: Negative for color change and wound.   Neurological: Negative for dizziness.   Psychiatric/Behavioral: Negative for agitation, behavioral problems and confusion.  History of bipolar    Physical Exam:  Constitutional:       Appearance: Patient is well-developed.   HENT:      Head: Normocephalic.   Eyes:      Conjunctiva/sclera: Conjunctivae normal.   Neck:      Musculoskeletal: Normal range of motion.   Cardiovascular:      Rate and Rhythm: Normal rate and regular rhythm.      Heart sounds: Normal heart sounds. No murmur.   Pulmonary:      Effort: No respiratory distress.      Breath sounds: Normal breath sounds. No wheezing or rales.   Abdominal:      General: Bowel sounds are normal. There is no distension.      Palpations: Abdomen is soft.      Tenderness: There is no abdominal tenderness.   Musculoskeletal:       Normal range of motion.     Decreased left lower extremity  Skin:General:        Skin is warm.  Surgical incision left hip with Mepilex dressing  Neurological:         Mental Status: Patient is alert and oriented to person, place, and time.   Psychiatric:         Behavior: Behavior normal.     Vitals:/72   Pulse 81   Temp 97.8  F (36.6  C)   Resp 18   Wt 118.4 kg (261 lb)   SpO2 100%   BMI 44.80 kg/m   and Body mass index is 44.8 kg/m .    Lab/Diagnostic data:     Recent Results (from the past 240 hour(s))   Glucose by meter    Collection Time: 09/09/21  4:46 PM   Result Value Ref Range    GLUCOSE BY METER POCT 182 (H) 70 - 99 mg/dL   UA with Microscopic reflex to Culture    Collection Time: 09/10/21  3:00 AM    Specimen: Urine, Catheter   Result Value Ref Range    Color Urine Light Yellow Colorless, Straw, Light Yellow, Yellow    Appearance Urine Clear Clear    Glucose Urine Negative Negative mg/dL    Bilirubin Urine  Negative Negative    Ketones Urine Negative Negative mg/dL    Specific Gravity Urine 1.012 1.001 - 1.030    Blood Urine Negative Negative    pH Urine 5.5 5.0 - 7.0    Protein Albumin Urine Negative Negative mg/dL    Urobilinogen Urine <2.0 <2.0 mg/dL    Nitrite Urine Negative Negative    Leukocyte Esterase Urine Negative Negative    Mucus Urine Present (A) None Seen /LPF    RBC Urine 0 <=2 /HPF    WBC Urine 0 <=5 /HPF   Hemoglobin A1c    Collection Time: 09/10/21  6:27 AM   Result Value Ref Range    Hemoglobin A1C 4.6 <=5.6 %   Hemoglobin    Collection Time: 09/10/21  6:27 AM   Result Value Ref Range    Hemoglobin 11.5 (L) 11.7 - 15.7 g/dL   Basic metabolic panel    Collection Time: 09/10/21  6:27 AM   Result Value Ref Range    Sodium 135 (L) 136 - 145 mmol/L    Potassium 4.3 3.5 - 5.0 mmol/L    Chloride 109 (H) 98 - 107 mmol/L    Carbon Dioxide (CO2) 18 (L) 22 - 31 mmol/L    Anion Gap 8 5 - 18 mmol/L    Urea Nitrogen 10 8 - 22 mg/dL    Creatinine 1.00 0.60 - 1.10 mg/dL    Calcium 8.7 8.5 - 10.5 mg/dL    Glucose 155 (H) 70 - 125 mg/dL    GFR Estimate 70 >60 mL/min/1.73m2   Glucose by meter    Collection Time: 09/10/21  6:31 AM   Result Value Ref Range    GLUCOSE BY METER POCT 142 (H) 70 - 99 mg/dL   Glucose by meter    Collection Time: 09/10/21 12:34 PM   Result Value Ref Range    GLUCOSE BY METER POCT 129 (H) 70 - 99 mg/dL   Glucose by meter    Collection Time: 09/10/21  4:43 PM   Result Value Ref Range    GLUCOSE BY METER POCT 138 (H) 70 - 99 mg/dL   Glucose by meter    Collection Time: 09/10/21 10:23 PM   Result Value Ref Range    GLUCOSE BY METER POCT 126 (H) 70 - 99 mg/dL   Glucose by meter    Collection Time: 09/11/21  6:09 AM   Result Value Ref Range    GLUCOSE BY METER POCT 101 (H) 70 - 99 mg/dL   Hemoglobin    Collection Time: 09/11/21  6:27 AM   Result Value Ref Range    Hemoglobin 9.0 (L) 11.7 - 15.7 g/dL   Glucose by meter    Collection Time: 09/11/21 10:43 AM   Result Value Ref Range    GLUCOSE BY METER  POCT 99 70 - 99 mg/dL   Glucose by meter    Collection Time: 09/11/21  4:23 PM   Result Value Ref Range    GLUCOSE BY METER POCT 94 70 - 99 mg/dL   Glucose by meter    Collection Time: 09/11/21  8:58 PM   Result Value Ref Range    GLUCOSE BY METER POCT 103 (H) 70 - 99 mg/dL   Glucose by meter    Collection Time: 09/12/21  6:33 AM   Result Value Ref Range    GLUCOSE BY METER POCT 85 70 - 99 mg/dL   Glucose by meter    Collection Time: 09/12/21 11:25 AM   Result Value Ref Range    GLUCOSE BY METER POCT 85 70 - 99 mg/dL   Glucose by meter    Collection Time: 09/12/21  4:57 PM   Result Value Ref Range    GLUCOSE BY METER POCT 115 (H) 70 - 99 mg/dL   Glucose by meter    Collection Time: 09/12/21 10:07 PM   Result Value Ref Range    GLUCOSE BY METER POCT 135 (H) 70 - 99 mg/dL   Glucose by meter    Collection Time: 09/13/21  6:31 AM   Result Value Ref Range    GLUCOSE BY METER POCT 101 (H) 70 - 99 mg/dL   Glucose by meter    Collection Time: 09/13/21 11:47 AM   Result Value Ref Range    GLUCOSE BY METER POCT 117 (H) 70 - 99 mg/dL   Glucose by meter    Collection Time: 09/13/21  4:47 PM   Result Value Ref Range    GLUCOSE BY METER POCT 129 (H) 70 - 99 mg/dL   Glucose by meter    Collection Time: 09/13/21  8:55 PM   Result Value Ref Range    GLUCOSE BY METER POCT 110 (H) 70 - 99 mg/dL   Glucose by meter    Collection Time: 09/14/21  6:23 AM   Result Value Ref Range    GLUCOSE BY METER POCT 105 (H) 70 - 99 mg/dL   Glucose by meter    Collection Time: 09/14/21 11:47 AM   Result Value Ref Range    GLUCOSE BY METER POCT 109 (H) 70 - 99 mg/dL   Asymptomatic COVID-19 Virus (Coronavirus) by PCR Nasopharyngeal    Collection Time: 09/14/21  2:14 PM    Specimen: Nasopharyngeal; Swab   Result Value Ref Range    SARS CoV2 PCR Negative Negative   Glucose by meter    Collection Time: 09/14/21  5:30 PM   Result Value Ref Range    GLUCOSE BY METER POCT 120 (H) 70 - 99 mg/dL   Glucose by meter    Collection Time: 09/14/21  9:09 PM   Result  Value Ref Range    GLUCOSE BY METER POCT 146 (H) 70 - 99 mg/dL   Glucose by meter    Collection Time: 09/15/21  6:47 AM   Result Value Ref Range    GLUCOSE BY METER POCT 118 (H) 70 - 99 mg/dL   Glucose by meter    Collection Time: 09/15/21 11:56 AM   Result Value Ref Range    GLUCOSE BY METER POCT 124 (H) 70 - 99 mg/dL       MEDICATIONS:     Review of your medicines          Accurate as of September 17, 2021 11:59 PM. If you have any questions, ask your nurse or doctor.            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  Quantity: 100 each  Refills: 11     blood glucose monitoring meter device kit  Commonly known as: NO BRAND SPECIFIED  Used for: Type 2 diabetes mellitus without complication, without long-term current use of insulin (H)      Use to test blood sugar 2 times daily or as directed.  Quantity: 1 kit  Refills: 0     blood glucose test strip  Commonly known as: NO  BRAND SPECIFIED  Used for: Diabetes mellitus, type 2 (H)      Use to test blood sugars 2 times daily or as directed  Quantity: 100 strip  Refills: 3     buPROPion 300 MG 24 hr tablet  Commonly known as: WELLBUTRIN XL      Dose: 300 mg  Take 300 mg by mouth daily  Refills: 0     docusate sodium 100 MG capsule  Commonly known as: COLACE      Dose: 100 mg  Take 100 mg by mouth 3 times daily as needed for constipation  Refills: 0     EPINEPHrine 0.3 MG/0.3ML injection 2-pack  Commonly known as: ANY BX GENERIC EQUIV      Dose: 0.3 mg  Inject 0.3 mLs (0.3 mg) into the muscle once as needed for anaphylaxis  Quantity: 0.6 mL  Refills: 0     etodolac 400 MG 24 hr tablet  Commonly known as: LODINE XL      Dose: 400 mg  Take 400 mg by mouth daily  Refills: 0     hydrOXYzine 25 MG tablet  Commonly known as: ATARAX  Used for: Status post total hip replacement, left      Dose: 25 mg  Take 1 tablet (25 mg) by mouth every 6 hours as needed for itching or anxiety (with pain, moderate pain)  Quantity: 30 tablet  Refills: 0     ibuprofen 600 MG tablet  Commonly known as: ADVIL/MOTRIN  Used for: 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, initial encounter (H)      Dose: 600 mg  Take 1 tablet (600 mg) by mouth every 6 hours as needed for pain (mild)  Refills: 0     LORazepam 0.5 MG tablet  Commonly known as: ATIVAN  Used for: Anxiety      Dose: 0.5 mg  Take 1 tablet (0.5 mg) by mouth daily as needed for anxiety  Quantity: 15 tablet  Refills: 0     meclizine 25 MG tablet  Commonly known as: ANTIVERT      Dose: 25 mg  Take 25 mg by mouth 4 times daily as needed for dizziness  Refills: 0     Myrbetriq 25 MG 24 hr tablet  Generic drug: mirabegron      Dose: 25 mg  Take 25 mg by mouth daily  Refills: 0     oxyCODONE 5 MG tablet  Commonly known as: ROXICODONE  Used for: Status post total hip replacement, left      Dose: 5-10 mg  Take 1-2 tablets (5-10 mg) by mouth every 6 hours as needed for pain (Moderate to  Severe)  Quantity: 30 tablet  Refills: 0     polyethylene glycol 17 GM/Dose powder  Commonly known as: MIRALAX  Used for: 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  Quantity: 30 tablet  Refills: 0     topiramate 100 MG tablet  Commonly known as: TOPAMAX      Dose: 100 mg  Take 100 mg by mouth 2 times daily  Refills: 0     traZODone 100 MG tablet  Commonly known as: DESYREL      Dose: 100 mg  Take 100 mg by mouth At Bedtime  Quantity: 30 tablet  Refills: 1     Trulicity 0.75 MG/0.5ML pen  Generic drug: dulaglutide      Dose: 0.75 mg  Inject 0.75 mg Subcutaneous once a week (sundays)  Refills: 0     vitamin D3 125 MCG (5000 UT) tablet  Commonly known as: CHOLECALCIFEROL      Dose: 125 mcg  Take 125 mcg by mouth daily  Refills: 0            ASSESSMENT/PLAN  Encounter Diagnoses   Name Primary?     Pain of both hip joints Yes     Asthma, unspecified asthma severity, unspecified whether complicated, unspecified whether persistent      Physical deconditioning      Pain both hips status post left total hip arthroplasty, pain control, follow-up with orthopedics, monitor incision for signs and symptoms of infection    Asthma albuterol inhaler as needed    Physical deconditioning PT OT    Pain control on scheduled Tylenol, as needed oxycodone and hydroxyzine    Anxiety as needed lorazepam    Overactive bladder on Myrbetriq    Type 2 diabetes on Trulicity last  A1c was 4.6 on 9/10/2021    Dizziness as needed meclizine    Constipation scheduled bowel movements    Electronically signed by: Carmencita Yarbrough CNP          Sincerely,        Carmencita Yarbrough CNP

## 2021-09-17 NOTE — PROGRESS NOTES
[facility] GERIATRIC SERVICES  Chief Complaint   Patient presents with     [facility] F/U     [facility] Record Number:  [id number]  Place of Service where encounter took place:  [facility] ([facility]) [[id number]]  Code Status:  FULL    HISTORY:      HPI:  Ronel Ryan  is 41 year old (1980) undergoing physical and occupational therapy. Patient has extensive medical history including HTN, acute blood loss anemia (without severe symptoms), DM type 2, obesity, intermittent vertigo and bipolar disorder. Who underwent a left total hip     Today she is seen in her room to review vital signs, labs, follow-up left total hip, and to establish care.  Patient denied chest pain shortness of breath cough congestion.  She does report is having a small bowel movement yesterday.  No abdominal distention she is passing flatus.  Her pain is controlled with current medications.  She is with a Mepilex dressing left hip.  She does rated her pain 6 out of 10 and that it comes down to 4 out of 10 after medications.    ALLERGIES:Cephalexin, Erythromycin, and Penicillins    PAST MEDICAL HISTORY:   Past Medical History:   Diagnosis Date     Allergic state      Anxiety      Anxiety      Arthritis      Asthma      Bipolar 2 disorder (H)      Bipolar 2 disorder (H)     mixed, one manic episode when combined wellbutrin and celexa. Now off Celexa.     Borderline personality disorder (H)      Cholelithiasis     2007 lap gil     Depressive disorder      Diabetes (H)      Diabetes mellitus (H)     Dx in 2013, lost weight and in 2015 MD thought she might have been misdiagnosed.     Elevated cholesterol      GERD (gastroesophageal reflux disease)      H/O hypercholesterolemia     on statin therapy     Hip dysplasia      Hypertension      Hypertension      Major depression      Menstrual disorder     heavy and irregular bleeding, improved with IUD.     Obese      Personality disorder (H)      PTSD (post-traumatic stress  disorder)      Uncomplicated asthma      Urinary incontinence     on ditropan (showers/pools and stress incontinence).     vertigo        PAST SURGICAL HISTORY:   has a past surgical history that includes orthopedic surgery; Cholecystectomy; Tonsillectomy; Cholecystectomy; Cyst Removal; Arthroscopic reconstruction anterior cruciate ligament (Left); Ankle Fracture Surgery (Right); Colonoscopy; Mammo Stereotactic Core Biopsy Left (Left, 06/15/2020); Colonoscopy w/wo Brush **Performed** (N/A, 06/08/2021); Closed reduction hip (Left, 9/9/2021); and Arthroplasty hip (Left, 9/9/2021).    FAMILY HISTORY: family history includes Arthritis in her brother, maternal grandmother, and mother; Breast Cancer in her mother; Cancer in her maternal grandmother and mother; Cerebrovascular Disease in her maternal grandfather, maternal grandmother, and mother; Diabetes in her brother, brother, father, and mother; Heart Disease in her maternal grandfather; Hyperlipidemia in her brother, maternal grandfather, and mother; Hypertension in her brother, maternal grandfather, maternal grandmother, and mother; Mental Illness in her brother and brother; Obesity in her brother, maternal grandfather, maternal grandmother, mother, and sister; Pacemaker in her maternal grandfather; Seizure Disorder in her maternal grandfather; Substance Abuse in her maternal grandfather.    SOCIAL HISTORY:  reports that she has never smoked. She has never used smokeless tobacco. She reports that she does not drink alcohol and does not use drugs.    ROS:  Constitutional: Negative for activity change, appetite change, fatigue and fever.   HENT: Negative for congestion.    Respiratory: Negative for cough, shortness of breath and wheezing.    Cardiovascular: Negative for chest pain and leg swelling.   Gastrointestinal: Negative for abdominal distention, abdominal pain, constipation, diarrhea and nausea.   Genitourinary: Negative for dysuria.   Musculoskeletal: Negative  for arthralgia. Negative for back pain.  Surgical incision left hip  Skin: Negative for color change and wound.   Neurological: Negative for dizziness.   Psychiatric/Behavioral: Negative for agitation, behavioral problems and confusion.  History of bipolar    Physical Exam:  Constitutional:       Appearance: Patient is well-developed.   HENT:      Head: Normocephalic.   Eyes:      Conjunctiva/sclera: Conjunctivae normal.   Neck:      Musculoskeletal: Normal range of motion.   Cardiovascular:      Rate and Rhythm: Normal rate and regular rhythm.      Heart sounds: Normal heart sounds. No murmur.   Pulmonary:      Effort: No respiratory distress.      Breath sounds: Normal breath sounds. No wheezing or rales.   Abdominal:      General: Bowel sounds are normal. There is no distension.      Palpations: Abdomen is soft.      Tenderness: There is no abdominal tenderness.   Musculoskeletal:       Normal range of motion.     Decreased left lower extremity  Skin:General:        Skin is warm.  Surgical incision left hip with Mepilex dressing  Neurological:         Mental Status: Patient is alert and oriented to person, place, and time.   Psychiatric:         Behavior: Behavior normal.     Vitals:/72   Pulse 81   Temp 97.8  F (36.6  C)   Resp 18   Wt 118.4 kg (261 lb)   SpO2 100%   BMI 44.80 kg/m   and Body mass index is 44.8 kg/m .    Lab/Diagnostic data:     Recent Results (from the past 240 hour(s))   Glucose by meter    Collection Time: 09/09/21  4:46 PM   Result Value Ref Range    GLUCOSE BY METER POCT 182 (H) 70 - 99 mg/dL   UA with Microscopic reflex to Culture    Collection Time: 09/10/21  3:00 AM    Specimen: Urine, Catheter   Result Value Ref Range    Color Urine Light Yellow Colorless, Straw, Light Yellow, Yellow    Appearance Urine Clear Clear    Glucose Urine Negative Negative mg/dL    Bilirubin Urine Negative Negative    Ketones Urine Negative Negative mg/dL    Specific Gravity Urine 1.012 1.001 -  1.030    Blood Urine Negative Negative    pH Urine 5.5 5.0 - 7.0    Protein Albumin Urine Negative Negative mg/dL    Urobilinogen Urine <2.0 <2.0 mg/dL    Nitrite Urine Negative Negative    Leukocyte Esterase Urine Negative Negative    Mucus Urine Present (A) None Seen /LPF    RBC Urine 0 <=2 /HPF    WBC Urine 0 <=5 /HPF   Hemoglobin A1c    Collection Time: 09/10/21  6:27 AM   Result Value Ref Range    Hemoglobin A1C 4.6 <=5.6 %   Hemoglobin    Collection Time: 09/10/21  6:27 AM   Result Value Ref Range    Hemoglobin 11.5 (L) 11.7 - 15.7 g/dL   Basic metabolic panel    Collection Time: 09/10/21  6:27 AM   Result Value Ref Range    Sodium 135 (L) 136 - 145 mmol/L    Potassium 4.3 3.5 - 5.0 mmol/L    Chloride 109 (H) 98 - 107 mmol/L    Carbon Dioxide (CO2) 18 (L) 22 - 31 mmol/L    Anion Gap 8 5 - 18 mmol/L    Urea Nitrogen 10 8 - 22 mg/dL    Creatinine 1.00 0.60 - 1.10 mg/dL    Calcium 8.7 8.5 - 10.5 mg/dL    Glucose 155 (H) 70 - 125 mg/dL    GFR Estimate 70 >60 mL/min/1.73m2   Glucose by meter    Collection Time: 09/10/21  6:31 AM   Result Value Ref Range    GLUCOSE BY METER POCT 142 (H) 70 - 99 mg/dL   Glucose by meter    Collection Time: 09/10/21 12:34 PM   Result Value Ref Range    GLUCOSE BY METER POCT 129 (H) 70 - 99 mg/dL   Glucose by meter    Collection Time: 09/10/21  4:43 PM   Result Value Ref Range    GLUCOSE BY METER POCT 138 (H) 70 - 99 mg/dL   Glucose by meter    Collection Time: 09/10/21 10:23 PM   Result Value Ref Range    GLUCOSE BY METER POCT 126 (H) 70 - 99 mg/dL   Glucose by meter    Collection Time: 09/11/21  6:09 AM   Result Value Ref Range    GLUCOSE BY METER POCT 101 (H) 70 - 99 mg/dL   Hemoglobin    Collection Time: 09/11/21  6:27 AM   Result Value Ref Range    Hemoglobin 9.0 (L) 11.7 - 15.7 g/dL   Glucose by meter    Collection Time: 09/11/21 10:43 AM   Result Value Ref Range    GLUCOSE BY METER POCT 99 70 - 99 mg/dL   Glucose by meter    Collection Time: 09/11/21  4:23 PM   Result Value Ref  Range    GLUCOSE BY METER POCT 94 70 - 99 mg/dL   Glucose by meter    Collection Time: 09/11/21  8:58 PM   Result Value Ref Range    GLUCOSE BY METER POCT 103 (H) 70 - 99 mg/dL   Glucose by meter    Collection Time: 09/12/21  6:33 AM   Result Value Ref Range    GLUCOSE BY METER POCT 85 70 - 99 mg/dL   Glucose by meter    Collection Time: 09/12/21 11:25 AM   Result Value Ref Range    GLUCOSE BY METER POCT 85 70 - 99 mg/dL   Glucose by meter    Collection Time: 09/12/21  4:57 PM   Result Value Ref Range    GLUCOSE BY METER POCT 115 (H) 70 - 99 mg/dL   Glucose by meter    Collection Time: 09/12/21 10:07 PM   Result Value Ref Range    GLUCOSE BY METER POCT 135 (H) 70 - 99 mg/dL   Glucose by meter    Collection Time: 09/13/21  6:31 AM   Result Value Ref Range    GLUCOSE BY METER POCT 101 (H) 70 - 99 mg/dL   Glucose by meter    Collection Time: 09/13/21 11:47 AM   Result Value Ref Range    GLUCOSE BY METER POCT 117 (H) 70 - 99 mg/dL   Glucose by meter    Collection Time: 09/13/21  4:47 PM   Result Value Ref Range    GLUCOSE BY METER POCT 129 (H) 70 - 99 mg/dL   Glucose by meter    Collection Time: 09/13/21  8:55 PM   Result Value Ref Range    GLUCOSE BY METER POCT 110 (H) 70 - 99 mg/dL   Glucose by meter    Collection Time: 09/14/21  6:23 AM   Result Value Ref Range    GLUCOSE BY METER POCT 105 (H) 70 - 99 mg/dL   Glucose by meter    Collection Time: 09/14/21 11:47 AM   Result Value Ref Range    GLUCOSE BY METER POCT 109 (H) 70 - 99 mg/dL   Asymptomatic COVID-19 Virus (Coronavirus) by PCR Nasopharyngeal    Collection Time: 09/14/21  2:14 PM    Specimen: Nasopharyngeal; Swab   Result Value Ref Range    SARS CoV2 PCR Negative Negative   Glucose by meter    Collection Time: 09/14/21  5:30 PM   Result Value Ref Range    GLUCOSE BY METER POCT 120 (H) 70 - 99 mg/dL   Glucose by meter    Collection Time: 09/14/21  9:09 PM   Result Value Ref Range    GLUCOSE BY METER POCT 146 (H) 70 - 99 mg/dL   Glucose by meter    Collection  Time: 09/15/21  6:47 AM   Result Value Ref Range    GLUCOSE BY METER POCT 118 (H) 70 - 99 mg/dL   Glucose by meter    Collection Time: 09/15/21 11:56 AM   Result Value Ref Range    GLUCOSE BY METER POCT 124 (H) 70 - 99 mg/dL       MEDICATIONS:     Review of your medicines          Accurate as of September 17, 2021 11:59 PM. If you have any questions, ask your nurse or doctor.            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  Quantity: 100 each  Refills: 11     blood glucose monitoring meter device kit  Commonly known as: NO BRAND SPECIFIED  Used for: Type 2 diabetes mellitus without complication, without long-term current use of insulin (H)      Use to test blood sugar 2 times daily or as directed.  Quantity: 1 kit  Refills: 0     blood glucose test strip  Commonly known as: NO BRAND SPECIFIED  Used for: Diabetes mellitus, type 2 (H)      Use to test blood sugars 2 times  daily or as directed  Quantity: 100 strip  Refills: 3     buPROPion 300 MG 24 hr tablet  Commonly known as: WELLBUTRIN XL      Dose: 300 mg  Take 300 mg by mouth daily  Refills: 0     docusate sodium 100 MG capsule  Commonly known as: COLACE      Dose: 100 mg  Take 100 mg by mouth 3 times daily as needed for constipation  Refills: 0     EPINEPHrine 0.3 MG/0.3ML injection 2-pack  Commonly known as: ANY BX GENERIC EQUIV      Dose: 0.3 mg  Inject 0.3 mLs (0.3 mg) into the muscle once as needed for anaphylaxis  Quantity: 0.6 mL  Refills: 0     etodolac 400 MG 24 hr tablet  Commonly known as: LODINE XL      Dose: 400 mg  Take 400 mg by mouth daily  Refills: 0     hydrOXYzine 25 MG tablet  Commonly known as: ATARAX  Used for: Status post total hip replacement, left      Dose: 25 mg  Take 1 tablet (25 mg) by mouth every 6 hours as needed for itching or anxiety (with pain, moderate pain)  Quantity: 30 tablet  Refills: 0     ibuprofen 600 MG tablet  Commonly known as: ADVIL/MOTRIN  Used for: 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, initial encounter (H)      Dose: 600 mg  Take 1 tablet (600 mg) by mouth every 6 hours as needed for pain (mild)  Refills: 0     LORazepam 0.5 MG tablet  Commonly known as: ATIVAN  Used for: Anxiety      Dose: 0.5 mg  Take 1 tablet (0.5 mg) by mouth daily as needed for anxiety  Quantity: 15 tablet  Refills: 0     meclizine 25 MG tablet  Commonly known as: ANTIVERT      Dose: 25 mg  Take 25 mg by mouth 4 times daily as needed for dizziness  Refills: 0     Myrbetriq 25 MG 24 hr tablet  Generic drug: mirabegron      Dose: 25 mg  Take 25 mg by mouth daily  Refills: 0     oxyCODONE 5 MG tablet  Commonly known as: ROXICODONE  Used for: Status post total hip replacement, left      Dose: 5-10 mg  Take 1-2 tablets (5-10 mg) by mouth every 6 hours as needed for pain (Moderate to Severe)  Quantity: 30 tablet  Refills: 0     polyethylene glycol 17 GM/Dose powder  Commonly known as:  MIRALAX  Used for: 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  Quantity: 30 tablet  Refills: 0     topiramate 100 MG tablet  Commonly known as: TOPAMAX      Dose: 100 mg  Take 100 mg by mouth 2 times daily  Refills: 0     traZODone 100 MG tablet  Commonly known as: DESYREL      Dose: 100 mg  Take 100 mg by mouth At Bedtime  Quantity: 30 tablet  Refills: 1     Trulicity 0.75 MG/0.5ML pen  Generic drug: dulaglutide      Dose: 0.75 mg  Inject 0.75 mg Subcutaneous once a week (sundays)  Refills: 0     vitamin D3 125 MCG (5000 UT) tablet  Commonly known as: CHOLECALCIFEROL      Dose: 125 mcg  Take 125 mcg by mouth daily  Refills: 0            ASSESSMENT/PLAN  Encounter Diagnoses   Name Primary?     Pain of both hip joints Yes     Asthma, unspecified asthma severity, unspecified whether complicated, unspecified whether persistent      Physical deconditioning      Pain both hips status post left total hip arthroplasty, pain control, follow-up with orthopedics, monitor incision for signs and symptoms of infection    Asthma albuterol inhaler as needed    Physical deconditioning PT OT    Pain control on scheduled Tylenol, as needed oxycodone and hydroxyzine    Anxiety as needed lorazepam    Overactive bladder on Myrbetriq    Type 2 diabetes on Trulicity last A1c was 4.6 on 9/10/2021    Dizziness as needed meclizine    Constipation scheduled bowel  movements    Electronically signed by: Carmencita Yarbrough CNP

## 2021-09-19 LAB — SARS-COV-2 RNA RESP QL NAA+PROBE: NOT DETECTED

## 2021-09-21 PROCEDURE — U0003 INFECTIOUS AGENT DETECTION BY NUCLEIC ACID (DNA OR RNA); SEVERE ACUTE RESPIRATORY SYNDROME CORONAVIRUS 2 (SARS-COV-2) (CORONAVIRUS DISEASE [COVID-19]), AMPLIFIED PROBE TECHNIQUE, MAKING USE OF HIGH THROUGHPUT TECHNOLOGIES AS DESCRIBED BY CMS-2020-01-R: HCPCS | Performed by: FAMILY MEDICINE

## 2021-09-22 ENCOUNTER — LAB REQUISITION (OUTPATIENT)
Dept: LAB | Facility: CLINIC | Age: 41
End: 2021-09-22
Payer: MEDICAID

## 2021-09-22 DIAGNOSIS — Z11.59 ENCOUNTER FOR SCREENING FOR OTHER VIRAL DISEASES: ICD-10-CM

## 2021-09-22 PROCEDURE — U0003 INFECTIOUS AGENT DETECTION BY NUCLEIC ACID (DNA OR RNA); SEVERE ACUTE RESPIRATORY SYNDROME CORONAVIRUS 2 (SARS-COV-2) (CORONAVIRUS DISEASE [COVID-19]), AMPLIFIED PROBE TECHNIQUE, MAKING USE OF HIGH THROUGHPUT TECHNOLOGIES AS DESCRIBED BY CMS-2020-01-R: HCPCS | Performed by: FAMILY MEDICINE

## 2021-09-23 ENCOUNTER — TRANSITIONAL CARE UNIT VISIT (OUTPATIENT)
Dept: GERIATRICS | Facility: CLINIC | Age: 41
End: 2021-09-23
Payer: COMMERCIAL

## 2021-09-23 VITALS
TEMPERATURE: 98.4 F | DIASTOLIC BLOOD PRESSURE: 75 MMHG | HEIGHT: 64 IN | OXYGEN SATURATION: 95 % | BODY MASS INDEX: 44.9 KG/M2 | RESPIRATION RATE: 18 BRPM | WEIGHT: 263 LBS | SYSTOLIC BLOOD PRESSURE: 125 MMHG | HEART RATE: 74 BPM

## 2021-09-23 DIAGNOSIS — E11.69 TYPE 2 DIABETES MELLITUS WITH OTHER SPECIFIED COMPLICATION, WITHOUT LONG-TERM CURRENT USE OF INSULIN (H): ICD-10-CM

## 2021-09-23 DIAGNOSIS — I10 ESSENTIAL HYPERTENSION: ICD-10-CM

## 2021-09-23 DIAGNOSIS — D62 ABLA (ACUTE BLOOD LOSS ANEMIA): ICD-10-CM

## 2021-09-23 DIAGNOSIS — Z96.642 STATUS POST TOTAL HIP REPLACEMENT, LEFT: Primary | ICD-10-CM

## 2021-09-23 LAB — SARS-COV-2 RNA RESP QL NAA+PROBE: NOT DETECTED

## 2021-09-23 PROCEDURE — 99305 1ST NF CARE MODERATE MDM 35: CPT | Performed by: FAMILY MEDICINE

## 2021-09-23 ASSESSMENT — MIFFLIN-ST. JEOR: SCORE: 1837.96

## 2021-09-23 NOTE — LETTER
9/23/2021        RE: Ronel Ryan  [address]  [facility] [address]          [facility] SERVICES    [place] Medical Record Number:  [id number]  Place of Service where encounter took place: [facility] ([facility]) [[id number]]   CODE STATUS:   CPR/Full code       Chief Complaint/Reason for Visit:  Chief Complaint   Patient presents with     Hospital F/U     Admit note to TCU after left hip surgery/replacement.        HPI:    Ronel Ryan is a 41 year old female with hx of HTN not on meds, DM, CKD, bipolar disorder, admitted to the hospital on 9/9/2021 for elective Left VALENTIN due to osteoarthritis. Her hospital discharge summary is partially excerpted below.    [facility] Orthopedics Discharge Summary                                     Admission Information:  Admission Diagnosis:  Degenerative joint disease of left hip   Degenerative joint disease (DJD) of hip   Status post total hip replacement, left      Post-Operative Day: 6 Days Post-Op     Reason for admission:  The patient was admitted for the following:Procedure(s) (LRB):  CLOSED REDUCTION, LEFT HIP (Left)     Active Problems:    Degenerative joint disease (DJD) of hip    Status post total hip replacement, left     Following the procedure noted above the patient was transferred to the post-op floor and started on:     Therapy:  physical therapy and occupational therapy  Anticoagulation Plan: asprin  for 42 days  Pain Management: oxycodone and tylenol  Weight bearing status: Weight bearing as tolerated      The patient was followed by Orthopedics during the inpatient treatment course:  Complications:  None  Additional consultations:  [facility] [place].     Patient has extensive medical history including HTN, acute blood loss anemia (without severe symptoms), DM type 2, obesity, intermittent vertigo and bipolar disorder. With continuation of home medications and hosptialist team management, these diagnoses were fortunately not a  significant part of hospital course.      Overall stabilized and discharged to TCU on 9/15/2021 for PT, OT, nursing cares, medical management and monitoring.       Today:  She is allowed WBAT, working with therapy. Old drainage noted on Aquacel and some concern of new drainage shortly after she came to TCU. Ortho was updated on 9/21/2021 and placed her on abx Bactrim DS BID for 10 days. She has a follow up appt with ortho tomorrow 9/24/2021. Pain is controlled. She is on aspirin for DVT prevention. Reports chronic pain in hips, has to have right hip replacement in the future according to patient. She denies fever, cough, congestion. No urinary sx, had post op urinary retention in the hospital, on myrbetriq for OAB, no difficulty urinating in TCU. No constipation, diarrhea or abdominal pain. No new vision or hearing concerns. Appetite is good. Sleeping okay. She lives alone in an apartment, her mother lives nearby.          PAST MEDICAL HISTORY:  Past Medical History:   Diagnosis Date     Allergic state      Anxiety      Anxiety      Arthritis      Asthma      Bipolar 2 disorder (H)      Bipolar 2 disorder (H)     mixed, one manic episode when combined wellbutrin and celexa. Now off Celexa.     Borderline personality disorder (H)      Cholelithiasis     2007 lap gil     Depressive disorder      Diabetes (H)      Diabetes mellitus (H)     Dx in 2013, lost weight and in 2015 MD thought she might have been misdiagnosed.     Elevated cholesterol      GERD (gastroesophageal reflux disease)      H/O hypercholesterolemia     on statin therapy     Hip dysplasia      Hypertension      Hypertension      Major depression      Menstrual disorder     heavy and irregular bleeding, improved with IUD.     Obese      Personality disorder (H)      PTSD (post-traumatic stress disorder)      Uncomplicated asthma      Urinary incontinence     on ditropan (showers/pools and stress incontinence).     vertigo        PAST SURGICAL  HISTORY:  Past Surgical History:   Procedure Laterality Date     ANKLE FRACTURE SURGERY Right      ARTHROPLASTY HIP Left 9/9/2021    Procedure: LEFT TOTAL HIP ARTHROPLASTY;  Surgeon: Zenon Damon MD;  Location: [facility] Main OR     ARTHROSCOPIC RECONSTRUCTION ANTERIOR CRUCIATE LIGAMENT Left      CHOLECYSTECTOMY       CHOLECYSTECTOMY       CLOSED REDUCTION HIP Left 9/9/2021    Procedure: CLOSED REDUCTION, LEFT HIP;  Surgeon: Zenon Damon MD;  Location: [facility] Main OR     COLONOSCOPY      normal colon at age 40, June 2021 study.     CYST REMOVAL      Back of neck     MAMMO STEREOTACTIC CORE BIOPSY LEFT Left 06/15/2020     ORTHOPEDIC SURGERY      ankle and acl     TONSILLECTOMY       [facility] COLONOSCOPY W/WO BRUSH/WASH N/A 06/08/2021    Procedure: COLONOSCOPY;  Surgeon: Yoel Siegel MD;  Location: [facility] Main OR;  Service: Gastroenterology       FAMILY HISTORY:  Family History   Problem Relation Age of Onset     Diabetes Mother      Cancer Mother      Breast Cancer Mother         Unsure of age     Hypertension Mother      Hyperlipidemia Mother      Arthritis Mother      Obesity Mother      Cerebrovascular Disease Mother      Diabetes Brother      Diabetes Father      Mental Illness Brother      Mental Illness Brother      Diabetes Brother      Hypertension Brother      Arthritis Brother      Obesity Brother      Hyperlipidemia Brother      Obesity Sister      Cancer Maternal Grandmother      Obesity Maternal Grandmother      Hypertension Maternal Grandmother      Arthritis Maternal Grandmother      Cerebrovascular Disease Maternal Grandmother      Obesity Maternal Grandfather      Substance Abuse Maternal Grandfather      Heart Disease Maternal Grandfather      Hypertension Maternal Grandfather      Hyperlipidemia Maternal Grandfather      Cerebrovascular Disease Maternal Grandfather      Pacemaker Maternal Grandfather      Seizure Disorder Maternal Grandfather        SOCIAL HISTORY:  Social  History     Socioeconomic History     Marital status: Single     Spouse name: Not on file     Number of children: Not on file     Years of education: Not on file     Highest education level: Not on file   Occupational History     Not on file   Tobacco Use     Smoking status: Never Smoker     Smokeless tobacco: Never Used   Substance and Sexual Activity     Alcohol use: No     Drug use: No     Sexual activity: Not Currently     Partners: Female     Birth control/protection: I.U.D.     Comment: Female 2015, Male two weeks ago   Other Topics Concern     Parent/sibling w/ CABG, MI or angioplasty before 65F 55M? No   Social History Narrative     Not on file     Social Determinants of Health     Financial Resource Strain:      Difficulty of Paying Living Expenses:    Food Insecurity:      Worried About Running Out of Food in the Last Year:      Ran Out of Food in the Last Year:    Transportation Needs:      Lack of Transportation (Medical):      Lack of Transportation (Non-Medical):    Physical Activity:      Days of Exercise per Week:      Minutes of Exercise per Session:    Stress:      Feeling of Stress :    Social Connections:      Frequency of Communication with Friends and Family:      Frequency of Social Gatherings with Friends and Family:      Attends Orthodox Services:      Active Member of Clubs or Organizations:      Attends Club or Organization Meetings:      Marital Status:    Intimate Partner Violence:      Fear of Current or Ex-Partner:      Emotionally Abused:      Physically Abused:      Sexually Abused:        MEDICATIONS:  Current Outpatient Medications   Medication Instructions     acetaminophen (TYLENOL) 975 mg, Oral, 3 TIMES DAILY     albuterol (PROAIR HFA/PROVENTIL HFA/VENTOLIN HFA) 108 (90 BASE) MCG/ACT Inhaler 1-2 puffs, Inhalation, EVERY 4 HOURS PRN     aspirin (ASA) 325 mg, Oral, DAILY     atorvastatin (LIPITOR) 40 MG tablet 1 tablet, Oral, EVERY EVENING     blood glucose (NO BRAND SPECIFIED)  "lancets standard Use to test blood sugar 2 times daily or as directed.     blood glucose monitoring (NO BRAND SPECIFIED) meter device kit Use to test blood sugar 2 times daily or as directed.     blood glucose monitoring (NO BRAND SPECIFIED) test strip Use to test blood sugars 2 times daily or as directed     buPROPion (WELLBUTRIN XL) 300 mg, Oral, DAILY     docusate sodium (COLACE) 100 mg, Oral, 3 TIMES DAILY PRN     EPINEPHrine (ANY BX GENERIC EQUIV) 0.3 mg, Intramuscular, ONCE PRN     etodolac (LODINE XL) 400 mg, Oral, DAILY     hydrOXYzine (ATARAX) 25 mg, Oral, EVERY 6 HOURS PRN     ibuprofen (ADVIL/MOTRIN) 600 mg, Oral, EVERY 6 HOURS PRN     LORazepam (ATIVAN) 0.5 mg, Oral, DAILY PRN     meclizine (ANTIVERT) 25 mg, Oral, 4 TIMES DAILY PRN     Myrbetriq 25 mg, Oral, DAILY     oxyCODONE (ROXICODONE) 5-10 mg, Oral, EVERY 6 HOURS PRN     polyethylene glycol (MIRALAX) 17 g, Oral, DAILY PRN     risperiDONE (RISPERDAL) 2 mg, Oral, AT BEDTIME     senna-docusate (SENOKOT-S/PERICOLACE) 8.6-50 MG tablet 1-2 tablets, Oral, DAILY PRN, Take while on oral narcotics to prevent or treat constipation.     topiramate (TOPAMAX) 100 mg, Oral, 2 TIMES DAILY     traZODone (DESYREL) 100 mg, Oral, AT BEDTIME     Trulicity 0.75 mg, Subcutaneous, WEEKLY, (sundays)     vitamin D3 (CHOLECALCIFEROL) 125 mcg, Oral, DAILY       ALLERGIES:  Allergies   Allergen Reactions     Cephalexin Hives     Other reaction(s): *Unknown     Erythromycin Anaphylaxis and Hives     Other reaction(s): Unknown     Penicillins Hives     hives  Other reaction(s): Unknown       REVIEW OF SYSTEMS:  Pertinent items as noted in HPI.      PHYSICAL EXAM:  General: Patient is alert female, no distress.   Vitals: /75   Pulse 74   Temp 98.4  F (36.9  C)   Resp 18   Ht 1.626 m (5' 4\")   Wt 119.3 kg (263 lb)   SpO2 95%   BMI 45.14 kg/m    HEENT: Head is NCAT. Eyes show no injection or icterus. Nares negative. Oropharynx well hydrated.  Neck: Supple. No " tenderness or adenopathy. No JVD.  Lungs: Clear bilaterally. No wheezes.  Cardiovascular: Regular rate and rhythm, normal S1, S2.  Back: No spinal or CVA tenderness.  Abdomen: Soft, no tenderness on exam. Bowel sounds present. No guarding rebound or rigidity.  : Deferred.  Extremities: No signif distal LE edema is noted.  Musculoskeletal: Swelling left hip area,   Skin: Surgical bandage, aquacell with old drainage.  Psych: Mood appears good.      LABS/DIAGNOSTIC DATA:  Component      Latest Ref Rng & Units 9/10/2021 9/11/2021   Hemoglobin      11.7 - 15.7 g/dL 11.5 (L) 9.0 (L)     Component      Latest Ref Rng & Units 9/7/2021 9/10/2021              Sodium      136 - 145 mmol/L 143 135 (L)   Potassium      3.5 - 5.0 mmol/L 4.2 4.3   Chloride      98 - 107 mmol/L 111 (H) 109 (H)   Carbon Dioxide      22 - 31 mmol/L 18 (L) 18 (L)   Anion Gap      5 - 18 mmol/L 14 8   Glucose      70 - 125 mg/dL 93 155 (H)   Urea Nitrogen      8 - 22 mg/dL 8 10   Creatinine      0.60 - 1.10 mg/dL 1.28 (H) 1.00   GFR Estimate      >60 mL/min/1.73m2 52 (L) 70   Calcium      8.5 - 10.5 mg/dL 9.8 8.7         ASSESSMENT/PLAN:  1. Left hip OA. S/p L VALENTIN on 9/9/2021, later that day back to OR for closed reduction due to post op dislocation. She is WBAT, has follow up appt with ortho tomorrow 9/24/2021. Due to concerns of drainage below surgical bandage, she was started on abx Bactrim DS BID for 10 days on 9/21/2021. Patient reports long hx of hip problems, plans to have right hip replacement in the future.   2. HTN. Not on BP meds, BPs well controlled in TCU, continue to monitor.   3. ABLA. Secondary to surgery. Did not require transfusion. No dizziness reported. Labs reviewed, Hgb to be rechecked in TCU.  4. DM. She is on trulicity. Accuchecks followed. Adjust doses if needed.   5. Bipolar disorder. Continue PTA meds Bupropion, Risperidone, Topamax.  6. Asthma. No current respiratory concerns.  7. OAB. On Myrbetriq.  8. HLD. Continue PTA  Atorvastatin.  9. CKD. Labs reviewed as noted.   10. Post op urinary retention. Resolved. No current urinary concerns.   11. DVT prevention. Orders for aspirin 325 mg for 42 days per ortho.  12. Code status is full code.              Electronically signed by: Faith Jain MD                Sincerely,        Faith Jain MD

## 2021-09-24 LAB — SARS-COV-2 RNA RESP QL NAA+PROBE: NOT DETECTED

## 2021-09-25 ENCOUNTER — HEALTH MAINTENANCE LETTER (OUTPATIENT)
Age: 41
End: 2021-09-25

## 2021-09-28 ENCOUNTER — TRANSITIONAL CARE UNIT VISIT (OUTPATIENT)
Dept: GERIATRICS | Facility: CLINIC | Age: 41
End: 2021-09-28
Payer: COMMERCIAL

## 2021-09-28 VITALS
DIASTOLIC BLOOD PRESSURE: 69 MMHG | OXYGEN SATURATION: 99 % | HEART RATE: 82 BPM | TEMPERATURE: 98.2 F | SYSTOLIC BLOOD PRESSURE: 120 MMHG | WEIGHT: 263 LBS | HEIGHT: 64 IN | BODY MASS INDEX: 44.9 KG/M2 | RESPIRATION RATE: 20 BRPM

## 2021-09-28 DIAGNOSIS — E11.69 TYPE 2 DIABETES MELLITUS WITH OTHER SPECIFIED COMPLICATION, WITHOUT LONG-TERM CURRENT USE OF INSULIN (H): ICD-10-CM

## 2021-09-28 DIAGNOSIS — D62 ABLA (ACUTE BLOOD LOSS ANEMIA): ICD-10-CM

## 2021-09-28 DIAGNOSIS — Z96.642 STATUS POST TOTAL HIP REPLACEMENT, LEFT: Primary | ICD-10-CM

## 2021-09-28 DIAGNOSIS — I10 ESSENTIAL HYPERTENSION: ICD-10-CM

## 2021-09-28 PROCEDURE — 99309 SBSQ NF CARE MODERATE MDM 30: CPT | Performed by: FAMILY MEDICINE

## 2021-09-28 ASSESSMENT — MIFFLIN-ST. JEOR: SCORE: 1837.96

## 2021-09-28 NOTE — LETTER
9/28/2021        RE: Ronel Ryan  [address]  [facility] [address]          [facility] SERVICES    [place] Medical Record Number:  [id number]  Place of Service where encounter took place: [facility] ([facility]) [[id number]]   CODE STATUS:   CPR/Full code       Chief Complaint/Reason for Visit:  Chief Complaint   Patient presents with     RECHECK     TCU 9/28/2021. S/p L VALENTIN.        TCU HPI:    Ronel Ryan is a 41 year old female with hx of HTN not on meds, DM, CKD, bipolar disorder, admitted to the hospital on 9/9/2021 for elective Left VALENTIN due to osteoarthritis. Her hospital discharge summary is partially excerpted below.    [facility] Orthopedics Discharge Summary                                     Admission Information:  Admission Diagnosis:  Degenerative joint disease of left hip   Degenerative joint disease (DJD) of hip   Status post total hip replacement, left      Post-Operative Day: 6 Days Post-Op     Reason for admission:  The patient was admitted for the following:Procedure(s) (LRB):  CLOSED REDUCTION, LEFT HIP (Left)     Active Problems:    Degenerative joint disease (DJD) of hip    Status post total hip replacement, left     Following the procedure noted above the patient was transferred to the post-op floor and started on:     Therapy:  physical therapy and occupational therapy  Anticoagulation Plan: asprin  for 42 days  Pain Management: oxycodone and tylenol  Weight bearing status: Weight bearing as tolerated      The patient was followed by Orthopedics during the inpatient treatment course:  Complications:  None  Additional consultations:  [facility] [place].     Patient has extensive medical history including HTN, acute blood loss anemia (without severe symptoms), DM type 2, obesity, intermittent vertigo and bipolar disorder. With continuation of home medications and hosptialist team management, these diagnoses were fortunately not a significant part of hospital  "course.      Overall stabilized and discharged to TCU on 9/15/2021 for PT, OT, nursing cares, medical management and monitoring.       Today:  She is allowed WBAT, working with therapy. Due to drainage on and around Aquacel, ortho was updated on 9/21/2021 and placed her on abx Bactrim DS BID for 10 days. She had a follow up appt with ortho on 9/24/2021. Report came back \"Xrays show stable VALENTIN.\" Aquacell was removed, she is to continue in PT/OT and RTC on 11/2/2021. Pain is controlled. She is on aspirin for DVT prevention. She denies fever, cough, congestion. No urinary sx, had post op urinary retention in the hospital, on myrbetriq for OAB, no difficulty urinating in TCU. No constipation, diarrhea or abdominal pain. Appetite is good. Sleeping okay.          PAST MEDICAL HISTORY:  Past Medical History:   Diagnosis Date     Allergic state      Anxiety      Anxiety      Arthritis      Asthma      Bipolar 2 disorder (H)      Bipolar 2 disorder (H)     mixed, one manic episode when combined wellbutrin and celexa. Now off Celexa.     Borderline personality disorder (H)      Cholelithiasis     2007 lap gil     Depressive disorder      Diabetes (H)      Diabetes mellitus (H)     Dx in 2013, lost weight and in 2015 MD thought she might have been misdiagnosed.     Elevated cholesterol      GERD (gastroesophageal reflux disease)      H/O hypercholesterolemia     on statin therapy     Hip dysplasia      Hypertension      Hypertension      Major depression      Menstrual disorder     heavy and irregular bleeding, improved with IUD.     Obese      Personality disorder (H)      PTSD (post-traumatic stress disorder)      Uncomplicated asthma      Urinary incontinence     on ditropan (showers/pools and stress incontinence).     vertigo        MEDICATIONS:  Current Outpatient Medications   Medication Instructions     acetaminophen (TYLENOL) 975 mg, Oral, 3 TIMES DAILY     albuterol (PROAIR HFA/PROVENTIL HFA/VENTOLIN HFA) 108 (90 " "BASE) MCG/ACT Inhaler 1-2 puffs, Inhalation, EVERY 4 HOURS PRN     aspirin (ASA) 325 mg, Oral, DAILY     atorvastatin (LIPITOR) 40 MG tablet 1 tablet, Oral, EVERY EVENING     blood glucose (NO BRAND SPECIFIED) lancets standard Use to test blood sugar 2 times daily or as directed.     blood glucose monitoring (NO BRAND SPECIFIED) meter device kit Use to test blood sugar 2 times daily or as directed.     blood glucose monitoring (NO BRAND SPECIFIED) test strip Use to test blood sugars 2 times daily or as directed     buPROPion (WELLBUTRIN XL) 300 mg, Oral, DAILY     docusate sodium (COLACE) 100 mg, Oral, 3 TIMES DAILY PRN     EPINEPHrine (ANY BX GENERIC EQUIV) 0.3 mg, Intramuscular, ONCE PRN     etodolac (LODINE XL) 400 mg, Oral, DAILY     hydrOXYzine (ATARAX) 25 mg, Oral, EVERY 6 HOURS PRN     ibuprofen (ADVIL/MOTRIN) 600 mg, Oral, EVERY 6 HOURS PRN     LORazepam (ATIVAN) 0.5 mg, Oral, DAILY PRN     meclizine (ANTIVERT) 25 mg, Oral, 4 TIMES DAILY PRN     Myrbetriq 25 mg, Oral, DAILY     oxyCODONE (ROXICODONE) 5-10 mg, Oral, EVERY 6 HOURS PRN     polyethylene glycol (MIRALAX) 17 g, Oral, DAILY PRN     risperiDONE (RISPERDAL) 2 mg, Oral, AT BEDTIME     senna-docusate (SENOKOT-S/PERICOLACE) 8.6-50 MG tablet 1-2 tablets, Oral, DAILY PRN, Take while on oral narcotics to prevent or treat constipation.     topiramate (TOPAMAX) 100 mg, Oral, 2 TIMES DAILY     traZODone (DESYREL) 100 mg, Oral, AT BEDTIME     Trulicity 0.75 mg, Subcutaneous, WEEKLY, (sundays)     vitamin D3 (CHOLECALCIFEROL) 125 mcg, Oral, DAILY       PHYSICAL EXAM:  General: Patient is alert female, no distress.   Vitals: /69   Pulse 82   Temp 98.2  F (36.8  C)   Resp 20   Ht 1.626 m (5' 4\")   Wt 119.3 kg (263 lb)   SpO2 99%   BMI 45.14 kg/m    HEENT: Head is NCAT. Eyes show no injection or icterus. Nares negative. Oropharynx well hydrated.  Neck: Supple. No tenderness or adenopathy. No JVD.  Lungs: Clear bilaterally. No wheezes.  Cardiovascular: " Regular rate and rhythm, normal S1, S2.  Abdomen: Soft, no tenderness on exam. Bowel sounds present. No guarding rebound or rigidity.  : Deferred.  Extremities: No signif distal LE edema is noted.  Musculoskeletal: Swelling left hip area.   Skin: Bandage left hip. No drainage.   Psych: Mood appears good.      LABS/DIAGNOSTIC DATA:  Component      Latest Ref Rng & Units 9/10/2021 9/11/2021   Hemoglobin      11.7 - 15.7 g/dL 11.5 (L) 9.0 (L)     Component      Latest Ref Rng & Units 9/7/2021 9/10/2021              Sodium      136 - 145 mmol/L 143 135 (L)   Potassium      3.5 - 5.0 mmol/L 4.2 4.3   Chloride      98 - 107 mmol/L 111 (H) 109 (H)   Carbon Dioxide      22 - 31 mmol/L 18 (L) 18 (L)   Anion Gap      5 - 18 mmol/L 14 8   Glucose      70 - 125 mg/dL 93 155 (H)   Urea Nitrogen      8 - 22 mg/dL 8 10   Creatinine      0.60 - 1.10 mg/dL 1.28 (H) 1.00   GFR Estimate      >60 mL/min/1.73m2 52 (L) 70   Calcium      8.5 - 10.5 mg/dL 9.8 8.7         ASSESSMENT/PLAN:    1. Left hip OA. S/p L VALENTIN on 9/9/2021, later that day back to OR for closed reduction due to post op dislocation. WBAT, had follow up appt with ortho 9/24/2021, X-rays stable, continue in therapy, RTC 11/2/2021. Due to concerns of drainage below surgical bandage, she was started on abx Bactrim DS BID for 10 days on 9/21/2021.    2. HTN. Not on BP meds, BPs well controlled in TCU, continue to monitor.   3. ABLA. Secondary to surgery. Did not require transfusion. No dizziness reported.  4. DM. She is on trulicity. Accuchecks followed. Adjust doses if needed.   5. Bipolar disorder. Continue PTA meds Bupropion, Risperidone, Topamax.  6. Asthma. No current respiratory concerns.  7. OAB. On Myrbetriq.  8. HLD. Continue PTA Atorvastatin.  9. CKD. Labs reviewed as noted.   10. Post op urinary retention. Resolved. No current urinary concerns.   11. DVT prevention. Orders for aspirin 325 mg for 42 days per ortho.           Electronically signed by: Faith GASTELUM  MD Susy          Sincerely,        Faith Jain MD

## 2021-09-30 ENCOUNTER — TRANSITIONAL CARE UNIT VISIT (OUTPATIENT)
Dept: GERIATRICS | Facility: CLINIC | Age: 41
End: 2021-09-30
Payer: COMMERCIAL

## 2021-09-30 VITALS
HEART RATE: 79 BPM | DIASTOLIC BLOOD PRESSURE: 79 MMHG | WEIGHT: 263 LBS | RESPIRATION RATE: 18 BRPM | OXYGEN SATURATION: 97 % | TEMPERATURE: 97.1 F | BODY MASS INDEX: 44.9 KG/M2 | SYSTOLIC BLOOD PRESSURE: 132 MMHG | HEIGHT: 64 IN

## 2021-09-30 DIAGNOSIS — E11.69 TYPE 2 DIABETES MELLITUS WITH OTHER SPECIFIED COMPLICATION, WITHOUT LONG-TERM CURRENT USE OF INSULIN (H): ICD-10-CM

## 2021-09-30 DIAGNOSIS — D62 ABLA (ACUTE BLOOD LOSS ANEMIA): ICD-10-CM

## 2021-09-30 DIAGNOSIS — I10 ESSENTIAL HYPERTENSION: ICD-10-CM

## 2021-09-30 DIAGNOSIS — Z96.642 STATUS POST TOTAL HIP REPLACEMENT, LEFT: Primary | ICD-10-CM

## 2021-09-30 PROCEDURE — 99316 NF DSCHRG MGMT 30 MIN+: CPT | Performed by: FAMILY MEDICINE

## 2021-09-30 ASSESSMENT — MIFFLIN-ST. JEOR: SCORE: 1837.96

## 2021-09-30 NOTE — LETTER
9/30/2021        RE: Ronel Ryan  [address]  [facility] [address]          [facility]    [place] Medical Record Number:  [id number]  Place of [place] where [place] took place[place] [facility] [place] [facility]) [[id number]]   CODE STATUS:   CPR/Full code     Chief Complaint/Reason for Visit:  Chief Complaint   Patient presents with     Discharge Summary     [facility] 9/15/2021-10/1/2021 (Anticipated DC).      Face to Face for Wheelchair.       TCU HPI:    Ronel Ryan is a 41 year old female with hx of HTN not on meds, DM, CKD, bipolar disorder, admitted to the hospital on 9/9/2021 for elective Left VALENTIN due to osteoarthritis. Her hospital discharge summary is partially excerpted below.    [facility] Orthopedics Discharge Summary                                     Admission Information:  Admission Diagnosis:  Degenerative joint disease of left hip   Degenerative joint disease (DJD) of hip   Status post total hip replacement, left      Post-Operative Day: 6 Days Post-Op     Reason for admission:  The patient was admitted for the following:Procedure(s) (LRB):  CLOSED REDUCTION, LEFT HIP (Left)     Active Problems:    Degenerative joint disease (DJD) of hip    Status post total hip replacement, left     Following the procedure noted above the patient was transferred to the post-op floor and started on:     Therapy:  physical therapy and occupational therapy  Anticoagulation Plan: asprin  for 42 days  Pain Management: oxycodone and tylenol  Weight bearing status: Weight bearing as tolerated      The patient was followed by Orthopedics during the inpatient treatment course:  Complications:  None  Additional consultations:  [facility] [place].     Patient has extensive medical history including HTN, acute blood loss anemia (without severe symptoms), DM type 2, obesity, intermittent vertigo and bipolar disorder. With continuation of home medications and hosptialist team management, these  "diagnoses were fortunately not a significant part of hospital course.      Overall stabilized and discharged to TCU on 9/15/2021 for PT, OT, nursing cares, medical management and monitoring.       TCU Course:  No complications during her TCU stay. No medication changes to her usual home meds, she has just completed course of Keflex for concerns of drainage of surgical hip wound. Medical status and conditions were monitored as warranted while she participated in therapy and received nursing care and assistance. She had a follow up appt with ortho on 9/24/2021. Report came back \"Xrays show stable VALENTIN.\" Aquacel was removed, she is to RTC on 11/2/2021. She is on aspirin for DVT prevention, for 42 days post op. Had post op urinary retention in the hospital, on myrbetriq for OAB, no difficulty urinating in TCU. She is ready to discharge home tomorrow 10/1/2021 and will need home services as well as wheelchair, face to face documentation is noted below.          PAST MEDICAL HISTORY:  Past Medical History:   Diagnosis Date     Allergic state      Anxiety      Anxiety      Arthritis      Asthma      Bipolar 2 disorder (H)      Bipolar 2 disorder (H)     mixed, one manic episode when combined wellbutrin and celexa. Now off Celexa.     Borderline personality disorder (H)      Cholelithiasis     2007 lap gil     Depressive disorder      Diabetes (H)      Diabetes mellitus (H)     Dx in 2013, lost weight and in 2015 MD thought she might have been misdiagnosed.     Elevated cholesterol      GERD (gastroesophageal reflux disease)      H/O hypercholesterolemia     on statin therapy     Hip dysplasia      Hypertension      Hypertension      Major depression      Menstrual disorder     heavy and irregular bleeding, improved with IUD.     Obese      Personality disorder (H)      PTSD (post-traumatic stress disorder)      Uncomplicated asthma      Urinary incontinence     on ditropan (showers/pools and stress incontinence).     " "vertigo        DISCHARGE MEDICATIONS:  Current Outpatient Medications   Medication Instructions     acetaminophen (TYLENOL) 975 mg, Oral, 3 TIMES DAILY     albuterol (PROAIR HFA/PROVENTIL HFA/VENTOLIN HFA) 108 (90 BASE) MCG/ACT Inhaler 1-2 puffs, Inhalation, EVERY 4 HOURS PRN     aspirin (ASA) 325 mg, Oral, DAILY     atorvastatin (LIPITOR) 40 MG tablet 1 tablet, Oral, EVERY EVENING     blood glucose (NO BRAND SPECIFIED) lancets standard Use to test blood sugar 2 times daily or as directed.     blood glucose monitoring (NO BRAND SPECIFIED) meter device kit Use to test blood sugar 2 times daily or as directed.     blood glucose monitoring (NO BRAND SPECIFIED) test strip Use to test blood sugars 2 times daily or as directed     buPROPion (WELLBUTRIN XL) 300 mg, Oral, DAILY     docusate sodium (COLACE) 100 mg, Oral, 3 TIMES DAILY PRN     EPINEPHrine (ANY BX GENERIC EQUIV) 0.3 mg, Intramuscular, ONCE PRN     etodolac (LODINE XL) 400 mg, Oral, DAILY     hydrOXYzine (ATARAX) 25 mg, Oral, EVERY 6 HOURS PRN     ibuprofen (ADVIL/MOTRIN) 600 mg, Oral, EVERY 6 HOURS PRN     LORazepam (ATIVAN) 0.5 mg, Oral, DAILY PRN     meclizine (ANTIVERT) 25 mg, Oral, 4 TIMES DAILY PRN     Myrbetriq 25 mg, Oral, DAILY     oxyCODONE (ROXICODONE) 5-10 mg, Oral, EVERY 6 HOURS PRN     polyethylene glycol (MIRALAX) 17 g, Oral, DAILY PRN     risperiDONE (RISPERDAL) 2 mg, Oral, AT BEDTIME     senna-docusate (SENOKOT-S/PERICOLACE) 8.6-50 MG tablet 1-2 tablets, Oral, DAILY PRN, Take while on oral narcotics to prevent or treat constipation.     topiramate (TOPAMAX) 100 mg, Oral, 2 TIMES DAILY     traZODone (DESYREL) 100 mg, Oral, AT BEDTIME     Trulicity 0.75 mg, Subcutaneous, WEEKLY, (sundays)     vitamin D3 (CHOLECALCIFEROL) 125 mcg, Oral, DAILY       PHYSICAL EXAM:  General: Patient is alert female, no distress.   Vitals: /79   Pulse 79   Temp 97.1  F (36.2  C)   Resp 18   Ht 1.626 m (5' 4\")   Wt 119.3 kg (263 lb)   SpO2 97%   BMI 45.14 " "kg/m    HEENT: Head is NCAT. Eyes show no injection or icterus. Nares negative. Oropharynx well hydrated.  Neck: Supple. No tenderness or adenopathy. No JVD.  Lungs: Clear bilaterally. No wheezes.  Cardiovascular: Regular rate and rhythm, normal S1, S2.  Abdomen: Soft, no tenderness on exam. Bowel sounds present. No guarding rebound or rigidity.  Extremities: No signif distal LE edema is noted.  Skin: Bandage left hip. No drainage.   Psych: Mood appears good.      DISCHARGE DIAGNOSES:  1. Left hip OA. S/p L VALENTIN on 9/9/2021, later that day back to OR for closed reduction due to post op dislocation.   2. HTN. Not on BP meds.   3. ABLA. Secondary to surgery. Did not require transfusion.   4. DM. She is on trulicity.   5. Bipolar disorder. On Bupropion, Risperidone, Topamax.  6. Asthma.   7. OAB. On Myrbetriq.  8. HLD. On Atorvastatin.  9. CKD.   10. Post op urinary retention. Resolved.  11. DVT prevention. Orders for aspirin 325 mg for 42 days post op per ortho.      Total time greater than 30 minutes discharge coordination.      FACE TO FACE FOR WHEELCHAIR:  Due to the diagnosis of osteoarthritis of hip and presence of left artificial hip joint, my patient has the following limitations of mobility-inability to participate in MRADLs such as food prep, dressing, transferring, and ambulation. The patients mobility limitations cannot be sufficiently and safely resolved by use of a cane or walker. Her current home has adequate space for maneuvering a manual wheelchair, the patient and caregiver are able to safely use a manual wheelchair, the patient will use the wheelchair daily and use of the wheelchair will improve the participation in MRADLs for the patient. My patient will require and benefit from a 20\" x 18\" jayna height manual wheelchair with standard foot rest on right side and elevated foot rest on left side due to hip replacement, bilateral full length arm rests and 20\" x 18\" cushion. Due to difficulty walking and " "short stature (63\"), the jayna height option is necessary in order for the patient to self propel the wheelchair by placing her feet on the ground. The patient cannot self-propel a standard wheelchair but can and does self-propel in a jayna height wheelchair. A cushion is necessary since my patient sits in the wheelchair for greater than 8 hours per day placing her at high risk for skin breakdown. This equipment is necessary for the duration of 3 months.       DISCHARGE PLAN/FACE TO FACE:  I certify that services are/were furnished while this patient was under the care of a physician and that a physician or an allowed non-physician practitioner (NPP), had a face-to-face encounter that meets the physician face-to-face encounter requirements. The encounter was in whole, or in part, related to the primary reason for home health. The patient is confined to his/her home and needs intermittent skilled nursing, physical therapy, speech-language pathology, or the continued need for occupational therapy. A plan of care has been established by a physician and is periodically reviewed by a physician.    Date of Face-to-Face Encounter: 9/30/2021.     I certify that, based on my findings, the following services are medically necessary home health services: PT, OT, HHA, RN.    My clinical findings support the need for the above skilled services because:  Recent left hip replacement due to OA, post op dislocation requiring closed reduction back in OR. Allowed WBAT. Needs additional therapy as she returns home for further strengthening, gait, stamina and stabilization. Aide to assist with cares and RN for clinical assessments.     This patient is homebound because: Too strenuous to leave the home.     The patient is, or has been, under my care and I have initiated the establishment of the plan of care. This patient will be followed by a physician who will periodically review the plan of care.        Electronically signed by: Faith GASTELUM" MD Susy           Sincerely,        Faith Jain MD

## 2021-10-04 NOTE — PROGRESS NOTES
[facility] SERVICES    [place] Medical Record Number:  [id number]  Place of Service where encounter took place: [facility] ([facility]) [[id number]]   CODE STATUS:   CPR/Full code       Chief Complaint/Reason for Visit:  Chief Complaint   Patient presents with     RECHECK     TCU 9/28/2021. S/p L VAELNTIN.        TCU HPI:    Ronel Ryan is a 41 year old female with hx of HTN not on meds, DM, CKD, bipolar disorder, admitted to the hospital on 9/9/2021 for elective Left VALENTIN due to osteoarthritis. Her hospital discharge summary is partially excerpted below.    [facility] Orthopedics Discharge Summary                                     Admission Information:  Admission Diagnosis:  Degenerative joint disease of left hip   Degenerative joint disease (DJD) of hip   Status post total hip replacement, left      Post-Operative Day: 6 Days Post-Op     Reason for admission:  The patient was admitted for the following:Procedure(s) (LRB):  CLOSED REDUCTION, LEFT HIP (Left)     Active Problems:    Degenerative joint disease (DJD) of hip    Status post total hip replacement, left     Following the procedure noted above the patient was transferred to the post-op floor and started on:     Therapy:  physical therapy and occupational therapy  Anticoagulation Plan: asprin  for 42 days  Pain Management: oxycodone and tylenol  Weight bearing status: Weight bearing as tolerated      The patient was followed by Orthopedics during the inpatient treatment course:  Complications:  None  Additional consultations:  [facility] [place].     Patient has extensive medical history including HTN, acute blood loss anemia (without severe symptoms), DM type 2, obesity, intermittent vertigo and bipolar disorder. With continuation of home medications and hosptialist team management, these diagnoses were fortunately not a significant part of hospital course.      Overall stabilized and discharged to TCU on 9/15/2021 for PT, OT, nursing cares,  "medical management and monitoring.       Today:  She is allowed WBAT, working with therapy. Due to drainage on and around Aquacel, ortho was updated on 9/21/2021 and placed her on abx Bactrim DS BID for 10 days. She had a follow up appt with ortho on 9/24/2021. Report came back \"Xrays show stable VALENTIN.\" Aquacell was removed, she is to continue in PT/OT and RTC on 11/2/2021. Pain is controlled. She is on aspirin for DVT prevention. She denies fever, cough, congestion. No urinary sx, had post op urinary retention in the hospital, on myrbetriq for OAB, no difficulty urinating in TCU. No constipation, diarrhea or abdominal pain. Appetite is good. Sleeping okay.          PAST MEDICAL HISTORY:  Past Medical History:   Diagnosis Date     Allergic state      Anxiety      Anxiety      Arthritis      Asthma      Bipolar 2 disorder (H)      Bipolar 2 disorder (H)     mixed, one manic episode when combined wellbutrin and celexa. Now off Celexa.     Borderline personality disorder (H)      Cholelithiasis     2007 lap gil     Depressive disorder      Diabetes (H)      Diabetes mellitus (H)     Dx in 2013, lost weight and in 2015 MD thought she might have been misdiagnosed.     Elevated cholesterol      GERD (gastroesophageal reflux disease)      H/O hypercholesterolemia     on statin therapy     Hip dysplasia      Hypertension      Hypertension      Major depression      Menstrual disorder     heavy and irregular bleeding, improved with IUD.     Obese      Personality disorder (H)      PTSD (post-traumatic stress disorder)      Uncomplicated asthma      Urinary incontinence     on ditropan (showers/pools and stress incontinence).     vertigo        MEDICATIONS:  Current Outpatient Medications   Medication Instructions     acetaminophen (TYLENOL) 975 mg, Oral, 3 TIMES DAILY     albuterol (PROAIR HFA/PROVENTIL HFA/VENTOLIN HFA) 108 (90 BASE) MCG/ACT Inhaler 1-2 puffs, Inhalation, EVERY 4 HOURS PRN     aspirin (ASA) 325 mg, Oral, " "DAILY     atorvastatin (LIPITOR) 40 MG tablet 1 tablet, Oral, EVERY EVENING     blood glucose (NO BRAND SPECIFIED) lancets standard Use to test blood sugar 2 times daily or as directed.     blood glucose monitoring (NO BRAND SPECIFIED) meter device kit Use to test blood sugar 2 times daily or as directed.     blood glucose monitoring (NO BRAND SPECIFIED) test strip Use to test blood sugars 2 times daily or as directed     buPROPion (WELLBUTRIN XL) 300 mg, Oral, DAILY     docusate sodium (COLACE) 100 mg, Oral, 3 TIMES DAILY PRN     EPINEPHrine (ANY BX GENERIC EQUIV) 0.3 mg, Intramuscular, ONCE PRN     etodolac (LODINE XL) 400 mg, Oral, DAILY     hydrOXYzine (ATARAX) 25 mg, Oral, EVERY 6 HOURS PRN     ibuprofen (ADVIL/MOTRIN) 600 mg, Oral, EVERY 6 HOURS PRN     LORazepam (ATIVAN) 0.5 mg, Oral, DAILY PRN     meclizine (ANTIVERT) 25 mg, Oral, 4 TIMES DAILY PRN     Myrbetriq 25 mg, Oral, DAILY     oxyCODONE (ROXICODONE) 5-10 mg, Oral, EVERY 6 HOURS PRN     polyethylene glycol (MIRALAX) 17 g, Oral, DAILY PRN     risperiDONE (RISPERDAL) 2 mg, Oral, AT BEDTIME     senna-docusate (SENOKOT-S/PERICOLACE) 8.6-50 MG tablet 1-2 tablets, Oral, DAILY PRN, Take while on oral narcotics to prevent or treat constipation.     topiramate (TOPAMAX) 100 mg, Oral, 2 TIMES DAILY     traZODone (DESYREL) 100 mg, Oral, AT BEDTIME     Trulicity 0.75 mg, Subcutaneous, WEEKLY, (sundays)     vitamin D3 (CHOLECALCIFEROL) 125 mcg, Oral, DAILY       PHYSICAL EXAM:  General: Patient is alert female, no distress.   Vitals: /69   Pulse 82   Temp 98.2  F (36.8  C)   Resp 20   Ht 1.626 m (5' 4\")   Wt 119.3 kg (263 lb)   SpO2 99%   BMI 45.14 kg/m    HEENT: Head is NCAT. Eyes show no injection or icterus. Nares negative. Oropharynx well hydrated.  Neck: Supple. No tenderness or adenopathy. No JVD.  Lungs: Clear bilaterally. No wheezes.  Cardiovascular: Regular rate and rhythm, normal S1, S2.  Abdomen: Soft, no tenderness on exam. Bowel sounds " present. No guarding rebound or rigidity.  : Deferred.  Extremities: No signif distal LE edema is noted.  Musculoskeletal: Swelling left hip area.   Skin: Bandage left hip. No drainage.   Psych: Mood appears good.      LABS/DIAGNOSTIC DATA:  Component      Latest Ref Rng & Units 9/10/2021 9/11/2021   Hemoglobin      11.7 - 15.7 g/dL 11.5 (L) 9.0 (L)     Component      Latest Ref Rng & Units 9/7/2021 9/10/2021              Sodium      136 - 145 mmol/L 143 135 (L)   Potassium      3.5 - 5.0 mmol/L 4.2 4.3   Chloride      98 - 107 mmol/L 111 (H) 109 (H)   Carbon Dioxide      22 - 31 mmol/L 18 (L) 18 (L)   Anion Gap      5 - 18 mmol/L 14 8   Glucose      70 - 125 mg/dL 93 155 (H)   Urea Nitrogen      8 - 22 mg/dL 8 10   Creatinine      0.60 - 1.10 mg/dL 1.28 (H) 1.00   GFR Estimate      >60 mL/min/1.73m2 52 (L) 70   Calcium      8.5 - 10.5 mg/dL 9.8 8.7         ASSESSMENT/PLAN:    1. Left hip OA. S/p L VALENTNI on 9/9/2021, later that day back to OR for closed reduction due to post op dislocation. WBAT, had follow up appt with ortho 9/24/2021, X-rays stable, continue in therapy, RTC 11/2/2021. Due to concerns of drainage below surgical bandage, she was started on abx Bactrim DS BID for 10 days on 9/21/2021.    2. HTN. Not on BP meds, BPs well controlled in TCU, continue to monitor.   3. ABLA. Secondary to surgery. Did not require transfusion. No dizziness reported.  4. DM. She is on trulicity. Accuchecks followed. Adjust doses if needed.   5. Bipolar disorder. Continue PTA meds Bupropion, Risperidone, Topamax.  6. Asthma. No current respiratory concerns.  7. OAB. On Myrbetriq.  8. HLD. Continue PTA Atorvastatin.  9. CKD. Labs reviewed as noted.   10. Post op urinary retention. Resolved. No current urinary concerns.   11. DVT prevention. Orders for aspirin 325 mg for 42 days per ortho.           Electronically signed by: Faith Jain MD

## 2021-10-04 NOTE — PROGRESS NOTES
[facility] SERVICES    [place] Medical Record Number:  [id number]  Place of Service where encounter took place: [facility] ([facility]) [[id number]]   CODE STATUS:   CPR/Full code     Chief Complaint/Reason for Visit:  Chief Complaint   Patient presents with     Discharge Summary     [facility] TCU 9/15/2021-10/1/2021 (Anticipated DC).      Face to Face for Wheelchair.       TCU HPI:    Ronel Ryan is a 41 year old female with hx of HTN not on meds, DM, CKD, bipolar disorder, admitted to the hospital on 9/9/2021 for elective Left VALENTIN due to osteoarthritis. Her hospital discharge summary is partially excerpted below.    [facility] Orthopedics Discharge Summary                                     Admission Information:  Admission Diagnosis:  Degenerative joint disease of left hip   Degenerative joint disease (DJD) of hip   Status post total hip replacement, left      Post-Operative Day: 6 Days Post-Op     Reason for admission:  The patient was admitted for the following:Procedure(s) (LRB):  CLOSED REDUCTION, LEFT HIP (Left)     Active Problems:    Degenerative joint disease (DJD) of hip    Status post total hip replacement, left     Following the procedure noted above the patient was transferred to the post-op floor and started on:     Therapy:  physical therapy and occupational therapy  Anticoagulation Plan: asprin  for 42 days  Pain Management: oxycodone and tylenol  Weight bearing status: Weight bearing as tolerated      The patient was followed by Orthopedics during the inpatient treatment course:  Complications:  None  Additional consultations:  [facility] [place].     Patient has extensive medical history including HTN, acute blood loss anemia (without severe symptoms), DM type 2, obesity, intermittent vertigo and bipolar disorder. With continuation of home medications and hosptialist team management, these diagnoses were fortunately not a significant part of hospital course.      Overall stabilized and  "discharged to TCU on 9/15/2021 for PT, OT, nursing cares, medical management and monitoring.       TCU Course:  No complications during her TCU stay. No medication changes to her usual home meds, she has just completed course of Keflex for concerns of drainage of surgical hip wound. Medical status and conditions were monitored as warranted while she participated in therapy and received nursing care and assistance. She had a follow up appt with ortho on 9/24/2021. Report came back \"Xrays show stable VALENTIN.\" Aquacel was removed, she is to RTC on 11/2/2021. She is on aspirin for DVT prevention, for 42 days post op. Had post op urinary retention in the hospital, on myrbetriq for OAB, no difficulty urinating in TCU. She is ready to discharge home tomorrow 10/1/2021 and will need home services as well as wheelchair, face to face documentation is noted below.          PAST MEDICAL HISTORY:  Past Medical History:   Diagnosis Date     Allergic state      Anxiety      Anxiety      Arthritis      Asthma      Bipolar 2 disorder (H)      Bipolar 2 disorder (H)     mixed, one manic episode when combined wellbutrin and celexa. Now off Celexa.     Borderline personality disorder (H)      Cholelithiasis     2007 lap gil     Depressive disorder      Diabetes (H)      Diabetes mellitus (H)     Dx in 2013, lost weight and in 2015 MD thought she might have been misdiagnosed.     Elevated cholesterol      GERD (gastroesophageal reflux disease)      H/O hypercholesterolemia     on statin therapy     Hip dysplasia      Hypertension      Hypertension      Major depression      Menstrual disorder     heavy and irregular bleeding, improved with IUD.     Obese      Personality disorder (H)      PTSD (post-traumatic stress disorder)      Uncomplicated asthma      Urinary incontinence     on ditropan (showers/pools and stress incontinence).     vertigo        DISCHARGE MEDICATIONS:  Current Outpatient Medications   Medication Instructions     " "acetaminophen (TYLENOL) 975 mg, Oral, 3 TIMES DAILY     albuterol (PROAIR HFA/PROVENTIL HFA/VENTOLIN HFA) 108 (90 BASE) MCG/ACT Inhaler 1-2 puffs, Inhalation, EVERY 4 HOURS PRN     aspirin (ASA) 325 mg, Oral, DAILY     atorvastatin (LIPITOR) 40 MG tablet 1 tablet, Oral, EVERY EVENING     blood glucose (NO BRAND SPECIFIED) lancets standard Use to test blood sugar 2 times daily or as directed.     blood glucose monitoring (NO BRAND SPECIFIED) meter device kit Use to test blood sugar 2 times daily or as directed.     blood glucose monitoring (NO BRAND SPECIFIED) test strip Use to test blood sugars 2 times daily or as directed     buPROPion (WELLBUTRIN XL) 300 mg, Oral, DAILY     docusate sodium (COLACE) 100 mg, Oral, 3 TIMES DAILY PRN     EPINEPHrine (ANY BX GENERIC EQUIV) 0.3 mg, Intramuscular, ONCE PRN     etodolac (LODINE XL) 400 mg, Oral, DAILY     hydrOXYzine (ATARAX) 25 mg, Oral, EVERY 6 HOURS PRN     ibuprofen (ADVIL/MOTRIN) 600 mg, Oral, EVERY 6 HOURS PRN     LORazepam (ATIVAN) 0.5 mg, Oral, DAILY PRN     meclizine (ANTIVERT) 25 mg, Oral, 4 TIMES DAILY PRN     Myrbetriq 25 mg, Oral, DAILY     oxyCODONE (ROXICODONE) 5-10 mg, Oral, EVERY 6 HOURS PRN     polyethylene glycol (MIRALAX) 17 g, Oral, DAILY PRN     risperiDONE (RISPERDAL) 2 mg, Oral, AT BEDTIME     senna-docusate (SENOKOT-S/PERICOLACE) 8.6-50 MG tablet 1-2 tablets, Oral, DAILY PRN, Take while on oral narcotics to prevent or treat constipation.     topiramate (TOPAMAX) 100 mg, Oral, 2 TIMES DAILY     traZODone (DESYREL) 100 mg, Oral, AT BEDTIME     Trulicity 0.75 mg, Subcutaneous, WEEKLY, (sundays)     vitamin D3 (CHOLECALCIFEROL) 125 mcg, Oral, DAILY       PHYSICAL EXAM:  General: Patient is alert female, no distress.   Vitals: /79   Pulse 79   Temp 97.1  F (36.2  C)   Resp 18   Ht 1.626 m (5' 4\")   Wt 119.3 kg (263 lb)   SpO2 97%   BMI 45.14 kg/m    HEENT: Head is NCAT. Eyes show no injection or icterus. Nares negative. Oropharynx well " "hydrated.  Neck: Supple. No tenderness or adenopathy. No JVD.  Lungs: Clear bilaterally. No wheezes.  Cardiovascular: Regular rate and rhythm, normal S1, S2.  Abdomen: Soft, no tenderness on exam. Bowel sounds present. No guarding rebound or rigidity.  Extremities: No signif distal LE edema is noted.  Skin: Bandage left hip. No drainage.   Psych: Mood appears good.      DISCHARGE DIAGNOSES:  1. Left hip OA. S/p L VALENTIN on 9/9/2021, later that day back to OR for closed reduction due to post op dislocation.   2. HTN. Not on BP meds.   3. ABLA. Secondary to surgery. Did not require transfusion.   4. DM. She is on trulicity.   5. Bipolar disorder. On Bupropion, Risperidone, Topamax.  6. Asthma.   7. OAB. On Myrbetriq.  8. HLD. On Atorvastatin.  9. CKD.   10. Post op urinary retention. Resolved.  11. DVT prevention. Orders for aspirin 325 mg for 42 days post op per ortho.      Total time greater than 30 minutes discharge coordination.      FACE TO FACE FOR WHEELCHAIR:  Due to the diagnosis of osteoarthritis of hip and presence of left artificial hip joint, my patient has the following limitations of mobility-inability to participate in MRADLs such as food prep, dressing, transferring, and ambulation. The patients mobility limitations cannot be sufficiently and safely resolved by use of a cane or walker. Her current home has adequate space for maneuvering a manual wheelchair, the patient and caregiver are able to safely use a manual wheelchair, the patient will use the wheelchair daily and use of the wheelchair will improve the participation in MRADLs for the patient. My patient will require and benefit from a 20\" x 18\" jayna height manual wheelchair with standard foot rest on right side and elevated foot rest on left side due to hip replacement, bilateral full length arm rests and 20\" x 18\" cushion. Due to difficulty walking and short stature (64\"), the jayna height option is necessary in order for the patient to self propel " the wheelchair by placing her feet on the ground. The patient cannot self-propel a standard wheelchair but can and does self-propel in a jayna height wheelchair. A cushion is necessary since my patient sits in the wheelchair for greater than 8 hours per day placing her at high risk for skin breakdown. This equipment is necessary for the duration of 3 months.       DISCHARGE PLAN/FACE TO FACE:  I certify that services are/were furnished while this patient was under the care of a physician and that a physician or an allowed non-physician practitioner (NPP), had a face-to-face encounter that meets the physician face-to-face encounter requirements. The encounter was in whole, or in part, related to the primary reason for home health. The patient is confined to his/her home and needs intermittent skilled nursing, physical therapy, speech-language pathology, or the continued need for occupational therapy. A plan of care has been established by a physician and is periodically reviewed by a physician.    Date of Face-to-Face Encounter: 9/30/2021.     I certify that, based on my findings, the following services are medically necessary home health services: PT, OT, HHA, RN.    My clinical findings support the need for the above skilled services because:  Recent left hip replacement due to OA, post op dislocation requiring closed reduction back in OR. Allowed WBAT. Needs additional therapy as she returns home for further strengthening, gait, stamina and stabilization. Aide to assist with cares and RN for clinical assessments.     This patient is homebound because: Too strenuous to leave the home.     The patient is, or has been, under my care and I have initiated the establishment of the plan of care. This patient will be followed by a physician who will periodically review the plan of care.        Electronically signed by: Faith Jain MD

## 2021-10-04 NOTE — PROGRESS NOTES
[facility] Medical Record Number:  [id number]  Place of Service where encounter took place: [facility] [place] ([facility]) [[id number]]   CODE STATUS:   CPR/Full code       Chief Complaint/Reason for Visit:  Chief Complaint   Patient presents with     Hospital F/U     Admit note to TCU after left hip surgery/replacement.        HPI:    Ronel Ryan is a 41 year old female with hx of HTN not on meds, DM, CKD, bipolar disorder, admitted to the hospital on 9/9/2021 for elective Left VALENTIN due to osteoarthritis. Her hospital discharge summary is partially excerpted below.    [facility] Orthopedics Discharge Summary                                     Admission Information:  Admission Diagnosis:  Degenerative joint disease of left hip   Degenerative joint disease (DJD) of hip   Status post total hip replacement, left      Post-Operative Day: 6 Days Post-Op     Reason for admission:  The patient was admitted for the following:Procedure(s) (LRB):  CLOSED REDUCTION, LEFT HIP (Left)     Active Problems:    Degenerative joint disease (DJD) of hip    Status post total hip replacement, left     Following the procedure noted above the patient was transferred to the post-op floor and started on:     Therapy:  physical therapy and occupational therapy  Anticoagulation Plan: asprin  for 42 days  Pain Management: oxycodone and tylenol  Weight bearing status: Weight bearing as tolerated      The patient was followed by Orthopedics during the inpatient treatment course:  Complications:  None  Additional [place]  [facility] [place]     Patient has extensive medical history including HTN, acute blood loss anemia (without severe symptoms), DM type 2, obesity, intermittent vertigo and bipolar disorder. With continuation of home medications and hosptialist team management, these diagnoses were fortunately not a significant part of hospital course.      Overall stabilized and discharged to U on 9/15/2021 for  PT, OT, nursing cares, medical management and monitoring.       Today:  She is allowed WBAT, working with therapy. Old drainage noted on Aquacel and some concern of new drainage shortly after she came to TCU. Ortho was updated on 9/21/2021 and placed her on abx Bactrim DS BID for 10 days. She has a follow up appt with ortho tomorrow 9/24/2021. Pain is controlled. She is on aspirin for DVT prevention. Reports chronic pain in hips, has to have right hip replacement in the future according to patient. She denies fever, cough, congestion. No urinary sx, had post op urinary retention in the hospital, on myrbetriq for OAB, no difficulty urinating in TCU. No constipation, diarrhea or abdominal pain. No new vision or hearing concerns. Appetite is good. Sleeping okay. She lives alone in an apartment, her mother lives nearby.          PAST MEDICAL HISTORY:  Past Medical History:   Diagnosis Date     Allergic state      Anxiety      Anxiety      Arthritis      Asthma      Bipolar 2 disorder (H)      Bipolar 2 disorder (H)     mixed, one manic episode when combined wellbutrin and celexa. Now off Celexa.     Borderline personality disorder (H)      Cholelithiasis     2007 lap gil     Depressive disorder      Diabetes (H)      Diabetes mellitus (H)     Dx in 2013, lost weight and in 2015 MD thought she might have been misdiagnosed.     Elevated cholesterol      GERD (gastroesophageal reflux disease)      H/O hypercholesterolemia     on statin therapy     Hip dysplasia      Hypertension      Hypertension      Major depression      Menstrual disorder     heavy and irregular bleeding, improved with IUD.     Obese      Personality disorder (H)      PTSD (post-traumatic stress disorder)      Uncomplicated asthma      Urinary incontinence     on ditropan (showers/pools and stress incontinence).     vertigo        PAST SURGICAL HISTORY:  Past Surgical History:   Procedure Laterality Date     ANKLE FRACTURE SURGERY Right       ARTHROPLASTY HIP Left 9/9/2021    Procedure: LEFT TOTAL HIP ARTHROPLASTY;  Surgeon: Zenon Damon MD;  Location: [facility] Main OR     ARTHROSCOPIC RECONSTRUCTION ANTERIOR CRUCIATE LIGAMENT Left      CHOLECYSTECTOMY       CHOLECYSTECTOMY       CLOSED REDUCTION HIP Left 9/9/2021    Procedure: CLOSED REDUCTION, LEFT HIP;  Surgeon: Zenon Damon MD;  [facility] OR     COLONOSCOPY      normal colon at age 40, June 2021 study.     CYST REMOVAL      Back of neck     MAMMO STEREOTACTIC CORE BIOPSY LEFT Left 06/15/2020     ORTHOPEDIC SURGERY      ankle and acl     [facility]SILLECTOMY       [facility]WASH [facility]/A [facility]/08/2021    Procedure: COLONOSCOPY;  Surgeon: Yoel Siegel MD;  Location: [facility] Main OR;  Service: Gastroenterology       FAMILY HISTORY:  Family History   Problem Relation Age of Onset     Diabetes Mother      Cancer Mother      Breast Cancer Mother         Unsure of age     Hypertension Mother      Hyperlipidemia Mother      Arthritis Mother      Obesity Mother      Cerebrovascular Disease Mother      Diabetes Brother      Diabetes Father      Mental Illness Brother      Mental Illness Brother      Diabetes Brother      Hypertension Brother      Arthritis Brother      Obesity Brother      Hyperlipidemia Brother      Obesity Sister      Cancer Maternal Grandmother      Obesity Maternal Grandmother      Hypertension Maternal Grandmother      Arthritis Maternal Grandmother      Cerebrovascular Disease Maternal Grandmother      Obesity Maternal Grandfather      Substance Abuse Maternal Grandfather      Heart Disease Maternal Grandfather      Hypertension Maternal Grandfather      Hyperlipidemia Maternal Grandfather      Cerebrovascular Disease Maternal Grandfather      Pacemaker Maternal Grandfather      Seizure Disorder Maternal Grandfather        SOCIAL HISTORY:  Social History     Socioeconomic History     Marital status: Single     Spouse name: Not on file     Number of  children: Not on file     Years of education: Not on file     Highest education level: Not on file   Occupational History     Not on file   Tobacco Use     Smoking status: Never Smoker     Smokeless tobacco: Never Used   Substance and Sexual Activity     Alcohol use: No     Drug use: No     Sexual activity: Not Currently     Partners: Female     Birth control/protection: I.U.D.     Comment: Female 2015, Male two weeks ago   Other Topics Concern     Parent/sibling w/ CABG, MI or angioplasty before 65F 55M? No   Social History Narrative     Not on file     Social Determinants of Health     Financial Resource Strain:      Difficulty of Paying Living Expenses:    Food Insecurity:      Worried About Running Out of Food in the Last Year:      Ran Out of Food in the Last Year:    Transportation Needs:      Lack of Transportation (Medical):      Lack of Transportation (Non-Medical):    Physical Activity:      Days of Exercise per Week:      Minutes of Exercise per Session:    Stress:      Feeling of Stress :    Social Connections:      Frequency of Communication with Friends and Family:      Frequency of Social Gatherings with Friends and Family:      Attends Mosque Services:      Active Member of Clubs or Organizations:      Attends Club or Organization Meetings:      Marital Status:    Intimate Partner Violence:      Fear of Current or Ex-Partner:      Emotionally Abused:      Physically Abused:      Sexually Abused:        MEDICATIONS:  Current Outpatient Medications   Medication Instructions     acetaminophen (TYLENOL) 975 mg, Oral, 3 TIMES DAILY     albuterol (PROAIR HFA/PROVENTIL HFA/VENTOLIN HFA) 108 (90 BASE) MCG/ACT Inhaler 1-2 puffs, Inhalation, EVERY 4 HOURS PRN     aspirin (ASA) 325 mg, Oral, DAILY     atorvastatin (LIPITOR) 40 MG tablet 1 tablet, Oral, EVERY EVENING     blood glucose (NO BRAND SPECIFIED) lancets standard Use to test blood sugar 2 times daily or as directed.     blood glucose monitoring (NO  "BRAND SPECIFIED) meter device kit Use to test blood sugar 2 times daily or as directed.     blood glucose monitoring (NO BRAND SPECIFIED) test strip Use to test blood sugars 2 times daily or as directed     buPROPion (WELLBUTRIN XL) 300 mg, Oral, DAILY     docusate sodium (COLACE) 100 mg, Oral, 3 TIMES DAILY PRN     EPINEPHrine (ANY BX GENERIC EQUIV) 0.3 mg, Intramuscular, ONCE PRN     etodolac (LODINE XL) 400 mg, Oral, DAILY     hydrOXYzine (ATARAX) 25 mg, Oral, EVERY 6 HOURS PRN     ibuprofen (ADVIL/MOTRIN) 600 mg, Oral, EVERY 6 HOURS PRN     LORazepam (ATIVAN) 0.5 mg, Oral, DAILY PRN     meclizine (ANTIVERT) 25 mg, Oral, 4 TIMES DAILY PRN     Myrbetriq 25 mg, Oral, DAILY     oxyCODONE (ROXICODONE) 5-10 mg, Oral, EVERY 6 HOURS PRN     polyethylene glycol (MIRALAX) 17 g, Oral, DAILY PRN     risperiDONE (RISPERDAL) 2 mg, Oral, AT BEDTIME     senna-docusate (SENOKOT-S/PERICOLACE) 8.6-50 MG tablet 1-2 tablets, Oral, DAILY PRN, Take while on oral narcotics to prevent or treat constipation.     topiramate (TOPAMAX) 100 mg, Oral, 2 TIMES DAILY     traZODone (DESYREL) 100 mg, Oral, AT BEDTIME     Trulicity 0.75 mg, Subcutaneous, WEEKLY, (sundays)     vitamin D3 (CHOLECALCIFEROL) 125 mcg, Oral, DAILY       ALLERGIES:  Allergies   Allergen Reactions     Cephalexin Hives     Other reaction(s): *Unknown     Erythromycin Anaphylaxis and Hives     Other reaction(s): Unknown     Penicillins Hives     hives  Other reaction(s): Unknown       REVIEW OF SYSTEMS:  Pertinent items as noted in HPI.      PHYSICAL EXAM:  General: Patient is alert female, no distress.   Vitals: /75   Pulse 74   Temp 98.4  F (36.9  C)   Resp 18   Ht 1.626 m (5' 4\")   Wt 119.3 kg (263 lb)   SpO2 95%   BMI 45.14 kg/m    HEENT: Head is NCAT. Eyes show no injection or icterus. Nares negative. Oropharynx well hydrated.  Neck: Supple. No tenderness or adenopathy. No JVD.  Lungs: Clear bilaterally. No wheezes.  Cardiovascular: Regular rate and " rhythm, normal S1, S2.  Back: No spinal or CVA tenderness.  Abdomen: Soft, no tenderness on exam. Bowel sounds present. No guarding rebound or rigidity.  : Deferred.  Extremities: No signif distal LE edema is noted.  Musculoskeletal: Swelling left hip area,   Skin: Surgical bandage, aquacell with old drainage.  Psych: Mood appears good.      LABS/DIAGNOSTIC DATA:  Component      Latest Ref Rng & Units 9/10/2021 9/11/2021   Hemoglobin      11.7 - 15.7 g/dL 11.5 (L) 9.0 (L)     Component      Latest Ref Rng & Units 9/7/2021 9/10/2021              Sodium      136 - 145 mmol/L 143 135 (L)   Potassium      3.5 - 5.0 mmol/L 4.2 4.3   Chloride      98 - 107 mmol/L 111 (H) 109 (H)   Carbon Dioxide      22 - 31 mmol/L 18 (L) 18 (L)   Anion Gap      5 - 18 mmol/L 14 8   Glucose      70 - 125 mg/dL 93 155 (H)   Urea Nitrogen      8 - 22 mg/dL 8 10   Creatinine      0.60 - 1.10 mg/dL 1.28 (H) 1.00   GFR Estimate      >60 mL/min/1.73m2 52 (L) 70   Calcium      8.5 - 10.5 mg/dL 9.8 8.7         ASSESSMENT/PLAN:  1. Left hip OA. S/p L VALENTIN on 9/9/2021, later that day back to OR for closed reduction due to post op dislocation. She is WBAT, has follow up appt with ortho tomorrow 9/24/2021. Due to concerns of drainage below surgical bandage, she was started on abx Bactrim DS BID for 10 days on 9/21/2021. Patient reports long hx of hip problems, plans to have right hip replacement in the future.   2. HTN. Not on BP meds, BPs well controlled in TCU, continue to monitor.   3. ABLA. Secondary to surgery. Did not require transfusion. No dizziness reported. Labs reviewed, Hgb to be rechecked in TCU.  4. DM. She is on trulicity. Accuchecks followed. Adjust doses if needed.   5. Bipolar disorder. Continue PTA meds Bupropion, Risperidone, Topamax.  6. Asthma. No current respiratory concerns.  7. OAB. On Myrbetriq.  8. HLD. Continue PTA Atorvastatin.  9. CKD. Labs reviewed as noted.   10. Post op urinary retention. Resolved. No current urinary  concerns.   11. DVT prevention. Orders for aspirin 325 mg for 42 days per ortho.  12. Code status is full code.              Electronically signed by: Faith Jain MD

## 2021-11-08 ENCOUNTER — VIRTUAL VISIT (OUTPATIENT)
Dept: SURGERY | Facility: CLINIC | Age: 41
End: 2021-11-08
Payer: COMMERCIAL

## 2021-11-08 VITALS — HEIGHT: 64 IN | WEIGHT: 250 LBS | BODY MASS INDEX: 42.68 KG/M2

## 2021-11-08 DIAGNOSIS — M16.0 PRIMARY OSTEOARTHRITIS OF BOTH HIPS: ICD-10-CM

## 2021-11-08 DIAGNOSIS — E11.9 TYPE 2 DIABETES MELLITUS WITHOUT COMPLICATION, WITHOUT LONG-TERM CURRENT USE OF INSULIN (H): Primary | ICD-10-CM

## 2021-11-08 DIAGNOSIS — E66.01 OBESITY, CLASS III, BMI 40-49.9 (MORBID OBESITY) (H): ICD-10-CM

## 2021-11-08 PROBLEM — F31.9 BIPOLAR 1 DISORDER (H): Status: ACTIVE | Noted: 2021-11-08

## 2021-11-08 PROBLEM — K21.00 GASTROESOPHAGEAL REFLUX DISEASE WITH ESOPHAGITIS WITHOUT HEMORRHAGE: Status: ACTIVE | Noted: 2021-11-08

## 2021-11-08 PROBLEM — K59.00 CONSTIPATION: Status: ACTIVE | Noted: 2021-11-08

## 2021-11-08 PROBLEM — N39.46 MIXED INCONTINENCE: Status: ACTIVE | Noted: 2021-11-08

## 2021-11-08 PROBLEM — R20.9 SKIN SENSATION DISTURBANCE: Status: ACTIVE | Noted: 2021-11-08

## 2021-11-08 PROBLEM — M16.10 PRIMARY LOCALIZED OSTEOARTHRITIS OF PELVIC REGION AND THIGH: Status: ACTIVE | Noted: 2021-11-08

## 2021-11-08 PROCEDURE — 99442 PR PHYSICIAN TELEPHONE EVALUATION 11-20 MIN: CPT | Performed by: EMERGENCY MEDICINE

## 2021-11-08 RX ORDER — FAMOTIDINE 40 MG/1
TABLET, FILM COATED ORAL
COMMUNITY
End: 2022-03-23

## 2021-11-08 RX ORDER — DULAGLUTIDE 1.5 MG/.5ML
1.5 INJECTION, SOLUTION SUBCUTANEOUS
Qty: 2 ML | Refills: 1 | Status: SHIPPED | OUTPATIENT
Start: 2021-11-08 | End: 2022-01-07

## 2021-11-08 RX ORDER — BACLOFEN 10 MG/1
TABLET ORAL
COMMUNITY
End: 2023-06-22

## 2021-11-08 RX ORDER — MELOXICAM 7.5 MG/1
1 TABLET ORAL DAILY
COMMUNITY
End: 2022-03-23

## 2021-11-08 ASSESSMENT — MIFFLIN-ST. JEOR: SCORE: 1778.99

## 2021-11-08 NOTE — PROGRESS NOTES
"  The patient has been notified of following:     \"This telephone visit will be conducted via a call between you and your physician/provider. We have found that certain health care needs can be provided without the need for a physical exam.  This service lets us provide the care you need with a short phone conversation.  If a prescription is necessary we can send it directly to your pharmacy.  If lab work is needed we can place an order for that and you can then stop by our lab to have the test done at a later time.    Telephone visits are billed at different rates depending on your insurance coverage. During this emergency period, for some insurers they may be billed the same as an in-person visit.  Please reach out to your insurance provider with any questions.    If during the course of the call the physician/provider feels a telephone visit is not appropriate, you will not be charged for this service.\"    Patient has given verbal consent to a Telephone visit? Yes    What phone number would you like to be contacted at? [phone]    Patient would like to receive their AVS by Michelle    Additional provider notes:   Bariatric Clinic Follow-Up Visit:    Ronel Ryan is a 41 year old  female with Body mass index is 42.91 kg/m .  presenting here today for follow-up on non-surgical efforts for weight loss. Original Intake visit occurred on 7/31/19 with a weight of 345 lbs and BMI of 59.2.  Along with diet and behavior changes, she has been using Trulicity to help her diabetes/excess weight to assist her weight loss goals.  See her intake visit notes for details on identified contributors to weight gain in the past. Chart review shows Dietician calculated RMR of 1892 kcal/day and protein intake goal of 72-95g/day.    Weight:   Wt Readings from Last 3 Encounters:   11/08/21 113.4 kg (250 lb)   09/30/21 119.3 kg (263 lb)   09/28/21 119.3 kg (263 lb)    pounds      Comorbidities:  Patient Active Problem List "   Diagnosis     Binge eating     Eating disorder     Morbid obesity (H)     Multiple-type hyperlipidemia     Severe episode of recurrent major depressive disorder (H)     Arthralgia of hip     Hypertension     Trochanteric bursitis     History of urinary anomaly     Borderline personality disorder (H)     Attention deficit disorder     Asthma     Suicidal ideation     ACP (advance care planning)     Adjustment disorder with depressed mood     Anxiety     Moderate mixed bipolar I disorder (H)     Bipolar II disorder, most recent episode hypomanic (H)     Borderline high cholesterol     Care plan discussed with patient     Disorder of pelvis     Gait difficulty     Hx of diabetes mellitus     Hx of eating disorder     Morbid obesity with BMI of 50.0-59.9, adult (H)     Osteoarthritis     Pain of both hip joints     Relationship problems     Secondary amenorrhea     Soft tissue lesion of shoulder region     Vertigo     Greater trochanteric bursitis of both hips     Hip dysplasia     Left hip pain     Osteoarthritis of hip     Primary osteoarthritis of left hip     ADD (attention deficit disorder)     H/O urinary frequency     History of anxiety disorder     Hyperlipidemia     Mixed hyperlipidemia     Recurrent major depressive disorder (H)     Type 2 diabetes mellitus (H)     Primary osteoarthritis of both hips     Degenerative joint disease (DJD) of hip     Status post total hip replacement, left     Bipolar 1 disorder (H)     Constipation     Gastroesophageal reflux disease with esophagitis without hemorrhage     Mixed incontinence     Primary localized osteoarthritis of pelvic region and thigh     Skin sensation disturbance       Current Outpatient Medications:      acetaminophen (TYLENOL) 325 MG tablet, Take 3 tablets (975 mg) by mouth 3 times daily, Disp: , Rfl: 0     albuterol (PROAIR HFA/PROVENTIL HFA/VENTOLIN HFA) 108 (90 BASE) MCG/ACT Inhaler, Inhale 1-2 puffs into the lungs every 4 hours as needed , Disp: ,  Rfl:      aspirin (ASA) 325 MG EC tablet, Take 1 tablet (325 mg) by mouth daily, Disp: 42 tablet, Rfl: 0     atorvastatin (LIPITOR) 40 MG tablet, Take 1 tablet by mouth every evening , Disp: , Rfl:      baclofen (LIORESAL) 10 MG tablet, TAKE 1 TABLET BY MOUTH TWICE A DAY AS NEEDED FOR MUSCLE SPASM, Disp: , Rfl:      blood glucose (NO BRAND SPECIFIED) lancets standard, Use to test blood sugar 2 times daily or as directed., Disp: 100 each, Rfl: 11     blood glucose monitoring (NO BRAND SPECIFIED) meter device kit, Use to test blood sugar 2 times daily or as directed., Disp: 1 kit, Rfl: 0     blood glucose monitoring (NO BRAND SPECIFIED) test strip, Use to test blood sugars 2 times daily or as directed, Disp: 100 strip, Rfl: 3     buPROPion (WELLBUTRIN XL) 300 MG 24 hr tablet, Take 300 mg by mouth daily , Disp: , Rfl:      Cholecalciferol 125 MCG (5000 UT) TABS, Take 125 mcg by mouth daily , Disp: , Rfl:      docusate sodium (COLACE) 100 MG capsule, Take 100 mg by mouth 3 times daily as needed for constipation, Disp: , Rfl:      EPINEPHrine (EPIPEN/ADRENACLICK/OR ANY BX GENERIC EQUIV) 0.3 MG/0.3ML injection 2-pack, Inject 0.3 mLs (0.3 mg) into the muscle once as needed for anaphylaxis, Disp: 0.6 mL, Rfl: 0     etodolac (LODINE XL) 400 MG 24 hr tablet, Take 400 mg by mouth daily, Disp: , Rfl:      famotidine (PEPCID) 40 MG tablet, TAKE 1 TABLET BY MOUTH EVERY DAY AT BEDTIME FOR 30 DAYS, Disp: , Rfl:      hydrOXYzine (ATARAX) 25 MG tablet, Take 1 tablet (25 mg) by mouth every 6 hours as needed for itching or anxiety (with pain, moderate pain), Disp: 30 tablet, Rfl: 0     ibuprofen (ADVIL/MOTRIN) 600 MG tablet, Take 1 tablet (600 mg) by mouth every 6 hours as needed for pain (mild), Disp: , Rfl: 0     LORazepam (ATIVAN) 0.5 MG tablet, Take 1 tablet (0.5 mg) by mouth daily as needed for anxiety, Disp: 15 tablet, Rfl: 0     meclizine (ANTIVERT) 25 MG tablet, Take 25 mg by mouth 4 times daily as needed for dizziness, Disp: ,  Rfl:      meloxicam (MOBIC) 7.5 MG tablet, Take 1 tablet by mouth daily, Disp: , Rfl:      MYRBETRIQ 25 MG 24 hr tablet, Take 25 mg by mouth daily, Disp: , Rfl:      oxyCODONE (ROXICODONE) 5 MG tablet, Take 1-2 tablets (5-10 mg) by mouth every 6 hours as needed for pain (Moderate to Severe), Disp: 30 tablet, Rfl: 0     risperiDONE (RISPERDAL) 2 MG tablet, Take 2 mg by mouth At Bedtime , Disp: , Rfl:      senna-docusate (SENOKOT-S/PERICOLACE) 8.6-50 MG tablet, Take 1-2 tablets by mouth daily as needed for constipation Take while on oral narcotics to prevent or treat constipation., Disp: 30 tablet, Rfl: 0     topiramate (TOPAMAX) 100 MG tablet, Take 100 mg by mouth 2 times daily , Disp: , Rfl:      traZODone (DESYREL) 100 MG tablet, Take 100 mg by mouth At Bedtime , Disp: 30 tablet, Rfl: 1     TRULICITY 0.75 MG/0.5ML pen, Inject 0.75 mg Subcutaneous once a week (sundays), Disp: , Rfl:       Interim: Since our last visit, she has had left hip replaced, now right hip hurting bad and needs injection. Was down to 241 lbs before surgery, weight went up a bit 251 lbs/at  lbs there. After getting home down to 248 lbs, starting eating a lot this fall 250 lbs. Tolerating her low dose trulicity but increase appetite this past month and we'll ramp up therapy to help prevent re-emergence of her diabetes which has been very well controlled with meds/weight loss and see how the 1.5mg/week dose is handled.    Plan:   1.  Diet: continue mindful eating of 20-30g of lean protein pre meal every 5-6 hours to curb appetite. Prepare vegetables so easy to grab/mix with protein. Hydrate well with water and avoid sugary beverages as these can further stimulate appetite.  2. Exercise: PT of hip as able.   3. Medication: increase Trulicity to 1.5mg/week You can use use your last 2 pens but taking two shots of the 0.75mg pens each week and then fill the new prescription sent today for 1.5mg. We can look at ramping up to 3mg if necessary  later this winter.  4. Follow up as planned for hip injection.  5. Goals: Great work with reducing weight 95 lbs over the last 2.5 years (intake weight 345 lbs). Continued mindful eating/protein rich/higher fiber diet will serve you well as your hips become more mobile again following replacement.      We discussed [facility] Bariatric Basics including:  -eating 3 meals daily  -reviewed metabolic needs for weight loss based on Resting Metabolic Rate  -protein goals supportive of healthy weight loss  -avoiding/limiting calorie containing beverages  -We discussed the importance of restorative sleep and stress management in maintaining a healthy weight.  -We discussed the National Weight Control Registry healthy weight maintenance strategies and ways to optimize metabolism.  -We discussed the importance of physical activity including cardiovascular and strength training in maintaining a healthier weight and explored viable options.    Most recent labs:  Lab Results   Component Value Date    WBC 5.2 05/21/2021    HGB 9.0 (L) 09/11/2021    HCT 41.8 05/21/2021    MCV 94 05/21/2021     05/21/2021     Lab Results   Component Value Date    CHOL 162 05/21/2021     Lab Results   Component Value Date    HDL 34 (L) 05/21/2021     No components found for: LDLCALC  Lab Results   Component Value Date    TRIG 115 05/21/2021     No components found for: CHOLHDL  Lab Results   Component Value Date    ALT 21 09/07/2021    AST 16 09/07/2021    ALKPHOS 80 09/07/2021     No results found for: HGBA1C  No components found for: [id number]  No components found for: [id number]  No components found for: FERRITIN  No components found for: PTH  No components found for: 56736  No components found for: 7597  Lab Results   Component Value Date    TSH 1.14 05/21/2021     No results found for: TESTOSTERONE    DIETARY HISTORY    Positive Changes Since Last Visit: mindfulness increased, post op weight gain improving but now very  "hungry.  Struggling With: appetite control        PHYSICAL ACTIVITY PATTERNS:  Cardiovascular: some PT but now her bad right hip limits her  Strength Training: some PT    REVIEW OF SYSTEMS  Hungry and right hip pain..  PHYSICAL EXAM:  Vitals: Ht 1.626 m (5' 4\")   Wt 113.4 kg (250 lb)   BMI 42.91 kg/m    Weight:   Wt Readings from Last 3 Encounters:   11/08/21 113.4 kg (250 lb)   09/30/21 119.3 kg (263 lb)   09/28/21 119.3 kg (263 lb)         GEN: Pleasant,somewhat sedate voice today.  Medical Decision Making:  Interim study results: left hip replacement in Sept..      17 minutes spent on the date of the encounter doing chart review, history and exam, documentation and further activities per the note   Kerwin Dunn MD  [facility]  10:02 AM  11/8/2021    Phone call duration: 17 minutes    "

## 2021-11-08 NOTE — LETTER
"    11/8/2021         RE: Ronel Ryan  [address] [place] [address]        Dear Colleague,    Thank you for referring your patient, Ronel Ryan, to the [facility] [place]. Please see a copy of my visit note below.      The patient has been notified of following:     \"This telephone visit will be conducted via a call between you and your physician/provider. We have found that certain health care needs can be provided without the need for a physical exam.  This service lets us provide the care you need with a short phone conversation.  If a prescription is necessary we can send it directly to your pharmacy.  If lab work is needed we can place an order for that and you can then stop by our lab to have the test done at a later time.    Telephone visits are billed at different rates depending on your insurance coverage. During this emergency period, for some insurers they may be billed the same as an in-person visit.  Please reach out to your insurance provider with any questions.    If during the course of the call the physician/provider feels a telephone visit is not appropriate, you will not be charged for this service.\"    Patient has given verbal consent to a Telephone visit? Yes    What phone number would you like to be contacted at? [phone]    Patient would like to receive their AVS by Michelle    Additional provider notes:   Bariatric Clinic Follow-Up Visit:    Ronel Ryan is a 41 year old  female with Body mass index is 42.91 kg/m .  presenting here today for follow-up on non-surgical efforts for weight loss. Original Intake visit occurred on 7/31/19 with a weight of 345 lbs and BMI of 59.2.  Along with diet and behavior changes, she has been using Trulicity to help her diabetes/excess weight to assist her weight loss goals.  See her intake visit notes for details on identified contributors to weight gain in the past. Chart review " shows Dietician calculated RMR of 1892 kcal/day and protein intake goal of 72-95g/day.    Weight:   Wt Readings from Last 3 Encounters:   11/08/21 113.4 kg (250 lb)   09/30/21 119.3 kg (263 lb)   09/28/21 119.3 kg (263 lb)    pounds      Comorbidities:  Patient Active Problem List   Diagnosis     Binge eating     Eating disorder     Morbid obesity (H)     Multiple-type hyperlipidemia     Severe episode of recurrent major depressive disorder (H)     Arthralgia of hip     Hypertension     Trochanteric bursitis     History of urinary anomaly     Borderline personality disorder (H)     Attention deficit disorder     Asthma     Suicidal ideation     ACP (advance care planning)     Adjustment disorder with depressed mood     Anxiety     Moderate mixed bipolar I disorder (H)     Bipolar II disorder, most recent episode hypomanic (H)     Borderline high cholesterol     Care plan discussed with patient     Disorder of pelvis     Gait difficulty     Hx of diabetes mellitus     Hx of eating disorder     Morbid obesity with BMI of 50.0-59.9, adult (H)     Osteoarthritis     Pain of both hip joints     Relationship problems     Secondary amenorrhea     Soft tissue lesion of shoulder region     Vertigo     Greater trochanteric bursitis of both hips     Hip dysplasia     Left hip pain     Osteoarthritis of hip     Primary osteoarthritis of left hip     ADD (attention deficit disorder)     H/O urinary frequency     History of anxiety disorder     Hyperlipidemia     Mixed hyperlipidemia     Recurrent major depressive disorder (H)     Type 2 diabetes mellitus (H)     Primary osteoarthritis of both hips     Degenerative joint disease (DJD) of hip     Status post total hip replacement, left     Bipolar 1 disorder (H)     Constipation     Gastroesophageal reflux disease with esophagitis without hemorrhage     Mixed incontinence     Primary localized osteoarthritis of pelvic region and thigh     Skin sensation disturbance       Current  Outpatient Medications:      acetaminophen (TYLENOL) 325 MG tablet, Take 3 tablets (975 mg) by mouth 3 times daily, Disp: , Rfl: 0     albuterol (PROAIR HFA/PROVENTIL HFA/VENTOLIN HFA) 108 (90 BASE) MCG/ACT Inhaler, Inhale 1-2 puffs into the lungs every 4 hours as needed , Disp: , Rfl:      aspirin (ASA) 325 MG EC tablet, Take 1 tablet (325 mg) by mouth daily, Disp: 42 tablet, Rfl: 0     atorvastatin (LIPITOR) 40 MG tablet, Take 1 tablet by mouth every evening , Disp: , Rfl:      baclofen (LIORESAL) 10 MG tablet, TAKE 1 TABLET BY MOUTH TWICE A DAY AS NEEDED FOR MUSCLE SPASM, Disp: , Rfl:      blood glucose (NO BRAND SPECIFIED) lancets standard, Use to test blood sugar 2 times daily or as directed., Disp: 100 each, Rfl: 11     blood glucose monitoring (NO BRAND SPECIFIED) meter device kit, Use to test blood sugar 2 times daily or as directed., Disp: 1 kit, Rfl: 0     blood glucose monitoring (NO BRAND SPECIFIED) test strip, Use to test blood sugars 2 times daily or as directed, Disp: 100 strip, Rfl: 3     buPROPion (WELLBUTRIN XL) 300 MG 24 hr tablet, Take 300 mg by mouth daily , Disp: , Rfl:      Cholecalciferol 125 MCG (5000 UT) TABS, Take 125 mcg by mouth daily , Disp: , Rfl:      docusate sodium (COLACE) 100 MG capsule, Take 100 mg by mouth 3 times daily as needed for constipation, Disp: , Rfl:      EPINEPHrine (EPIPEN/ADRENACLICK/OR ANY BX GENERIC EQUIV) 0.3 MG/0.3ML injection 2-pack, Inject 0.3 mLs (0.3 mg) into the muscle once as needed for anaphylaxis, Disp: 0.6 mL, Rfl: 0     etodolac (LODINE XL) 400 MG 24 hr tablet, Take 400 mg by mouth daily, Disp: , Rfl:      famotidine (PEPCID) 40 MG tablet, TAKE 1 TABLET BY MOUTH EVERY DAY AT BEDTIME FOR 30 DAYS, Disp: , Rfl:      hydrOXYzine (ATARAX) 25 MG tablet, Take 1 tablet (25 mg) by mouth every 6 hours as needed for itching or anxiety (with pain, moderate pain), Disp: 30 tablet, Rfl: 0     ibuprofen (ADVIL/MOTRIN) 600 MG tablet, Take 1 tablet (600 mg) by mouth  every 6 hours as needed for pain (mild), Disp: , Rfl: 0     LORazepam (ATIVAN) 0.5 MG tablet, Take 1 tablet (0.5 mg) by mouth daily as needed for anxiety, Disp: 15 tablet, Rfl: 0     meclizine (ANTIVERT) 25 MG tablet, Take 25 mg by mouth 4 times daily as needed for dizziness, Disp: , Rfl:      meloxicam (MOBIC) 7.5 MG tablet, Take 1 tablet by mouth daily, Disp: , Rfl:      MYRBETRIQ 25 MG 24 hr tablet, Take 25 mg by mouth daily, Disp: , Rfl:      oxyCODONE (ROXICODONE) 5 MG tablet, Take 1-2 tablets (5-10 mg) by mouth every 6 hours as needed for pain (Moderate to Severe), Disp: 30 tablet, Rfl: 0     risperiDONE (RISPERDAL) 2 MG tablet, Take 2 mg by mouth At Bedtime , Disp: , Rfl:      senna-docusate (SENOKOT-S/PERICOLACE) 8.6-50 MG tablet, Take 1-2 tablets by mouth daily as needed for constipation Take while on oral narcotics to prevent or treat constipation., Disp: 30 tablet, Rfl: 0     topiramate (TOPAMAX) 100 MG tablet, Take 100 mg by mouth 2 times daily , Disp: , Rfl:      traZODone (DESYREL) 100 MG tablet, Take 100 mg by mouth At Bedtime , Disp: 30 tablet, Rfl: 1     TRULICITY 0.75 MG/0.5ML pen, Inject 0.75 mg Subcutaneous once a week (sundays), Disp: , Rfl:       Interim: Since our last visit, she has had left hip replaced, now right hip hurting bad and needs injection. Was down to 241 lbs before surgery, weight went up a bit 251 lbs/at  lbs there. After getting home down to 248 lbs, starting eating a lot this fall 250 lbs. Tolerating her low dose trulicity but increase appetite this past month and we'll ramp up therapy to help prevent re-emergence of her diabetes which has been very well controlled with meds/weight loss and see how the 1.5mg/week dose is handled.    Plan:   1.  Diet: continue mindful eating of 20-30g of lean protein pre meal every 5-6 hours to curb appetite. Prepare vegetables so easy to grab/mix with protein. Hydrate well with water and avoid sugary beverages as these can further  stimulate appetite.  2. Exercise: PT of hip as able.   3. Medication: increase Trulicity to 1.5mg/week You can use use your last 2 pens but taking two shots of the 0.75mg pens each week and then fill the new prescription sent today for 1.5mg. We can look at ramping up to 3mg if necessary later this winter.  4. Follow up as planned for hip injection.  5. Goals: Great work with reducing weight 95 lbs over the last 2.5 years (intake weight 345 lbs). Continued mindful eating/protein rich/higher fiber diet will serve you well as your hips become more mobile again following replacement.      We discussed [facility] Bariatric Basics including:  -eating 3 meals daily  -reviewed metabolic needs for weight loss based on Resting Metabolic Rate  -protein goals supportive of healthy weight loss  -avoiding/limiting calorie containing beverages  -We discussed the importance of restorative sleep and stress management in maintaining a healthy weight.  -We discussed the National Weight Control Registry healthy weight maintenance strategies and ways to optimize metabolism.  -We discussed the importance of physical activity including cardiovascular and strength training in maintaining a healthier weight and explored viable options.    Most recent labs:  Lab Results   Component Value Date    WBC 5.2 05/21/2021    HGB 9.0 (L) 09/11/2021    HCT 41.8 05/21/2021    MCV 94 05/21/2021     05/21/2021     Lab Results   Component Value Date    CHOL 162 05/21/2021     Lab Results   Component Value Date    HDL 34 (L) 05/21/2021     No components found for: LDLCALC  Lab Results   Component Value Date    TRIG 115 05/21/2021     No components found for: CHOLHDL  Lab Results   Component Value Date    ALT 21 09/07/2021    AST 16 09/07/2021    ALKPHOS 80 09/07/2021     No results found for: HGBA1C  No components found for: [id number]  No components found for: [id number]  No components found for: FERRITIN  No components found for: PTH  No  "components found for: [id number]  No components found for: 7597  Lab Results   Component Value Date    TSH 1.14 05/21/2021     No results found for: TESTOSTERONE    DIETARY HISTORY    Positive Changes Since Last Visit: mindfulness increased, post op weight gain improving but now very hungry.  Struggling With: appetite control        PHYSICAL ACTIVITY PATTERNS:  Cardiovascular: some PT but now her bad right hip limits her  Strength Training: some PT    REVIEW OF SYSTEMS  Hungry and right hip pain..  PHYSICAL EXAM:  Vitals: Ht 1.626 m (5' 4\")   Wt 113.4 kg (250 lb)   BMI 42.91 kg/m    Weight:   Wt Readings from Last 3 Encounters:   11/08/21 113.4 kg (250 lb)   09/30/21 119.3 kg (263 lb)   09/28/21 119.3 kg (263 lb)         GEN: Pleasant,somewhat sedate voice today.  Medical Decision Making:  Interim study results: left hip replacement in Sept..      17 minutes spent on the date of the encounter doing chart review, history and exam, documentation and further activities per the note   Kerwin Dunn MD  [facility]  10:02 AM  11/8/2021    Phone call duration: 17 minutes        Again, thank you for allowing me to participate in the care of your patient.        Sincerely,        Kerwin Dunn MD    "

## 2022-01-12 VITALS — HEIGHT: 66 IN | BODY MASS INDEX: 41.17 KG/M2 | WEIGHT: 256.2 LBS

## 2022-01-18 VITALS — BODY MASS INDEX: 50.02 KG/M2 | HEIGHT: 64 IN | WEIGHT: 293 LBS

## 2022-01-18 VITALS — HEIGHT: 64 IN | BODY MASS INDEX: 48.65 KG/M2 | WEIGHT: 285 LBS

## 2022-01-18 VITALS — HEIGHT: 64 IN | WEIGHT: 273 LBS | BODY MASS INDEX: 46.61 KG/M2

## 2022-01-18 VITALS — HEIGHT: 64 IN | BODY MASS INDEX: 50.02 KG/M2 | WEIGHT: 293 LBS | BODY MASS INDEX: 53.04 KG/M2 | WEIGHT: 293 LBS

## 2022-01-18 VITALS — HEIGHT: 64 IN | BODY MASS INDEX: 50.02 KG/M2 | WEIGHT: 293 LBS

## 2022-01-18 VITALS — HEIGHT: 64 IN | WEIGHT: 265 LBS | BODY MASS INDEX: 45.24 KG/M2

## 2022-01-18 VITALS — HEIGHT: 64 IN | WEIGHT: 271 LBS | BODY MASS INDEX: 46.26 KG/M2

## 2022-02-22 DIAGNOSIS — Z11.59 ENCOUNTER FOR SCREENING FOR OTHER VIRAL DISEASES: Primary | ICD-10-CM

## 2022-02-28 DIAGNOSIS — E11.9 TYPE 2 DIABETES MELLITUS WITHOUT COMPLICATION, WITHOUT LONG-TERM CURRENT USE OF INSULIN (H): Primary | ICD-10-CM

## 2022-02-28 NOTE — TELEPHONE ENCOUNTER
Patient has been out of the medication for two weeks and we didn't get a notification that she needed a refill.  Will order the 1.5 mg dose of Trulicity since that is what she was taking and was tolerating it with good appetite control. Dr. Dunn had talked about maybe increasing her to 3 mg at some point so I will ask if he is still thinking of doing that but will reoder a months worth for now of the 1.5 mg.  Kassandra Castellon RN

## 2022-03-12 ENCOUNTER — HEALTH MAINTENANCE LETTER (OUTPATIENT)
Age: 42
End: 2022-03-12

## 2022-03-14 ENCOUNTER — LAB REQUISITION (OUTPATIENT)
Dept: LAB | Facility: CLINIC | Age: 42
End: 2022-03-14
Payer: COMMERCIAL

## 2022-03-14 DIAGNOSIS — I10 ESSENTIAL (PRIMARY) HYPERTENSION: ICD-10-CM

## 2022-03-14 LAB
ALBUMIN SERPL-MCNC: 3.9 G/DL (ref 3.5–5)
ALP SERPL-CCNC: 68 U/L (ref 45–120)
ALT SERPL W P-5'-P-CCNC: 44 U/L (ref 0–45)
ANION GAP SERPL CALCULATED.3IONS-SCNC: 8 MMOL/L (ref 5–18)
AST SERPL W P-5'-P-CCNC: 28 U/L (ref 0–40)
BILIRUB SERPL-MCNC: 0.5 MG/DL (ref 0–1)
BUN SERPL-MCNC: 13 MG/DL (ref 8–22)
CALCIUM SERPL-MCNC: 9.4 MG/DL (ref 8.5–10.5)
CHLORIDE BLD-SCNC: 110 MMOL/L (ref 98–107)
CO2 SERPL-SCNC: 22 MMOL/L (ref 22–31)
CREAT SERPL-MCNC: 1.05 MG/DL (ref 0.6–1.1)
GFR SERPL CREATININE-BSD FRML MDRD: 68 ML/MIN/1.73M2
GLUCOSE BLD-MCNC: 86 MG/DL (ref 70–125)
POTASSIUM BLD-SCNC: 4.2 MMOL/L (ref 3.5–5)
PROT SERPL-MCNC: 6.7 G/DL (ref 6–8)
SODIUM SERPL-SCNC: 140 MMOL/L (ref 136–145)

## 2022-03-14 PROCEDURE — 80053 COMPREHEN METABOLIC PANEL: CPT | Mod: ORL | Performed by: STUDENT IN AN ORGANIZED HEALTH CARE EDUCATION/TRAINING PROGRAM

## 2022-03-23 RX ORDER — TRAZODONE HYDROCHLORIDE 150 MG/1
150 TABLET ORAL AT BEDTIME
COMMUNITY

## 2022-03-23 RX ORDER — TOLTERODINE 4 MG/1
4 CAPSULE, EXTENDED RELEASE ORAL DAILY
COMMUNITY
End: 2024-05-20

## 2022-03-23 NOTE — PROVIDER NOTIFICATION
Planning to either discharge to TCU (as she did with her last total hip replacement in 2021) or discharge to home and resume her home care with PT, RN, and 24 hour PCA care from [facility].       03/23/22 0780   Discharge Planning   Patient/Family Anticipates Transition to inpatient rehabilitation facility;home with help/services  (Currently has RN, PT, and 24 hour PCA care from [facility])   Concerns to be Addressed all concerns addressed in this encounter   Living Arrangements   People in Home alone   Type of Residence Private Residence   Is your private residence a single family home or apartment? Apartment   Number of Stairs, Within Home, Primary none  (elevator)   Once home, are you able to live on one level? Yes   Which level? Main Level   Bathroom Shower/Tub Walk-in shower   Equipment Currently Used at Home grab bar, toilet;grab bar, tub/shower;walker, standard;walker, rolling;raised toilet seat;shower chair;cane, straight  (Has a lift chair at home and a wheelchair)   Support System   Support Systems Home Care Staff  (24 hour PCA care from [facility])   Do you have someone available to stay with you one or two nights once you are home? Yes   Education   Patient attended total joint pre-op class/received pre-op teaching  email/phone call

## 2022-04-01 ENCOUNTER — LAB (OUTPATIENT)
Dept: FAMILY MEDICINE | Facility: CLINIC | Age: 42
End: 2022-04-01
Attending: ORTHOPAEDIC SURGERY
Payer: COMMERCIAL

## 2022-04-01 DIAGNOSIS — Z11.59 ENCOUNTER FOR SCREENING FOR OTHER VIRAL DISEASES: ICD-10-CM

## 2022-04-01 PROCEDURE — U0003 INFECTIOUS AGENT DETECTION BY NUCLEIC ACID (DNA OR RNA); SEVERE ACUTE RESPIRATORY SYNDROME CORONAVIRUS 2 (SARS-COV-2) (CORONAVIRUS DISEASE [COVID-19]), AMPLIFIED PROBE TECHNIQUE, MAKING USE OF HIGH THROUGHPUT TECHNOLOGIES AS DESCRIBED BY CMS-2020-01-R: HCPCS

## 2022-04-01 PROCEDURE — U0005 INFEC AGEN DETEC AMPLI PROBE: HCPCS

## 2022-04-01 NOTE — TREATMENT PLAN
Orthopedic Surgery Pre-Op Plan: Ronel Ryan  pre-op review. This is NOT an H&P   Surgeon: Dr. Comfort    Hospital: [facility]  Name of Surgery: Right Total Hip Arthroplasty   Date of Surgery: 4/4/22  H&P: Completed on 3/14/22 by Dr. Arvind Lane at [facility].   History of ASA, NSAIDS, vitamin and/or herbal supplements within 10 days: No  History of blood thinners: No    Plan:   1) Discharge Plan: TCU versus home with Home Care (PT, RN and 24 hr PCA from [facility]) depending on how she does following this surgery. Went to TCU following Left Total Hip surgery in Sept 2021 but had complication of dislocation of hip post-op, requiring closed reduction. Please see Discharge Planning section near bottom of this note for further details.     2) History of Allergic Reaction to: Cephalexin (Keflex)- anaphylaxis, hives, Erythromycin- anaphylaxis and hives, and Penicillin- hives. Has Epi pen. Dr. Damon's team notified as this could impact the type of IV antibiotic used for missael-op infection prevention. Had IV Vancomycin for surgical infection prophylaxis with her left total hip arthroplasty here in Sept. 2021.     3) Congenital Hip Dysplasia: Bilateral hips. S/P L-VALENTIN 9/2021 and now undergoing right total hip arthroplasty.     4) Hyperlipidemia: On atorvastatin.     5) Hypertension: Appears well controlled without medications at this time.    6) Type 2 Diabetes Mellitus: Most recent hemoglobin A1c 4.6 on 9/10/21. Shows excellent recent BG control with weight loss and Trulicity. Will check updated A1C on day of surgery since this was > 6 months ago.  I recommend blood glucose checks at least three times a day and at bedtime during hospital stay. Goal BG < 180 to decrease risk for infection and wound healing complications. Nursing to please notify Hospitalist if BG > 180.       7) Morbid Obesity: BMI 43, Wt: 251 lbs. Patient reports losing 100 lbs working with bariatrician and dietician at [facility] in [place]. Recommend she continues the excellent work on safe weight loss with help of bariatric medicine.     8) Bipolar 1 Disorder, Depression, Personality Disorder:  On Risperdal and bupropion.      9) Urinary Incontinence: On Ditropan.    Patient appears medically optimized for upcoming surgery. I would recommend Hospitalist Consult to assist with medical management. Please call me below with any questions on this patient.       Review of Systems Notable for: History of anaphylactic reaction to cephalexin-has EpiPen, congenital hip dysplasia, hyperlipidemia, hypertension, type 2 diabetes mellitus-excellent control, morbid obesity, bipolar 1 disorder, depression, personality disorder, urinary incontinence.    Past Medical History:   Past Medical History:   Diagnosis Date     Allergic state      Anxiety      Arthritis      Asthma      Bipolar 2 disorder (H)     mixed, one manic episode when combined wellbutrin and celexa. Now off Celexa.     Borderline personality disorder (H)      Cholelithiasis     2007 lap gil     Depressive disorder      Diabetes (H)      Diabetes mellitus (H)     Dx in 2013, lost weight and in 2015 MD thought she might have been misdiagnosed.     GERD (gastroesophageal reflux disease)      H/O constipation      H/O hypercholesterolemia     on statin therapy     Hip dysplasia      Hypertension      Major depression      Menstrual disorder     heavy and irregular bleeding, improved with IUD.     Obese      PTSD (post-traumatic stress disorder)      Uncomplicated asthma      Urinary incontinence     on ditropan (showers/pools and stress incontinence).     vertigo      Past Surgical History:   Procedure Laterality Date     ANKLE FRACTURE SURGERY Right      ARTHROPLASTY HIP Left 9/9/2021    Procedure: LEFT TOTAL HIP ARTHROPLASTY;  Surgeon: Zenon Damon MD;  Location: [facility] Main OR     ARTHROSCOPIC RECONSTRUCTION ANTERIOR CRUCIATE LIGAMENT Left      CHOLECYSTECTOMY        CLOSED REDUCTION HIP Left 9/9/2021    Procedure: CLOSED REDUCTION, LEFT HIP;  Surgeon: Zenon Damon MD;  Location: [facility] Main OR     COLONOSCOPY      normal colon at age 40, June 2021 study.     CYST REMOVAL      Back of neck     MAMMO STEREOTACTIC CORE BIOPSY LEFT Left 06/15/2020     ORTHOPEDIC SURGERY      ankle and acl     TONSILLECTOMY       ZZHC COLONOSCOPY W/WO BRUSH/WASH N/A 06/08/2021    Procedure: COLONOSCOPY;  Surgeon: Yoel Siegel MD;  Location: [facility];  Service: Gastroenterology       Current Medications:  Patient's Medications   New Prescriptions    No medications on file   Previous Medications    ALBUTEROL (PROAIR HFA/PROVENTIL HFA/VENTOLIN HFA) 108 (90 BASE) MCG/ACT INHALER    Inhale 1-2 puffs into the lungs every 4 hours as needed     ATORVASTATIN (LIPITOR) 40 MG TABLET    Take 1 tablet by mouth every evening     BACLOFEN (LIORESAL) 10 MG TABLET    TAKE 1 TABLET BY MOUTH TWICE A DAY AS NEEDED FOR MUSCLE SPASM    BLOOD GLUCOSE (NO BRAND SPECIFIED) LANCETS STANDARD    Use to test blood sugar 2 times daily or as directed.    BLOOD GLUCOSE MONITORING (NO BRAND SPECIFIED) METER DEVICE KIT    Use to test blood sugar 2 times daily or as directed.    BLOOD GLUCOSE MONITORING (NO BRAND SPECIFIED) TEST STRIP    Use to test blood sugars 2 times daily or as directed    BUPROPION (WELLBUTRIN XL) 300 MG 24 HR TABLET    Take 300 mg by mouth daily     CHOLECALCIFEROL 125 MCG (5000 UT) TABS    Take 125 mcg by mouth daily     DOCUSATE SODIUM (COLACE) 100 MG CAPSULE    Take 100 mg by mouth 3 times daily as needed for constipation    DULAGLUTIDE (TRULICITY) 1.5 MG/0.5ML PEN    Inject 1.5 mg Subcutaneous every 7 days    EPINEPHRINE (EPIPEN/ADRENACLICK/OR ANY BX GENERIC EQUIV) 0.3 MG/0.3ML INJECTION 2-PACK    Inject 0.3 mLs (0.3 mg) into the muscle once as needed for anaphylaxis    HYDROXYZINE (ATARAX) 25 MG TABLET    Take 1 tablet (25 mg) by mouth every 6 hours as needed for itching or anxiety  (with pain, moderate pain)    LEVONORGESTREL (LILETTA, 52 MG,) 19.5 MCG/DAY IUD    1 each by Intrauterine route once    MECLIZINE (ANTIVERT) 25 MG TABLET    Take 25 mg by mouth 4 times daily as needed for dizziness    RISPERIDONE (RISPERDAL) 2 MG TABLET    Take 2 mg by mouth At Bedtime     TOLTERODINE ER (DETROL LA) 4 MG 24 HR CAPSULE    Take 4 mg by mouth daily    TOPIRAMATE (TOPAMAX) 100 MG TABLET    Take 100 mg by mouth 2 times daily     TRAZODONE (DESYREL) 150 MG TABLET    Take 150 mg by mouth At Bedtime   Modified Medications    No medications on file   Discontinued Medications    ACETAMINOPHEN (TYLENOL) 325 MG TABLET    Take 3 tablets (975 mg) by mouth 3 times daily    ASPIRIN (ASA) 325 MG EC TABLET    Take 1 tablet (325 mg) by mouth daily    ETODOLAC (LODINE XL) 400 MG 24 HR TABLET    Take 400 mg by mouth daily    FAMOTIDINE (PEPCID) 40 MG TABLET    TAKE 1 TABLET BY MOUTH EVERY DAY AT BEDTIME FOR 30 DAYS    IBUPROFEN (ADVIL/MOTRIN) 600 MG TABLET    Take 1 tablet (600 mg) by mouth every 6 hours as needed for pain (mild)    LORAZEPAM (ATIVAN) 0.5 MG TABLET    Take 1 tablet (0.5 mg) by mouth daily as needed for anxiety    MELOXICAM (MOBIC) 7.5 MG TABLET    Take 1 tablet by mouth daily    MYRBETRIQ 25 MG 24 HR TABLET    Take 25 mg by mouth daily    OXYCODONE (ROXICODONE) 5 MG TABLET    Take 1-2 tablets (5-10 mg) by mouth every 6 hours as needed for pain (Moderate to Severe)    SENNA-DOCUSATE (SENOKOT-S/PERICOLACE) 8.6-50 MG TABLET    Take 1-2 tablets by mouth daily as needed for constipation Take while on oral narcotics to prevent or treat constipation.    TRAZODONE (DESYREL) 100 MG TABLET    Take 100 mg by mouth At Bedtime        ALLERGIES:  Allergies   Allergen Reactions     Cephalexin Anaphylaxis     Erythromycin Anaphylaxis and Hives     Other reaction(s): Unknown     Penicillins Hives     hives  Other reaction(s): Unknown       Social History  Social History     Tobacco Use     Smoking status: Never Smoker      Smokeless tobacco: Never Used   Substance Use Topics     Alcohol use: No     Drug use: No       Any Abnormal Recent Diagnostics? No  Most recent hemoglobin A1c 4.6 on 9/10/21. Shows excellent recent BG control with weight loss and Trulicity. Will check updated A1C on day of surgery since this was > 6 months ago.     Discharge Planning:   Planning to either discharge to TCU (as she did with her last total hip replacement in 2021) or discharge to home and resume her home care with PT, RN, and 24 hour PCA care from [facility].        03/23/22 [id number]   Discharge Planning   Patient/Family Anticipates Transition to inpatient rehabilitation facility;home with help/services  (Currently has RN, PT, and 24 hour PCA care from [facility])   Concerns to be Addressed all concerns addressed in this encounter   Living Arrangements   People in Home alone   Type of Residence Private Residence   Is your private residence a single family home or apartment? Apartment   Number of Stairs, Within Home, Primary none  (elevator)   Once home, are you able to live on one level? Yes   Which level? Main Level   Bathroom Shower/Tub Walk-in shower   Equipment Currently Used at Home grab bar, toilet;grab bar, tub/shower;walker, standard;walker, rolling;raised toilet seat;shower chair;cane, straight  (Has a lift chair at home and a wheelchair)   Support System   Support Systems Home Care Staff  (24 hour PCA care from [facility])   Do you have someone available to stay with you one or two nights once you are home? Yes   Education   Patient attended total joint pre-op class/received pre-op teaching  email/phone call       FEMI Pompa, CNP   Advanced Practice Nurse Navigator- Orthopedics  [facility]   Phone: [phone]

## 2022-04-02 LAB — SARS-COV-2 RNA RESP QL NAA+PROBE: NEGATIVE

## 2022-04-04 ENCOUNTER — APPOINTMENT (OUTPATIENT)
Dept: RADIOLOGY | Facility: CLINIC | Age: 42
End: 2022-04-04
Attending: ORTHOPAEDIC SURGERY
Payer: COMMERCIAL

## 2022-04-04 ENCOUNTER — HOSPITAL ENCOUNTER (OUTPATIENT)
Facility: CLINIC | Age: 42
Discharge: NURSING, REHAB OR LONG-TERM CARE | End: 2022-04-07
Attending: ORTHOPAEDIC SURGERY | Admitting: ORTHOPAEDIC SURGERY
Payer: COMMERCIAL

## 2022-04-04 ENCOUNTER — ANESTHESIA EVENT (OUTPATIENT)
Dept: SURGERY | Facility: CLINIC | Age: 42
End: 2022-04-04
Payer: COMMERCIAL

## 2022-04-04 ENCOUNTER — ANESTHESIA (OUTPATIENT)
Dept: SURGERY | Facility: CLINIC | Age: 42
End: 2022-04-04
Payer: COMMERCIAL

## 2022-04-04 DIAGNOSIS — Z86.59 HISTORY OF ANXIETY DISORDER: ICD-10-CM

## 2022-04-04 DIAGNOSIS — Z96.641 S/P TOTAL RIGHT HIP ARTHROPLASTY: Primary | ICD-10-CM

## 2022-04-04 DIAGNOSIS — Z96.641 STATUS POST TOTAL HIP REPLACEMENT, RIGHT: ICD-10-CM

## 2022-04-04 PROBLEM — M16.9 DEGENERATIVE JOINT DISEASE (DJD) OF HIP: Status: ACTIVE | Noted: 2021-09-09

## 2022-04-04 LAB
ABO/RH(D): NORMAL
ANION GAP SERPL CALCULATED.3IONS-SCNC: 10 MMOL/L (ref 5–18)
ANTIBODY SCREEN: NEGATIVE
BUN SERPL-MCNC: 14 MG/DL (ref 8–22)
CALCIUM SERPL-MCNC: 8.5 MG/DL (ref 8.5–10.5)
CHLORIDE BLD-SCNC: 110 MMOL/L (ref 98–107)
CO2 SERPL-SCNC: 17 MMOL/L (ref 22–31)
CREAT SERPL-MCNC: 0.91 MG/DL (ref 0.6–1.1)
ERYTHROCYTE [DISTWIDTH] IN BLOOD BY AUTOMATED COUNT: 12.7 % (ref 10–15)
GFR SERPL CREATININE-BSD FRML MDRD: 80 ML/MIN/1.73M2
GLUCOSE BLD-MCNC: 183 MG/DL (ref 70–125)
GLUCOSE BLDC GLUCOMTR-MCNC: 90 MG/DL (ref 70–99)
GLUCOSE BLDC GLUCOMTR-MCNC: 93 MG/DL (ref 70–99)
HBA1C MFR BLD: 4.7 %
HCT VFR BLD AUTO: 40.2 % (ref 35–47)
HGB BLD-MCNC: 13.3 G/DL (ref 11.7–15.7)
INR PPP: 1.12 (ref 0.85–1.15)
MAGNESIUM SERPL-MCNC: 2.4 MG/DL (ref 1.8–2.6)
MCH RBC QN AUTO: 29.8 PG (ref 26.5–33)
MCHC RBC AUTO-ENTMCNC: 33.1 G/DL (ref 31.5–36.5)
MCV RBC AUTO: 90 FL (ref 78–100)
PLATELET # BLD AUTO: 269 10E3/UL (ref 150–450)
POTASSIUM BLD-SCNC: 4.3 MMOL/L (ref 3.5–5)
RBC # BLD AUTO: 4.46 10E6/UL (ref 3.8–5.2)
SODIUM SERPL-SCNC: 137 MMOL/L (ref 136–145)
SPECIMEN EXPIRATION DATE: NORMAL
WBC # BLD AUTO: 5.1 10E3/UL (ref 4–11)

## 2022-04-04 PROCEDURE — 999N000285 HC STATISTIC VASC ACCESS LAB DRAW WITH PIV START

## 2022-04-04 PROCEDURE — 258N000003 HC RX IP 258 OP 636: Performed by: PHYSICIAN ASSISTANT

## 2022-04-04 PROCEDURE — 710N000010 HC RECOVERY PHASE 1, LEVEL 2, PER MIN: Performed by: ORTHOPAEDIC SURGERY

## 2022-04-04 PROCEDURE — 36415 COLL VENOUS BLD VENIPUNCTURE: CPT | Performed by: ANESTHESIOLOGY

## 2022-04-04 PROCEDURE — 272N000001 HC OR GENERAL SUPPLY STERILE: Performed by: ORTHOPAEDIC SURGERY

## 2022-04-04 PROCEDURE — 250N000013 HC RX MED GY IP 250 OP 250 PS 637: Performed by: ORTHOPAEDIC SURGERY

## 2022-04-04 PROCEDURE — 258N000001 HC RX 258: Performed by: ORTHOPAEDIC SURGERY

## 2022-04-04 PROCEDURE — 250N000011 HC RX IP 250 OP 636: Performed by: NURSE ANESTHETIST, CERTIFIED REGISTERED

## 2022-04-04 PROCEDURE — 86901 BLOOD TYPING SEROLOGIC RH(D): CPT | Performed by: ANESTHESIOLOGY

## 2022-04-04 PROCEDURE — 99214 OFFICE O/P EST MOD 30 MIN: CPT | Performed by: FAMILY MEDICINE

## 2022-04-04 PROCEDURE — 258N000003 HC RX IP 258 OP 636: Performed by: ANESTHESIOLOGY

## 2022-04-04 PROCEDURE — 258N000003 HC RX IP 258 OP 636: Performed by: ORTHOPAEDIC SURGERY

## 2022-04-04 PROCEDURE — 250N000009 HC RX 250: Performed by: NURSE ANESTHETIST, CERTIFIED REGISTERED

## 2022-04-04 PROCEDURE — 85027 COMPLETE CBC AUTOMATED: CPT | Performed by: PHYSICIAN ASSISTANT

## 2022-04-04 PROCEDURE — 83735 ASSAY OF MAGNESIUM: CPT | Performed by: FAMILY MEDICINE

## 2022-04-04 PROCEDURE — 999N000141 HC STATISTIC PRE-PROCEDURE NURSING ASSESSMENT: Performed by: ORTHOPAEDIC SURGERY

## 2022-04-04 PROCEDURE — 250N000013 HC RX MED GY IP 250 OP 250 PS 637: Performed by: FAMILY MEDICINE

## 2022-04-04 PROCEDURE — 250N000011 HC RX IP 250 OP 636: Performed by: ORTHOPAEDIC SURGERY

## 2022-04-04 PROCEDURE — 250N000013 HC RX MED GY IP 250 OP 250 PS 637: Performed by: ANESTHESIOLOGY

## 2022-04-04 PROCEDURE — C1776 JOINT DEVICE (IMPLANTABLE): HCPCS | Performed by: ORTHOPAEDIC SURGERY

## 2022-04-04 PROCEDURE — 82962 GLUCOSE BLOOD TEST: CPT

## 2022-04-04 PROCEDURE — 36415 COLL VENOUS BLD VENIPUNCTURE: CPT | Performed by: FAMILY MEDICINE

## 2022-04-04 PROCEDURE — C1713 ANCHOR/SCREW BN/BN,TIS/BN: HCPCS | Performed by: ORTHOPAEDIC SURGERY

## 2022-04-04 PROCEDURE — 250N000011 HC RX IP 250 OP 636: Performed by: PHYSICIAN ASSISTANT

## 2022-04-04 PROCEDURE — 370N000017 HC ANESTHESIA TECHNICAL FEE, PER MIN: Performed by: ORTHOPAEDIC SURGERY

## 2022-04-04 PROCEDURE — 85610 PROTHROMBIN TIME: CPT | Performed by: PHYSICIAN ASSISTANT

## 2022-04-04 PROCEDURE — 999N000063 XR HIP PORT RT 1 VW

## 2022-04-04 PROCEDURE — 36415 COLL VENOUS BLD VENIPUNCTURE: CPT | Performed by: PHYSICIAN ASSISTANT

## 2022-04-04 PROCEDURE — 250N000011 HC RX IP 250 OP 636: Performed by: ANESTHESIOLOGY

## 2022-04-04 PROCEDURE — 250N000009 HC RX 250: Performed by: PHYSICIAN ASSISTANT

## 2022-04-04 PROCEDURE — P9041 ALBUMIN (HUMAN),5%, 50ML: HCPCS | Performed by: NURSE ANESTHETIST, CERTIFIED REGISTERED

## 2022-04-04 PROCEDURE — 250N000009 HC RX 250: Performed by: ANESTHESIOLOGY

## 2022-04-04 PROCEDURE — 82310 ASSAY OF CALCIUM: CPT | Performed by: FAMILY MEDICINE

## 2022-04-04 PROCEDURE — 83036 HEMOGLOBIN GLYCOSYLATED A1C: CPT | Performed by: NURSE PRACTITIONER

## 2022-04-04 PROCEDURE — 999N000065 XR PELVIS AD HIP PORTABLE RIGHT 1 VIEW

## 2022-04-04 PROCEDURE — 999N000127 HC STATISTIC PERIPHERAL IV START W US GUIDANCE

## 2022-04-04 PROCEDURE — 360N000084 HC SURGERY LEVEL 4 W/ FLUORO, PER MIN: Performed by: ORTHOPAEDIC SURGERY

## 2022-04-04 PROCEDURE — 258N000003 HC RX IP 258 OP 636: Performed by: NURSE ANESTHETIST, CERTIFIED REGISTERED

## 2022-04-04 PROCEDURE — 250N000013 HC RX MED GY IP 250 OP 250 PS 637: Performed by: PHYSICIAN ASSISTANT

## 2022-04-04 DEVICE — CERAMIC V40 FEMORAL HEAD
Type: IMPLANTABLE DEVICE | Site: HIP | Status: FUNCTIONAL
Brand: BIOLOX

## 2022-04-04 DEVICE — 127 DEGREE NECK ANGLE HIP STEM
Type: IMPLANTABLE DEVICE | Site: HIP | Status: FUNCTIONAL
Brand: ACCOLADE

## 2022-04-04 DEVICE — TRIDENT X3 10 DEGREE POLYETHYLENE INSERT
Type: IMPLANTABLE DEVICE | Site: HIP | Status: FUNCTIONAL
Brand: TRIDENT X3 INSERT

## 2022-04-04 DEVICE — 6.5MM LOW PROFILE HEX SCREW 25MM
Type: IMPLANTABLE DEVICE | Site: HIP | Status: FUNCTIONAL
Brand: TRIDENT II

## 2022-04-04 DEVICE — TRIDENT II PSL CLUSTERHOLE HA ACETABULAR SHELL 48D
Type: IMPLANTABLE DEVICE | Site: HIP | Status: FUNCTIONAL
Brand: TRIDENT II

## 2022-04-04 RX ORDER — LIDOCAINE 40 MG/G
CREAM TOPICAL
Status: DISCONTINUED | OUTPATIENT
Start: 2022-04-04 | End: 2022-04-07 | Stop reason: HOSPADM

## 2022-04-04 RX ORDER — RISPERIDONE 1 MG/1
2 TABLET ORAL AT BEDTIME
Status: DISCONTINUED | OUTPATIENT
Start: 2022-04-04 | End: 2022-04-07 | Stop reason: HOSPADM

## 2022-04-04 RX ORDER — PROPOFOL 10 MG/ML
INJECTION, EMULSION INTRAVENOUS CONTINUOUS PRN
Status: DISCONTINUED | OUTPATIENT
Start: 2022-04-04 | End: 2022-04-04

## 2022-04-04 RX ORDER — BUPIVACAINE HYDROCHLORIDE 5 MG/ML
INJECTION, SOLUTION EPIDURAL; INTRACAUDAL
Status: COMPLETED | OUTPATIENT
Start: 2022-04-04 | End: 2022-04-04

## 2022-04-04 RX ORDER — ALBUMIN, HUMAN INJ 5% 5 %
SOLUTION INTRAVENOUS CONTINUOUS PRN
Status: DISCONTINUED | OUTPATIENT
Start: 2022-04-04 | End: 2022-04-04

## 2022-04-04 RX ORDER — TRAZODONE HYDROCHLORIDE 150 MG/1
150 TABLET ORAL AT BEDTIME
Status: DISCONTINUED | OUTPATIENT
Start: 2022-04-04 | End: 2022-04-07 | Stop reason: HOSPADM

## 2022-04-04 RX ORDER — AMOXICILLIN 250 MG
1 CAPSULE ORAL 2 TIMES DAILY
Status: DISCONTINUED | OUTPATIENT
Start: 2022-04-04 | End: 2022-04-07 | Stop reason: HOSPADM

## 2022-04-04 RX ORDER — ACETAMINOPHEN 325 MG/1
975 TABLET ORAL EVERY 8 HOURS
Status: COMPLETED | OUTPATIENT
Start: 2022-04-04 | End: 2022-04-07

## 2022-04-04 RX ORDER — DIPHENHYDRAMINE HCL 25 MG
25 CAPSULE ORAL EVERY 6 HOURS PRN
Status: DISCONTINUED | OUTPATIENT
Start: 2022-04-04 | End: 2022-04-07 | Stop reason: HOSPADM

## 2022-04-04 RX ORDER — DEXAMETHASONE SODIUM PHOSPHATE 10 MG/ML
INJECTION, SOLUTION INTRAMUSCULAR; INTRAVENOUS PRN
Status: DISCONTINUED | OUTPATIENT
Start: 2022-04-04 | End: 2022-04-04

## 2022-04-04 RX ORDER — ONDANSETRON 4 MG/1
4 TABLET, ORALLY DISINTEGRATING ORAL EVERY 30 MIN PRN
Status: DISCONTINUED | OUTPATIENT
Start: 2022-04-04 | End: 2022-04-04 | Stop reason: HOSPADM

## 2022-04-04 RX ORDER — HYDROXYZINE HYDROCHLORIDE 25 MG/1
25 TABLET, FILM COATED ORAL EVERY 6 HOURS PRN
Qty: 30 TABLET | Refills: 0 | Status: SHIPPED | OUTPATIENT
Start: 2022-04-04 | End: 2022-04-05

## 2022-04-04 RX ORDER — SODIUM CHLORIDE, SODIUM LACTATE, POTASSIUM CHLORIDE, CALCIUM CHLORIDE 600; 310; 30; 20 MG/100ML; MG/100ML; MG/100ML; MG/100ML
INJECTION, SOLUTION INTRAVENOUS CONTINUOUS
Status: DISCONTINUED | OUTPATIENT
Start: 2022-04-04 | End: 2022-04-04 | Stop reason: HOSPADM

## 2022-04-04 RX ORDER — KETOROLAC TROMETHAMINE 30 MG/ML
15 INJECTION, SOLUTION INTRAMUSCULAR; INTRAVENOUS
Status: DISCONTINUED | OUTPATIENT
Start: 2022-04-04 | End: 2022-04-04 | Stop reason: HOSPADM

## 2022-04-04 RX ORDER — MAGNESIUM SULFATE 4 G/50ML
4 INJECTION INTRAVENOUS ONCE
Status: COMPLETED | OUTPATIENT
Start: 2022-04-04 | End: 2022-04-04

## 2022-04-04 RX ORDER — FAMOTIDINE 20 MG/1
20 TABLET, FILM COATED ORAL 2 TIMES DAILY
Status: DISCONTINUED | OUTPATIENT
Start: 2022-04-04 | End: 2022-04-07 | Stop reason: HOSPADM

## 2022-04-04 RX ORDER — BACLOFEN 10 MG/1
10 TABLET ORAL DAILY PRN
Status: DISCONTINUED | OUTPATIENT
Start: 2022-04-04 | End: 2022-04-07 | Stop reason: HOSPADM

## 2022-04-04 RX ORDER — ACETAMINOPHEN 325 MG/1
650 TABLET ORAL EVERY 4 HOURS PRN
Status: DISCONTINUED | OUTPATIENT
Start: 2022-04-07 | End: 2022-04-07 | Stop reason: HOSPADM

## 2022-04-04 RX ORDER — ONDANSETRON 4 MG/1
4 TABLET, ORALLY DISINTEGRATING ORAL EVERY 6 HOURS PRN
Status: DISCONTINUED | OUTPATIENT
Start: 2022-04-04 | End: 2022-04-07 | Stop reason: HOSPADM

## 2022-04-04 RX ORDER — ONDANSETRON 2 MG/ML
4 INJECTION INTRAMUSCULAR; INTRAVENOUS EVERY 30 MIN PRN
Status: DISCONTINUED | OUTPATIENT
Start: 2022-04-04 | End: 2022-04-04 | Stop reason: HOSPADM

## 2022-04-04 RX ORDER — HALOPERIDOL 5 MG/ML
1 INJECTION INTRAMUSCULAR
Status: DISCONTINUED | OUTPATIENT
Start: 2022-04-04 | End: 2022-04-04 | Stop reason: HOSPADM

## 2022-04-04 RX ORDER — ACETAMINOPHEN 325 MG/1
1000 TABLET ORAL 3 TIMES DAILY
Refills: 0 | COMMUNITY
Start: 2022-04-04 | End: 2022-12-29

## 2022-04-04 RX ORDER — IBUPROFEN 600 MG/1
600 TABLET, FILM COATED ORAL EVERY 6 HOURS PRN
Refills: 0 | COMMUNITY
Start: 2022-04-04 | End: 2024-09-11

## 2022-04-04 RX ORDER — POLYETHYLENE GLYCOL 3350 17 G/17G
17 POWDER, FOR SOLUTION ORAL DAILY
Status: DISCONTINUED | OUTPATIENT
Start: 2022-04-05 | End: 2022-04-07 | Stop reason: HOSPADM

## 2022-04-04 RX ORDER — ACETAMINOPHEN 325 MG/1
975 TABLET ORAL ONCE
Status: DISCONTINUED | OUTPATIENT
Start: 2022-04-04 | End: 2022-04-04 | Stop reason: HOSPADM

## 2022-04-04 RX ORDER — OXYCODONE HYDROCHLORIDE 5 MG/1
10 TABLET ORAL EVERY 4 HOURS PRN
Status: DISCONTINUED | OUTPATIENT
Start: 2022-04-04 | End: 2022-04-07 | Stop reason: HOSPADM

## 2022-04-04 RX ORDER — BUPROPION HYDROCHLORIDE 150 MG/1
300 TABLET ORAL DAILY
Status: DISCONTINUED | OUTPATIENT
Start: 2022-04-04 | End: 2022-04-07 | Stop reason: HOSPADM

## 2022-04-04 RX ORDER — OXYCODONE HYDROCHLORIDE 5 MG/1
5-10 TABLET ORAL EVERY 6 HOURS PRN
Qty: 30 TABLET | Refills: 0 | Status: SHIPPED | OUTPATIENT
Start: 2022-04-04 | End: 2022-04-05

## 2022-04-04 RX ORDER — LIDOCAINE 40 MG/G
CREAM TOPICAL
Status: DISCONTINUED | OUTPATIENT
Start: 2022-04-04 | End: 2022-04-04 | Stop reason: HOSPADM

## 2022-04-04 RX ORDER — TOLTERODINE 2 MG/1
4 CAPSULE, EXTENDED RELEASE ORAL DAILY
Status: DISCONTINUED | OUTPATIENT
Start: 2022-04-04 | End: 2022-04-07 | Stop reason: HOSPADM

## 2022-04-04 RX ORDER — LORAZEPAM 2 MG/ML
.5-1 INJECTION INTRAMUSCULAR
Status: DISCONTINUED | OUTPATIENT
Start: 2022-04-04 | End: 2022-04-04 | Stop reason: HOSPADM

## 2022-04-04 RX ORDER — HYDROXYZINE HYDROCHLORIDE 25 MG/1
25 TABLET, FILM COATED ORAL EVERY 6 HOURS PRN
Status: DISCONTINUED | OUTPATIENT
Start: 2022-04-04 | End: 2022-04-07 | Stop reason: HOSPADM

## 2022-04-04 RX ORDER — TOPIRAMATE 100 MG/1
100 TABLET, FILM COATED ORAL 2 TIMES DAILY
Status: DISCONTINUED | OUTPATIENT
Start: 2022-04-04 | End: 2022-04-07 | Stop reason: HOSPADM

## 2022-04-04 RX ORDER — CEFAZOLIN SODIUM 2 G/100ML
2 INJECTION, SOLUTION INTRAVENOUS EVERY 8 HOURS
Status: CANCELLED | OUTPATIENT
Start: 2022-04-04 | End: 2022-04-05

## 2022-04-04 RX ORDER — OXYCODONE HYDROCHLORIDE 5 MG/1
5 TABLET ORAL EVERY 4 HOURS PRN
Status: DISCONTINUED | OUTPATIENT
Start: 2022-04-04 | End: 2022-04-07 | Stop reason: HOSPADM

## 2022-04-04 RX ORDER — AMOXICILLIN 250 MG
1-2 CAPSULE ORAL DAILY PRN
Qty: 30 TABLET | Refills: 0
Start: 2022-04-04 | End: 2022-05-06

## 2022-04-04 RX ORDER — HYDROMORPHONE HCL IN WATER/PF 6 MG/30 ML
0.4 PATIENT CONTROLLED ANALGESIA SYRINGE INTRAVENOUS
Status: DISCONTINUED | OUTPATIENT
Start: 2022-04-04 | End: 2022-04-07 | Stop reason: HOSPADM

## 2022-04-04 RX ORDER — HYDROXYZINE HYDROCHLORIDE 25 MG/1
25 TABLET, FILM COATED ORAL EVERY 6 HOURS PRN
Status: DISCONTINUED | OUTPATIENT
Start: 2022-04-04 | End: 2022-04-04

## 2022-04-04 RX ORDER — OXYCODONE HYDROCHLORIDE 5 MG/1
5 TABLET ORAL EVERY 4 HOURS PRN
Status: DISCONTINUED | OUTPATIENT
Start: 2022-04-04 | End: 2022-04-04 | Stop reason: HOSPADM

## 2022-04-04 RX ORDER — ONDANSETRON 2 MG/ML
4 INJECTION INTRAMUSCULAR; INTRAVENOUS EVERY 6 HOURS PRN
Status: DISCONTINUED | OUTPATIENT
Start: 2022-04-04 | End: 2022-04-07 | Stop reason: HOSPADM

## 2022-04-04 RX ORDER — PROCHLORPERAZINE MALEATE 10 MG
10 TABLET ORAL EVERY 6 HOURS PRN
Status: DISCONTINUED | OUTPATIENT
Start: 2022-04-04 | End: 2022-04-07 | Stop reason: HOSPADM

## 2022-04-04 RX ORDER — HYDROMORPHONE HCL IN WATER/PF 6 MG/30 ML
0.4 PATIENT CONTROLLED ANALGESIA SYRINGE INTRAVENOUS EVERY 5 MIN PRN
Status: DISCONTINUED | OUTPATIENT
Start: 2022-04-04 | End: 2022-04-04 | Stop reason: HOSPADM

## 2022-04-04 RX ORDER — HYDROMORPHONE HCL IN WATER/PF 6 MG/30 ML
0.2 PATIENT CONTROLLED ANALGESIA SYRINGE INTRAVENOUS
Status: DISCONTINUED | OUTPATIENT
Start: 2022-04-04 | End: 2022-04-07 | Stop reason: HOSPADM

## 2022-04-04 RX ORDER — TRANEXAMIC ACID 650 MG/1
1950 TABLET ORAL ONCE
Status: COMPLETED | OUTPATIENT
Start: 2022-04-04 | End: 2022-04-04

## 2022-04-04 RX ORDER — KETAMINE HYDROCHLORIDE 10 MG/ML
INJECTION INTRAMUSCULAR; INTRAVENOUS PRN
Status: DISCONTINUED | OUTPATIENT
Start: 2022-04-04 | End: 2022-04-04

## 2022-04-04 RX ORDER — ONDANSETRON 2 MG/ML
INJECTION INTRAMUSCULAR; INTRAVENOUS PRN
Status: DISCONTINUED | OUTPATIENT
Start: 2022-04-04 | End: 2022-04-04

## 2022-04-04 RX ORDER — BISACODYL 10 MG
10 SUPPOSITORY, RECTAL RECTAL DAILY PRN
Status: DISCONTINUED | OUTPATIENT
Start: 2022-04-04 | End: 2022-04-07 | Stop reason: HOSPADM

## 2022-04-04 RX ORDER — EPHEDRINE SULFATE 50 MG/ML
INJECTION, SOLUTION INTRAMUSCULAR; INTRAVENOUS; SUBCUTANEOUS PRN
Status: DISCONTINUED | OUTPATIENT
Start: 2022-04-04 | End: 2022-04-04

## 2022-04-04 RX ORDER — ATORVASTATIN CALCIUM 40 MG/1
40 TABLET, FILM COATED ORAL EVERY EVENING
Status: DISCONTINUED | OUTPATIENT
Start: 2022-04-04 | End: 2022-04-07 | Stop reason: HOSPADM

## 2022-04-04 RX ORDER — ACETAMINOPHEN 325 MG/1
975 TABLET ORAL ONCE
Status: COMPLETED | OUTPATIENT
Start: 2022-04-04 | End: 2022-04-04

## 2022-04-04 RX ORDER — SODIUM CHLORIDE, SODIUM LACTATE, POTASSIUM CHLORIDE, CALCIUM CHLORIDE 600; 310; 30; 20 MG/100ML; MG/100ML; MG/100ML; MG/100ML
INJECTION, SOLUTION INTRAVENOUS CONTINUOUS
Status: DISCONTINUED | OUTPATIENT
Start: 2022-04-04 | End: 2022-04-05

## 2022-04-04 RX ORDER — METHOCARBAMOL 750 MG/1
750 TABLET, FILM COATED ORAL EVERY 6 HOURS PRN
Status: DISCONTINUED | OUTPATIENT
Start: 2022-04-04 | End: 2022-04-07 | Stop reason: HOSPADM

## 2022-04-04 RX ORDER — FENTANYL CITRATE 50 UG/ML
INJECTION, SOLUTION INTRAMUSCULAR; INTRAVENOUS PRN
Status: DISCONTINUED | OUTPATIENT
Start: 2022-04-04 | End: 2022-04-04

## 2022-04-04 RX ORDER — KETOROLAC TROMETHAMINE 30 MG/ML
15 INJECTION, SOLUTION INTRAMUSCULAR; INTRAVENOUS EVERY 6 HOURS
Status: DISCONTINUED | OUTPATIENT
Start: 2022-04-04 | End: 2022-04-05

## 2022-04-04 RX ORDER — FENTANYL CITRATE 50 UG/ML
25 INJECTION, SOLUTION INTRAMUSCULAR; INTRAVENOUS EVERY 5 MIN PRN
Status: DISCONTINUED | OUTPATIENT
Start: 2022-04-04 | End: 2022-04-04 | Stop reason: HOSPADM

## 2022-04-04 RX ADMIN — VANCOMYCIN HYDROCHLORIDE 1500 MG: 5 INJECTION, POWDER, LYOPHILIZED, FOR SOLUTION INTRAVENOUS at 22:30

## 2022-04-04 RX ADMIN — VANCOMYCIN HYDROCHLORIDE 1500 MG: 5 INJECTION, POWDER, LYOPHILIZED, FOR SOLUTION INTRAVENOUS at 09:12

## 2022-04-04 RX ADMIN — FAMOTIDINE 20 MG: 20 TABLET ORAL at 22:15

## 2022-04-04 RX ADMIN — KETAMINE HYDROCHLORIDE 10 MG: 10 INJECTION, SOLUTION INTRAMUSCULAR; INTRAVENOUS at 11:58

## 2022-04-04 RX ADMIN — SENNOSIDES AND DOCUSATE SODIUM 1 TABLET: 50; 8.6 TABLET ORAL at 22:16

## 2022-04-04 RX ADMIN — KETAMINE HYDROCHLORIDE 10 MG: 10 INJECTION, SOLUTION INTRAMUSCULAR; INTRAVENOUS at 11:47

## 2022-04-04 RX ADMIN — PHENYLEPHRINE HYDROCHLORIDE 0.4 MCG/KG/MIN: 10 INJECTION INTRAVENOUS at 11:04

## 2022-04-04 RX ADMIN — MAGNESIUM SULFATE HEPTAHYDRATE 4 G: 80 INJECTION, SOLUTION INTRAVENOUS at 09:01

## 2022-04-04 RX ADMIN — TOPIRAMATE 100 MG: 100 TABLET ORAL at 22:14

## 2022-04-04 RX ADMIN — METHOCARBAMOL 750 MG: 750 TABLET, FILM COATED ORAL at 15:31

## 2022-04-04 RX ADMIN — Medication 5 MG: at 11:16

## 2022-04-04 RX ADMIN — ASPIRIN 325 MG: 325 TABLET ORAL at 14:32

## 2022-04-04 RX ADMIN — RISPERIDONE 2 MG: 1 TABLET ORAL at 22:13

## 2022-04-04 RX ADMIN — Medication 5 MG: at 11:08

## 2022-04-04 RX ADMIN — SODIUM CHLORIDE, POTASSIUM CHLORIDE, SODIUM LACTATE AND CALCIUM CHLORIDE: 600; 310; 30; 20 INJECTION, SOLUTION INTRAVENOUS at 14:35

## 2022-04-04 RX ADMIN — MIDAZOLAM 1 MG: 1 INJECTION INTRAMUSCULAR; INTRAVENOUS at 10:13

## 2022-04-04 RX ADMIN — SODIUM CHLORIDE, POTASSIUM CHLORIDE, SODIUM LACTATE AND CALCIUM CHLORIDE: 600; 310; 30; 20 INJECTION, SOLUTION INTRAVENOUS at 09:01

## 2022-04-04 RX ADMIN — OXYCODONE HYDROCHLORIDE 10 MG: 5 TABLET ORAL at 15:31

## 2022-04-04 RX ADMIN — FENTANYL CITRATE 50 MCG: 50 INJECTION, SOLUTION INTRAMUSCULAR; INTRAVENOUS at 10:16

## 2022-04-04 RX ADMIN — DEXAMETHASONE SODIUM PHOSPHATE 4 MG: 10 INJECTION, SOLUTION INTRAMUSCULAR; INTRAVENOUS at 10:36

## 2022-04-04 RX ADMIN — PROPOFOL 30 MCG/KG/MIN: 10 INJECTION, EMULSION INTRAVENOUS at 10:31

## 2022-04-04 RX ADMIN — TOLTERODINE 4 MG: 2 CAPSULE, EXTENDED RELEASE ORAL at 16:03

## 2022-04-04 RX ADMIN — ONDANSETRON 4 MG: 2 INJECTION INTRAMUSCULAR; INTRAVENOUS at 10:36

## 2022-04-04 RX ADMIN — FENTANYL CITRATE 50 MCG: 50 INJECTION, SOLUTION INTRAMUSCULAR; INTRAVENOUS at 10:13

## 2022-04-04 RX ADMIN — BUPIVACAINE HYDROCHLORIDE 2.5 ML: 5 INJECTION, SOLUTION EPIDURAL; INTRACAUDAL; PERINEURAL at 10:23

## 2022-04-04 RX ADMIN — MIDAZOLAM 1 MG: 1 INJECTION INTRAMUSCULAR; INTRAVENOUS at 10:17

## 2022-04-04 RX ADMIN — KETAMINE HYDROCHLORIDE 20 MG: 10 INJECTION, SOLUTION INTRAMUSCULAR; INTRAVENOUS at 11:44

## 2022-04-04 RX ADMIN — TRAZODONE HYDROCHLORIDE 150 MG: 150 TABLET ORAL at 22:15

## 2022-04-04 RX ADMIN — ACETAMINOPHEN 975 MG: 325 TABLET ORAL at 22:17

## 2022-04-04 RX ADMIN — TRANEXAMIC ACID 1950 MG: 650 TABLET ORAL at 09:02

## 2022-04-04 RX ADMIN — ACETAMINOPHEN 975 MG: 325 TABLET ORAL at 14:32

## 2022-04-04 RX ADMIN — ALBUMIN (HUMAN): 12.5 SOLUTION INTRAVENOUS at 11:15

## 2022-04-04 RX ADMIN — KETOROLAC TROMETHAMINE 15 MG: 30 INJECTION, SOLUTION INTRAMUSCULAR; INTRAVENOUS at 22:14

## 2022-04-04 RX ADMIN — ACETAMINOPHEN 975 MG: 325 TABLET ORAL at 09:03

## 2022-04-04 RX ADMIN — ATORVASTATIN CALCIUM 40 MG: 40 TABLET, FILM COATED ORAL at 22:16

## 2022-04-04 RX ADMIN — SODIUM CHLORIDE, POTASSIUM CHLORIDE, SODIUM LACTATE AND CALCIUM CHLORIDE: 600; 310; 30; 20 INJECTION, SOLUTION INTRAVENOUS at 13:59

## 2022-04-04 NOTE — CONSULTS
[facility] MEDICINE CONSULT NOTE   Physician requesting consult: Zenon Damon MD    Reason for consult: Postoperative medical management of medical co-morbidities as below    Assessment and Plan    Ronel Ryan is a 41 year old old female with a history of Obesity with reported 100lbs lost, mild intermittent asthma, HTN, Diabetes mellitus, hypercholesterolemia, vertigo, urinary incontinence, constipation, hip dysplasia and degenerative joint disease s/p left total hip arthroplasty in 2021, anxiety, Bipolar II Disorder, PTSD, and borderline personality disorder underwent right total hip arthroplasty.  [facility] service was asked to evaluate patient for postoperative medical management as follows below. Please resume the home medications as reconciled and further noted below with ordered hold parameters.  Vital signs have been stable post-operatively including hemodynamically stable blood pressure and heart rate. Thank you for this consult; we will continue to follow this patient until discharge.    Mild Intermittent Asthma   PRN albuterol inhaler. Does not use daily at home.   Plan: Continue PTA albuterol PRN    Hypertension  Not on daily medication at home. Normotensive since admission  Plan: Continue to monitor.     Diabetes Mellitus  Reports that she no longer takes Trulicity. Noted that this may have been inappropriately diagnosed.   A1C 6 months ago 4.6 and today 4.7  Plan: BG checks BID with goal < 200    Hypercholesterolemia   Continue PTA lipitor     Constipation  Takes Colace PRN  Plan: Per orthopedic surgery     GERD  Patient denies recent symptoms. Does not take Famotidine any longer  Plan: Continue to monitor     Urinary Incontinence - mixed stress and urge   Currently with rebollar in place  Plan:Continue PTA Detrol     Vertigo  Denies dizziness or tinnitus  Plan: Continue PTA PRN Meclizine    Anxiety  Denies anxiety or panic attacks currently  Plan: Continue PTA PRN  Hydroxyzine    Bipolar II Disorder  Seeing outpatient psychiatrist regularly  Stable on PTA regimen of Risperdal, Topamax, Wellbutrin, and Trazodone   Plan: Continue current medications     Postop Right Total Hip Arthroplasty  Plan: Per orthopedic surgery team    Estimated Blood Loss:  500 mL    DVT prophylaxis: 325 mg ASA every day  For 42 days per orthopedic surgery      Covid status: Covid infection x1 without severe illness and vaccination X2    Disposition: Outpatient vs TCU and per orthopedic surgical team      -Reviewed the patient's preoperative H and P and updated missing elements.  -Home medication reconciliation has been reviewed.  Medications have been ordered as noted from the home list and changes are documented above     HISTORY     Ronel Ryan is a 41 year old old female with past medical history of obesity with reported 100lb weight loss, mild intermittent asthma, HTN, Diabetes mellitus, hypercholesterolemia, vertigo, urinary incontinence, constipation, hip dysplasia, and degenerative joint disease s/p left total hip arthroplasty in 2021, PTSD, anxiety, Bipolar II Disorder, and borderline personality disorder who was admitted following right total hip arthroplasty. All chronic medical conditions well controlled preoperatively with prior to admission medication regimen. Of note, left hip arthroplasty complicated by dislocation requiring surgical revision. Currently with right incisional hip pain and diminished sensation in the right foot. Denies headaches, dizziness, vision changes, chest pain, shortness of breath, abdominal pain, nausea, or weakness.     Patient with bilateral hip dysplasia with pain starting in 2003. Pain progressed to mobility limiting levels requiring operative repair of both hips.     Past Medical History     Patient Active Problem List    Diagnosis Date Noted     Bipolar 1 disorder (H) 11/08/2021     Priority: Medium     Constipation 11/08/2021     Priority: Medium      Gastroesophageal reflux disease with esophagitis without hemorrhage 11/08/2021     Priority: Medium     Mixed incontinence 11/08/2021     Priority: Medium     Primary localized osteoarthritis of pelvic region and thigh 11/08/2021     Priority: Medium     Skin sensation disturbance 11/08/2021     Priority: Medium     Status post total hip replacement, left 09/10/2021     Priority: Medium     Degenerative joint disease (DJD) of hip 09/09/2021     Priority: Medium     Primary osteoarthritis of both hips 10/30/2020     Priority: Medium     Added automatically from request for surgery [id number]       Borderline high cholesterol 10/23/2020     Priority: Medium     Osteoarthritis 10/23/2020     Priority: Medium     Relationship problems 10/23/2020     Priority: Medium     History of anxiety disorder 10/23/2020     Priority: Medium     Gait difficulty 02/04/2019     Priority: Medium     Adjustment disorder with depressed mood 12/05/2018     Priority: Medium     Suicidal ideation 10/04/2017     Priority: Medium     Eating disorder 03/13/2017     Priority: Medium     Vertigo 03/10/2017     Priority: Medium     History of urinary anomaly 12/01/2016     Priority: Medium     H/O urinary frequency 12/01/2016     Priority: Medium     Hx of eating disorder 08/25/2016     Priority: Medium     Overview:   Reports distant laxative abuse and excessive exercise leading to 100 lb weight loss around 2013.        Morbid obesity with BMI of 50.0-59.9, adult (H) 08/25/2016     Priority: Medium     Overview:   BMI 52 at bariatric clinic intake 8/25/16.       Hyperlipidemia 08/25/2016     Priority: Medium     Overview:   On statin therapy.       Anxiety 05/11/2016     Priority: Medium     Moderate mixed bipolar I disorder (H) 05/10/2016     Priority: Medium     Overview:   Replacement Utility updated for latest IMO load       Multiple-type hyperlipidemia 03/21/2016     Priority: Medium     Arthralgia of hip 03/21/2016     Priority: Medium      Hypertension 03/21/2016     Priority: Medium     Left hip pain 03/21/2016     Priority: Medium     Mixed hyperlipidemia 03/21/2016     Priority: Medium     Trochanteric bursitis 10/27/2015     Priority: Medium     Greater trochanteric bursitis of both hips 10/27/2015     Priority: Medium     Disorder of pelvis 09/14/2015     Priority: Medium     Hip dysplasia 09/14/2015     Priority: Medium     Pain of both hip joints 06/30/2015     Priority: Medium     Osteoarthritis of hip 06/15/2015     Priority: Medium     Primary osteoarthritis of left hip 06/15/2015     Priority: Medium     Bipolar II disorder, most recent episode hypomanic (H) 04/29/2015     Priority: Medium     Type 2 diabetes mellitus (H) 08/12/2014     Priority: Medium     Binge eating 11/27/2013     Priority: Medium     Borderline personality disorder (H) 11/27/2013     Priority: Medium     Attention deficit disorder 11/27/2013     Priority: Medium     ADD (attention deficit disorder) 11/27/2013     Priority: Medium     Hx of diabetes mellitus 05/21/2013     Priority: Medium     Soft tissue lesion of shoulder region 02/27/2013     Priority: Medium     Secondary amenorrhea 10/22/2012     Priority: Medium     ACP (advance care planning) 10/18/2012     Priority: Medium     Patient has identified Health Care Agent(s): No  Add Health Care Agents: No  Patient has Advance Care Plan Documents (Health Care Directive, POLST): No,  .    Patient has identified Specific Treatment Preferences: Yes   Specific Treatment Preferences: a.) Code Status:  CPR/Attempt Resuscitation     She would like her mother  Calista Fernandes [phone] or Georgie Trejo [phone] to make decisions for her regarding health if she can not - but she has no formal forms for this  Patient has identified Health Care Agent(s): No  Add Health Care Agents: No  Patient has Advance Care Plan Documents (Health Care Directive, POLST): No,  .    Patient has identified Specific Treatment  Preferences: Yes   Specific Treatment Preferences: a.) Code Status:  CPR/Attempt Resuscitation     She would like her mother  Calista Fernandes [phone] or Georgie Trejo [phone] to make decisions for her regarding health if she can not - but she has no formal forms for this       Severe episode of recurrent major depressive disorder (H) 10/04/2012     Priority: Medium     Care plan discussed with patient 02/01/2012     Priority: Medium     This patient's Behavioral Health care has been restricted.  Please direct the patient to speak with the  provider before scheduling.       Morbid obesity (H) 12/22/2010     Priority: Medium     Asthma 12/22/2010     Priority: Medium     Recurrent major depressive disorder (H) 12/22/2010     Priority: Medium        Surgical History   She  has a past surgical history that includes Tonsillectomy; Cyst Removal; Ankle Fracture Surgery (Right); Colonoscopy; Mammo Stereotactic Core Biopsy Left (Left, 06/15/2020); Colonoscopy w/wo Brush **Performed** (N/A, 06/08/2021); orthopedic surgery; Arthroscopic reconstruction anterior cruciate ligament (Left); Closed reduction hip (Left, 9/9/2021); Arthroplasty hip (Left, 9/9/2021); and Cholecystectomy.     Past Surgical History:   Procedure Laterality Date     ANKLE FRACTURE SURGERY Right      ARTHROPLASTY HIP Left 9/9/2021    Procedure: LEFT TOTAL HIP ARTHROPLASTY;  Surgeon: Zenon Damon MD;  Location: [facility] OR     ARTHROSCOPIC RECONSTRUCTION ANTERIOR CRUCIATE LIGAMENT Left      CHOLECYSTECTOMY       CLOSED REDUCTION HIP Left 9/9/2021    Procedure: CLOSED REDUCTION, LEFT HIP;  Surgeon: Zenon Damon MD;  Location: [facility] OR     COLONOSCOPY      normal colon at age 40, June 2021 study.     CYST REMOVAL      Back of neck     MAMMO STEREOTACTIC CORE BIOPSY LEFT Left 06/15/2020     ORTHOPEDIC SURGERY      ankle and acl     TONSILLECTOMY       ZZHC COLONOSCOPY W/WO BRUSH/WASH N/A 06/08/2021    Procedure:  COLONOSCOPY;  Surgeon: Yoel Siegel MD;  Location: [facility] OR;  Service: Gastroenterology       Family History    Reviewed, and family history includes Arthritis in her brother, maternal grandmother, and mother; Breast Cancer in her mother; Cancer in her maternal grandmother and mother; Cerebrovascular Disease in her maternal grandfather, maternal grandmother, and mother; Diabetes in her brother, brother, father, and mother; Heart Disease in her maternal grandfather; Hyperlipidemia in her brother, maternal grandfather, and mother; Hypertension in her brother, maternal grandfather, maternal grandmother, and mother; Mental Illness in her brother and brother; Obesity in her brother, maternal grandfather, maternal grandmother, mother, and sister; Pacemaker in her maternal grandfather; Seizure Disorder in her maternal grandfather; Substance Abuse in her maternal grandfather.  Social History    Reviewed, and she  reports that she has never smoked. She has never used smokeless tobacco. She reports that she does not drink alcohol and does not use drugs.  Social History     Tobacco Use     Smoking status: Never Smoker     Smokeless tobacco: Never Used   Substance Use Topics     Alcohol use: No     Allergies     Allergies   Allergen Reactions     Cephalexin Anaphylaxis     Erythromycin Anaphylaxis and Hives     Other reaction(s): Unknown     Penicillins Hives     hives  Other reaction(s): Unknown       Prior to Admission Medications      Medications Prior to Admission   Medication Sig Dispense Refill Last Dose     albuterol (PROAIR HFA/PROVENTIL HFA/VENTOLIN HFA) 108 (90 BASE) MCG/ACT Inhaler Inhale 1-2 puffs into the lungs every 4 hours as needed    Past Month at not with     atorvastatin (LIPITOR) 40 MG tablet Take 1 tablet by mouth every evening    4/3/2022 at Unknown time     baclofen (LIORESAL) 10 MG tablet TAKE 1 TABLET BY MOUTH TWICE A DAY AS NEEDED FOR MUSCLE SPASM   Past Month at Unknown time      buPROPion (WELLBUTRIN XL) 300 MG 24 hr tablet Take 300 mg by mouth daily    4/3/2022 at Unknown time     Cholecalciferol 125 MCG (5000 UT) TABS Take 125 mcg by mouth daily    4/3/2022 at Unknown time     docusate sodium (COLACE) 100 MG capsule Take 100 mg by mouth 3 times daily as needed for constipation   Past Week at Unknown time     hydrOXYzine (ATARAX) 25 MG tablet Take 1 tablet (25 mg) by mouth every 6 hours as needed for itching or anxiety (with pain, moderate pain) 30 tablet 0 Past Week at Unknown time     meclizine (ANTIVERT) 25 MG tablet Take 25 mg by mouth 4 times daily as needed for dizziness   Past Week at Unknown time     risperiDONE (RISPERDAL) 2 MG tablet Take 2 mg by mouth At Bedtime    4/3/2022 at Unknown time     tolterodine ER (DETROL LA) 4 MG 24 hr capsule Take 4 mg by mouth daily   4/3/2022 at Unknown time     topiramate (TOPAMAX) 100 MG tablet Take 100 mg by mouth 2 times daily    4/3/2022 at Unknown time     traZODone (DESYREL) 150 MG tablet Take 150 mg by mouth At Bedtime   4/3/2022 at Unknown time     blood glucose (NO BRAND SPECIFIED) lancets standard Use to test blood sugar 2 times daily or as directed. 100 each 11      blood glucose monitoring (NO BRAND SPECIFIED) meter device kit Use to test blood sugar 2 times daily or as directed. 1 kit 0      blood glucose monitoring (NO BRAND SPECIFIED) test strip Use to test blood sugars 2 times daily or as directed 100 strip 3      dulaglutide (TRULICITY) 1.5 MG/0.5ML pen Inject 1.5 mg Subcutaneous every 7 days 2 mL 0 4/3/2022     EPINEPHrine (EPIPEN/ADRENACLICK/OR ANY BX GENERIC EQUIV) 0.3 MG/0.3ML injection 2-pack Inject 0.3 mLs (0.3 mg) into the muscle once as needed for anaphylaxis 0.6 mL 0      levonorgestrel (LILETTA, 52 MG,) 19.5 MCG/DAY IUD 1 each by Intrauterine route once          Review of Systems   A 12 point comprehensive review of systems was negative except as noted above.    OBJECTIVE         Physical Exam   Temp:  [97  F (36.1   C)-98.3  F (36.8  C)] 97.3  F (36.3  C)  Pulse:  [67-81] 68  Resp:  [12-24] 16  BP: (105-132)/(55-74) 132/73  SpO2:  [100 %] 100 %  Body mass index is 43.29 kg/m .    General: alert in no acute distress  Head: Atraumatic normocephalic.  HEENT: PERRLA. EOMI. Moist mucous membranes  Cardiac: RRR without murmurs  Lungs: unlabored respiratory effort on RA. CTAB  Abdomen: soft, nontender, without peritoneal signs  Extremities: warm, without edema. Able to wiggle toes bilaterally. Absent sensation in right foot.  Neuro: A&O X4.   Psych: appropriate mood and affect     Cardiographics Reviewed Personally By Myself   EKG Results: personally reviewed.  9/7/2021 : Normal sinus rhyhtm. Left atrial enlargement    Echocardiogram: 9/8/2021: EF 63% without wall motion abnormality. Mild left atrial dilation. Trileaflet aortic valve without stenosis or insufficiency. Trace Tricuspd regurgitation.   Imaging Reviewed Personally By Myself    Radiology Results:   Recent Results (from the past 24 hour(s))   XR Hip Port Right 1 View    Narrative    EXAM: XR HIP PORT RT 1 VW  LOCATION: [facility]  DATE/TIME: 4/4/2022 11:43 AM    INDICATION: right total hip  COMPARISON: None.      Impression    IMPRESSION: Single intraoperative radiograph shows a right total hip replacement in progress. Please see dedicated operative report for further details.    XR Pelvis w Hip Port Right 1 View    Narrative    EXAM: XR PELVIS AD HIP PORTABLE RIGHT 1 VIEW  LOCATION: [facility]  DATE/TIME: 4/4/2022 12:56 PM    INDICATION: Status post hip surgery.  COMPARISON: None.      Impression    IMPRESSION: Postoperative changes bilateral total hip arthroplasty. Components appear well seated bilaterally. Pelvis negative for fracture. Intrauterine device.                   Labs Reviewed Personally By Myself     Most Recent 3 CBC's:Recent Labs   Lab Test 04/04/22  0841 09/11/21  0627 09/10/21  0627 05/21/21  1152  07/25/19  1100   WBC 5.1  --   --  5.2 6.3   HGB 13.3 9.0* 11.5* 13.7 12.6   MCV 90  --   --  94 85     --   --  283 309     Most Recent 3 BMP's:Recent Labs   Lab Test 04/04/22  1243 04/04/22  0920 03/14/22  1139 09/10/21  0631 09/10/21  0627 09/09/21  0713 09/07/21  1257   NA  --   --  140  --  135*  --  143   POTASSIUM  --   --  4.2  --  4.3  --  4.2   CHLORIDE  --   --  110*  --  109*  --  111*   CO2  --   --  22  --  18*  --  18*   BUN  --   --  13  --  10  --  8   CR  --   --  1.05  --  1.00  --  1.28*   ANIONGAP  --   --  8  --  8  --  14   ANGELICA  --   --  9.4  --  8.7  --  9.8   GLC 93 90 86   < > 155*   < > 93    < > = values in this interval not displayed.     Most Recent 6 glucoses:Recent Labs   Lab Test 04/04/22  1243 04/04/22  0920 03/14/22  1139 09/15/21  1156 09/15/21  0647 09/14/21  2109   GLC 93 90 86 124* 118* 146*       Preoperative Labs Reviewed Personally By Myself     Thank you for this consultation.  Appreciate the opportunity to participate in the care of Ronel Ryan, please feel free to contact us for any questions or concerns.    Vy [employer] Student  [facility] Medicine  [facility]  Phone: #[phone]    Patient seen and examined  Patient discussed with physician assistant student  Agree with assessment and plan as outlined above    Braden Carrera MD  [facility] Medicine Service

## 2022-04-04 NOTE — OP NOTE
Total Hip Arthoplasty Operative Note        PLAN:  Weight bearing status: Weight bearing as tolerated   Activity: Activity as tolerated  Patient may move about with assist as indicated or with supervision   Anticoagulation plan:                 325mg ASA po qd  for 42 days  Follow up plan                           Follow up in 2 week(s)        Name: Ronel Ryan    PCP: Lexie Rodriguez    Procedure Date: 4/4/2022    Pre-operative diagnosis: Degenerative joint disease of right hip [M16.11]   Post-operative diagnosis: Same   Procedure: Total Hip arthoplasty (Right)   Surgeon: Zenon Damon MD     Assistant(s): Franklyn Patino PA-C   Anesthesia: Spinal Anesthesia   Estimated blood loss: 500 ml   Drains: None   Specimens: None       Findings: See full dictated operative note for details   Complications: None       Comments: See dictated operative report for full details     Indications:    The patient has experienced progressive right hip pain despite use of  Analgesics, NSAID's, Injection therapy and physical therapy. Because of the failure of these therapies to control symptoms and limited walking, night pain and altered activities the patient has decided to move ahead with joint replacement surgery.        Procedure and Findings:    After being informed of the risks, benefits, and alternatives to the procedure, the patient desired to proceed. Brought to the main operating suite where she was placed under spinal anesthetic. She was positioned in an Innomed hip oliver. The patient s Right lower extremity was prepped and draped in a manner appropriate for the procedure after a timeout verification step was complete.    Patient received 2 grams of intravenous Ancef. Franklyn Patino PA-C was present for the entire length of the case for the purposes of proper patient positioning, surgical exposure, and patient safety.    A minimally invasive posterior approach to the hip was taken. IT band was divided. Gluteus  fibers divided and the Charnley retractor was placed. Attention was directed towards the external rotators. The piriformis was identified and tagged. Minimus was elevated. The capsule was teed and tagged. Hip leg length and offset were then determined using a Smith and Nephew device with a pin in the innominate and the hip was then dislocated. The neck was resected using the [employer] guide. Full thickness cartilage loss was demonstrated involving the femoral head and acetabulum.     Attention was directed toward the acetabulum. Retractors were placed. Soft tissues were resected. Sequential reaming from 45 to 49 mm was carried out. Good cancellous bleeding bed was demonstrated. The trail 48 mm cup was stable. The final 48 mm Trident 2 HA PSL cup was selected and secured with 2 supplemental fixation screws. A 10 degree liner was placed. Attention was directed towards the femur. Box chisel, canal finder, sequential broaching up to 3 was carried out. Trial reductions were carried out and excellent range of motion and stability and restoration of leg length and offset was demonstrated with a 0,32 head. X-ray was obtained which demonstrated appropriate sizing and positioning of components. The trial components were then removed. The final 10 degree liner was secured. Inferior osteophytes were resected from the acetabulum. The implant 3 Accolade 2, 127 degree offset stem was the secured. Trial reductions were carried out and a 0, 32 mm head was selected. The hip was reduced. The joint was copiously irrigated. The joint capsule and piriformis tendon was repaired back to the greater trochanter through drill holes in bone. Soft tissues were infiltrated with anesthetic solution. Care was taken to avoid neurovascular structures.   The IT band was closed with running #1 running Stratafix stitch. Subcutaneous closure With layered 2-0 Vicryl, skin was closed with 3-0 Stratafix and Aquacell dressing. Sterile dressing was applied.  Hip abductor pillow was placed. The patient returned to PAR in stable condition.       Zenon Damon MD    Date: 4/4/2022 Time: 12:10 PM      CONFIDENTIALITY NOTICE This message and any included attachments are from [facility] and are intended only for the addressee. The information contained in this message is confidential and may constitute inside or non-public information under international, federal, or state securities laws. Unauthorized forwarding, printing, copying, distribution, or use of such information is strictly prohibited and may be unlawful. If you are not the addressee, please promptly delete this message and notify the sender of the delivery error by e-mail.

## 2022-04-04 NOTE — PLAN OF CARE
Problem: Ongoing Anesthesia Effects (Hip Arthroplasty)  Goal: Anesthesia/Sedation Recovery  Outcome: Ongoing, Progressing  Intervention: Optimize Anesthesia Recovery  Recent Flowsheet Documentation  Taken 4/4/2022 1340 by Vy Ayala RN  Safety Promotion/Fall Prevention:   activity supervised   bed alarm on   clutter free environment maintained   fall prevention program maintained   increased rounding and observation   increase visualization of patient   lighting adjusted   mobility aid in reach   nonskid shoes/slippers when out of bed   patient and family education   room door open   room near nurse's station   room organization consistent   safety round/check completed   supervised activity   treat reversible contributory factors  Administration (IS):   instruction provided, initial   mouthpiece utilized   proper technique demonstrated   self-administered  Level Incentive Spirometer (mL): 2500  Incentive Spirometer Predicted Level (mL): 2400  Number of Repetitions (IS): 5  Patient Tolerance (IS): good     Problem: Postoperative Nausea and Vomiting (Hip Arthroplasty)  Goal: Nausea and Vomiting Relief  Outcome: Ongoing, Progressing     Patient vital signs are at baseline: Yes  Patient able to ambulate as they were prior to admission or with assist devices provided by therapies during their stay:  No,  Reason:  Bedrest at this time as spinal block is still intact  Patient MUST void prior to discharge:  No,  Reason:  Adams catheter in place d/t mobility r/t spinal block  Patient able to tolerate oral intake:  Yes  Pain has adequate pain control using Oral analgesics:  Yes  Does patient have an identified :  Yes  Has goal D/C date and time been discussed with patient:  Yes    Pt arrived to [address] [place] [address] at 1:44 pm today. Dressing to R. Hip is CDI w/ abduction pillow in place. Pt endorses moderate-severe incisional pain; controlled w/ Oxycodone, Robaxin & APAP. Pt endorses numbness to BLE w/ tingling present also  in the RLE. Is able to wiggle toes. Adams patent & draining; left in at this time until pt regains sensation. VSS. Tolerating a regular diet. LR running at 100 ml. Anticipating discharge tomorrow pending continued clinical progression.

## 2022-04-04 NOTE — ANESTHESIA PREPROCEDURE EVALUATION
Anesthesia Pre-Procedure Evaluation    Patient: Ronel Ryan   MRN: [id number] : 1980        Preoperative Diagnosis: Degenerative joint disease of left hip [M16.12]   Procedure : Procedure(s):  LEFT TOTAL HIP ARTHROPLASTY     Past Medical History:   Diagnosis Date     Allergic state      Anxiety      Arthritis      Asthma      Bipolar 2 disorder (H)     mixed, one manic episode when combined wellbutrin and celexa. Now off Celexa.     Borderline personality disorder (H)      Cholelithiasis      lap gil     Depressive disorder      Diabetes (H)      Diabetes mellitus (H)     Dx in , lost weight and in  MD thought she might have been misdiagnosed.     GERD (gastroesophageal reflux disease)      H/O constipation      H/O hypercholesterolemia     on statin therapy     Hip dysplasia      Hypertension      Major depression      Menstrual disorder     heavy and irregular bleeding, improved with IUD.     Obese      PTSD (post-traumatic stress disorder)      Uncomplicated asthma      Urinary incontinence     on ditropan (showers/pools and stress incontinence).     vertigo       Past Surgical History:   Procedure Laterality Date     ANKLE FRACTURE SURGERY Right      ARTHROPLASTY HIP Left 2021    Procedure: LEFT TOTAL HIP ARTHROPLASTY;  Surgeon: Zenon Damon MD;  Location: [facility] Main OR     ARTHROSCOPIC RECONSTRUCTION ANTERIOR CRUCIATE LIGAMENT Left      CHOLECYSTECTOMY       CLOSED REDUCTION HIP Left 2021    Procedure: CLOSED REDUCTION, LEFT HIP;  Surgeon: Zenon Damon MD;  Location: [facility] Main OR     COLONOSCOPY      normal colon at age 40, 2021 study.     CYST REMOVAL      Back of neck     MAMMO STEREOTACTIC CORE BIOPSY LEFT Left 06/15/2020     ORTHOPEDIC SURGERY      ankle and acl     TONSILLECTOMY       ZZHC COLONOSCOPY W/WO BRUSH/WASH N/A 2021    Procedure: COLONOSCOPY;  Surgeon: Yoel Siegel MD;  Location: [facility] Main OR;  Service:  Gastroenterology      Allergies   Allergen Reactions     Cephalexin Anaphylaxis     Erythromycin Anaphylaxis and Hives     Other reaction(s): Unknown     Penicillins Hives     hives  Other reaction(s): Unknown      Social History     Tobacco Use     Smoking status: Never Smoker     Smokeless tobacco: Never Used   Substance Use Topics     Alcohol use: No      Wt Readings from Last 1 Encounters:   04/01/22 114.2 kg (251 lb 12.8 oz)        Anesthesia Evaluation            ROS/MED HX  ENT/Pulmonary:  - neg pulmonary ROS   (+) asthma     Neurologic:  - neg neurologic ROS     Cardiovascular:  - neg cardiovascular ROS   (+) hypertension-----    METS/Exercise Tolerance:     Hematologic:  - neg hematologic  ROS     Musculoskeletal:  - neg musculoskeletal ROS (+) arthritis,     GI/Hepatic:  - neg GI/hepatic ROS   (+) GERD, Asymptomatic on medication,     Renal/Genitourinary:  - neg Renal ROS     Endo:  - neg endo ROS   (+) type II DM, Obesity,     Psychiatric/Substance Use:  - neg psychiatric ROS     Infectious Disease:  - neg infectious disease ROS     Malignancy:  - neg malignancy ROS     Other:  - neg other ROS          Physical Exam    Airway  airway exam normal      Mallampati: II   TM distance: > 3 FB   Neck ROM: full   Mouth opening: > 3 cm    Respiratory Devices and Support         Dental  no notable dental history         Cardiovascular   cardiovascular exam normal       Rhythm and rate: regular     Pulmonary   pulmonary exam normal        breath sounds clear to auscultation           OUTSIDE LABS:  CBC:   Lab Results   Component Value Date    WBC 5.2 05/21/2021    WBC 6.3 07/25/2019    HGB 9.0 (L) 09/11/2021    HGB 11.5 (L) 09/10/2021    HCT 41.8 05/21/2021    HCT 39.3 07/25/2019     05/21/2021     07/25/2019     BMP:   Lab Results   Component Value Date     03/14/2022     (L) 09/10/2021    POTASSIUM 4.2 03/14/2022    POTASSIUM 4.3 09/10/2021    CHLORIDE 110 (H) 03/14/2022    CHLORIDE 109  (H) 09/10/2021    CO2 22 03/14/2022    CO2 18 (L) 09/10/2021    BUN 13 03/14/2022    BUN 10 09/10/2021    CR 1.05 03/14/2022    CR 1.00 09/10/2021    GLC 86 03/14/2022     (H) 09/15/2021     COAGS:   Lab Results   Component Value Date    INR 1.15 09/09/2021     POC:   Lab Results   Component Value Date     (H) 09/26/2017    HCG Negative 10/04/2017    HCGS Negative 06/08/2021     HEPATIC:   Lab Results   Component Value Date    ALBUMIN 3.9 03/14/2022    PROTTOTAL 6.7 03/14/2022    ALT 44 03/14/2022    AST 28 03/14/2022    ALKPHOS 68 03/14/2022    BILITOTAL 0.5 03/14/2022     OTHER:   Lab Results   Component Value Date    A1C 4.6 09/10/2021    ANGELICA 9.4 03/14/2022    LIPASE 124 09/26/2017    TSH 1.14 05/21/2021       Anesthesia Plan    ASA Status:  3   NPO Status:  NPO Appropriate    Anesthesia Type: Spinal.              Consents    Anesthesia Plan(s) and associated risks, benefits, and realistic alternatives discussed. Questions answered and patient/representative(s) expressed understanding.    - Discussed:     - Discussed with:  Patient         Postoperative Care    Pain management: IV analgesics, Oral pain medications, Multi-modal analgesia.   PONV prophylaxis: Ondansetron (or other 5HT-3), Dexamethasone or Solumedrol     Comments:    Other Comments: Decadron, Zofran.  Diprivan infusion.  Ketamine (0.5 mg/kg).  Magnesium.                Zenon Morrison MD

## 2022-04-04 NOTE — ANESTHESIA CARE TRANSFER NOTE
Patient: Ronel Ryan    Procedure: Procedure(s):  RIGHT TOTAL HIP ARTHROPLASTY       Diagnosis: Degenerative joint disease of right hip [M16.11]  Diagnosis Additional Information: No value filed.    Anesthesia Type:   Spinal     Note:    Oropharynx: oropharynx clear of all foreign objects  Level of Consciousness: awake  Oxygen Supplementation: face mask  Level of Supplemental Oxygen (L/min / FiO2): 10  Independent Airway: airway patency satisfactory and stable  Dentition: dentition unchanged  Vital Signs Stable: post-procedure vital signs reviewed and stable  Report to RN Given: handoff report given  Patient transferred to: PACU    Handoff Report: Identifed the Patient, Identified the Reponsible Provider, Reviewed the pertinent medical history, Discussed the surgical course, Reviewed Intra-OP anesthesia mangement and issues during anesthesia, Set expectations for post-procedure period and Allowed opportunity for questions and acknowledgement of understanding      Vitals:  Vitals Value Taken Time   /60    Temp 97.8    Pulse 82 04/04/22 1223   Resp 19 04/04/22 1223   SpO2 100 % 04/04/22 1223   Vitals shown include unvalidated device data.    Electronically Signed By: FEMI Estrada CRNA  April 4, 2022  12:24 PM

## 2022-04-04 NOTE — BRIEF OP NOTE
[facility]    Brief Operative Note    Pre-operative diagnosis: Degenerative joint disease of right hip [M16.11]  Post-operative diagnosis Same as pre-operative diagnosis    Procedure: Procedure(s):  RIGHT TOTAL HIP ARTHROPLASTY  Surgeon: Surgeon(s) and Role:     * Zenon Damon MD - Primary     * Franklyn Patino PA-C - Assisting  Anesthesia: Choice   Estimated Blood Loss: 500 ml    Drains: None  Specimens: * No specimens in log *  Findings:   None.  Complications: None.  Implants:   Implant Name Type Inv. Item Serial No.  Lot No. LRB No. Used Action   SHELL ACTB 48MM HIP CH HA TRDNT II PSL D STRL 742-11-48D - [id number] Total Joint Component/Insert SHELL ACTB 48MM HIP CH HA TRDNT II PSL D STRL 742-11-48D  [employer] [id number] Right 1 Implanted   IMP SCR STRK LOW PROFILE HEX 6.5X25MM 0206-0340 - [id number] Metallic Hardware/[place] IMP SCR STRK LOW PROFILE HEX 6.5X25MM [id number]  [employer] [id number] Right 1 Implanted   IMP SCR STRK LOW PROFILE HEX 6.5X25MM 5688-4455 - [id number] Metallic Hardware/[place] IMP SCR STRK LOW PROFILE HEX 6.5X25MM [id number]  [employer] [id number] Right 1 Implanted   IMP STEM FEMORAL HIP STRK ACCOLADE II 127DEG SZ 3 9640-4193 - [id number] Total Joint Component/Insert IMP STEM FEMORAL HIP STRK ACCOLADE II 127DEG SZ 3 1034-6291  [employer] [id number] Right 1 Implanted   INSERT ACTB 10DEG 32MM 0D D HIP X3 TRDNT STRL LF - [id number] Total Joint Component/Insert INSERT ACTB 10DEG 32MM 0D D HIP X3 TRDNT STRL LF  [employer] [id number] Right 1 Implanted   IMP HEAD FEMORAL STRK BIOLOX DELTA CERAMIC 32MM 0MM - [id number] Total Joint Component/Insert IMP HEAD FEMORAL STRK BIOLOX DELTA CERAMIC 32MM 0MM  [employer] [id number] Right 1 Implanted

## 2022-04-04 NOTE — ANESTHESIA PROCEDURE NOTES
Intrathecal injection Procedure Note    Pre-Procedure   Staff -        Anesthesiologist:  Zenon Morrison MD       Performed By: anesthesiologist       Location: OR       Procedure Start/Stop Times: 4/4/2022 10:19 AM and 4/4/2022 10:23 AM       Pre-Anesthestic Checklist: patient identified, IV checked, risks and benefits discussed, informed consent, monitors and equipment checked and pre-op evaluation  Timeout:       Correct Patient: Yes        Correct Procedure: Yes        Correct Site: Yes        Correct Position: Yes   Procedure Documentation  Procedure: intrathecal injection       Patient Position: sitting       Patient Prep/Sterile Barriers: sterile gloves, patient draped       Skin prep: Chloraprep       Insertion Site: L3-4. (midline approach).       Needle Gauge: 25.        Needle Length (Inches): 5        Spinal Needle Type: Pencan       Introducer used       Introducer: 20 G       # of attempts: 1 and  # of redirects:  0    Assessment/Narrative         Paresthesias: No.       CSF fluid: clear.    Medication(s) Administered   0.5% Bupivacaine PF (Intrathecal), 2.5 mL  Medication Administration Time: 4/4/2022 10:23 AM

## 2022-04-04 NOTE — ANESTHESIA POSTPROCEDURE EVALUATION
Patient: Ronel Ryan    Procedure: Procedure(s):  RIGHT TOTAL HIP ARTHROPLASTY       Anesthesia Type:  Spinal    Note:  Disposition: Admission   Postop Pain Control: Uneventful            Sign Out: Well controlled pain   PONV: No   Neuro/Psych: Uneventful            Sign Out: Acceptable/Baseline neuro status   Airway/Respiratory: Uneventful            Sign Out: Acceptable/Baseline resp. status; O2 supplementation               Oxygen: Nasal Cannula   CV/Hemodynamics: Uneventful            Sign Out: Acceptable CV status; No obvious hypovolemia; No obvious fluid overload   Other NRE: NONE   DID A NON-ROUTINE EVENT OCCUR? No           Last vitals:  Vitals Value Taken Time   /58 04/04/22 1300   Temp 36.1  C (97  F) 04/04/22 1300   Pulse 67 04/04/22 1320   Resp 12 04/04/22 1306   SpO2 100 % 04/04/22 1320   Vitals shown include unvalidated device data.    Electronically Signed By: Zenon Morrison MD  April 4, 2022  1:22 PM

## 2022-04-04 NOTE — INTERVAL H&P NOTE
"I have reviewed the surgical (or preoperative) H&P that is linked to this encounter, and examined the patient. There are no significant changes    Clinical Conditions Present on Arrival:  Clinically Significant Risk Factors Present on Admission                    # Severe Obesity: Estimated body mass index is 43.29 kg/m  as calculated from the following:    Height as of this encounter: 1.626 m (5' 4\").    Weight as of this encounter: 114.4 kg (252 lb 3.2 oz).       "

## 2022-04-04 NOTE — PHARMACY-ADMISSION MEDICATION HISTORY
Pharmacy Note - Admission Medication History    Pertinent Provider Information:    ______________________________________________________________________    Prior To Admission (PTA) med list completed and updated in EMR.       PTA Med List   Medication Sig Last Dose     albuterol (PROAIR HFA/PROVENTIL HFA/VENTOLIN HFA) 108 (90 BASE) MCG/ACT Inhaler Inhale 1-2 puffs into the lungs every 4 hours as needed  Past Month at not with     atorvastatin (LIPITOR) 40 MG tablet Take 1 tablet by mouth every evening  4/3/2022 at Unknown time     baclofen (LIORESAL) 10 MG tablet TAKE 1 TABLET BY MOUTH TWICE A DAY AS NEEDED FOR MUSCLE SPASM Past Month at Unknown time     buPROPion (WELLBUTRIN XL) 300 MG 24 hr tablet Take 300 mg by mouth daily  4/3/2022 at Unknown time     Cholecalciferol 125 MCG (5000 UT) TABS Take 125 mcg by mouth daily  4/3/2022 at Unknown time     docusate sodium (COLACE) 100 MG capsule Take 100 mg by mouth 3 times daily as needed for constipation Past Week at Unknown time     hydrOXYzine (ATARAX) 25 MG tablet Take 1 tablet (25 mg) by mouth every 6 hours as needed for itching or anxiety (with pain, moderate pain) Past Week at Unknown time     meclizine (ANTIVERT) 25 MG tablet Take 25 mg by mouth 4 times daily as needed for dizziness Past Week at Unknown time     risperiDONE (RISPERDAL) 2 MG tablet Take 2 mg by mouth At Bedtime  4/3/2022 at Unknown time     tolterodine ER (DETROL LA) 4 MG 24 hr capsule Take 4 mg by mouth daily 4/3/2022 at Unknown time     topiramate (TOPAMAX) 100 MG tablet Take 100 mg by mouth 2 times daily  4/3/2022 at Unknown time     traZODone (DESYREL) 150 MG tablet Take 150 mg by mouth At Bedtime 4/3/2022 at Unknown time       Information source(s): Patient and CareEverywhere/SureScripts    Method of interview communication: in-person    Patient was asked about OTC/herbal products specifically.  PTA med list reflects this.    Based on the pharmacist's assessment, the PTA med list information  appears reliable    Allergies were reviewed, assessed, and updated with the patient.      Patient did not bring any medications to the hospital and can't retrieve from home. No multi-dose medications are available for use during hospital stay.      Thank you for the opportunity to participate in the care of this patient.      Rudi Fritz [facility]     4/4/2022     9:17 AM

## 2022-04-05 ENCOUNTER — APPOINTMENT (OUTPATIENT)
Dept: PHYSICAL THERAPY | Facility: CLINIC | Age: 42
End: 2022-04-05
Attending: ORTHOPAEDIC SURGERY
Payer: COMMERCIAL

## 2022-04-05 ENCOUNTER — APPOINTMENT (OUTPATIENT)
Dept: OCCUPATIONAL THERAPY | Facility: CLINIC | Age: 42
End: 2022-04-05
Attending: ORTHOPAEDIC SURGERY
Payer: COMMERCIAL

## 2022-04-05 ENCOUNTER — MEDICAL CORRESPONDENCE (OUTPATIENT)
Dept: HEALTH INFORMATION MANAGEMENT | Facility: CLINIC | Age: 42
End: 2022-04-05

## 2022-04-05 LAB
GLUCOSE BLDC GLUCOMTR-MCNC: 117 MG/DL (ref 70–99)
GLUCOSE BLDC GLUCOMTR-MCNC: 147 MG/DL (ref 70–99)
HGB BLD-MCNC: 9.3 G/DL (ref 11.7–15.7)

## 2022-04-05 PROCEDURE — 250N000011 HC RX IP 250 OP 636: Performed by: ORTHOPAEDIC SURGERY

## 2022-04-05 PROCEDURE — 97110 THERAPEUTIC EXERCISES: CPT | Mod: GP

## 2022-04-05 PROCEDURE — 97530 THERAPEUTIC ACTIVITIES: CPT | Mod: GP

## 2022-04-05 PROCEDURE — 97162 PT EVAL MOD COMPLEX 30 MIN: CPT | Mod: GP

## 2022-04-05 PROCEDURE — 85018 HEMOGLOBIN: CPT | Performed by: ORTHOPAEDIC SURGERY

## 2022-04-05 PROCEDURE — 250N000013 HC RX MED GY IP 250 OP 250 PS 637: Performed by: ORTHOPAEDIC SURGERY

## 2022-04-05 PROCEDURE — 36415 COLL VENOUS BLD VENIPUNCTURE: CPT | Performed by: ORTHOPAEDIC SURGERY

## 2022-04-05 PROCEDURE — 82962 GLUCOSE BLOOD TEST: CPT

## 2022-04-05 PROCEDURE — 250N000013 HC RX MED GY IP 250 OP 250 PS 637: Performed by: FAMILY MEDICINE

## 2022-04-05 PROCEDURE — 97116 GAIT TRAINING THERAPY: CPT | Mod: GP

## 2022-04-05 PROCEDURE — 99214 OFFICE O/P EST MOD 30 MIN: CPT | Performed by: FAMILY MEDICINE

## 2022-04-05 PROCEDURE — 97166 OT EVAL MOD COMPLEX 45 MIN: CPT | Mod: GO

## 2022-04-05 PROCEDURE — 97535 SELF CARE MNGMENT TRAINING: CPT | Mod: GO

## 2022-04-05 RX ORDER — POLYETHYLENE GLYCOL 3350 17 G/17G
17 POWDER, FOR SOLUTION ORAL DAILY
Qty: 510 G | COMMUNITY
Start: 2022-04-05 | End: 2022-04-06

## 2022-04-05 RX ORDER — HYDROXYZINE HYDROCHLORIDE 25 MG/1
25 TABLET, FILM COATED ORAL EVERY 6 HOURS PRN
Qty: 30 TABLET | Refills: 0 | Status: SHIPPED | OUTPATIENT
Start: 2022-04-05 | End: 2022-04-06

## 2022-04-05 RX ADMIN — TOLTERODINE 4 MG: 2 CAPSULE, EXTENDED RELEASE ORAL at 09:19

## 2022-04-05 RX ADMIN — FAMOTIDINE 20 MG: 20 TABLET ORAL at 09:19

## 2022-04-05 RX ADMIN — ASPIRIN 325 MG: 325 TABLET ORAL at 09:19

## 2022-04-05 RX ADMIN — FAMOTIDINE 20 MG: 20 TABLET ORAL at 21:56

## 2022-04-05 RX ADMIN — TRAZODONE HYDROCHLORIDE 150 MG: 150 TABLET ORAL at 21:56

## 2022-04-05 RX ADMIN — POLYETHYLENE GLYCOL 3350 17 G: 17 POWDER, FOR SOLUTION ORAL at 09:18

## 2022-04-05 RX ADMIN — TOPIRAMATE 100 MG: 100 TABLET ORAL at 09:22

## 2022-04-05 RX ADMIN — KETOROLAC TROMETHAMINE 15 MG: 30 INJECTION, SOLUTION INTRAMUSCULAR; INTRAVENOUS at 03:44

## 2022-04-05 RX ADMIN — ATORVASTATIN CALCIUM 40 MG: 40 TABLET, FILM COATED ORAL at 21:56

## 2022-04-05 RX ADMIN — RISPERIDONE 2 MG: 1 TABLET ORAL at 21:57

## 2022-04-05 RX ADMIN — SENNOSIDES AND DOCUSATE SODIUM 1 TABLET: 50; 8.6 TABLET ORAL at 21:56

## 2022-04-05 RX ADMIN — BUPROPION HYDROCHLORIDE 300 MG: 150 TABLET, EXTENDED RELEASE ORAL at 09:18

## 2022-04-05 RX ADMIN — ACETAMINOPHEN 975 MG: 325 TABLET ORAL at 05:43

## 2022-04-05 RX ADMIN — ACETAMINOPHEN 975 MG: 325 TABLET ORAL at 21:57

## 2022-04-05 RX ADMIN — SENNOSIDES AND DOCUSATE SODIUM 1 TABLET: 50; 8.6 TABLET ORAL at 09:19

## 2022-04-05 RX ADMIN — OXYCODONE HYDROCHLORIDE 10 MG: 5 TABLET ORAL at 13:15

## 2022-04-05 RX ADMIN — ACETAMINOPHEN 975 MG: 325 TABLET ORAL at 13:15

## 2022-04-05 RX ADMIN — TOPIRAMATE 100 MG: 100 TABLET ORAL at 21:56

## 2022-04-05 NOTE — PLAN OF CARE
Problem: Functional Ability Impaired (Hip Arthroplasty)  Goal: Optimal Functional Ability  Outcome: Ongoing, Progressing  Intervention: Promote Optimal Functional Status  Recent Flowsheet Documentation  Taken 4/5/2022 0725 by Vy Ayala RN  Activity Management:   activity adjusted per tolerance   up in chair     Problem: Pain (Hip Arthroplasty)  Goal: Acceptable Pain Control  Outcome: Ongoing, Progressing  Intervention: Prevent or Manage Pain  Recent Flowsheet Documentation  Taken 4/5/2022 1400 by Vy Ayala RN  Pain Management Interventions:   declines   rest  Taken 4/5/2022 0725 by Vy Ayala RN  Pain Management Interventions:   declines   rest     Problem: Postoperative Urinary Retention (Hip Arthroplasty)  Goal: Effective Urinary Elimination  Outcome: Ongoing, Progressing    Patient vital signs are at baseline: Yes  Patient able to ambulate as they were prior to admission or with assist devices provided by therapies during their stay:  Yes, assist of 1-2 w/ walker & gait belt  Patient MUST void prior to discharge:  Yes  Patient able to tolerate oral intake:  Yes  Pain has adequate pain control using Oral analgesics:  Yes  Does patient have an identified :  Yes, Home care vs TCU  Has goal D/C date and time been discussed with patient:  Yes     Pt endorses moderate-severe pain; controlled w/ Oxycodone & APAP. Dressing to R. Hip is CDI. Pt voiding & continuing on bladder scanning protocol. Still needs one more post-void bladder scan less than 500 ml. Participating in therapies but did report dizziness w/ AM PT; MD informed & ordered orthostatic BP's. VSS otherwise stable. Up w/ an assist of 1-2 w/ walker & gait belt. Tolerating a regular diet. Saline locked. Anticipating discharge tomorrow pending continued clinical progression.

## 2022-04-05 NOTE — CONSULTS
Care Management Initial Consult    General Information  Assessment completed with:  patient        Primary Care Provider verified and updated as needed:  CM updated    Readmission within the last 30 days: No          Advance Care Planning:    Does not have health care directive         Communication Assessment  Patient's communication style: spoken language (English or Bilingual)    Hearing Difficulty or Deaf: no   Wear Glasses or Blind: yes    Cognitive  Cognitive/Neuro/Behavioral: WDL                      Living Environment:   People in home: other (see comments), alone (with 24 hour PCA)     Current living Arrangements:  Living alone in an apartment       Able to return to prior arrangements:  TBD       Family/Social Support:  Care provided by: self, homecare agency (has 24/7 PCA services, also Home RN/PT)  Provides care for: no one                 Description of Support System: see below          Current Resources:   Patient receiving home care services:  24/7 PCA services, [facility] for RN/PT     Community Resources:  See below  Equipment currently used at home: Rx glasses, WC, walker, lift chair   Supplies currently used at home:  none    Values/Beliefs:  Spiritual, Cultural Beliefs, Restorationist Practices, Values that affect care:   denies              Additional Information:    Chart reviewed. PT and OT recommendations for TCU. CM met with patient and completed assessment. Reportedly has both [facility] Medicare Advantage Plan (which typically requires insurance prior authorization) and [facility] dual [employer] (does not typically require insurance prior authorization). Has [facility] for RN, PT and OT. Also reports having around the clock PCA care. CM updated PCP in Epic. Lives alone in an apartment. Wears Rx glasses. Has WC, walker and lift chair. No Health Care Directive but interested in blank document- CM will provide. Has [facility] CM ([facility] [phone]) and  [phone] Reports hx of bipolar disorder, PTDS, anxiety. Last psychiatric hospital admission in November 2019. No hx of civil commitment. Patient gives verbal consent for CM to speak with either [facility]. Also, states OK for CM to speak with her mother ([place] [phone]).     [facility]CU- per Jennifer in admissions, they are not holding any bed for her, unsure of [facility] will be in network at facility but will need to review full referral before determining if able to accept.  CM also sent additional TCU referrals to [facility] contracted facilities as discussed with patient.   Has had a previous TCU stay at [facility].   Requesting HE WC transportation at hospital discharge (has used in the past).   Will need PAS.     4:27 PM  CM spoke with Nayeli (PT, Director of HC) at [facility] who confirms patient was active with services for RN, PT and had OT. Agency can accept back for RN/PT/OT if patient returns home at hospital discharge with resumption of PCA services. Nayeli has been in contact with TCU admissions at [facility]. Nayeli can be reached at [phone] fax number for discharge orders (if returns home) is [phone] main HC phone number is [phone]  CM did not received return call from Jennifer in admissions at [facility] TCU- CM will need to follow up tomorrow.   Isabel Tubbs, RN

## 2022-04-05 NOTE — PROGRESS NOTES
Patient vital signs are at baseline: Yes  Patient able to ambulate as they were prior to admission or with assist devices provided by therapies during their stay:  No,  Reason:  Pt is refusing to get out of bed at this time.   Patient MUST void prior to discharge:  No,  Reason:  Currently has rebollar in place.   Patient able to tolerate oral intake:  Yes  Pain has adequate pain control using Oral analgesics:  Yes  Does patient have an identified :  Yes, mother   Has goal D/C date and time been discussed with patient:  Yes      Doris Dobson RN  4/5/2022

## 2022-04-05 NOTE — PROGRESS NOTES
04/05/22 0735   Quick Adds   Type of Visit Initial Occupational Therapy Evaluation   Living Environment   People in Home alone   Current Living Arrangements apartment   Home Accessibility no concerns   Transportation Anticipated family or friend will provide   Living Environment Comments all needs met on main level. Pt has walk in shower w/ shower chair and grab bars, elevated toilet seat and commode, sock aid, racher, leg . PCA coverage 24 hrs per day   Self-Care   Usual Activity Tolerance moderate   Equipment Currently Used at Home grab bar, toilet;grab bar, tub/shower;commode chair;raised toilet seat;shower chair;walker, rolling;walker, standard   Fall history within last six months no   Activity/Exercise/Self-Care Comment Pt receives assistance w/ all ADLs from PCA at [facility] Information   Onset of Illness/Injury or Date of Surgery 04/04/22   Referring Physician Zenon Damon MD   Additional Occupational Profile Info/Pertinent History of Current Problem VALENTIN   Performance Patterns (Routines, Roles, Habits) requires assistance w/ all ADLs and IADLs   Existing Precautions/Restrictions fall;no hip IR;no active hip ABD;no hip ADD past midline;no hip hyperextension;90 degree hip flexion;no pivoting or twisting   Right Lower Extremity (Weight-bearing Status) weight-bearing as tolerated (WBAT)   Cognitive Status Examination   Orientation Status orientation to person, place and time   Visual Perception   Visual Impairment/Limitations corrective lenses full-time   Sensory   Sensory Quick Adds No deficits were identified   Pain Assessment   Patient Currently in Pain Yes, see Vital Sign flowsheet   Integumentary/Edema   Integumentary/Edema no deficits were identifed   Posture   Posture forward head position   Range of Motion Comprehensive   General Range of Motion upper extremity range of motion deficits identified   General Upper Extremity Assessment (Range of Motion)   Upper Extremity: Range of  Motion shoulder, left: UE ROM;shoulder, right: UE ROM   Strength Comprehensive (MMT)   General Manual Muscle Testing (MMT) Assessment upper extremity strength deficits identified   Upper Extremity (Manual Muscle Testing)   Upper Extremity: Manual Muscle Testing (MMT) left shoulder strength deficit;right shoulder strength deficit   Muscle Tone Assessment   Muscle Tone Quick Adds No deficits were identified   Coordination   Upper Extremity Coordination No deficits were identified   Bed Mobility   Bed Mobility rolling left;rolling right;scooting/bridging;supine-sit;sit-supine   Comment (Bed Mobility) Min-Mod A for bed mobility   Transfers   Transfers bed-chair transfer;sit-stand transfer;toilet transfer   Transfer Comments Min A for all trasnfers   Activities of Daily Living   BADL Assessment/Intervention lower body dressing;toileting   Lower Body Dressing Assessment/Training   Maribel Level (Lower Body Dressing) minimum assist (75% patient effort)   Assistive Devices (Lower Body Dressing) reacher;sock-aid   Toileting   Maribel Level (Toileting) supervision   Clinical Impression   Criteria for Skilled Therapeutic Interventions Met (OT) Yes, treatment indicated   OT Diagnosis decreased ind w/ ADLS   Influenced by the following impairments VALENTIN   OT Problem List-Impairments impacting ADL activity tolerance impaired;mobility;strength;pain;post-surgical precautions   Assessment of Occupational Performance 3-5 Performance Deficits   Identified Performance Deficits bed mobilty, LE dressing, all transfers   Planned Therapy Interventions (OT) ADL retraining;bed mobility training;strengthening;transfer training   Clinical Decision Making Complexity (OT) moderate complexity   Risk & Benefits of therapy have been explained care plan/treatment goals reviewed;patient   OT Discharge Planning   OT Discharge Recommendation (DC Rec) Transitional Care Facility   OT Rationale for DC Rec Pt below baseline - feeling drowsy and  having pain in RLE. Pt currently has low activity tolerance and will benefit from continued therapy at TCU - pt agreeable to TCU   Therapy Certification   Start of Care Date 04/05/22   Certification date from 04/05/22   Certification date to 05/05/22   Medical Diagnosis VAELNTIN   OT Goals   Therapy Frequency (OT) Daily   OT Predicated Duration/Target Date for Goal Attainment 04/08/22   OT Goals Bed Mobility;Transfers;Toilet Transfer/Toileting   OT: Bed Mobility Supervision/stand-by assist;supine to/from sitting;rolling;bridging;within precautions   OT: Transfer Modified independent;with assistive device;within precautions   OT: Toilet Transfer/Toileting Modified independent;toilet transfer;cleaning and garment management;within precautions

## 2022-04-05 NOTE — PROGRESS NOTES
[facility]      OUTPATIENT OCCUPATIONAL THERAPY  EVALUATION  PLAN OF TREATMENT FOR OUTPATIENT REHABILITATION  (COMPLETE FOR INITIAL CLAIMS ONLY)  Patient's Last Name, First Name, M.I.  YOB: 1980  Ronel Ryan                          Provider's Name  [facility] Medical Record No.  [id number]                               Onset Date:  04/04/22   Start of Care Date:  04/05/22     Type:     ___PT   _X_OT   ___SLP Medical Diagnosis:  VALENTIN                        OT Diagnosis:  decreased ind w/ ADLS   Visits from SOC:  1   _________________________________________________________________________________  Plan of Treatment/Functional Goals    Planned Interventions: ADL retraining, bed mobility training, strengthening, transfer training   Goals: See Occupational Therapy Goals on Care Plan in [employer] electronic health record.    Therapy Frequency: Daily  Predicted Duration of Therapy Intervention: 04/08/22  _________________________________________________________________________________    I CERTIFY THE NEED FOR THESE SERVICES FURNISHED UNDER        THIS PLAN OF TREATMENT AND WHILE UNDER MY CARE     (Physician co-signature of this document indicates review and certification of the therapy plan).                Certification date from: 04/05/22, Certification date to: 05/05/22    Referring Physician: Zenon Damon MD            Initial Assessment        See Occupational Therapy evaluation dated 04/05/22 in Epic electronic health record.  Zenon Damon MD

## 2022-04-05 NOTE — PROGRESS NOTES
[facility] Orthopaedics Progress Note      Post-operative Day: 1 Day Post-Op    Procedure(s):  RIGHT TOTAL HIP ARTHROPLASTY        Plan: Anticoagulation protocol: scoanticoagulationlist2:  mg daily  x 40  days            Pain medications:  scopainmedication: oxycodone, tylenol, toradol. Will switch to ibuprofen after 4 doses of toradol.               Weight bearing status:  WBAT            Disposition:  Home this afternoon vs. Tomorrow morning with home care related to needed additional support and social needs.  Social work consult placed.  Acute Blood Loss Anemia: loss 4 pts. Mildly symptomatic with PT this morning. Apprec[place] [facility] [place].    Anterolateral thigh pain: previous numbness and tingling does sound somewhat improved. It is possible the patient has peresthetica meralgia, but this is difficult to determine at this time as the patient's sensation change may have been permanent prior to surgery. In the future, she should discuss this with her PCP and Dr. Damon.             Continue cares and rehabilitation     Subjective:  Ronel is a 41 year old woman with a PMH of mild intermittent asthma, HTN, Diabetes mellitus, hypercholesterolemia, vertigo, urinary incontinence, constipation, hip dysplasia and degenerative joint disease s/p left total hip arthroplasty in 2021, anxiety, Bipolar II Disorder, PTSD, and borderline personality disorder. History of obesity and 100 lb weight loss per chart.     Pain: moderate, however chief complaint this morning is chronic anterolateral thigh pain. The tingling over this area from pre-op is somewhat improved, reporting only pain, no paresthesias at this time. However, this has been present for 2 years. Patient asks when to expect this to go away.    Chest pain, SOB:  No  Reports an episode of lightheadedness/dizziness and mild SOB while working with therapies this morning.     Denies nausea/ vomiting  Passing flatus  voiding well    Objective:  Blood pressure  "102/55, pulse 84, temperature 98.1  F (36.7  C), temperature source Oral, resp. rate 18, height 1.626 m (5' 4\"), weight 114.4 kg (252 lb 3.2 oz), SpO2 100 %, not currently breastfeeding.    Patient Vitals for the past 24 hrs:   BP Temp Temp src Pulse Resp SpO2   04/05/22 0040 102/55 98.1  F (36.7  C) Oral 84 18 100 %   04/04/22 2040 116/56 97.6  F (36.4  C) Oral 98 18 100 %   04/04/22 1726 136/84 -- -- -- -- --   04/04/22 1640 (!) 190/79 97.3  F (36.3  C) Axillary 87 16 100 %   04/04/22 1540 138/68 97.5  F (36.4  C) Oral 85 18 100 %   04/04/22 1440 132/73 97.3  F (36.3  C) Axillary 68 16 100 %   04/04/22 1410 127/74 97.3  F (36.3  C) Axillary 67 16 100 %   04/04/22 1340 120/70 97.1  F (36.2  C) Temporal -- 16 100 %   04/04/22 1321 105/59 -- -- -- 16 --   04/04/22 1300 108/58 97  F (36.1  C) Temporal 74 15 100 %   04/04/22 1250 109/58 -- -- 73 12 100 %   04/04/22 1240 106/55 -- -- 76 17 100 %   04/04/22 1225 105/58 97.9  F (36.6  C) Temporal 81 24 100 %       Wt Readings from Last 4 Encounters:   04/04/22 114.4 kg (252 lb 3.2 oz)   11/08/21 113.4 kg (250 lb)   09/30/21 119.3 kg (263 lb)   09/28/21 119.3 kg (263 lb)         Motor function, sensation, and circulation intact   Yes  Wound status: Aquacel is clean, without shadowing, dry, and intact. Yes  Calf tenderness: Bilateral  No    Pertinent Labs   Lab Results: personally reviewed.     Recent Labs   Lab Test 04/05/22  0541 04/04/22  1657 04/04/22  0841 03/14/22  1139 09/11/21  0627 09/10/21  0627 09/09/21  0711 09/07/21  1257 05/21/21  1152 07/25/19  1100   INR  --   --  1.12  --   --   --  1.15  --   --   --    HGB 9.3*  --  13.3  --  9.0* 11.5*  --   --  13.7 12.6   HCT  --   --  40.2  --   --   --   --   --  41.8 39.3   MCV  --   --  90  --   --   --   --   --  94 85   PLT  --   --  269  --   --   --   --   --  283 309   NA  --  137  --  140  --  135*  --    < > 138 138    < > = values in this interval not displayed.       Report completed by:  Atul Pham, " NP  Date: 4/5/2022

## 2022-04-05 NOTE — PROGRESS NOTES
Patient vital signs are at baseline: Yes  Patient able to ambulate as they were prior to admission or with assist devices provided by therapies during their stay:  No,  Reason:  Able to stand and piviot  Patient MUST void prior to discharge:  No, due to void  Patient able to tolerate oral intake:  Yes  Pain has adequate pain control using Oral analgesics:  Yes  Does patient have an identified :  Yes  Has goal D/C date and time been discussed with patient:  Yes     A&O. Pt was able to tolerate standing and pivoting to chair 2A. Adams catheter removed, due to void. Pain managed using scheduled Tylenol.

## 2022-04-05 NOTE — PROGRESS NOTES
[facility] MEDICINE PROGRESS NOTE      Identification/Summary: oRnel Ryan is a 41 year old old female with a history of obesity with reported 100lbs lost, mild intermittent asthma, HTN, diabetes mellitus, hypercholesterolemia, vertigo, urinary incontinence, constipation, bilateral hip dysplasia and degenerative joint disease s/p left total hip arthroplasty in 2021, anxiety, Bipolar II Disorder, PTSD, and borderline personality disorder underwent right total hip arthroplasty. All chronic medical conditions well controlled preoperatively with prior to admission medication regimen. Of note, left hip arthroplasty complicated by dislocation requiring surgical revision.    Estimated 500 ml EBL. Pt feeling SOB and dizzy with exertion. Hgb drop of 4 points postoperatively. PT/OT recommending TCU for discharge versus home with home care.    Assessment and Plan:  Postop Right Total Hip Arthroplasty  Having pain, states it is tolerable with oral medications  Has not been able to void since removal of rebollar catheter at 0630  No return of bowel function  Plan: Per orthopedic surgery team    Acute Blood Loss Anemia following Total Hip Arthroplasty  Estimated 500 ml EBL intraoperatively.  Hgb drop from 13.3 to 9.3  Pt reporting fatigue, SOB, and dizziness with activity. States that this happened following her Left VALENTIN in September 2021  Plan: Monitor Hgb. Orthostatic BP monitoring.    Mild Intermittent Asthma   PRN albuterol inhaler. Does not use daily at home.   Plan: Continue PTA albuterol PRN     Hypertension  Not on daily medication at home. Normotensive since admission.  Plan: Continue to monitor.      Diabetes Mellitus  Reports that she no longer takes Trulicity. Noted that this may have been inappropriately diagnosed.   A1C 6 months ago 4.6 and today 4.7  BG ranging from 90 to 117  Plan: BG checks BID with goal < 200     Hypercholesterolemia   Continue PTA lipitor      Constipation  Takes  Colace PRN  Plan: Per orthopedic surgery      GERD  Patient denies recent symptoms. Does not take Famotidine any longer  Plan: Continue to monitor      Urinary Incontinence - mixed stress and urge   Adams removed 4/5 0630. Has not been able to spontaneously void yet.  Plan: hold PTA Detrol until return of spontaneous voids. Consider bladder scan.     Vertigo  Dizziness patient is experiencing is lightheadedness vs room spinning vertigo she usually feels  Plan: Continue PTA PRN Meclizine     Anxiety  Denies anxiety or panic attacks currently  Plan: Continue PTA PRN Hydroxyzine     Bipolar II Disorder  Seeing outpatient psychiatrist regularly  Stable on PTA regimen of Risperdal, Topamax, Wellbutrin, and Trazodone   Plan: Continue current medications        Diet: Advance Diet as Tolerated: Regular Diet Adult  Regular Diet Adult  DVT Prophylaxis: 325 mg ASA every day for 42 days per orthopedic surgery.  Code Status: Full Code    Disposition Plan   Expected Discharge: 04/06/2022       PT/OT recommending TCU versus home with home care.  Since patient is in a facility with PT/OT and RN available and a PCA available 24 hours a day probably very reasonable she does return therefore people know her well.  Patient in agreement with plan     The patient's care was discussed with the Attending Physician, Dr. Carrera.    [facility]  Phone: #[phone]    Patient seen and examined  Patient discussed with physician assistant student  Agree with assessment and plan as outlined above    Braden Carrera MD  [facility]    Interval History/Subjective:  Pt reports that she is doing well postoperatively. She endorses right hip pain that is being managed adequately with oral medications. She has been able to ambulate with PT, but was limited by dizziness. Her appetite and intake have been good but she states she is unable to pass gas  or have a bowel movement. No return of spontaneous voiding following catheter removal this morning. She denies headaches, vision changes, chest pain, shortness of breath at rest, nausea, abdominal pain, weakness, or numbness in extremities, or anxiety. Therapy recommending TCU versus home with home care and patient is comfortable with this plan moving forward.    Physical Exam/Objective:  Temp:  [97  F (36.1  C)-98.1  F (36.7  C)] 98.1  F (36.7  C)  Pulse:  [67-98] 84  Resp:  [12-24] 18  BP: (102-190)/(55-84) 102/55  SpO2:  [100 %] 100 %  Body mass index is 43.29 kg/m .    GENERAL:  Appears comfortable in no acute distress. Pt appears tired with eyes closed.   HEAD:  Normocephalic, without obvious abnormality, atraumatic   EYES:  PERRL, conjunctiva/corneas clear, no scleral icterus, EOM's intact   THROAT: Lips, mucosa, and tongue normal; teeth and gums normal, mouth moist   LUNGS:   Clear to auscultation bilaterally, no rales, rhonchi, or wheezing, symmetric chest rise on inhalation, respirations unlabored   HEART:  Regular rate and rhythm, S1 and S2 normal, no murmur, rub, or gallop    ABDOMEN:   Soft, non-tender, bowel sounds hypoactive all four quadrants, no masses, no organomegaly, no rebound or guarding   EXTREMITIES: No edema. 5/5 plantarflexion and dorsiflexion strength bilaterally. Sensation intact in bilateral lower extremities. Incision assessment per orthopedic surgery.   NEURO: Alert, appears grossly cognitively intact, moves all four extremities freely   PSYCH: Cooperative, behavior is appropriate.  Seems to lack some insight into her medical situation     Data reviewed today: I personally reviewed all new medications, labs, imaging/diagnostics reports over the past 24 hours. Pertinent findings include:    Imaging:   Recent Results (from the past 24 hour(s))   XR Hip Port Right 1 View    Narrative    EXAM: XR HIP PORT RT 1 VW  LOCATION: [facility]  DATE/TIME: 4/4/2022 11:43  AM    INDICATION: right total hip  COMPARISON: None.      Impression    IMPRESSION: Single intraoperative radiograph shows a right total hip replacement in progress. Please see dedicated operative report for further details.    XR Pelvis w Hip Port Right 1 View    Narrative    EXAM: XR PELVIS AD HIP PORTABLE RIGHT 1 VIEW  LOCATION: [facility]  DATE/TIME: 4/4/2022 12:56 PM    INDICATION: Status post hip surgery.  COMPARISON: None.      Impression    IMPRESSION: Postoperative changes bilateral total hip arthroplasty. Components appear well seated bilaterally. Pelvis negative for fracture. Intrauterine device.                   Labs:  Most Recent 3 BMP's:Recent Labs   Lab Test 04/05/22  0937 04/04/22  1657 04/04/22  1243 04/04/22  0920 03/14/22  1139 09/10/21  0631 09/10/21  0627   NA  --  137  --   --  140  --  135*   POTASSIUM  --  4.3  --   --  4.2  --  4.3   CHLORIDE  --  110*  --   --  110*  --  109*   CO2  --  17*  --   --  22  --  18*   BUN  --  14  --   --  13  --  10   CR  --  0.91  --   --  1.05  --  1.00   ANIONGAP  --  10  --   --  8  --  8   ANGELICA  --  8.5  --   --  9.4  --  8.7   * 183* 93   < > 86   < > 155*    < > = values in this interval not displayed.     Most Recent 3 Hemoglobins:Recent Labs   Lab Test 04/05/22  0541 04/04/22  0841 09/11/21  0627   HGB 9.3* 13.3 9.0*     Most Recent 6 glucoses:Recent Labs   Lab Test 04/05/22  0937 04/04/22  1657 04/04/22  1243 04/04/22  0920 03/14/22  1139 09/15/21  1156   * 183* 93 90 86 124*       Medications:   Personally Reviewed.  Medications     lactated ringers Stopped (04/05/22 0213)       acetaminophen  975 mg Oral Q8H     aspirin  325 mg Oral Daily     atorvastatin  40 mg Oral QPM     buPROPion  300 mg Oral Daily     famotidine  20 mg Oral BID    Or     famotidine  20 mg Intravenous BID     polyethylene glycol  17 g Oral Daily     risperiDONE  2 mg Oral At Bedtime     senna-docusate  1 tablet Oral BID     sodium  chloride (PF)  3 mL Intracatheter Q8H     tolterodine ER  4 mg Oral Daily     topiramate  100 mg Oral BID     traZODone  150 mg Oral At Bedtime

## 2022-04-05 NOTE — PROGRESS NOTES
[facility]      OUTPATIENT PHYSICAL THERAPY EVALUATION  PLAN OF TREATMENT FOR OUTPATIENT REHABILITATION  (COMPLETE FOR INITIAL CLAIMS ONLY)  Patient's Last Name, First Name, M.I.  YOB: 1980  Ronel Ryan                        Provider's Name  [facility] Medical Record No.  [id number]                               Onset Date:  04/04/22   Start of Care Date:  (P) 04/05/22      Type:     _X_PT   ___OT   ___SLP Medical Diagnosis:  (P) R VALENTIN                        PT Diagnosis:  (P) Impaired functional mobility   Visits from SOC:  1   _________________________________________________________________________________  Plan of Treatment/Functional Goals    Planned Interventions: (P) gait training, strengthening, transfer training     Goals: See Physical Therapy Goals on Care Plan in [employer] electronic health record.    Therapy Frequency:    Predicted Duration of Therapy Intervention:    _________________________________________________________________________________    I CERTIFY THE NEED FOR THESE SERVICES FURNISHED UNDER        THIS PLAN OF TREATMENT AND WHILE UNDER MY CARE     (Physician co-signature of this document indicates review and certification of the therapy plan).              Zenon Damon MD  Certification date from: (P) 04/05/22, Certification date to: (P) 04/19/22    Referring Physician: Dr. Zenon Damon            Initial Assessment        See Physical Therapy evaluation dated (P) 04/05/22 in Epic electronic health record.

## 2022-04-05 NOTE — PROGRESS NOTES
Patient vital signs are at baseline: Yes  Patient able to ambulate as they were prior to admission or with assist devices provided by therapies during their stay:  No,  Reason:  Due to ambulate  Patient MUST void prior to discharge:  No,  Reason:  Adams Catheter  Patient able to tolerate oral intake:  Yes  Pain has adequate pain control using Oral analgesics:  Yes  Does patient have an identified :  Yes, Mother  Has goal D/C date and time been discussed with patient:  Yes     Pt is due to ambulate. Pt verbalizes fear of ambulating due to hx of post-op hip dislocation with left hip and anxiety about potential pain. Education about importance of post-op ambulation provided. Will continue to encourage ambulation. Adams catheter remains intact, no order clarifying placement or removal. Will seek order clarification with day shift and when pt mobility increases.     Cira Fuentes RN

## 2022-04-05 NOTE — PROGRESS NOTES
04/05/22 0837   Quick Adds   Quick Adds Certification   Type of Visit Initial PT Evaluation   Living Environment   People in Home alone   Current Living Arrangements apartment   Home Accessibility no concerns   Self-Care   Equipment Currently Used at Home walker, rolling   Fall history within last six months no   Activity/Exercise/Self-Care Comment Reports PCA assisted her into bed due to hip pain. Amb independent with FWW at baseline.   General Information   Onset of Illness/Injury or Date of Surgery 04/04/22   Referring Physician Dr. Zenon Damon   Patient/Family Therapy Goals Statement (PT) None stated   Pertinent History of Current Problem (include personal factors and/or comorbidities that impact the POC) Admit for R VALENTIN   Existing Precautions/Restrictions no hip IR;no hip ADD past midline;no hip hyperextension;90 degree hip flexion;no pivoting or twisting;fall   Weight-Bearing Status - RLE weight-bearing as tolerated   Pain Assessment   Patient Currently in Pain Yes, see Vital Sign flowsheet   Bed Mobility   Impairments Contributing to Impaired Bed Mobility pain;decreased strength   Assistive Device (Bed Mobility) bed rails;draw sheet   Comment, (Bed Mobility) Supine>sit max A for RLE due to pain and with pt using bed rail.   Transfers   Impairments Contributing to Impaired Transfers pain;decreased strength   Comment, (Transfers) Sit<>stand edge of bed to FWW min A.   Gait/Stairs (Locomotion)   [place] Level (Gait) minimum assist (75% patient effort)   Assistive Device (Gait) walker, front-wheeled   Distance in Feet (Required for LE Total Joints) 40'x1   Pattern (Gait) step-to   Deviations/Abnormal Patterns (Gait) antalgic;sandrine decreased;gait speed decreased;weight shifting decreased;stride length decreased   Comment, (Gait/Stairs) Very slow, step-to gait with multiple standing rest breaks due to fatigue and weakness   Clinical Impression   Criteria for Skilled Therapeutic Intervention Yes,  treatment indicated   PT Diagnosis (PT) Impaired functional mobility   Influenced by the following impairments weakness, fatigue, pain   Functional limitations due to impairments transfers, gait   Clinical Presentation (PT Evaluation Complexity) Stable/Uncomplicated   Clinical Presentation Rationale Presents as medically diagnosed   Clinical Decision Making (Complexity) low complexity   Planned Therapy Interventions (PT) gait training;strengthening;transfer training   Anticipated Equipment Needs at Discharge (PT) walker, rolling   Risk & Benefits of therapy have been explained evaluation/treatment results reviewed;care plan/treatment goals reviewed;participants voiced agreement with care plan;participants included;patient   PT Discharge Planning   PT Discharge Recommendation (DC Rec) Transitional Care Facility   PT Rationale for DC Rec Requires assist with all mobility at this time.   Plan of Care Review   Plan of Care Reviewed With patient   Therapy Certification   Start of care date 04/05/22   Certification date from 04/05/22   Certification date to 04/19/22   Medical Diagnosis R VALENTIN

## 2022-04-05 NOTE — PROGRESS NOTES
04/05/22 0837   Quick Adds   Quick Adds Certification   Type of Visit Initial PT Evaluation   Living Environment   People in Home alone   Current Living Arrangements apartment   Home Accessibility no concerns   Self-Care   Equipment Currently Used at Home walker, rolling   Fall history within last six months no   Activity/Exercise/Self-Care Comment Reports PCA assisted her into bed due to hip pain. Amb independent with FWW at baseline.   General Information   Onset of Illness/Injury or Date of Surgery 04/04/22   Referring Physician Dr. Zenon Damon   Patient/Family Therapy Goals Statement (PT) None stated   Pertinent History of Current Problem (include personal factors and/or comorbidities that impact the POC) Admit for R VALENTIN   Existing Precautions/Restrictions no hip IR;no hip ADD past midline;no hip hyperextension;90 degree hip flexion;no pivoting or twisting;fall   Weight-Bearing Status - RLE weight-bearing as tolerated   Pain Assessment   Patient Currently in Pain Yes, see Vital Sign flowsheet   Bed Mobility   Impairments Contributing to Impaired Bed Mobility pain;decreased strength   Assistive Device (Bed Mobility) bed rails;draw sheet   Comment, (Bed Mobility) Supine>sit max A for RLE due to pain and with pt using bed rail.   Transfers   Impairments Contributing to Impaired Transfers pain;decreased strength   Comment, (Transfers) Sit<>stand edge of bed to FWW min A.   Gait/Stairs (Locomotion)   [place] Level (Gait) minimum assist (75% patient effort)   Assistive Device (Gait) walker, front-wheeled   Distance in Feet (Required for LE Total Joints) 40'x1   Pattern (Gait) step-to   Deviations/Abnormal Patterns (Gait) antalgic;sandrine decreased;gait speed decreased;weight shifting decreased;stride length decreased   Comment, (Gait/Stairs) Very slow, step-to gait with multiple standing rest breaks due to fatigue and weakness   Clinical Impression   Criteria for Skilled Therapeutic Intervention Yes,  treatment indicated   PT Diagnosis (PT) Impaired functional mobility   Influenced by the following impairments weakness, fatigue, pain   Functional limitations due to impairments transfers, gait   Clinical Presentation (PT Evaluation Complexity) Stable/Uncomplicated   Clinical Presentation Rationale Presents as medically diagnosed   Clinical Decision Making (Complexity) low complexity   Planned Therapy Interventions (PT) gait training;strengthening;transfer training   Anticipated Equipment Needs at Discharge (PT) walker, rolling   Risk & Benefits of therapy have been explained evaluation/treatment results reviewed;care plan/treatment goals reviewed;participants voiced agreement with care plan;participants included;patient   PT Discharge Planning   PT Discharge Recommendation (DC Rec) Transitional Care Facility   PT Rationale for DC Rec Requires assist with all mobility at this time.   Plan of Care Review   Plan of Care Reviewed With patient

## 2022-04-06 ENCOUNTER — APPOINTMENT (OUTPATIENT)
Dept: PHYSICAL THERAPY | Facility: CLINIC | Age: 42
End: 2022-04-06
Attending: ORTHOPAEDIC SURGERY
Payer: COMMERCIAL

## 2022-04-06 LAB
ATRIAL RATE - MUSE: 93 BPM
DIASTOLIC BLOOD PRESSURE - MUSE: NORMAL MMHG
FASTING STATUS PATIENT QL REPORTED: ABNORMAL
GLUCOSE BLD-MCNC: 143 MG/DL (ref 70–125)
GLUCOSE BLDC GLUCOMTR-MCNC: 130 MG/DL (ref 70–99)
GLUCOSE BLDC GLUCOMTR-MCNC: 93 MG/DL (ref 70–99)
HGB BLD-MCNC: 9.3 G/DL (ref 11.7–15.7)
INTERPRETATION ECG - MUSE: NORMAL
P AXIS - MUSE: 66 DEGREES
PR INTERVAL - MUSE: 156 MS
QRS DURATION - MUSE: 86 MS
QT - MUSE: 394 MS
QTC - MUSE: 489 MS
R AXIS - MUSE: -14 DEGREES
SARS-COV-2 RNA RESP QL NAA+PROBE: NEGATIVE
SYSTOLIC BLOOD PRESSURE - MUSE: NORMAL MMHG
T AXIS - MUSE: 29 DEGREES
TROPONIN I SERPL-MCNC: <0.01 NG/ML (ref 0–0.29)
VENTRICULAR RATE- MUSE: 93 BPM

## 2022-04-06 PROCEDURE — 250N000013 HC RX MED GY IP 250 OP 250 PS 637: Performed by: ORTHOPAEDIC SURGERY

## 2022-04-06 PROCEDURE — 97116 GAIT TRAINING THERAPY: CPT | Mod: GP

## 2022-04-06 PROCEDURE — 250N000013 HC RX MED GY IP 250 OP 250 PS 637: Performed by: FAMILY MEDICINE

## 2022-04-06 PROCEDURE — 93010 ELECTROCARDIOGRAM REPORT: CPT | Performed by: GENERAL ACUTE CARE HOSPITAL

## 2022-04-06 PROCEDURE — 97530 THERAPEUTIC ACTIVITIES: CPT | Mod: GP

## 2022-04-06 PROCEDURE — 82962 GLUCOSE BLOOD TEST: CPT | Mod: 91

## 2022-04-06 PROCEDURE — 82947 ASSAY GLUCOSE BLOOD QUANT: CPT | Mod: 91 | Performed by: ORTHOPAEDIC SURGERY

## 2022-04-06 PROCEDURE — 84484 ASSAY OF TROPONIN QUANT: CPT | Performed by: FAMILY MEDICINE

## 2022-04-06 PROCEDURE — 36415 COLL VENOUS BLD VENIPUNCTURE: CPT | Performed by: ORTHOPAEDIC SURGERY

## 2022-04-06 PROCEDURE — 93005 ELECTROCARDIOGRAM TRACING: CPT

## 2022-04-06 PROCEDURE — 99214 OFFICE O/P EST MOD 30 MIN: CPT | Performed by: FAMILY MEDICINE

## 2022-04-06 PROCEDURE — 85018 HEMOGLOBIN: CPT | Performed by: ORTHOPAEDIC SURGERY

## 2022-04-06 PROCEDURE — 93005 ELECTROCARDIOGRAM TRACING: CPT | Performed by: FAMILY MEDICINE

## 2022-04-06 PROCEDURE — 87635 SARS-COV-2 COVID-19 AMP PRB: CPT | Performed by: FAMILY MEDICINE

## 2022-04-06 PROCEDURE — 36415 COLL VENOUS BLD VENIPUNCTURE: CPT | Performed by: FAMILY MEDICINE

## 2022-04-06 PROCEDURE — 97110 THERAPEUTIC EXERCISES: CPT | Mod: GP

## 2022-04-06 RX ORDER — DIPHENHYDRAMINE HCL 25 MG
50 CAPSULE ORAL
Status: DISCONTINUED | OUTPATIENT
Start: 2022-04-06 | End: 2022-04-07 | Stop reason: HOSPADM

## 2022-04-06 RX ORDER — OXYCODONE HYDROCHLORIDE 5 MG/1
5-10 TABLET ORAL EVERY 6 HOURS PRN
Qty: 26 TABLET | Refills: 0 | Status: SHIPPED | OUTPATIENT
Start: 2022-04-06 | End: 2022-11-10

## 2022-04-06 RX ORDER — DIPHENHYDRAMINE HYDROCHLORIDE 50 MG/ML
50 INJECTION INTRAMUSCULAR; INTRAVENOUS
Status: DISCONTINUED | OUTPATIENT
Start: 2022-04-06 | End: 2022-04-07 | Stop reason: HOSPADM

## 2022-04-06 RX ORDER — EPINEPHRINE 1 MG/ML
0.3 INJECTION, SOLUTION INTRAMUSCULAR; SUBCUTANEOUS EVERY 5 MIN PRN
Status: DISCONTINUED | OUTPATIENT
Start: 2022-04-06 | End: 2022-04-07 | Stop reason: HOSPADM

## 2022-04-06 RX ORDER — HYDROXYZINE HYDROCHLORIDE 25 MG/1
25 TABLET, FILM COATED ORAL EVERY 6 HOURS PRN
Qty: 30 TABLET | Refills: 0 | Status: SHIPPED | OUTPATIENT
Start: 2022-04-06 | End: 2023-06-22

## 2022-04-06 RX ORDER — POLYETHYLENE GLYCOL 3350 17 G/17G
17 POWDER, FOR SOLUTION ORAL DAILY
Qty: 510 G | Refills: 0 | Status: SHIPPED | OUTPATIENT
Start: 2022-04-06 | End: 2022-11-10

## 2022-04-06 RX ADMIN — TOPIRAMATE 100 MG: 100 TABLET ORAL at 21:01

## 2022-04-06 RX ADMIN — ASPIRIN 325 MG: 325 TABLET ORAL at 09:20

## 2022-04-06 RX ADMIN — RISPERIDONE 2 MG: 1 TABLET ORAL at 21:01

## 2022-04-06 RX ADMIN — FAMOTIDINE 20 MG: 20 TABLET ORAL at 20:58

## 2022-04-06 RX ADMIN — OXYCODONE HYDROCHLORIDE 5 MG: 5 TABLET ORAL at 05:13

## 2022-04-06 RX ADMIN — TRAZODONE HYDROCHLORIDE 150 MG: 150 TABLET ORAL at 20:59

## 2022-04-06 RX ADMIN — TOPIRAMATE 100 MG: 100 TABLET ORAL at 10:52

## 2022-04-06 RX ADMIN — SENNOSIDES AND DOCUSATE SODIUM 1 TABLET: 50; 8.6 TABLET ORAL at 20:58

## 2022-04-06 RX ADMIN — HYDROXYZINE HYDROCHLORIDE 25 MG: 25 TABLET, FILM COATED ORAL at 09:27

## 2022-04-06 RX ADMIN — POLYETHYLENE GLYCOL 3350 17 G: 17 POWDER, FOR SOLUTION ORAL at 09:20

## 2022-04-06 RX ADMIN — ACETAMINOPHEN 975 MG: 325 TABLET ORAL at 05:13

## 2022-04-06 RX ADMIN — BUPROPION HYDROCHLORIDE 300 MG: 150 TABLET, EXTENDED RELEASE ORAL at 09:20

## 2022-04-06 RX ADMIN — OXYCODONE HYDROCHLORIDE 10 MG: 5 TABLET ORAL at 13:35

## 2022-04-06 RX ADMIN — ATORVASTATIN CALCIUM 40 MG: 40 TABLET, FILM COATED ORAL at 20:59

## 2022-04-06 RX ADMIN — ACETAMINOPHEN 975 MG: 325 TABLET ORAL at 21:00

## 2022-04-06 RX ADMIN — SENNOSIDES AND DOCUSATE SODIUM 1 TABLET: 50; 8.6 TABLET ORAL at 09:20

## 2022-04-06 RX ADMIN — ACETAMINOPHEN 975 MG: 325 TABLET ORAL at 13:34

## 2022-04-06 NOTE — UTILIZATION REVIEW
Admission Status; Secondary Review Determination     Under the authority of the Utilization Management Committee, the utilization review process indicated a secondary review on Ronel Ryan.  The review outcome is based on review of the medical records, discussions with staff, and applying clinical experience noted on the date of the review.       Outpatient/Post Procedure Care - This patient's medical care is not consistent with medical management for Inpatient or Observation Status.          RATIONALE FOR DETERMINATION   41 yr old female with obesity BMI 43, asthma, HTN on no meds and DM on no meds with anxiety presented for Right VALENTIN.  Did have EBL 500cc and 4 point hgb drop 13 to 9 but has been stable since and did not require transfusion.  Postop course uncomplicated except for pain and some slow progress with therapy.  Not orthostatic.  Medically stable and awaiting safe disposition plan.    The severity of illness, intensity of service provided, expected LOS and risk for adverse outcome make the care complex, high risk and appropriate for hospital admission.    The information on this document is developed by the utilization review team in order for the business office to ensure compliance.  This only denotes the appropriateness of proper admission status and does not reflect the quality of care rendered.         The definitions of Inpatient Status and Observation Status used in making the determination above are those provided in the CMS Coverage Manual, Chapter 1 and Chapter 6, section 70.4.      Sincerely,   Ann Paul MD  Utilization Review  Physician Advisor  [facility]

## 2022-04-06 NOTE — PLAN OF CARE
Pt eating, drinking, voiding and has active bowel sounds.    She is very unmotivated to move and do ADLs.     Patient vital signs are at baseline: Yes  Patient able to ambulate as they were prior to admission or with assist devices provided by therapies during their stay:  No,  Reason:  Pt is not motivated to walk and do her PT/OT excercises.  Patient MUST void prior to discharge:  Yes  Patient able to tolerate oral intake:  Yes  Pain has adequate pain control using Oral analgesics:  Yes  Does patient have an identified :  Yes  Has goal D/C date and time been discussed with patient:  Yes plans to go to TCU.      Problem: Plan of Care - These are the overarching goals to be used throughout the patient stay.    Goal: Optimal Comfort and Wellbeing  Outcome: Ongoing, Not Progressing  Intervention: Monitor Pain and Promote Comfort  Recent Flowsheet Documentation  Taken 4/6/2022 1200 by Ioana Ortiz RN  Pain Management Interventions: declines  Taken 4/6/2022 0927 by Ioana Ortiz RN  Pain Management Interventions: medication (see MAR)  Taken 4/6/2022 0845 by Ioana Ortiz RN  Pain Management Interventions:    distraction    pillow support provided    repositioned         Goal Outcome Evaluation:       Continue to monitor VS, labs, pain level, incision site and activity tolerance.

## 2022-04-06 NOTE — PROGRESS NOTES
Care Management Follow Up    Length of Stay (days): 0        Patient plan of care discussed at interdisciplinary rounds: Yes    Expected Discharge Date:   4/6/22 (4/7/22)       Concerns to be Addressed / Barriers to Discharge: medical surgical management, TCU bed pending insurance auth     Anticipated Discharge Disposition: TCU    Anticipated Discharge Services:      Anticipated Discharge DME:      Patient/family educated on Medicare website which has current facility and service quality ratings:  previously done    Education Provided on the Discharge Plan:   Yes    Patient/Family in Agreement with the Plan:   yes    Referrals Placed by CM/SW: previously done  Private pay costs discussed: Not applicable     Additional Information:  4/6/2022  Reviewed chart and noted  (DARBY) was working on TCU bed for patient with [facility] Medicare advantage for insurance. Per 4/5 CM note, patient also has dual MSHO and she would prefer to go to [facility] for TCU or return home with resumption of home care and 24 hour PCA services. (see note for further details) Noted other TCU referrals placed.     8:28 AM Called and spoke with Blossom in admissions @ [facility] TCU re: referral for TCU bed. Per Blossom, she still needs to review patient but patient's claim she has a bed is not correct. (Patient currently open to [facility].). Per Blossom, [facility] is out of network for her [facility] insurance so there would be a copay of: $150 for days 1 - 16. Then a copay of $250 from days 17-47. Still need to review.   Requested return call to [phone].     9:41 AM return call from Dominic in admissions with [facility]) TCU. Still reviewing. Requested return call to [phone].     Return call from Dominic in admissions- if patient in agreement with receiving COVID booster, and they receive [facility] insurance authorization, would be able to offer private TCU room at no extra  charge. Will need repeat COVID negative test within 72 hours of admission so will need repeat. Discussed with patient and in agreement with getting COVID booster injection to be able to go to TCU @ [facility] TCU. Updated Hospitalist and Ioana DO.     Called and updated Dominic in admissions- per Dominic will submit for are auth. (per Dominic, patient has [employer] [facility], not dual MSHO). Planning on admission for Th 4/7 @ 11. Per previous CM note 4/5, patient requesting [facility] wheelchair ride.     Updated Ioana ABBASI RN    Called and scheduled wheelchair ride with Luis Alberto at [facility] transport for 1100 tomorrow 4/7.

## 2022-04-06 NOTE — PROGRESS NOTES
Occupational Therapy Discharge Summary    Reason for therapy discharge:    Discharged to transitional care facility.    Progress towards therapy goal(s). See goals on Care Plan in [facility] electronic health record for goal details.  Goals not met.  Barriers to achieving goals:   discharge from facility.    Therapy recommendation(s):    Continued therapy is recommended.  Rationale/Recommendations:  to increase independence with adls.

## 2022-04-06 NOTE — PLAN OF CARE
Problem: Pain (Hip Arthroplasty)  Goal: Acceptable Pain Control  Intervention: Prevent or Manage Pain  Recent Flowsheet Documentation  Taken 4/5/2022 2200 by Ivy Flynn RN  Pain Management Interventions: medication (see MAR)   Goal Outcome Evaluation:        Pain well managed with scheduled oral pain medication and positioning.  Patient rated pain minimal this shift.  Patient vital signs are at baseline: Yes  Patient able to ambulate as they were prior to admission or with assist devices provided by therapies during their stay:  Yes  Patient MUST void prior to discharge:  Yes  Patient able to tolerate oral intake:  Yes  Pain has adequate pain control using Oral analgesics:  Yes  Does patient have an identified :  Yes  Has goal D/C date and time been discussed with patient:  Yes

## 2022-04-06 NOTE — PLAN OF CARE
Goal Outcome Evaluation:        Patient vital signs are at baseline: Yes  Patient able to ambulate as they were prior to admission or with assist devices provided by therapies during their stay:  Yes  Patient MUST void prior to discharge:  Yes  Patient able to tolerate oral intake:  Yes  Pain has adequate pain control using Oral analgesics:  Yes  Does patient have an identified :  Yes  Has goal D/C date and time been discussed with patient:  Yes     Pt stated pain 10/10 with activity. Required lots of encouragement for getting up and void. Voiding with bedside commode. A && O X 4. Able to sleep between cares.

## 2022-04-06 NOTE — PROGRESS NOTES
[facility] MEDICINE PROGRESS NOTE      Identification/Summary: Identification/Summary: Ronel Ryan is a 41 year old old female with a history of obesity with reported 100lbs lost, mild intermittent asthma, HTN, diabetes mellitus, hypercholesterolemia, vertigo, urinary incontinence, constipation, bilateral hip dysplasia and degenerative joint disease s/p left total hip arthroplasty in 2021, anxiety, Bipolar II Disorder, PTSD, and borderline personality disorder underwent right total hip arthroplasty. All chronic medical conditions well controlled preoperatively with prior to admission medication regimen. Of note, left hip arthroplasty complicated by dislocation requiring surgical revision.     Estimated 500 ml EBL. Pt feeling SOB and dizzy with exertion. Hgb drop of 4 points postoperatively.     4/6: Hgb stable at 9.3. Continues to feel lightheaded with exertion. Soft BPs of 90s/60s. Possible discharge to TCU pending placement. Reports some exertional chest pain and dyspnea yesterday, but denies current symptoms. EKG showing possible new anterior infarct. Troponin and Echocardiogram ordered.    Assessment and Plan:  Postop Right Total Hip Arthroplasty  Having pain, states it is tolerable with oral medications  Return of spontaneous voiding   Denies bowel movement - endorses she is able to pass gas   Plan: Per orthopedic surgery team and to TCU tomorrow     Acute Blood Loss Anemia following Total Hip Arthroplasty  Estimated 500 ml EBL intraoperatively.  Hgb drop from 13.3 to 9.3. Stable at 9.3 on 4/6  Pt reporting fatigue, SOB, and dizziness with activity. States that this happened following her Left VALENTIN in September 2021  Plan: Monitor Hgb. Orthostatic BP monitoring.     Mild Intermittent Asthma   PRN albuterol inhaler. Does not use daily at home.   Plan: Continue PTA albuterol PRN     Hypertension  Not on daily medication at home. Soft BPs for last 24 hours.  Plan: Continue to  monitor.      Diabetes Mellitus  Reports that she no longer takes Trulicity. Noted that this may have been inappropriately diagnosed.   A1C 6 months ago 4.6 and today 4.7  BG ranging from 117-147  Plan: BG checks BID with goal < 200     Hypercholesterolemia   Continue PTA lipitor      Constipation  Takes Colace PRN  Plan: Per orthopedic surgery      GERD  Patient denies recent symptoms. Does not take Famotidine any longer  Plan: Continue to monitor      Urinary Incontinence - mixed stress and urge   Adams removed 4/5. Has been able to spontaneously void.  Plan: Hold PTA Detrol until discharge      Vertigo  Dizziness patient is experiencing is lightheadedness vs room spinning vertigo she usually feels  Plan: Continue PTA PRN Meclizine     Anxiety  Denies anxiety or panic attacks currently  Plan: Continue PTA PRN Hydroxyzine     Bipolar II Disorder  Seeing outpatient psychiatrist regularly  Stable on PTA regimen of Risperdal, Topamax, Wellbutrin, and Trazodone   Plan: Continue current medications     Chest pain and Shortness of Breath with Exertion, currently asymptomatic  Stated that yesterday she experienced transient chest pain and SOB with activity. Denied diaphoresis, anxiety, nausea, or radiation of pain. Currently asymptomatic  EKG showing possible old inferior infarct.  Plan: Troponin and Echocardiogram        Diet: Advance Diet as Tolerated: Regular Diet Adult  Regular Diet Adult  DVT Prophylaxis: 325 mg ASA every day for 42 days per orthopedic surgery.  Code Status: Full Code     Disposition plan:   Awaiting [facility] TCU approval.  Pt lives in facility with PT/OT and RN available as well as 24 hour PCA.     The patient's care was discussed with the Attending Physician, Dr. Carrera.    [facility]  Phone: #[phone]    Patient seen and examined  Patient discussed with physician assistant student  Agree with  assessment and plan as outlined above     Braden Carrera MD  [facility] Medicine Service    Interval History/Subjective:  Pt reports that she is doing well postoperatively and that her right hip pain is being well controlled with oral medications. She continues to have dizziness with activity following surgery. She reports that yesterday she had chest pain and shortness of breath with activity. She denies ongoing pain or SOB. Has been able to tolerate oral intake, urinate, and has begun passing gas. She denies headaches, vision changes, chest pain, shortness of breath, nausea, abdominal pain, weakness, or numbness in extremities, or anxiety. Recommending TCU for discharge and patient in agreement with plan.     Physical Exam/Objective:  Temp:  [97.7  F (36.5  C)-99  F (37.2  C)] 98.2  F (36.8  C)  Pulse:  [75-96] 95  Resp:  [18-20] 18  BP: ()/(49-64) 96/52  SpO2:  [99 %-100 %] 99 %  Body mass index is 43.29 kg/m .    GENERAL:  Alert, appears comfortable, in no acute distress, appears stated age   HEAD:  Normocephalic, without obvious abnormality, atraumatic   EYES:  PERRL, conjunctiva/corneas clear, no scleral icterus, EOM's intact   LUNGS:   Clear to auscultation bilaterally, no rales, rhonchi, or wheezing, symmetric chest rise on inhalation, respirations unlabored   HEART:  Regular rate and rhythm, S1 and S2 normal, no murmur, rub, or gallop    ABDOMEN:   Soft, non-tender, no masses, no organomegaly, no rebound or guarding   EXTREMITIES: Trace edema in RLE. 5/5 plantarflexion and dorsiflexion strength bilaterally. Sensation intact in bilateral lower extremities. Incision assessment per orthopedic surgery.   NEURO: Alert, cognitively intact, moves all four extremities freely   PSYCH: Cooperative, behavior is appropriate, Seems to lack some insight into her medical situation     Data reviewed today: I personally reviewed all new medications, labs, imaging/diagnostics reports over the past 24 hours.  Pertinent findings include:    Imaging:   No results found for this or any previous visit (from the past 24 hour(s)).    Labs:  Most Recent 3 CBC's:Recent Labs   Lab Test 04/06/22  0532 04/05/22  0541 04/04/22  0841 09/10/21  0627 05/21/21  1152 07/25/19  1100   WBC  --   --  5.1  --  5.2 6.3   HGB 9.3* 9.3* 13.3   < > 13.7 12.6   MCV  --   --  90  --  94 85   PLT  --   --  269  --  283 309    < > = values in this interval not displayed.     Most Recent 3 BMP's:Recent Labs   Lab Test 04/06/22  0813 04/06/22  0532 04/05/22 1737 04/05/22 0937 04/04/22 1657 04/04/22  0920 03/14/22  1139 09/10/21  0631 09/10/21  0627   NA  --   --   --   --  137  --  140  --  135*   POTASSIUM  --   --   --   --  4.3  --  4.2  --  4.3   CHLORIDE  --   --   --   --  110*  --  110*  --  109*   CO2  --   --   --   --  17*  --  22  --  18*   BUN  --   --   --   --  14  --  13  --  10   CR  --   --   --   --  0.91  --  1.05  --  1.00   ANIONGAP  --   --   --   --  10  --  8  --  8   ANGELICA  --   --   --   --  8.5  --  9.4  --  8.7   * 143* 147*   < > 183*   < > 86   < > 155*    < > = values in this interval not displayed.     Most Recent 6 glucoses:Recent Labs   Lab Test 04/06/22  0813 04/06/22  0532 04/05/22 1737 04/05/22 0937 04/04/22  1657 04/04/22  1243   * 143* 147* 117* 183* 93       Medications:   Personally Reviewed.  Medications       acetaminophen  975 mg Oral Q8H     aspirin  325 mg Oral Daily     atorvastatin  40 mg Oral QPM     buPROPion  300 mg Oral Daily     [START ON 4/7/2022] COVID-19 mRNA vacc (PFIZER)  30 mcg Intramuscular Prior to discharge     famotidine  20 mg Oral BID    Or     famotidine  20 mg Intravenous BID     polyethylene glycol  17 g Oral Daily     risperiDONE  2 mg Oral At Bedtime     senna-docusate  1 tablet Oral BID     sodium chloride (PF)  3 mL Intracatheter Q8H     [Held by provider] tolterodine ER  4 mg Oral Daily     topiramate  100 mg Oral BID     traZODone  150 mg Oral At Bedtime

## 2022-04-06 NOTE — PROGRESS NOTES
[facility] Orthopaedics Progress Note      Post-operative Day: 1 Day Post-Op    Procedure(s):  RIGHT TOTAL HIP ARTHROPLASTY        Plan: Anticoagulation protocol: scoanticoagulationlist2:  mg daily  x 40  days            Pain medications:  scopainmedication: oxycodone, tylenol, toradol. Will switch to ibuprofen, doses of Toradol completed.     Weight bearing status:  WBAT. Patient needs significant encouragement to stand and be mobile on her leg.   Acute Blood Loss Anemia: loss 4 pts. Mildly symptomatic with PT this morning, with lower BP. However, patient was moving with therapies slightly better today than yesterday. Appreciate [facility] consult.    Anterolateral thigh pain: tenderness around the right lateral hip dressing but not outside normal limits. It is possible the patient has meralgia paresthetica, but difficult to determine at this time as the patient's sensation change may have been permanent prior to surgery. In the future, she should discuss this with her PCP and Dr. Damon.     Disposition:  Home this afternoon with home care vs. This afternoon vs. tomorrow to TCU. Per Dr. Carrera, the patient's home living situation is similar to an apartment with 24/7 PCA care.  I acknowledge patient would likely do well with additional support as needed at this time and am happy to write the orders for this as needed.    Per care manager note, awaiting [facility] TCU approval as of 1100 am.   Continue cares and rehabilitation.       Subjective:  Ronel is a 41 year old woman with a PMH of mild intermittent asthma, HTN, Diabetes mellitus, hypercholesterolemia, vertigo, urinary incontinence, constipation, hip dysplasia and degenerative joint disease s/p left total hip arthroplasty in 2021, anxiety, Bipolar II Disorder, PTSD, and borderline personality disorder. History of obesity and 100 lb weight loss per chart.     Pain: moderate, however chief complaint this morning is chronic anterolateral thigh  "pain. The tingling over this area from pre-op is somewhat improved, reporting only pain, no paresthesias at this time. However, this has been present for 2 years. Patient asks when to expect this to go away.    Chest pain, SOB:  No  Reports an episode of lightheadedness/dizziness and mild SOB while working with therapies this morning.     Denies nausea/ vomiting  Passing flatus  voiding well    Objective:  Blood pressure 96/52, pulse 95, temperature 98.2  F (36.8  C), temperature source Oral, resp. rate 18, height 1.626 m (5' 4\"), weight 114.4 kg (252 lb 3.2 oz), SpO2 99 %, not currently breastfeeding.    Patient Vitals for the past 24 hrs:   BP Temp Temp src Pulse Resp SpO2   04/06/22 0815 96/52 98.2  F (36.8  C) Oral 95 18 99 %   04/06/22 0433 101/49 98.2  F (36.8  C) Oral 96 18 99 %   04/06/22 0037 95/50 99  F (37.2  C) Oral 89 18 100 %   04/05/22 1608 90/52 98.6  F (37  C) Oral 76 20 100 %   04/05/22 1315 -- -- -- -- -- 100 %   04/05/22 1210 122/64 97.7  F (36.5  C) Oral 75 18 99 %       Wt Readings from Last 4 Encounters:   04/04/22 114.4 kg (252 lb 3.2 oz)   11/08/21 113.4 kg (250 lb)   09/30/21 119.3 kg (263 lb)   09/28/21 119.3 kg (263 lb)         Motor function, sensation, and circulation intact   Yes  Wound status: Aquacel is clean, without shadowing, dry, and intact. Yes  Calf tenderness: Bilateral  No    Pertinent Labs   Lab Results: personally reviewed.     Recent Labs   Lab Test 04/05/22  0541 04/04/22  1657 04/04/22  0841 03/14/22  1139 09/11/21  0627 09/10/21  0627 09/09/21  0711 09/07/21  1257 05/21/21  1152 07/25/19  1100   INR  --   --  1.12  --   --   --  1.15  --   --   --    HGB 9.3*  --  13.3  --  9.0* 11.5*  --   --  13.7 12.6   HCT  --   --  40.2  --   --   --   --   --  41.8 39.3   MCV  --   --  90  --   --   --   --   --  94 85   PLT  --   --  269  --   --   --   --   --  283 309   NA  --  137  --  140  --  135*  --    < > 138 138    < > = values in this interval not displayed. "       Report completed by:  Atul Pham NP  Date: 4/6/2022

## 2022-04-07 ENCOUNTER — APPOINTMENT (OUTPATIENT)
Dept: CARDIOLOGY | Facility: CLINIC | Age: 42
End: 2022-04-07
Attending: FAMILY MEDICINE
Payer: COMMERCIAL

## 2022-04-07 ENCOUNTER — APPOINTMENT (OUTPATIENT)
Dept: PHYSICAL THERAPY | Facility: CLINIC | Age: 42
End: 2022-04-07
Attending: ORTHOPAEDIC SURGERY
Payer: COMMERCIAL

## 2022-04-07 VITALS
BODY MASS INDEX: 43.06 KG/M2 | OXYGEN SATURATION: 97 % | TEMPERATURE: 98.4 F | HEIGHT: 64 IN | WEIGHT: 252.2 LBS | SYSTOLIC BLOOD PRESSURE: 107 MMHG | DIASTOLIC BLOOD PRESSURE: 59 MMHG | RESPIRATION RATE: 18 BRPM | HEART RATE: 85 BPM

## 2022-04-07 LAB — LVEF ECHO: NORMAL

## 2022-04-07 PROCEDURE — 250N000013 HC RX MED GY IP 250 OP 250 PS 637: Performed by: ORTHOPAEDIC SURGERY

## 2022-04-07 PROCEDURE — 93306 TTE W/DOPPLER COMPLETE: CPT | Mod: 26 | Performed by: INTERNAL MEDICINE

## 2022-04-07 PROCEDURE — 91305 HC RX IP 250 OP 636: CPT | Performed by: FAMILY MEDICINE

## 2022-04-07 PROCEDURE — 250N000013 HC RX MED GY IP 250 OP 250 PS 637: Performed by: FAMILY MEDICINE

## 2022-04-07 PROCEDURE — 97116 GAIT TRAINING THERAPY: CPT | Mod: GP | Performed by: PHYSICAL THERAPIST

## 2022-04-07 PROCEDURE — 0054A HC ADMIN COVID VAC PFIZER TRS-SUCR, BOOSTER: CPT | Performed by: FAMILY MEDICINE

## 2022-04-07 PROCEDURE — 250N000011 HC RX IP 250 OP 636: Performed by: FAMILY MEDICINE

## 2022-04-07 PROCEDURE — 99214 OFFICE O/P EST MOD 30 MIN: CPT | Performed by: FAMILY MEDICINE

## 2022-04-07 PROCEDURE — 93306 TTE W/DOPPLER COMPLETE: CPT

## 2022-04-07 RX ADMIN — ASPIRIN 325 MG: 325 TABLET ORAL at 08:29

## 2022-04-07 RX ADMIN — ACETAMINOPHEN 975 MG: 325 TABLET ORAL at 06:10

## 2022-04-07 RX ADMIN — TOPIRAMATE 100 MG: 100 TABLET ORAL at 08:29

## 2022-04-07 RX ADMIN — OXYCODONE HYDROCHLORIDE 10 MG: 5 TABLET ORAL at 06:12

## 2022-04-07 RX ADMIN — SENNOSIDES AND DOCUSATE SODIUM 1 TABLET: 50; 8.6 TABLET ORAL at 08:28

## 2022-04-07 RX ADMIN — BUPROPION HYDROCHLORIDE 300 MG: 150 TABLET, EXTENDED RELEASE ORAL at 08:28

## 2022-04-07 RX ADMIN — BNT162B2 30 MCG: 0.23 INJECTION, SUSPENSION INTRAMUSCULAR at 11:19

## 2022-04-07 RX ADMIN — POLYETHYLENE GLYCOL 3350 17 G: 17 POWDER, FOR SOLUTION ORAL at 08:30

## 2022-04-07 NOTE — PLAN OF CARE
Pt ready to transfer to TCU. Ride is expected to come at 1500.     Patient ready for discharge.  Discharge instructions faxed to TCU and questions answered.  All belongings sent with patient.       Goal Outcome Evaluation:

## 2022-04-07 NOTE — PROGRESS NOTES
"Care Management Discharge Note    Discharge Date: 04/07/2022       Discharge Disposition:   TCU    Discharge Services:       Discharge DME:       Discharge Transportation: medical wheelchair    Private pay costs discussed: Not applicable       Education Provided on the Discharge Plan:   yes  Persons Notified of Discharge Plans: yes  Patient in Agreement with the Plan:   yes    PAS Confirmation Code: [id number]    Handoff Referral Completed: Yes     Additional Information:  4/7/2022  PAS done. [id number]    8:10 AM received call from Dominic with [facility] TCU. Due to staffing and lack of insurance authorization yet, requesting ride to be rescheduled for 1500 today.     Called and rescheduled wheelchair ride for 1500 with Sancho at [facility] transport.  Ioana DO updated.     8:23 AM Called and updated Sun with admissions @ [facility] re: patient planning on going to TCU @ [facility].      Updated HUC and charge.     Received update from Ras with PT and Dr Carrera about patient returning home today instead of TCU. Spoke with patient and she is determined to go to TCU since she will be able to work with therapy daily \"to get better\". Explained awaiting insurance authorization so rescheduled ride for 1500 today per TCU admissions request. Patient wanted to know what would happen if insurance declined coverage for TCU. Discussed next option would be to return home with resumption of home care.     Updated Atul REVELES NP with [facility]. Updated Dr Carrera.     1:24 PM No return call from TCU admissions. Called and left message for admissions @ [facility] re; awaiting call from admissions as ride set for 1500 today and no update re: insurance auth. Need return call to [phone] by 1400 if need to change ride.     Return call from Dominic with [facility] admissions- still awaiting insurance auth so requested this writer to move ride back to " 1800. Spoke with Sancho with [employer] medical transport- next available ride @ 1815.    While talking with MD, received message from TCU admissions re: received insurance authorization so requesting this writer to keep 1500 ride time.   2:03 PM Called and spoke with Sancho for [facility] medical transport and rescheduled ride for 1500 today.     Updated Charge and patient. Updated ERNESTINA Triplett.   Called and left message for admissions @ [facility]) TCU confirming received message with insurance auth and ride arranged for 1500 today.  Requested return call to [phone] if questions. .

## 2022-04-07 NOTE — PROGRESS NOTES
"[facility] Orthopaedics Progress Note      Post-operative Day: 3 Day Post-Op    Procedure(s):  RIGHT TOTAL HIP ARTHROPLASTY    Plan: Anticoagulation protocol: scoanticoagulationlist2:  mg daily  x 40  days            Pain medications:  scopainmedication: oxycodone, tylenol, toradol. Will switch to ibuprofen, doses of Toradol completed.     Weight bearing status:  WBAT. Patient needs significant encouragement to stand and be mobile.   Acute Blood Loss Anemia: decrease lightheadedness now.     Anterolateral thigh pain: tenderness around the right lateral hip dressing but not outside normal limits. It is possible the patient has meralgia paresthetica, but difficult to determine at this time as the patient's sensation change may have been permanent prior to surgery. In the future, she should discuss this with her PCP and Dr. Damon.     Disposition:  TCU today. Agreed patient would likely do well with additional support as needed at this time and am happy to write the orders for this as needed.    Per care manager note, awaiting [facility] approval  Continue cares and rehabilitation.       Subjective:  Ronel is a 41 year old woman with a PMH of mild intermittent asthma, HTN, Diabetes mellitus, hypercholesterolemia, vertigo, urinary incontinence, constipation, hip dysplasia and degenerative joint disease s/p left total hip arthroplasty in 2021, anxiety, Bipolar II Disorder, PTSD, and borderline personality disorder. History of obesity and 100 lb weight loss per chart.     Today, the patient mentions \"I would like to go home and my mom would prefer I do, but I have a feeling I'd do better at TCU for a little while. I need a professional recommendation.\" When asked further, she acknowledges that at home, although she has 24 hour PCA support, PCAs have not encouraged her to stand and move. We review that she does not need to stay at the TCU for long, but would likely do well with increased " "encouragement and cares. She is open to this.       Chest pain, SOB:  No    Denies nausea/ vomiting  Passing flatus  voiding well    Objective:  Blood pressure 107/59, pulse 85, temperature 98.4  F (36.9  C), temperature source Oral, resp. rate 18, height 1.626 m (5' 4\"), weight 114.4 kg (252 lb 3.2 oz), SpO2 97 %, not currently breastfeeding.    Patient Vitals for the past 24 hrs:   BP Temp Temp src Pulse Resp SpO2   04/07/22 0732 107/59 -- -- -- -- --   04/07/22 0730 (!) 84/51 98.4  F (36.9  C) Oral 85 18 97 %   04/07/22 0002 106/52 98.6  F (37  C) Oral 92 18 99 %   04/06/22 1544 112/56 98.7  F (37.1  C) Oral 92 20 100 %   04/06/22 1158 -- -- -- -- -- 100 %       Wt Readings from Last 4 Encounters:   04/04/22 114.4 kg (252 lb 3.2 oz)   11/08/21 113.4 kg (250 lb)   09/30/21 119.3 kg (263 lb)   09/28/21 119.3 kg (263 lb)         Motor function, sensation, and circulation intact   Yes  Wound status: Aquacel is clean, without shadowing, dry, and intact. Yes  Calf tenderness: Bilateral  No    Pertinent Labs   Lab Results: personally reviewed.     Recent Labs   Stable Hgb 4/6/22: 9.3    Report completed by:  Atul Pham NP  Date: 4/7/2022       "

## 2022-04-07 NOTE — PROGRESS NOTES
[facility] MEDICINE PROGRESS NOTE      Identification/Summary: Ronel Ryan is a 41 year old old female with a history of obesity with reported 100lbs lost, mild intermittent asthma, HTN, diabetes mellitus, hypercholesterolemia, vertigo, urinary incontinence, constipation, bilateral hip dysplasia and degenerative joint disease s/p left total hip arthroplasty in 2021, anxiety, Bipolar II Disorder, PTSD, and borderline personality disorder underwent right total hip arthroplasty. All chronic medical conditions well controlled preoperatively with prior to admission medication regimen. Of note, left hip arthroplasty complicated by dislocation requiring surgical revision.     Estimated 500 ml EBL. Pt initially had been feeling lightheaded with exertion but this has resolved yesterday afternoon and today. Hgb drop of 4 points postoperatively. On 4/6 hemoglobin was found to be stable at 9.3 with soft BPs of 90s systolic the last 2 days with blood pressures good today.  Blood pressure now 107/59.  Pt reported a very brief episode approximately 2 to 3 minutes of mild anterior chest pain with a little bit of anxiety 2 nights ago while walking.  Has had none since.  EKG showed new Q waves in inferior leads compared to EKG 5 years ago. Reviewed with Cardiology and not found to be associated with acute cardiac concern. Troponin negative.  Echocardiogram is still pending.  Cardiology thought given the whole picture is probably reasonable for her to have an outpatient CT angiogram but there is no urgency with this and that EKG was not that concerning.     Pt doing well today and able to ambulate the whole benitez with PT and perform more independent transfers. Denies ongoing chest pain, shortness of breath, or dizziness. Cardiology recommending echocardiogram and CT coronary angiogram on outpatient basis. Patient medically appropriate to discharge to TCU today.     Assessment and Plan:  Acute Blood  Loss Anemia following Total Hip Arthroplasty  Estimated 500 ml EBL intraoperatively.  Hgb drop from 13.3 to 9.3. Stable at 9.3 on 4/6  Pt reporting resolution of  fatigue, SOB, and dizziness with activity.   Orthostatic BP without evidence of Orthostatic hypotension   Plan: Resolved. Monitor Hgb at TCU     Mild Intermittent Asthma   PRN albuterol inhaler. Does not use daily at home.   Plan: Continue PTA albuterol PRN     Hypertension  Not on daily medication at home. Soft BPs on 4/6.   Improvement of BPs today to 100s/50s  Plan: Continue to monitor at TCU     Diabetes Mellitus  Reports that she no longer takes Trulicity. Noted that this may have been inappropriately diagnosed.   A1C 6 months ago 4.6 and today 4.7  BG stable  Plan: Since all her blood glucoses here have been between 93 and 147 in the past 3 days I do not think she needs regular blood sugar checks     Hypercholesterolemia   Continue PTA lipitor      Constipation  Takes Colace PRN  Plan: Per orthopedic surgery      GERD  Patient denies recent symptoms. Does not take Famotidine any longer  Plan: Continue to monitor      Urinary Incontinence - mixed stress and urge   Adams removed 4/5. Has been able to spontaneously void.  We held her Detrol while she was here but this should be restarted now at discharge     Lightheadedness likely multifactorial postoperatively and related to acute blood loss anemia but this now seems to have resolved for the past 24 hours  Dizziness patient is experiencing is lightheadedness vs room spinning vertigo she usually feels  Pt denies ongoing dizziness today  Plan: Continue PTA PRN Meclizine     Anxiety  Denies anxiety or panic attacks currently  Plan: Continue PTA PRN Hydroxyzine     Bipolar II Disorder  Seeing outpatient psychiatrist regularly  Stable on PTA regimen of Risperdal, Topamax, Wellbutrin, and Trazodone   Plan: Continue current medications      Chest pain and Shortness of Breath with Exertion, currently  asymptomatic  Stated that yesterday she experienced transient chest pain and SOB with activity that lasted 2-3 minutes. Denied diaphoresis, anxiety, nausea, or radiation of pain. Currently asymptomatic  EKG showing possible old inferior infarct though the EKG changes are pretty subtle compared to 5 years ago.  No ST-T wave changes.. Troponin Negative. No further episodes.   Plan: Cardiology recommending outpatient echocardiogram and CT coronary angiogram.     Diet: Advance Diet as Tolerated: Regular Diet Adult  Regular Diet Adult  Diet  DVT Prophylaxis: Aspirin 325 mg per orthopedic surgery  Code Status: Full Code     Disposition  Patient desiring TCU placement. Discharge to [facility] pending Insurance coverage.      The patient's care was discussed with the Attending Physician, Dr. Carrera.    [facility]  Phone: #[phone]    Patient seen and examined  Patient discussed with physician assistant student and cardiology  Agree with assessment and plan as outlined above    Braden Carrera MD  [facility] Medicine Service    Interval History/Subjective:  Patient is in good spirits and reports that she feels much better today. Pain is well controlled with oral medications and she is feeling stronger and performing well in PT. Has been able to tolerate oral intake, urinate, and has been passing gas.  She denies ongoing chest pain, shortness of breath, or dizziness. Patient desires TCU placement following discharge to motivate her to continue recovery.     Physical Exam/Objective:  Temp:  [98.4  F (36.9  C)-98.7  F (37.1  C)] 98.4  F (36.9  C)  Pulse:  [85-92] 85  Resp:  [18-20] 18  BP: ()/(51-59) 107/59  SpO2:  [97 %-100 %] 97 %  Body mass index is 43.29 kg/m .    GENERAL:  Alert, appears comfortable, in no acute distress, appears stated age   HEAD:  Normocephalic, without obvious abnormality, atraumatic    LUNGS:   Clear to auscultation bilaterally, no rales, rhonchi, or wheezing, symmetric chest rise on inhalation, respirations unlabored   HEART:  Regular rate and rhythm, S1 and S2 normal, no murmur, rub, or gallop    ABDOMEN:   Soft, non-tender, no masses, no organomegaly, no rebound or guarding   EXTREMITIES: Incision assessment per orthopedic surgery. No edema. 5/5 plantarflexion and dorsiflexion strength bilaterally. Sensation intact in bilateral lower extremities   NEURO: Alert, cognitively appears grossly intact intact.  moves all four extremities freely   PSYCH: Cooperative, behavior is appropriate, lacks some insight into medical situation but is able to make needs and desires known.      Data reviewed today: I personally reviewed all new medications, labs, imaging/diagnostics reports over the past 24 hours. Pertinent findings include:    Labs:  Most Recent 3 CBC's:Recent Labs   Lab Test 04/06/22  0532 04/05/22  0541 04/04/22  0841 09/10/21  0627 05/21/21  1152 07/25/19  1100   WBC  --   --  5.1  --  5.2 6.3   HGB 9.3* 9.3* 13.3   < > 13.7 12.6   MCV  --   --  90  --  94 85   PLT  --   --  269  --  283 309    < > = values in this interval not displayed.     Most Recent 3 BMP's:Recent Labs   Lab Test 04/06/22  1643 04/06/22  0813 04/06/22  0532 04/05/22  0937 04/04/22  1657 04/04/22  0920 03/14/22  1139 09/10/21  0631 09/10/21  0627   NA  --   --   --   --  137  --  140  --  135*   POTASSIUM  --   --   --   --  4.3  --  4.2  --  4.3   CHLORIDE  --   --   --   --  110*  --  110*  --  109*   CO2  --   --   --   --  17*  --  22  --  18*   BUN  --   --   --   --  14  --  13  --  10   CR  --   --   --   --  0.91  --  1.05  --  1.00   ANIONGAP  --   --   --   --  10  --  8  --  8   ANGELICA  --   --   --   --  8.5  --  9.4  --  8.7   GLC 93 130* 143*   < > 183*   < > 86   < > 155*    < > = values in this interval not displayed.     Most Recent 3 Troponin's:Recent Labs   Lab Test 09/26/17  2133 09/26/17  1932  09/26/17  1730   TROPI <0.015 <0.015 <0.015     Most Recent 6 glucoses:Recent Labs   Lab Test 04/06/22  1643 04/06/22  0813 04/06/22  0532 04/05/22  1737 04/05/22  0937 04/04/22  1657   GLC 93 130* 143* 147* 117* 183*       Medications:   Personally Reviewed.  Medications       aspirin  325 mg Oral Daily     atorvastatin  40 mg Oral QPM     buPROPion  300 mg Oral Daily     famotidine  20 mg Oral BID    Or     famotidine  20 mg Intravenous BID     polyethylene glycol  17 g Oral Daily     risperiDONE  2 mg Oral At Bedtime     senna-docusate  1 tablet Oral BID     sodium chloride (PF)  3 mL Intracatheter Q8H     [Held by provider] tolterodine ER  4 mg Oral Daily     topiramate  100 mg Oral BID     traZODone  150 mg Oral At Bedtime

## 2022-04-07 NOTE — PROGRESS NOTES
Pt vitally stable. Denies chest pain.  Alert and oriented x4. Pt voiding with a lot of encouragement. Able to rest most of the night between cares. Discharge to [facility].

## 2022-04-07 NOTE — DISCHARGE SUMMARY
[facility] Orthopedics Discharge Summary                                  [facility]     JAMES CHRISTOPHER [id number]   Age: 41 year old  PCP: Arvind Lane, [phone] 1980     Date of Admission:  4/4/2022  Date of Discharge::  4/7/2022  Discharge Provider:  Atul Pham NP    Code status:  Full Code    Admission Information:  Admission Diagnosis:  Degenerative joint disease of right hip [M16.11]  Degenerative joint disease (DJD) of hip [M16.9]    Post-Operative Day: 3 Days Post-Op     Reason for admission:  The patient was admitted for the following:Procedure(s) (LRB):  RIGHT TOTAL HIP ARTHROPLASTY (Right)    Active Problems:    Degenerative joint disease (DJD) of hip      Allergies:  Cephalexin, Erythromycin, and Penicillins    Following the procedure noted above the patient was transferred to the post-op floor and started on:    Therapy:  physical therapy and occupational therapy  Anticoagulation Plan: scoanticoagulationlist2:  mg daily  for 40 days  Pain Management: scopainmedication: oxycodone, toradol and tylenol  Weight bearing status: Weight bearing as tolerated     The patient was followed by Orthopedics during the inpatient treatment course:  Complications:  Slow progression with therapies  [place]  [facility] [place]     Pertinent Labs   Lab Results: personally reviewed.     Recent Labs   Lab Test 04/06/22  0532 04/05/22  0541 04/04/22  1657 04/04/22  0841 03/14/22  1139 09/11/21  0627 09/10/21  0627 09/09/21  0711 09/07/21  1257 05/21/21  1152 07/25/19  1100   INR  --   --   --  1.12  --   --   --  1.15  --   --   --    HGB 9.3* 9.3*  --  13.3  --    < > 11.5*  --   --  13.7 12.6   HCT  --   --   --  40.2  --   --   --   --   --  41.8 39.3   MCV  --   --   --  90  --   --   --   --   --  94 85   PLT  --   --   --  269  --   --   --   --   --  283 309   NA  --   --  137  --  140  --  135*  --    < > 138 138    < > = values in this interval not  displayed.          Discharge Information:  Condition at discharge: Stable  Discharge destination:  Discharged to short-term care facility     Medications at discharge:  Current Discharge Medication List      START taking these medications    Details   acetaminophen (TYLENOL) 325 MG tablet Take 3 tablets (975 mg) by mouth 3 times daily  Refills: 0    Associated Diagnoses: Status post total hip replacement, right      aspirin (ASA) 325 MG EC tablet Take 1 tablet (325 mg) by mouth daily  Qty: 42 tablet, Refills: 0    Associated Diagnoses: Status post total hip replacement, right      ibuprofen (ADVIL/MOTRIN) 600 MG tablet Take 1 tablet (600 mg) by mouth every 6 hours as needed (mild)  Refills: 0    Associated Diagnoses: Status post total hip replacement, right      oxyCODONE (ROXICODONE) 5 MG tablet Take 1-2 tablets (5-10 mg) by mouth every 6 hours as needed for breakthrough pain or moderate to severe pain Take 1 pill for moderate pain (4-6/10) and 2 pills for severe pain (7-10/10). Alternate with Tylenol. Maximum 6 pills per day  Qty: 26 tablet, Refills: 0      polyethylene glycol (MIRALAX) 17 GM/Dose powder Take 17 g by mouth daily Take while taking opioid medications and until bowels are back to normal routine. Hold for loose stools  Qty: 510 g, Refills: 0      senna-docusate (SENOKOT-S/PERICOLACE) 8.6-50 MG tablet Take 1-2 tablets by mouth daily as needed for constipation Take while on oral narcotics to prevent or treat constipation.  Qty: 30 tablet, Refills: 0    Comments: While taking narcotics  Associated Diagnoses: Status post total hip replacement, right         CONTINUE these medications which have CHANGED    Details   hydrOXYzine (ATARAX) 25 MG tablet Take 1 tablet (25 mg) by mouth every 6 hours as needed for itching or anxiety (with pain, moderate pain)  Qty: 30 tablet, Refills: 0    Associated Diagnoses: Status post total hip replacement, right         CONTINUE these medications which have NOT CHANGED     Details   albuterol (PROAIR HFA/PROVENTIL HFA/VENTOLIN HFA) 108 (90 BASE) MCG/ACT Inhaler Inhale 1-2 puffs into the lungs every 4 hours as needed       atorvastatin (LIPITOR) 40 MG tablet Take 1 tablet by mouth every evening       baclofen (LIORESAL) 10 MG tablet TAKE 1 TABLET BY MOUTH TWICE A DAY AS NEEDED FOR MUSCLE SPASM      buPROPion (WELLBUTRIN XL) 300 MG 24 hr tablet Take 300 mg by mouth daily       Cholecalciferol 125 MCG (5000 UT) TABS Take 125 mcg by mouth daily       docusate sodium (COLACE) 100 MG capsule Take 100 mg by mouth 3 times daily as needed for constipation      meclizine (ANTIVERT) 25 MG tablet Take 25 mg by mouth 4 times daily as needed for dizziness      risperiDONE (RISPERDAL) 2 MG tablet Take 2 mg by mouth At Bedtime       tolterodine ER (DETROL LA) 4 MG 24 hr capsule Take 4 mg by mouth daily      topiramate (TOPAMAX) 100 MG tablet Take 100 mg by mouth 2 times daily       traZODone (DESYREL) 150 MG tablet Take 150 mg by mouth At Bedtime      blood glucose (NO BRAND SPECIFIED) lancets standard Use to test blood sugar 2 times daily or as directed.  Qty: 100 each, Refills: 11    Associated Diagnoses: Type 2 diabetes mellitus without complication, without long-term current use of insulin (H)      blood glucose monitoring (NO BRAND SPECIFIED) meter device kit Use to test blood sugar 2 times daily or as directed.  Qty: 1 kit, Refills: 0    Associated Diagnoses: Type 2 diabetes mellitus without complication, without long-term current use of insulin (H)      blood glucose monitoring (NO BRAND SPECIFIED) test strip Use to test blood sugars 2 times daily or as directed  Qty: 100 strip, Refills: 3    Associated Diagnoses: Diabetes mellitus, type 2 (H)      dulaglutide (TRULICITY) 1.5 MG/0.5ML pen Inject 1.5 mg Subcutaneous every 7 days  Qty: 2 mL, Refills: 0    Associated Diagnoses: Type 2 diabetes mellitus without complication, without long-term current use of insulin (H)      EPINEPHrine  (EPIPEN/ADRENACLICK/OR ANY BX GENERIC EQUIV) 0.3 MG/0.3ML injection 2-pack Inject 0.3 mLs (0.3 mg) into the muscle once as needed for anaphylaxis  Qty: 0.6 mL, Refills: 0      levonorgestrel (LILETTA, 52 MG,) 19.5 MCG/DAY IUD 1 each by Intrauterine route once                        Follow-Up Care:  Patient should be seen in the office in 10-14 days by the Orthopedic Surgeon/Physician Assistant.  Call [phone] for appointment or questions.    It was my pleasure to take care of the above patient.  Atul Pham, BERTRAM

## 2022-04-08 NOTE — PLAN OF CARE
Physical Therapy Discharge Summary    Reason for therapy discharge:    Patient discharged to TCU.     Progress towards therapy goal(s). See goals on Care Plan in [facility] electronic health record for goal details.  Goals met    Therapy recommendation(s):    Continued therapy is recommended.  Rationale/Recommendations:  TCU Rehab.

## 2022-04-09 ENCOUNTER — LAB REQUISITION (OUTPATIENT)
Dept: LAB | Facility: CLINIC | Age: 42
End: 2022-04-09
Payer: COMMERCIAL

## 2022-04-09 DIAGNOSIS — E11.9 TYPE 2 DIABETES MELLITUS WITHOUT COMPLICATIONS (H): ICD-10-CM

## 2022-04-09 DIAGNOSIS — Z96.641 PRESENCE OF RIGHT ARTIFICIAL HIP JOINT: ICD-10-CM

## 2022-04-09 DIAGNOSIS — I10 ESSENTIAL (PRIMARY) HYPERTENSION: ICD-10-CM

## 2022-04-09 DIAGNOSIS — Z47.1 AFTERCARE FOLLOWING JOINT REPLACEMENT SURGERY: ICD-10-CM

## 2022-04-11 LAB
ANION GAP SERPL CALCULATED.3IONS-SCNC: 7 MMOL/L (ref 5–18)
BUN SERPL-MCNC: 9 MG/DL (ref 8–22)
CALCIUM SERPL-MCNC: 9 MG/DL (ref 8.5–10.5)
CHLORIDE BLD-SCNC: 110 MMOL/L (ref 98–107)
CO2 SERPL-SCNC: 23 MMOL/L (ref 22–31)
CREAT SERPL-MCNC: 0.82 MG/DL (ref 0.6–1.1)
GFR SERPL CREATININE-BSD FRML MDRD: >90 ML/MIN/1.73M2
GLUCOSE BLD-MCNC: 80 MG/DL (ref 70–125)
HGB BLD-MCNC: 8.1 G/DL (ref 11.7–15.7)
POTASSIUM BLD-SCNC: 3.8 MMOL/L (ref 3.5–5)
SODIUM SERPL-SCNC: 140 MMOL/L (ref 136–145)

## 2022-04-11 PROCEDURE — P9604 ONE-WAY ALLOW PRORATED TRIP: HCPCS | Mod: ORL | Performed by: FAMILY MEDICINE

## 2022-04-11 PROCEDURE — 36415 COLL VENOUS BLD VENIPUNCTURE: CPT | Mod: ORL | Performed by: FAMILY MEDICINE

## 2022-04-11 PROCEDURE — 85018 HEMOGLOBIN: CPT | Mod: ORL | Performed by: FAMILY MEDICINE

## 2022-04-11 PROCEDURE — 80048 BASIC METABOLIC PNL TOTAL CA: CPT | Mod: ORL | Performed by: FAMILY MEDICINE

## 2022-04-13 ENCOUNTER — DOCUMENTATION ONLY (OUTPATIENT)
Dept: OTHER | Facility: CLINIC | Age: 42
End: 2022-04-13
Payer: COMMERCIAL

## 2022-04-14 ENCOUNTER — TRANSITIONAL CARE UNIT VISIT (OUTPATIENT)
Dept: GERIATRICS | Facility: CLINIC | Age: 42
End: 2022-04-14
Payer: COMMERCIAL

## 2022-04-14 VITALS
SYSTOLIC BLOOD PRESSURE: 130 MMHG | WEIGHT: 268.6 LBS | TEMPERATURE: 99.1 F | HEIGHT: 64 IN | RESPIRATION RATE: 16 BRPM | DIASTOLIC BLOOD PRESSURE: 82 MMHG | HEART RATE: 89 BPM | OXYGEN SATURATION: 98 % | BODY MASS INDEX: 45.85 KG/M2

## 2022-04-14 DIAGNOSIS — Z96.641 S/P TOTAL RIGHT HIP ARTHROPLASTY: Primary | ICD-10-CM

## 2022-04-14 DIAGNOSIS — D62 ABLA (ACUTE BLOOD LOSS ANEMIA): ICD-10-CM

## 2022-04-14 DIAGNOSIS — F31.9 BIPOLAR 1 DISORDER (H): ICD-10-CM

## 2022-04-14 DIAGNOSIS — E11.9 TYPE 2 DIABETES MELLITUS WITHOUT COMPLICATION, WITHOUT LONG-TERM CURRENT USE OF INSULIN (H): ICD-10-CM

## 2022-04-14 PROCEDURE — 99305 1ST NF CARE MODERATE MDM 35: CPT | Performed by: FAMILY MEDICINE

## 2022-04-19 ENCOUNTER — OFFICE VISIT (OUTPATIENT)
Dept: GERIATRICS | Facility: CLINIC | Age: 42
End: 2022-04-19
Payer: COMMERCIAL

## 2022-04-19 DIAGNOSIS — D62 ABLA (ACUTE BLOOD LOSS ANEMIA): ICD-10-CM

## 2022-04-19 DIAGNOSIS — E11.9 TYPE 2 DIABETES MELLITUS WITHOUT COMPLICATION, WITHOUT LONG-TERM CURRENT USE OF INSULIN (H): ICD-10-CM

## 2022-04-19 DIAGNOSIS — F31.9 BIPOLAR 1 DISORDER (H): ICD-10-CM

## 2022-04-19 DIAGNOSIS — Z96.641 S/P TOTAL RIGHT HIP ARTHROPLASTY: Primary | ICD-10-CM

## 2022-04-19 PROCEDURE — 99316 NF DSCHRG MGMT 30 MIN+: CPT | Performed by: FAMILY MEDICINE

## 2022-04-19 NOTE — LETTER
4/19/2022        RE: Ronel Ryan  [address]  [facility] [address]          [facility] GERIATRICS  [place] Medical Record Number:  [id number]  Place of Service where encounter took place: [facility] ([facility]) [[id number]]   CODE STATUS:   CPR/Full code     Chief Complaint/Reason for Visit:  Chief Complaint   Patient presents with     Discharge Summary     [facility] TCU 4/7/2022-4/21/2022.       TCU HPI:    Ronel Ryan is a 41 year old female with hx of HTN not on medications, DM, bipolar depression, prior Left VALENTIN 9/2021, admitted to the hospital on 4/4/2022 for elective right VALENTIN due to right hip DJD. Her orthopedic discharge summary is partially excerpted below. No reported complications. No need for blood transfusion.     [facility] Orthopedics Discharge Summary   Date of Admission: 4/4/2022  Date of Discharge: 4/7/2022     Admission Diagnosis:  Degenerative joint disease of right hip   Degenerative joint disease (DJD) of hip     Reason for admission:  The patient was admitted for the following:Procedure(s)):  RIGHT TOTAL HIP ARTHROPLASTY (Right)     Following the procedure noted above the patient was transferred to the post-op floor and started on:     Therapy:  physical therapy and occupational therapy  Anticoagulation Plan: anticoagulation:  mg daily for 40 days  Pain Management: pain medication: oxycodone, toradol and tylenol  Weight bearing status: Weight bearing as tolerated      The patient was followed by Orthopedics during the inpatient treatment course:  Complications:  Slow progression with therapies  Additional consultations:  [facility], social work/ care management    Overall stabilized and discharged to TCU on 7/20/16 for PT, OT, nursing cares, medical management and monitoring.       TCU Course:  No complications during her TCU stay. Medical status and conditions were monitored as warranted while she participated in therapy and  received nursing care and assistance. No medication changes. She was supposed to see ortho for follow up today but appt has been changed to Fri 4/22/2022. Pain is well controlled. WBAT. She is on aspirin for DVT prevention per recommendation of ortho for total of 6 weeks post op. She is ready to discharge home on Thurs 4/21/2022 and will receive home services. She lives alone though has around the clock PCA services.        PAST MEDICAL HISTORY:  Past Medical History:   Diagnosis Date     Allergic state      Anxiety      Arthritis      Asthma      Bipolar 2 disorder (H)     mixed, one manic episode when combined wellbutrin and celexa. Now off Celexa.     Borderline personality disorder (H)      Cholelithiasis     2007 lap gil     Depressive disorder      Diabetes (H)      Diabetes mellitus (H)     Dx in 2013, lost weight and in 2015 MD thought she might have been misdiagnosed.     GERD (gastroesophageal reflux disease)      H/O constipation      H/O hypercholesterolemia     on statin therapy     Hip dysplasia      Hypertension      Major depression      Menstrual disorder     heavy and irregular bleeding, improved with IUD.     Obese      PTSD (post-traumatic stress disorder)      Uncomplicated asthma      Urinary incontinence     on ditropan (showers/pools and stress incontinence).     vertigo        DISCHARGE MEDICATIONS:  Current Outpatient Medications   Medication Sig Dispense Refill     acetaminophen (TYLENOL) 325 MG tablet Take 3 tablets (975 mg) by mouth 3 times daily  0     albuterol (PROAIR HFA/PROVENTIL HFA/VENTOLIN HFA) 108 (90 BASE) MCG/ACT Inhaler Inhale 1-2 puffs into the lungs every 4 hours as needed        aspirin (ASA) 325 MG EC tablet Take 1 tablet (325 mg) by mouth daily 42 tablet 0     atorvastatin (LIPITOR) 40 MG tablet Take 1 tablet by mouth every evening        baclofen (LIORESAL) 10 MG tablet TAKE 1 TABLET BY MOUTH TWICE A DAY AS NEEDED FOR MUSCLE SPASM       blood glucose (NO BRAND  SPECIFIED) lancets standard Use to test blood sugar 2 times daily or as directed. 100 each 11     blood glucose monitoring (NO BRAND SPECIFIED) meter device kit Use to test blood sugar 2 times daily or as directed. 1 kit 0     blood glucose monitoring (NO BRAND SPECIFIED) test strip Use to test blood sugars 2 times daily or as directed 100 strip 3     buPROPion (WELLBUTRIN XL) 300 MG 24 hr tablet Take 300 mg by mouth daily        Cholecalciferol 125 MCG (5000 UT) TABS Take 125 mcg by mouth daily        docusate sodium (COLACE) 100 MG capsule Take 100 mg by mouth 3 times daily as needed for constipation       dulaglutide (TRULICITY) 1.5 MG/0.5ML pen Inject 1.5 mg Subcutaneous every 7 days 2 mL 0     EPINEPHrine (EPIPEN/ADRENACLICK/OR ANY BX GENERIC EQUIV) 0.3 MG/0.3ML injection 2-pack Inject 0.3 mLs (0.3 mg) into the muscle once as needed for anaphylaxis 0.6 mL 0     hydrOXYzine (ATARAX) 25 MG tablet Take 1 tablet (25 mg) by mouth every 6 hours as needed for itching or anxiety (with pain, moderate pain) 30 tablet 0     ibuprofen (ADVIL/MOTRIN) 600 MG tablet Take 1 tablet (600 mg) by mouth every 6 hours as needed (mild)  0     levonorgestrel (LILETTA, 52 MG,) 19.5 MCG/DAY IUD 1 each by Intrauterine route once       meclizine (ANTIVERT) 25 MG tablet Take 25 mg by mouth 4 times daily as needed for dizziness       oxyCODONE (ROXICODONE) 5 MG tablet Take 1-2 tablets (5-10 mg) by mouth every 6 hours as needed for breakthrough pain or moderate to severe pain Take 1 pill for moderate pain (4-6/10) and 2 pills for severe pain (7-10/10). Alternate with Tylenol. Maximum 6 pills per day 26 tablet 0     polyethylene glycol (MIRALAX) 17 GM/Dose powder Take 17 g by mouth daily Take while taking opioid medications and until bowels are back to normal routine. Hold for loose stools 510 g 0     risperiDONE (RISPERDAL) 2 MG tablet Take 2 mg by mouth At Bedtime        senna-docusate (SENOKOT-S/PERICOLACE) 8.6-50 MG tablet Take 1-2  "tablets by mouth daily as needed for constipation Take while on oral narcotics to prevent or treat constipation. 30 tablet 0     tolterodine ER (DETROL LA) 4 MG 24 hr capsule Take 4 mg by mouth daily       topiramate (TOPAMAX) 100 MG tablet Take 100 mg by mouth 2 times daily        traZODone (DESYREL) 150 MG tablet Take 150 mg by mouth At Bedtime          PHYSICAL EXAM:  General: Patient is alert female, no distress.   Vitals: /66   Pulse 81   Temp 98  F (36.7  C)   Resp 18   Ht 1.626 m (5' 4\")   Wt 121.8 kg (268 lb 9.6 oz)   SpO2 96%   BMI 46.11 kg/m    HEENT: Head is NCAT. Eyes show no injection or icterus. Nares negative. Oropharynx well hydrated.  Neck: No JVD.  Lungs: Nonlabored respirations.   Abdomen: Soft.  : Deferred.  Extremities: Mild distal LE edema is noted.  Musculoskeletal: Prior left VALENTIN.   Skin: Surgical bandage right hip.   Psych: Mood appears good.      LABS/DIAGNOSTIC DATA:  Component      Latest Ref Rng & Units 4/4/2022 4/6/2022              Sodium      136 - 145 mmol/L 137    Potassium      3.5 - 5.0 mmol/L 4.3    Chloride      98 - 107 mmol/L 110 (H)    Carbon Dioxide      22 - 31 mmol/L 17 (L)    Anion Gap      5 - 18 mmol/L 10    Glucose      70 - 125 mg/dL 183 (H) 143 (H)   Urea Nitrogen      8 - 22 mg/dL 14    Creatinine      0.60 - 1.10 mg/dL 0.91    GFR Estimate      >60 mL/min/1.73m2 80    Calcium      8.5 - 10.5 mg/dL 8.5      Component      Latest Ref Rng & Units 4/4/2022 4/5/2022 4/6/2022   Hemoglobin      11.7 - 15.7 g/dL 13.3 9.3 (L) 9.3 (L)         DISCHARGE DIAGNOSES:  1. Right hip arthroplasty on 4/4/2022. Due to DJD.  WBAT.  Follow up with orthopedics, appt on 4/22/2022.  2. DVT prevention. On aspirin 325 mg for total 6 weeks.   3. DM. Has trulicity. Follow up with PMD.  4. Bipolar depression. On Bupropion, topamax, risperdal, and trazodone at HS.   5. ABLA. Last hgb on 9.3 on 4/6/2022 prior to hospital discharge.   6. OAB. On Detrol.  7. HLD. On " atorvastatin.  8. HTN. Not on BP meds.       Total time greater than 30 minutes discharge coordination.        DISCHARGE PLAN/FACE TO FACE:  I certify that services are/were furnished while this patient was under the care of a physician and that a physician or an allowed non-physician practitioner (NPP), had a face-to-face encounter that meets the physician face-to-face encounter requirements. The encounter was in whole, or in part, related to the primary reason for home health. The patient is confined to his/her home and needs intermittent skilled nursing, physical therapy, speech-language pathology, or the continued need for occupational therapy. A plan of care has been established by a physician and is periodically reviewed by a physician.    Date of Face-to-Face Encounter: 4/19/2022.    I certify that, based on my findings, the following services are medically necessary home health services: PT, OT, HHA, RN.    My clinical findings support the need for the above skilled services because: Needs additional therapy as she returns home for gait, stamina and strengthening. Aide to assist with cares and RN for clinical assessments, medication management.    This patient is homebound because: Too strenuous to leave the home.    The patient is, or has been, under my care and I have initiated the establishment of the plan of care. This patient will be followed by a physician who will periodically review the plan of care.          Electronically signed by: Faith Jain MD           Sincerely,        Faith Jain MD

## 2022-04-20 VITALS
WEIGHT: 268.6 LBS | OXYGEN SATURATION: 96 % | RESPIRATION RATE: 18 BRPM | DIASTOLIC BLOOD PRESSURE: 66 MMHG | HEIGHT: 64 IN | SYSTOLIC BLOOD PRESSURE: 130 MMHG | BODY MASS INDEX: 45.85 KG/M2 | HEART RATE: 81 BPM | TEMPERATURE: 98 F

## 2022-05-02 NOTE — PROGRESS NOTES
[facility] GERIATRICS  [place] Medical Record Number:  [id number]  Place of Service where encounter took place: [facility] ([facility]) [[id number]]   CODE STATUS:   CPR/Full code     Chief Complaint/Reason for Visit:  Chief Complaint   Patient presents with     Discharge Summary     [facility] TCU 4/7/2022-4/21/2022.       TCU HPI:    Ronel Ryan is a 41 year old female with hx of HTN not on medications, DM, bipolar depression, prior Left VALENTIN 9/2021, admitted to the hospital on 4/4/2022 for elective right VALENTIN due to right hip DJD. Her orthopedic discharge summary is partially excerpted below. No reported complications. No need for blood transfusion.     [facility] Orthopedics Discharge Summary   Date of Admission: 4/4/2022  Date of Discharge: 4/7/2022     Admission Diagnosis:  Degenerative joint disease of right hip   Degenerative joint disease (DJD) of hip     Reason for admission:  The patient was admitted for the following:Procedure(s)):  RIGHT TOTAL HIP ARTHROPLASTY (Right)     Following the procedure noted above the patient was transferred to the post-op floor and started on:     Therapy:  physical therapy and occupational therapy  Anticoagulation Plan: anticoagulation:  mg daily for 40 days  Pain Management: pain medication: oxycodone, toradol and tylenol  Weight bearing status: Weight bearing as tolerated      The patient was followed by Orthopedics during the inpatient treatment course:  Complications:  Slow progression with therapies  Additional consultations:  [facility], social work/ care management    Overall stabilized and discharged to TCU on 7/20/16 for PT, OT, nursing cares, medical management and monitoring.       TCU Course:  No complications during her TCU stay. Medical status and conditions were monitored as warranted while she participated in therapy and received nursing care and assistance. No medication changes. She was supposed to see ortho  for follow up today but appt has been changed to Fri 4/22/2022. Pain is well controlled. WBAT. She is on aspirin for DVT prevention per recommendation of ortho for total of 6 weeks post op. She is ready to discharge home on Thurs 4/21/2022 and will receive home services. She lives alone though has around the clock PCA services.        PAST MEDICAL HISTORY:  Past Medical History:   Diagnosis Date     Allergic state      Anxiety      Arthritis      Asthma      Bipolar 2 disorder (H)     mixed, one manic episode when combined wellbutrin and celexa. Now off Celexa.     Borderline personality disorder (H)      Cholelithiasis     2007 lap gil     Depressive disorder      Diabetes (H)      Diabetes mellitus (H)     Dx in 2013, lost weight and in 2015 MD thought she might have been misdiagnosed.     GERD (gastroesophageal reflux disease)      H/O constipation      H/O hypercholesterolemia     on statin therapy     Hip dysplasia      Hypertension      Major depression      Menstrual disorder     heavy and irregular bleeding, improved with IUD.     Obese      PTSD (post-traumatic stress disorder)      Uncomplicated asthma      Urinary incontinence     on ditropan (showers/pools and stress incontinence).     vertigo        DISCHARGE MEDICATIONS:  Current Outpatient Medications   Medication Sig Dispense Refill     acetaminophen (TYLENOL) 325 MG tablet Take 3 tablets (975 mg) by mouth 3 times daily  0     albuterol (PROAIR HFA/PROVENTIL HFA/VENTOLIN HFA) 108 (90 BASE) MCG/ACT Inhaler Inhale 1-2 puffs into the lungs every 4 hours as needed        aspirin (ASA) 325 MG EC tablet Take 1 tablet (325 mg) by mouth daily 42 tablet 0     atorvastatin (LIPITOR) 40 MG tablet Take 1 tablet by mouth every evening        baclofen (LIORESAL) 10 MG tablet TAKE 1 TABLET BY MOUTH TWICE A DAY AS NEEDED FOR MUSCLE SPASM       blood glucose (NO BRAND SPECIFIED) lancets standard Use to test blood sugar 2 times daily or as directed. 100 each 11      blood glucose monitoring (NO BRAND SPECIFIED) meter device kit Use to test blood sugar 2 times daily or as directed. 1 kit 0     blood glucose monitoring (NO BRAND SPECIFIED) test strip Use to test blood sugars 2 times daily or as directed 100 strip 3     buPROPion (WELLBUTRIN XL) 300 MG 24 hr tablet Take 300 mg by mouth daily        Cholecalciferol 125 MCG (5000 UT) TABS Take 125 mcg by mouth daily        docusate sodium (COLACE) 100 MG capsule Take 100 mg by mouth 3 times daily as needed for constipation       dulaglutide (TRULICITY) 1.5 MG/0.5ML pen Inject 1.5 mg Subcutaneous every 7 days 2 mL 0     EPINEPHrine (EPIPEN/ADRENACLICK/OR ANY BX GENERIC EQUIV) 0.3 MG/0.3ML injection 2-pack Inject 0.3 mLs (0.3 mg) into the muscle once as needed for anaphylaxis 0.6 mL 0     hydrOXYzine (ATARAX) 25 MG tablet Take 1 tablet (25 mg) by mouth every 6 hours as needed for itching or anxiety (with pain, moderate pain) 30 tablet 0     ibuprofen (ADVIL/MOTRIN) 600 MG tablet Take 1 tablet (600 mg) by mouth every 6 hours as needed (mild)  0     levonorgestrel (LILETTA, 52 MG,) 19.5 MCG/DAY IUD 1 each by Intrauterine route once       meclizine (ANTIVERT) 25 MG tablet Take 25 mg by mouth 4 times daily as needed for dizziness       oxyCODONE (ROXICODONE) 5 MG tablet Take 1-2 tablets (5-10 mg) by mouth every 6 hours as needed for breakthrough pain or moderate to severe pain Take 1 pill for moderate pain (4-6/10) and 2 pills for severe pain (7-10/10). Alternate with Tylenol. Maximum 6 pills per day 26 tablet 0     polyethylene glycol (MIRALAX) 17 GM/Dose powder Take 17 g by mouth daily Take while taking opioid medications and until bowels are back to normal routine. Hold for loose stools 510 g 0     risperiDONE (RISPERDAL) 2 MG tablet Take 2 mg by mouth At Bedtime        senna-docusate (SENOKOT-S/PERICOLACE) 8.6-50 MG tablet Take 1-2 tablets by mouth daily as needed for constipation Take while on oral narcotics to prevent or treat  "constipation. 30 tablet 0     tolterodine ER (DETROL LA) 4 MG 24 hr capsule Take 4 mg by mouth daily       topiramate (TOPAMAX) 100 MG tablet Take 100 mg by mouth 2 times daily        traZODone (DESYREL) 150 MG tablet Take 150 mg by mouth At Bedtime          PHYSICAL EXAM:  General: Patient is alert female, no distress.   Vitals: /66   Pulse 81   Temp 98  F (36.7  C)   Resp 18   Ht 1.626 m (5' 4\")   Wt 121.8 kg (268 lb 9.6 oz)   SpO2 96%   BMI 46.11 kg/m    HEENT: Head is NCAT. Eyes show no injection or icterus. Nares negative. Oropharynx well hydrated.  Neck: No JVD.  Lungs: Nonlabored respirations.   Abdomen: Soft.  : Deferred.  Extremities: Mild distal LE edema is noted.  Musculoskeletal: Prior left VALENTIN.   Skin: Surgical bandage right hip.   Psych: Mood appears good.      LABS/DIAGNOSTIC DATA:  Component      Latest Ref Rng & Units 4/4/2022 4/6/2022              Sodium      136 - 145 mmol/L 137    Potassium      3.5 - 5.0 mmol/L 4.3    Chloride      98 - 107 mmol/L 110 (H)    Carbon Dioxide      22 - 31 mmol/L 17 (L)    Anion Gap      5 - 18 mmol/L 10    Glucose      70 - 125 mg/dL 183 (H) 143 (H)   Urea Nitrogen      8 - 22 mg/dL 14    Creatinine      0.60 - 1.10 mg/dL 0.91    GFR Estimate      >60 mL/min/1.73m2 80    Calcium      8.5 - 10.5 mg/dL 8.5      Component      Latest Ref Rng & Units 4/4/2022 4/5/2022 4/6/2022   Hemoglobin      11.7 - 15.7 g/dL 13.3 9.3 (L) 9.3 (L)         DISCHARGE DIAGNOSES:  1. Right hip arthroplasty on 4/4/2022. Due to DJD.  WBAT.  Follow up with orthopedics, appt on 4/22/2022.  2. DVT prevention. On aspirin 325 mg for total 6 weeks.   3. DM. Has trulicity. Follow up with PMD.  4. Bipolar depression. On Bupropion, topamax, risperdal, and trazodone at HS.   5. ABLA. Last hgb on 9.3 on 4/6/2022 prior to hospital discharge.   6. OAB. On Detrol.  7. HLD. On atorvastatin.  8. HTN. Not on BP meds.       Total time greater than 30 minutes discharge " coordination.        DISCHARGE PLAN/FACE TO FACE:  I certify that services are/were furnished while this patient was under the care of a physician and that a physician or an allowed non-physician practitioner (NPP), had a face-to-face encounter that meets the physician face-to-face encounter requirements. The encounter was in whole, or in part, related to the primary reason for home health. The patient is confined to his/her home and needs intermittent skilled nursing, physical therapy, speech-language pathology, or the continued need for occupational therapy. A plan of care has been established by a physician and is periodically reviewed by a physician.    Date of Face-to-Face Encounter: 4/19/2022.    I certify that, based on my findings, the following services are medically necessary home health services: PT, OT, HHA, RN.    My clinical findings support the need for the above skilled services because: Needs additional therapy as she returns home for gait, stamina and strengthening. Aide to assist with cares and RN for clinical assessments, medication management.    This patient is homebound because: Too strenuous to leave the home.    The patient is, or has been, under my care and I have initiated the establishment of the plan of care. This patient will be followed by a physician who will periodically review the plan of care.          Electronically signed by: Faith Jain MD

## 2022-05-02 NOTE — PROGRESS NOTES
[facility] GERIATRICS  [place] Medical Record Number:  [id number]  Place of Service where encounter took place: [facility] ([facility]) [[id number]]   CODE STATUS:   CPR/Full code     Chief Complaint/Reason for Visit:  Chief Complaint   Patient presents with     Hospital F/U     Admit note to TCU after Right VALENTIN on 4/4/2022.        HPI:    Ronel Ryan is a 41 year old female with hx of HTN not on medications, DM, bipolar depression, prior Left VALENTIN 9/2021, admitted to the hospital on 4/4/2022 for elective right VALENTIN due to right hip DJD. Her orthopedic discharge summary is partially excerpted below. No reported complications. No need for blood transfusion.     [facility] Discharge Summary   Date of Admission: 4/4/2022  Date of Discharge: 4/7/2022     Admission Diagnosis:  Degenerative joint disease of right hip   Degenerative joint disease (DJD) of hip     Reason for admission:  The patient was admitted for the following:Procedure(s)):  RIGHT TOTAL HIP ARTHROPLASTY (Right)     Following the procedure noted above the patient was transferred to the post-op floor and started on:     Therapy:  physical therapy and occupational therapy  Anticoagulation Plan: anticoagulation:  mg daily for 40 days  Pain Management: pain medication: oxycodone, toradol and tylenol  Weight bearing status: Weight bearing as tolerated      The patient was followed by Orthopedics during the inpatient treatment course:  Complications:  Slow progression with therapies  Additional consultations:  [facility], social work/ care management    Overall stabilized and discharged to TCU on 7/20/16 for PT, OT, nursing cares, medical management and monitoring.       Today:  She has been progressing pretty well with therapy. Allowed WBAT. Pain is controlled, has scheduled tylenol and prn oxycodone. On aspirin for DVT prevention. Hx of HTN, not on meds, Bps satisfactory here in TCU. On meds for bipolar depression, chart  notes hx of PTSD and borderline personality. She is a diabetic on trulicity, A1c was 4.7, should follow up with PMD for further discussion and management. Some ABLA not requiring transfusion. She denies shortness of breath, chest pain. No fever, cough or congestion. Sleeping well, takes trazodone. Appetite is good. No diarrhea or constipation. Reports of persistent right thigh dysesthesia which preceded surgery. She will discuss further with ortho or PMD at post op appts. She states she thinks it is getting better. No urinary sx. She lives alone with around the clock PCA care. No new vision or hearing concerns.        PAST MEDICAL HISTORY:  Past Medical History:   Diagnosis Date     Allergic state      Anxiety      Arthritis      Asthma      Bipolar 2 disorder (H)     mixed, one manic episode when combined wellbutrin and celexa. Now off Celexa.     Borderline personality disorder (H)      Cholelithiasis     2007 lap gil     Depressive disorder      Diabetes (H)      Diabetes mellitus (H)     Dx in 2013, lost weight and in 2015 MD thought she might have been misdiagnosed.     GERD (gastroesophageal reflux disease)      H/O constipation      H/O hypercholesterolemia     on statin therapy     Hip dysplasia      Hypertension      Major depression      Menstrual disorder     heavy and irregular bleeding, improved with IUD.     Obese      PTSD (post-traumatic stress disorder)      Uncomplicated asthma      Urinary incontinence     on ditropan (showers/pools and stress incontinence).     vertigo        PAST SURGICAL HISTORY:  Past Surgical History:   Procedure Laterality Date     ANKLE FRACTURE SURGERY Right      ARTHROPLASTY HIP Left 9/9/2021    Procedure: LEFT TOTAL HIP ARTHROPLASTY;  Surgeon: Zenon Damon MD;  Location: [facility] OR     ARTHROPLASTY HIP Right 4/4/2022    Procedure: RIGHT TOTAL HIP ARTHROPLASTY;  Surgeon: Zenon Damon MD;  Location: [facility] OR     ARTHROSCOPIC RECONSTRUCTION  ANTERIOR CRUCIATE LIGAMENT Left      CHOLECYSTECTOMY       CLOSED REDUCTION HIP Left 9/9/2021    Procedure: CLOSED REDUCTION, LEFT HIP;  Surgeon: Zenon Damon MD;  Location: [facility] OR     COLONOSCOPY      normal colon at age 40, June 2021 study.     CYST REMOVAL      Back of neck     MAMMO STEREOTACTIC CORE BIOPSY LEFT Left 06/15/2020     ORTHOPEDIC SURGERY      ankle and acl     TONSILLECTOMY       ZZHC COLONOSCOPY W/WO BRUSH/WASH N/A 06/08/2021    Procedure: COLONOSCOPY;  Surgeon: Yoel Siegel MD;  Location: [facility] OR;  Service: Gastroenterology       FAMILY HISTORY:  Family History   Problem Relation Age of Onset     Diabetes Mother      Cancer Mother      Breast Cancer Mother         Unsure of age     Hypertension Mother      Hyperlipidemia Mother      Arthritis Mother      Obesity Mother      Cerebrovascular Disease Mother      Diabetes Brother      Diabetes Father      Mental Illness Brother      Mental Illness Brother      Diabetes Brother      Hypertension Brother      Arthritis Brother      Obesity Brother      Hyperlipidemia Brother      Obesity Sister      Cancer Maternal Grandmother      Obesity Maternal Grandmother      Hypertension Maternal Grandmother      Arthritis Maternal Grandmother      Cerebrovascular Disease Maternal Grandmother      Obesity Maternal Grandfather      Substance Abuse Maternal Grandfather      Heart Disease Maternal Grandfather      Hypertension Maternal Grandfather      Hyperlipidemia Maternal Grandfather      Cerebrovascular Disease Maternal Grandfather      Pacemaker Maternal Grandfather      Seizure Disorder Maternal Grandfather        SOCIAL HISTORY:  Social History     Socioeconomic History     Marital status: Single     Spouse name: Not on file     Number of children: Not on file     Years of education: Not on file     Highest education level: Not on file   Occupational History     Not on file   Tobacco Use     Smoking status: Never Smoker      Smokeless tobacco: Never Used   Substance and Sexual Activity     Alcohol use: No     Drug use: No     Sexual activity: Not Currently     Partners: Female     Birth control/protection: I.U.D.     Comment: Female 2015, Male two weeks ago   Other Topics Concern     Parent/sibling w/ CABG, MI or angioplasty before 65F 55M? No   Social History Narrative     Not on file     Social Determinants of Health     Financial Resource Strain: Not on file   Food Insecurity: Not on file   Transportation Needs: Not on file   Physical Activity: Not on file   Stress: Not on file   Social Connections: Not on file   Intimate Partner Violence: Not on file   Housing Stability: Not on file       MEDICATIONS:  Current Outpatient Medications   Medication Sig Dispense Refill     acetaminophen (TYLENOL) 325 MG tablet Take 3 tablets (975 mg) by mouth 3 times daily  0     albuterol (PROAIR HFA/PROVENTIL HFA/VENTOLIN HFA) 108 (90 BASE) MCG/ACT Inhaler Inhale 1-2 puffs into the lungs every 4 hours as needed        aspirin (ASA) 325 MG EC tablet Take 1 tablet (325 mg) by mouth daily 42 tablet 0     atorvastatin (LIPITOR) 40 MG tablet Take 1 tablet by mouth every evening        baclofen (LIORESAL) 10 MG tablet TAKE 1 TABLET BY MOUTH TWICE A DAY AS NEEDED FOR MUSCLE SPASM       blood glucose (NO BRAND SPECIFIED) lancets standard Use to test blood sugar 2 times daily or as directed. 100 each 11     blood glucose monitoring (NO BRAND SPECIFIED) meter device kit Use to test blood sugar 2 times daily or as directed. 1 kit 0     blood glucose monitoring (NO BRAND SPECIFIED) test strip Use to test blood sugars 2 times daily or as directed 100 strip 3     buPROPion (WELLBUTRIN XL) 300 MG 24 hr tablet Take 300 mg by mouth daily        Cholecalciferol 125 MCG (5000 UT) TABS Take 125 mcg by mouth daily        docusate sodium (COLACE) 100 MG capsule Take 100 mg by mouth 3 times daily as needed for constipation       dulaglutide (TRULICITY) 1.5 MG/0.5ML pen  Inject 1.5 mg Subcutaneous every 7 days 2 mL 0     EPINEPHrine (EPIPEN/ADRENACLICK/OR ANY BX GENERIC EQUIV) 0.3 MG/0.3ML injection 2-pack Inject 0.3 mLs (0.3 mg) into the muscle once as needed for anaphylaxis 0.6 mL 0     hydrOXYzine (ATARAX) 25 MG tablet Take 1 tablet (25 mg) by mouth every 6 hours as needed for itching or anxiety (with pain, moderate pain) 30 tablet 0     ibuprofen (ADVIL/MOTRIN) 600 MG tablet Take 1 tablet (600 mg) by mouth every 6 hours as needed (mild)  0     levonorgestrel (LILETTA, 52 MG,) 19.5 MCG/DAY IUD 1 each by Intrauterine route once       meclizine (ANTIVERT) 25 MG tablet Take 25 mg by mouth 4 times daily as needed for dizziness       oxyCODONE (ROXICODONE) 5 MG tablet Take 1-2 tablets (5-10 mg) by mouth every 6 hours as needed for breakthrough pain or moderate to severe pain Take 1 pill for moderate pain (4-6/10) and 2 pills for severe pain (7-10/10). Alternate with Tylenol. Maximum 6 pills per day 26 tablet 0     polyethylene glycol (MIRALAX) 17 GM/Dose powder Take 17 g by mouth daily Take while taking opioid medications and until bowels are back to normal routine. Hold for loose stools 510 g 0     risperiDONE (RISPERDAL) 2 MG tablet Take 2 mg by mouth At Bedtime        senna-docusate (SENOKOT-S/PERICOLACE) 8.6-50 MG tablet Take 1-2 tablets by mouth daily as needed for constipation Take while on oral narcotics to prevent or treat constipation. 30 tablet 0     tolterodine ER (DETROL LA) 4 MG 24 hr capsule Take 4 mg by mouth daily       topiramate (TOPAMAX) 100 MG tablet Take 100 mg by mouth 2 times daily        traZODone (DESYREL) 150 MG tablet Take 150 mg by mouth At Bedtime          ALLERGIES:  Allergies   Allergen Reactions     Cephalexin Anaphylaxis     Erythromycin Anaphylaxis and Hives     Other reaction(s): Unknown     Penicillins Hives     hives  Other reaction(s): Unknown       REVIEW OF SYSTEMS:  Pertinent items as noted in HPI.      PHYSICAL EXAM:  General: Patient is alert  "female, no distress.   Vitals: /82   Pulse 89   Temp 99.1  F (37.3  C)   Resp 16   Ht 1.626 m (5' 4\")   Wt 121.8 kg (268 lb 9.6 oz)   SpO2 98%   BMI 46.11 kg/m    HEENT: Head is NCAT. Eyes show no injection or icterus. Nares negative. Oropharynx well hydrated.  Neck: Supple. No tenderness or adenopathy. No JVD.  Lungs: Clear bilaterally. No wheezes.  Cardiovascular: Regular rate and rhythm, normal S1. S2.  Back: No spinal or CVA tenderness.  Abdomen: Soft, no tenderness on exam. Bowel sounds present. No guarding rebound or rigidity.  : Deferred.  Extremities: Mild distal LE edema is noted.  Musculoskeletal: Prior left VALENTIN. Aquacell right hip.   Skin: Surgical bandage right hip.   Psych: Mood appears good.        LABS/DIAGNOSTIC DATA:  Component      Latest Ref Rng & Units 4/4/2022 4/6/2022              Sodium      136 - 145 mmol/L 137    Potassium      3.5 - 5.0 mmol/L 4.3    Chloride      98 - 107 mmol/L 110 (H)    Carbon Dioxide      22 - 31 mmol/L 17 (L)    Anion Gap      5 - 18 mmol/L 10    Glucose      70 - 125 mg/dL 183 (H) 143 (H)   Urea Nitrogen      8 - 22 mg/dL 14    Creatinine      0.60 - 1.10 mg/dL 0.91    GFR Estimate      >60 mL/min/1.73m2 80    Calcium      8.5 - 10.5 mg/dL 8.5      Component      Latest Ref Rng & Units 4/4/2022 4/5/2022 4/6/2022   Hemoglobin      11.7 - 15.7 g/dL 13.3 9.3 (L) 9.3 (L)     Component      Latest Ref Rng & Units 4/4/2022   Hemoglobin A1C      <=5.6 % 4.7         ASSESSMENT/PLAN:  1. Right hip arthroplasty on 4/4/2022. Due to DJD. No reported complications. WBAT. Had prior left VALENTIN Sept 2021. Continue in therapy in TCU. Follow up with orthopedics.  2. DVT prevention. On aspirin 325 mg for total 6 weeks.   3. DM. Has trulicity. Monitor accuchecks. Last A1c was 4.7, defer to PMD for long term diabetic management.  4. Bipolar depression. On Bupropion, topamax, risperdal, and trazodone at HS.   5. ABLA. Last hgb on 9.3 on 4/6/2022 prior to hospital discharge. " Recheck if needed in TCU.  6. OAB. On Detrol. No increased concerns.   7. HLD. Continue PTA atorvastatin.  8. HTN. Noted historically though not on BP meds.   9. Code status is full code.         Electronically signed by: Faith Jain MD

## 2022-05-06 ENCOUNTER — VIRTUAL VISIT (OUTPATIENT)
Dept: SURGERY | Facility: CLINIC | Age: 42
End: 2022-05-06
Payer: COMMERCIAL

## 2022-05-06 VITALS — HEIGHT: 64 IN | BODY MASS INDEX: 43.36 KG/M2 | WEIGHT: 254 LBS

## 2022-05-06 DIAGNOSIS — D64.9 POSTOPERATIVE ANEMIA: Primary | ICD-10-CM

## 2022-05-06 PROCEDURE — 99443 PR PHYSICIAN TELEPHONE EVALUATION 21-30 MIN: CPT | Performed by: EMERGENCY MEDICINE

## 2022-05-06 RX ORDER — BACILLUS COAGULANS 1B CELL
1 CAPSULE ORAL DAILY
Qty: 45 TABLET | Refills: 0 | Status: SHIPPED | OUTPATIENT
Start: 2022-05-06 | End: 2022-06-20

## 2022-05-06 NOTE — PATIENT INSTRUCTIONS
Plan:   1.  Diet: continue mindful meals every 4.5-6 hours with 20grams of protein per meal, a good mix of veggies and hydrating well with water with 64-80oz/day.   2. Exercise: continue PT, gentle walks.  3. Medication: Trulicity stopped, A1c excellent. It did have some appetite regulation benefits but your bupropion and topamax also have appetite suppressive effects as well so we'll see how mindful protein rich meals with these medicines helps stabilize this excellent 89 lb reduction in weight since 2019.  We could consider adding back naltrexone in the future once no longer requiring pain medication from your recent hip surgery. We'll get together in 3-4 months to see how things have progressed.  4.  Given blood loss at surgery, a bit of extra iron may be helpful this month of May: Geraldine MARINELLI, one wilks X 45 pills.      Example Meal Plan for a 1299-2304 Calorie Diet:    In order to fuel your weight loss properly and avoid hunger-induced overeating later in the day, for your height and weight, you will enjoy the most success by following the diet below or similar with adjustments based on your particular tastes and preferences.  Exercise may influence speed, amount of weight loss further.     I recommend getting into a meal routine and keeping it similar day to day in the beginning so you don t have to think too hard about what you re going to make/eat.  Keep snacks healthy, ideally containing protein and some vegetables.  Non-processed food is preferable to packaged items.  Eat at least a few crunchy green vegetables if having a snack, which should be 2-3 hours after your mealtimes(prepare these ahead of time for ease of use).  Drink 64 oz -80 oz of water daily for most, some of you will need more and we'll discuss it at your visit if that is the case.      When changing our diet,  we can often mistake thirst for hunger or just have some distracted eating habits that we need to break free from ('bored/mindless  eating', screen time,work, driving,etc).  A glass of water and reconsideration of our hunger is often all that is needed.  Having the urge is not the problem, but watching it pass by without acting on it is the goal.    If you re having hunger problems, add a protein drink/snack to your morning hours or afternoon snack with at least 20grams of protein and not too much sugar (under 10g).  A carton of higher protein/low sugar yogurt can work as well.  If the urge to snack is overwhelming and not satiated, try going for a 10 minute walk/exercise, come home and drink a glass of water and if still hungry, have a  calorie snack (handful of raw/sprouted nuts, veggies and string cheese, protein bar, etc).  Savor it.    It is better to have a large breakfast, a moderate lunch and a smaller dinner to fuel your day.  People lose 10-15% more weight during their weight loss season with this strategy. Optimizing your protein intake at each meal will further keep you more satisfied while eating less food overall.  Getting exercise in early has also been shown to offer the best results (before breakfast ideally but anytime is the right time to exercise if that is not an option for you).    To make sure you re getting adequate vitamins and minerals during weight loss, I recommend one complete multivitamin a day of your choice.  Consider a probiotic and taking some vitamin D 2000 IU daily.    Let supper be your last meal of the day and ideally try to have at least 12 hours between supper and breakfast the next day to tap into some beneficial overnight fasting dynamics.  Midnight snacks need to go away. Water in the evening is fine, unsweetened, non caffeinated herbal tea is helpful as well.  Consolidating your meals within a 8-12 hour period of your day will help tap into these additional metabolic benefits and tends to keep your appetite up for breakfast, further helping to stay on track.  For most of my patients, I don't  recommend an intermittent fasting style diet (many find it hard to fit in their lifestyle) but an overnight fast is very doable for most patients and helps regulate our hunger drives a little better.  This makes it very important to nail good intake at all three meals to feel satisfied/energized and still lose weight.      If evening snacking desires are high, consider a glass of fiber supplement for some additional fullness (metamucil or similar). Most of us don't get the 25-30 grams per day of fiber that promotes good gut health/satiety.  Benefiber, metamucil, citrucel are reasonable/affordable options for most people.  Inulin, chicory, psyllium husk are reasonable options but start slow and low in the dose to avoid gas/bloating until your gut gets acclimated (ramping up to 5-10 grams per day of supplemental fiber after 3-4 weeks if needed).      Example Meal Plan:  Breakfast: 450-475 Calories  1 egg cooked on low in olive oil:   calories.  5oz Greek Yogurt (Fage plain classic: ~150 randi)  Handful of Berries of your choice (about a calorie per berry or 20-40cal per handful)    cup(cooked) of  old fashioned oatmeal or 1/2 cup(cooked) steel cut oats. (150 randi)  Sprinkle amount of brown sugar and a pat of butter. (40 randi)  Glass of  Water  Black coffee or unsweetened Tea (0calories).      2-3 hours Later Snack: (195 calories).  Glass of water  One string Cheese (80 calories) or 4 oz creamed cottage cheese (115 calories) with  Crunchy Celery sticks (less than 10 calories per large stalk) 2 stalks. (20 calories)    of a  Large Banana or   of a Large Apple (60 calories):  eat second half at lunch or afternoon snack.     Lunch:300 -350 calories   Chicken Breast  (baked/broiled/roasted/grilled)  4-6 oz.  (125-180 randi), BBQ sauce/hot sauce/mustard/seasoning is free. Just use a reasonable amount. Or a can of tuna with 1 tablespoon mayonnaise.  Salad: lettuce, any other veggies (cucumbers, green peppers/celery you like  and a small drizzle of dressing to just flavor.  Go as big on the veggies as you like,  as they are practically calorie free.   A whole, 8 inch cucumber is 45 calories, a whole green pepper is 23 calories, a stalk of celery is 9 calories.  [place] Dressing is 60 calories per tablespoon..so moderate your desired dressing or do a drizzle of olive oil and splash of balsamic vinegar on top,  Total calories unlikely to be over 150 even with dressing.  Glass of Water.    Option for lunch is meal replacement protein drink/smoothie.  Need at least 20 grams of protein and eat the rest of your apple/banana from the morning snack.      Afternoon Snack: 150-200 calories   Cheese Stick or cottage cheese again  and a fresh fruit OR  Granola Bar (protein Bar acceptable if under 200 calories OR  Homemade smoothies:  8oz skim milk,  a handful of berries (fresh or frozen and a serving of protein powder such as BiPro or Taniya sWhey for example.  If you don't like dairy, make with 8oz water, one small banana, handful of berries and the protein powder, add any veggies you want as well:  roughly 200 calories.   Glass of Water    Dinner: 325 calories  4oz of fresh, Atlantic salmon.  Broiled (salt/pepper/dill) for about 8-8.5 minutes (200calories) or  4oz filet mignon steak or sirloin steak  Salad or vegetable sautéed lightly in olive oil or   Broccoli 1.5  cups chopped and steamed  or micro-waved in a little water (75 calories)  Glass of Water,    Cup of herbal tea (unsweetened, caffeine free)      Herbs and seasonings are encouraged to flavor your foods/vegetables.  Make your food delicious.      Tips for Success:  1.  Prepare proteins ahead of time (broil chicken breasts in bulk so you can grab and go), steel cut oats/lentils can be stored in casserole dish/bowl in the fridge for quick scoop in the morning and rewarm in microwave, make use of crock pot recipes (watch salt content).  Making meals that cover 3-4 future meals is an  "easy way to stay on track.  2.  Drink a 8-12 oz glass of water every 2-3 hours when awake.  We often mistake hunger for thirst, especially when losing weight.  3. Remember your Reward and Motivation when things get hard.  4.  Weigh yourself every morning and record, you'll stay on track better and learn how our biorhythms, diet and elimination patterns show up on the scale. Don't worry about 1 or 2 day patterns, but when on track you'll notice good trend downward of weight over 3-4 day segments.  Plateaus tend to resolve after 4-8 days in most cases if you stay consistent with your plan.  These are natural and part of weight loss, even if you're perfect with your plan execution.  5. Call if problems/concerns.  [employer] is a great tool to stay in touch and provide weekly outside accountability. Check in with questions or if you want to brag.  6.  Find a handful of meals/foods that keep you on track and feeling good and get into a routine that is sustainable for you.  It's OK to have a routine that works for you.  7.  Consider taking a complete multivitamin just to make sure all micronutrients are adequate during weight loss.  8. If losing hair/brittle nails it usually means you are not taking enough protein.  Minimum goal is 60 grams daily of protein for smaller women, 80 grams a day for men. Consider taking Biotin as supplement or a \"Hair and Nail\" multivitamin.    LEAN PROTEIN SOURCES  Getting 20-30 grams of protein, 3 meals daily, is appropriate for most people, some need more but more than about 40 grams per meal is not useful.  General rule is drinking one ounce of water per gram of protein eaten over the course of the day:  70 grams of protein each day, drink 70 oz of water.  Protein Source Portion Calories Grams of Protein                           Nonfat, plain Greek yogurt    (10 grams sugar or less) 3/4 cup (6 oz)  12-17   Light Yogurt (10 grams sugar or less) 3/4 cup (6 oz)  6-8   Protein Shake " 1 shake 110-180 15-30   Skim/1% Milk or lactose-free milk 1 cup ( 8 oz)  8   Plain or light, flavored soymilk 1 cup  7-8   Plain or light, hemp milk 1 cup 110 6   Fat Free or 1% Cottage Cheese 1/2 cup 90 15   Part skim ricotta cheese 1/2 cup 100 14   Part skim or reduced fat cheese slices 1 ounce 65-80 8     Mozzarella String Cheese 1 80 8   Canned tuna, chicken, crab or salmon  (canned in water)  1/2 cup 100 15-20   White fish (broiled, grilled, baked) 3 ounces 100 21   [place]/Tuna (broiled, grilled, baked) 3 ounces 150-180 21   Shrimp, Scallops, Lobster, Crab 3 ounces 100 21   Pork loin, Pork Tenderloin 3 ounces 150 21   Boneless, skinless chicken /turkey breast                          (broiled, grilled, baked) 3 ounces 120 21   [place], [place], [place], and Venison 3 ounces 120 21   Lean cuts of red meat and pork (sirloin,   round, tenderloin, flank, ground 93%-96%) 3 ounces 170 21   Lean or Extra Lean Ground Turkey 1/2 cup 150 20   90-95% Lean Hallie Burger 1 mckinley 140-180 21   Low-fat casserole with lean meat 3/4 cup 200 17   Luncheon Meats                                                        (turkey, lean ham, roast beef, chicken) 3 ounces 100 21   Egg (boiled, poached, scrambled) 1 Egg 60 7   Egg Substitute 1/2 cup 70 10   Nuts (limit to 1 serving per day)  3 Tbsp. 150 7   Nut Carman (peanut, almond)  Limit to 1 serving or less daily 1 Tbsp. 90 4   Soy Burger (varies) 1  15   Garbanzo, Black, [place] Beans 1/2 cup 110 7   Refried Beans 1/2 cup 100 7   Kidney and Lima beans 1/2 cup 110 7   Tempeh 3 oz 175 18   Vegan crumbles 1/2 cup 100 14   Tofu 1/2 cup 110 14   Chili (beans and extra lean beef or turkey) 1 cup 200 23   Lentil Stew/Soup 1 cup 150 12   Black Bean Soup 1 cup 175 12       MEDICATIONS FOR WEIGHT LOSS  There are several medications available to assist us in weight loss.  By themselves, without compliance to a change in diet and increase in movement/activity these medications are  disappointing in their results. However, combined with a closely monitored program of diet change and exercise they can be very effective in controlling appetite and boosting initial weight loss.  All weight loss medications need continual re-evaluation for efficacy as their side effects and health benefits fail to be worthwhile if a person is not continuing to lose weight or in maintaining their healthy weight.  Some weight loss medications are scheduled drugs, meaning there is at least a theoretical possibility for developing addiction to them but in practice this is rare.  We do anticipate coming off meds in the future after stabilization of weight loss is assurred.  Finally, a tolerance can develop and people s perceived efficacy of medication can diminish.  In communication with your physician, it may be appropriate to intermittently take a break from these medications and then restart again (few weeks off then restart again) if a plateau is reached that cannot be broken through.  Each person can respond to a medication differently and to be a good option for you, it will need to be affordable, effective and well tolerated with minimal side effects.    In most cases, weight loss progress after one month and three months will be obtained and if a patient is not reaching the satisfactory progress towards weight loss, the medications may be discontinued.  The thought is that if a person is taking a weight loss medication and not receiving the potential health benefit of that drug, the side effects are not worthwhile and use should be discontinued.  On the flip side, there are many people on some weight loss medications for years because it continues to be an effective tool in their weight management and they are tolerating the medication without any long-term side effects.  Each person's response and purpose will be evaluated.      PHENTERMINE (Adipex): approved in 1959 for appetite suppression.  It has stimulant  effects and cannot be used with Ritalin, Concerta, or other stimulants.  Although it is not highly addictive, it's chemically related to amphetamines which are addictive.  Occasional dependence can develop, but rarely. The most common side effects are dry mouth, increased energy and concentration, increased pulse, and constipation.  You should not take phentermine if you have glaucoma, hyperthyroidism, or uncontrolled/untreated hypertension or overly anxious. You should stop if dramatic mood swings, severe insomnia, palpations, chest pains, visual changes or if your Blood Pressure is consistently elevated or any time it's over 160/90.   It's ok to go off the med for a few weeks and restart if efficacy is wearing off.  $24-$30 for 90 tablets at [facility]. Females are required to have reliable birth control to reduce the risk birth defects/miscarriage.      TOPIRAMATE (Topamax): Anti-seizure medication, also used to prevent migraines and sometimes for mood stabilization.  Side effects include paresthesia, glaucoma, altered concentration, attention difficulties, memory and speech problems, metabolic acidosis, depression, increase in body temperature and decrease sweating, risk of kidney stones.  Do not take Topamax while taking Depakote as this can cause high ammonia levels.  You must have reliable birth control as Topamax can cause birth defects.  If prolonged use has occurred it should be tapered off slowly to avoid withdrawal issues.  Insurance usually covers Topiramate.  At higher doses, there may be some confusion/forgetfulness associated with this so we try to limit dose to under 75mg twice daily to reduce this risk. Often covered by insurance as it's used for many reasons.  Topamax will cause carbonated beverages to taste bad. A recheck of your kidney/electrolytes may occur within a few months of starting.    QSYMIA (Phentermine + Topamax):  See above information about phentermine and Topamax.  Most common  side effects are paresthesia, dizziness, distortion of taste, insomnia, constipation, and dry mouth.  See above descriptions for the two individual agents.Females are required to have reliable birth control to reduce the risk birth defects/miscarriage.  $150-$220 per month      GLP1 Agonists:  Liraglutide (Victoza/Saxenda), Semaglutide (Ozempic/Wegovy):   Part of the family of Glucagon Like Peptide Agonists, these medications directly suppresses appetite and are often used by diabetic patients due to improvements in glucose/insulin balance.  They also slow how quickly the stomach empties so increase fullness. They may be hard to get covered for non diabetics and some plans have exclusions for weight loss purposes.  Currently, these are  injectable medications delivered via autoinjector pen. It can be very costly without insurance coverage (over $500/month).  Small risk for pancreatitis and dose should be held if increased mid abdominal pain/burning. It is not to be used if previous Multiple Endocrine Neoplasia. In rodents, may increase risk of thyroid tumors and not indicated for anyone with hx of medullary thyroid cancer as a result.  If changes in voice/swallowing should be discontinued. Reliable birth control required in women. [email or web address], [email or web address] has more information on these medications.    Contrave (Bupropion/Naltrexone).    Synergistic combination of a mild appetite suppressing anti-depressant (Bupropion) whose effects are increased due to interaction with Naltrexone.  Naltrexone may have some effects on craving and is often used in addiction medicine to help previous opiate addicts be less prone to relapse as it blocks the action of opiates. Should be stopped if any need for opiate pain medication, surgery or planned procedures where you'll be given sedation/anesthesia. If prolonged use recommend stepping down bupropion over 2-3 weeks to limit any risk of withdrawal issues. Side effects may include dry  "mouth, increased heart rate, mild elevation in Blood pressure;  dizziness, ringing in the ears, anxiety (typically due to bupropion), nausea, constipation, and some get fatigued with naltrexone.  About $210 on [facility] for 120 tabs of \"Contrave\", the brand name without insurance coverage. Generic Bupropion 75mg: $25 for 120 tabs, Naltrexone: $55 for 90 tabs without insurance coverage on [employer]. Cannot be used if pregnant/trying to conceive or breast feeding.      Plenity:   Recently available October 2020, by mail order pharmacy only, but expensive. $98/month.  2-3 capsules taken 20 minutes before 2 meals daily provides crystals that expand into a gel that provides a mechanical fullness. Gel mixes with your next meal and increases satisfaction by bulking the meal up to feel bigger than it truly is. Plenity is not absorbed and gets passed through the digestive tract and excreted in stools. May cause some bloating/gas/full feeling as it behaves like a fiber in many ways. Cost not available yet. FDA cleared in March of 2019 but not available in stores as of March of 2020. Clearance safety and efficacy data done under \"Gelesis\" name. Not appropriate for people with stomach or bowel motility issues as requires you to pass it through digestive tract. [email or web address] has more information.   "

## 2022-05-06 NOTE — LETTER
"    5/6/2022         RE: Ronel Ryan  [address]  [facility] [address]        Dear Colleague,    Thank you for referring your patient, Ronel Ryan, to the [facility] [place]. Please see a copy of my visit note below.      The patient has been notified of following:     \"This telephone visit will be conducted via a call between you and your physician/provider. We have found that certain health care needs can be provided without the need for a physical exam.  This service lets us provide the care you need with a short phone conversation.  If a prescription is necessary we can send it directly to your pharmacy.  If lab work is needed we can place an order for that and you can then stop by our lab to have the test done at a later time.    Telephone visits are billed at different rates depending on your insurance coverage. During this emergency period, for some insurers they may be billed the same as an in-person visit.  Please reach out to your insurance provider with any questions.    If during the course of the call the physician/provider feels a telephone visit is not appropriate, you will not be charged for this service.\"    Patient has given verbal consent to a Telephone visit? Yes    What phone number would you like to be contacted at? [phone]    Patient would like to receive their AVS by [employer]    Are there any specific questions or needs that you would like addressed at your visit today? Discuss trulicity     Additional provider notes:   Bariatric Clinic Follow-Up Visit:    Ronel Ryan is a 41 year old  female with Body mass index is 43.6 kg/m .  presenting here today for follow-up on non-surgical efforts for weight loss. Original Intake visit occurred on 7/13/19 with a weight of 345 lbs and BMI of 59.2.  Along with diet and behavior changes, she has been using various combinations through the years of Trulicity/naltrexone and a " previous use of topiramate from her pain clinic which can also assist her weight loss goals.  See her intake visit notes for details on identified contributors to weight gain in the past. Chart review shows Dietician calculated RMR of 1892kcal/day and protein intake goal of 70-90g/day.    Weight:   Wt Readings from Last 3 Encounters:   05/06/22 115.2 kg (254 lb)   04/20/22 121.8 kg (268 lb 9.6 oz)   04/14/22 121.8 kg (268 lb 9.6 oz)    pounds      Comorbidities:  Patient Active Problem List   Diagnosis     Binge eating     Eating disorder     Morbid obesity (H)     Multiple-type hyperlipidemia     Severe episode of recurrent major depressive disorder (H)     Arthralgia of hip     Hypertension     Trochanteric bursitis     History of urinary anomaly     Borderline personality disorder (H)     Attention deficit disorder     Asthma     Suicidal ideation     ACP (advance care planning)     Adjustment disorder with depressed mood     Anxiety     Moderate mixed bipolar I disorder (H)     Bipolar II disorder, most recent episode hypomanic (H)     Borderline high cholesterol     Care plan discussed with patient     Disorder of pelvis     Gait difficulty     Hx of diabetes mellitus     Hx of eating disorder     Morbid obesity with BMI of 50.0-59.9, adult (H)     Osteoarthritis     Pain of both hip joints     Relationship problems     Secondary amenorrhea     Soft tissue lesion of shoulder region     Vertigo     Greater trochanteric bursitis of both hips     Hip dysplasia     Left hip pain     Osteoarthritis of hip     Primary osteoarthritis of left hip     ADD (attention deficit disorder)     H/O urinary frequency     History of anxiety disorder     Hyperlipidemia     Mixed hyperlipidemia     Recurrent major depressive disorder (H)     Type 2 diabetes mellitus (H)     Primary osteoarthritis of both hips     Degenerative joint disease (DJD) of hip     Status post total hip replacement, left     Bipolar 1 disorder (H)      Constipation     Gastroesophageal reflux disease with esophagitis without hemorrhage     Mixed incontinence     Primary localized osteoarthritis of pelvic region and thigh     Skin sensation disturbance       Current Outpatient Medications:      albuterol (PROAIR HFA/PROVENTIL HFA/VENTOLIN HFA) 108 (90 BASE) MCG/ACT Inhaler, Inhale 1-2 puffs into the lungs every 4 hours as needed , Disp: , Rfl:      aspirin (ASA) 325 MG EC tablet, Take 1 tablet (325 mg) by mouth daily, Disp: 42 tablet, Rfl: 0     atorvastatin (LIPITOR) 40 MG tablet, Take 1 tablet by mouth every evening , Disp: , Rfl:      baclofen (LIORESAL) 10 MG tablet, TAKE 1 TABLET BY MOUTH TWICE A DAY AS NEEDED FOR MUSCLE SPASM, Disp: , Rfl:      blood glucose (NO BRAND SPECIFIED) lancets standard, Use to test blood sugar 2 times daily or as directed., Disp: 100 each, Rfl: 11     blood glucose monitoring (NO BRAND SPECIFIED) meter device kit, Use to test blood sugar 2 times daily or as directed., Disp: 1 kit, Rfl: 0     blood glucose monitoring (NO BRAND SPECIFIED) test strip, Use to test blood sugars 2 times daily or as directed, Disp: 100 strip, Rfl: 3     buPROPion (WELLBUTRIN XL) 300 MG 24 hr tablet, Take 300 mg by mouth daily , Disp: , Rfl:      Cholecalciferol 125 MCG (5000 UT) TABS, Take 125 mcg by mouth daily , Disp: , Rfl:      docusate sodium (COLACE) 100 MG capsule, Take 100 mg by mouth 3 times daily as needed for constipation, Disp: , Rfl:      dulaglutide (TRULICITY) 1.5 MG/0.5ML pen, Inject 1.5 mg Subcutaneous every 7 days, Disp: 2 mL, Rfl: 0     EPINEPHrine (EPIPEN/ADRENACLICK/OR ANY BX GENERIC EQUIV) 0.3 MG/0.3ML injection 2-pack, Inject 0.3 mLs (0.3 mg) into the muscle once as needed for anaphylaxis, Disp: 0.6 mL, Rfl: 0     hydrOXYzine (ATARAX) 25 MG tablet, Take 1 tablet (25 mg) by mouth every 6 hours as needed for itching or anxiety (with pain, moderate pain), Disp: 30 tablet, Rfl: 0     ibuprofen (ADVIL/MOTRIN) 600 MG tablet, Take 1 tablet  (600 mg) by mouth every 6 hours as needed (mild), Disp: , Rfl: 0     levonorgestrel (LILETTA, 52 MG,) 19.5 MCG/DAY IUD, 1 each by Intrauterine route once, Disp: , Rfl:      meclizine (ANTIVERT) 25 MG tablet, Take 25 mg by mouth 4 times daily as needed for dizziness, Disp: , Rfl:      oxyCODONE (ROXICODONE) 5 MG tablet, Take 1-2 tablets (5-10 mg) by mouth every 6 hours as needed for breakthrough pain or moderate to severe pain Take 1 pill for moderate pain (4-6/10) and 2 pills for severe pain (7-10/10). Alternate with Tylenol. Maximum 6 pills per day, Disp: 26 tablet, Rfl: 0     polyethylene glycol (MIRALAX) 17 GM/Dose powder, Take 17 g by mouth daily Take while taking opioid medications and until bowels are back to normal routine. Hold for loose stools, Disp: 510 g, Rfl: 0     risperiDONE (RISPERDAL) 2 MG tablet, Take 2 mg by mouth At Bedtime , Disp: , Rfl:      tolterodine ER (DETROL LA) 4 MG 24 hr capsule, Take 4 mg by mouth daily, Disp: , Rfl:      topiramate (TOPAMAX) 100 MG tablet, Take 100 mg by mouth 2 times daily , Disp: , Rfl:      traZODone (DESYREL) 150 MG tablet, Take 150 mg by mouth At Bedtime, Disp: , Rfl:      acetaminophen (TYLENOL) 325 MG tablet, Take 3 tablets (975 mg) by mouth 3 times daily (Patient not taking: Reported on 5/6/2022), Disp: , Rfl: 0      Interim: Since our last visit, she has had hip surgery and chart review showed post op anemia c/w recent surgery, down to 8.1 on 4/11/22 (surgery 4/4/22). Today, she reports a good weight reduction from her perioperative hip surgery weight, back into the 250s.  Had to have her left hip procedure repeated due to dislocation while in the hospital. Right hip went well recently. A1c is 4.6% and they held it in hospital. In TCU had trulicity and sugars were all great.   Overall appetite is low.   Found out never had previous diabetes so has come off trulicity and doing fine. Has support from topamax and bupropion for appetite  Getting PT and OT in home.  Twice weekly. Able to walk 1-2x daily walking w/ walker still and 100 yards is still a long walk for her.  Plan:   1.  Diet: continue mindful meals every 4.5-6 hours with 20grams of protein per meal, a good mix of veggies and hydrating well with water with 64-80oz/day.   2. Exercise: continue PT, gentle walks.  3. Medication: Trulicity stopped, A1c excellent. It did have some appetite regulation benefits but your bupropion and topamax also have appetite suppressive effects as well so we'll see how mindful protein rich meals with these medicines helps stabilize this excellent 89 lb reduction in weight since 2019.  We could consider adding back naltrexone in the future once no longer requiring pain medication from your recent hip surgery. We'll get together in 3-4 months to see how things have progressed.  4.  Given blood loss at surgery, a bit of extra iron may be helpful this month of May: Geraldine MARINELLI, one wilks X 45 pills.        We discussed [facility] Bariatric Basics including:  -eating 3 meals daily  -reviewed metabolic needs for weight loss based on Resting Metabolic Rate  -protein goals supportive of healthy weight loss  -avoiding/limiting calorie containing beverages  -We discussed the importance of restorative sleep and stress management in maintaining a healthy weight.  -We discussed the National Weight Control Registry healthy weight maintenance strategies and ways to optimize metabolism.  -We discussed the importance of physical activity including cardiovascular and strength training in maintaining a healthier weight and explored viable options.    Most recent labs:  Lab Results   Component Value Date    WBC 5.1 04/04/2022    HGB 8.1 (L) 04/11/2022    HCT 40.2 04/04/2022    MCV 90 04/04/2022     04/04/2022     Lab Results   Component Value Date    CHOL 162 05/21/2021     Lab Results   Component Value Date    HDL 34 (L) 05/21/2021     No components found for: LDLCALC  Lab Results   Component Value Date  "   TRIG 115 05/21/2021     No results found for: CHOLHDL  Lab Results   Component Value Date    ALT 44 03/14/2022    AST 28 03/14/2022    ALKPHOS 68 03/14/2022     No results found for: HGBA1C  No components found for: [id number]  No components found for: [id number]  No components found for: FERRITIN  No components found for: PTH  No components found for: 33988  No components found for: 7597  Lab Results   Component Value Date    TSH 1.14 05/21/2021     No results found for: TESTOSTERONE    DIETARY HISTORY    Positive Changes Since Last Visit: improving ambulation slowly. Easy fatigue likely related to recovery period and anemia post op (down to 8.1g/dl).   Struggling With: not much. Lower appetite.     Knowledgeable in [place] Food Labels: yes  Getting Adequate Protein: less since surgery as appetite has been low.     Stress management happy to have good recovery this time from hip replacement (last September had dislocation early on that required treatment).     PHYSICAL ACTIVITY PATTERNS:  Cardiovascular: PT/OT  Strength Training: same    REVIEW OF SYSTEMS  Doing well overall. Happy about recent A1c. .  PHYSICAL EXAM:  Vitals: Ht 1.626 m (5' 4\")   Wt 115.2 kg (254 lb)   BMI 43.60 kg/m    Weight:   Wt Readings from Last 3 Encounters:   05/06/22 115.2 kg (254 lb)   04/20/22 121.8 kg (268 lb 9.6 oz)   04/14/22 121.8 kg (268 lb 9.6 oz)         GEN: Pleasant on phone. No cough/dyspnea.NEURO: Alert and Oriented X3, fluent speech. .  .    Medical Decision Making:  Interim study results: labs reviewed 4/11/22. A1c 4.7% 4/4/22. Glucose 183mg/dl. .      30 minutes spent on the date of the encounter doing chart review, history and exam, documentation and further activities per the note   Kerwin Dunn MD  [facility]  9:07 AM  5/6/2022              Again, thank you for allowing me to participate in the care of your patient.        Sincerely,        Kerwin Dunn MD    "

## 2022-05-07 ENCOUNTER — HEALTH MAINTENANCE LETTER (OUTPATIENT)
Age: 42
End: 2022-05-07

## 2022-08-03 ENCOUNTER — VIRTUAL VISIT (OUTPATIENT)
Dept: SURGERY | Facility: CLINIC | Age: 42
End: 2022-08-03
Payer: COMMERCIAL

## 2022-08-03 VITALS — BODY MASS INDEX: 48.32 KG/M2 | HEIGHT: 64 IN | WEIGHT: 283 LBS

## 2022-08-03 DIAGNOSIS — E88.810 METABOLIC SYNDROME X: Primary | ICD-10-CM

## 2022-08-03 DIAGNOSIS — E55.9 VITAMIN D DEFICIENCY: ICD-10-CM

## 2022-08-03 DIAGNOSIS — E88.819 INSULIN RESISTANCE: ICD-10-CM

## 2022-08-03 DIAGNOSIS — R79.9 ABNORMAL FINDING OF BLOOD CHEMISTRY, UNSPECIFIED: ICD-10-CM

## 2022-08-03 DIAGNOSIS — D64.9 POSTOPERATIVE ANEMIA: ICD-10-CM

## 2022-08-03 DIAGNOSIS — E78.6 LOW HDL (UNDER 40): ICD-10-CM

## 2022-08-03 PROCEDURE — 99214 OFFICE O/P EST MOD 30 MIN: CPT | Mod: 95 | Performed by: EMERGENCY MEDICINE

## 2022-08-03 NOTE — PROGRESS NOTES
Bariatric Clinic Follow-Up Visit:    Ronel Ryan is a 41 year old  female with Body mass index is 48.58 kg/m .  presenting here today for follow-up on non-surgical efforts for weight loss. Original Intake visit occurred on 7/13/19 with a weight of 345 lbs and BMI of 59.2.  Along with diet and behavior changes, she has been using various combinations through the years of Trulicity/naltrexone and a previous use of topiramate from her pain clinic which can also assist her weight loss goals.  See her intake visit notes for details on identified contributors to weight gain in the past. Chart review shows Dietician calculated RMR of 1892kcal/day and protein intake goal of 70-90g/day.  Weight:   Wt Readings from Last 5 Encounters:   08/03/22 128.4 kg (283 lb)   05/06/22 115.2 kg (254 lb)   04/20/22 121.8 kg (268 lb 9.6 oz)   04/14/22 121.8 kg (268 lb 9.6 oz)   04/04/22 114.4 kg (252 lb 3.2 oz)    pounds      Comorbidities:  Patient Active Problem List   Diagnosis     Binge eating     Eating disorder     Morbid obesity (H)     Multiple-type hyperlipidemia     Severe episode of recurrent major depressive disorder (H)     Arthralgia of hip     Hypertension     Trochanteric bursitis     History of urinary anomaly     Borderline personality disorder (H)     Attention deficit disorder     Asthma     Suicidal ideation     ACP (advance care planning)     Adjustment disorder with depressed mood     Anxiety     Moderate mixed bipolar I disorder (H)     Bipolar II disorder, most recent episode hypomanic (H)     Borderline high cholesterol     Care plan discussed with patient     Disorder of pelvis     Gait difficulty     Hx of diabetes mellitus     Hx of eating disorder     Morbid obesity with BMI of 50.0-59.9, adult (H)     Osteoarthritis     Pain of both hip joints     Relationship problems     Secondary amenorrhea     Soft tissue lesion of shoulder region     Vertigo     Greater trochanteric bursitis of both hips     Hip  dysplasia     Left hip pain     Osteoarthritis of hip     Primary osteoarthritis of left hip     ADD (attention deficit disorder)     H/O urinary frequency     History of anxiety disorder     Hyperlipidemia     Mixed hyperlipidemia     Recurrent major depressive disorder (H)     Type 2 diabetes mellitus (H)     Primary osteoarthritis of both hips     Degenerative joint disease (DJD) of hip     Status post total hip replacement, left     Bipolar 1 disorder (H)     Constipation     Gastroesophageal reflux disease with esophagitis without hemorrhage     Mixed incontinence     Primary localized osteoarthritis of pelvic region and thigh     Skin sensation disturbance       Current Outpatient Medications:      albuterol (PROAIR HFA/PROVENTIL HFA/VENTOLIN HFA) 108 (90 BASE) MCG/ACT Inhaler, Inhale 1-2 puffs into the lungs every 4 hours as needed , Disp: , Rfl:      aspirin (ASA) 325 MG EC tablet, Take 1 tablet (325 mg) by mouth daily, Disp: 42 tablet, Rfl: 0     atorvastatin (LIPITOR) 40 MG tablet, Take 1 tablet by mouth every evening , Disp: , Rfl:      baclofen (LIORESAL) 10 MG tablet, TAKE 1 TABLET BY MOUTH TWICE A DAY AS NEEDED FOR MUSCLE SPASM, Disp: , Rfl:      buPROPion (WELLBUTRIN XL) 300 MG 24 hr tablet, Take 300 mg by mouth daily , Disp: , Rfl:      Cholecalciferol 125 MCG (5000 UT) TABS, Take 125 mcg by mouth daily , Disp: , Rfl:      docusate sodium (COLACE) 100 MG capsule, Take 100 mg by mouth 3 times daily as needed for constipation, Disp: , Rfl:      EPINEPHrine (EPIPEN/ADRENACLICK/OR ANY BX GENERIC EQUIV) 0.3 MG/0.3ML injection 2-pack, Inject 0.3 mLs (0.3 mg) into the muscle once as needed for anaphylaxis, Disp: 0.6 mL, Rfl: 0     hydrOXYzine (ATARAX) 25 MG tablet, Take 1 tablet (25 mg) by mouth every 6 hours as needed for itching or anxiety (with pain, moderate pain), Disp: 30 tablet, Rfl: 0     ibuprofen (ADVIL/MOTRIN) 600 MG tablet, Take 1 tablet (600 mg) by mouth every 6 hours as needed (mild), Disp: ,  Rfl: 0     levonorgestrel (LILETTA, 52 MG,) 19.5 MCG/DAY IUD, 1 each by Intrauterine route once, Disp: , Rfl:      meclizine (ANTIVERT) 25 MG tablet, Take 25 mg by mouth 4 times daily as needed for dizziness, Disp: , Rfl:      polyethylene glycol (MIRALAX) 17 GM/Dose powder, Take 17 g by mouth daily Take while taking opioid medications and until bowels are back to normal routine. Hold for loose stools, Disp: 510 g, Rfl: 0     risperiDONE (RISPERDAL) 2 MG tablet, Take 2 mg by mouth At Bedtime , Disp: , Rfl:      tolterodine ER (DETROL LA) 4 MG 24 hr capsule, Take 4 mg by mouth daily, Disp: , Rfl:      topiramate (TOPAMAX) 100 MG tablet, Take 100 mg by mouth 2 times daily , Disp: , Rfl:      traZODone (DESYREL) 150 MG tablet, Take 150 mg by mouth At Bedtime, Disp: , Rfl:      acetaminophen (TYLENOL) 325 MG tablet, Take 3 tablets (975 mg) by mouth 3 times daily (Patient not taking: No sig reported), Disp: , Rfl: 0     blood glucose (NO BRAND SPECIFIED) lancets standard, Use to test blood sugar 2 times daily or as directed. (Patient not taking: Reported on 8/3/2022), Disp: 100 each, Rfl: 11     blood glucose monitoring (NO BRAND SPECIFIED) meter device kit, Use to test blood sugar 2 times daily or as directed. (Patient not taking: Reported on 8/3/2022), Disp: 1 kit, Rfl: 0     blood glucose monitoring (NO BRAND SPECIFIED) test strip, Use to test blood sugars 2 times daily or as directed (Patient not taking: Reported on 8/3/2022), Disp: 100 strip, Rfl: 3     oxyCODONE (ROXICODONE) 5 MG tablet, Take 1-2 tablets (5-10 mg) by mouth every 6 hours as needed for breakthrough pain or moderate to severe pain Take 1 pill for moderate pain (4-6/10) and 2 pills for severe pain (7-10/10). Alternate with Tylenol. Maximum 6 pills per day (Patient not taking: Reported on 8/3/2022), Disp: 26 tablet, Rfl: 0      Interim: Since our last visit, she has been working on transition from 2 wheeled walker to her cane soon.   Her April Hip  "surgery has gone \"OK\", pain now worse than a 4/10, \"way better\". . Since Trulicity was stopped in the hospital at that visit (A1c was 4.6%) her appetite has been interrupted by cravings and weight has increased 30 lbs. Cereal/breads/pastaHer post op anemia treated w/ vitron C has not been used \"I forgot to take it\".  Given her low HDL (on statin previously as well), insulin resistance and excess weight she fits criteria for metabolic syndrome and given the rapid weight regain after coming off Trulicity, we'll look to get Ozemic covered which offers a bit better appetite control vs Trulicity and further weight reduction will certainly help her metabolic syndrome risks as she looks to reduce simple starch intake with low calorie/protein rich/complex carbohydrate and unsaturated fat diet, 1400-1500kcal/day goal and 70-90g of lean protein daily.    Her psychiatric medications have significant obesogenic risks and losing the appetite suppressant actions of her Trulicity likely contributed to 30 lb weight increase since the spring of this year. We'll look to ramp up medical therapy again given improved but ongoing class III morbid obesity and metabolic syndrome.  Plan:   1.  Diet: aiming for 1400-1500kcal/day goal with 70-90g of lean protein. Avoid simple starches and if using bread use a higher fiber (2-4grams of fiber per slice) whole grain bread. Limit pasta/rice to 1/3-1/2 cup cooked portion. Increase vegetable content to 1/3-1/2 of a plate per 2 meals daily minimum. Hydrate well with water to reduce starch craving (80 oz per day goal with water).   2. Exercise: continue with your hip rehabilitation following joint replacement 3 months ago. Weight loss will improve longevity of that joint.   3. Medication: Given weight increase since coming off Trulicity, we'll start ramp of Ozempic if covered well in light of your metabolic syndrome (low HDL, insulin resistance, central adiposity) that was worsened by recent weight " gain. Prime the pen and then set to 0.25mg and inject ONCE WEEKLY (I recommend Thursday injection) for 4 weeks. If tolerating that dose well, increase to 0.5mg dose in September and stay at that dose with refills until our follow up. We'll recheck labs.  4. Great work with keeping these 60 lbs off since 2019, we'll want to make the changes that produce 1-2 lbs per week of weight loss again and the above plan should help you accomplish that. Recheck with me in 8-10 weeks.      We discussed [facility] Bariatric Basics including:  -eating 3 meals daily  -reviewed metabolic needs for weight loss based on Resting Metabolic Rate  -protein goals supportive of healthy weight loss  -avoiding/limiting calorie containing beverages  -We discussed the importance of restorative sleep and stress management in maintaining a healthy weight.  -We discussed the National Weight Control Registry healthy weight maintenance strategies and ways to optimize metabolism.  -We discussed the importance of physical activity including cardiovascular and strength training in maintaining a healthier weight and explored viable options.    Most recent labs:  Lab Results   Component Value Date    WBC 5.1 04/04/2022    HGB 8.1 (L) 04/11/2022    HCT 40.2 04/04/2022    MCV 90 04/04/2022     04/04/2022     Lab Results   Component Value Date    CHOL 162 05/21/2021     Lab Results   Component Value Date    HDL 34 (L) 05/21/2021     No components found for: LDLCALC  Lab Results   Component Value Date    TRIG 115 05/21/2021     No results found for: CHOLHDL  Lab Results   Component Value Date    ALT 44 03/14/2022    AST 28 03/14/2022    ALKPHOS 68 03/14/2022     No results found for: HGBA1C  No components found for: [id number]  No components found for: [id number]  No components found for: FERRITIN  No components found for: PTH  No components found for: 94294  No components found for: 7597  Lab Results   Component Value Date    TSH 1.14 05/21/2021  "    No results found for: TESTOSTERONE    DIETARY HISTORY    Positive Changes Since Last Visit: understands too many carbs have been used  Struggling With: weight gain.     Knowledgeable in [place] Food Labels: yes  Getting Adequate Protein: often  Sleeping 7-8 hours/day irregularly  Stress management ok    PHYSICAL ACTIVITY PATTERNS:  Cardiovascular: PT for hip replacement  Strength Training: same    REVIEW OF SYSTEMS  Doing well overall..  PHYSICAL EXAM:  Vitals: Ht 1.626 m (5' 4\")   Wt 128.4 kg (283 lb)   BMI 48.58 kg/m    Weight:   Wt Readings from Last 3 Encounters:   08/03/22 128.4 kg (283 lb)   05/06/22 115.2 kg (254 lb)   04/20/22 121.8 kg (268 lb 9.6 oz)         GEN: Pleasant on the phone. No cough/dyspnea.   Interim study results: cannot see Entira labs. Anemic post op hip surgery this spring, hgb in the 8s. Will recheck labs.      30 minutes spent on the date of the encounter doing chart review, history and exam, documentation and further activities per the note   Kerwin Dunn MD  [facility]  8:10 PM  8/2/2022  "

## 2022-08-03 NOTE — PATIENT INSTRUCTIONS
Plan:   1.  Diet: aiming for 1400-1500kcal/day goal with 70-90g of lean protein. Avoid simple starches and if using bread use a higher fiber (2-4grams of fiber per slice) whole grain bread. Limit pasta/rice to 1/3-1/2 cup cooked portion. Increase vegetable content to 1/3-1/2 of a plate per 2 meals daily minimum. Hydrate well with water to reduce starch craving (80 oz per day goal with water).   2. Exercise: continue with your hip rehabilitation following joint replacement 3 months ago. Weight loss will improve longevity of that joint.   3. Medication: Given weight increase since coming off Trulicity, we'll start ramp of Ozempic if covered well in light of your metabolic syndrome (low HDL, insulin resistance, central adiposity) that was worsened by recent weight gain. Prime the pen and then set to 0.25mg and inject ONCE WEEKLY (I recommend Thursday injection) for 4 weeks. If tolerating that dose well, increase to 0.5mg dose in September and stay at that dose with refills until our follow up. We'll recheck labs.  4. Great work with keeping these 60 lbs off since 2019, we'll want to make the changes that produce 1-2 lbs per week of weight loss again and the above plan should help you accomplish that. Recheck with me in 8-10 weeks.

## 2022-08-03 NOTE — LETTER
8/3/2022         RE: Ronel Ryan  [address]  [place] [address]        Dear Colleague,    Thank you for referring your patient, Ronel Ryan, to the [facility] [place]. Please see a copy of my visit note below.    Bariatric Clinic Follow-Up Visit:    Ronel Ryan is a 41 year old  female with Body mass index is 48.58 kg/m .  presenting here today for follow-up on non-surgical efforts for weight loss. Original Intake visit occurred on 7/13/19 with a weight of 345 lbs and BMI of 59.2.  Along with diet and behavior changes, she has been using various combinations through the years of Trulicity/naltrexone and a previous use of topiramate from her pain clinic which can also assist her weight loss goals.  See her intake visit notes for details on identified contributors to weight gain in the past. Chart review shows Dietician calculated RMR of 1892kcal/day and protein intake goal of 70-90g/day.  Weight:   Wt Readings from Last 5 Encounters:   08/03/22 128.4 kg (283 lb)   05/06/22 115.2 kg (254 lb)   04/20/22 121.8 kg (268 lb 9.6 oz)   04/14/22 121.8 kg (268 lb 9.6 oz)   04/04/22 114.4 kg (252 lb 3.2 oz)    pounds      Comorbidities:  Patient Active Problem List   Diagnosis     Binge eating     Eating disorder     Morbid obesity (H)     Multiple-type hyperlipidemia     Severe episode of recurrent major depressive disorder (H)     Arthralgia of hip     Hypertension     Trochanteric bursitis     History of urinary anomaly     Borderline personality disorder (H)     Attention deficit disorder     Asthma     Suicidal ideation     ACP (advance care planning)     Adjustment disorder with depressed mood     Anxiety     Moderate mixed bipolar I disorder (H)     Bipolar II disorder, most recent episode hypomanic (H)     Borderline high cholesterol     Care plan discussed with patient     Disorder of pelvis     Gait difficulty     Hx of diabetes mellitus      Hx of eating disorder     Morbid obesity with BMI of 50.0-59.9, adult (H)     Osteoarthritis     Pain of both hip joints     Relationship problems     Secondary amenorrhea     Soft tissue lesion of shoulder region     Vertigo     Greater trochanteric bursitis of both hips     Hip dysplasia     Left hip pain     Osteoarthritis of hip     Primary osteoarthritis of left hip     ADD (attention deficit disorder)     H/O urinary frequency     History of anxiety disorder     Hyperlipidemia     Mixed hyperlipidemia     Recurrent major depressive disorder (H)     Type 2 diabetes mellitus (H)     Primary osteoarthritis of both hips     Degenerative joint disease (DJD) of hip     Status post total hip replacement, left     Bipolar 1 disorder (H)     Constipation     Gastroesophageal reflux disease with esophagitis without hemorrhage     Mixed incontinence     Primary localized osteoarthritis of pelvic region and thigh     Skin sensation disturbance       Current Outpatient Medications:      albuterol (PROAIR HFA/PROVENTIL HFA/VENTOLIN HFA) 108 (90 BASE) MCG/ACT Inhaler, Inhale 1-2 puffs into the lungs every 4 hours as needed , Disp: , Rfl:      aspirin (ASA) 325 MG EC tablet, Take 1 tablet (325 mg) by mouth daily, Disp: 42 tablet, Rfl: 0     atorvastatin (LIPITOR) 40 MG tablet, Take 1 tablet by mouth every evening , Disp: , Rfl:      baclofen (LIORESAL) 10 MG tablet, TAKE 1 TABLET BY MOUTH TWICE A DAY AS NEEDED FOR MUSCLE SPASM, Disp: , Rfl:      buPROPion (WELLBUTRIN XL) 300 MG 24 hr tablet, Take 300 mg by mouth daily , Disp: , Rfl:      Cholecalciferol 125 MCG (5000 UT) TABS, Take 125 mcg by mouth daily , Disp: , Rfl:      docusate sodium (COLACE) 100 MG capsule, Take 100 mg by mouth 3 times daily as needed for constipation, Disp: , Rfl:      EPINEPHrine (EPIPEN/ADRENACLICK/OR ANY BX GENERIC EQUIV) 0.3 MG/0.3ML injection 2-pack, Inject 0.3 mLs (0.3 mg) into the muscle once as needed for anaphylaxis, Disp: 0.6 mL, Rfl: 0      hydrOXYzine (ATARAX) 25 MG tablet, Take 1 tablet (25 mg) by mouth every 6 hours as needed for itching or anxiety (with pain, moderate pain), Disp: 30 tablet, Rfl: 0     ibuprofen (ADVIL/MOTRIN) 600 MG tablet, Take 1 tablet (600 mg) by mouth every 6 hours as needed (mild), Disp: , Rfl: 0     levonorgestrel (LILETTA, 52 MG,) 19.5 MCG/DAY IUD, 1 each by Intrauterine route once, Disp: , Rfl:      meclizine (ANTIVERT) 25 MG tablet, Take 25 mg by mouth 4 times daily as needed for dizziness, Disp: , Rfl:      polyethylene glycol (MIRALAX) 17 GM/Dose powder, Take 17 g by mouth daily Take while taking opioid medications and until bowels are back to normal routine. Hold for loose stools, Disp: 510 g, Rfl: 0     risperiDONE (RISPERDAL) 2 MG tablet, Take 2 mg by mouth At Bedtime , Disp: , Rfl:      tolterodine ER (DETROL LA) 4 MG 24 hr capsule, Take 4 mg by mouth daily, Disp: , Rfl:      topiramate (TOPAMAX) 100 MG tablet, Take 100 mg by mouth 2 times daily , Disp: , Rfl:      traZODone (DESYREL) 150 MG tablet, Take 150 mg by mouth At Bedtime, Disp: , Rfl:      acetaminophen (TYLENOL) 325 MG tablet, Take 3 tablets (975 mg) by mouth 3 times daily (Patient not taking: No sig reported), Disp: , Rfl: 0     blood glucose (NO BRAND SPECIFIED) lancets standard, Use to test blood sugar 2 times daily or as directed. (Patient not taking: Reported on 8/3/2022), Disp: 100 each, Rfl: 11     blood glucose monitoring (NO BRAND SPECIFIED) meter device kit, Use to test blood sugar 2 times daily or as directed. (Patient not taking: Reported on 8/3/2022), Disp: 1 kit, Rfl: 0     blood glucose monitoring (NO BRAND SPECIFIED) test strip, Use to test blood sugars 2 times daily or as directed (Patient not taking: Reported on 8/3/2022), Disp: 100 strip, Rfl: 3     oxyCODONE (ROXICODONE) 5 MG tablet, Take 1-2 tablets (5-10 mg) by mouth every 6 hours as needed for breakthrough pain or moderate to severe pain Take 1 pill for moderate pain (4-6/10) and 2  "pills for severe pain (7-10/10). Alternate with Tylenol. Maximum 6 pills per day (Patient not taking: Reported on 8/3/2022), Disp: 26 tablet, Rfl: 0      Interim: Since our last visit, she has been working on transition from 2 wheeled walker to her cane soon.   Her April Hip surgery has gone \"OK\", pain now worse than a 4/10, \"way better\". . Since Trulicity was stopped in the hospital at that visit (A1c was 4.6%) her appetite has been interrupted by cravings and weight has increased 30 lbs. Cereal/breads/pastaHer post op anemia treated w/ vitron C has not been used \"I forgot to take it\".  Given her low HDL (on statin previously as well), insulin resistance and excess weight she fits criteria for metabolic syndrome and given the rapid weight regain after coming off Trulicity, we'll look to get Ozemic covered which offers a bit better appetite control vs Trulicity and further weight reduction will certainly help her metabolic syndrome risks as she looks to reduce simple starch intake with low calorie/protein rich/complex carbohydrate and unsaturated fat diet, 1400-1500kcal/day goal and 70-90g of lean protein daily.    Her psychiatric medications have significant obesogenic risks and losing the appetite suppressant actions of her Trulicity likely contributed to 30 lb weight increase since the spring of this year. We'll look to ramp up medical therapy again given improved but ongoing class III morbid obesity and metabolic syndrome.  Plan:   1.  Diet: aiming for 1400-1500kcal/day goal with 70-90g of lean protein. Avoid simple starches and if using bread use a higher fiber (2-4grams of fiber per slice) whole grain bread. Limit pasta/rice to 1/3-1/2 cup cooked portion. Increase vegetable content to 1/3-1/2 of a plate per 2 meals daily minimum. Hydrate well with water to reduce starch craving (80 oz per day goal with water).   2. Exercise: continue with your hip rehabilitation following joint replacement 3 months ago. Weight " loss will improve longevity of that joint.   3. Medication: Given weight increase since coming off Trulicity, we'll start ramp of Ozempic if covered well in light of your metabolic syndrome (low HDL, insulin resistance, central adiposity) that was worsened by recent weight gain. Prime the pen and then set to 0.25mg and inject ONCE WEEKLY (I recommend Thursday injection) for 4 weeks. If tolerating that dose well, increase to 0.5mg dose in September and stay at that dose with refills until our follow up. We'll recheck labs.  4. Great work with keeping these 60 lbs off since 2019, we'll want to make the changes that produce 1-2 lbs per week of weight loss again and the above plan should help you accomplish that. Recheck with me in 8-10 weeks.      We discussed [facility]ariat[facility] Basics including:  -eating 3 meals daily  -reviewed metabolic needs for weight loss based on Resting Metabolic Rate  -protein goals supportive of healthy weight loss  -avoiding/limiting calorie containing beverages  -We discussed the importance of restorative sleep and stress management in maintaining a healthy weight.  -We discussed the National Weight Control Registry healthy weight maintenance strategies and ways to optimize metabolism.  -We discussed the importance of physical activity including cardiovascular and strength training in maintaining a healthier weight and explored viable options.    Most recent labs:  Lab Results   Component Value Date    WBC 5.1 04/04/2022    HGB 8.1 (L) 04/11/2022    HCT 40.2 04/04/2022    MCV 90 04/04/2022     04/04/2022     Lab Results   Component Value Date    CHOL 162 05/21/2021     Lab Results   Component Value Date    HDL 34 (L) 05/21/2021     No components found for: LDLCALC  Lab Results   Component Value Date    TRIG 115 05/21/2021     No results found for: CHOLHDL  Lab Results   Component Value Date    ALT 44 03/14/2022    AST 28 03/14/2022    ALKPHOS 68 03/14/2022     No results found for:  "HGBA1C  No components found for: [id number]  No components found for: [id number]  No components found for: FERRITIN  No components found for: PTH  No components found for: 24110  No components found for: 7597  Lab Results   Component Value Date    TSH 1.14 05/21/2021     No results found for: TESTOSTERONE    DIETARY HISTORY    Positive Changes Since Last Visit: understands too many carbs have been used  Struggling With: weight gain.     Knowledgeable in Reading Food Labels: yes  Getting Adequate Protein: often  Sleeping 7-8 hours/day irregularly  Stress management ok    PHYSICAL ACTIVITY PATTERNS:  Cardiovascular: PT for hip replacement  Strength Training: same    REVIEW OF SYSTEMS  Doing well overall..  PHYSICAL EXAM:  Vitals: Ht 1.626 m (5' 4\")   Wt 128.4 kg (283 lb)   BMI 48.58 kg/m    Weight:   Wt Readings from Last 3 Encounters:   08/03/22 128.4 kg (283 lb)   05/06/22 115.2 kg (254 lb)   04/20/22 121.8 kg (268 lb 9.6 oz)         GEN: Pleasant on the phone. No cough/dyspnea.   Interim study results: cannot see Entira labs. Anemic post op hip surgery this spring, hgb in the 8s. Will recheck labs.      30 minutes spent on the date of the encounter doing chart review, history and exam, documentation and further activities per the note   Kerwin Dunn MD  [facility]  8:10 PM  8/2/2022      The patient has been notified of following:     \"This telephone visit will be conducted via a call between you and your physician/provider. We have found that certain health care needs can be provided without the need for a physical exam.  This service lets us provide the care you need with a short phone conversation.  If a prescription is necessary we can send it directly to your pharmacy.  If lab work is needed we can place an order for that and you can then stop by our lab to have the test done at a later time.    Telephone visits are billed at different rates depending on your insurance " "coverage. During this emergency period, for some insurers they may be billed the same as an in-person visit.  Please reach out to your insurance provider with any questions.    If during the course of the call the physician/provider feels a telephone visit is not appropriate, you will not be charged for this service.\"    Patient has given verbal consent to a Telephone visit? Yes    What phone number would you like to be contacted at? [phone]    [facility]    Additional provider notes: see my note    Phone call duration: 30 minutes       Again, thank you for allowing me to participate in the care of your patient.        Sincerely,        Kerwin Dunn MD    "

## 2022-08-03 NOTE — PROGRESS NOTES
"  The patient has been notified of following:     \"This telephone visit will be conducted via a call between you and your physician/provider. We have found that certain health care needs can be provided without the need for a physical exam.  This service lets us provide the care you need with a short phone conversation.  If a prescription is necessary we can send it directly to your pharmacy.  If lab work is needed we can place an order for that and you can then stop by our lab to have the test done at a later time.    Telephone visits are billed at different rates depending on your insurance coverage. During this emergency period, for some insurers they may be billed the same as an in-person visit.  Please reach out to your insurance provider with any questions.    If during the course of the call the physician/provider feels a telephone visit is not appropriate, you will not be charged for this service.\"    Patient has given verbal consent to a Telephone visit? Yes    What phone number would you like to be contacted at? [phone]    Patient would like to receive their AVS by Michelle    Additional provider notes: see my note    Phone call duration: 30 minutes   "

## 2022-10-03 ENCOUNTER — LAB REQUISITION (OUTPATIENT)
Dept: LAB | Facility: CLINIC | Age: 42
End: 2022-10-03
Payer: COMMERCIAL

## 2022-10-03 DIAGNOSIS — I10 ESSENTIAL (PRIMARY) HYPERTENSION: ICD-10-CM

## 2022-10-03 DIAGNOSIS — E78.2 MIXED HYPERLIPIDEMIA: ICD-10-CM

## 2022-10-03 PROCEDURE — 80061 LIPID PANEL: CPT | Mod: ORL | Performed by: STUDENT IN AN ORGANIZED HEALTH CARE EDUCATION/TRAINING PROGRAM

## 2022-10-03 PROCEDURE — 80053 COMPREHEN METABOLIC PANEL: CPT | Mod: ORL | Performed by: STUDENT IN AN ORGANIZED HEALTH CARE EDUCATION/TRAINING PROGRAM

## 2022-10-04 LAB
ALBUMIN SERPL BCG-MCNC: 4.2 G/DL (ref 3.5–5.2)
ALP SERPL-CCNC: 60 U/L (ref 35–104)
ALT SERPL W P-5'-P-CCNC: 26 U/L (ref 10–35)
ANION GAP SERPL CALCULATED.3IONS-SCNC: 10 MMOL/L (ref 7–15)
AST SERPL W P-5'-P-CCNC: 27 U/L (ref 10–35)
BILIRUB SERPL-MCNC: 0.3 MG/DL
BUN SERPL-MCNC: 13.8 MG/DL (ref 6–20)
CALCIUM SERPL-MCNC: 9 MG/DL (ref 8.6–10)
CHLORIDE SERPL-SCNC: 107 MMOL/L (ref 98–107)
CHOLEST SERPL-MCNC: 95 MG/DL
CREAT SERPL-MCNC: 1.09 MG/DL (ref 0.51–0.95)
DEPRECATED HCO3 PLAS-SCNC: 21 MMOL/L (ref 22–29)
GFR SERPL CREATININE-BSD FRML MDRD: 64 ML/MIN/1.73M2
GLUCOSE SERPL-MCNC: 82 MG/DL (ref 70–99)
HDLC SERPL-MCNC: 44 MG/DL
LDLC SERPL CALC-MCNC: 33 MG/DL
NONHDLC SERPL-MCNC: 51 MG/DL
POTASSIUM SERPL-SCNC: 4 MMOL/L (ref 3.4–5.3)
PROT SERPL-MCNC: 6.4 G/DL (ref 6.4–8.3)
SODIUM SERPL-SCNC: 138 MMOL/L (ref 136–145)
TRIGL SERPL-MCNC: 88 MG/DL

## 2022-10-12 ENCOUNTER — ANCILLARY PROCEDURE (OUTPATIENT)
Dept: MAMMOGRAPHY | Facility: CLINIC | Age: 42
End: 2022-10-12
Attending: STUDENT IN AN ORGANIZED HEALTH CARE EDUCATION/TRAINING PROGRAM
Payer: COMMERCIAL

## 2022-10-12 DIAGNOSIS — Z12.31 VISIT FOR SCREENING MAMMOGRAM: ICD-10-CM

## 2022-10-12 PROCEDURE — 77067 SCR MAMMO BI INCL CAD: CPT

## 2022-10-26 DIAGNOSIS — E88.810 METABOLIC SYNDROME X: ICD-10-CM

## 2022-11-01 ENCOUNTER — TELEPHONE (OUTPATIENT)
Dept: SURGERY | Facility: CLINIC | Age: 42
End: 2022-11-01

## 2022-11-01 NOTE — TELEPHONE ENCOUNTER
Pharmacy called and said that the Ozempic is on backorder right now and so the patient hasn't had it for the last 4 weeks.  They were calling for an alternative for the time being because they can't get in Ozempic at all and have called around to several locations to see if they could get it from them as well.   Kassandra Castellon RN

## 2022-11-02 DIAGNOSIS — E11.9 TYPE 2 DIABETES MELLITUS WITHOUT COMPLICATION, WITHOUT LONG-TERM CURRENT USE OF INSULIN (H): ICD-10-CM

## 2022-11-02 DIAGNOSIS — E88.810 METABOLIC SYNDROME X: Primary | ICD-10-CM

## 2022-11-02 RX ORDER — LIRAGLUTIDE 6 MG/ML
INJECTION SUBCUTANEOUS
Qty: 9 ML | Refills: 2 | Status: SHIPPED | OUTPATIENT
Start: 2022-11-02 | End: 2022-12-29

## 2022-11-02 NOTE — PROGRESS NOTES
ozempic ran out a month ago due to shortage. Will transition to Victoza for now and ramp dose up every 2-3 weeks if tolerated to 1.8mg/day dose max for now and we'll look at transition back to ozempic when available again in the new year.  Kerwin Dunn MD

## 2022-11-07 ENCOUNTER — TELEPHONE (OUTPATIENT)
Dept: SURGERY | Facility: CLINIC | Age: 42
End: 2022-11-07

## 2022-11-07 NOTE — TELEPHONE ENCOUNTER
Ozempic ran out a month ago due to shortage. Will transition to Victoza for now and ramp dose up every 2-3 weeks if tolerated to 1.8mg/day dose max for now and we'll look at transition back to ozempic when available again in the new year.  Kerwin Dunn MD    Left a message for Heather letting her know that this is why the medication was dropped in dose and encouraged to call if questions.  Kassandra Castellon RN

## 2022-11-07 NOTE — TELEPHONE ENCOUNTER
Keyanna from [facility] called and states pt was taking a higher dose of Victoza then prescribed     [phone]

## 2022-11-09 ENCOUNTER — LAB (OUTPATIENT)
Dept: LAB | Facility: CLINIC | Age: 42
End: 2022-11-09
Payer: COMMERCIAL

## 2022-11-09 DIAGNOSIS — E88.810 METABOLIC SYNDROME X: ICD-10-CM

## 2022-11-09 DIAGNOSIS — R79.9 ABNORMAL FINDING OF BLOOD CHEMISTRY, UNSPECIFIED: ICD-10-CM

## 2022-11-09 DIAGNOSIS — E88.819 INSULIN RESISTANCE: ICD-10-CM

## 2022-11-09 DIAGNOSIS — E55.9 VITAMIN D DEFICIENCY: ICD-10-CM

## 2022-11-09 DIAGNOSIS — D64.9 POSTOPERATIVE ANEMIA: ICD-10-CM

## 2022-11-09 DIAGNOSIS — E78.6 LOW HDL (UNDER 40): ICD-10-CM

## 2022-11-09 LAB
ALBUMIN SERPL BCG-MCNC: 3.8 G/DL (ref 3.5–5.2)
ALP SERPL-CCNC: 65 U/L (ref 35–104)
ALT SERPL W P-5'-P-CCNC: 20 U/L (ref 10–35)
ANION GAP SERPL CALCULATED.3IONS-SCNC: 11 MMOL/L (ref 7–15)
AST SERPL W P-5'-P-CCNC: 29 U/L (ref 10–35)
BILIRUB SERPL-MCNC: 0.3 MG/DL
BUN SERPL-MCNC: 11.1 MG/DL (ref 6–20)
CALCIUM SERPL-MCNC: 9 MG/DL (ref 8.6–10)
CHLORIDE SERPL-SCNC: 108 MMOL/L (ref 98–107)
CHOLEST SERPL-MCNC: 102 MG/DL
CREAT SERPL-MCNC: 1.03 MG/DL (ref 0.51–0.95)
DEPRECATED CALCIDIOL+CALCIFEROL SERPL-MC: 61 UG/L (ref 20–75)
DEPRECATED HCO3 PLAS-SCNC: 20 MMOL/L (ref 22–29)
ERYTHROCYTE [DISTWIDTH] IN BLOOD BY AUTOMATED COUNT: 12.8 % (ref 10–15)
FERRITIN SERPL-MCNC: 79 NG/ML (ref 6–175)
GFR SERPL CREATININE-BSD FRML MDRD: 69 ML/MIN/1.73M2
GLUCOSE SERPL-MCNC: 87 MG/DL (ref 70–99)
HBA1C MFR BLD: 5.1 % (ref 0–5.6)
HCT VFR BLD AUTO: 41.1 % (ref 35–47)
HDLC SERPL-MCNC: 49 MG/DL
HGB BLD-MCNC: 13.5 G/DL (ref 11.7–15.7)
LDLC SERPL CALC-MCNC: 31 MG/DL
MCH RBC QN AUTO: 31 PG (ref 26.5–33)
MCHC RBC AUTO-ENTMCNC: 32.8 G/DL (ref 31.5–36.5)
MCV RBC AUTO: 94 FL (ref 78–100)
NONHDLC SERPL-MCNC: 53 MG/DL
PLATELET # BLD AUTO: 234 10E3/UL (ref 150–450)
POTASSIUM SERPL-SCNC: 4.2 MMOL/L (ref 3.4–5.3)
PROT SERPL-MCNC: 6.5 G/DL (ref 6.4–8.3)
RBC # BLD AUTO: 4.36 10E6/UL (ref 3.8–5.2)
SODIUM SERPL-SCNC: 139 MMOL/L (ref 136–145)
TRIGL SERPL-MCNC: 110 MG/DL
VIT B12 SERPL-MCNC: 368 PG/ML (ref 232–1245)
WBC # BLD AUTO: 6.2 10E3/UL (ref 4–11)

## 2022-11-09 PROCEDURE — 82607 VITAMIN B-12: CPT

## 2022-11-09 PROCEDURE — 83036 HEMOGLOBIN GLYCOSYLATED A1C: CPT

## 2022-11-09 PROCEDURE — 80053 COMPREHEN METABOLIC PANEL: CPT

## 2022-11-09 PROCEDURE — 36415 COLL VENOUS BLD VENIPUNCTURE: CPT

## 2022-11-09 PROCEDURE — 85027 COMPLETE CBC AUTOMATED: CPT

## 2022-11-09 PROCEDURE — 82728 ASSAY OF FERRITIN: CPT

## 2022-11-09 PROCEDURE — 80061 LIPID PANEL: CPT

## 2022-11-09 PROCEDURE — 82306 VITAMIN D 25 HYDROXY: CPT

## 2022-11-10 ENCOUNTER — VIRTUAL VISIT (OUTPATIENT)
Dept: SURGERY | Facility: CLINIC | Age: 42
End: 2022-11-10
Payer: COMMERCIAL

## 2022-11-10 VITALS — HEIGHT: 64 IN | WEIGHT: 278 LBS | BODY MASS INDEX: 47.46 KG/M2

## 2022-11-10 DIAGNOSIS — R63.5 DRUG-INDUCED WEIGHT GAIN: ICD-10-CM

## 2022-11-10 DIAGNOSIS — E66.01 CLASS 3 SEVERE OBESITY DUE TO EXCESS CALORIES WITH SERIOUS COMORBIDITY AND BODY MASS INDEX (BMI) OF 45.0 TO 49.9 IN ADULT (H): Primary | ICD-10-CM

## 2022-11-10 DIAGNOSIS — E88.810 METABOLIC SYNDROME: ICD-10-CM

## 2022-11-10 DIAGNOSIS — T50.905A DRUG-INDUCED WEIGHT GAIN: ICD-10-CM

## 2022-11-10 DIAGNOSIS — E66.813 CLASS 3 SEVERE OBESITY DUE TO EXCESS CALORIES WITH SERIOUS COMORBIDITY AND BODY MASS INDEX (BMI) OF 45.0 TO 49.9 IN ADULT (H): Primary | ICD-10-CM

## 2022-11-10 PROCEDURE — 99214 OFFICE O/P EST MOD 30 MIN: CPT | Mod: 95 | Performed by: EMERGENCY MEDICINE

## 2022-11-10 RX ORDER — RISPERIDONE 0.5 MG/1
TABLET ORAL
COMMUNITY
Start: 2022-10-03 | End: 2023-06-22

## 2022-11-10 RX ORDER — DOCUSATE SODIUM 50MG AND SENNOSIDES 8.6MG 8.6; 5 MG/1; MG/1
TABLET, FILM COATED ORAL
COMMUNITY
Start: 2022-10-26 | End: 2023-06-22

## 2022-11-10 NOTE — LETTER
11/10/2022         RE: Ronel Ryan  [address]  [place] [facility] [address]        Dear Colleague,    Thank you for referring your patient, Ronel Ryan, to the [facility]. Please see a copy of my visit note below.    Ronel Ryan is 42 year old  female who presents for a billable video visit today.    How would you like to obtain your AVS? MyChart  If dropped from the video visit, the video invitation should be resent by: Text to cell phone: [phone]  Will anyone else be joining your video visit? No      Video Start Time: 10:15 AM    Are there any specific questions or needs that you would like addressed at your visit today? Gained 20# after being off ozempic     Video-Visit Details    Type of service:  Video Visit    Platform used for Video Visit: VivaReal    Video End Time (time video stopped): 10:35 AM    Originating Location (pt. Location): Home        Distant Location (provider location):  On-site    Distant Location (provider location[facility]  [facility]       Bariatric Clinic Follow-Up Visit:    Ronel Ryan is a 42 year old  female with Body mass index is 47.72 kg/m .  presenting here today for follow-up on non-surgical efforts for weight loss. Original Intake visit occurred on 7/13/19 with a weight of 345 lbs and BMI of 59.2.  Along with diet and behavior changes, she has been using various combinations through the years of Trulicity/naltrexone and a previous use of topiramate from her pain clinic which can also assist her weight loss goals.  See her intake visit notes for details on identified contributors to weight gain in the past. Chart review shows Dietician calculated RMR of 1892kcal/day and protein intake goal of 70-90g/day.  Her Trulicity was stopped in hospital May of 2022 during hip surgery and she had weight gain of 30 lbs over the summer due to increased cravings off  medication. We transitioned to Semaglutide therapy this summer of 2022 but then supplies ran out due to shortages in supply and Victoza was started in November 2022 (11/7/22) with plans to transition back in the new year when supplies of Ozempic are more available.   Weight:   Wt Readings from Last 5 Encounters:   11/10/22 126.1 kg (278 lb)   08/03/22 128.4 kg (283 lb)   05/06/22 115.2 kg (254 lb)   04/20/22 121.8 kg (268 lb 9.6 oz)   04/14/22 121.8 kg (268 lb 9.6 oz)    pounds      Comorbidities:  Patient Active Problem List   Diagnosis     Binge eating     Eating disorder     Morbid obesity (H)     Multiple-type hyperlipidemia     Severe episode of recurrent major depressive disorder (H)     Arthralgia of hip     Hypertension     Trochanteric bursitis     History of urinary anomaly     Borderline personality disorder (H)     Attention deficit disorder     Asthma     Suicidal ideation     ACP (advance care planning)     Adjustment disorder with depressed mood     Anxiety     Moderate mixed bipolar I disorder (H)     Bipolar II disorder, most recent episode hypomanic (H)     Borderline high cholesterol     Care plan discussed with patient     Disorder of pelvis     Gait difficulty     Hx of diabetes mellitus     Hx of eating disorder     Morbid obesity with BMI of 50.0-59.9, adult (H)     Osteoarthritis     Pain of both hip joints     Relationship problems     Secondary amenorrhea     Soft tissue lesion of shoulder region     Vertigo     Greater trochanteric bursitis of both hips     Hip dysplasia     Left hip pain     Osteoarthritis of hip     Primary osteoarthritis of left hip     ADD (attention deficit disorder)     H/O urinary frequency     History of anxiety disorder     Hyperlipidemia     Mixed hyperlipidemia     Recurrent major depressive disorder (H)     Type 2 diabetes mellitus (H)     Primary osteoarthritis of both hips     Degenerative joint disease (DJD) of hip     Status post total hip replacement, left      Bipolar 1 disorder (H)     Constipation     Gastroesophageal reflux disease with esophagitis without hemorrhage     Mixed incontinence     Primary localized osteoarthritis of pelvic region and thigh     Skin sensation disturbance       Current Outpatient Medications:      acetaminophen (TYLENOL) 325 MG tablet, Take 3 tablets (975 mg) by mouth 3 times daily, Disp: , Rfl: 0     albuterol (PROAIR HFA/PROVENTIL HFA/VENTOLIN HFA) 108 (90 BASE) MCG/ACT Inhaler, Inhale 1-2 puffs into the lungs every 4 hours as needed , Disp: , Rfl:      aspirin (ASA) 325 MG EC tablet, Take 1 tablet (325 mg) by mouth daily, Disp: 42 tablet, Rfl: 0     atorvastatin (LIPITOR) 40 MG tablet, Take 1 tablet by mouth every evening , Disp: , Rfl:      baclofen (LIORESAL) 10 MG tablet, TAKE 1 TABLET BY MOUTH TWICE A DAY AS NEEDED FOR MUSCLE SPASM, Disp: , Rfl:      blood glucose (NO BRAND SPECIFIED) lancets standard, Use to test blood sugar 2 times daily or as directed., Disp: 100 each, Rfl: 11     blood glucose monitoring (NO BRAND SPECIFIED) meter device kit, Use to test blood sugar 2 times daily or as directed., Disp: 1 kit, Rfl: 0     blood glucose monitoring (NO BRAND SPECIFIED) test strip, Use to test blood sugars 2 times daily or as directed, Disp: 100 strip, Rfl: 3     buPROPion (WELLBUTRIN XL) 300 MG 24 hr tablet, Take 300 mg by mouth daily , Disp: , Rfl:      Cholecalciferol 125 MCG (5000 UT) TABS, Take 125 mcg by mouth daily , Disp: , Rfl:      EPINEPHrine (EPIPEN/ADRENACLICK/OR ANY BX GENERIC EQUIV) 0.3 MG/0.3ML injection 2-pack, Inject 0.3 mLs (0.3 mg) into the muscle once as needed for anaphylaxis, Disp: 0.6 mL, Rfl: 0     hydrOXYzine (ATARAX) 25 MG tablet, Take 1 tablet (25 mg) by mouth every 6 hours as needed for itching or anxiety (with pain, moderate pain), Disp: 30 tablet, Rfl: 0     ibuprofen (ADVIL/MOTRIN) 600 MG tablet, Take 1 tablet (600 mg) by mouth every 6 hours as needed (mild), Disp: , Rfl: 0     insulin pen needle  (31G X 6 MM) 31G X 6 MM miscellaneous, Use new pen needle for each autoinjection., Disp: 90 each, Rfl: 1     levonorgestrel (LILETTA, 52 MG,) 19.5 MCG/DAY IUD, 1 each by Intrauterine route once, Disp: , Rfl:      liraglutide (VICTOZA) 18 MG/3ML solution, Ramp up dose every 2-3 weeks if tolerated:  Start at 0.6mg injected daily for 2-3 weeks, then up to 1.2mg injected daily for 2-3 weeks then up to max of 1.8mg injected daily if tolerated., Disp: 9 mL, Rfl: 2     meclizine (ANTIVERT) 25 MG tablet, Take 25 mg by mouth 4 times daily as needed for dizziness, Disp: , Rfl:      risperiDONE (RISPERDAL) 0.5 MG tablet, , Disp: , Rfl:      SENEXON-S 8.6-50 MG tablet, , Disp: , Rfl:      tolterodine ER (DETROL LA) 4 MG 24 hr capsule, Take 4 mg by mouth daily, Disp: , Rfl:      topiramate (TOPAMAX) 100 MG tablet, Take 100 mg by mouth 2 times daily , Disp: , Rfl:      traZODone (DESYREL) 150 MG tablet, Take 150 mg by mouth At Bedtime, Disp: , Rfl:       Interim: Since our last visit, she has had some good weight reduction since restarting medication.  Going to [employer]: 3 days of cardio with katie classes, elliptical bike classes for 20-30 60 minutes and 3 days of strength each week and does body pump for strength training 3 days weekly. Not yet getting through whole class.   Left hip is comletely healed. Right hip bothers her a bit. Followed up with Ortho and placement of that replacement looked fine per her recollection.   Plan:   1.  Diet: 1400-1500kcal/day with 80-100grams of lean protein daily should continue to support you well during weightloss and [employer] workouts. Hydrate well with water 75-80oz/day should keep things on track.  2. Exercise: continue [employer] aerobic and strength workouts and modify them as your body requires so you don't burn out/get injured. Aim to increase the time in each class by 3-5 minutes each week and by January you should be completing classes regularly.  3. Medication: Victoza for the next 2 months then  we'll switch back to Ozemic as supplies become available again. Victoza can increase from 0.6mg/day for 1-2 weeks to 1.2mg/day for 1-2 weeks and then 1.8mg/day and stay at that dose until we make the switch back to Ozempic 0.5mg/week around January.  4. Great work maintaining 67 lb weight reduction over the last 3.5 years! Aiming to stabilize weight under 250 lbs over the long term.  Already down 12 BMI points and your workouts will greatly help your metabolic rate/lean muscle mass going forward.   5.       We discussed [facility] Bariat[facility] Basics including:  -eating 3 meals daily  -reviewed metabolic needs for weight loss based on Resting Metabolic Rate  -protein goals supportive of healthy weight loss  -avoiding/limiting calorie containing beverages  -We discussed the importance of restorative sleep and stress management in maintaining a healthy weight.  -We discussed the National Weight Control Registry healthy weight maintenance strategies and ways to optimize metabolism.  -We discussed the importance of physical activity including cardiovascular and strength training in maintaining a healthier weight and explored viable options.      Most recent labs:  Lab Results   Component Value Date    WBC 6.2 11/09/2022    HGB 13.5 11/09/2022    HCT 41.1 11/09/2022    MCV 94 11/09/2022     11/09/2022     Lab Results   Component Value Date    CHOL 102 11/09/2022     Lab Results   Component Value Date    HDL 49 (L) 11/09/2022     No components found for: LDLCALC  Lab Results   Component Value Date    TRIG 110 11/09/2022     No results found for: CHOLHDL  Lab Results   Component Value Date    ALT 20 11/09/2022    AST 29 11/09/2022    ALKPHOS 65 11/09/2022     No results found for: HGBA1C  Lab Results   Component Value Date    B12 368 11/09/2022     No components found for: VITDT1  Lab Results   Component Value Date    KOFFI 79 11/09/2022     Lab Results   Component Value Date    PTHI 77 08/23/2019     No results found  "for: ZN  Lab Results   Component Value Date    VIB1WB 108 08/23/2019     Lab Results   Component Value Date    TSH 1.14 05/21/2021     No results found for: TEST    DIETARY HISTORY    Positive Changes Since Last Visit: workouts regularly the last couple weeks at the [facility]  Struggling With: vigor of workouts but she paces herself and rests as needed.    Knowledgeable in [employer] Lab[employer]: yes  Getting Adequate Protein: often  Sleeping 7-8 hours/day yes  Stress management good with exericse. Mood is good lately.    PHYSICAL ACTIVITY PATTERNS:  Cardiovascular: see above  Strength Training: same.    REVIEW OF SYSTEMS  [place] better satiety on semaglutide therapy .  PHYSICAL EXAM:  Vitals: Ht 1.626 m (5' 4\")   Wt 126.1 kg (278 lb)   BMI 47.72 kg/m    Weight:   Wt Readings from Last 3 Encounters:   11/10/22 126.1 kg (278 lb)   08/03/22 128.4 kg (283 lb)   05/06/22 115.2 kg (254 lb)         GEN: Pleasant, well groomed, in no acute distress. Central adiposity  HEENT:   .  NECK: No swelling.  HEART: .  LUNGS: No respiratory difficulty noted. No cough. .  ABDOMEN: .  EXTREMITIES: No tremor. ..  NEURO: Alert and Oriented X3, fluent speech. .  SKIN: No visible rashes. .    Interim study results: 11/9/22 labs show improving HDL up from 44 to 49, A1c of 5.1%, triglycerides of 110, CMP normal. Ferritin 79. GFR 69.      30 minutes spent on the date of the encounter doing chart review, history and exam, documentation and further activities per the note   Kerwin Dunn MD  [facility]  10:06 AM  11/10/2022    a      Again, thank you for allowing me to participate in the care of your patient.        Sincerely,        Kerwin Dunn MD    "

## 2022-11-10 NOTE — PATIENT INSTRUCTIONS
Plan:   1.  Diet: 1400-1500kcal/day with 80-100grams of lean protein daily should continue to support you well during weightloss and [employer] workouts. Hydrate well with water 75-80oz/day should keep things on track.  2. Exercise: continue [employer] aerobic and strength workouts and modify them as your body requires so you don't burn out/get injured. Aim to increase the time in each class by 3-5 minutes each week and by January you should be completing classes regularly.  3. Medication: Victoza for the next 2 months then we'll switch back to Ozemic as supplies become available again. Victoza can increase from 0.6mg/day for 1-2 weeks to 1.2mg/day for 1-2 weeks and then 1.8mg/day and stay at that dose until we make the switch back to Ozempic 0.5mg/week around January.  4. Great work maintaining 67 lb weight reduction over the last 3.5 years! Aiming to stabilize weight under 250 lbs over the long term.  Already down 12 BMI points and your workouts will greatly help your metabolic rate/lean muscle mass going forward.         Example Meal Plan for a 9267-8853 Calorie Diet:    In order to fuel your weight loss properly and avoid hunger-induced overeating later in the day, for your height and weight, you will enjoy the most success by following the diet below or similar with adjustments based on your particular tastes and preferences.  Exercise may influence speed, amount of weight loss further.     I recommend getting into a meal routine and keeping it similar day to day in the beginning so you don t have to think too hard about what you re going to make/eat.  Keep snacks healthy, ideally containing protein and some vegetables.  Non-processed food is preferable to packaged items.  Eat at least a few crunchy green vegetables if having a snack, which should be 2-3 hours after your mealtimes(prepare these ahead of time for ease of use).  Drink 64 oz -80 oz of water daily for most, some of you will need more and we'll discuss it at  your visit if that is the case.      When changing our diet,  we can often mistake thirst for hunger or just have some distracted eating habits that we need to break free from ('bored/mindless eating', screen time,work, driving,etc).  A glass of water and reconsideration of our hunger is often all that is needed.  Having the urge is not the problem, but watching it pass by without acting on it is the goal.    If you re having hunger problems, add a protein drink/snack to your morning hours or afternoon snack with at least 20grams of protein and not too much sugar (under 10g).  A carton of higher protein/low sugar yogurt can work as well.  If the urge to snack is overwhelming and not satiated, try going for a 10 minute walk/exercise, come home and drink a glass of water and if still hungry, have a  calorie snack (handful of raw/sprouted nuts, veggies and string cheese, protein bar, etc).  Savor it.    It is better to have a large breakfast, a moderate lunch and a smaller dinner to fuel your day.  People lose 10-15% more weight during their weight loss season with this strategy. Optimizing your protein intake at each meal will further keep you more satisfied while eating less food overall.  Getting exercise in early has also been shown to offer the best results (before breakfast ideally but anytime is the right time to exercise if that is not an option for you).    To make sure you re getting adequate vitamins and minerals during weight loss, I recommend one complete multivitamin a day of your choice.  Consider a probiotic and taking some vitamin D 2000 IU daily.    Let supper be your last meal of the day and ideally try to have at least 12 hours between supper and breakfast the next day to tap into some beneficial overnight fasting dynamics.  Midnight snacks need to go away. Water in the evening is fine, unsweetened, non caffeinated herbal tea is helpful as well.  Consolidating your meals within a 8-12 hour  period of your day will help tap into these additional metabolic benefits and tends to keep your appetite up for breakfast, further helping to stay on track.  For most of my patients, I don't recommend an intermittent fasting style diet (many find it hard to fit in their lifestyle) but an overnight fast is very doable for most patients and helps regulate our hunger drives a little better.  This makes it very important to nail good intake at all three meals to feel satisfied/energized and still lose weight.      If evening snacking desires are high, consider a glass of fiber supplement for some additional fullness (metamucil or similar). Most of us don't get the 25-30 grams per day of fiber that promotes good gut health/satiety.  Benefiber, metamucil, citrucel are reasonable/affordable options for most people.  Inulin, chicory, psyllium husk are reasonable options but start slow and low in the dose to avoid gas/bloating until your gut gets acclimated (ramping up to 5-10 grams per day of supplemental fiber after 3-4 weeks if needed).      Example Meal Plan:  Breakfast: 450-475 Calories  1 egg cooked on low in olive oil:   calories.  5oz Greek Yogurt (Fage plain classic: ~150 randi)  Handful of Berries of your choice (about a calorie per berry or 20-40cal per handful)    cup(cooked) of  old fashioned oatmeal or 1/2 cup(cooked) steel cut oats. (150 randi)  Sprinkle amount of brown sugar and a pat of butter. (40 randi)  Glass of  Water  Black coffee or unsweetened Tea (0calories).      2-3 hours Later Snack: (195 calories).  Glass of water  One string Cheese (80 calories) or 4 oz creamed cottage cheese (115 calories) with  Crunchy Celery sticks (less than 10 calories per large stalk) 2 stalks. (20 calories)    of a  Large Banana or   of a Large Apple (60 calories):  eat second half at lunch or afternoon snack.     Lunch:300 -350 calories   Chicken Breast  (baked/broiled/roasted/grilled)  4-6 oz.  (125-180 randi), BBQ  sauce/hot sauce/mustard/seasoning is free. Just use a reasonable amount. Or a can of tuna with 1 tablespoon mayonnaise.  Salad: lettuce, any other veggies (cucumbers, green peppers/celery you like and a small drizzle of dressing to just flavor.  Go as big on the veggies as you like,  as they are practically calorie free.   A whole, 8 inch cucumber is 45 calories, a whole green pepper is 23 calories, a stalk of celery is 9 calories.  [place] Dressing is 60 calories per tablespoon..so moderate your desired dressing or do a drizzle of olive oil and splash of balsamic vinegar on top,  Total calories unlikely to be over 150 even with dressing.  Glass of Water.    Option for lunch is meal replacement protein drink/smoothie.  Need at least 20 grams of protein and eat the rest of your apple/banana from the morning snack.      Afternoon Snack: 150-200 calories   Cheese Stick or cottage cheese again  and a fresh fruit OR  Granola Bar (protein Bar acceptable if under 200 calories OR  Homemade smoothies:  8oz skim milk,  a handful of berries (fresh or frozen and a serving of protein powder such as BiPro or Taniya sWhey for example.  If you don't like dairy, make with 8oz water, one small banana, handful of berries and the protein powder, add any veggies you want as well:  roughly 200 calories.   Glass of Water    Dinner: 325 calories  4oz of fresh, [place] salmon.  Broiled (salt/pepper/dill) for about 8-8.5 minutes (200calories) or  4oz filet mignon steak or sirloin steak  Salad or vegetable sautéed lightly in olive oil or   Broccoli 1.5  cups chopped and steamed  or micro-waved in a little water (75 calories)  Glass of Water,    Cup of herbal tea (unsweetened, caffeine free)      Herbs and seasonings are encouraged to flavor your foods/vegetables.  Make your food delicious.      Tips for Success:  1.  Prepare proteins ahead of time (broil chicken breasts in bulk so you can grab and go), steel cut oats/lentils can be  "stored in casserole dish/bowl in the fridge for quick scoop in the morning and rewarm in microwave, make use of crock pot recipes (watch salt content).  Making meals that cover 3-4 future meals is an easy way to stay on track.  2.  Drink a 8-12 oz glass of water every 2-3 hours when awake.  We often mistake hunger for thirst, especially when losing weight.  3. Remember your Reward and Motivation when things get hard.  4.  Weigh yourself every morning and record, you'll stay on track better and learn how our biorhythms, diet and elimination patterns show up on the scale. Don't worry about 1 or 2 day patterns, but when on track you'll notice good trend downward of weight over 3-4 day segments.  Plateaus tend to resolve after 4-8 days in most cases if you stay consistent with your plan.  These are natural and part of weight loss, even if you're perfect with your plan execution.  5. Call if problems/concerns.  [employer] is a great tool to stay in touch and provide weekly outside accountability. Check in with questions or if you want to brag.  6.  Find a handful of meals/foods that keep you on track and feeling good and get into a routine that is sustainable for you.  It's OK to have a routine that works for you.  7.  Consider taking a complete multivitamin just to make sure all micronutrients are adequate during weight loss.  8. If losing hair/brittle nails it usually means you are not taking enough protein.  Minimum goal is 60 grams daily of protein for smaller women, 80 grams a day for men. Consider taking Biotin as supplement or a \"Hair and Nail\" multivitamin.    LEAN PROTEIN SOURCES  Getting 20-30 grams of protein, 3 meals daily, is appropriate for most people, some need more but more than about 40 grams per meal is not useful.  General rule is drinking one ounce of water per gram of protein eaten over the course of the day:  70 grams of protein each day, drink 70 oz of water.  Protein Source Portion Calories Grams of " Protein                           Nonfat, plain Greek yogurt    (10 grams sugar or less) 3/4 cup (6 oz)  12-17   Light Yogurt (10 grams sugar or less) 3/4 cup (6 oz)  6-8   Protein Shake 1 shake 110-180 15-30   Skim/1% Milk or lactose-free milk 1 cup ( 8 oz)  8   Plain or light, flavored soymilk 1 cup  7-8   Plain or light, hemp milk 1 cup 110 6   Fat Free or 1% Cottage Cheese 1/2 cup 90 15   Part skim ricotta cheese 1/2 cup 100 14   Part skim or reduced fat cheese slices 1 ounce 65-80 8     Mozzarella String Cheese 1 80 8   Canned tuna, chicken, crab or salmon  (canned in water)  1/2 cup 100 15-20   White fish (broiled, grilled, baked) 3 ounces 100 21   [place]/Tuna (broiled, grilled, baked) 3 ounces 150-180 21   Shrimp, Scallops, Lobster, Crab 3 ounces 100 21   Pork loin, Pork Tenderloin 3 ounces 150 21   Boneless, skinless chicken /turkey breast                          (broiled, grilled, baked) 3 ounces 120 21   [place], [place], and Venison 3 ounces 120 21   Lean cuts of red meat and pork (sirloin,   round, tenderloin, flank, ground 93%-96%) 3 ounces 170 21   Lean or Extra Lean Ground Turkey 1/2 cup 150 20   90-95% Lean [place] Burger 1 mckinley 140-180 21   Low-fat casserole with lean meat 3/4 cup 200 17   Luncheon Meats                                                        (turkey, lean ham, roast beef, chicken) 3 ounces 100 21   Egg (boiled, poached, scrambled) 1 Egg 60 7   Egg Substitute 1/2 cup 70 10   Nuts (limit to 1 serving per day)  3 Tbsp. 150 7   Nut [place] (peanut, almond)  Limit to 1 serving or less daily 1 Tbsp. 90 4   Soy Burger (varies) 1  15   Garbanzo, Black, [place] Beans 1/2 cup 110 7   Refried Beans 1/2 cup 100 7   Kidney and Lima beans 1/2 cup 110 7   Tempeh 3 oz 175 18   Vegan crumbles 1/2 cup 100 14   Tofu 1/2 cup 110 14   Chili (beans and extra lean beef or turkey) 1 cup 200 23   Lentil Stew/Soup 1 cup 150 12   Black Bean Soup 1 cup 175 12       High  "Intensity Interval Training (HIIT):    To feel our best, our bodies need to move. Our mental health, sleep, memory, immune function, stress coping and metabolic health depend on exercise and its benefits for optimizing how our body works best. In the modern world, most do not get nearly enough exercise.  However, it's important to understand that ANYTHING is better than nothing and if you can get started with a routine for fitness, even a little bit has some benefit compared to none.  The more you do, the better (up to 20 hours weekly, beyond that the benefit tails off), but the NIH guidelines for all adults in [place] is to work up to 150-200 minutes of moderate aerobic activity each week to improve our health.  If you're a person that struggles with time pressure/lack of interest then those 2.5-3 hours weekly may be too much to ask.  So, we have to be very efficient in how we exercise to reap the benefits. It turns out that INTENSITY matters. When we use Vigorous rather than Moderate aerobic activity (Vigorous defined by a heart rate of 70-85% of calculated maximum heart rate; max heart rate equals 220 beats minus your age or the level of effort where you could talk 2-4 words before needing to take a breath), the amount of time required to reap the benefits of exercise is cut in half:  75-90 minutes/week.      A recent study showed that a 10 minute interval workout using a 3-4 minute warm up and then 20 second \"maximum effort\" followed by 1 minute, 40 seconds of recovery and then repeated two more times was as effective in improving fitness as a 30 minute brisk walk.  They utilized exercise bikes or stairs for the effort but you could adapt to whatever geography/gym/pool/bike/hill/staircase that you may have as long as you can safely do the effort without injury.   As your fitness improves, intervals of 30 seconds to 2 minutes of increased effort followed by 1-2 minutes of recovery are options as well. The fitter " you become, the harder and more intervals you'll be able to do.  Start with what you CAN do, not what you WANT to do and that will allow your body to adapt/develop fitness down the road to reach your goals.  Give yourself permission to develop a base of fitness the first 3 weeks and then you should be able to ramp up from there nicely and progressively each week.    Jumping right into a High Intensity Interval workout if you've not been having some level of exercise/fitness recently can increase your risk of injury.  To help develop some base fitness here are some options that would give you a 3-4 week base development, assuming you can walk or ride a stationary bike but haven't been doing much the last few months. 3-4 sessions each week of:      Week Warm up Interval: 3-6 repeats Cooldown   1 3-5 minutes easy walk/spin 20 seconds brisk but doable pace then recover with 90 seconds easy 2-3 minute easy walk/spin   2 3-5 minutes easy walk/spin 25 seconds brisk, 90 seconds recovery 2-3 minute easy walk/spin   3 3-5 minutes easy walk/spin 30 seconds brisk, 90 seconds recovery 2-3 minute easy walk/spin   4 3-5 minutes easy walk/spin 40 seconds brisk, 60 seconds recovery 2-3 minute easy walk/spin     *for base development, keep resistance steady and just  the speed. Once you've completed base phase, feel free to add a little extra resistance to the faster phase to increase vigorousness/heart rate.  During base phase you should be still able to talk comfortable/sing during faster pedaling/walking.     After completing the base phase, start ramping up workouts on the bike/walking. Have one easy pace workout but of longer duration (add 2-3 minutes each week to the time) each week.  One workout weekly should have an interval workout where you push your intensity working up to those 15-30 second maximum efforts and recovering fully and then repeating for at least 3-4 intervals.  Cool down/easy pedal at the end for 1-2  minutes.     Consider a spin class if you have the means/access and remember, it's OK to rest if needed. Make the workout yours and don't focus on other people's abilities, getting started will develop your own fitness every week.  Jogging plans such as the Couch to 10k or any aerobic training program make use of similar intervals and can help you reach a fitter and more capable body regardless of where/how you perform the intervals (walking/biking/swimming/elliptical/row machine/erg arm machine, peddler, raking, lawn mowing,etc).  Go for it.  MEDICATIONS FOR WEIGHT LOSS  There are several medications available to assist us in weight loss.  By themselves, without a mindful change in diet and increase in movement/activity these medications are disappointing in their results. However, combined with a closely monitored program of diet change and exercise they can be very effective in controlling appetite and boosting initial weight loss.  All weight loss medications need continual re-evaluation for efficacy as their side effects and health benefits fail to be worthwhile if a person is not continuing to lose weight or in maintaining their healthy weight.  Some weight loss medications are scheduled drugs, meaning there is at least a theoretical possibility for developing addiction to them, but in practice this is rare.  We do anticipate coming off meds in the future- after stabilization of weight loss is assurred.  Finally, a tolerance can develop and people s perceived efficacy of medication can diminish.  In communication with your physician, it may be appropriate to intermittently take a break from these medications and then restart again (few weeks off then restart again) if a plateau is reached that cannot be broken through.  Each person can respond to a medication differently and to be a good option for you, it will need to be affordable, effective and well tolerated with minimal side effects.    In most cases,  weight loss progress after one month and three months will be obtained and if a patient is not reaching the satisfactory progress towards weight loss, the medications may be discontinued.  The thought is that if a person is taking a weight loss medication and not receiving the potential health benefit of that drug, the side effects are not worthwhile and use should be discontinued.  On the flip side, there are many people on some weight loss medications for years because it continues to be an effective tool in their weight management and they are tolerating the medication without any long-term side effects.  Each person's response and purpose will be evaluated.      PHENTERMINE (Adipex): approved in 1959 for appetite suppression.  It has stimulant effects and cannot be used with Ritalin, Concerta, or other stimulants.  Although it is not highly addictive, it's chemically related to amphetamines which are addictive and is classified as a Controlled Substance by the [place].  Occasional dependence can develop, but rarely. The most common side effects are dry mouth, increased energy and concentration, increased pulse, and constipation.  You should not take phentermine if you have glaucoma, hyperthyroidism, or uncontrolled/untreated hypertension or overly anxious. You should stop if dramatic mood swings, severe insomnia, palpations, chest pains, visual changes or if your Blood Pressure is consistently elevated or any time it's over 160/90.   It's ok to go off the med for a few weeks and restart if efficacy is wearing off.  $24-$30 for 90 tablets at [facility]. Females are required to have reliable birth control to reduce the risk birth defects/miscarriage.      TOPIRAMATE (Topamax): Anti-seizure medication, also used to prevent migraines and sometimes for mood stabilization.  Side effects include paresthesia, glaucoma, altered concentration, attention difficulties, memory and speech problems, metabolic acidosis,  depression, increase in body temperature and decrease sweating, risk of kidney stones.  Do not take Topamax while taking Depakote as this can cause high ammonia levels.  You must have reliable birth control as Topamax can cause birth defects.  If prolonged use has occurred it should be tapered off slowly to avoid withdrawal issues.  Insurance usually covers Topiramate.  At higher doses, there may be some confusion/forgetfulness associated with this so we try to limit dose to under 75mg twice daily to reduce this risk. Often covered by insurance as it's used for many reasons.  Topamax will cause carbonated beverages to taste bad. A recheck of your kidney/electrolytes may occur within a few months of starting.    QSYMIA (Phentermine + Topamax):  See above information about phentermine and Topamax.  Most common side effects are paresthesia, dizziness, distortion of taste, insomnia, constipation, and dry mouth.  See above descriptions for the two individual agents.Females are required to have reliable birth control to reduce the risk birth defects/miscarriage.  $150-$220 per month      GLP1 Agonists:  Liraglutide (Victoza/Saxenda), Semaglutide (Ozempic/Wegovy):   Part of the family of Glucagon Like Peptide Agonists, these medications directly suppresses appetite and are often used by diabetic patients due to improvements in glucose/insulin balance.  They also slow how quickly the stomach empties, increasing fullness. They may be hard to get covered for non diabetics and some plans have exclusions for weight loss purposes.  Currently, these are  injectable medications delivered via autoinjector pen. It can be very costly without insurance coverage (over $500/month).  Small risks for pancreatitis exists and dose should be held if increased mid abdominal pain/burning. It is not to be used if previous Multiple Endocrine Neoplasia. In rodents, may increase risk of thyroid tumors and not indicated for anyone with a history of  "medullary thyroid cancer as a result.  If changes in voice/swallowing should be discontinued. Reliable birth control required in women. [email or web address], [email or web address] has more information on these medications.    Contrave (Bupropion/Naltrexone).    Synergistic combination of a mild appetite suppressing anti-depressant (Bupropion) whose effects are increased due to interaction with Naltrexone.  Naltrexone may have some effects on craving and is often used in addiction medicine to help previous opiate addicts be less prone to relapse as it blocks the action of opiates. Should be stopped if any need for opiate pain medication, surgery or planned procedures where you'll be given sedation/anesthesia. If prolonged use, recommend stepping down bupropion over 2-3 weeks to limit any risk of withdrawal issues. Side effects may include dry mouth, increased heart rate, mild elevation in Blood pressure;  dizziness, ringing in the ears, anxiety (typically due to bupropion), nausea, constipation, and some get fatigued with naltrexone.  About $210 on [facility] for 120 tabs of \"Contrave\", the brand name without insurance coverage. Generic Bupropion 75mg: $25 for 120 tabs, Naltrexone: $55 for 90 tabs without insurance coverage on [facility]. Cannot be used if pregnant/trying to conceive or breast feeding.      Plenity:   By mail order pharmacy only, moderately expensive. $98/month.  2-3 capsules taken with 16 oz of water, 20 minutes before 2 meals daily provides crystals that expand into a gel that fills the stomach 20-25% and provides a mechanical fullness.  The Plenity then mixes with your next meal and increases satisfaction by bulking the meal up to feel bigger than it truly is. Plenity is not absorbed and gets passed through the digestive tract and excreted in stools. May cause some bloating/gas/full feeling as it behaves like a fiber in many ways. Cost not available yet. FDA cleared in March of 2019 and became available near the end of " "2020 through mail order pharmacy only ([facility]). The safety and efficacy trials were done under \"Gelesis\" name. Not appropriate for people with stomach or bowel motility issues as requires you to pass it through digestive tract. [email or web address] has more information.    "

## 2022-11-10 NOTE — PROGRESS NOTES
Ronel Ryan is 42 year old  female who presents for a billable video visit today.    How would you like to obtain your AVS? MyChart  If dropped from the video visit, the video invitation should be resent by: Text to cell phone: [phone]  Will anyone else be joining your video visit? No      Video Start Time: 10:15 AM    Are there any specific questions or needs that you would like addressed at your visit today? Gained 20# after being off ozempic     Video-Visit Details    Type of service:  Video Visit    Platform used for Video Visit: Snoox    Video End Time (time video stopped): 10:35 AM    Originating Location (pt. Location): Home        Distant Location (provider location):  On-site    Distant Location (provider location):  [facility]       Bariatric Clinic Follow-Up Visit:    Ronel Ryan is a 42 year old  female with Body mass index is 47.72 kg/m .  presenting here today for follow-up on non-surgical efforts for weight loss. Original Intake visit occurred on 7/13/19 with a weight of 345 lbs and BMI of 59.2.  Along with diet and behavior changes, she has been using various combinations through the years of Trulicity/naltrexone and a previous use of topiramate from her pain clinic which can also assist her weight loss goals.  See her intake visit notes for details on identified contributors to weight gain in the past. Chart review shows Dietician calculated RMR of 1892kcal/day and protein intake goal of 70-90g/day.  Her Trulicity was stopped in hospital May of 2022 during hip surgery and she had weight gain of 30 lbs over the summer due to increased cravings off medication. We transitioned to Semaglutide therapy this summer of 2022 but then supplies ran out due to shortages in supply and Victoza was started in November 2022 (11/7/22) with plans to transition back in the new year when supplies of Ozempic are more available.   Weight:   Wt  Readings from Last 5 Encounters:   11/10/22 126.1 kg (278 lb)   08/03/22 128.4 kg (283 lb)   05/06/22 115.2 kg (254 lb)   04/20/22 121.8 kg (268 lb 9.6 oz)   04/14/22 121.8 kg (268 lb 9.6 oz)    pounds      Comorbidities:  Patient Active Problem List   Diagnosis     Binge eating     Eating disorder     Morbid obesity (H)     Multiple-type hyperlipidemia     Severe episode of recurrent major depressive disorder (H)     Arthralgia of hip     Hypertension     Trochanteric bursitis     History of urinary anomaly     Borderline personality disorder (H)     Attention deficit disorder     Asthma     Suicidal ideation     ACP (advance care planning)     Adjustment disorder with depressed mood     Anxiety     Moderate mixed bipolar I disorder (H)     Bipolar II disorder, most recent episode hypomanic (H)     Borderline high cholesterol     Care plan discussed with patient     Disorder of pelvis     Gait difficulty     Hx of diabetes mellitus     Hx of eating disorder     Morbid obesity with BMI of 50.0-59.9, adult (H)     Osteoarthritis     Pain of both hip joints     Relationship problems     Secondary amenorrhea     Soft tissue lesion of shoulder region     Vertigo     Greater trochanteric bursitis of both hips     Hip dysplasia     Left hip pain     Osteoarthritis of hip     Primary osteoarthritis of left hip     ADD (attention deficit disorder)     H/O urinary frequency     History of anxiety disorder     Hyperlipidemia     Mixed hyperlipidemia     Recurrent major depressive disorder (H)     Type 2 diabetes mellitus (H)     Primary osteoarthritis of both hips     Degenerative joint disease (DJD) of hip     Status post total hip replacement, left     Bipolar 1 disorder (H)     Constipation     Gastroesophageal reflux disease with esophagitis without hemorrhage     Mixed incontinence     Primary localized osteoarthritis of pelvic region and thigh     Skin sensation disturbance       Current Outpatient Medications:       acetaminophen (TYLENOL) 325 MG tablet, Take 3 tablets (975 mg) by mouth 3 times daily, Disp: , Rfl: 0     albuterol (PROAIR HFA/PROVENTIL HFA/VENTOLIN HFA) 108 (90 BASE) MCG/ACT Inhaler, Inhale 1-2 puffs into the lungs every 4 hours as needed , Disp: , Rfl:      aspirin (ASA) 325 MG EC tablet, Take 1 tablet (325 mg) by mouth daily, Disp: 42 tablet, Rfl: 0     atorvastatin (LIPITOR) 40 MG tablet, Take 1 tablet by mouth every evening , Disp: , Rfl:      baclofen (LIORESAL) 10 MG tablet, TAKE 1 TABLET BY MOUTH TWICE A DAY AS NEEDED FOR MUSCLE SPASM, Disp: , Rfl:      blood glucose (NO BRAND SPECIFIED) lancets standard, Use to test blood sugar 2 times daily or as directed., Disp: 100 each, Rfl: 11     blood glucose monitoring (NO BRAND SPECIFIED) meter device kit, Use to test blood sugar 2 times daily or as directed., Disp: 1 kit, Rfl: 0     blood glucose monitoring (NO BRAND SPECIFIED) test strip, Use to test blood sugars 2 times daily or as directed, Disp: 100 strip, Rfl: 3     buPROPion (WELLBUTRIN XL) 300 MG 24 hr tablet, Take 300 mg by mouth daily , Disp: , Rfl:      Cholecalciferol 125 MCG (5000 UT) TABS, Take 125 mcg by mouth daily , Disp: , Rfl:      EPINEPHrine (EPIPEN/ADRENACLICK/OR ANY BX GENERIC EQUIV) 0.3 MG/0.3ML injection 2-pack, Inject 0.3 mLs (0.3 mg) into the muscle once as needed for anaphylaxis, Disp: 0.6 mL, Rfl: 0     hydrOXYzine (ATARAX) 25 MG tablet, Take 1 tablet (25 mg) by mouth every 6 hours as needed for itching or anxiety (with pain, moderate pain), Disp: 30 tablet, Rfl: 0     ibuprofen (ADVIL/MOTRIN) 600 MG tablet, Take 1 tablet (600 mg) by mouth every 6 hours as needed (mild), Disp: , Rfl: 0     insulin pen needle (31G X 6 MM) 31G X 6 MM miscellaneous, Use new pen needle for each autoinjection., Disp: 90 each, Rfl: 1     levonorgestrel (LILETTA, 52 MG,) 19.5 MCG/DAY IUD, 1 each by Intrauterine route once, Disp: , Rfl:      liraglutide (VICTOZA) 18 MG/3ML solution, Ramp up dose every 2-3  weeks if tolerated:  Start at 0.6mg injected daily for 2-3 weeks, then up to 1.2mg injected daily for 2-3 weeks then up to max of 1.8mg injected daily if tolerated., Disp: 9 mL, Rfl: 2     meclizine (ANTIVERT) 25 MG tablet, Take 25 mg by mouth 4 times daily as needed for dizziness, Disp: , Rfl:      risperiDONE (RISPERDAL) 0.5 MG tablet, , Disp: , Rfl:      SENEXON-S 8.6-50 MG tablet, , Disp: , Rfl:      tolterodine ER (DETROL LA) 4 MG 24 hr capsule, Take 4 mg by mouth daily, Disp: , Rfl:      topiramate (TOPAMAX) 100 MG tablet, Take 100 mg by mouth 2 times daily , Disp: , Rfl:      traZODone (DESYREL) 150 MG tablet, Take 150 mg by mouth At Bedtime, Disp: , Rfl:       Interim: Since our last visit, she has had some good weight reduction since restarting medication.  Going to [employer]: 3 days of cardio with katie classes, elliptical bike classes for 20-30 60 minutes and 3 days of strength each week and does body pump for strength training 3 days weekly. Not yet getting through whole class.   Left hip is comletely healed. Right hip bothers her a bit. Followed up with Ortho and placement of that replacement looked fine per her recollection.   Plan:   1.  Diet: 1400-1500kcal/day with 80-100grams of lean protein daily should continue to support you well during weightloss and [employer] workouts. Hydrate well with water 75-80oz/day should keep things on track.  2. Exercise: continue [employer] aerobic and strength workouts and modify them as your body requires so you don't burn out/get injured. Aim to increase the time in each class by 3-5 minutes each week and by January you should be completing classes regularly.  3. Medication: Victoza for the next 2 months then we'll switch back to Ozemic as supplies become available again. Victoza can increase from 0.6mg/day for 1-2 weeks to 1.2mg/day for 1-2 weeks and then 1.8mg/day and stay at that dose until we make the switch back to Ozempic 0.5mg/week around January.  4. Great work maintaining  67 lb weight reduction over the last 3.5 years! Aiming to stabilize weight under 250 lbs over the long term.  Already down 12 BMI points and your workouts will greatly help your metabolic rate/lean muscle mass going forward.   5.       We discussed [facility] Bariatric Basics including:  -eating 3 meals daily  -reviewed metabolic needs for weight loss based on Resting Metabolic Rate  -protein goals supportive of healthy weight loss  -avoiding/limiting calorie containing beverages  -We discussed the importance of restorative sleep and stress management in maintaining a healthy weight.  -We discussed the National Weight Control Registry healthy weight maintenance strategies and ways to optimize metabolism.  -We discussed the importance of physical activity including cardiovascular and strength training in maintaining a healthier weight and explored viable options.      Most recent labs:  Lab Results   Component Value Date    WBC 6.2 11/09/2022    HGB 13.5 11/09/2022    HCT 41.1 11/09/2022    MCV 94 11/09/2022     11/09/2022     Lab Results   Component Value Date    CHOL 102 11/09/2022     Lab Results   Component Value Date    HDL 49 (L) 11/09/2022     No components found for: LDLCALC  Lab Results   Component Value Date    TRIG 110 11/09/2022     No results found for: CHOLHDL  Lab Results   Component Value Date    ALT 20 11/09/2022    AST 29 11/09/2022    ALKPHOS 65 11/09/2022     No results found for: HGBA1C  Lab Results   Component Value Date    B12 368 11/09/2022     No components found for: VITDT1  Lab Results   Component Value Date    KOFFI 79 11/09/2022     Lab Results   Component Value Date    PTHI 77 08/23/2019     No results found for: ZN  Lab Results   Component Value Date    VIB1WB 108 08/23/2019     Lab Results   Component Value Date    TSH 1.14 05/21/2021     No results found for: TEST    DIETARY HISTORY    Positive Changes Since Last Visit: workouts regularly the last couple weeks at the  [facility]  Struggling With: vigor of workouts but she paces herself and rests as needed.    Knowledgeable in [place] Food Labels: yes  Getting Adequate Protein: often  Sleeping 7-8 hours/day yes  Stress management good with exericse. Mood is good lately.    PHYSICAL ACTIVITY PATTERNS:  Cardiovascular: see above  Strength Training: same.    REVIEW OF SYSTEMS  [place] better satiety on semaglutide therapy .  PHYSICAL EXAM:  Vitals: Ht 1.626 m (5' 4\")   Wt 126.1 kg (278 lb)   BMI 47.72 kg/m    Weight:   Wt Readings from Last 3 Encounters:   11/10/22 126.1 kg (278 lb)   08/03/22 128.4 kg (283 lb)   05/06/22 115.2 kg (254 lb)         GEN: Pleasant, well groomed, in no acute distress. Central adiposity  HEENT:   .  NECK: No swelling.  HEART: .  LUNGS: No respiratory difficulty noted. No cough. .  ABDOMEN: .  EXTREMITIES: No tremor. ..  NEURO: Alert and Oriented X3, fluent speech. .  SKIN: No visible rashes. .    Interim study results: 11/9/22 labs show improving HDL up from 44 to 49, A1c of 5.1%, triglycerides of 110, CMP normal. Ferritin 79. GFR 69.      30 minutes spent on the date of the encounter doing chart review, history and exam, documentation and further activities per the note   Kerwin Dunn MD  [facility]  10:06 AM  11/10/2022  "

## 2022-11-18 ENCOUNTER — TELEPHONE (OUTPATIENT)
Dept: SURGERY | Facility: CLINIC | Age: 42
End: 2022-11-18

## 2022-11-18 NOTE — TELEPHONE ENCOUNTER
Pt called in with complaints of weight gain since changing from Ozempic to Victoza. She had to stop Ozempic due to supply issues and was started on Victoza on 11/7. She says she is craving sweets and eating more dt/ that. She was not having these issues with Ozempic and wants to know what she can do.   She increased her dosage to 1.2 mg yesterday and says she is up 6 pounds. I let her know that I will send her message to  and she verbalized understanding.    Isabela Llamas RN, CBN  [facility]  P [phone]  F [phone]

## 2022-12-29 ENCOUNTER — VIRTUAL VISIT (OUTPATIENT)
Dept: SURGERY | Facility: CLINIC | Age: 42
End: 2022-12-29
Payer: COMMERCIAL

## 2022-12-29 VITALS — HEIGHT: 64 IN | WEIGHT: 283 LBS | BODY MASS INDEX: 48.32 KG/M2

## 2022-12-29 DIAGNOSIS — E66.813 CLASS 3 SEVERE OBESITY DUE TO EXCESS CALORIES WITH SERIOUS COMORBIDITY AND BODY MASS INDEX (BMI) OF 45.0 TO 49.9 IN ADULT (H): Primary | ICD-10-CM

## 2022-12-29 DIAGNOSIS — E66.01 CLASS 3 SEVERE OBESITY DUE TO EXCESS CALORIES WITH SERIOUS COMORBIDITY AND BODY MASS INDEX (BMI) OF 45.0 TO 49.9 IN ADULT (H): Primary | ICD-10-CM

## 2022-12-29 DIAGNOSIS — E11.9 TYPE 2 DIABETES MELLITUS WITHOUT COMPLICATION, WITHOUT LONG-TERM CURRENT USE OF INSULIN (H): ICD-10-CM

## 2022-12-29 DIAGNOSIS — E88.810 METABOLIC SYNDROME X: ICD-10-CM

## 2022-12-29 PROCEDURE — 99214 OFFICE O/P EST MOD 30 MIN: CPT | Mod: 95 | Performed by: EMERGENCY MEDICINE

## 2022-12-29 RX ORDER — IBUPROFEN 800 MG/1
800 TABLET, FILM COATED ORAL
COMMUNITY
End: 2023-06-22

## 2022-12-29 RX ORDER — EPINEPHRINE 0.3 MG/.3ML
0.3 INJECTION SUBCUTANEOUS
COMMUNITY

## 2022-12-29 RX ORDER — PSEUDOEPHED/ACETAMINOPH/DIPHEN 30MG-500MG
TABLET ORAL
COMMUNITY
Start: 2022-12-02 | End: 2024-09-11

## 2022-12-29 RX ORDER — RISPERIDONE 2 MG/1
TABLET ORAL
COMMUNITY
Start: 2022-12-05 | End: 2023-06-22

## 2022-12-29 NOTE — LETTER
12/29/2022         RE: Ronel Ryan  [address]  [place] [address]        Dear Colleague,    Thank you for referring your patient, Ronel Ryan, to the [facility]. Please see a copy of my visit note below.    Ronel Ryan is 42 year old  female who presents for a billable video visit today.    How would you like to obtain your AVS? MyChart  If dropped from the video visit, the video invitation should be resent by: Text to cell phone: [phone]  Will anyone else be joining your video visit? No      Video Start Time: [phone]  1:20 PM    Are there any specific questions or needs that you would like addressed at your visit today? RWM - has some weight gain that she is concerned about          Video-Visit Details    Type of service:  Video Visit    Platform used for Video Visit: [employer]Mile    Video End Time (time video stopped): 1:49 PM    Originating Location (pt. Location): Home        Distant Location (provider location):  On-site    Distant Location (provider location[facility]     Bariatric Clinic Follow-Up Visit:    Ronel Ryan is a 42 year old  female with Body mass index is 48.58 kg/m .  presenting here today for follow-up on non-surgical efforts for weight loss. Original Intake visit occurred on 7/13/19 with a weight of 345 lbs and BMI of 59.2.  Along with diet and behavior changes, she has been using various combinations through the years of Trulicity/naltrexone and a previous use of topiramate from her pain clinic which can also assist her weight loss goals.  See her intake visit notes for details on identified contributors to weight gain in the past. Chart review shows Dietician calculated RMR of 1892kcal/day and protein intake goal of 70-90g/day.  Her Trulicity was stopped in hospital May of 2022 during hip surgery and she had weight gain of 30 lbs over the  summer due to increased cravings off medication. We transitioned to Semaglutide therapy this summer of 2022 but then supplies ran out due to shortages in supply and Victoza was started in November 2022 (11/7/22) with plans to transition back in the new year when supplies of Ozempic are more available.   Weight:   Wt Readings from Last 5 Encounters:   12/29/22 128.4 kg (283 lb)   11/10/22 126.1 kg (278 lb)   08/03/22 128.4 kg (283 lb)   05/06/22 115.2 kg (254 lb)   04/20/22 121.8 kg (268 lb 9.6 oz)    pounds      Comorbidities:  Patient Active Problem List   Diagnosis     Binge eating     Eating disorder     Morbid obesity (H)     Multiple-type hyperlipidemia     Severe episode of recurrent major depressive disorder (H)     Arthralgia of hip     Hypertension     Trochanteric bursitis     History of urinary anomaly     Borderline personality disorder (H)     Attention deficit disorder     Asthma     Suicidal ideation     ACP (advance care planning)     Adjustment disorder with depressed mood     Anxiety     Moderate mixed bipolar I disorder (H)     Bipolar II disorder, most recent episode hypomanic (H)     Borderline high cholesterol     Care plan discussed with patient     Disorder of pelvis     Gait difficulty     Hx of diabetes mellitus     Hx of eating disorder     Morbid obesity with BMI of 50.0-59.9, adult (H)     Osteoarthritis     Pain of both hip joints     Relationship problems     Secondary amenorrhea     Soft tissue lesion of shoulder region     Vertigo     Greater trochanteric bursitis of both hips     Hip dysplasia     Left hip pain     Osteoarthritis of hip     Primary osteoarthritis of left hip     ADD (attention deficit disorder)     H/O urinary frequency     History of anxiety disorder     Hyperlipidemia     Mixed hyperlipidemia     Recurrent major depressive disorder (H)     Type 2 diabetes mellitus (H)     Primary osteoarthritis of both hips     Degenerative joint disease (DJD) of hip     Status  post total hip replacement, left     Bipolar 1 disorder (H)     Constipation     Gastroesophageal reflux disease with esophagitis without hemorrhage     Mixed incontinence     Primary localized osteoarthritis of pelvic region and thigh     Skin sensation disturbance       Current Outpatient Medications:      ACETAMINOPHEN EXTRA STRENGTH 500 MG tablet, , Disp: , Rfl:      albuterol (PROAIR HFA/PROVENTIL HFA/VENTOLIN HFA) 108 (90 BASE) MCG/ACT Inhaler, Inhale 1-2 puffs into the lungs every 4 hours as needed , Disp: , Rfl:      atorvastatin (LIPITOR) 40 MG tablet, Take 1 tablet by mouth every evening , Disp: , Rfl:      baclofen (LIORESAL) 10 MG tablet, TAKE 1 TABLET BY MOUTH TWICE A DAY AS NEEDED FOR MUSCLE SPASM, Disp: , Rfl:      blood glucose (NO BRAND SPECIFIED) lancets standard, Use to test blood sugar 2 times daily or as directed., Disp: 100 each, Rfl: 11     blood glucose monitoring (NO BRAND SPECIFIED) meter device kit, Use to test blood sugar 2 times daily or as directed., Disp: 1 kit, Rfl: 0     blood glucose monitoring (NO BRAND SPECIFIED) test strip, Use to test blood sugars 2 times daily or as directed, Disp: 100 strip, Rfl: 3     buPROPion (WELLBUTRIN XL) 300 MG 24 hr tablet, Take 300 mg by mouth daily , Disp: , Rfl:      cholecalciferol (VITAMIN D3) 125 mcg (5000 units) capsule, , Disp: , Rfl:      Cholecalciferol 125 MCG (5000 UT) TABS, Take 125 mcg by mouth daily , Disp: , Rfl:      EPINEPHrine (ANY BX GENERIC EQUIV) 0.3 MG/0.3ML injection 2-pack, Inject 0.3 mg into the muscle, Disp: , Rfl:      EPINEPHrine (EPIPEN/ADRENACLICK/OR ANY BX GENERIC EQUIV) 0.3 MG/0.3ML injection 2-pack, Inject 0.3 mLs (0.3 mg) into the muscle once as needed for anaphylaxis, Disp: 0.6 mL, Rfl: 0     hydrOXYzine (ATARAX) 25 MG tablet, Take 1 tablet (25 mg) by mouth every 6 hours as needed for itching or anxiety (with pain, moderate pain), Disp: 30 tablet, Rfl: 0     ibuprofen (ADVIL/MOTRIN) 600 MG tablet, Take 1 tablet (600  "mg) by mouth every 6 hours as needed (mild), Disp: , Rfl: 0     ibuprofen (ADVIL/MOTRIN) 800 MG tablet, Take 800 mg by mouth, Disp: , Rfl:      insulin pen needle (31G X 6 MM) 31G X 6 MM miscellaneous, Use new pen needle for each autoinjection., Disp: 90 each, Rfl: 1     levonorgestrel (LILETTA, 52 MG,) 19.5 MCG/DAY IUD, 1 each by Intrauterine route once, Disp: , Rfl:      liraglutide (VICTOZA) 18 MG/3ML solution, Ramp up dose every 2-3 weeks if tolerated:  Start at 0.6mg injected daily for 2-3 weeks, then up to 1.2mg injected daily for 2-3 weeks then up to max of 1.8mg injected daily if tolerated., Disp: 9 mL, Rfl: 2     meclizine (ANTIVERT) 25 MG tablet, Take 25 mg by mouth 4 times daily as needed for dizziness, Disp: , Rfl:      risperiDONE (RISPERDAL) 0.5 MG tablet, , Disp: , Rfl:      risperiDONE (RISPERDAL) 2 MG tablet, , Disp: , Rfl:      SENEXON-S 8.6-50 MG tablet, , Disp: , Rfl:      tolterodine ER (DETROL LA) 4 MG 24 hr capsule, Take 4 mg by mouth daily, Disp: , Rfl:      topiramate (TOPAMAX) 100 MG tablet, Take 100 mg by mouth 2 times daily , Disp: , Rfl:      traZODone (DESYREL) 150 MG tablet, Take 150 mg by mouth At Bedtime, Disp: , Rfl:       Interim: Since our last visit, she has had some modest weight weight gain since coming off Ozempic which can put her metabolic syndrome under strain.  Initially, got very hungry when starting Victoza.  Good BMs regularly now and her previous constipation is improved. 2-3 daily. Victoza ramping to 1.8mg has been tolerated but low efficacy compared to Ozempic.. Her [employer] routine is currently on hold after going 6 days weekly. Had injuried her hip in weight lifting class and during benches/push ups and struggled to get back up, strained her hip.  Followed up with Ortho and reported negative xrays. Some \"shock\" feeling.  Got PT and advised to ease off on her workouts. Planning to get back into it. .   11/9/22 labs showed improvements in HDL and lowering of LDL, normal " CMP, ferritin and glucose levels, A1c of 5.1% is excellent. B12 and vitamin D within normal ranges and normal hemogram.  Plan:   1.  Diet: continue aiming for 1400-1500kcal/day and 80 grams of protein daily with good hydration through the day.  2. Exercise: Back to the [facility] for spinning/cycling/swimming/strength work. Let your hip heal a bit more since your strain last month. Aim for 3 days weekly the month of January and ramp up from there if doing well.  3. Medication: Victoza was not as helpful for you as ozempic was this summer. We'll switch back and you can start at the 0.5mg/week dose of Ozempic and stay there the next 3 months and we'll reconsider increased dosing from there.  4.  Maintaining a 62 lb reduction from starting weight in 2019. Well done.   5. With your increased fitness this year, your HDL has increased nicely from 44 to 49, your other labs/vitamin D, B12 and kidney/iver function look great.  A1c had corrected nicely to 5.1% from your peak at 7% 4 years ago with excellent control of your diabetes.    We discussed [facility] Bariatric Basics including:  -eating 3 meals daily  -reviewed metabolic needs for weight loss based on Resting Metabolic Rate  -protein goals supportive of healthy weight loss  -avoiding/limiting calorie containing beverages  -We discussed the importance of restorative sleep and stress management in maintaining a healthy weight.  -We discussed the National Weight Control Registry healthy weight maintenance strategies and ways to optimize metabolism.  -We discussed the importance of physical activity including cardiovascular and strength training in maintaining a healthier weight and explored viable options.      Most recent labs:  Lab Results   Component Value Date    WBC 6.2 11/09/2022    HGB 13.5 11/09/2022    HCT 41.1 11/09/2022    MCV 94 11/09/2022     11/09/2022     Lab Results   Component Value Date    CHOL 102 11/09/2022     Lab Results   Component Value Date     "HDL 49 (L) 11/09/2022   increase in HDL from 44 previously.  No components found for: LDLCALC  Lab Results   Component Value Date    TRIG 110 11/09/2022     No results found for: CHOLHDL  Lab Results   Component Value Date    ALT 20 11/09/2022    AST 29 11/09/2022    ALKPHOS 65 11/09/2022     No results found for: HGBA1C  Lab Results   Component Value Date    B12 368 11/09/2022     No components found for: VITDT1  Lab Results   Component Value Date    KOFFI 79 11/09/2022     Lab Results   Component Value Date    PTHI 77 08/23/2019     No results found for: ZN  Lab Results   Component Value Date    VIB1WB 108 08/23/2019     Lab Results   Component Value Date    TSH 1.14 05/21/2021     No results found for: TEST    DIETARY HISTORY    Positive Changes Since Last Visit: see above, after initial intolerance to Victoza, did complete the ramping but finds that it isn't as efficacious for her as the Ozempic was and wishes to revert back to Ozempic now that supply chain is back in place.   Struggling With: hip injury slowed down her     Knowledgeable in [place] Food Labels: yes  Getting Adequate Protein: often  Sleeping 7-8 hours/day n/aa  Stress management [employer]    PHYSICAL ACTIVITY PATTERNS:  Cardiovascular: see above  Strength Training: see above    REVIEW OF SYSTEMS  Constipation improved. .  PHYSICAL EXAM:  Vitals: Ht 1.626 m (5' 4\")   Wt 128.4 kg (283 lb)   BMI 48.58 kg/m    Weight:   Wt Readings from Last 3 Encounters:   12/29/22 128.4 kg (283 lb)   11/10/22 126.1 kg (278 lb)   08/03/22 128.4 kg (283 lb)         GEN: Pleasant, well groomed, in no acute distress  HEENT:   .  NECK: No swelling.  HEART: .  LUNGS: No respiratory difficulty noted. No cough..  ABDOMEN: .  EXTREMITIES: No tremor...  NEURO: Alert and Oriented X3, fluent speech. Relaxing on couch during video.  SKIN: No visible rashes. .    Interim study results: November labs reviewed as above..      34 minutes spent on the date of the encounter doing chart " review, history and exam, documentation and further activities per the note   Kerwin Dunn MD  [facility]  7:46 AM  12/29/2022    a      Again, thank you for allowing me to participate in the care of your patient.        Sincerely,        Kerwin Dunn MD

## 2022-12-29 NOTE — PATIENT INSTRUCTIONS
Plan:   1.  Diet: continue aiming for 1400-1500kcal/day and 80 grams of protein daily with good hydration through the day.  2. Exercise: Back to the [facility] for spinning/cycling/swimming/strength work. Let your hip heal a bit more since your strain last month. Aim for 3 days weekly the month of January and ramp up from there if doing well.  3. Medication: Victoza was not as helpful for you as ozempic was this summer. We'll switch back and you can start at the 0.5mg/week dose of Ozempic and stay there the next 3 months and we'll reconsider increased dosing from there.  4.  Maintaining a 62 lb reduction from starting weight in 2019. Well done.   5. With your increased fitness this year, your HDL has increased nicely from 44 to 49, your other labs/vitamin D, B12 and kidney/iver function look great.  A1c had corrected nicely to 5.1% from your peak at 7% 4 years ago with excellent control of your diabetes.  Continued good exercise, diet/protein intake and medication support should keep blood sugars well controlled long term.      Example Meal Plan for a 9126-6029 Calorie Diet:    In order to fuel your weight loss properly and avoid hunger-induced overeating later in the day, for your height and weight, you will enjoy the most success by following the diet below or similar with adjustments based on your particular tastes and preferences.  Exercise may influence speed, amount of weight loss further.     I recommend getting into a meal routine and keeping it similar day to day in the beginning so you don t have to think too hard about what you re going to make/eat.  Keep snacks healthy, ideally containing protein and some vegetables.  Non-processed food is preferable to packaged items.  Eat at least a few crunchy green vegetables if having a snack, which should be 2-3 hours after your mealtimes(prepare these ahead of time for ease of use).  Drink 64 oz -80 oz of water daily for most, some of you will need more and we'll  discuss it at your visit if that is the case.      When changing our diet,  we can often mistake thirst for hunger or just have some distracted eating habits that we need to break free from ('bored/mindless eating', screen time,work, driving,etc).  A glass of water and reconsideration of our hunger is often all that is needed.  Having the urge is not the problem, but watching it pass by without acting on it is the goal.    If you re having hunger problems, add a protein drink/snack to your morning hours or afternoon snack with at least 20grams of protein and not too much sugar (under 10g).  A carton of higher protein/low sugar yogurt can work as well.  If the urge to snack is overwhelming and not satiated, try going for a 10 minute walk/exercise, come home and drink a glass of water and if still hungry, have a  calorie snack (handful of raw/sprouted nuts, veggies and string cheese, protein bar, etc).  Savor it.    It is better to have a large breakfast, a moderate lunch and a smaller dinner to fuel your day.  People lose 10-15% more weight during their weight loss season with this strategy. Optimizing your protein intake at each meal will further keep you more satisfied while eating less food overall.  Getting exercise in early has also been shown to offer the best results (before breakfast ideally but anytime is the right time to exercise if that is not an option for you).    To make sure you re getting adequate vitamins and minerals during weight loss, I recommend one complete multivitamin a day of your choice.  Consider a probiotic and taking some vitamin D 2000 IU daily.    Let supper be your last meal of the day and ideally try to have at least 12 hours between supper and breakfast the next day to tap into some beneficial overnight fasting dynamics.  Midnight snacks need to go away. Water in the evening is fine, unsweetened, non caffeinated herbal tea is helpful as well.  Consolidating your meals within a  8-12 hour period of your day will help tap into these additional metabolic benefits and tends to keep your appetite up for breakfast, further helping to stay on track.  For most of my patients, I don't recommend an intermittent fasting style diet (many find it hard to fit in their lifestyle) but an overnight fast is very doable for most patients and helps regulate our hunger drives a little better.  This makes it very important to nail good intake at all three meals to feel satisfied/energized and still lose weight.      If evening snacking desires are high, consider a glass of fiber supplement for some additional fullness (metamucil or similar). Most of us don't get the 25-30 grams per day of fiber that promotes good gut health/satiety.  Benefiber, metamucil, citrucel are reasonable/affordable options for most people.  Inulin, chicory, psyllium husk are reasonable options but start slow and low in the dose to avoid gas/bloating until your gut gets acclimated (ramping up to 5-10 grams per day of supplemental fiber after 3-4 weeks if needed).      Example Meal Plan:  Breakfast: 450-475 Calories  1 egg cooked on low in olive oil:   calories.  5oz Greek Yogurt (Fage plain classic: ~150 randi)  Handful of Berries of your choice (about a calorie per berry or 20-40cal per handful)    cup(cooked) of  old fashioned oatmeal or 1/2 cup(cooked) steel cut oats. (150 randi)  Sprinkle amount of brown sugar and a pat of butter. (40 randi)  Glass of  Water  Black coffee or unsweetened Tea (0calories).      2-3 hours Later Snack: (195 calories).  Glass of water  One string Cheese (80 calories) or 4 oz creamed cottage cheese (115 calories) with  Crunchy Celery sticks (less than 10 calories per large stalk) 2 stalks. (20 calories)    of a  Large Banana or   of a Large Apple (60 calories):  eat second half at lunch or afternoon snack.     Lunch:300 -350 calories   Chicken Breast  (baked/broiled/roasted/grilled)  4-6 oz.  (125-180 randi),  BBQ sauce/hot sauce/mustard/seasoning is free. Just use a reasonable amount. Or a can of tuna with 1 tablespoon mayonnaise.  Salad: lettuce, any other veggies (cucumbers, green peppers/celery you like and a small drizzle of dressing to just flavor.  Go as big on the veggies as you like,  as they are practically calorie free.   A whole, 8 inch cucumber is 45 calories, a whole green pepper is 23 calories, a stalk of celery is 9 calories.  [place] Dressing is 60 calories per tablespoon..so moderate your desired dressing or do a drizzle of olive oil and splash of balsamic vinegar on top,  Total calories unlikely to be over 150 even with dressing.  Glass of Water.    Option for lunch is meal replacement protein drink/smoothie.  Need at least 20 grams of protein and eat the rest of your apple/banana from the morning snack.      Afternoon Snack: 150-200 calories   Cheese Stick or cottage cheese again  and a fresh fruit OR  Granola Bar (protein Bar acceptable if under 200 calories OR  Homemade smoothies:  8oz skim milk,  a handful of berries (fresh or frozen and a serving of protein powder such as BiPro or Taniya sWhey for example.  If you don't like dairy, make with 8oz water, one small banana, handful of berries and the protein powder, add any veggies you want as well:  roughly 200 calories.   Glass of Water    Dinner: 325 calories  4oz of fresh, Atlantic salmon.  Broiled (salt/pepper/dill) for about 8-8.5 minutes (200calories) or  4oz filet mignon steak or sirloin steak  Salad or vegetable sautéed lightly in olive oil or   Broccoli 1.5  cups chopped and steamed  or micro-waved in a little water (75 calories)  Glass of Water,    Cup of herbal tea (unsweetened, caffeine free)      Herbs and seasonings are encouraged to flavor your foods/vegetables.  Make your food delicious.      Tips for Success:  1.  Prepare proteins ahead of time (broil chicken breasts in bulk so you can grab and go), steel cut oats/lentils can be  "stored in casserole dish/bowl in the fridge for quick scoop in the morning and rewarm in microwave, make use of crock pot recipes (watch salt content).  Making meals that cover 3-4 future meals is an easy way to stay on track.  2.  Drink a 8-12 oz glass of water every 2-3 hours when awake.  We often mistake hunger for thirst, especially when losing weight.  3. Remember your Reward and Motivation when things get hard.  4.  Weigh yourself every morning and record, you'll stay on track better and learn how our biorhythms, diet and elimination patterns show up on the scale. Don't worry about 1 or 2 day patterns, but when on track you'll notice good trend downward of weight over 3-4 day segments.  Plateaus tend to resolve after 4-8 days in most cases if you stay consistent with your plan.  These are natural and part of weight loss, even if you're perfect with your plan execution.  5. Call if problems/concerns.  [employer] is a great tool to stay in touch and provide weekly outside accountability. Check in with questions or if you want to brag.  6.  Find a handful of meals/foods that keep you on track and feeling good and get into a routine that is sustainable for you.  It's OK to have a routine that works for you.  7.  Consider taking a complete multivitamin just to make sure all micronutrients are adequate during weight loss.  8. If losing hair/brittle nails it usually means you are not taking enough protein.  Minimum goal is 60 grams daily of protein for smaller women, 80 grams a day for men. Consider taking Biotin as supplement or a \"Hair and Nail\" multivitamin.        LEAN PROTEIN SOURCES  Getting 20-30 grams of protein, 3 meals daily, is appropriate for most people, some need more but more than about 40 grams per meal is not useful.  General rule is drinking one ounce of water per gram of protein eaten over the course of the day:  70 grams of protein each day, drink 70 oz of water.  Protein Source Portion Calories " Grams of Protein                           Nonfat, plain Greek yogurt    (10 grams sugar or less) 3/4 cup (6 oz)  12-17   Light Yogurt (10 grams sugar or less) 3/4 cup (6 oz)  6-8   Protein Shake 1 shake 110-180 15-30   Skim/1% Milk or lactose-free milk 1 cup ( 8 oz)  8   Plain or light, flavored soymilk 1 cup  7-8   Plain or light, hemp milk 1 cup 110 6   Fat Free or 1% Cottage Cheese 1/2 cup 90 15   Part skim ricotta cheese 1/2 cup 100 14   Part skim or reduced fat cheese slices 1 ounce 65-80 8     Mozzarella String Cheese 1 80 8   Canned tuna, chicken, crab or salmon  (canned in water)  1/2 cup 100 15-20   White fish (broiled, grilled, baked) 3 ounces 100 21   [place]/Tuna (broiled, grilled, baked) 3 ounces 150-180 21   Shrimp, Scallops, Lobster, Crab 3 ounces 100 21   Pork loin, Pork Tenderloin 3 ounces 150 21   Boneless, skinless chicken /turkey breast                          (broiled, grilled, baked) 3 ounces 120 21   [place], [place], [place], and Venison 3 ounces 120 21   Lean cuts of red meat and pork (sirloin,   round, tenderloin, flank, ground 93%-96%) 3 ounces 170 21   Lean or Extra Lean Ground Turkey 1/2 cup 150 20   90-95% Lean Alexis Burger 1 mckinley 140-180 21   Low-fat casserole with lean meat 3/4 cup 200 17   Luncheon Meats                                                        (turkey, lean ham, roast beef, chicken) 3 ounces 100 21   Egg (boiled, poached, scrambled) 1 Egg 60 7   Egg Substitute 1/2 cup 70 10   Nuts (limit to 1 serving per day)  3 Tbsp. 150 7   Nut [place] (peanut, almond)  Limit to 1 serving or less daily 1 Tbsp. 90 4   Soy Burger (varies) 1  15   Garbanzo, Black, Watson Beans 1/2 cup 110 7   Refried Beans 1/2 cup 100 7   Kidney and Lima beans 1/2 cup 110 7   Tempeh 3 oz 175 18   Vegan crumbles 1/2 cup 100 14   Tofu 1/2 cup 110 14   Chili (beans and extra lean beef or turkey) 1 cup 200 23   Lentil Stew/Soup 1 cup 150 12   Black Bean Soup 1 cup 175 12          On-the-Go Breakfast Ideas  As of 2015, the latest research shows what a huge impact eating breakfast has on losing weight and feeling your best. People lose more weight when they make breakfast their biggest meal of the day compared to Dinner, but even if you cannot go to that degree, getting a breakfast that has at least 20 grams of protein and even a moderate amount of fat is ideal for maintaining good energy through the day and limits overeating in the evening hours.  The following are some quick and easy suggestions for at least getting something of substance into your body in the morning.  Enjoy!    Eating breakfast within 90 minutes of waking up is an important part of taking care of your body on a restricted calorie diet plan.  After sleeping for hours, your body is in need of fuel.  An ideal breakfast is a combination of protein, whole-grain carbohydrates, or fruit.  Here s why:    -Protein digests very slowly in the body, helping you feel more satisfied.  -Whole grains provide dietary fiber, which also digests slowly and helps keep your gut clean.  -Fruit is a great source of vitamins, minerals, and fiber.     Each one of these breakfast combinations has between 200-300 calories and 15-20 grams of protein.  Feel free to mix and match!    Bone Broth (chicken bone broth or beef bone broth) is a great way to boost protein content. 8oz of bone broth will typically have 9-12grams of protein for 40kcal of energy.    Protein: Choose  -1/2 cup low-fat cottage cheese  -2 hard boiled eggs , or one cooked in olive oil (low/slow heat).  -1 low fat string cheese stick  -1 Tableson natural peanut butter  -InComm vegetarian sausage mckinley (found in freezer section)  -1 slice lowfat cheese  -6 oz 2% or lowfat Greek yogurt, such as Fage or Oikos.    PLUS    Whole Grains:  Choose   -1 whole wheat English muffin  -1 whole wheat sol, half  -1/2 Fiber One frozen muffin, thawed  -1/2 Fiber One toaster  pastry  -1 whole wheat bagel thin  -1/2 cup Kashi cereal  -1 Kashi waffle (or other whole grain high-fiber waffle)  Aim for whole grain/sprouted breads with at least 3g of fiber/slice if having bread. [place] is one such brand.    OR    Fruit: Choose  -1/3 cup blueberries  -1/2 banana (or a plantain- similar to a banana, yet smaller)  -1/2 cup cantaloupe cubes  -1 small apple  -1 small orange  -1/2 cup strawberries  -handful raspberries/blackberries (each berry is about 1 calorie).    *Adapted from [employer], Fall 20    Ten Breakfasts Under 250 calories    Ideally, getting between 350-600 calories  (depending on starting height and weight)for breakfast is ideal for avoiding hunger later in the day, adjust/add to the following accordingly:    One- 250 calories, 8.5 g protein  1 slice whole-grain toast   1 Tbsp peanut butter    banana    Two- 250 calories, 8 g protein    cup nonfat/lowfat yogurt  1/3rd cup diced no-sugar peaches  1/3rd cup cereal (like Special K, Cheerios, or bran flakes)    Three- 250 calories, 25 g protein  1 egg scrambled with 1 oz skim milk    cup shredded cheddar    whole grain English muffin  1 oz [place] rick  1 tsp margarine spread    Four- 225 calories, 25 g protein  1/2 cup Kashi Go-Lean cereal    cup skim milk mixed with 1 scoop Bariatric Advantage protein powder    cup no-sugar diced pears    Five- 250 calories, 20 g protein    cup oatmeal prepared with skim milk, 1 scoop protein powder, and sugar-free maple syrup    Six- 200 calories, 5 g protein  1 whole grain waffle, toasted  1 tablespoon creamy peanut or almond butter    Seven-  250 calories, 19 g protein  Breakfast sandwich: 1 slice whole grain toast, cut in half.  Add 1 scrambled egg and one slice cheddar  cheese.    Eight-  250 calories, 15 g protein  2 eggs scrambled with 1/3 cup frozen spinach (heat before adding to eggs) and 2 tablespoons low fat cream cheese.    Nine-  150 calories, 15 g protein  2/3rd cup cottage  cheese    cup cantaloupe    Ten- 200 calories, 20 g protein  Fruit smoothie made with 4 oz. nonfat Greek yogurt,   cup berries, 1 scoop protein powder, and 4 oz skim milk.    Ten Lunches Under 250 Calories    Aim for lunch to be around 300-400 calories a day when trying to lose weight and get that protein in!    One- 200 calories, 11 g protein  1/3 cup tuna salad made with light perez on 1 slice whole grain bread  1 small peeled apple    Two- 250 calories, 16 g protein  1/3 cup lowfat cottage cheese    cup cooked green beans    small fruit cocktail (in natural juice)    Three- 200 calories, 11 g protein    grilled cheese sandwich on whole grain bread with lowfat cheese  2/3rd cup of tomato soup    Four- 250 calories, 22 g protein  Deli wrap: 1 oz sliced turkey, 1 oz sliced ham, 1 oz sliced chicken rolled up with 1 slice low-fat cheese  1 small orange    Five- 250 calories, 28 g protein  2/3rd cup chili with 1 oz shredded cheese  4 saltine crackers    Six- 250 calories, 22 g protein  1 cup fresh spinach with 2 oz chicken, 1/3rd cup mandarin oranges, and 2 tablespoons sliced almonds with 1 tablespoon  vinaigrette dressing    Seven- 200 calories, 11 g protein  1 Tbsp sugar-free preserves and 1 Tbsp peanut butter on 1 slice whole grain toast    cup nonfat/lowfat Greek yogurt    Eight- 250 calories, 18 g protein  1 small soft-shell chicken taco with 1 oz shredded cheese, lettuce, tomato, salsa, and 1 Tbsp light sour cream    cup black beans    Nine- 225 calories, 13 g protein  2 ounces baked chicken  1/4 cup mashed potatoes    cup green beans    Ten- 200 calories, 21 g protein  Deli sol: 2 oz roast beef or other deli meat with 1 tsp Александр mayonnaise and sliced tomato, onion, and lettuce  1/3rd cup cottage cheese      Ten Dinners Under 300 calories    If you're eating a large breakfast and medium lunch, keep dinner small.  300-400 calories is ideal for most people depending on their caloric needs.    One- 300 calories, 12  g protein  1-inch thick slice of turkey meatloaf    cup baked butternut squash    Two- 200 calories, 9 g protein  Bread-less BLT: 3 slices turkey rick, sliced tomato, wrapped in a large lettuce leaf    cup peeled fruit    Three- 275 calories, 36 g protein  3 oz roasted chicken    cup cooked broccoli    cup shredded cheddar cheese    cup unsweetened applesauce    Four- 200 calories, 25 g protein  3 oz baked tilapia  1/3rd cup cooked carrots    cup yogurt    Five- 250 calories, 20 g protein  Grilled ham  n  Swiss: spread 2 tsp ghee or butter on 1 slice of whole grain bread.  Cut bread in half, layer 2 oz deli ham with 1 piece of Swiss cheese and grill until cheese is melted.    cup cooked vegetables    Six- 250 calories, 18 g protein  Vegetarian cheeseburger: 1 [place] cheeseburger topped with lettuce, onion, tomato, and ketchup/mustard    cup sweet potato fries    Seven- 250 calories, 18 g protein  Pork pot roast: 2 oz roasted pork loin, 1/3rd cup roasted carrots,   medium potato, cooked with   cup gravy    Eight- 330 calories, 25 g protein  2 oz meatballs (about 2 small meatballs)    cup spaghetti sauce  1/2 piece toast topped with 1 tsp ghee or butterand topped with garlic powder, toasted in oven    Nine- 250 calories, 16 g protein  Mexican pizza: one 8  corn tortilla topped with 2 oz chicken,   cup salsa, 2 tablespoons black beans, 2 tablespoons shredded cheese.  Bake until cheese is melted.    Ten- 250 calories, 22 g protein  Shrimp stir-butler: 3 oz cooked shrimp, 1/6th onion,   pepper,   cup chopped carrots sautéed in 1 tablespoon olive oil, topped with 2 tablespoons stir butler sauce and a pinch of sesame seeds        150 Calories or Less Snack Ideas   1 hardboiled egg with   cup berries  1 small apple with 1 hardboiled egg  10 almonds with   cup berries  2 clementines with 1 light string cheese  1 light string cheese with   sliced apple  1 light string cheese wrapped in 2 slices of turkey  4 100% whole wheat crackers  (e.g. Triscuit) with 1 light string cheese    c. cottage cheese with   cup fruit and 1 Tbsp sunflower seeds     cup cottage cheese with   of an avocado     can tuna fish with 1 cup sliced cucumbers     cup roasted garbanzo beans with paprika and cayenne pepper    baked sweet potato with   cup chili beans or   cup cottage cheese  2 oz. nitrate free turkey slices with 1 cup carrots  1 container (6 oz) of low sugar (less than 10 grams of sugar) greek yogurt   3 Tablespoons of hummus with 1 cup sliced bell peppers   2 Tablespoons of hummus with 15 baby carrots  4 Tablespoons ranch dip made with plain Greek Yogurt and 3 mini cucumbers  1/4 cup nuts (any kind)  1 Tablespoon peanut butter with 1 stalk celery   1 dill pickle wrapped in 1-2 slices of deli ham with 1 tsp of mayonnaise/mustard.      MEDICATIONS FOR WEIGHT LOSS  There are several medications available to assist us in weight loss.  By themselves, without a mindful change in diet and increase in movement/activity these medications are disappointing in their results. However, combined with a closely monitored program of diet change and exercise they can be very effective in controlling appetite and boosting initial weight loss.  All weight loss medications need continual re-evaluation for efficacy as their side effects and health benefits fail to be worthwhile if a person is not continuing to lose weight or in maintaining their healthy weight.  Some weight loss medications are scheduled drugs, meaning there is at least a theoretical possibility for developing addiction to them, but in practice this is rare.  We do anticipate coming off meds in the future- after stabilization of weight loss is assurred.  Finally, a tolerance can develop and people s perceived efficacy of medication can diminish.  In communication with your physician, it may be appropriate to intermittently take a break from these medications and then restart again (few weeks off then restart again)  if a plateau is reached that cannot be broken through.  Each person can respond to a medication differently and to be a good option for you, it will need to be affordable, effective and well tolerated with minimal side effects.    In most cases, weight loss progress after one month and three months will be obtained and if a patient is not reaching the satisfactory progress towards weight loss, the medications may be discontinued.  The thought is that if a person is taking a weight loss medication and not receiving the potential health benefit of that drug, the side effects are not worthwhile and use should be discontinued.  On the flip side, there are many people on some weight loss medications for years because it continues to be an effective tool in their weight management and they are tolerating the medication without any long-term side effects.  Each person's response and purpose will be evaluated.      PHENTERMINE (Adipex): approved in 1959 for appetite suppression.  It has stimulant effects and cannot be used with Ritalin, Concerta, or other stimulants.  Although it is not highly addictive, it's chemically related to amphetamines which are addictive and is classified as a Controlled Substance by the [place].  Occasional dependence can develop, but rarely. The most common side effects are dry mouth, increased energy and concentration, increased pulse, and constipation.  You should not take phentermine if you have glaucoma, hyperthyroidism, or uncontrolled/untreated hypertension or overly anxious. You should stop if dramatic mood swings, severe insomnia, palpations, chest pains, visual changes or if your Blood Pressure is consistently elevated or any time it's over 160/90.   It's ok to go off the med for a few weeks and restart if efficacy is wearing off.  $24-$30 for 90 tablets at [facility]. Females are required to have reliable birth control to reduce the risk birth defects/miscarriage.      TOPIRAMATE  (Topamax): Anti-seizure medication, also used to prevent migraines and sometimes for mood stabilization.  Side effects include paresthesia, glaucoma, altered concentration, attention difficulties, memory and speech problems, metabolic acidosis, depression, increase in body temperature and decrease sweating, risk of kidney stones.  Do not take Topamax while taking Depakote as this can cause high ammonia levels.  You must have reliable birth control as Topamax can cause birth defects.  If prolonged use has occurred it should be tapered off slowly to avoid withdrawal issues.  Insurance usually covers Topiramate.  At higher doses, there may be some confusion/forgetfulness associated with this so we try to limit dose to under 75mg twice daily to reduce this risk. Often covered by insurance as it's used for many reasons.  Topamax will cause carbonated beverages to taste bad. A recheck of your kidney/electrolytes may occur within a few months of starting.    QSYMIA (Phentermine + Topamax):  See above information about phentermine and Topamax.  Most common side effects are paresthesia, dizziness, distortion of taste, insomnia, constipation, and dry mouth.  See above descriptions for the two individual agents.Females are required to have reliable birth control to reduce the risk birth defects/miscarriage.  $150-$220 per month      GLP1 Agonists:  Liraglutide (Victoza/Saxenda), Semaglutide (Ozempic/Wegovy):   Part of the family of Glucagon Like Peptide Agonists, these medications directly suppresses appetite and are often used by diabetic patients due to improvements in glucose/insulin balance.  They also slow how quickly the stomach empties, increasing fullness. They may be hard to get covered for non diabetics and some plans have exclusions for weight loss purposes.  Currently, these are  injectable medications delivered via autoinjector pen. It can be very costly without insurance coverage (over $500/month).  Small risks  "for pancreatitis exists and dose should be held if increased mid abdominal pain/burning. It is not to be used if previous Multiple Endocrine Neoplasia. In rodents, may increase risk of thyroid tumors and not indicated for anyone with a history of medullary thyroid cancer as a result.  If changes in voice/swallowing should be discontinued. Reliable birth control required in women. [email or web address], [email or web address] has more information on these medications.    Contrave (Bupropion/Naltrexone).    Synergistic combination of a mild appetite suppressing anti-depressant (Bupropion) whose effects are increased due to interaction with Naltrexone.  Naltrexone may have some effects on craving and is often used in addiction medicine to help previous opiate addicts be less prone to relapse as it blocks the action of opiates. Should be stopped if any need for opiate pain medication, surgery or planned procedures where you'll be given sedation/anesthesia. If prolonged use, recommend stepping down bupropion over 2-3 weeks to limit any risk of withdrawal issues. Side effects may include dry mouth, increased heart rate, mild elevation in Blood pressure;  dizziness, ringing in the ears, anxiety (typically due to bupropion), nausea, constipation, and some get fatigued with naltrexone.  About $210 on [facility] for 120 tabs of \"Contrave\", the brand name without insurance coverage. Generic Bupropion 75mg: $25 for 120 tabs, Naltrexone: $55 for 90 tabs without insurance coverage on [facility]. Cannot be used if pregnant/trying to conceive or breast feeding.      Plenity:   By mail order pharmacy only, moderately expensive. $98/month.  2-3 capsules taken with 16 oz of water, 20 minutes before 2 meals daily provides crystals that expand into a gel that fills the stomach 20-25% and provides a mechanical fullness.  The Plenity then mixes with your next meal and increases satisfaction by bulking the meal up to feel bigger than it truly is. Plenity is not " "absorbed and gets passed through the digestive tract and excreted in stools. May cause some bloating/gas/full feeling as it behaves like a fiber in many ways. Cost not available yet. FDA cleared in March of 2019 and became available near the end of 2020 through mail order pharmacy only ([facility]). The safety and efficacy trials were done under \"Gelesis\" name. Not appropriate for people with stomach or bowel motility issues as requires you to pass it through digestive tract. [email or web address] has more information.        "

## 2022-12-29 NOTE — PROGRESS NOTES
Ronel Ryan is 42 year old  female who presents for a billable video visit today.    How would you like to obtain your AVS? MyChart  If dropped from the video visit, the video invitation should be resent by: Text to cell phone: [phone]  Will anyone else be joining your video visit? No      Video Start Time: [phone]  1:20 PM    Are there any specific questions or needs that you would like addressed at your visit today? RWM - has some weight gain that she is concerned about          Video-Visit Details    Type of service:  Video Visit    Platform used for Video Visit: Udex    Video End Time (time video stopped): 1:49 PM    Originating Location (pt. Location): Home        Distant Location (provider location):  On-site    Distant Location (provider location):  [facility]     Bariatric Clinic Follow-Up Visit:    Ronel Ryan is a 42 year old  female with Body mass index is 48.58 kg/m .  presenting here today for follow-up on non-surgical efforts for weight loss. Original Intake visit occurred on 7/13/19 with a weight of 345 lbs and BMI of 59.2.  Along with diet and behavior changes, she has been using various combinations through the years of Trulicity/naltrexone and a previous use of topiramate from her pain clinic which can also assist her weight loss goals.  See her intake visit notes for details on identified contributors to weight gain in the past. Chart review shows Dietician calculated RMR of 1892kcal/day and protein intake goal of 70-90g/day.  Her Trulicity was stopped in hospital May of 2022 during hip surgery and she had weight gain of 30 lbs over the summer due to increased cravings off medication. We transitioned to Semaglutide therapy this summer of 2022 but then supplies ran out due to shortages in supply and Victoza was started in November 2022 (11/7/22) with plans to transition back in the new year when supplies of Ozempic are  more available.   Weight:   Wt Readings from Last 5 Encounters:   12/29/22 128.4 kg (283 lb)   11/10/22 126.1 kg (278 lb)   08/03/22 128.4 kg (283 lb)   05/06/22 115.2 kg (254 lb)   04/20/22 121.8 kg (268 lb 9.6 oz)    pounds      Comorbidities:  Patient Active Problem List   Diagnosis     Binge eating     Eating disorder     Morbid obesity (H)     Multiple-type hyperlipidemia     Severe episode of recurrent major depressive disorder (H)     Arthralgia of hip     Hypertension     Trochanteric bursitis     History of urinary anomaly     Borderline personality disorder (H)     Attention deficit disorder     Asthma     Suicidal ideation     ACP (advance care planning)     Adjustment disorder with depressed mood     Anxiety     Moderate mixed bipolar I disorder (H)     Bipolar II disorder, most recent episode hypomanic (H)     Borderline high cholesterol     Care plan discussed with patient     Disorder of pelvis     Gait difficulty     Hx of diabetes mellitus     Hx of eating disorder     Morbid obesity with BMI of 50.0-59.9, adult (H)     Osteoarthritis     Pain of both hip joints     Relationship problems     Secondary amenorrhea     Soft tissue lesion of shoulder region     Vertigo     Greater trochanteric bursitis of both hips     Hip dysplasia     Left hip pain     Osteoarthritis of hip     Primary osteoarthritis of left hip     ADD (attention deficit disorder)     H/O urinary frequency     History of anxiety disorder     Hyperlipidemia     Mixed hyperlipidemia     Recurrent major depressive disorder (H)     Type 2 diabetes mellitus (H)     Primary osteoarthritis of both hips     Degenerative joint disease (DJD) of hip     Status post total hip replacement, left     Bipolar 1 disorder (H)     Constipation     Gastroesophageal reflux disease with esophagitis without hemorrhage     Mixed incontinence     Primary localized osteoarthritis of pelvic region and thigh     Skin sensation disturbance       Current  Outpatient Medications:      ACETAMINOPHEN EXTRA STRENGTH 500 MG tablet, , Disp: , Rfl:      albuterol (PROAIR HFA/PROVENTIL HFA/VENTOLIN HFA) 108 (90 BASE) MCG/ACT Inhaler, Inhale 1-2 puffs into the lungs every 4 hours as needed , Disp: , Rfl:      atorvastatin (LIPITOR) 40 MG tablet, Take 1 tablet by mouth every evening , Disp: , Rfl:      baclofen (LIORESAL) 10 MG tablet, TAKE 1 TABLET BY MOUTH TWICE A DAY AS NEEDED FOR MUSCLE SPASM, Disp: , Rfl:      blood glucose (NO BRAND SPECIFIED) lancets standard, Use to test blood sugar 2 times daily or as directed., Disp: 100 each, Rfl: 11     blood glucose monitoring (NO BRAND SPECIFIED) meter device kit, Use to test blood sugar 2 times daily or as directed., Disp: 1 kit, Rfl: 0     blood glucose monitoring (NO BRAND SPECIFIED) test strip, Use to test blood sugars 2 times daily or as directed, Disp: 100 strip, Rfl: 3     buPROPion (WELLBUTRIN XL) 300 MG 24 hr tablet, Take 300 mg by mouth daily , Disp: , Rfl:      cholecalciferol (VITAMIN D3) 125 mcg (5000 units) capsule, , Disp: , Rfl:      Cholecalciferol 125 MCG (5000 UT) TABS, Take 125 mcg by mouth daily , Disp: , Rfl:      EPINEPHrine (ANY BX GENERIC EQUIV) 0.3 MG/0.3ML injection 2-pack, Inject 0.3 mg into the muscle, Disp: , Rfl:      EPINEPHrine (EPIPEN/ADRENACLICK/OR ANY BX GENERIC EQUIV) 0.3 MG/0.3ML injection 2-pack, Inject 0.3 mLs (0.3 mg) into the muscle once as needed for anaphylaxis, Disp: 0.6 mL, Rfl: 0     hydrOXYzine (ATARAX) 25 MG tablet, Take 1 tablet (25 mg) by mouth every 6 hours as needed for itching or anxiety (with pain, moderate pain), Disp: 30 tablet, Rfl: 0     ibuprofen (ADVIL/MOTRIN) 600 MG tablet, Take 1 tablet (600 mg) by mouth every 6 hours as needed (mild), Disp: , Rfl: 0     ibuprofen (ADVIL/MOTRIN) 800 MG tablet, Take 800 mg by mouth, Disp: , Rfl:      insulin pen needle (31G X 6 MM) 31G X 6 MM miscellaneous, Use new pen needle for each autoinjection., Disp: 90 each, Rfl: 1      "levonorgestrel (LILETTA, 52 MG,) 19.5 MCG/DAY IUD, 1 each by Intrauterine route once, Disp: , Rfl:      liraglutide (VICTOZA) 18 MG/3ML solution, Ramp up dose every 2-3 weeks if tolerated:  Start at 0.6mg injected daily for 2-3 weeks, then up to 1.2mg injected daily for 2-3 weeks then up to max of 1.8mg injected daily if tolerated., Disp: 9 mL, Rfl: 2     meclizine (ANTIVERT) 25 MG tablet, Take 25 mg by mouth 4 times daily as needed for dizziness, Disp: , Rfl:      risperiDONE (RISPERDAL) 0.5 MG tablet, , Disp: , Rfl:      risperiDONE (RISPERDAL) 2 MG tablet, , Disp: , Rfl:      SENEXON-S 8.6-50 MG tablet, , Disp: , Rfl:      tolterodine ER (DETROL LA) 4 MG 24 hr capsule, Take 4 mg by mouth daily, Disp: , Rfl:      topiramate (TOPAMAX) 100 MG tablet, Take 100 mg by mouth 2 times daily , Disp: , Rfl:      traZODone (DESYREL) 150 MG tablet, Take 150 mg by mouth At Bedtime, Disp: , Rfl:       Interim: Since our last visit, she has had some modest weight weight gain since coming off Ozempic which can put her metabolic syndrome under strain.  Initially, got very hungry when starting Victoza.  Good BMs regularly now and her previous constipation is improved. 2-3 daily. Victoza ramping to 1.8mg has been tolerated but low efficacy compared to Ozempic.. Her [employer] routine is currently on hold after going 6 days weekly. Had injuried her hip in weight lifting class and during benches/push ups and struggled to get back up, strained her hip.  Followed up with Ortho and reported negative xrays. Some \"shock\" feeling.  Got PT and advised to ease off on her workouts. Planning to get back into it. .   11/9/22 labs showed improvements in HDL and lowering of LDL, normal CMP, ferritin and glucose levels, A1c of 5.1% is excellent. B12 and vitamin D within normal ranges and normal hemogram.  Plan:   1.  Diet: continue aiming for 1400-1500kcal/day and 80 grams of protein daily with good hydration through the day.  2. Exercise: Back to the " [employer] for spinning/cycling/swimming/strength work. Let your hip heal a bit more since your strain last month. Aim for 3 days weekly the month of January and ramp up from there if doing well.  3. Medication: Victoza was not as helpful for you as ozempic was this summer. We'll switch back and you can start at the 0.5mg/week dose of Ozempic and stay there the next 3 months and we'll reconsider increased dosing from there.  4.  Maintaining a 62 lb reduction from starting weight in 2019. Well done.   5. With your increased fitness this year, your HDL has increased nicely from 44 to 49, your other labs/vitamin D, B12 and kidney/iver function look great.  A1c had corrected nicely to 5.1% from your peak at 7% 4 years ago with excellent control of your diabetes.    We discussed [facility] Bariat[facility] Basics including:  -eating 3 meals daily  -reviewed metabolic needs for weight loss based on Resting Metabolic Rate  -protein goals supportive of healthy weight loss  -avoiding/limiting calorie containing beverages  -We discussed the importance of restorative sleep and stress management in maintaining a healthy weight.  -We discussed the National Weight Control Registry healthy weight maintenance strategies and ways to optimize metabolism.  -We discussed the importance of physical activity including cardiovascular and strength training in maintaining a healthier weight and explored viable options.      Most recent labs:  Lab Results   Component Value Date    WBC 6.2 11/09/2022    HGB 13.5 11/09/2022    HCT 41.1 11/09/2022    MCV 94 11/09/2022     11/09/2022     Lab Results   Component Value Date    CHOL 102 11/09/2022     Lab Results   Component Value Date    HDL 49 (L) 11/09/2022   increase in HDL from 44 previously.  No components found for: LDLCALC  Lab Results   Component Value Date    TRIG 110 11/09/2022     No results found for: CHOLHDL  Lab Results   Component Value Date    ALT 20 11/09/2022    AST 29 11/09/2022     "ALKPHOS 65 11/09/2022     No results found for: HGBA1C  Lab Results   Component Value Date    B12 368 11/09/2022     No components found for: VITDT1  Lab Results   Component Value Date    KOFFI 79 11/09/2022     Lab Results   Component Value Date    PTHI 77 08/23/2019     No results found for: ZN  Lab Results   Component Value Date    VIB1WB 108 08/23/2019     Lab Results   Component Value Date    TSH 1.14 05/21/2021     No results found for: TEST    DIETARY HISTORY    Positive Changes Since Last Visit: see above, after initial intolerance to Victoza, did complete the ramping but finds that it isn't as efficacious for her as the Ozempic was and wishes to revert back to Ozempic now that supply chain is back in place.   Struggling With: hip injury slowed down her     Knowledgeable in [employer]: yes  Getting Adequate Protein: often  Sleeping 7-8 hours/day n/aa  Stress management [employer]    PHYSICAL ACTIVITY PATTERNS:  Cardiovascular: see above  Strength Training: see above    REVIEW OF SYSTEMS  Constipation improved. .  PHYSICAL EXAM:  Vitals: Ht 1.626 m (5' 4\")   Wt 128.4 kg (283 lb)   BMI 48.58 kg/m    Weight:   Wt Readings from Last 3 Encounters:   12/29/22 128.4 kg (283 lb)   11/10/22 126.1 kg (278 lb)   08/03/22 128.4 kg (283 lb)         GEN: Pleasant, well groomed, in no acute distress  HEENT:   .  NECK: No swelling.  HEART: .  LUNGS: No respiratory difficulty noted. No cough..  ABDOMEN: .  EXTREMITIES: No tremor...  NEURO: Alert and Oriented X3, fluent speech. Relaxing on couch during video.  SKIN: No visible rashes. .    Interim study results: November labs reviewed as above..      34 minutes spent on the date of the encounter doing chart review, history and exam, documentation and further activities per the note   Kerwin Dunn MD  [facility]  7:46 AM  12/29/2022  "

## 2023-02-01 ENCOUNTER — HOSPITAL ENCOUNTER (EMERGENCY)
Facility: HOSPITAL | Age: 43
Discharge: HOME | End: 2023-02-01
Attending: EMERGENCY MEDICINE | Admitting: EMERGENCY MEDICINE
Payer: COMMERCIAL

## 2023-02-01 ENCOUNTER — APPOINTMENT (OUTPATIENT)
Dept: RADIOLOGY | Facility: HOSPITAL | Age: 43
End: 2023-02-01
Attending: EMERGENCY MEDICINE
Payer: COMMERCIAL

## 2023-02-01 VITALS
HEART RATE: 72 BPM | DIASTOLIC BLOOD PRESSURE: 78 MMHG | SYSTOLIC BLOOD PRESSURE: 137 MMHG | RESPIRATION RATE: 20 BRPM | TEMPERATURE: 98 F | OXYGEN SATURATION: 100 % | BODY MASS INDEX: 47.46 KG/M2 | HEIGHT: 64 IN | WEIGHT: 278 LBS

## 2023-02-01 DIAGNOSIS — M94.0 COSTOCHONDRITIS: ICD-10-CM

## 2023-02-01 LAB
ALBUMIN SERPL BCG-MCNC: 3.9 G/DL (ref 3.5–5.2)
ALP SERPL-CCNC: 56 U/L (ref 35–104)
ALT SERPL W P-5'-P-CCNC: 34 U/L (ref 10–35)
ANION GAP SERPL CALCULATED.3IONS-SCNC: 7 MMOL/L (ref 7–15)
AST SERPL W P-5'-P-CCNC: 28 U/L (ref 10–35)
BASOPHILS # BLD AUTO: 0 10E3/UL (ref 0–0.2)
BASOPHILS NFR BLD AUTO: 1 %
BILIRUB DIRECT SERPL-MCNC: <0.2 MG/DL (ref 0–0.3)
BILIRUB SERPL-MCNC: 0.4 MG/DL
BUN SERPL-MCNC: 10.5 MG/DL (ref 6–20)
CALCIUM SERPL-MCNC: 9.3 MG/DL (ref 8.6–10)
CHLORIDE SERPL-SCNC: 105 MMOL/L (ref 98–107)
CREAT SERPL-MCNC: 1.16 MG/DL (ref 0.51–0.95)
DEPRECATED HCO3 PLAS-SCNC: 23 MMOL/L (ref 22–29)
EOSINOPHIL # BLD AUTO: 0.2 10E3/UL (ref 0–0.7)
EOSINOPHIL NFR BLD AUTO: 3 %
ERYTHROCYTE [DISTWIDTH] IN BLOOD BY AUTOMATED COUNT: 12.3 % (ref 10–15)
GFR SERPL CREATININE-BSD FRML MDRD: 60 ML/MIN/1.73M2
GLUCOSE SERPL-MCNC: 82 MG/DL (ref 70–99)
HCG SERPL QL: NEGATIVE
HCT VFR BLD AUTO: 42.1 % (ref 35–47)
HGB BLD-MCNC: 13.9 G/DL (ref 11.7–15.7)
IMM GRANULOCYTES # BLD: 0 10E3/UL
IMM GRANULOCYTES NFR BLD: 0 %
LYMPHOCYTES # BLD AUTO: 2.5 10E3/UL (ref 0.8–5.3)
LYMPHOCYTES NFR BLD AUTO: 45 %
MAGNESIUM SERPL-MCNC: 1.8 MG/DL (ref 1.7–2.3)
MCH RBC QN AUTO: 30.5 PG (ref 26.5–33)
MCHC RBC AUTO-ENTMCNC: 33 G/DL (ref 31.5–36.5)
MCV RBC AUTO: 93 FL (ref 78–100)
MONOCYTES # BLD AUTO: 0.3 10E3/UL (ref 0–1.3)
MONOCYTES NFR BLD AUTO: 5 %
NEUTROPHILS # BLD AUTO: 2.5 10E3/UL (ref 1.6–8.3)
NEUTROPHILS NFR BLD AUTO: 46 %
NRBC # BLD AUTO: 0 10E3/UL
NRBC BLD AUTO-RTO: 0 /100
PLATELET # BLD AUTO: 264 10E3/UL (ref 150–450)
POTASSIUM SERPL-SCNC: 3.9 MMOL/L (ref 3.4–5.3)
PROT SERPL-MCNC: 6.8 G/DL (ref 6.4–8.3)
RBC # BLD AUTO: 4.55 10E6/UL (ref 3.8–5.2)
SODIUM SERPL-SCNC: 135 MMOL/L (ref 136–145)
TROPONIN T SERPL HS-MCNC: 6 NG/L
WBC # BLD AUTO: 5.5 10E3/UL (ref 4–11)

## 2023-02-01 PROCEDURE — 82248 BILIRUBIN DIRECT: CPT | Performed by: EMERGENCY MEDICINE

## 2023-02-01 PROCEDURE — 36415 COLL VENOUS BLD VENIPUNCTURE: CPT | Performed by: EMERGENCY MEDICINE

## 2023-02-01 PROCEDURE — 999N000127 HC STATISTIC PERIPHERAL IV START W US GUIDANCE

## 2023-02-01 PROCEDURE — 999N000285 HC STATISTIC VASC ACCESS LAB DRAW WITH PIV START

## 2023-02-01 PROCEDURE — 99285 EMERGENCY DEPT VISIT HI MDM: CPT | Mod: 25

## 2023-02-01 PROCEDURE — 84484 ASSAY OF TROPONIN QUANT: CPT | Performed by: EMERGENCY MEDICINE

## 2023-02-01 PROCEDURE — 96374 THER/PROPH/DIAG INJ IV PUSH: CPT

## 2023-02-01 PROCEDURE — 93005 ELECTROCARDIOGRAM TRACING: CPT | Performed by: EMERGENCY MEDICINE

## 2023-02-01 PROCEDURE — 80053 COMPREHEN METABOLIC PANEL: CPT | Performed by: EMERGENCY MEDICINE

## 2023-02-01 PROCEDURE — 84703 CHORIONIC GONADOTROPIN ASSAY: CPT | Performed by: EMERGENCY MEDICINE

## 2023-02-01 PROCEDURE — 71046 X-RAY EXAM CHEST 2 VIEWS: CPT

## 2023-02-01 PROCEDURE — 250N000011 HC RX IP 250 OP 636: Performed by: EMERGENCY MEDICINE

## 2023-02-01 PROCEDURE — 83735 ASSAY OF MAGNESIUM: CPT | Performed by: EMERGENCY MEDICINE

## 2023-02-01 PROCEDURE — 85004 AUTOMATED DIFF WBC COUNT: CPT | Performed by: EMERGENCY MEDICINE

## 2023-02-01 RX ORDER — KETOROLAC TROMETHAMINE 15 MG/ML
15 INJECTION, SOLUTION INTRAMUSCULAR; INTRAVENOUS ONCE
Status: COMPLETED | OUTPATIENT
Start: 2023-02-01 | End: 2023-02-01

## 2023-02-01 RX ORDER — ALBUTEROL SULFATE 90 UG/1
2 AEROSOL, METERED RESPIRATORY (INHALATION) EVERY 6 HOURS PRN
Qty: 18 G | Refills: 0 | Status: SHIPPED | OUTPATIENT
Start: 2023-02-01

## 2023-02-01 RX ADMIN — KETOROLAC TROMETHAMINE 15 MG: 15 INJECTION, SOLUTION INTRAMUSCULAR; INTRAVENOUS at 13:45

## 2023-02-01 ASSESSMENT — ACTIVITIES OF DAILY LIVING (ADL): ADLS_ACUITY_SCORE: 35

## 2023-02-01 NOTE — ED PROVIDER NOTES
EMERGENCY DEPARTMENT ENCOUNTER      NAME: Ronel Ryan  AGE: 42 year old female  YOB: 1980  MRN: [id number]  EVALUATION DATE & TIME: 2/1/2023  1:41 PM    PCP: Arvind Lane    ED PROVIDER: Maryam Novak MD    Chief Complaint   Patient presents with     chest wall pain         FINAL IMPRESSION:  1. Costochondritis          ED COURSE & MEDICAL DECISION MAKING:    Pertinent Labs & Imaging studies reviewed. (See chart for details)  42 year old female with history of morbid obesity, HLD, DM who presents to the Emergency Department for evaluation of several weeks of intermittent right sided sternal margin chest pain, more constant today.  Worse with movement, worse with palpation.  Worse after she started a new exercise regimen of weight lifting and doing Karyna after the first of the year.  Highly suspect that this is related to musculoskeletal chest wall pain, costochondritis.  Certainly given her habitus and HLD, DM she does have increased risk for CAD but my suspicion is low.  Patient is PERC negative and does not warrant further CT imaging or D-dimer for pulmonary embolism.    Patient placed on monitor, IV established and blood obtained.  Twelve-lead EKG shows sinus rhythm.  Given 15 mg Toradol.  CBC, BMP, LFTs, magnesium, troponin, pregnancy test unremarkable.  Chest x-ray negative.  Patient felt improvement with the above.  Does not warrant any serial cardiac enzymes and will be discharged home with diagnosis of costochondritis, encouraged NSAIDs.      ED Course as of 02/01/23 2232 Wed Feb 01, 2023   1423 Troponin T, High Sensitivity: 6  Troponin less than or equal to 6 is not warrant repeat   1423 Creatinine(!): 1.16  Chronic   1423 XR Chest 2 Views  Chest x-ray negative       Medical Decision Making    History:    Supplemental history from: Documented in chart, if applicable    External Record(s) reviewed: Documented in chart, if applicable.    Work Up:    Chart documentation includes  differential considered and any EKGs or imaging independently interpreted by provider, where specified.    In additional to work up documented, I considered the following work up: Documented in chart, if applicable.    External consultation:    Discussion of management with another provider: Documented in chart, if applicable    Complicating factors:    Care impacted by chronic illness: Hyperlipidemia    Care affected by social determinants of health: Access to Medical Care    Disposition considerations: Discharge. No recommendations on prescription strength medication(s). N/A.    2:01 PM I met with the patient and performed my initial interview and exam    2:26 PM I updated the patient with lab and imaging results and discussed the plan for discharge     At the conclusion of the encounter I discussed the results of all of the tests and the disposition. The questions were answered. The patient or family acknowledged understanding and was agreeable with the care plan.      MEDICATIONS GIVEN IN THE EMERGENCY:  Medications   ketorolac (TORADOL) injection 15 mg (15 mg Intravenous Given 2/1/23 1345)       NEW PRESCRIPTIONS STARTED AT TODAY'S ER VISIT  Discharge Medication List as of 2/1/2023  2:57 PM      START taking these medications    Details   !! albuterol (PROAIR HFA/PROVENTIL HFA/VENTOLIN HFA) 108 (90 Base) MCG/ACT inhaler Inhale 2 puffs into the lungs every 6 hours as needed for shortness of breath, wheezing or cough, Disp-18 g, R-0, [facility] may dispense brand covered by insurance (Proair, or proventil or ventolin or generic albuterol inhaler)       !! - Potential duplicate medications found. Please discuss with provider.             =================================================================    HPI    Patient information was obtained from: Patient     Use of Intrepreter: N/A      Ronel Ryan is a 42 year old female with pertinent medical history of asthma, HLD, and GERD who presents with  "chest pain     The patient is a 42 year old female who presents with a \"couple months\" of chest pain that has worsened in the past \"two weeks\". The patient notes that they have an inhaler that would help with their chest pain, but they ran out and have been unable to get more. The patient reports that the chest pain has gotten more frequent and worsened in intensity over the past two weeks. Yesterday, the chest pain spread into the patient's face and arm which concerned them to call a nurse triage line who told them to present to the ED. Along with these episodes of pain the patient reports some shortness of breath. The patient notes on 1/30 they started to lift weights and go to Karyna classes which could have aggravated something. The patient denies lightheadedness with the pain episodes.     Social history: The patient denies smoking. Patient's mother has a pacemaker, TIA, DM type II    REVIEW OF SYSTEMS  Constitutional:  Denies fever, chills, weight loss or weakness  HENT:  Denies sore throat, ear pain, congestion  Respiratory: No wheeze or cough. Positive for shortness of breath   Cardiovascular:  No palpitations. Positive for chest pain   GI:  Denies abdominal pain, nausea, vomiting, diarrhea      PAST MEDICAL HISTORY:  Past Medical History:   Diagnosis Date     Allergic state      Anxiety      Arthritis      Asthma      Bipolar 2 disorder (H)     mixed, one manic episode when combined wellbutrin and celexa. Now off Celexa.     Borderline personality disorder (H)      Cholelithiasis     2007 lap gil     Depressive disorder      Diabetes (H)      Diabetes mellitus (H)     Dx in 2013, lost weight and in 2015 MD thought she might have been misdiagnosed.     GERD (gastroesophageal reflux disease)      H/O constipation      H/O hypercholesterolemia     on statin therapy     Hip dysplasia      Hypertension      Major depression      Menstrual disorder     heavy and irregular bleeding, improved with IUD.     Obese  "     PTSD (post-traumatic stress disorder)      Uncomplicated asthma      Urinary incontinence     on ditropan (showers/pools and stress incontinence).     vertigo        PAST SURGICAL HISTORY:  Past Surgical History:   Procedure Laterality Date     ANKLE FRACTURE SURGERY Right      ARTHROPLASTY HIP Left 9/9/2021    Procedure: LEFT TOTAL HIP ARTHROPLASTY;  Surgeon: Zenon Damon MD;  Location: [facility] OR     ARTHROPLASTY HIP Right 4/4/2022    Procedure: RIGHT TOTAL HIP ARTHROPLASTY;  Surgeon: Zenon Damon MD;  Location: [facility] OR     ARTHROSCOPIC RECONSTRUCTION ANTERIOR CRUCIATE LIGAMENT Left      CHOLECYSTECTOMY       CLOSED REDUCTION HIP Left 9/9/2021    Procedure: CLOSED REDUCTION, LEFT HIP;  Surgeon: Zenon Damon MD;  [facility]     COLONOSCOPY      normal colon at age 40, June 2021 study.     CYST REMOVAL      Back of neck     MAMMO STEREOTACTIC CORE BIOPSY LEFT Left 06/15/2020     ORTHOPEDIC SURGERY      ankle and acl     TONSILLECTOMY       ZZHC COLONOSCOPY W/WO BRUSH/WASH N/A 06/08/2021    Procedure: COLONOSCOPY;  Surgeon: Yoel Siegel MD;  Location: [facility] OR;  Service: Gastroenterology       CURRENT MEDICATIONS:    Prior to Admission Medications   Prescriptions Last Dose Informant Patient Reported? Taking?   ACETAMINOPHEN EXTRA STRENGTH 500 MG tablet   Yes No   Cholecalciferol 125 MCG (5000 UT) TABS   Yes No   Sig: Take 125 mcg by mouth daily    EPINEPHrine (ANY BX GENERIC EQUIV) 0.3 MG/0.3ML injection 2-pack   Yes No   Sig: Inject 0.3 mg into the muscle   EPINEPHrine (EPIPEN/ADRENACLICK/OR ANY BX GENERIC EQUIV) 0.3 MG/0.3ML injection 2-pack   No No   Sig: Inject 0.3 mLs (0.3 mg) into the muscle once as needed for anaphylaxis   SENEXON-S 8.6-50 MG tablet   Yes No   albuterol (PROAIR HFA/PROVENTIL HFA/VENTOLIN HFA) 108 (90 BASE) MCG/ACT Inhaler   Yes No   Sig: Inhale 1-2 puffs into the lungs every 4 hours as needed    atorvastatin (LIPITOR) 40  MG tablet   Yes No   Sig: Take 1 tablet by mouth every evening    baclofen (LIORESAL) 10 MG tablet   Yes No   Sig: TAKE 1 TABLET BY MOUTH TWICE A DAY AS NEEDED FOR MUSCLE SPASM   blood glucose (NO BRAND SPECIFIED) lancets standard   No No   Sig: Use to test blood sugar 2 times daily or as directed.   blood glucose monitoring (NO BRAND SPECIFIED) meter device kit   No No   Sig: Use to test blood sugar 2 times daily or as directed.   blood glucose monitoring (NO BRAND SPECIFIED) test strip   No No   Sig: Use to test blood sugars 2 times daily or as directed   buPROPion (WELLBUTRIN XL) 300 MG 24 hr tablet   Yes No   Sig: Take 300 mg by mouth daily    cholecalciferol (VITAMIN D3) 125 mcg (5000 units) capsule   Yes No   hydrOXYzine (ATARAX) 25 MG tablet   No No   Sig: Take 1 tablet (25 mg) by mouth every 6 hours as needed for itching or anxiety (with pain, moderate pain)   Patient not taking: Reported on 2022   ibuprofen (ADVIL/MOTRIN) 600 MG tablet   No No   Sig: Take 1 tablet (600 mg) by mouth every 6 hours as needed (mild)   ibuprofen (ADVIL/MOTRIN) 800 MG tablet   Yes No   Sig: Take 800 mg by mouth   insulin pen needle (31G X 6 MM) 31G X 6 MM miscellaneous   No No   Sig: Use new pen needle for each autoinjection.   levonorgestrel (LILETTA, 52 MG,) 19.5 MCG/DAY IUD   Yes No   Si each by Intrauterine route once   meclizine (ANTIVERT) 25 MG tablet   Yes No   Sig: Take 25 mg by mouth 4 times daily as needed for dizziness   risperiDONE (RISPERDAL) 0.5 MG tablet   Yes No   risperiDONE (RISPERDAL) 2 MG tablet   Yes No   semaglutide (OZEMPIC) 2 MG/1.5ML SOPN pen   No No   Si.5mg injected every 7 days (once weekly).   tolterodine ER (DETROL LA) 4 MG 24 hr capsule   Yes No   Sig: Take 4 mg by mouth daily   topiramate (TOPAMAX) 100 MG tablet   Yes No   Sig: Take 100 mg by mouth 2 times daily    traZODone (DESYREL) 150 MG tablet   Yes No   Sig: Take 150 mg by mouth At Bedtime      Facility-Administered Medications:  "None       ALLERGIES:  Allergies   Allergen Reactions     Cephalexin Anaphylaxis     Erythromycin Anaphylaxis and Hives     Other reaction(s): Unknown     Penicillins Hives     hives  Other reaction(s): Unknown       FAMILY HISTORY:  Family History   Problem Relation Age of Onset     Diabetes Mother      Cancer Mother      Breast Cancer Mother         Unsure of age     Hypertension Mother      Hyperlipidemia Mother      Arthritis Mother      Obesity Mother      Cerebrovascular Disease Mother      Diabetes Brother      Diabetes Father      Mental Illness Brother      Mental Illness Brother      Diabetes Brother      Hypertension Brother      Arthritis Brother      Obesity Brother      Hyperlipidemia Brother      Obesity Sister      Cancer Maternal Grandmother      Obesity Maternal Grandmother      Hypertension Maternal Grandmother      Arthritis Maternal Grandmother      Cerebrovascular Disease Maternal Grandmother      Obesity Maternal Grandfather      Substance Abuse Maternal Grandfather      Heart Disease Maternal Grandfather      Hypertension Maternal Grandfather      Hyperlipidemia Maternal Grandfather      Cerebrovascular Disease Maternal Grandfather      Pacemaker Maternal Grandfather      Seizure Disorder Maternal Grandfather        SOCIAL HISTORY:  Social History     Tobacco Use     Smoking status: Never     Smokeless tobacco: Never   Substance Use Topics     Alcohol use: No     Drug use: No        VITALS:  Patient Vitals for the past 24 hrs:   BP Temp Pulse Resp SpO2 Height Weight   02/01/23 1426 137/78 -- 72 -- 100 % -- --   02/01/23 1234 (!) 140/88 98  F (36.7  C) 91 20 98 % 1.626 m (5' 4\") 126.1 kg (278 lb)       PHYSICAL EXAM    General Appearance: Well-appearing, no acute distress   Head:  Normocephalic  Eyes:   conjunctiva/corneas clear  ENT:   membranes are moist without pallor  Neck:  Supple  Chest:  No deformity, no crepitus. Reproducible tenderness to right sternal margin with palpation and full " range of motion of right upper extremity   Cardio:  Regular rate and rhythm, no murmur/gallop/rub, 2+ pulses symmetric in all extremities  Pulm:  No respiratory distress, clear to auscultation bilaterally  Abdomen:  Soft, non-tender, morbidly obese female  Skin:  Skin warm, dry, no rashes  Neuro:  Alert and oriented ×3     RADIOLOGY/LABS:  Reviewed all pertinent imaging. Please see official radiology report. All pertinent labs reviewed and interpreted.    Results for orders placed or performed during the hospital encounter of 02/01/23   XR Chest 2 Views    Impression    IMPRESSION: Negative chest. Lungs are clear. Normal heart size.       Basic metabolic panel   Result Value Ref Range    Sodium 135 (L) 136 - 145 mmol/L    Potassium 3.9 3.4 - 5.3 mmol/L    Chloride 105 98 - 107 mmol/L    Carbon Dioxide (CO2) 23 22 - 29 mmol/L    Anion Gap 7 7 - 15 mmol/L    Urea Nitrogen 10.5 6.0 - 20.0 mg/dL    Creatinine 1.16 (H) 0.51 - 0.95 mg/dL    Calcium 9.3 8.6 - 10.0 mg/dL    Glucose 82 70 - 99 mg/dL    GFR Estimate 60 (L) >60 mL/min/1.73m2   Result Value Ref Range    Troponin T, High Sensitivity 6 <=14 ng/L   Result Value Ref Range    Magnesium 1.8 1.7 - 2.3 mg/dL   HCG QUALitative pregnancy (blood)   Result Value Ref Range    hCG Serum Qualitative Negative Negative   Hepatic function panel   Result Value Ref Range    Protein Total 6.8 6.4 - 8.3 g/dL    Albumin 3.9 3.5 - 5.2 g/dL    Bilirubin Total 0.4 <=1.2 mg/dL    Alkaline Phosphatase 56 35 - 104 U/L    AST 28 10 - 35 U/L    ALT 34 10 - 35 U/L    Bilirubin Direct <0.20 0.00 - 0.30 mg/dL   CBC with platelets and differential   Result Value Ref Range    WBC Count 5.5 4.0 - 11.0 10e3/uL    RBC Count 4.55 3.80 - 5.20 10e6/uL    Hemoglobin 13.9 11.7 - 15.7 g/dL    Hematocrit 42.1 35.0 - 47.0 %    MCV 93 78 - 100 fL    MCH 30.5 26.5 - 33.0 pg    MCHC 33.0 31.5 - 36.5 g/dL    RDW 12.3 10.0 - 15.0 %    Platelet Count 264 150 - 450 10e3/uL    % Neutrophils 46 %    % Lymphocytes 45  %    % Monocytes 5 %    % Eosinophils 3 %    % Basophils 1 %    % Immature Granulocytes 0 %    NRBCs per 100 WBC 0 <1 /100    Absolute Neutrophils 2.5 1.6 - 8.3 10e3/uL    Absolute Lymphocytes 2.5 0.8 - 5.3 10e3/uL    Absolute Monocytes 0.3 0.0 - 1.3 10e3/uL    Absolute Eosinophils 0.2 0.0 - 0.7 10e3/uL    Absolute Basophils 0.0 0.0 - 0.2 10e3/uL    Absolute Immature Granulocytes 0.0 <=0.4 10e3/uL    Absolute NRBCs 0.0 10e3/uL       EKG:  Sinus rhythm rate 76.  No notable ST or T wave abnormalities and normal intervals with QRS at 96 and QTc of 459.  Compared to previous EKG from 4/6/2020 no significant interval change.  I have independently reviewed and interpreted the EKG(s) documented above.      The creation of this record is based on the scribe s observations of the work being performed by Maryam Novak MD and the provider s statements to them. It was created on her behalf by Yumiko Merida, a trained medical scribe. This document has been checked and approved by the attending provider.    Maryam Novak MD  Emergency Medicine  [facility] EMERGENCY DEPARTMENT  [address] [id number] [phone]  Dept: [phone]       Maryam Novak MD  02/01/23 8832

## 2023-02-01 NOTE — ED NOTES
"ED Provider In Triage Note  [facility]  Encounter Date: Feb 1, 2023    Chief Complaint   Patient presents with     chest wall pain       Brief HPI:   Ronel Ryan is a 42 year old female presenting to the Emergency Department with a chief complaint of chest pains over the past couple months.  Now happening every day for the past couple weeks.  Central chest pain, radiated into right arm and face.  Does report pain is worsened with movement of upper body/torso.  Pt reports increased exercise recently. Reports associated dyspnea. Denies nausea, diaphoresis, leg pain/swelling.    Brief Physical Exam:  BP (!) 140/88   Pulse 91   Temp 98  F (36.7  C)   Resp 20   Ht 1.626 m (5' 4\")   Wt 126.1 kg (278 lb)   SpO2 98%   BMI 47.72 kg/m    General: Non-toxic appearing  HEENT: Atraumatic  Resp: No respiratory distress  Abdomen: Non-peritoneal  Neuro: Alert, oriented, answers questions appropriately  Psych: Behavior appropriate  Chest wall: TTP central chest wall    Plan Initiated in Triage:  Labs, EKG, CXR    PIT Dispo:   Return to lobby while awaiting workup and ED bed availability.  Pain is worse with movement of torso and palpation, suggestive of chest wall source, but warrants workup.    Ham Decker MD on 2/1/2023 at 12:31 PM    Patient was evaluated by the Physician in Triage due to a limitation of available rooms in the Emergency Department. A plan of care was discussed based on the information obtained on the initial evaluation and patient was consuled to return back to the Emergency Department lobby after this initial evalutaiton until results were obtained or a room became available in the Emergency Department. Patient was counseled not to leave prior to receiving the results of their workup.     Ham Decker MD  [facility] EMERGENCY DEPARTMENT  [address] [place] [id number] [phone]       Ham Decker MD  02/01/23 " 1238

## 2023-02-01 NOTE — DISCHARGE INSTRUCTIONS
Tylenol, ibuprofen or Aleve as needed for chest wall pain.  You were given a refill of the inhaler as needed for your asthma symptoms.

## 2023-02-01 NOTE — ED TRIAGE NOTES
"Patient here for right sided chest wall pain. Recently started exercising, pain worse in afternoon. No nausea or SOB. \"My inhalers did help, but I ran out of them\"      "

## 2023-03-16 ENCOUNTER — VIRTUAL VISIT (OUTPATIENT)
Dept: SURGERY | Facility: CLINIC | Age: 43
End: 2023-03-16
Payer: COMMERCIAL

## 2023-03-16 VITALS — WEIGHT: 266 LBS | BODY MASS INDEX: 45.41 KG/M2 | HEIGHT: 64 IN

## 2023-03-16 DIAGNOSIS — E88.810 METABOLIC SYNDROME X: ICD-10-CM

## 2023-03-16 DIAGNOSIS — E66.813 CLASS 3 SEVERE OBESITY DUE TO EXCESS CALORIES WITH SERIOUS COMORBIDITY AND BODY MASS INDEX (BMI) OF 45.0 TO 49.9 IN ADULT (H): ICD-10-CM

## 2023-03-16 DIAGNOSIS — E66.01 CLASS 3 SEVERE OBESITY DUE TO EXCESS CALORIES WITH SERIOUS COMORBIDITY AND BODY MASS INDEX (BMI) OF 45.0 TO 49.9 IN ADULT (H): ICD-10-CM

## 2023-03-16 DIAGNOSIS — E11.9 TYPE 2 DIABETES MELLITUS WITHOUT COMPLICATION, WITHOUT LONG-TERM CURRENT USE OF INSULIN (H): ICD-10-CM

## 2023-03-16 PROCEDURE — 99214 OFFICE O/P EST MOD 30 MIN: CPT | Mod: VID | Performed by: EMERGENCY MEDICINE

## 2023-03-16 NOTE — PROGRESS NOTES
Ronel Ryan is 42 year old  female who presents for a billable video visit today.    How would you like to obtain your AVS? MyChart  If dropped from the video visit, the video invitation should be resent by: Text to cell phone: [phone]  Will anyone else be joining your video visit? No      Video Start Time: 10:30 AM    Are there any specific questions or needs that you would like addressed at your visit today? Pt stopped eating bread, sugar, and cereals. She has been just eating fruits and lettuce wraps, but now has been feeling very lethargic and has low energy    Provider Notes:   Bariatric Clinic Follow-Up Visit:    Ronel Ryan is a 42 year old  female with Body mass index is 45.66 kg/m .  presenting here today for follow-up on non-surgical efforts for weight loss. Original Intake visit occurred on 7/13/19 with a weight of 345 lbs and BMI of 59.2.  Along with diet and behavior changes, she has been using various combinations through the years of Trulicity/naltrexone and a previous use of topiramate from her pain clinic which can also assist her weight loss goals.  See her intake visit notes for details on identified contributors to weight gain in the past. Chart review shows Dietician calculated RMR of 1892kcal/day and protein intake goal of 70-90g/day.  Her Trulicity was stopped in hospital May of 2022 during hip surgery and she had weight gain of 30 lbs over the summer due to increased cravings off medication. We transitioned to Semaglutide therapy this summer of 2022 but then supplies ran out due to shortages in supply and Victoza was started in November 2022 (11/7/22) with plans to transition back in 2023 when supplies of Ozempic are more available. she is now on 0.5mg/week Ozempic again and weight is coming down nicely with 12 lb reduction in weight from November 2022.  Weight:   Wt Readings from Last 5 Encounters:   03/16/23 120.7 kg (266 lb)   02/01/23 126.1 kg (278 lb)   12/29/22 128.4 kg  (283 lb)   11/10/22 126.1 kg (278 lb)   08/03/22 128.4 kg (283 lb)    pounds      Comorbidities:  Patient Active Problem List   Diagnosis     Binge eating     Eating disorder     Morbid obesity (H)     Multiple-type hyperlipidemia     Severe episode of recurrent major depressive disorder (H)     Arthralgia of hip     Hypertension     Trochanteric bursitis     History of urinary anomaly     Borderline personality disorder (H)     Attention deficit disorder     Asthma     Suicidal ideation     ACP (advance care planning)     Adjustment disorder with depressed mood     Anxiety     Moderate mixed bipolar I disorder (H)     Bipolar II disorder, most recent episode hypomanic (H)     Borderline high cholesterol     Care plan discussed with patient     Disorder of pelvis     Gait difficulty     Hx of diabetes mellitus     Hx of eating disorder     Morbid obesity with BMI of 50.0-59.9, adult (H)     Osteoarthritis     Pain of both hip joints     Relationship problems     Secondary amenorrhea     Soft tissue lesion of shoulder region     Vertigo     Greater trochanteric bursitis of both hips     Hip dysplasia     Left hip pain     Osteoarthritis of hip     Primary osteoarthritis of left hip     ADD (attention deficit disorder)     H/O urinary frequency     History of anxiety disorder     Hyperlipidemia     Mixed hyperlipidemia     Recurrent major depressive disorder (H)     Type 2 diabetes mellitus (H)     Primary osteoarthritis of both hips     Degenerative joint disease (DJD) of hip     Status post total hip replacement, left     Bipolar 1 disorder (H)     Constipation     Gastroesophageal reflux disease with esophagitis without hemorrhage     Mixed incontinence     Primary localized osteoarthritis of pelvic region and thigh     Skin sensation disturbance       Current Outpatient Medications:      ACETAMINOPHEN EXTRA STRENGTH 500 MG tablet, , Disp: , Rfl:      albuterol (PROAIR HFA/PROVENTIL HFA/VENTOLIN HFA) 108 (90 Base)  MCG/ACT inhaler, Inhale 2 puffs into the lungs every 6 hours as needed for shortness of breath, wheezing or cough, Disp: 18 g, Rfl: 0     albuterol (PROAIR HFA/PROVENTIL HFA/VENTOLIN HFA) 108 (90 BASE) MCG/ACT Inhaler, Inhale 1-2 puffs into the lungs every 4 hours as needed , Disp: , Rfl:      atorvastatin (LIPITOR) 40 MG tablet, Take 1 tablet by mouth every evening , Disp: , Rfl:      baclofen (LIORESAL) 10 MG tablet, TAKE 1 TABLET BY MOUTH TWICE A DAY AS NEEDED FOR MUSCLE SPASM, Disp: , Rfl:      blood glucose (NO BRAND SPECIFIED) lancets standard, Use to test blood sugar 2 times daily or as directed., Disp: 100 each, Rfl: 11     blood glucose monitoring (NO BRAND SPECIFIED) meter device kit, Use to test blood sugar 2 times daily or as directed., Disp: 1 kit, Rfl: 0     blood glucose monitoring (NO BRAND SPECIFIED) test strip, Use to test blood sugars 2 times daily or as directed, Disp: 100 strip, Rfl: 3     buPROPion (WELLBUTRIN XL) 300 MG 24 hr tablet, Take 300 mg by mouth daily , Disp: , Rfl:      cholecalciferol (VITAMIN D3) 125 mcg (5000 units) capsule, , Disp: , Rfl:      Cholecalciferol 125 MCG (5000 UT) TABS, Take 125 mcg by mouth daily , Disp: , Rfl:      EPINEPHrine (ANY BX GENERIC EQUIV) 0.3 MG/0.3ML injection 2-pack, Inject 0.3 mg into the muscle, Disp: , Rfl:      EPINEPHrine (EPIPEN/ADRENACLICK/OR ANY BX GENERIC EQUIV) 0.3 MG/0.3ML injection 2-pack, Inject 0.3 mLs (0.3 mg) into the muscle once as needed for anaphylaxis, Disp: 0.6 mL, Rfl: 0     hydrOXYzine (ATARAX) 25 MG tablet, Take 1 tablet (25 mg) by mouth every 6 hours as needed for itching or anxiety (with pain, moderate pain), Disp: 30 tablet, Rfl: 0     ibuprofen (ADVIL/MOTRIN) 600 MG tablet, Take 1 tablet (600 mg) by mouth every 6 hours as needed (mild), Disp: , Rfl: 0     ibuprofen (ADVIL/MOTRIN) 800 MG tablet, Take 800 mg by mouth, Disp: , Rfl:      insulin pen needle (31G X 6 MM) 31G X 6 MM miscellaneous, Use new pen needle for each  autoinjection., Disp: 90 each, Rfl: 1     levonorgestrel (LILETTA, 52 MG,) 19.5 MCG/DAY IUD, 1 each by Intrauterine route once, Disp: , Rfl:      meclizine (ANTIVERT) 25 MG tablet, Take 25 mg by mouth 4 times daily as needed for dizziness, Disp: , Rfl:      risperiDONE (RISPERDAL) 0.5 MG tablet, , Disp: , Rfl:      risperiDONE (RISPERDAL) 2 MG tablet, , Disp: , Rfl:      semaglutide (OZEMPIC) 2 MG/1.5ML SOPN pen, 0.5mg injected every 7 days (once weekly)., Disp: 1.5 mL, Rfl: 2     SENEXON-S 8.6-50 MG tablet, , Disp: , Rfl:      tolterodine ER (DETROL LA) 4 MG 24 hr capsule, Take 4 mg by mouth daily, Disp: , Rfl:      topiramate (TOPAMAX) 100 MG tablet, Take 100 mg by mouth 2 times daily , Disp: , Rfl:      traZODone (DESYREL) 150 MG tablet, Take 150 mg by mouth At Bedtime, Disp: , Rfl:       Interim: Since our last visit, she has continued losing weight through the Winter, is now back on Ozempic and lately has been a bit overrestricting her complex carb intake with likely some fatigue issues resulting. We reviewed a more balanced   Not using [facility] recently due to some lethargy, lower energy. Walking w/ her PCA around the building lately in the meanwhile. Her ozempic has been well tolerated at the 0.5mg/week dose. No side effects.     Plan:   1.  Diet: aiming for a good balance of higher fiber carbohydrates with your protein/healthy fat intake should help your energy some. With your intake goal of 1500-1600kcal/day and protein intake goal of 80-90grams per day, complex carbohydrates to provide about half your calorie intake (from fruits/grains/vegetables) while avoiding simple sugars/high sugar foods should continue to nourish your weight loss well this year.  This would allow about 45-60grams of carbohydrate per meal in complex carb fashion given your good tendency to label read/track.   2. Exercise: continue walkingand I think you'll feel more energy for workouts at the [facility] again with some carbohydrate back in your  life  3. Medication: Ozempic 0.5mg/week well tolerated.You can recheck labs anytime in May to see how levels are looking for A1c, kidney/liver function and vitamin D levels.  4. For vitamin D supplementation, you can reduce to 3-4 days weekly now if using the 5000 international unit(s) dose (125mcg)   5. Goals: approaching an 80 lb reduction in weight from 2019 starting weight of 345 lbs a 23% total body weight reduction thus far which is phenomenal for your health.    6. Consider reward/special outing that isn't food/drink based if getting weight under 250 lbs and maintaining it for 3 months.      We discussed [facility] Bariat[facility] Basics including:  -eating 3 meals daily  -reviewed metabolic needs for weight loss based on Resting Metabolic Rate  -protein goals supportive of healthy weight loss  -avoiding/limiting calorie containing beverages  -We discussed the importance of restorative sleep and stress management in maintaining a healthy weight.  -We discussed the National Weight Control Registry healthy weight maintenance strategies and ways to optimize metabolism.  -We discussed the importance of physical activity including cardiovascular and strength training in maintaining a healthier weight and explored viable options.      Most recent labs:  Lab Results   Component Value Date    WBC 5.5 02/01/2023    HGB 13.9 02/01/2023    HCT 42.1 02/01/2023    MCV 93 02/01/2023     02/01/2023     Lab Results   Component Value Date    CHOL 102 11/09/2022     Lab Results   Component Value Date    HDL 49 (L) 11/09/2022     No components found for: LDLCALC  Lab Results   Component Value Date    TRIG 110 11/09/2022     No results found for: CHOLHDL  Lab Results   Component Value Date    ALT 34 02/01/2023    AST 28 02/01/2023    ALKPHOS 56 02/01/2023     No results found for: HGBA1C  Lab Results   Component Value Date    B12 368 11/09/2022     No components found for: VITDT1  Lab Results   Component Value Date    KOFFI 79  "11/09/2022     Lab Results   Component Value Date    PTHI 77 08/23/2019     No results found for: ZN  Lab Results   Component Value Date    VIB1WB 108 08/23/2019     Lab Results   Component Value Date    TSH 1.14 05/21/2021     No results found for: TEST    DIETARY HISTORY  Tracking technique: yes  Positive Changes Since Last Visit: continued good reduction.  Struggling With: energy/motvation for exercise and some aversion to complex carbs.     Getting Adequate Protein: yes  Sleep schedule: n/a.      PHYSICAL ACTIVITY PATTERNS:  Cardiovascular: walking  Strength Training: limited this winter, has [employer] membership    REVIEW OF SYSTEMS  .  PHYSICAL EXAM:  Vitals: Ht 1.626 m (5' 4\")   Wt 120.7 kg (266 lb)   BMI 45.66 kg/m    Weight:   Wt Readings from Last 3 Encounters:   03/16/23 120.7 kg (266 lb)   02/01/23 126.1 kg (278 lb)   12/29/22 128.4 kg (283 lb)         GEN: Pleasant, well groomed, in no acute distress  HEENT:  Only shows her eyes on video, normal voice .  Normal cognition/recall    Interim study results: orders placed today for May.      30 minutes spent on the date of the encounter doing chart review, history and exam, documentation and further activities per the note   Kerwin Dunn MD  [facility]  10:34 AM  3/16/2023      Video-Visit Details    Type of service:  Video Visit    Platform used for Video Visit: Whisper    Video End Time (time video stopped): 11:05 AM    Originating Location (pt. Location): Home        Distant Location (provider location):  On-site    Distant Location (provider location):  [facility] [place]     "

## 2023-03-16 NOTE — PATIENT INSTRUCTIONS
Plan:   1.  Diet: aiming for a good balance of higher fiber carbohydrates with your protein/healthy fat intake should help your energy some. With your intake goal of 1500-1600kcal/day and protein intake goal of 80-90grams per day, complex carbohydrates to provide about half your calorie intake (from fruits/grains/vegetables) while avoiding simple sugars/high sugar foods should continue to nourish your weight loss well this year.  This would allow about 45-60grams of carbohydrate per meal in complex carb fashion given your good tendency to label read/track.   2. Exercise: continue walkingand I think you'll feel more energy for workouts at the [facility] again with some carbohydrate back in your life  3. Medication: Ozempic 0.5mg/week well tolerated.You can recheck labs anytime in May to see how levels are looking for A1c, kidney/liver function and vitamin D levels.  4. For vitamin D supplementation, you can reduce to 3-4 days weekly now if using the 5000 international unit(s) dose (125mcg)   5. Goals: approaching an 80 lb reduction in weight from 2019 starting weight of 345 lbs a 23% total body weight reduction thus far which is phenomenal for your health.    6. Consider reward/special outing that isn't food/drink based if getting weight under 250 lbs and maintaining it for 3 months.        Example Meal Plan for a 7067-4420 Calorie Diet:    In order to fuel your weight loss properly and avoid hunger-induced overeating later in the day, for your height and weight, you will enjoy the most success by following the diet below or similar with adjustments based on your particular tastes and preferences.  Exercise may influence speed, amount of weight loss further.     I recommend getting into a meal routine and keeping it similar day to day in the beginning so you don t have to think too hard about what you re going to make/eat.  Keep snacks healthy, ideally containing protein and some vegetables.  Non-processed food is  preferable to packaged items.  Eat at least a few crunchy green vegetables if having a snack, which should be 2-3 hours after your mealtimes(prepare these ahead of time for ease of use).  Drink 64 oz -80 oz of water daily for most, some of you will need more and we'll discuss it at your visit if that is the case.      When changing our diet,  we can often mistake thirst for hunger or just have some distracted eating habits that we need to break free from ('bored/mindless eating', screen time,work, driving,etc).  A glass of water and reconsideration of our hunger is often all that is needed.  Having the urge is not the problem, but watching it pass by without acting on it is the goal.    If you re having hunger problems, add a protein drink/snack to your morning hours or afternoon snack with at least 20grams of protein and not too much sugar (under 10g).  A carton of higher protein/low sugar yogurt can work as well.  If the urge to snack is overwhelming and not satiated, try going for a 10 minute walk/exercise, come home and drink a glass of water and if still hungry, have a  calorie snack (handful of raw/sprouted nuts, veggies and string cheese, protein bar, etc).  Savor it.    It is better to have a large breakfast, a moderate lunch and a smaller dinner to fuel your day.  People lose 10-15% more weight during their weight loss season with this strategy. Optimizing your protein intake at each meal will further keep you more satisfied while eating less food overall.  Getting exercise in early has also been shown to offer the best results (before breakfast ideally but anytime is the right time to exercise if that is not an option for you).    To make sure you re getting adequate vitamins and minerals during weight loss, I recommend one complete multivitamin a day of your choice.  Consider a probiotic and taking some vitamin D 2000 IU daily.    Let supper be your last meal of the day and ideally try to have at  least 12 hours between supper and breakfast the next day to tap into some beneficial overnight fasting dynamics.  Midnight snacks need to go away. Water in the evening is fine, unsweetened, non caffeinated herbal tea is helpful as well.  Consolidating your meals within a 8-12 hour period of your day will help tap into these additional metabolic benefits and tends to keep your appetite up for breakfast, further helping to stay on track.  For most of my patients, I don't recommend an intermittent fasting style diet (many find it hard to fit in their lifestyle) but an overnight fast is very doable for most patients and helps regulate our hunger drives a little better.  This makes it very important to nail good intake at all three meals to feel satisfied/energized and still lose weight.      If evening snacking desires are high, consider a glass of fiber supplement for some additional fullness (metamucil or similar). Most of us don't get the 25-30 grams per day of fiber that promotes good gut health/satiety.  Benefiber, metamucil, citrucel are reasonable/affordable options for most people.  Inulin, chicory, psyllium husk are reasonable options but start slow and low in the dose to avoid gas/bloating until your gut gets acclimated (ramping up to 5-10 grams per day of supplemental fiber after 3-4 weeks if needed).      Example Meal Plan:  Breakfast: 450-475 Calories  1 egg cooked on low in olive oil:   calories.  5oz Greek Yogurt (Fage plain classic: ~150 randi)  Handful of Berries of your choice (about a calorie per berry or 20-40cal per handful)    cup(cooked) of  old fashioned oatmeal or 1/2 cup(cooked) steel cut oats. (150 randi)  Sprinkle amount of brown sugar and a pat of butter. (40 randi)  Glass of  Water  Black coffee or unsweetened Tea (0calories).      2-3 hours Later Snack: (195 calories).  Glass of water  One string Cheese (80 calories) or 4 oz creamed cottage cheese (115 calories) with  Crunchy Celery sticks  (less than 10 calories per large stalk) 2 stalks. (20 calories)    of a  Large Banana or   of a Large Apple (60 calories):  eat second half at lunch or afternoon snack.     Lunch:300 -350 calories   Chicken Breast  (baked/broiled/roasted/grilled)  4-6 oz.  (125-180 randi), BBQ sauce/hot sauce/mustard/seasoning is free. Just use a reasonable amount. Or a can of tuna with 1 tablespoon mayonnaise.  Salad: lettuce, any other veggies (cucumbers, green peppers/celery you like and a small drizzle of dressing to just flavor.  Go as big on the veggies as you like,  as they are practically calorie free.   A whole, 8 inch cucumber is 45 calories, a whole green pepper is 23 calories, a stalk of celery is 9 calories.  [place] Dressing is 60 calories per tablespoon..so moderate your desired dressing or do a drizzle of olive oil and splash of balsamic vinegar on top,  Total calories unlikely to be over 150 even with dressing.  Glass of Water.    Option for lunch is meal replacement protein drink/smoothie.  Need at least 20 grams of protein and eat the rest of your apple/banana from the morning snack.      Afternoon Snack: 150-200 calories   Cheese Stick or cottage cheese again  and a fresh fruit OR  Granola Bar (protein Bar acceptable if under 200 calories OR  Homemade smoothies:  8oz skim milk,  a handful of berries (fresh or frozen and a serving of protein powder such as BiPro or Taniya sWhey for example.  If you don't like dairy, make with 8oz water, one small banana, handful of berries and the protein powder, add any veggies you want as well:  roughly 200 calories.   Glass of Water    Dinner: 325 calories  4oz of fresh, Atlantic salmon.  Broiled (salt/pepper/dill) for about 8-8.5 minutes (200calories) or  4oz filet mignon steak or sirloin steak  Salad or vegetable sautéed lightly in olive oil or   Broccoli 1.5  cups chopped and steamed  or micro-waved in a little water (75 calories)  Glass of Water,    Cup of herbal tea  "(unsweetened, caffeine free)      Herbs and seasonings are encouraged to flavor your foods/vegetables.  Make your food delicious.      Tips for Success:  1.  Prepare proteins ahead of time (broil chicken breasts in bulk so you can grab and go), steel cut oats/lentils can be stored in casserole dish/bowl in the fridge for quick scoop in the morning and rewarm in microwave, make use of crock pot recipes (watch salt content).  Making meals that cover 3-4 future meals is an easy way to stay on track.  2.  Drink a 8-12 oz glass of water every 2-3 hours when awake.  We often mistake hunger for thirst, especially when losing weight.  3. Remember your Reward and Motivation when things get hard.  4.  Weigh yourself every morning and record, you'll stay on track better and learn how our biorhythms, diet and elimination patterns show up on the scale. Don't worry about 1 or 2 day patterns, but when on track you'll notice good trend downward of weight over 3-4 day segments.  Plateaus tend to resolve after 4-8 days in most cases if you stay consistent with your plan.  These are natural and part of weight loss, even if you're perfect with your plan execution.  5. Call if problems/concerns.  [employer] is a great tool to stay in touch and provide weekly outside accountability. Check in with questions or if you want to brag.  6.  Find a handful of meals/foods that keep you on track and feeling good and get into a routine that is sustainable for you.  It's OK to have a routine that works for you.  7.  Consider taking a complete multivitamin just to make sure all micronutrients are adequate during weight loss.  8. If losing hair/brittle nails it usually means you are not taking enough protein.  Minimum goal is 60 grams daily of protein for smaller women, 80 grams a day for men. Consider taking Biotin as supplement or a \"Hair and Nail\" multivitamin.    LEAN PROTEIN SOURCES  Getting 20-30 grams of protein, 3 meals daily, is appropriate for " most people, some need more but more than about 40 grams per meal is not useful.  General rule is drinking one ounce of water per gram of protein eaten over the course of the day:  70 grams of protein each day, drink 70 oz of water.  Protein Source Portion Calories Grams of Protein                           Nonfat, plain Greek yogurt    (10 grams sugar or less) 3/4 cup (6 oz)  12-17   Light Yogurt (10 grams sugar or less) 3/4 cup (6 oz)  6-8   Protein Shake 1 shake 110-180 15-30   Skim/1% Milk or lactose-free milk 1 cup ( 8 oz)  8   Plain or light, flavored soymilk 1 cup  7-8   Plain or light, hemp milk 1 cup 110 6   Fat Free or 1% Cottage Cheese 1/2 cup 90 15   Part skim ricotta cheese 1/2 cup 100 14   Part skim or reduced fat cheese slices 1 ounce 65-80 8     Mozzarella String Cheese 1 80 8   Canned tuna, chicken, crab or salmon  (canned in water)  1/2 cup 100 15-20   White fish (broiled, grilled, baked) 3 ounces 100 21   Lapaz/Tuna (broiled, grilled, baked) 3 ounces 150-180 21   Shrimp, Scallops, Lobster, Crab 3 ounces 100 21   Pork loin, Pork Tenderloin 3 ounces 150 21   Boneless, skinless chicken /turkey breast                          (broiled, grilled, baked) 3 ounces 120 21   [place], [place], [place], and Venison 3 ounces 120 21   Lean cuts of red meat and pork (sirloin,   round, tenderloin, flank, ground 93%-96%) 3 ounces 170 21   Lean or Extra Lean Ground Turkey 1/2 cup 150 20   90-95% Lean [place] Burger 1 mckinley 140-180 21   Low-fat casserole with lean meat 3/4 cup 200 17   Luncheon Meats                                                        (turkey, lean ham, roast beef, chicken) 3 ounces 100 21   Egg (boiled, poached, scrambled) 1 Egg 60 7   Egg Substitute 1/2 cup 70 10   Nuts (limit to 1 serving per day)  3 Tbsp. 150 7   Nut [place] (peanut, almond)  Limit to 1 serving or less daily 1 Tbsp. 90 4   Soy Burger (varies) 1  15   Garbanzo, Black, [place] Beans 1/2 cup 110 7   Refried  Beans 1/2 cup 100 7   Kidney and Lima beans 1/2 cup 110 7   Tempeh 3 oz 175 18   Vegan crumbles 1/2 cup 100 14   Tofu 1/2 cup 110 14   Chili (beans and extra lean beef or turkey) 1 cup 200 23   Lentil Stew/Soup 1 cup 150 12   Black Bean Soup 1 cup 175 12

## 2023-03-16 NOTE — LETTER
3/16/2023         RE: Ronel Ryan  [address]  [facility] [address]        Dear Colleague,    Thank you for referring your patient, Ronel Ryan, to the [facility] [place]. Please see a copy of my visit note below.    Ronel Ryan is 42 year old  female who presents for a billable video visit today.    How would you like to obtain your AVS? MyChart  If dropped from the video visit, the video invitation should be resent by: Text to cell phone: [phone]  Will anyone else be joining your video visit? No      Video Start Time: 10:30 AM    Are there any specific questions or needs that you would like addressed at your visit today? Pt stopped eating bread, sugar, and cereals. She has been just eating fruits and lettuce wraps, but now has been feeling very lethargic and has low energy    Provider Notes:   Bariatric Clinic Follow-Up Visit:    Ronel Ryan is a 42 year old  female with Body mass index is 45.66 kg/m .  presenting here today for follow-up on non-surgical efforts for weight loss. Original Intake visit occurred on 7/13/19 with a weight of 345 lbs and BMI of 59.2.  Along with diet and behavior changes, she has been using various combinations through the years of Trulicity/naltrexone and a previous use of topiramate from her pain clinic which can also assist her weight loss goals.  See her intake visit notes for details on identified contributors to weight gain in the past. Chart review shows Dietician calculated RMR of 1892kcal/day and protein intake goal of 70-90g/day.  Her Trulicity was stopped in hospital May of 2022 during hip surgery and she had weight gain of 30 lbs over the summer due to increased cravings off medication. We transitioned to Semaglutide therapy this summer of 2022 but then supplies ran out due to shortages in supply and Victoza was started in November 2022 (11/7/22) with plans to transition back in 2023 when  supplies of Ozempic are more available. she is now on 0.5mg/week Ozempic again and weight is coming down nicely with 12 lb reduction in weight from November 2022.  Weight:   Wt Readings from Last 5 Encounters:   03/16/23 120.7 kg (266 lb)   02/01/23 126.1 kg (278 lb)   12/29/22 128.4 kg (283 lb)   11/10/22 126.1 kg (278 lb)   08/03/22 128.4 kg (283 lb)    pounds      Comorbidities:  Patient Active Problem List   Diagnosis     Binge eating     Eating disorder     Morbid obesity (H)     Multiple-type hyperlipidemia     Severe episode of recurrent major depressive disorder (H)     Arthralgia of hip     Hypertension     Trochanteric bursitis     History of urinary anomaly     Borderline personality disorder (H)     Attention deficit disorder     Asthma     Suicidal ideation     ACP (advance care planning)     Adjustment disorder with depressed mood     Anxiety     Moderate mixed bipolar I disorder (H)     Bipolar II disorder, most recent episode hypomanic (H)     Borderline high cholesterol     Care plan discussed with patient     Disorder of pelvis     Gait difficulty     Hx of diabetes mellitus     Hx of eating disorder     Morbid obesity with BMI of 50.0-59.9, adult (H)     Osteoarthritis     Pain of both hip joints     Relationship problems     Secondary amenorrhea     Soft tissue lesion of shoulder region     Vertigo     Greater trochanteric bursitis of both hips     Hip dysplasia     Left hip pain     Osteoarthritis of hip     Primary osteoarthritis of left hip     ADD (attention deficit disorder)     H/O urinary frequency     History of anxiety disorder     Hyperlipidemia     Mixed hyperlipidemia     Recurrent major depressive disorder (H)     Type 2 diabetes mellitus (H)     Primary osteoarthritis of both hips     Degenerative joint disease (DJD) of hip     Status post total hip replacement, left     Bipolar 1 disorder (H)     Constipation     Gastroesophageal reflux disease with esophagitis without hemorrhage      Mixed incontinence     Primary localized osteoarthritis of pelvic region and thigh     Skin sensation disturbance       Current Outpatient Medications:      ACETAMINOPHEN EXTRA STRENGTH 500 MG tablet, , Disp: , Rfl:      albuterol (PROAIR HFA/PROVENTIL HFA/VENTOLIN HFA) 108 (90 Base) MCG/ACT inhaler, Inhale 2 puffs into the lungs every 6 hours as needed for shortness of breath, wheezing or cough, Disp: 18 g, Rfl: 0     albuterol (PROAIR HFA/PROVENTIL HFA/VENTOLIN HFA) 108 (90 BASE) MCG/ACT Inhaler, Inhale 1-2 puffs into the lungs every 4 hours as needed , Disp: , Rfl:      atorvastatin (LIPITOR) 40 MG tablet, Take 1 tablet by mouth every evening , Disp: , Rfl:      baclofen (LIORESAL) 10 MG tablet, TAKE 1 TABLET BY MOUTH TWICE A DAY AS NEEDED FOR MUSCLE SPASM, Disp: , Rfl:      blood glucose (NO BRAND SPECIFIED) lancets standard, Use to test blood sugar 2 times daily or as directed., Disp: 100 each, Rfl: 11     blood glucose monitoring (NO BRAND SPECIFIED) meter device kit, Use to test blood sugar 2 times daily or as directed., Disp: 1 kit, Rfl: 0     blood glucose monitoring (NO BRAND SPECIFIED) test strip, Use to test blood sugars 2 times daily or as directed, Disp: 100 strip, Rfl: 3     buPROPion (WELLBUTRIN XL) 300 MG 24 hr tablet, Take 300 mg by mouth daily , Disp: , Rfl:      cholecalciferol (VITAMIN D3) 125 mcg (5000 units) capsule, , Disp: , Rfl:      Cholecalciferol 125 MCG (5000 UT) TABS, Take 125 mcg by mouth daily , Disp: , Rfl:      EPINEPHrine (ANY BX GENERIC EQUIV) 0.3 MG/0.3ML injection 2-pack, Inject 0.3 mg into the muscle, Disp: , Rfl:      EPINEPHrine (EPIPEN/ADRENACLICK/OR ANY BX GENERIC EQUIV) 0.3 MG/0.3ML injection 2-pack, Inject 0.3 mLs (0.3 mg) into the muscle once as needed for anaphylaxis, Disp: 0.6 mL, Rfl: 0     hydrOXYzine (ATARAX) 25 MG tablet, Take 1 tablet (25 mg) by mouth every 6 hours as needed for itching or anxiety (with pain, moderate pain), Disp: 30 tablet, Rfl: 0      ibuprofen (ADVIL/MOTRIN) 600 MG tablet, Take 1 tablet (600 mg) by mouth every 6 hours as needed (mild), Disp: , Rfl: 0     ibuprofen (ADVIL/MOTRIN) 800 MG tablet, Take 800 mg by mouth, Disp: , Rfl:      insulin pen needle (31G X 6 MM) 31G X 6 MM miscellaneous, Use new pen needle for each autoinjection., Disp: 90 each, Rfl: 1     levonorgestrel (LILETTA, 52 MG,) 19.5 MCG/DAY IUD, 1 each by Intrauterine route once, Disp: , Rfl:      meclizine (ANTIVERT) 25 MG tablet, Take 25 mg by mouth 4 times daily as needed for dizziness, Disp: , Rfl:      risperiDONE (RISPERDAL) 0.5 MG tablet, , Disp: , Rfl:      risperiDONE (RISPERDAL) 2 MG tablet, , Disp: , Rfl:      semaglutide (OZEMPIC) 2 MG/1.5ML SOPN pen, 0.5mg injected every 7 days (once weekly)., Disp: 1.5 mL, Rfl: 2     SENEXON-S 8.6-50 MG tablet, , Disp: , Rfl:      tolterodine ER (DETROL LA) 4 MG 24 hr capsule, Take 4 mg by mouth daily, Disp: , Rfl:      topiramate (TOPAMAX) 100 MG tablet, Take 100 mg by mouth 2 times daily , Disp: , Rfl:      traZODone (DESYREL) 150 MG tablet, Take 150 mg by mouth At Bedtime, Disp: , Rfl:       Interim: Since our last visit, she has continued losing weight through the Winter, is now back on Ozempic and lately has been a bit overrestricting her complex carb intake with likely some fatigue issues resulting. We reviewed a more balanced   Not using [employer] recently due to some lethargy, lower energy. Walking w/ her PCA around the building lately in the meanwhile. Her ozempic has been well tolerated at the 0.5mg/week dose. No side effects.     Plan:   1.  Diet: aiming for a good balance of higher fiber carbohydrates with your protein/healthy fat intake should help your energy some. With your intake goal of 1500-1600kcal/day and protein intake goal of 80-90grams per day, complex carbohydrates to provide about half your calorie intake (from fruits/grains/vegetables) while avoiding simple sugars/high sugar foods should continue to nourish your  weight loss well this year.  This would allow about 45-60grams of carbohydrate per meal in complex carb fashion given your good tendency to label read/track.   2. Exercise: continue walkingand I think you'll feel more energy for workouts at the [facility] again with some carbohydrate back in your life  3. Medication: Ozempic 0.5mg/week well tolerated.You can recheck labs anytime in May to see how levels are looking for A1c, kidney/liver function and vitamin D levels.  4. For vitamin D supplementation, you can reduce to 3-4 days weekly now if using the 5000 international unit(s) dose (125mcg)   5. Goals: approaching an 80 lb reduction in weight from 2019 starting weight of 345 lbs a 23% total body weight reduction thus far which is phenomenal for your health.    6. Consider reward/special outing that isn't food/drink based if getting weight under 250 lbs and maintaining it for 3 months.      We discussed [facility] Bariatric Basics including:  -eating 3 meals daily  -reviewed metabolic needs for weight loss based on Resting Metabolic Rate  -protein goals supportive of healthy weight loss  -avoiding/limiting calorie containing beverages  -We discussed the importance of restorative sleep and stress management in maintaining a healthy weight.  -We discussed the National Weight Control Registry healthy weight maintenance strategies and ways to optimize metabolism.  -We discussed the importance of physical activity including cardiovascular and strength training in maintaining a healthier weight and explored viable options.      Most recent labs:  Lab Results   Component Value Date    WBC 5.5 02/01/2023    HGB 13.9 02/01/2023    HCT 42.1 02/01/2023    MCV 93 02/01/2023     02/01/2023     Lab Results   Component Value Date    CHOL 102 11/09/2022     Lab Results   Component Value Date    HDL 49 (L) 11/09/2022     No components found for: LDLCALC  Lab Results   Component Value Date    TRIG 110 11/09/2022     No results found  "for: CHOLHDL  Lab Results   Component Value Date    ALT 34 02/01/2023    AST 28 02/01/2023    ALKPHOS 56 02/01/2023     No results found for: HGBA1C  Lab Results   Component Value Date    B12 368 11/09/2022     No components found for: VITDT1  Lab Results   Component Value Date    KOFFI 79 11/09/2022     Lab Results   Component Value Date    PTHI 77 08/23/2019     No results found for: ZN  Lab Results   Component Value Date    VIB1WB 108 08/23/2019     Lab Results   Component Value Date    TSH 1.14 05/21/2021     No results found for: TEST    DIETARY HISTORY  Tracking technique: yes  Positive Changes Since Last Visit: continued good reduction.  Struggling With: energy/motvation for exercise and some aversion to complex carbs.     Getting Adequate Protein: yes  Sleep schedule: n/a.      PHYSICAL ACTIVITY PATTERNS:  Cardiovascular: walking  Strength Training: limited this winter, has [employer] membership    REVIEW OF SYSTEMS  .  PHYSICAL EXAM:  Vitals: Ht 1.626 m (5' 4\")   Wt 120.7 kg (266 lb)   BMI 45.66 kg/m    Weight:   Wt Readings from Last 3 Encounters:   03/16/23 120.7 kg (266 lb)   02/01/23 126.1 kg (278 lb)   12/29/22 128.4 kg (283 lb)         GEN: Pleasant, well groomed, in no acute distress  HEENT:  Only shows her eyes on video, normal voice .  Normal cognition/recall    Interim study results: orders placed today for May.      30 minutes spent on the date of the encounter doing chart review, history and exam, documentation and further activities per the note   Kerwin Dunn MD  [facility]  10:34 AM  3/16/2023      Video-Visit Details    Type of service:  Video Visit    Platform used for Video Visit: Akeneo    Video End Time (time video stopped): 11:05 AM    Originating Location (pt. Location): Home        Distant Location (provider location):  On-site    Distant Location (provider location):  [facility] [place]         Again, thank you for " allowing me to participate in the care of your patient.        Sincerely,        Kerwin Dunn MD

## 2023-06-02 ENCOUNTER — HEALTH MAINTENANCE LETTER (OUTPATIENT)
Age: 43
End: 2023-06-02

## 2023-06-22 ENCOUNTER — VIRTUAL VISIT (OUTPATIENT)
Dept: SURGERY | Facility: CLINIC | Age: 43
End: 2023-06-22
Payer: COMMERCIAL

## 2023-06-22 VITALS — HEIGHT: 64 IN | BODY MASS INDEX: 43.71 KG/M2 | WEIGHT: 256 LBS

## 2023-06-22 DIAGNOSIS — E88.810 METABOLIC SYNDROME X: ICD-10-CM

## 2023-06-22 DIAGNOSIS — T50.905A DRUG-INDUCED WEIGHT GAIN: ICD-10-CM

## 2023-06-22 DIAGNOSIS — E11.9 TYPE 2 DIABETES MELLITUS WITHOUT COMPLICATION, WITHOUT LONG-TERM CURRENT USE OF INSULIN (H): ICD-10-CM

## 2023-06-22 DIAGNOSIS — E66.813 CLASS 3 SEVERE OBESITY DUE TO EXCESS CALORIES WITH SERIOUS COMORBIDITY AND BODY MASS INDEX (BMI) OF 45.0 TO 49.9 IN ADULT (H): Primary | ICD-10-CM

## 2023-06-22 DIAGNOSIS — R63.5 DRUG-INDUCED WEIGHT GAIN: ICD-10-CM

## 2023-06-22 DIAGNOSIS — E66.01 CLASS 3 SEVERE OBESITY DUE TO EXCESS CALORIES WITH SERIOUS COMORBIDITY AND BODY MASS INDEX (BMI) OF 45.0 TO 49.9 IN ADULT (H): Primary | ICD-10-CM

## 2023-06-22 PROCEDURE — 99214 OFFICE O/P EST MOD 30 MIN: CPT | Mod: VID | Performed by: EMERGENCY MEDICINE

## 2023-06-22 RX ORDER — RISPERIDONE 3 MG/1
TABLET ORAL
COMMUNITY
Start: 2023-06-14

## 2023-06-22 RX ORDER — SENNOSIDES 8.6 MG
TABLET ORAL
COMMUNITY
Start: 2023-06-14

## 2023-06-22 RX ORDER — HYDROXYZINE HYDROCHLORIDE 50 MG/1
TABLET, FILM COATED ORAL
COMMUNITY
Start: 2023-06-14 | End: 2024-09-11

## 2023-06-22 NOTE — LETTER
6/22/2023         RE: Ronel Ryan  [address]  [facility] [address]        Dear Colleague,    Thank you for referring your patient, Ronel Ryan, to the [facility] [place]. Please see a copy of my visit note below.    Ronel Ryan is 42 year old  female who presents for a billable video visit today.    How would you like to obtain your AVS? MyChart  If dropped from the video visit, the video invitation should be resent by: Text to cell phone: [phone]  Will anyone else be joining your video visit? No      Video Start Time: 10:56 AM    Are there any specific questions or needs that you would like addressed at your visit today? Question about apple cider vinegar gummies     Provider Notes:   Bariatric Clinic Follow-Up Visit:    Ronel Ryan is a 42 year old  female with Body mass index is 43.94 kg/m .  presenting here today for follow-up on non-surgical efforts for weight loss. Original Intake visit occurred on 7/13/19 with a weight of 345 lbs and BMI of 59.2.  Along with diet and behavior changes, she has been using various combinations through the years of Trulicity/naltrexone and a previous use of topiramate from her pain clinic which can also assist her weight loss goals.  See her intake visit notes for details on identified contributors to weight gain in the past. Chart review shows Dietician calculated RMR of 1892kcal/day and protein intake goal of 70-90g/day.  Her Trulicity was stopped in hospital May of 2022 during hip surgery and she had weight gain of 30 lbs over the summer due to increased cravings off medication. We transitioned to Semaglutide therapy this summer of 2022 but then supplies ran out due to shortages in supply and Victoza was started in November 2022 (11/7/22) with plans to transition back in 2023 when supplies of Ozempic are more available. she is now on 0.5mg/week Ozempic again and weight is coming down nicely  with 12 lb reduction in weight from November 2022.    Weight:   Wt Readings from Last 5 Encounters:   06/22/23 116.1 kg (256 lb)   03/16/23 120.7 kg (266 lb)   02/01/23 126.1 kg (278 lb)   12/29/22 128.4 kg (283 lb)   11/10/22 126.1 kg (278 lb)    pounds      Comorbidities:  Patient Active Problem List   Diagnosis     Binge eating     Eating disorder     Morbid obesity (H)     Multiple-type hyperlipidemia     Severe episode of recurrent major depressive disorder (H)     Arthralgia of hip     Hypertension     Trochanteric bursitis     History of urinary anomaly     Borderline personality disorder (H)     Attention deficit disorder     Asthma     Suicidal ideation     ACP (advance care planning)     Adjustment disorder with depressed mood     Anxiety     Moderate mixed bipolar I disorder (H)     Bipolar II disorder, most recent episode hypomanic (H)     Borderline high cholesterol     Care plan discussed with patient     Disorder of pelvis     Gait difficulty     Hx of diabetes mellitus     Hx of eating disorder     Morbid obesity with BMI of 50.0-59.9, adult (H)     Osteoarthritis     Pain of both hip joints     Relationship problems     Secondary amenorrhea     Soft tissue lesion of shoulder region     Vertigo     Greater trochanteric bursitis of both hips     Hip dysplasia     Left hip pain     Osteoarthritis of hip     Primary osteoarthritis of left hip     ADD (attention deficit disorder)     H/O urinary frequency     History of anxiety disorder     Hyperlipidemia     Mixed hyperlipidemia     Recurrent major depressive disorder (H)     Type 2 diabetes mellitus (H)     Primary osteoarthritis of both hips     Degenerative joint disease (DJD) of hip     Status post total hip replacement, left     Bipolar 1 disorder (H)     Constipation     Gastroesophageal reflux disease with esophagitis without hemorrhage     Mixed incontinence     Primary localized osteoarthritis of pelvic region and thigh     Skin sensation  disturbance       Current Outpatient Medications:      ACETAMINOPHEN EXTRA STRENGTH 500 MG tablet, , Disp: , Rfl:      albuterol (PROAIR HFA/PROVENTIL HFA/VENTOLIN HFA) 108 (90 Base) MCG/ACT inhaler, Inhale 2 puffs into the lungs every 6 hours as needed for shortness of breath, wheezing or cough, Disp: 18 g, Rfl: 0     blood glucose (NO BRAND SPECIFIED) lancets standard, Use to test blood sugar 2 times daily or as directed., Disp: 100 each, Rfl: 11     blood glucose monitoring (NO BRAND SPECIFIED) meter device kit, Use to test blood sugar 2 times daily or as directed., Disp: 1 kit, Rfl: 0     blood glucose monitoring (NO BRAND SPECIFIED) test strip, Use to test blood sugars 2 times daily or as directed, Disp: 100 strip, Rfl: 3     buPROPion (WELLBUTRIN XL) 300 MG 24 hr tablet, Take 300 mg by mouth daily , Disp: , Rfl:      cholecalciferol (VITAMIN D3) 125 mcg (5000 units) capsule, , Disp: , Rfl:      hydrOXYzine (ATARAX) 50 MG tablet, , Disp: , Rfl:      ibuprofen (ADVIL/MOTRIN) 600 MG tablet, Take 1 tablet (600 mg) by mouth every 6 hours as needed (mild), Disp: , Rfl: 0     insulin pen needle (31G X 6 MM) 31G X 6 MM miscellaneous, Use new pen needle for each autoinjection., Disp: 90 each, Rfl: 1     levonorgestrel (LILETTA, 52 MG,) 19.5 MCG/DAY IUD, 1 each by Intrauterine route once, Disp: , Rfl:      risperiDONE (RISPERDAL) 3 MG tablet, , Disp: , Rfl:      semaglutide (OZEMPIC) 2 MG/1.5ML SOPN pen, 0.5mg injected every 7 days (once weekly)., Disp: 1.5 mL, Rfl: 6     sennosides (SENOKOT) 8.6 MG tablet, , Disp: , Rfl:      tolterodine ER (DETROL LA) 4 MG 24 hr capsule, Take 4 mg by mouth daily, Disp: , Rfl:      topiramate (TOPAMAX) 100 MG tablet, Take 100 mg by mouth 2 times daily , Disp: , Rfl:      traZODone (DESYREL) 150 MG tablet, Take 150 mg by mouth At Bedtime, Disp: , Rfl:      EPINEPHrine (ANY BX GENERIC EQUIV) 0.3 MG/0.3ML injection 2-pack, Inject 0.3 mg into the muscle, Disp: , Rfl:      EPINEPHrine  (EPIPEN/ADRENACLICK/OR ANY BX GENERIC EQUIV) 0.3 MG/0.3ML injection 2-pack, Inject 0.3 mLs (0.3 mg) into the muscle once as needed for anaphylaxis, Disp: 0.6 mL, Rfl: 0    Lab Results   Component Value Date    A1C 5.1 11/09/2022    A1C 4.7 04/04/2022    A1C 4.6 09/10/2021    A1C 6.7 08/23/2019    A1C 7.0 11/21/2018    A1C 6.0 12/31/2010       Interim: Since our last visit, she has maintained an 89 lb reduction from heaviest weight in 2019 of 345 lbs, a 25.8% total body weight reduction and is down 27 lbs from the beginning of this year, about a 10% reduction in weight.   New walking routine, uses her walker and walks from her side of building to parking lot. Going to [facility] w/ coworker and does group power weight lifting class and spinning (90 minutes total, 3x weekly right now). Sore the next day.     Has cut back on added sugar  Plan:   1.  Diet: aiming for 1500-1625kcal/day now that you're exercising 3-4 days weekly should continue to support your weight loss goals well if getting 75-90 grams of lean protein daily. If workouts burn more than 350 kcal, then have an after workout snack like a yogurt or one oz of string cheese and an apple in the first hour after exercising.   2. Exercise: continue combination of walking/strength workouts/spinning classes at the [facility] and around home.   3. Medication: ozempic 0.5mg/week to continue. We'll recheck labs in the Fall.  4. Use your smart watch to track exercise. Aiming for over 150minutes/week (300/week would be optimal by the end of summer/during the Fall and winter)  5. Goals: you're down over 25% total body weight reduction from 2019 starting weight of 345 lbs. Since getting back on Ozempic weight is down another 10% from January as you combine the medication therapy with your food therapy to control your previous diabetes.  Aiming to get weight under 230 lbs long term will drop weight related health risks greatly.      We discussed [facility] Bariatric Basics  including:  -eating 3 meals daily  -reviewed metabolic needs for weight loss based on Resting Metabolic Rate  -protein goals supportive of healthy weight loss  -avoiding/limiting calorie containing beverages  -We discussed the importance of restorative sleep and stress management in maintaining a healthy weight.  -We discussed the National Weight Control Registry healthy weight maintenance strategies and ways to optimize metabolism.  -We discussed the importance of physical activity including cardiovascular and strength training in maintaining a healthier weight and explored viable options.      Most recent labs:  Lab Results   Component Value Date    WBC 5.5 02/01/2023    HGB 13.9 02/01/2023    HCT 42.1 02/01/2023    MCV 93 02/01/2023     02/01/2023     Lab Results   Component Value Date    CHOL 102 11/09/2022     Lab Results   Component Value Date    HDL 49 (L) 11/09/2022     No components found for: LDLCALC  Lab Results   Component Value Date    TRIG 110 11/09/2022     No results found for: CHOLHDL  Lab Results   Component Value Date    ALT 34 02/01/2023    AST 28 02/01/2023    ALKPHOS 56 02/01/2023     No results found for: HGBA1C  Lab Results   Component Value Date    B12 368 11/09/2022     No components found for: VITDT1  Lab Results   Component Value Date    KOFFI 79 11/09/2022     Lab Results   Component Value Date    PTHI 77 08/23/2019     No results found for: ZN  Lab Results   Component Value Date    VIB1WB 108 08/23/2019     Lab Results   Component Value Date    TSH 1.14 05/21/2021     No results found for: TEST    DIETARY HISTORY  Tracking technique: smart watch access  Positive Changes Since Last Visit: [employer] regularly with her friend now  Struggling With: not much    Getting Adequate Protein: most days  Sleep schedule: n/a.      PHYSICAL ACTIVITY PATTERNS:  Cardiovascular: see above  Strength Training: see above    REVIEW OF SYSTEMS  ozempic well tolerated at the 0.5mg/week dose..  PHYSICAL  "EXAM:  Vitals: Ht 1.626 m (5' 4\")   Wt 116.1 kg (256 lb)   BMI 43.94 kg/m    Weight:   Wt Readings from Last 3 Encounters:   06/22/23 116.1 kg (256 lb)   03/16/23 120.7 kg (266 lb)   02/01/23 126.1 kg (278 lb)         GEN: Pleasant, well groomed, in no acute distress  HEENT:  Normal voice .  NECK: No swelling.  HEART: .  LUNGS: No respiratory difficulty noted. No cough. .  ABDOMEN: .  EXTREMITIES: not in video frame..  NEURO: Alert and Oriented X3, fluent speech. .  SKIN: No visible rashes.   Mood seems euthymic today..    Interim study results: none..      30 minutes spent by me on the date of the encounter doing chart review, history and exam, documentation and further activities per the note   Kerwin Dunn MD  [facility]  10:56 AM  6/22/2023      Video-Visit Details    Type of service:  Video Visit    Platform used for Video Visit: Rezzie    Video End Time (time video stopped): 11:24 AM    Originating Location (pt. Location): Home        Distant Location (provider location):  On-site    Distant Location (provider location):  [facility] [place]       Again, thank you for allowing me to participate in the care of your patient.        Sincerely,        Kerwin Dunn MD    "

## 2023-06-22 NOTE — PATIENT INSTRUCTIONS
Plan:   1.  Diet: aiming for 1500-1625kcal/day now that you're exercising 3-4 days weekly should continue to support your weight loss goals well if getting 75-90 grams of lean protein daily. If workouts burn more than 350 kcal, then have an after workout snack like a yogurt or one oz of string cheese and an apple in the first hour after exercising.   2. Exercise: continue combination of walking/strength workouts/spinning classes at the [facility] and around home.   3. Medication: ozempic 0.5mg/week to continue. We'll recheck labs in the Fall.  4. Use your smart watch to track exercise. Aiming for over 150minutes/week (300/week would be optimal by the end of summer/during the Fall and winter)  5. Goals: you're down over 25% total body weight reduction from 2019 starting weight of 345 lbs. Since getting back on Ozempic weight is down another 10% from January as you combine the medication therapy with your food therapy to control your previous diabetes.  Aiming to get weight under 230 lbs long term will drop weight related health risks greatly.        Example Meal Plan for a 9822-9739 Calorie Diet:    In order to fuel your weight loss properly and avoid hunger-induced overeating later in the day, for your height and weight, you will enjoy the most success by following the diet below or similar with adjustments based on your particular tastes and preferences.  Exercise may influence speed, amount of weight loss further.     I recommend getting into a meal routine and keeping it similar day to day in the beginning so you don t have to think too hard about what you re going to make/eat.  Keep snacks healthy, ideally containing protein and some vegetables.  Non-processed food is preferable to packaged items.  Eat at least a few crunchy green vegetables if having a snack, which should be 2-3 hours after your mealtimes(prepare these ahead of time for ease of use).  Drink 64 oz -80 oz of water daily for most, some of you will need  more and we'll discuss it at your visit if that is the case.      When changing our diet,  we can often mistake thirst for hunger or just have some distracted eating habits that we need to break free from ('bored/mindless eating', screen time,work, driving,etc).  A glass of water and reconsideration of our hunger is often all that is needed.  Having the urge is not the problem, but watching it pass by without acting on it is the goal.    If you re having hunger problems, add a protein drink/snack to your morning hours or afternoon snack with at least 20grams of protein and not too much sugar (under 10g).  A carton of higher protein/low sugar yogurt can work as well.  If the urge to snack is overwhelming and not satiated, try going for a 10 minute walk/exercise, come home and drink a glass of water and if still hungry, have a  calorie snack (handful of raw/sprouted nuts, veggies and string cheese, protein bar, etc).  Savor it.    It is better to have a large breakfast, a moderate lunch and a smaller dinner to fuel your day.  People lose 10-15% more weight during their weight loss season with this strategy. Optimizing your protein intake at each meal will further keep you more satisfied while eating less food overall.  Getting exercise in early has also been shown to offer the best results (before breakfast ideally but anytime is the right time to exercise if that is not an option for you).    To make sure you re getting adequate vitamins and minerals during weight loss, I recommend one complete multivitamin a day of your choice.  Consider a probiotic and taking some vitamin D 2000 IU daily.    Let supper be your last meal of the day and ideally try to have at least 12 hours between supper and breakfast the next day to tap into some beneficial overnight fasting dynamics.  Midnight snacks need to go away. Water in the evening is fine, unsweetened, non caffeinated herbal tea is helpful as well.  Consolidating your  meals within a 8-12 hour period of your day will help tap into these additional metabolic benefits and tends to keep your appetite up for breakfast, further helping to stay on track.  For most of my patients, I don't recommend an intermittent fasting style diet (many find it hard to fit in their lifestyle) but an overnight fast is very doable for most patients and helps regulate our hunger drives a little better.  This makes it very important to nail good intake at all three meals to feel satisfied/energized and still lose weight.      If evening snacking desires are high, consider a glass of fiber supplement for some additional fullness (metamucil or similar). Most of us don't get the 25-30 grams per day of fiber that promotes good gut health/satiety.  Benefiber, metamucil, citrucel are reasonable/affordable options for most people.  Inulin, chicory, psyllium husk are reasonable options but start slow and low in the dose to avoid gas/bloating until your gut gets acclimated (ramping up to 5-10 grams per day of supplemental fiber after 3-4 weeks if needed).      Example Meal Plan:  Breakfast: 450-475 Calories  1 egg cooked on low in olive oil:   calories.  5oz Greek Yogurt (Fage plain classic: ~150 randi)  Handful of Berries of your choice (about a calorie per berry or 20-40cal per handful)    cup(cooked) of  old fashioned oatmeal or 1/2 cup(cooked) steel cut oats. (150 randi)  Sprinkle amount of brown sugar and a pat of butter. (40 randi)  Glass of  Water  Black coffee or unsweetened Tea (0calories).      2-3 hours Later Snack: (195 calories).  Glass of water  One string Cheese (80 calories) or 4 oz creamed cottage cheese (115 calories) with  Crunchy Celery sticks (less than 10 calories per large stalk) 2 stalks. (20 calories)    of a  Large Banana or   of a Large Apple (60 calories):  eat second half at lunch or afternoon snack.     Lunch:300 -350 calories   Chicken Breast  (baked/broiled/roasted/grilled)  4-6 oz.   (125-180 randi), BBQ sauce/hot sauce/mustard/seasoning is free. Just use a reasonable amount. Or a can of tuna with 1 tablespoon mayonnaise.  Salad: lettuce, any other veggies (cucumbers, green peppers/celery you like and a small drizzle of dressing to just flavor.  Go as big on the veggies as you like,  as they are practically calorie free.   A whole, 8 inch cucumber is 45 calories, a whole green pepper is 23 calories, a stalk of celery is 9 calories.  [place] Dressing is 60 calories per tablespoon..so moderate your desired dressing or do a drizzle of olive oil and splash of balsamic vinegar on top,  Total calories unlikely to be over 150 even with dressing.  Glass of Water.    Option for lunch is meal replacement protein drink/smoothie.  Need at least 20 grams of protein and eat the rest of your apple/banana from the morning snack.      Afternoon Snack: 150-200 calories   Cheese Stick or cottage cheese again  and a fresh fruit OR  Granola Bar (protein Bar acceptable if under 200 calories OR  Homemade smoothies:  8oz skim milk,  a handful of berries (fresh or frozen and a serving of protein powder such as BiPro or Taniya sWhey for example.  If you don't like dairy, make with 8oz water, one small banana, handful of berries and the protein powder, add any veggies you want as well:  roughly 200 calories.   Glass of Water    Dinner: 325 calories  4oz of fresh, Atlantic salmon.  Broiled (salt/pepper/dill) for about 8-8.5 minutes (200calories) or  4oz filet mignon steak or sirloin steak  Salad or vegetable sautéed lightly in olive oil or   Broccoli 1.5  cups chopped and steamed  or micro-waved in a little water (75 calories)  Glass of Water,    Cup of herbal tea (unsweetened, caffeine free)      Herbs and seasonings are encouraged to flavor your foods/vegetables.  Make your food delicious.      Tips for Success:  1.  Prepare proteins ahead of time (broil chicken breasts in bulk so you can grab and go), steel cut  "oats/lentils can be stored in casserole dish/bowl in the fridge for quick scoop in the morning and rewarm in microwave, make use of crock pot recipes (watch salt content).  Making meals that cover 3-4 future meals is an easy way to stay on track.  2.  Drink a 8-12 oz glass of water every 2-3 hours when awake.  We often mistake hunger for thirst, especially when losing weight.  3. Remember your Reward and Motivation when things get hard.  4.  Weigh yourself every morning and record, you'll stay on track better and learn how our biorhythms, diet and elimination patterns show up on the scale. Don't worry about 1 or 2 day patterns, but when on track you'll notice good trend downward of weight over 3-4 day segments.  Plateaus tend to resolve after 4-8 days in most cases if you stay consistent with your plan.  These are natural and part of weight loss, even if you're perfect with your plan execution.  5. Call if problems/concerns.  [employer] is a great tool to stay in touch and provide weekly outside accountability. Check in with questions or if you want to brag.  6.  Find a handful of meals/foods that keep you on track and feeling good and get into a routine that is sustainable for you.  It's OK to have a routine that works for you.  7.  Consider taking a complete multivitamin just to make sure all micronutrients are adequate during weight loss.  8. If losing hair/brittle nails it usually means you are not taking enough protein.  Minimum goal is 60 grams daily of protein for smaller women, 80 grams a day for men. Consider taking Biotin as supplement or a \"Hair and Nail\" multivitamin.  On-the-Go Breakfast Ideas  As of 2015, the latest research shows what a huge impact eating breakfast has on losing weight and feeling your best. People lose more weight when they make breakfast their biggest meal of the day compared to Dinner, but even if you cannot go to that degree, getting a breakfast that has at least 20 grams of protein " and even a moderate amount of fat is ideal for maintaining good energy through the day and limits overeating in the evening hours.  The following are some quick and easy suggestions for at least getting something of substance into your body in the morning.  Enjoy!    Eating breakfast within 90 minutes of waking up is an important part of taking care of your body on a restricted calorie diet plan.  After sleeping for hours, your body is in need of fuel.  An ideal breakfast is a combination of protein, whole-grain carbohydrates, or fruit.  Here s why:    -Protein digests very slowly in the body, helping you feel more satisfied.  -Whole grains provide dietary fiber, which also digests slowly and helps keep your gut clean.  -Fruit is a great source of vitamins, minerals, and fiber.     Each one of these breakfast combinations has between 200-300 calories and 15-20 grams of protein.  Feel free to mix and match!    Bone Broth (chicken bone broth or beef bone broth) is a great way to boost protein content. 8oz of bone broth will typically have 9-12grams of protein for 40kcal of energy.    Protein: Choose  -1/2 cup low-fat cottage cheese  -2 hard boiled eggs , or one cooked in olive oil (low/slow heat).  -1 low fat string cheese stick  -1 [facility] natural peanut butter  -Whisbi vegetarian sausage mckinley (found in freezer section)  -1 slice lowfat cheese  -6 oz 2% or lowfat Greek yogurt, such as Fage or Oikos.    PLUS    Whole Grains:  Choose   -1 whole wheat English muffin  -1 whole wheat sol, half  -1/2 Fiber One frozen muffin, thawed  -1/2 Fiber One toaster pastry  -1 whole wheat bagel thin  -1/2 cup Kashi cereal  -1 Kashi waffle (or other whole grain high-fiber waffle)  Aim for whole grain/sprouted breads with at least 3g of fiber/slice if having bread. [place] is one such brand.    OR    Fruit: Choose  -1/3 cup blueberries  -1/2 banana (or a plantain- similar to a banana, yet smaller)  -1/2 cup  cantaloupe cubes  -1 small apple  -1 small orange  -1/2 cup strawberries  -handful raspberries/blackberries (each berry is about 1 calorie).    *Adapted from Diabetes [employer], Fall 20    Ten Breakfasts Under 250 calories    Ideally, getting between 350-600 calories  (depending on starting height and weight)for breakfast is ideal for avoiding hunger later in the day, adjust/add to the following accordingly:    One- 250 calories, 8.5 g protein  1 slice whole-grain toast   1 Tbsp peanut butter    banana    Two- 250 calories, 8 g protein    cup nonfat/lowfat yogurt  1/3rd cup diced no-sugar peaches  1/3rd cup cereal (like Special K, Cheerios, or bran flakes)    Three- 250 calories, 25 g protein  1 egg scrambled with 1 oz skim milk    cup shredded cheddar    whole grain English muffin  1 oz [place]er rick  1 tsp margarine spread    Four- 225 calories, 25 g protein  1/2 cup Kashi Go-Lean cereal    cup skim milk mixed with 1 scoop Bariatric Advantage protein powder    cup no-sugar diced pears    Five- 250 calories, 20 g protein    cup oatmeal prepared with skim milk, 1 scoop protein powder, and sugar-free maple syrup    Six- 200 calories, 5 g protein  1 whole grain waffle, toasted  1 tablespoon creamy peanut or almond butter    Seven-  250 calories, 19 g protein  Breakfast sandwich: 1 slice whole grain toast, cut in half.  Add 1 scrambled egg and one slice cheddar  cheese.    Eight-  250 calories, 15 g protein  2 eggs scrambled with 1/3 cup frozen spinach (heat before adding to eggs) and 2 tablespoons low fat cream cheese.    Nine-  150 calories, 15 g protein  2/3rd cup cottage cheese    cup cantaloupe    Ten- 200 calories, 20 g protein  Fruit smoothie made with 4 oz. nonfat Greek yogurt,   cup berries, 1 scoop protein powder, and 4 oz skim milk.    Ten Lunches Under 250 Calories    Aim for lunch to be around 300-400 calories a day when trying to lose weight and get that protein in!    One- 200 calories, 11 g protein  1/3  cup tuna salad made with light perez on 1 slice whole grain bread  1 small peeled apple    Two- 250 calories, 16 g protein  1/3 cup lowfat cottage cheese    cup cooked green beans    small fruit cocktail (in natural juice)    Three- 200 calories, 11 g protein    grilled cheese sandwich on whole grain bread with lowfat cheese  2/3rd cup of tomato soup    Four- 250 calories, 22 g protein  Deli wrap: 1 oz sliced turkey, 1 oz sliced ham, 1 oz sliced chicken rolled up with 1 slice low-fat cheese  1 small orange    Five- 250 calories, 28 g protein  2/3rd cup chili with 1 oz shredded cheese  4 saltine crackers    Six- 250 calories, 22 g protein  1 cup fresh spinach with 2 oz chicken, 1/3rd cup mandarin oranges, and 2 tablespoons sliced almonds with 1 tablespoon  vinaigrette dressing    Seven- 200 calories, 11 g protein  1 Tbsp sugar-free preserves and 1 Tbsp peanut butter on 1 slice whole grain toast    cup nonfat/lowfat Greek yogurt    Eight- 250 calories, 18 g protein  1 small soft-shell chicken taco with 1 oz shredded cheese, lettuce, tomato, salsa, and 1 Tbsp light sour cream    cup black beans    Nine- 225 calories, 13 g protein  2 ounces baked chicken  1/4 cup mashed potatoes    cup green beans    Ten- 200 calories, 21 g protein  Deli sol: 2 oz roast beef or other deli meat with 1 tsp [place]р mayonnaise and sliced tomato, onion, and lettuce  1/3rd cup cottage cheese      Ten Dinners Under 300 calories    If you're eating a large breakfast and medium lunch, keep dinner small.  300-400 calories is ideal for most people depending on their caloric needs.    One- 300 calories, 12 g protein  1-inch thick slice of turkey meatloaf    cup baked butternut squash    Two- 200 calories, 9 g protein  Bread-less BLT: 3 slices turkey rick, sliced tomato, wrapped in a large lettuce leaf    cup peeled fruit    Three- 275 calories, 36 g protein  3 oz roasted chicken    cup cooked broccoli    cup shredded cheddar cheese    cup  unsweetened applesauce    Four- 200 calories, 25 g protein  3 oz baked tilapia  1/3rd cup cooked carrots    cup yogurt    Five- 250 calories, 20 g protein  Grilled ham  n  Swiss: spread 2 tsp ghee or butter on 1 slice of whole grain bread.  Cut bread in half, layer 2 oz deli ham with 1 piece of Swiss cheese and grill until cheese is melted.    cup cooked vegetables    Six- 250 calories, 18 g protein  Vegetarian cheeseburger: 1 [place] cheeseburger topped with lettuce, onion, tomato, and ketchup/mustard    cup sweet potato fries    Seven- 250 calories, 18 g protein  Pork pot roast: 2 oz roasted pork loin, 1/3rd cup roasted carrots,   medium potato, cooked with   cup gravy    Eight- 330 calories, 25 g protein  2 oz meatballs (about 2 small meatballs)    cup spaghetti sauce  1/2 piece toast topped with 1 tsp ghee or butterand topped with garlic powder, toasted in oven    Nine- 250 calories, 16 g protein  Mexican pizza: one 8  corn tortilla topped with 2 oz chicken,   cup salsa, 2 tablespoons black beans, 2 tablespoons shredded cheese.  Bake until cheese is melted.    Ten- 250 calories, 22 g protein  Shrimp stir-butler: 3 oz cooked shrimp, 1/6th onion,   pepper,   cup chopped carrots sautéed in 1 tablespoon olive oil, topped with 2 tablespoons stir butler sauce and a pinch of sesame seeds        150 Calories or Less Snack Ideas   1 hardboiled egg with   cup berries  1 small apple with 1 hardboiled egg  10 almonds with   cup berries  2 clementines with 1 light string cheese  1 light string cheese with   sliced apple  1 light string cheese wrapped in 2 slices of turkey  4 100% whole wheat crackers (e.g. Triscuit) with 1 light string cheese    c. cottage cheese with   cup fruit and 1 Tbsp sunflower seeds     cup cottage cheese with   of an avocado     can tuna fish with 1 cup sliced cucumbers     cup roasted garbanzo beans with paprika and cayenne pepper    baked sweet potato with   cup chili beans or   cup cottage cheese  2 oz.  nitrate free turkey slices with 1 cup carrots  1 container (6 oz) of low sugar (less than 10 grams of sugar) greek yogurt   3 Tablespoons of hummus with 1 cup sliced bell peppers   2 Tablespoons of hummus with 15 baby carrots  4 Tablespoons ranch dip made with plain Greek Yogurt and 3 mini cucumbers  1/4 cup nuts (any kind)  1 Tablespoon peanut butter with 1 stalk celery   1 dill pickle wrapped in 1-2 slices of deli ham with 1 tsp of mayonnaise/mustard.    LEAN PROTEIN SOURCES  Getting 20-30 grams of protein, 3 meals daily, is appropriate for most people, some need more but more than about 40 grams per meal is not useful.  General rule is drinking one ounce of water per gram of protein eaten over the course of the day:  70 grams of protein each day, drink 70 oz of water.  Protein Source Portion Calories Grams of Protein                           Nonfat, plain Greek yogurt    (10 grams sugar or less) 3/4 cup (6 oz)  12-17   Light Yogurt (10 grams sugar or less) 3/4 cup (6 oz)  6-8   Protein Shake 1 shake 110-180 15-30   Skim/1% Milk or lactose-free milk 1 cup ( 8 oz)  8   Plain or light, flavored soymilk 1 cup  7-8   Plain or light, hemp milk 1 cup 110 6   Fat Free or 1% Cottage Cheese 1/2 cup 90 15   Part skim ricotta cheese 1/2 cup 100 14   Part skim or reduced fat cheese slices 1 ounce 65-80 8     Mozzarella String Cheese 1 80 8   Canned tuna, chicken, crab or salmon  (canned in water)  1/2 cup 100 15-20   White fish (broiled, grilled, baked) 3 ounces 100 21   [place]/Tuna (broiled, grilled, baked) 3 ounces 150-180 21   Shrimp, Scallops, Lobster, Crab 3 ounces 100 21   Pork loin, Pork Tenderloin 3 ounces 150 21   Boneless, skinless chicken /turkey breast                          (broiled, grilled, baked) 3 ounces 120 21   [place], [place], [place], and Venison 3 ounces 120 21   Lean cuts of red meat and pork (sirloin,   round, tenderloin, flank, ground 93%-96%) 3 ounces 170 21   Lean or Extra Lean  Ground Turkey 1/2 cup 150 20   90-95% Lean Arley Burger 1 mckinley 140-180 21   Low-fat casserole with lean meat 3/4 cup 200 17   Luncheon Meats                                                        (turkey, lean ham, roast beef, chicken) 3 ounces 100 21   Egg (boiled, poached, scrambled) 1 Egg 60 7   Egg Substitute 1/2 cup 70 10   Nuts (limit to 1 serving per day)  3 Tbsp. 150 7   Nut [place] (peanut, almond)  Limit to 1 serving or less daily 1 Tbsp. 90 4   Soy Burger (varies) 1  15   Garbanzo, Black, [place] Beans 1/2 cup 110 7   Refried Beans 1/2 cup 100 7   Kidney and Lima beans 1/2 cup 110 7   Tempeh 3 oz 175 18   Vegan crumbles 1/2 cup 100 14   Tofu 1/2 cup 110 14   Chili (beans and extra lean beef or turkey) 1 cup 200 23   Lentil Stew/Soup 1 cup 150 12   Black Bean Soup 1 cup 175 12       Exercise Guidance    Nearly everything that bothers us gets better when the proper amount of exercise can be done in the proper amounts.  Getting to that level safely and without injury is the key.  When it comes to weight loss, exercise is especially important in maintaining the weight loss.  Unfortunately, one of the harsh realities is that substantial weight loss slows our metabolism, often anywhere from 5-20%.    Our brain always remembers our heaviest weight and we can return to that if we're not mindful and moving regularly.  Our biology doesn't understand the concept of having too much energy, only not having enough.  As such, when we lose weight, it's thought that the brain interprets this as we're ill or in a famine and dials back our metabolism to limit further weight loss.  This is why exercise is so important in keeping the weight off and is the main reason people have some weight regain from their low weight point after weight loss.  We have to make up that 10-20% of calories not being burned.Since we can restrict our intake for only so long, exercise becomes very important in our long term healthy weigh  maintenance to balance out the occasional indiscretion with our diet.    Generally, for every 5% body weight reduction in a weight loss season, a person needs to add  kilocalories of exercise in their daily routine to keep that weight off for the long term.  This is why it's vital to be starting your fitness regimen during weight loss season, so that routine is well established as you move into your maintenance period.    Additionally, all sorts of good enzymes and genes turn on with exercise and our stress, sleep, mood and bodies feel better when we can get to the point of making ourselves a little sweaty and short of breath 35-50 minutes most days of the week. But we have to start with what we can do first and give ourselves permission to work our way up to this goal.    Who isn't ready for exercise? Well, if you get severe dizziness/palpitations, chest pain or short of breath/faint with even minimal activity like walking across a room or you're having to pause while going up a flight of stairs, then getting your heart and/or lungs fully evaluated prior to starting an exercise regimen is recommended. Everyone else can probably start a program, but everyone may start at a different point:  Some can set a 5-10 minute walking goal and others will be able to ride their bike for an hour.      Start with where you're at and look to add 10% more each week until you're at that 150 minutes or more a week (or 75 minutes/week or more of vigorous exercise). Moderate exercise can be estimated as the pace you can carry on a conversation and vigorous is the pace at which you can get 3-5 words out before having to take a breath.  If you're using heart rate monitoring, Moderate is about 60% of your maximum heart rate and vigorous about 75%. (Max heart rate estimated as 220 beats minus your age:  Example: 220-age of 43 =176 Beats per minute (BPM) maximum. 0.6X 176= 105 BPM (moderate), 132 BMP(vigorous)).    If you like to  "count steps, the 10,000 steps per day does correlate well with weight maintenance but try to make at least 20-25% of those steps at a brisk pace (like you are about to miss your bus).    Finally, if you are pressed for time, it's important to know that some exercise is better than none.  High Intensity Interval training (HIIT) is a good way to get as much out of a short period of working out. If you can't walk, use the stairs, bike or swim; you could use a punching/arm workout regimen for your activity.  The idea with HIIT is to have a 3-6 minute warm up period of low intensity and the 3-6 \"intervals\" where you push the intensity up and then recover and start the next interval. One study showed that 3 intervals of 20 seconds at \"Maximum Effort\" while either biking on a stationary bike or going up stairs and then having 100 seconds recovery time before the next Maximum Effort was equally as beneficial on cardiovascular fitness development as doing 30 minutes of moderately paced walking 3 days weekly over a 6 week period of time.  So intensity matters. You just need to be able to safely do your desired exercise without injury. There are many great HIIT exercises/routines out there. IF you're not doing much exercise currently, I recommend giving your self 2-3 weeks of moderate exercise, 3 days weekly minimum to get your bones/tendons/muscles used to exercise before going for High Intensity workouts.    If you like to use Apps on the phone, the couch to 5k nia and 7 minute workout apps are nice places to start if you are reasonably healthy.  There are hundreds of other options out there.  Consider viewing Undesk if gentler exercise/movement is desired. Videos on [employer] and chair yoga for seniors exist and are free. Check them out and let's get that 3-4 days a week routine going.    Let's move!  Kerwin Dunn MD.   How much activity does it take to burn off the occasional treat?  Occasional treats or indulgence doesn't " "have to set back your weight loss season goals. But if the occasional treat turns into the daily chocolate habit or the chips after dinner, or a can of soda, then your progress will slow dramatically unless your activity and exercise can compensate for those empty, low nutrition/high calorie foods.  Here's some estimations of walking off your snacks depending on general weights.  You can use \"calorie burn calculators\" if you search in Google, more accurate estimations should ask height/weight/gender and if you have a smart watch/heart rate monitor fitness watch, then your personal burn rate will be much more accurate then these general numbers. But these are in the ballpark.  http://www.[employer]/ikhhngde-rlikpc-lbbsdbmftv/ is a nice reference for many different activities with just entering weight and time spent doing anactivity.    Most pre-packaged snacks/treats or 12 oz cans of soda come in around 150 kcal (small bag of chips (11-12 chips), 2 Tollhouse Cookies, 12 oz Coke, 5 oz of wine(125 kcal), 2 oz Vodka (130kcal),etc).  To burn off this snack/indulgence, walking at a \"walking the dog\" pace or for most people that aren't in training/fitness mode, is about 2.5 MPH. If you move slower than this, you'll burn less calories. If you move faster than this,you'll burn more.     Calories burned are for a 30 minute walk, at 2.5 MPH (traveling 1.25 miles in 30 minutes):    Body Weight Calories Burned   175 lbs 120 kcal   200 lbs 137 kcal   225 lbs 153 kcal   250 lbs 171 kcal   300 lbs 204 kcal       Speed, intensity, hills and activity type (walk/bike/swim/chores/yardwork/etc) will determine how much energy you burn per hour.  As you can see, for most people, a small snack of 150 kcal is a 30 minute commitment to moving at a modest pace.  A quick stroll. In comparison, most people moving at a \"missing the bus\" walk pace of about 4 mph (or the pace that a fit person may  walk in a marathon race if they can't " run anymore).    30 minutes at 4 MPH speedy walk/slow jog on level ground burn rate (2 miles covered in 30 minutes):    Body Weight Calories Burned   175 lbs 198 kcal   200 lbs 227 kcal   225 lbs 255 kcal   250 lbs 283 kcal   300 lbs 340 kcal.     Finding loops in your neighborhood is easy to map out distances by using sites like, MapMyRun.com, MapMyRide.com, or Strava.com.  If your hips/knees don't do well with walking, walking in a pool, spinning on a bike/exercise bike are great options for less weight bearing strain. Strength work always is beneficial for metabolic rate and feeling our best. Even 2 days weekly makes a difference, ideally 3-4 days weekly for the best results.    Get out there and earn it, burn it, or pay it forward.  Banking calories is always a good idea if you know an occasion is coming up where you plan to indulge. The goal for all adults around the work is at [id number] minutes weekly of moderate aerobic activity like those listed above (keeping heart rate at about 50-65% of your maximum heart rate calculation (roughly 220-Age in years (as long as not on heart slowing medicationslike Beta Blockers)).     Hopefully, this drives home the point that snacks need to be intentional during weight loss season as most of us struggle with finding 30-60minutes most days a week to exercise and it takes more than that in many cases to make up for the sweet/treats and indulgences that may have contributed to your weight gain over the years (roughly, a can of soda daily for a year will put a person at risk for a 10-15 lb weight gain each year).

## 2023-06-22 NOTE — PROGRESS NOTES
Ronel Ryan is 42 year old  female who presents for a billable video visit today.    How would you like to obtain your AVS? MyChart  If dropped from the video visit, the video invitation should be resent by: Text to cell phone: [phone]  Will anyone else be joining your video visit? No      Video Start Time: 10:56 AM    Are there any specific questions or needs that you would like addressed at your visit today? Question about apple cider vinegar gummies     Provider Notes:   Bariatric Clinic Follow-Up Visit:    Ronel Ryan is a 42 year old  female with Body mass index is 43.94 kg/m .  presenting here today for follow-up on non-surgical efforts for weight loss. Original Intake visit occurred on 7/13/19 with a weight of 345 lbs and BMI of 59.2.  Along with diet and behavior changes, she has been using various combinations through the years of Trulicity/naltrexone and a previous use of topiramate from her pain clinic which can also assist her weight loss goals.  See her intake visit notes for details on identified contributors to weight gain in the past. Chart review shows Dietician calculated RMR of 1892kcal/day and protein intake goal of 70-90g/day.  Her Trulicity was stopped in hospital May of 2022 during hip surgery and she had weight gain of 30 lbs over the summer due to increased cravings off medication. We transitioned to Semaglutide therapy this summer of 2022 but then supplies ran out due to shortages in supply and Victoza was started in November 2022 (11/7/22) with plans to transition back in 2023 when supplies of Ozempic are more available. she is now on 0.5mg/week Ozempic again and weight is coming down nicely with 12 lb reduction in weight from November 2022.    Weight:   Wt Readings from Last 5 Encounters:   06/22/23 116.1 kg (256 lb)   03/16/23 120.7 kg (266 lb)   02/01/23 126.1 kg (278 lb)   12/29/22 128.4 kg (283 lb)   11/10/22 126.1 kg (278 lb)    pounds      Comorbidities:  Patient  Active Problem List   Diagnosis     Binge eating     Eating disorder     Morbid obesity (H)     Multiple-type hyperlipidemia     Severe episode of recurrent major depressive disorder (H)     Arthralgia of hip     Hypertension     Trochanteric bursitis     History of urinary anomaly     Borderline personality disorder (H)     Attention deficit disorder     Asthma     Suicidal ideation     ACP (advance care planning)     Adjustment disorder with depressed mood     Anxiety     Moderate mixed bipolar I disorder (H)     Bipolar II disorder, most recent episode hypomanic (H)     Borderline high cholesterol     Care plan discussed with patient     Disorder of pelvis     Gait difficulty     Hx of diabetes mellitus     Hx of eating disorder     Morbid obesity with BMI of 50.0-59.9, adult (H)     Osteoarthritis     Pain of both hip joints     Relationship problems     Secondary amenorrhea     Soft tissue lesion of shoulder region     Vertigo     Greater trochanteric bursitis of both hips     Hip dysplasia     Left hip pain     Osteoarthritis of hip     Primary osteoarthritis of left hip     ADD (attention deficit disorder)     H/O urinary frequency     History of anxiety disorder     Hyperlipidemia     Mixed hyperlipidemia     Recurrent major depressive disorder (H)     Type 2 diabetes mellitus (H)     Primary osteoarthritis of both hips     Degenerative joint disease (DJD) of hip     Status post total hip replacement, left     Bipolar 1 disorder (H)     Constipation     Gastroesophageal reflux disease with esophagitis without hemorrhage     Mixed incontinence     Primary localized osteoarthritis of pelvic region and thigh     Skin sensation disturbance       Current Outpatient Medications:      ACETAMINOPHEN EXTRA STRENGTH 500 MG tablet, , Disp: , Rfl:      albuterol (PROAIR HFA/PROVENTIL HFA/VENTOLIN HFA) 108 (90 Base) MCG/ACT inhaler, Inhale 2 puffs into the lungs every 6 hours as needed for shortness of breath, wheezing  or cough, Disp: 18 g, Rfl: 0     blood glucose (NO BRAND SPECIFIED) lancets standard, Use to test blood sugar 2 times daily or as directed., Disp: 100 each, Rfl: 11     blood glucose monitoring (NO BRAND SPECIFIED) meter device kit, Use to test blood sugar 2 times daily or as directed., Disp: 1 kit, Rfl: 0     blood glucose monitoring (NO BRAND SPECIFIED) test strip, Use to test blood sugars 2 times daily or as directed, Disp: 100 strip, Rfl: 3     buPROPion (WELLBUTRIN XL) 300 MG 24 hr tablet, Take 300 mg by mouth daily , Disp: , Rfl:      cholecalciferol (VITAMIN D3) 125 mcg (5000 units) capsule, , Disp: , Rfl:      hydrOXYzine (ATARAX) 50 MG tablet, , Disp: , Rfl:      ibuprofen (ADVIL/MOTRIN) 600 MG tablet, Take 1 tablet (600 mg) by mouth every 6 hours as needed (mild), Disp: , Rfl: 0     insulin pen needle (31G X 6 MM) 31G X 6 MM miscellaneous, Use new pen needle for each autoinjection., Disp: 90 each, Rfl: 1     levonorgestrel (LILETTA, 52 MG,) 19.5 MCG/DAY IUD, 1 each by Intrauterine route once, Disp: , Rfl:      risperiDONE (RISPERDAL) 3 MG tablet, , Disp: , Rfl:      semaglutide (OZEMPIC) 2 MG/1.5ML SOPN pen, 0.5mg injected every 7 days (once weekly)., Disp: 1.5 mL, Rfl: 6     sennosides (SENOKOT) 8.6 MG tablet, , Disp: , Rfl:      tolterodine ER (DETROL LA) 4 MG 24 hr capsule, Take 4 mg by mouth daily, Disp: , Rfl:      topiramate (TOPAMAX) 100 MG tablet, Take 100 mg by mouth 2 times daily , Disp: , Rfl:      traZODone (DESYREL) 150 MG tablet, Take 150 mg by mouth At Bedtime, Disp: , Rfl:      EPINEPHrine (ANY BX GENERIC EQUIV) 0.3 MG/0.3ML injection 2-pack, Inject 0.3 mg into the muscle, Disp: , Rfl:      EPINEPHrine (EPIPEN/ADRENACLICK/OR ANY BX GENERIC EQUIV) 0.3 MG/0.3ML injection 2-pack, Inject 0.3 mLs (0.3 mg) into the muscle once as needed for anaphylaxis, Disp: 0.6 mL, Rfl: 0    Lab Results   Component Value Date    A1C 5.1 11/09/2022    A1C 4.7 04/04/2022    A1C 4.6 09/10/2021    A1C 6.7  08/23/2019    A1C 7.0 11/21/2018    A1C 6.0 12/31/2010       Interim: Since our last visit, she has maintained an 89 lb reduction from heaviest weight in 2019 of 345 lbs, a 25.8% total body weight reduction and is down 27 lbs from the beginning of this year, about a 10% reduction in weight.   New walking routine, uses her walker and walks from her side of building to parking lot. Going to [facility] w/ coworker and does group power weight lifting class and spinning (90 minutes total, 3x weekly right now). Sore the next day.     Has cut back on added sugar  Plan:   1.  Diet: aiming for 1500-1625kcal/day now that you're exercising 3-4 days weekly should continue to support your weight loss goals well if getting 75-90 grams of lean protein daily. If workouts burn more than 350 kcal, then have an after workout snack like a yogurt or one oz of string cheese and an apple in the first hour after exercising.   2. Exercise: continue combination of walking/strength workouts/spinning classes at the [facility] and around home.   3. Medication: ozempic 0.5mg/week to continue. We'll recheck labs in the Fall.  4. Use your smart watch to track exercise. Aiming for over 150minutes/week (300/week would be optimal by the end of summer/during the Fall and winter)  5. Goals: you're down over 25% total body weight reduction from 2019 starting weight of 345 lbs. Since getting back on Ozempic weight is down another 10% from January as you combine the medication therapy with your food therapy to control your previous diabetes.  Aiming to get weight under 230 lbs long term will drop weight related health risks greatly.      We discussed [facility]ariat[facility] Basics including:  -eating 3 meals daily  -reviewed metabolic needs for weight loss based on Resting Metabolic Rate  -protein goals supportive of healthy weight loss  -avoiding/limiting calorie containing beverages  -We discussed the importance of restorative sleep and stress management in  "maintaining a healthy weight.  -We discussed the National Weight Control Registry healthy weight maintenance strategies and ways to optimize metabolism.  -We discussed the importance of physical activity including cardiovascular and strength training in maintaining a healthier weight and explored viable options.      Most recent labs:  Lab Results   Component Value Date    WBC 5.5 02/01/2023    HGB 13.9 02/01/2023    HCT 42.1 02/01/2023    MCV 93 02/01/2023     02/01/2023     Lab Results   Component Value Date    CHOL 102 11/09/2022     Lab Results   Component Value Date    HDL 49 (L) 11/09/2022     No components found for: LDLCALC  Lab Results   Component Value Date    TRIG 110 11/09/2022     No results found for: CHOLHDL  Lab Results   Component Value Date    ALT 34 02/01/2023    AST 28 02/01/2023    ALKPHOS 56 02/01/2023     No results found for: HGBA1C  Lab Results   Component Value Date    B12 368 11/09/2022     No components found for: VITDT1  Lab Results   Component Value Date    KOFFI 79 11/09/2022     Lab Results   Component Value Date    PTHI 77 08/23/2019     No results found for: ZN  Lab Results   Component Value Date    VIB1WB 108 08/23/2019     Lab Results   Component Value Date    TSH 1.14 05/21/2021     No results found for: TEST    DIETARY HISTORY  Tracking technique: smart watch access  Positive Changes Since Last Visit: tomi regularly with her friend now  Struggling With: not much    Getting Adequate Protein: most days  Sleep schedule: n/a.      PHYSICAL ACTIVITY PATTERNS:  Cardiovascular: see above  Strength Training: see above    REVIEW OF SYSTEMS  ozempic well tolerated at the 0.5mg/week dose..  PHYSICAL EXAM:  Vitals: Ht 1.626 m (5' 4\")   Wt 116.1 kg (256 lb)   BMI 43.94 kg/m    Weight:   Wt Readings from Last 3 Encounters:   06/22/23 116.1 kg (256 lb)   03/16/23 120.7 kg (266 lb)   02/01/23 126.1 kg (278 lb)         GEN: Pleasant, well groomed, in no acute distress  HEENT:  Normal " voice .  NECK: No swelling.  HEART: .  LUNGS: No respiratory difficulty noted. No cough. .  ABDOMEN: .  EXTREMITIES: not in video frame..  NEURO: Alert and Oriented X3, fluent speech. .  SKIN: No visible rashes.   Mood seems euthymic today..    Interim study results: none..      30 minutes spent by me on the date of the encounter doing chart review, history and exam, documentation and further activities per the note   Kerwin Dunn MD  [facility]  10:56 AM  6/22/2023      Video-Visit Details    Type of service:  Video Visit    Platform used for Video Visit: Dayima    Video End Time (time video stopped): 11:24 AM    Originating Location (pt. Location): Home        Distant Location (provider location):  On-site    Distant Location (provider location):  [facility]

## 2023-08-21 ENCOUNTER — LAB REQUISITION (OUTPATIENT)
Dept: LAB | Facility: CLINIC | Age: 43
End: 2023-08-21

## 2023-08-21 DIAGNOSIS — J02.9 ACUTE PHARYNGITIS, UNSPECIFIED: ICD-10-CM

## 2023-08-21 PROCEDURE — 87081 CULTURE SCREEN ONLY: CPT | Performed by: FAMILY MEDICINE

## 2023-08-23 LAB — BACTERIA SPEC CULT: NORMAL

## 2023-10-06 ENCOUNTER — OFFICE VISIT (OUTPATIENT)
Dept: SURGERY | Facility: CLINIC | Age: 43
End: 2023-10-06
Payer: COMMERCIAL

## 2023-10-06 VITALS
BODY MASS INDEX: 44.66 KG/M2 | SYSTOLIC BLOOD PRESSURE: 134 MMHG | WEIGHT: 261.6 LBS | DIASTOLIC BLOOD PRESSURE: 80 MMHG | HEIGHT: 64 IN

## 2023-10-06 DIAGNOSIS — E66.01 CLASS 3 SEVERE OBESITY DUE TO EXCESS CALORIES WITH SERIOUS COMORBIDITY AND BODY MASS INDEX (BMI) OF 45.0 TO 49.9 IN ADULT (H): ICD-10-CM

## 2023-10-06 DIAGNOSIS — E66.813 CLASS 3 SEVERE OBESITY DUE TO EXCESS CALORIES WITH SERIOUS COMORBIDITY AND BODY MASS INDEX (BMI) OF 45.0 TO 49.9 IN ADULT (H): ICD-10-CM

## 2023-10-06 DIAGNOSIS — M16.0 PRIMARY OSTEOARTHRITIS OF BOTH HIPS: ICD-10-CM

## 2023-10-06 DIAGNOSIS — E11.9 TYPE 2 DIABETES MELLITUS WITHOUT COMPLICATION, WITHOUT LONG-TERM CURRENT USE OF INSULIN (H): Primary | ICD-10-CM

## 2023-10-06 DIAGNOSIS — R63.5 DRUG-INDUCED WEIGHT GAIN: ICD-10-CM

## 2023-10-06 DIAGNOSIS — T50.905A DRUG-INDUCED WEIGHT GAIN: ICD-10-CM

## 2023-10-06 PROCEDURE — 99214 OFFICE O/P EST MOD 30 MIN: CPT | Performed by: EMERGENCY MEDICINE

## 2023-10-06 RX ORDER — OMEGA-3/DHA/EPA/FISH OIL 60 MG-90MG
CAPSULE ORAL
COMMUNITY
End: 2024-05-20 | Stop reason: ALTCHOICE

## 2023-10-06 RX ORDER — KETOCONAZOLE 2 G/100G
AEROSOL, FOAM TOPICAL
COMMUNITY

## 2023-10-06 RX ORDER — SEMAGLUTIDE 0.68 MG/ML
INJECTION, SOLUTION SUBCUTANEOUS
COMMUNITY
Start: 2023-09-11 | End: 2023-12-04 | Stop reason: DRUGHIGH

## 2023-10-06 RX ORDER — ATOMOXETINE 80 MG/1
80 CAPSULE ORAL DAILY
COMMUNITY
End: 2024-05-20

## 2023-10-06 RX ORDER — MOMETASONE FUROATE 1 MG/G
CREAM TOPICAL DAILY
COMMUNITY
End: 2024-09-11

## 2023-10-06 NOTE — PROGRESS NOTES
Bariatric Clinic Follow-Up Visit:    Ronel Ryan is a 43 year old  female with Body mass index is 44.9 kg/m .  presenting here today for follow-up on non-surgical efforts for weight loss. Original Intake visit occurred on 7/13/19 with a weight of 345 lbs and BMI of 59.2.  Along with diet and behavior changes, she has been using various combinations through the years of Trulicity/naltrexone and a previous use of topiramate from her pain clinic which can also assist her weight loss goals.  See her intake visit notes for details on identified contributors to weight gain in the past. Chart review shows Dietician calculated RMR of 1892kcal/day and protein intake goal of 70-90g/day.  Her Trulicity was stopped in hospital May of 2022 during hip surgery and she had weight gain of 30 lbs over the summer due to increased cravings off medication. We transitioned to Semaglutide therapy this summer of 2022 but then supplies ran out due to shortages in supply and Victoza was started in November 2022 (11/7/22) with plans to transition back in 2023 when supplies of Ozempic are more available. she is now on 0.5mg/week Ozempic again and weight is coming down nicely with 12 lb reduction in weight from November 2022.     Weight:   Wt Readings from Last 5 Encounters:   10/06/23 118.7 kg (261 lb 9.6 oz)   06/22/23 116.1 kg (256 lb)   03/16/23 120.7 kg (266 lb)   02/01/23 126.1 kg (278 lb)   12/29/22 128.4 kg (283 lb)    pounds      Comorbidities:  Patient Active Problem List   Diagnosis    Binge eating    Eating disorder    Morbid obesity (H)    Multiple-type hyperlipidemia    Severe episode of recurrent major depressive disorder (H)    Arthralgia of hip    Hypertension    Trochanteric bursitis    History of urinary anomaly    Borderline personality disorder (H)    Attention deficit disorder    Asthma    Suicidal ideation    ACP (advance care planning)    Adjustment disorder with depressed mood    Anxiety    Moderate mixed bipolar  I disorder (H)    Bipolar II disorder, most recent episode hypomanic (H)    Borderline high cholesterol    Care plan discussed with patient    Disorder of pelvis    Gait difficulty    Hx of diabetes mellitus    Hx of eating disorder    Morbid obesity with BMI of 50.0-59.9, adult (H)    Osteoarthritis    Pain of both hip joints    Relationship problems    Secondary amenorrhea    Soft tissue lesion of shoulder region    Vertigo    Greater trochanteric bursitis of both hips    Hip dysplasia    Left hip pain    Osteoarthritis of hip    Primary osteoarthritis of left hip    ADD (attention deficit disorder)    H/O urinary frequency    History of anxiety disorder    Hyperlipidemia    Mixed hyperlipidemia    Recurrent major depressive disorder (H24)    Type 2 diabetes mellitus (H)    Primary osteoarthritis of both hips    Degenerative joint disease (DJD) of hip    Status post total hip replacement, left    Bipolar 1 disorder (H)    Constipation    Gastroesophageal reflux disease with esophagitis without hemorrhage    Mixed incontinence    Primary localized osteoarthritis of pelvic region and thigh    Skin sensation disturbance       Current Outpatient Medications:     ACETAMINOPHEN EXTRA STRENGTH 500 MG tablet, , Disp: , Rfl:     albuterol (PROAIR HFA/PROVENTIL HFA/VENTOLIN HFA) 108 (90 Base) MCG/ACT inhaler, Inhale 2 puffs into the lungs every 6 hours as needed for shortness of breath, wheezing or cough, Disp: 18 g, Rfl: 0    atomoxetine (STRATTERA) 80 MG capsule, Take 80 mg by mouth daily, Disp: , Rfl:     blood glucose (NO BRAND SPECIFIED) lancets standard, Use to test blood sugar 2 times daily or as directed., Disp: 100 each, Rfl: 11    blood glucose monitoring (NO BRAND SPECIFIED) meter device kit, Use to test blood sugar 2 times daily or as directed., Disp: 1 kit, Rfl: 0    blood glucose monitoring (NO BRAND SPECIFIED) test strip, Use to test blood sugars 2 times daily or as directed, Disp: 100 strip, Rfl: 3     buPROPion (WELLBUTRIN XL) 300 MG 24 hr tablet, Take 300 mg by mouth daily , Disp: , Rfl:     cholecalciferol (VITAMIN D3) 125 mcg (5000 units) capsule, , Disp: , Rfl:     EPINEPHrine (ANY BX GENERIC EQUIV) 0.3 MG/0.3ML injection 2-pack, Inject 0.3 mg into the muscle, Disp: , Rfl:     EPINEPHrine (EPIPEN/ADRENACLICK/OR ANY BX GENERIC EQUIV) 0.3 MG/0.3ML injection 2-pack, Inject 0.3 mLs (0.3 mg) into the muscle once as needed for anaphylaxis, Disp: 0.6 mL, Rfl: 0    fish oil-omega-3 fatty acids 500 MG capsule, , Disp: , Rfl:     hydrOXYzine (ATARAX) 50 MG tablet, , Disp: , Rfl:     ibuprofen (ADVIL/MOTRIN) 600 MG tablet, Take 1 tablet (600 mg) by mouth every 6 hours as needed (mild), Disp: , Rfl: 0    insulin pen needle (31G X 6 MM) 31G X 6 MM miscellaneous, Use new pen needle for each autoinjection., Disp: 90 each, Rfl: 1    ketoconazole (EXTINA) 2 % external foam, , Disp: , Rfl:     levonorgestrel (LILETTA, 52 MG,) 19.5 MCG/DAY IUD, 1 each by Intrauterine route once, Disp: , Rfl:     mometasone (ELOCON) 0.1 % external cream, Apply topically daily, Disp: , Rfl:     OZEMPIC, 0.25 OR 0.5 MG/DOSE, 2 MG/3ML pen, , Disp: , Rfl:     risperiDONE (RISPERDAL) 3 MG tablet, , Disp: , Rfl:     Semaglutide, 1 MG/DOSE, (OZEMPIC) 4 MG/3ML pen, Inject 1 mg Subcutaneous every 7 days, Disp: 3 mL, Rfl: 0    sennosides (SENOKOT) 8.6 MG tablet, , Disp: , Rfl:     tolterodine ER (DETROL LA) 4 MG 24 hr capsule, Take 4 mg by mouth daily, Disp: , Rfl:     traZODone (DESYREL) 150 MG tablet, Take 150 mg by mouth At Bedtime, Disp: , Rfl:       Interim: Since our last visit, she has hired a  each Saturday, does group power classes 2 days weekly, [employer] bike every day and machines every other day.     Appetite has increased with increased workouts.   Using food shelf for foods.   Had some binging on ice cream sandwiches when she had started.   Lab Results   Component Value Date    A1C 5.1 11/09/2022    A1C 4.7 04/04/2022    A1C 4.6  09/10/2021    A1C 6.7 08/23/2019    A1C 7.0 11/21/2018    A1C 6.0 12/31/2010   Diabetes has been well managed as noted above but some increased binge tendencies since coming off her topamax a month ago. There is likely dual benefit for improving glycemic control/curbing binging and offering some appetite reduction from 1mg/week Ozempic so we'll increase medication.  Had both hip surgeries: 2021 and 2022 and feeling good.   Plan:   1.  Diet: with the heavy/daily workouts, I'd like you to shoot for 1600-1700kcal/day when workout out more than 5 days weekly and if only working out 2 days weekly, then 1400kcal/day should keep you on track.  Hydrate well through the day with water and use a soft or protein shake supplement for your second meal of the day to make sure you're getting your 80-grams of protein daily goal met.   2. Exercise: continue [employer] workouts, excellent progress on your fitness.   3. Medication: Ozempic will increase to 1mg/week  now that you're off topamax.  4. Recheck A1c and metabolic/nutritional labs at your physical next week.  5. Goals: down 84 lbs over the last few years, great work. To preserve hip replacements for as long as possible, continuing to work on weight management to get BMI under 40 this year would be a good mid term goal.  About 27 lbs to accomplish this.       We discussed [facility] including:  -eating 3 meals daily  -reviewed metabolic needs for weight loss based on Resting Metabolic Rate  -protein goals supportive of healthy weight loss  -avoiding/limiting calorie containing beverages  -We discussed the importance of restorative sleep and stress management in maintaining a healthy weight.  -We discussed the National Weight Control Registry healthy weight maintenance strategies and ways to optimize metabolism.  -We discussed the importance of physical activity including cardiovascular and strength training in maintaining a healthier weight and explored viable  "options.      Most recent labs:  Lab Results   Component Value Date    WBC 5.5 02/01/2023    HGB 13.9 02/01/2023    HCT 42.1 02/01/2023    MCV 93 02/01/2023     02/01/2023     Lab Results   Component Value Date    CHOL 102 11/09/2022     Lab Results   Component Value Date    HDL 49 (L) 11/09/2022     No components found for: LDLCALC  Lab Results   Component Value Date    TRIG 110 11/09/2022     No results found for: CHOLHDL  Lab Results   Component Value Date    ALT 34 02/01/2023    AST 28 02/01/2023    ALKPHOS 56 02/01/2023     No results found for: HGBA1C  Lab Results   Component Value Date    B12 368 11/09/2022     No components found for: VITDT1  Lab Results   Component Value Date    KOFFI 79 11/09/2022     Lab Results   Component Value Date    PTHI 77 08/23/2019     No results found for: ZN  Lab Results   Component Value Date    VIB1WB 108 08/23/2019     Lab Results   Component Value Date    TSH 1.14 05/21/2021     No results found for: TEST    DIETARY HISTORY  Tracking technique: yes, [employer] poly  Positive Changes Since Last Visit: working out nearly every days still, able to do more s/p hip replacements.  Struggling With: some binging recently since coming off topamax.     Getting Adequate Protein: reviewed goals. Will supplement if needed to hit 80g/day goal.   Sleep schedule: n/a.      PHYSICAL ACTIVITY PATTERNS:  Cardiovascular: see above  Strength Training: see above    REVIEW OF SYSTEMS  Feels well. No issues when she aburptly stopped topamax last month..  PHYSICAL EXAM:  Vitals: /80 (BP Location: Right arm, Patient Position: Sitting)   Ht 1.626 m (5' 4\")   Wt 118.7 kg (261 lb 9.6 oz)   BMI 44.90 kg/m    Weight:   Wt Readings from Last 3 Encounters:   10/06/23 118.7 kg (261 lb 9.6 oz)   06/22/23 116.1 kg (256 lb)   03/16/23 120.7 kg (266 lb)         GEN: Pleasant, well groomed, in no acute distress  HEENT:  normal facies .  NECK: No swelling.  HEART: RRR.  LUNGS: No respiratory " difficulty noted. No cough. .  ABDOMEN: nontender to palpation. .  EXTREMITIES: No tremor. Ambulation is independent..  NEURO: Alert and Oriented X3, fluent speech. .  SKIN: No visible rashes. Lipidematous arms/thighs without cellulitis. .    Interim study results: none. Will recheck A1c, nutritional studies at her physical next week..      30 minutes spent by me on the date of the encounter doing chart review, history and exam, documentation and further activities per the note   Kerwin Dunn MD  [facility]  7:57 AM  10/6/2023

## 2023-10-06 NOTE — PATIENT INSTRUCTIONS
Plan:   1.  Diet: with the heavy/daily workouts, I'd like you to shoot for 1600-1700kcal/day when workout out more than 5 days weekly and if only working out 2 days weekly, then 1400kcal/day should keep you on track.  Hydrate well through the day with water and use a soft or protein shake supplement for your second meal of the day to make sure you're getting your 80-grams of protein daily goal met.   2. Exercise: continue [employer] workouts, excellent progress on your fitness.   3. Medication: Ozempic will increase now that you're off topamax.  4. Recheck A1c and metabolic/nutritional labs at your physical next week.  5. Goals: down 84 lbs over the last few years, great work. To preserve hip replacements for as long as possible, continuing to work on weight management to get BMI under 40 this year would be a good mid term goal.  About 27 lbs to accomplish this.       On-the-Go Breakfast Ideas  As of 2015, the latest research shows what a huge impact eating breakfast has on losing weight and feeling your best. People lose more weight when they make breakfast their biggest meal of the day compared to Dinner, but even if you cannot go to that degree, getting a breakfast that has at least 20 grams of protein and even a moderate amount of fat is ideal for maintaining good energy through the day and limits overeating in the evening hours.  The following are some quick and easy suggestions for at least getting something of substance into your body in the morning.  Enjoy!    Eating breakfast within 90 minutes of waking up is an important part of taking care of your body on a restricted calorie diet plan.  After sleeping for hours, your body is in need of fuel.  An ideal breakfast is a combination of protein, whole-grain carbohydrates, or fruit.  Here s why:    -Protein digests very slowly in the body, helping you feel more satisfied.  -Whole grains provide dietary fiber, which also digests slowly and helps keep your gut  clean.  -Fruit is a great source of vitamins, minerals, and fiber.     Each one of these breakfast combinations has between 200-300 calories and 15-20 grams of protein.  Feel free to mix and match!    Bone Broth (chicken bone broth or beef bone broth) is a great way to boost protein content. 8oz of bone broth will typically have 9-12grams of protein for 40kcal of energy.    Protein: Choose  -1/2 cup low-fat cottage cheese  -2 hard boiled eggs , or one cooked in olive oil (low/slow heat).  -1 low fat string cheese stick  -1 Tablespoon natural peanut butter  -[employer] vegetarian sausage mckinley (found in freezer section)  -1 slice lowfat cheese  -6 oz 2% or lowfat Greek yogurt, such as Fage or Oikos.    PLUS    Whole Grains:  Choose   -1 whole wheat English muffin  -1 whole wheat sol, half  -1/2 Fiber One frozen muffin, thawed  -1/2 Fiber One toaster pastry  -1 whole wheat bagel thin  -1/2 cup Kashi cereal  -1 Kashi waffle (or other whole grain high-fiber waffle)  Aim for whole grain/sprouted breads with at least 3g of fiber/slice if having bread. [place] is one such brand.    OR    Fruit: Choose  -1/3 cup blueberries  -1/2 banana (or a plantain- similar to a banana, yet smaller)  -1/2 cup cantaloupe cubes  -1 small apple  -1 small orange  -1/2 cup strawberries  -handful raspberries/blackberries (each berry is about 1 calorie).    *Adapted from [employer], Fall 20    Ten Breakfasts Under 250 calories    Ideally, getting between 350-600 calories  (depending on starting height and weight)for breakfast is ideal for avoiding hunger later in the day, adjust/add to the following accordingly:    One- 250 calories, 8.5 g protein  1 slice whole-grain toast   1 Tbsp peanut butter    banana    Two- 250 calories, 8 g protein    cup nonfat/lowfat yogurt  1/3rd cup diced no-sugar peaches  1/3rd cup cereal (like Special K, Cheerios, or bran flakes)    Three- 250 calories, 25 g protein  1 egg scrambled with 1 oz  skim milk    cup shredded cheddar    whole grain English muffin  1 oz [place] rick  1 tsp margarine spread    Four- 225 calories, 25 g protein  1/2 cup Kashi Go-Lean cereal    cup skim milk mixed with 1 scoop Bariatric Advantage protein powder    cup no-sugar diced pears    Five- 250 calories, 20 g protein    cup oatmeal prepared with skim milk, 1 scoop protein powder, and sugar-free maple syrup    Six- 200 calories, 5 g protein  1 whole grain waffle, toasted  1 tablespoon creamy peanut or almond butter    Seven-  250 calories, 19 g protein  Breakfast sandwich: 1 slice whole grain toast, cut in half.  Add 1 scrambled egg and one slice cheddar  cheese.    Eight-  250 calories, 15 g protein  2 eggs scrambled with 1/3 cup frozen spinach (heat before adding to eggs) and 2 tablespoons low fat cream cheese.    Nine-  150 calories, 15 g protein  2/3rd cup cottage cheese    cup cantaloupe    Ten- 200 calories, 20 g protein  Fruit smoothie made with 4 oz. nonfat Greek yogurt,   cup berries, 1 scoop protein powder, and 4 oz skim milk.    Ten Lunches Under 250 Calories    Aim for lunch to be around 300-400 calories a day when trying to lose weight and get that protein in!    One- 200 calories, 11 g protein  1/3 cup tuna salad made with light perez on 1 slice whole grain bread  1 small peeled apple    Two- 250 calories, 16 g protein  1/3 cup lowfat cottage cheese    cup cooked green beans    small fruit cocktail (in natural juice)    Three- 200 calories, 11 g protein    grilled cheese sandwich on whole grain bread with lowfat cheese  2/3rd cup of tomato soup    Four- 250 calories, 22 g protein  Deli wrap: 1 oz sliced turkey, 1 oz sliced ham, 1 oz sliced chicken rolled up with 1 slice low-fat cheese  1 small orange    Five- 250 calories, 28 g protein  2/3rd cup chili with 1 oz shredded cheese  4 saltine crackers    Six- 250 calories, 22 g protein  1 cup fresh spinach with 2 oz chicken, 1/3rd cup mandarin oranges, and 2  tablespoons sliced almonds with 1 tablespoon  vinaigrette dressing    Seven- 200 calories, 11 g protein  1 Tbsp sugar-free preserves and 1 Tbsp peanut butter on 1 slice whole grain toast    cup nonfat/lowfat Greek yogurt    Eight- 250 calories, 18 g protein  1 small soft-shell chicken taco with 1 oz shredded cheese, lettuce, tomato, salsa, and 1 Tbsp light sour cream    cup black beans    Nine- 225 calories, 13 g protein  2 ounces baked chicken  1/4 cup mashed potatoes    cup green beans    Ten- 200 calories, 21 g protein  Deli sol: 2 oz roast beef or other deli meat with 1 tsp Александр mayonnaise and sliced tomato, onion, and lettuce  1/3rd cup cottage cheese      Ten Dinners Under 300 calories    If you're eating a large breakfast and medium lunch, keep dinner small.  300-400 calories is ideal for most people depending on their caloric needs.    One- 300 calories, 12 g protein  1-inch thick slice of turkey meatloaf    cup baked butternut squash    Two- 200 calories, 9 g protein  Bread-less BLT: 3 slices turkey rick, sliced tomato, wrapped in a large lettuce leaf    cup peeled fruit    Three- 275 calories, 36 g protein  3 oz roasted chicken    cup cooked broccoli    cup shredded cheddar cheese    cup unsweetened applesauce    Four- 200 calories, 25 g protein  3 oz baked tilapia  1/3rd cup cooked carrots    cup yogurt    Five- 250 calories, 20 g protein  Grilled ham  n  Swiss: spread 2 tsp ghee or butter on 1 slice of whole grain bread.  Cut bread in half, layer 2 oz deli ham with 1 piece of Swiss cheese and grill until cheese is melted.    cup cooked vegetables    Six- 250 calories, 18 g protein  Vegetarian cheeseburger: 1 [place] cheeseburger topped with lettuce, onion, tomato, and ketchup/mustard    cup sweet potato fries    Seven- 250 calories, 18 g protein  Pork pot roast: 2 oz roasted pork loin, 1/3rd cup roasted carrots,   medium potato, cooked with   cup gravy    Eight- 330 calories, 25 g protein  2 oz meatballs  (about 2 small meatballs)    cup spaghetti sauce  1/2 piece toast topped with 1 tsp ghee or butterand topped with garlic powder, toasted in oven    Nine- 250 calories, 16 g protein  Mexican pizza: one 8  corn tortilla topped with 2 oz chicken,   cup salsa, 2 tablespoons black beans, 2 tablespoons shredded cheese.  Bake until cheese is melted.    Ten- 250 calories, 22 g protein  Shrimp stir-butler: 3 oz cooked shrimp, 1/6th onion,   pepper,   cup chopped carrots sautéed in 1 tablespoon olive oil, topped with 2 tablespoons stir butler sauce and a pinch of sesame seeds        150 Calories or Less Snack Ideas   1 hardboiled egg with   cup berries  1 small apple with 1 hardboiled egg  10 almonds with   cup berries  2 clementines with 1 light string cheese  1 light string cheese with   sliced apple  1 light string cheese wrapped in 2 slices of turkey  4 100% whole wheat crackers (e.g. Triscuit) with 1 light string cheese    c. cottage cheese with   cup fruit and 1 Tbsp sunflower seeds     cup cottage cheese with   of an avocado     can tuna fish with 1 cup sliced cucumbers     cup roasted garbanzo beans with paprika and cayenne pepper    baked sweet potato with   cup chili beans or   cup cottage cheese  2 oz. nitrate free turkey slices with 1 cup carrots  1 container (6 oz) of low sugar (less than 10 grams of sugar) greek yogurt   3 Tablespoons of hummus with 1 cup sliced bell peppers   2 Tablespoons of hummus with 15 baby carrots  4 Tablespoons ranch dip made with plain Greek Yogurt and 3 mini cucumbers  1/4 cup nuts (any kind)  1 Tablespoon peanut butter with 1 stalk celery   1 dill pickle wrapped in 1-2 slices of deli ham with 1 tsp of mayonnaise/mustard.  Example Meal Plan for a 5655-1799 Calorie Diet:    In order to fuel your weight loss properly and avoid hunger-induced overeating later in the day, for your height and weight, you will enjoy the most success by following the diet below or similar with adjustments based on  your particular tastes and preferences.  Exercise may influence speed, amount of weight loss further.     I recommend getting into a meal routine and keeping it similar day to day in the beginning so you don t have to think too hard about what you re going to make/eat.  Keep snacks healthy, ideally containing protein and some vegetables.  Non-processed food is preferable to packaged items.  Eat at least a few crunchy green vegetables if having a snack, which should be 2-3 hours after your mealtimes(prepare these ahead of time for ease of use).  Drink 64 oz -80 oz of water daily for most, some of you will need more and we'll discuss it at your visit if that is the case.      When changing our diet,  we can often mistake thirst for hunger or just have some distracted eating habits that we need to break free from ('bored/mindless eating', screen time,work, driving,etc).  A glass of water and reconsideration of our hunger is often all that is needed.  Having the urge is not the problem, but watching it pass by without acting on it is the goal.    If you re having hunger problems, add a protein drink/snack to your morning hours or afternoon snack with at least 20grams of protein and not too much sugar (under 10g).  A carton of higher protein/low sugar yogurt can work as well.  If the urge to snack is overwhelming and not satiated, try going for a 10 minute walk/exercise, come home and drink a glass of water and if still hungry, have a  calorie snack (handful of raw/sprouted nuts, veggies and string cheese, protein bar, etc).  Savor it.    It is better to have a large breakfast, a moderate lunch and a smaller dinner to fuel your day.  People lose 10-15% more weight during their weight loss season with this strategy. Optimizing your protein intake at each meal will further keep you more satisfied while eating less food overall.  Getting exercise in early has also been shown to offer the best results (before breakfast  ideally but anytime is the right time to exercise if that is not an option for you).    To make sure you re getting adequate vitamins and minerals during weight loss, I recommend one complete multivitamin a day of your choice.  Consider a probiotic and taking some vitamin D 2000 IU daily.    Let supper be your last meal of the day and ideally try to have at least 12 hours between supper and breakfast the next day to tap into some beneficial overnight fasting dynamics.  Midnight snacks need to go away. Water in the evening is fine, unsweetened, non caffeinated herbal tea is helpful as well.  Consolidating your meals within a 8-12 hour period of your day will help tap into these additional metabolic benefits and tends to keep your appetite up for breakfast, further helping to stay on track.  For most of my patients, I don't recommend an intermittent fasting style diet (many find it hard to fit in their lifestyle) but an overnight fast is very doable for most patients and helps regulate our hunger drives a little better.  This makes it very important to nail good intake at all three meals to feel satisfied/energized and still lose weight.      If evening snacking desires are high, consider a glass of fiber supplement for some additional fullness (metamucil or similar). Most of us don't get the 25-30 grams per day of fiber that promotes good gut health/satiety.  Benefiber, metamucil, citrucel are reasonable/affordable options for most people.  Inulin, chicory, psyllium husk are reasonable options but start slow and low in the dose to avoid gas/bloating until your gut gets acclimated (ramping up to 5-10 grams per day of supplemental fiber after 3-4 weeks if needed).      Example Meal Plan:  Breakfast: 450-475 Calories  1 egg cooked on low in olive oil:   calories.  5oz Greek Yogurt (Fage plain classic: ~150 randi)  Handful of Berries of your choice (about a calorie per berry or 20-40cal per handful)    cup(cooked) of   old fashioned oatmeal or 1/2 cup(cooked) steel cut oats. (150 randi)  Sprinkle amount of brown sugar and a pat of butter. (40 randi)  Glass of  Water  Black coffee or unsweetened Tea (0calories).      2-3 hours Later Snack: (195 calories).  Glass of water  One string Cheese (80 calories) or 4 oz creamed cottage cheese (115 calories) with  Crunchy Celery sticks (less than 10 calories per large stalk) 2 stalks. (20 calories)    of a  Large Banana or   of a Large Apple (60 calories):  eat second half at lunch or afternoon snack.     Lunch:300 -350 calories   Chicken Breast  (baked/broiled/roasted/grilled)  4-6 oz.  (125-180 randi), BBQ sauce/hot sauce/mustard/seasoning is free. Just use a reasonable amount. Or a can of tuna with 1 tablespoon mayonnaise.  Salad: lettuce, any other veggies (cucumbers, green peppers/celery you like and a small drizzle of dressing to just flavor.  Go as big on the veggies as you like,  as they are practically calorie free.   A whole, 8 inch cucumber is 45 calories, a whole green pepper is 23 calories, a stalk of celery is 9 calories.  [place] Dressing is 60 calories per tablespoon..so moderate your desired dressing or do a drizzle of olive oil and splash of balsamic vinegar on top,  Total calories unlikely to be over 150 even with dressing.  Glass of Water.    Option for lunch is meal replacement protein drink/smoothie.  Need at least 20 grams of protein and eat the rest of your apple/banana from the morning snack.      Afternoon Snack: 150-200 calories   Cheese Stick or cottage cheese again  and a fresh fruit OR  Granola Bar (protein Bar acceptable if under 200 calories OR  Homemade smoothies:  8oz skim milk,  a handful of berries (fresh or frozen and a serving of protein powder such as BiPro or Taniya sWhey for example.  If you don't like dairy, make with 8oz water, one small banana, handful of berries and the protein powder, add any veggies you want as well:  roughly 200 calories.    Glass of Water    Dinner: 325 calories  4oz of fresh, [place] salmon.  Broiled (salt/pepper/dill) for about 8-8.5 minutes (200calories) or  4oz filet mignon steak or sirloin steak  Salad or vegetable sautéed lightly in olive oil or   Broccoli 1.5  cups chopped and steamed  or micro-waved in a little water (75 calories)  Glass of Water,    Cup of herbal tea (unsweetened, caffeine free)      Herbs and seasonings are encouraged to flavor your foods/vegetables.  Make your food delicious.      Tips for Success:  1.  Prepare proteins ahead of time (broil chicken breasts in bulk so you can grab and go), steel cut oats/lentils can be stored in casserole dish/bowl in the fridge for quick scoop in the morning and rewarm in microwave, make use of crock pot recipes (watch salt content).  Making meals that cover 3-4 future meals is an easy way to stay on track.  2.  Drink a 8-12 oz glass of water every 2-3 hours when awake.  We often mistake hunger for thirst, especially when losing weight.  3. Remember your Reward and Motivation when things get hard.  4.  Weigh yourself every morning and record, you'll stay on track better and learn how our biorhythms, diet and elimination patterns show up on the scale. Don't worry about 1 or 2 day patterns, but when on track you'll notice good trend downward of weight over 3-4 day segments.  Plateaus tend to resolve after 4-8 days in most cases if you stay consistent with your plan.  These are natural and part of weight loss, even if you're perfect with your plan execution.  5. Call if problems/concerns.  [employer] is a great tool to stay in touch and provide weekly outside accountability. Check in with questions or if you want to brag.  6.  Find a handful of meals/foods that keep you on track and feeling good and get into a routine that is sustainable for you.  It's OK to have a routine that works for you.  7.  Consider taking a complete multivitamin just to make sure all micronutrients are  "adequate during weight loss.  8. If losing hair/brittle nails it usually means you are not taking enough protein.  Minimum goal is 60 grams daily of protein for smaller women, 80 grams a day for men. Consider taking Biotin as supplement or a \"Hair and Nail\" multivitamin.    LEAN PROTEIN SOURCES  Getting 20-30 grams of protein, 3 meals daily, is appropriate for most people, some need more but more than about 40 grams per meal is not useful.  General rule is drinking one ounce of water per gram of protein eaten over the course of the day:  70 grams of protein each day, drink 70 oz of water.  Protein Source Portion Calories Grams of Protein                           Nonfat, plain Greek yogurt    (10 grams sugar or less) 3/4 cup (6 oz)  12-17   Light Yogurt (10 grams sugar or less) 3/4 cup (6 oz)  6-8   Protein Shake 1 shake 110-180 15-30   Skim/1% Milk or lactose-free milk 1 cup ( 8 oz)  8   Plain or light, flavored soymilk 1 cup  7-8   Plain or light, hemp milk 1 cup 110 6   Fat Free or 1% Cottage Cheese 1/2 cup 90 15   Part skim ricotta cheese 1/2 cup 100 14   Part skim or reduced fat cheese slices 1 ounce 65-80 8     Mozzarella String Cheese 1 80 8   Canned tuna, chicken, crab or salmon  (canned in water)  1/2 cup 100 15-20   White fish (broiled, grilled, baked) 3 ounces 100 21   Comfort/Tuna (broiled, grilled, baked) 3 ounces 150-180 21   Shrimp, Scallops, Lobster, Crab 3 ounces 100 21   Pork loin, Pork Tenderloin 3 ounces 150 21   Boneless, skinless chicken /turkey breast                          (broiled, grilled, baked) 3 ounces 120 21   [place], [place], [place], and Venison 3 ounces 120 21   Lean cuts of red meat and pork (sirloin,   round, tenderloin, flank, ground 93%-96%) 3 ounces 170 21   Lean or Extra Lean Ground Turkey 1/2 cup 150 20   90-95% Lean Ida Burger 1 mckinley 140-180 21   Low-fat casserole with lean meat 3/4 cup 200 17   Luncheon Meats                                            "             (turkey, lean ham, roast beef, chicken) 3 ounces 100 21   Egg (boiled, poached, scrambled) 1 Egg 60 7   Egg Substitute 1/2 cup 70 10   Nuts (limit to 1 serving per day)  3 Tbsp. 150 7   Nut Martin's Additions (peanut, almond)  Limit to 1 serving or less daily 1 Tbsp. 90 4   Soy Burger (varies) 1  15   Garbanzo, Black, [place] Beans 1/2 cup 110 7   Refried Beans 1/2 cup 100 7   Kidney and Lima beans 1/2 cup 110 7   Tempeh 3 oz 175 18   Vegan crumbles 1/2 cup 100 14   Tofu 1/2 cup 110 14   Chili (beans and extra lean beef or turkey) 1 cup 200 23   Lentil Stew/Soup 1 cup 150 12   Black Bean Soup 1 cup 175 12       MEDICATIONS FOR WEIGHT LOSS  There are several medications available to assist us in weight loss.  By themselves, without a mindful change in diet and increase in movement/activity these medications are disappointing in their results. However, combined with a closely monitored program of diet change and exercise they can be very effective in controlling appetite and boosting initial weight loss.  All weight loss medications need continual re-evaluation for efficacy as their side effects and health benefits fail to be worthwhile if a person is not continuing to lose weight or in maintaining their healthy weight.  Some weight loss medications are scheduled drugs, meaning there is at least a theoretical possibility for developing addiction to them, but in practice this is rare.  We do anticipate coming off meds in the future- after stabilization of weight loss is assurred.  Finally, a tolerance can develop and people s perceived efficacy of medication can diminish.  In communication with your physician, it may be appropriate to intermittently take a break from these medications and then restart again (few weeks off then restart again) if a plateau is reached that cannot be broken through.  Each person can respond to a medication differently and to be a good option for you, it will need to be affordable,  effective and well tolerated with minimal side effects.    In most cases, weight loss progress after one month and three months will be obtained and if a patient is not reaching the satisfactory progress towards weight loss, the medications may be discontinued.  The thought is that if a person is taking a weight loss medication and not receiving the potential health benefit of that drug, the side effects are not worthwhile and use should be discontinued.  On the flip side, there are many people on some weight loss medications for years because it continues to be an effective tool in their weight management and they are tolerating the medication without any long-term side effects.  Each person's response and purpose will be evaluated.      PHENTERMINE (Adipex): approved in 1959 for appetite suppression.  It has stimulant effects and cannot be used with Ritalin, Concerta, or other stimulants.  Although it is not highly addictive, it's chemically related to amphetamines which are addictive and is classified as a Controlled Substance by the [place].  Occasional dependence can develop, but rarely. The most common side effects are dry mouth, increased energy and concentration, increased pulse, and constipation.  You should not take phentermine if you have glaucoma, hyperthyroidism, or uncontrolled/untreated hypertension or overly anxious. You should stop if dramatic mood swings, severe insomnia, palpations, chest pains, visual changes or if your Blood Pressure is consistently elevated or any time it's over 160/90.   It's ok to go off the med for a few weeks and restart if efficacy is wearing off.  $24-$30 for 90 tablets at [facility]. Females are required to have reliable birth control to reduce the risk birth defects/miscarriage.      TOPIRAMATE (Topamax): Anti-seizure medication, also used to prevent migraines and sometimes for mood stabilization.  Side effects include paresthesia, glaucoma, altered concentration,  attention difficulties, memory and speech problems, metabolic acidosis, depression, increase in body temperature and decrease sweating, risk of kidney stones.  Do not take Topamax while taking Depakote as this can cause high ammonia levels.  You must have reliable birth control as Topamax can cause birth defects.  If prolonged use has occurred it should be tapered off slowly to avoid withdrawal issues.  Insurance usually covers Topiramate.  At higher doses, there may be some confusion/forgetfulness associated with this so we try to limit dose to under 75mg twice daily to reduce this risk. Often covered by insurance as it's used for many reasons.  Topamax will cause carbonated beverages to taste bad. A recheck of your kidney/electrolytes may occur within a few months of starting.    QSYMIA (Phentermine + Topamax):  See above information about phentermine and Topamax.  Most common side effects are paresthesia, dizziness, distortion of taste, insomnia, constipation, and dry mouth.  See above descriptions for the two individual agents.Females are required to have reliable birth control to reduce the risk birth defects/miscarriage.  $150-$220 per month      GLP1 Agonists:  Liraglutide (Victoza/Saxenda), Semaglutide (Ozempic/Wegovy):   Part of the family of Glucagon Like Peptide Agonists, these medications directly suppresses appetite and are often used by diabetic patients due to improvements in glucose/insulin balance.  They also slow how quickly the stomach empties, increasing fullness. They may be hard to get covered for non diabetics and some plans have exclusions for weight loss purposes.  Currently, these are  injectable medications delivered via autoinjector pen. It can be very costly without insurance coverage (over $500/month).  Small risks for pancreatitis exists and dose should be held if increased mid abdominal pain/burning. It is not to be used if previous Multiple Endocrine Neoplasia. In rodents, may  "increase risk of thyroid tumors and not indicated for anyone with a history of medullary thyroid cancer as a result.  If changes in voice/swallowing should be discontinued. Reliable birth control required in women. Saxenda.[email or web address], Wegovy.com has more information on these medications.    Contrave (Bupropion/Naltrexone).    Synergistic combination of a mild appetite suppressing anti-depressant (Bupropion) whose effects are increased due to interaction with Naltrexone.  Naltrexone may have some effects on craving and is often used in addiction medicine to help previous opiate addicts be less prone to relapse as it blocks the action of opiates. Should be stopped if any need for opiate pain medication, surgery or planned procedures where you'll be given sedation/anesthesia. If prolonged use, recommend stepping down bupropion over 2-3 weeks to limit any risk of withdrawal issues. Side effects may include dry mouth, increased heart rate, mild elevation in Blood pressure;  dizziness, ringing in the ears, anxiety (typically due to bupropion), nausea, constipation, and some get fatigued with naltrexone.  About $210 on [facility] for 120 tabs of \"Contrave\", the brand name without insurance coverage. Generic Bupropion 75mg: $25 for 120 tabs, Naltrexone: $55 for 90 tabs without insurance coverage on [employer]. Cannot be used if pregnant/trying to conceive or breast feeding.      Plenity:   By mail order pharmacy only, moderately expensive. $98/month.  2-3 capsules taken with 16 oz of water, 20 minutes before 2 meals daily provides crystals that expand into a gel that fills the stomach 20-25% and provides a mechanical fullness.  The Plenity then mixes with your next meal and increases satisfaction by bulking the meal up to feel bigger than it truly is. Plenity is not absorbed and gets passed through the digestive tract and excreted in stools. May cause some bloating/gas/full feeling as it behaves like a fiber in many ways. Cost not " "available yet. FDA cleared in March of 2019 and became available near the end of 2020 through mail order pharmacy only (PowerbyProxi). The safety and efficacy trials were done under \"Gelesis\" name. Not appropriate for people with stomach or bowel motility issues as requires you to pass it through digestive tract. MyPlenity.com has more information.  How much activity does it take to burn off the occasional treat?  Occasional treats or indulgence doesn't have to set back your weight loss season goals. But if the occasional treat turns into the daily chocolate habit or the chips after dinner, or a can of soda, then your progress will slow dramatically unless your activity and exercise can compensate for those empty, low nutrition/high calorie foods.  Here's some estimations of walking off your snacks depending on general weights.  You can use \"calorie burn calculators\" if you search in [place], more accurate estimations should ask height/weight/gender and if you have a smart watch/heart rate monitor fitness watch, then your personal burn rate will be much more accurate then these general numbers. But these are in the ballpark.  http://www.TopCodercalculator.com/tbtsxvho-ysnfml-yjszpelnet/ is a nice reference for many different activities with just entering weight and time spent doing anactivity.    Most pre-packaged snacks/treats or 12 oz cans of soda come in around 150 kcal (small bag of chips (11-12 chips), 2 Tollhouse Cookies, 12 oz Coke, 5 oz of wine(125 kcal), 2 oz Vodka (130kcal),etc).  To burn off this snack/indulgence, walking at a \"walking the dog\" pace or for most people that aren't in training/fitness mode, is about 2.5 MPH. If you move slower than this, you'll burn less calories. If you move faster than this,you'll burn more.     Calories burned are for a 30 minute walk, at 2.5 MPH (traveling 1.25 miles in 30 minutes):    Body Weight Calories Burned   175 lbs 120 kcal   200 lbs 137 kcal   225 lbs 153 kcal " "  250 lbs 171 kcal   300 lbs 204 kcal       Speed, intensity, hills and activity type (walk/bike/swim/chores/yardwork/etc) will determine how much energy you burn per hour.  As you can see, for most people, a small snack of 150 kcal is a 30 minute commitment to moving at a modest pace.  A quick stroll. In comparison, most people moving at a \"missing the bus\" walk pace of about 4 mph (or the pace that a fit person may  walk in a marathon race if they can't run anymore).    30 minutes at 4 MPH speedy walk/slow jog on level ground burn rate (2 miles covered in 30 minutes):    Body Weight Calories Burned   175 lbs 198 kcal   200 lbs 227 kcal   225 lbs 255 kcal   250 lbs 283 kcal   300 lbs 340 kcal.     Finding loops in your neighborhood is easy to map out distances by using sites like, MapMyRun.com, MapMyRide.com, or Strava.com.    Get out there and earn it, burn it, or pay it forward.  Banking calories is always a good idea if you know an occasion is coming up where you plan to indulge. The goal for all adults around the work is at [id number] minutes weekly of moderate aerobic activity like those listed above (keeping heart rate at about 50-65% of your maximum heart rate calculation (roughly 220-Age in years (as long as not on heart slowing medicationslike Beta Blockers)).     Hopefully, this drives home the point that snacks need to be intentional during weight loss season as most of us struggle with finding 30-60minutes most days a week to exercise and it takes more than that in many cases to make up for the sweet/treats and indulgences that may have contributed to your weight gain over the years (roughly, a can of soda daily for a year will put a person at risk for a 10-15 lb weight gain each year).    "

## 2023-10-06 NOTE — LETTER
10/6/2023         RE: Ronel Ryan  [address]  [facility] [address]        Dear Colleague,    Thank you for referring your patient, Ronel Ryan, to the [facility] [place]. Please see a copy of my visit note below.    Bariatric Clinic Follow-Up Visit:    Ronel Ryan is a 43 year old  female with Body mass index is 44.9 kg/m .  presenting here today for follow-up on non-surgical efforts for weight loss. Original Intake visit occurred on 7/13/19 with a weight of 345 lbs and BMI of 59.2.  Along with diet and behavior changes, she has been using various combinations through the years of Trulicity/naltrexone and a previous use of topiramate from her pain clinic which can also assist her weight loss goals.  See her intake visit notes for details on identified contributors to weight gain in the past. Chart review shows Dietician calculated RMR of 1892kcal/day and protein intake goal of 70-90g/day.  Her Trulicity was stopped in hospital May of 2022 during hip surgery and she had weight gain of 30 lbs over the summer due to increased cravings off medication. We transitioned to Semaglutide therapy this summer of 2022 but then supplies ran out due to shortages in supply and Victoza was started in November 2022 (11/7/22) with plans to transition back in 2023 when supplies of Ozempic are more available. she is now on 0.5mg/week Ozempic again and weight is coming down nicely with 12 lb reduction in weight from November 2022.     Weight:   Wt Readings from Last 5 Encounters:   10/06/23 118.7 kg (261 lb 9.6 oz)   06/22/23 116.1 kg (256 lb)   03/16/23 120.7 kg (266 lb)   02/01/23 126.1 kg (278 lb)   12/29/22 128.4 kg (283 lb)    pounds      Comorbidities:  Patient Active Problem List   Diagnosis     Binge eating     Eating disorder     Morbid obesity (H)     Multiple-type hyperlipidemia     Severe episode of recurrent major depressive disorder (H)      Arthralgia of hip     Hypertension     Trochanteric bursitis     History of urinary anomaly     Borderline personality disorder (H)     Attention deficit disorder     Asthma     Suicidal ideation     ACP (advance care planning)     Adjustment disorder with depressed mood     Anxiety     Moderate mixed bipolar I disorder (H)     Bipolar II disorder, most recent episode hypomanic (H)     Borderline high cholesterol     Care plan discussed with patient     Disorder of pelvis     Gait difficulty     Hx of diabetes mellitus     Hx of eating disorder     Morbid obesity with BMI of 50.0-59.9, adult (H)     Osteoarthritis     Pain of both hip joints     Relationship problems     Secondary amenorrhea     Soft tissue lesion of shoulder region     Vertigo     Greater trochanteric bursitis of both hips     Hip dysplasia     Left hip pain     Osteoarthritis of hip     Primary osteoarthritis of left hip     ADD (attention deficit disorder)     H/O urinary frequency     History of anxiety disorder     Hyperlipidemia     Mixed hyperlipidemia     Recurrent major depressive disorder (H24)     Type 2 diabetes mellitus (H)     Primary osteoarthritis of both hips     Degenerative joint disease (DJD) of hip     Status post total hip replacement, left     Bipolar 1 disorder (H)     Constipation     Gastroesophageal reflux disease with esophagitis without hemorrhage     Mixed incontinence     Primary localized osteoarthritis of pelvic region and thigh     Skin sensation disturbance       Current Outpatient Medications:      ACETAMINOPHEN EXTRA STRENGTH 500 MG tablet, , Disp: , Rfl:      albuterol (PROAIR HFA/PROVENTIL HFA/VENTOLIN HFA) 108 (90 Base) MCG/ACT inhaler, Inhale 2 puffs into the lungs every 6 hours as needed for shortness of breath, wheezing or cough, Disp: 18 g, Rfl: 0     atomoxetine (STRATTERA) 80 MG capsule, Take 80 mg by mouth daily, Disp: , Rfl:      blood glucose (NO BRAND SPECIFIED) lancets standard, Use to test blood  sugar 2 times daily or as directed., Disp: 100 each, Rfl: 11     blood glucose monitoring (NO BRAND SPECIFIED) meter device kit, Use to test blood sugar 2 times daily or as directed., Disp: 1 kit, Rfl: 0     blood glucose monitoring (NO BRAND SPECIFIED) test strip, Use to test blood sugars 2 times daily or as directed, Disp: 100 strip, Rfl: 3     buPROPion (WELLBUTRIN XL) 300 MG 24 hr tablet, Take 300 mg by mouth daily , Disp: , Rfl:      cholecalciferol (VITAMIN D3) 125 mcg (5000 units) capsule, , Disp: , Rfl:      EPINEPHrine (ANY BX GENERIC EQUIV) 0.3 MG/0.3ML injection 2-pack, Inject 0.3 mg into the muscle, Disp: , Rfl:      EPINEPHrine (EPIPEN/ADRENACLICK/OR ANY BX GENERIC EQUIV) 0.3 MG/0.3ML injection 2-pack, Inject 0.3 mLs (0.3 mg) into the muscle once as needed for anaphylaxis, Disp: 0.6 mL, Rfl: 0     fish oil-omega-3 fatty acids 500 MG capsule, , Disp: , Rfl:      hydrOXYzine (ATARAX) 50 MG tablet, , Disp: , Rfl:      ibuprofen (ADVIL/MOTRIN) 600 MG tablet, Take 1 tablet (600 mg) by mouth every 6 hours as needed (mild), Disp: , Rfl: 0     insulin pen needle (31G X 6 MM) 31G X 6 MM miscellaneous, Use new pen needle for each autoinjection., Disp: 90 each, Rfl: 1     ketoconazole (EXTINA) 2 % external foam, , Disp: , Rfl:      levonorgestrel (LILETTA, 52 MG,) 19.5 MCG/DAY IUD, 1 each by Intrauterine route once, Disp: , Rfl:      mometasone (ELOCON) 0.1 % external cream, Apply topically daily, Disp: , Rfl:      OZEMPIC, 0.25 OR 0.5 MG/DOSE, 2 MG/3ML pen, , Disp: , Rfl:      risperiDONE (RISPERDAL) 3 MG tablet, , Disp: , Rfl:      Semaglutide, 1 MG/DOSE, (OZEMPIC) 4 MG/3ML pen, Inject 1 mg Subcutaneous every 7 days, Disp: 3 mL, Rfl: 0     sennosides (SENOKOT) 8.6 MG tablet, , Disp: , Rfl:      tolterodine ER (DETROL LA) 4 MG 24 hr capsule, Take 4 mg by mouth daily, Disp: , Rfl:      traZODone (DESYREL) 150 MG tablet, Take 150 mg by mouth At Bedtime, Disp: , Rfl:       Interim: Since our last visit, she has  hired a  each Saturday, does group power classes 2 days weekly, [employer] bike every day and machines every other day.     Appetite has increased with increased workouts.   Using food shelf for foods.   Had some binging on ice cream sandwiches when she had started.   Lab Results   Component Value Date    A1C 5.1 11/09/2022    A1C 4.7 04/04/2022    A1C 4.6 09/10/2021    A1C 6.7 08/23/2019    A1C 7.0 11/21/2018    A1C 6.0 12/31/2010   Diabetes has been well managed as noted above but some increased binge tendencies since coming off her topamax a month ago. There is likely dual benefit for improving glycemic control/curbing binging and offering some appetite reduction from 1mg/week Ozempic so we'll increase medication.  Had both hip surgeries: 2021 and 2022 and feeling good.   Plan:   1.  Diet: with the heavy/daily workouts, I'd like you to shoot for 1600-1700kcal/day when workout out more than 5 days weekly and if only working out 2 days weekly, then 1400kcal/day should keep you on track.  Hydrate well through the day with water and use a soft or protein shake supplement for your second meal of the day to make sure you're getting your 80-grams of protein daily goal met.   2. Exercise: continue [employer] workouts, excellent progress on your fitness.   3. Medication: Ozempic will increase to 1mg/week  now that you're off topamax.  4. Recheck A1c and metabolic/nutritional labs at your physical next week.  5. Goals: down 84 lbs over the last few years, great work. To preserve hip replacements for as long as possible, continuing to work on weight management to get BMI under 40 this year would be a good mid term goal.  About 27 lbs to accomplish this.       We discussed [facility]ariat[facility] Basics including:  -eating 3 meals daily  -reviewed metabolic needs for weight loss based on Resting Metabolic Rate  -protein goals supportive of healthy weight loss  -avoiding/limiting calorie containing beverages  -We discussed the  importance of restorative sleep and stress management in maintaining a healthy weight.  -We discussed the National Weight Control Registry healthy weight maintenance strategies and ways to optimize metabolism.  -We discussed the importance of physical activity including cardiovascular and strength training in maintaining a healthier weight and explored viable options.      Most recent labs:  Lab Results   Component Value Date    WBC 5.5 02/01/2023    HGB 13.9 02/01/2023    HCT 42.1 02/01/2023    MCV 93 02/01/2023     02/01/2023     Lab Results   Component Value Date    CHOL 102 11/09/2022     Lab Results   Component Value Date    HDL 49 (L) 11/09/2022     No components found for: LDLCALC  Lab Results   Component Value Date    TRIG 110 11/09/2022     No results found for: CHOLHDL  Lab Results   Component Value Date    ALT 34 02/01/2023    AST 28 02/01/2023    ALKPHOS 56 02/01/2023     No results found for: HGBA1C  Lab Results   Component Value Date    B12 368 11/09/2022     No components found for: VITDT1  Lab Results   Component Value Date    KOFFI 79 11/09/2022     Lab Results   Component Value Date    PTHI 77 08/23/2019     No results found for: ZN  Lab Results   Component Value Date    VIB1WB 108 08/23/2019     Lab Results   Component Value Date    TSH 1.14 05/21/2021     No results found for: TEST    DIETARY HISTORY  Tracking technique: yes, myfitness poly  Positive Changes Since Last Visit: working out nearly every days still, able to do more s/p hip replacements.  Struggling With: some binging recently since coming off topamax.     Getting Adequate Protein: reviewed goals. Will supplement if needed to hit 80g/day goal.   Sleep schedule: n/a.      PHYSICAL ACTIVITY PATTERNS:  Cardiovascular: see above  Strength Training: see above    REVIEW OF SYSTEMS  Feels well. No issues when she aburptly stopped topamax last month..  PHYSICAL EXAM:  Vitals: /80 (BP Location: Right arm, Patient Position: Sitting)   " Ht 1.626 m (5' 4\")   Wt 118.7 kg (261 lb 9.6 oz)   BMI 44.90 kg/m    Weight:   Wt Readings from Last 3 Encounters:   10/06/23 118.7 kg (261 lb 9.6 oz)   06/22/23 116.1 kg (256 lb)   03/16/23 120.7 kg (266 lb)         GEN: Pleasant, well groomed, in no acute distress  HEENT:  normal facies .  NECK: No swelling.  HEART: RRR.  LUNGS: No respiratory difficulty noted. No cough. .  ABDOMEN: nontender to palpation. .  EXTREMITIES: No tremor. Ambulation is independent..  NEURO: Alert and Oriented X3, fluent speech. .  SKIN: No visible rashes. Lipidematous arms/thighs without cellulitis. .    Interim study results: none. Will recheck A1c, nutritional studies at her physical next week..      30 minutes spent by me on the date of the encounter doing chart review, history and exam, documentation and further activities per the note   Kerwin Dunn MD  [facility]  7:57 AM  10/6/2023    IRonel, give verbal consent for a resident to be present in today's visit.       Again, thank you for allowing me to participate in the care of your patient.        Sincerely,        Kerwin Dunn MD  "

## 2023-10-11 ENCOUNTER — VIRTUAL VISIT (OUTPATIENT)
Dept: SURGERY | Facility: CLINIC | Age: 43
End: 2023-10-11
Payer: COMMERCIAL

## 2023-10-11 DIAGNOSIS — E11.9 TYPE 2 DIABETES MELLITUS WITHOUT COMPLICATION, WITHOUT LONG-TERM CURRENT USE OF INSULIN (H): Primary | ICD-10-CM

## 2023-10-11 PROCEDURE — 97802 MEDICAL NUTRITION INDIV IN: CPT | Mod: VID | Performed by: DIETITIAN, REGISTERED

## 2023-10-11 NOTE — PROGRESS NOTES
Ronel Ryan is a 43 year old who is being evaluated via a billable telephone visit.        Medical Weight Loss Initial Diet Evaluation  Assessment:  This patient was referred by Dr. Dunn for MNT as treatment for Morbid obesity.  Ronel is presenting today for a new weight management nutrition consultation. Pt has had an initial appointment with Dr. Dunn.    Weight loss medication:  Ozempic .    Anthropometrics:    Pt's weight is 261 lb  Initial weight: 345 lb  Weight change: - 86 lb  BMI: There is no height or weight on file to calculate BMI.   Ideal body weight: 54.7 kg (120 lb 9.5 oz)  Adjusted ideal body weight: 80.3 kg (176 lb 15.9 oz)  Estimated RMR (Culpeper-St Jeor equation):  1821 kcals x 1.2 (sedentary) = 2186 kcals (for weight maintenance)  1821 kcals x 1.3 (light active) = 2504 kcals (for weight maintenance)    Recommended Protein Intake: 70-90 grams of protein/day    Medical History:  Patient Active Problem List   Diagnosis    Binge eating    Eating disorder    Morbid obesity (H)    Multiple-type hyperlipidemia    Severe episode of recurrent major depressive disorder (H)    Arthralgia of hip    Hypertension    Trochanteric bursitis    History of urinary anomaly    Borderline personality disorder (H)    Attention deficit disorder    Asthma    Suicidal ideation    ACP (advance care planning)    Adjustment disorder with depressed mood    Anxiety    Moderate mixed bipolar I disorder (H)    Bipolar II disorder, most recent episode hypomanic (H)    Borderline high cholesterol    Care plan discussed with patient    Disorder of pelvis    Gait difficulty    Hx of diabetes mellitus    Hx of eating disorder    Morbid obesity with BMI of 50.0-59.9, adult (H)    Osteoarthritis    Pain of both hip joints    Relationship problems    Secondary amenorrhea    Soft tissue lesion of shoulder region    Vertigo    Greater trochanteric bursitis of both hips    Hip dysplasia    Left hip pain    Osteoarthritis of  "hip    Primary osteoarthritis of left hip    ADD (attention deficit disorder)    H/O urinary frequency    History of anxiety disorder    Hyperlipidemia    Mixed hyperlipidemia    Recurrent major depressive disorder (H24)    Type 2 diabetes mellitus (H)    Primary osteoarthritis of both hips    Degenerative joint disease (DJD) of hip    Status post total hip replacement, left    Bipolar 1 disorder (H)    Constipation    Gastroesophageal reflux disease with esophagitis without hemorrhage    Mixed incontinence    Primary localized osteoarthritis of pelvic region and thigh    Skin sensation disturbance      Diabetes: type 2  HbA1c:  No results found for: \"HGBA1C\"    Nutrition History:   Weight loss history: has been working with Dr. Dunn for 4 years - has lost about 86 lbs  \"Trying to stay away from sugar\"  Tracks in [email or web address] - set at 1592 kcals    Dietary Recall:  Breakfast: Nonfat vanilla yogurt, blueberries, half banana OR eggs and croissant  Lunch: salad, fruit, premiere protein drink   Dinner: not discussed   Overnight eating: Yes, wakes up and gets something to eat  - habit   Eating out: was eating out at [employer] buff[employer] on Sundays    Exercise: [employer] - goes to classes, bike, meets with a  every Saturday (just started this), 6 days per week    Nutrition Diagnosis (PES statement):   Morbid obesity related to excessive energy intake as evidence by BMI of 44.9     Nutrition Intervention  Food and/or Nutrient Delivery   Placed emphasis on importance of developing a healthy meal routine, aiming for 3 meals a day and no snacks.  Discussed using a protein supplement as a meal replacement.  Nutrition Education   Discussed with patient how to build a meal: the importance of including a lean/low fat protein at each meal  Discussed label reading  Educated on sources of lean protein, portion sizes, the amount of grams found in each source. Recommend patient to aim for 20-30g protein at each meal or 70-90 g per " day  Discussed the importance of adequate hydration, with emphasis on drinking 64oz of water or zero calorie beverages per day.  Nutrition Counseling   Encouraged importance of developing routine exercise for health benefits and weight loss.    Goals established by patient:   Continue tracking intake in MyFitnesspal, 3439-3110 kcals, 70-90 g protein   Continue exercise routine    Assessment/Plan:    Pt will follow up in 3 month(s) with bariatrician and 1 month(s) with dietitian.     Phone call duration: 21 minutes    Originating Location (pt. Location): Home      Distant Location (provider location):  Off-site    Mode of Communication:  Video Conference via [facility]    Physician has received verbal consent for a Video Visit from the patient? Yes      Tanya Selby RD

## 2023-10-11 NOTE — LETTER
10/11/2023         RE: Ronel Ryan  [address]  [facility] [address]        Dear Colleague,    Thank you for referring your patient, Ronel Ryan, to the [facility] AND [facility]ATR[facility]ARE [place]. Please see a copy of my visit note below.    Ronel Ryan is a 43 year old who is being evaluated via a billable telephone visit.        Medical Weight Loss Initial Diet Evaluation  Assessment:  This patient was referred by Dr. Dunn for MNT as treatment for Morbid obesity.  Ronel is presenting today for a new weight management nutrition consultation. Pt has had an initial appointment with Dr. Dunn.    Weight loss medication:  Ozempic .    Anthropometrics:    Pt's weight is 261 lb  Initial weight: 345 lb  Weight change: - 86 lb  BMI: There is no height or weight on file to calculate BMI.   Ideal body weight: 54.7 kg (120 lb 9.5 oz)  Adjusted ideal body weight: 80.3 kg (176 lb 15.9 oz)  Estimated RMR ([place]-St Jeor equation):  1821 kcals x 1.2 (sedentary) = 2186 kcals (for weight maintenance)  1821 kcals x 1.3 (light active) = 2504 kcals (for weight maintenance)    Recommended Protein Intake: 70-90 grams of protein/day    Medical History:  Patient Active Problem List   Diagnosis     Binge eating     Eating disorder     Morbid obesity (H)     Multiple-type hyperlipidemia     Severe episode of recurrent major depressive disorder (H)     Arthralgia of hip     Hypertension     Trochanteric bursitis     History of urinary anomaly     Borderline personality disorder (H)     Attention deficit disorder     Asthma     Suicidal ideation     ACP (advance care planning)     Adjustment disorder with depressed mood     Anxiety     Moderate mixed bipolar I disorder (H)     Bipolar II disorder, most recent episode hypomanic (H)     Borderline high cholesterol     Care plan discussed with patient     Disorder of pelvis     Gait difficulty     Hx of diabetes mellitus     Hx of  "eating disorder     Morbid obesity with BMI of 50.0-59.9, adult (H)     Osteoarthritis     Pain of both hip joints     Relationship problems     Secondary amenorrhea     Soft tissue lesion of shoulder region     Vertigo     Greater trochanteric bursitis of both hips     Hip dysplasia     Left hip pain     Osteoarthritis of hip     Primary osteoarthritis of left hip     ADD (attention deficit disorder)     H/O urinary frequency     History of anxiety disorder     Hyperlipidemia     Mixed hyperlipidemia     Recurrent major depressive disorder (H24)     Type 2 diabetes mellitus (H)     Primary osteoarthritis of both hips     Degenerative joint disease (DJD) of hip     Status post total hip replacement, left     Bipolar 1 disorder (H)     Constipation     Gastroesophageal reflux disease with esophagitis without hemorrhage     Mixed incontinence     Primary localized osteoarthritis of pelvic region and thigh     Skin sensation disturbance      Diabetes: type 2  HbA1c:  No results found for: \"HGBA1C\"    Nutrition History:   Weight loss history: has been working with Dr. Dunn for 4 years - has lost about 86 lbs  \"Trying to stay away from sugar\"  Tracks in [employer] - set at 1592 kcals    Dietary Recall:  Breakfast: Nonfat vanilla yogurt, blueberries, half banana OR eggs and croissant  Lunch: salad, fruit, premiere protein drink   Dinner: not discussed   Overnight eating: Yes, wakes up and gets something to eat  - habit   Eating out: was eating out at [employer] on Sundays    Exercise: [employer] - goes to classes, bike, meets with a  every Saturday (just started this), 6 days per week    Nutrition Diagnosis (PES statement):   Morbid obesity related to excessive energy intake as evidence by BMI of 44.9     Nutrition Intervention  Food and/or Nutrient Delivery   Placed emphasis on importance of developing a healthy meal routine, aiming for 3 meals a day and no snacks.  Discussed using a protein supplement as a " meal replacement.  Nutrition Education   Discussed with patient how to build a meal: the importance of including a lean/low fat protein at each meal  Discussed label reading  Educated on sources of lean protein, portion sizes, the amount of grams found in each source. Recommend patient to aim for 20-30g protein at each meal or 70-90 g per day  Discussed the importance of adequate hydration, with emphasis on drinking 64oz of water or zero calorie beverages per day.  Nutrition Counseling   Encouraged importance of developing routine exercise for health benefits and weight loss.    Goals established by patient:   Continue tracking intake in MyFitnesspal, 5742-8507 kcals, 70-90 g protein   Continue exercise routine    Assessment/Plan:    Pt will follow up in 3 month(s) with bariatrician and 1 month(s) with dietitian.     Phone call duration: 21 minutes    Originating Location (pt. Location): Home      Distant Location (provider location):  Off-site    Mode of Communication:  Video Conference via [employer]    Physician has received verbal consent for a Video Visit from the patient? Yes      Tanya Selby RD         Again, thank you for allowing me to participate in the care of your patient.        Sincerely,        Tanya Selby RD

## 2023-10-17 ENCOUNTER — LAB REQUISITION (OUTPATIENT)
Dept: LAB | Facility: CLINIC | Age: 43
End: 2023-10-17

## 2023-10-17 DIAGNOSIS — E78.2 MIXED HYPERLIPIDEMIA: ICD-10-CM

## 2023-10-17 DIAGNOSIS — I10 ESSENTIAL (PRIMARY) HYPERTENSION: ICD-10-CM

## 2023-10-17 PROCEDURE — 80061 LIPID PANEL: CPT | Performed by: STUDENT IN AN ORGANIZED HEALTH CARE EDUCATION/TRAINING PROGRAM

## 2023-10-17 PROCEDURE — 80053 COMPREHEN METABOLIC PANEL: CPT | Performed by: STUDENT IN AN ORGANIZED HEALTH CARE EDUCATION/TRAINING PROGRAM

## 2023-10-18 LAB
ALBUMIN SERPL BCG-MCNC: 3.9 G/DL (ref 3.5–5.2)
ALP SERPL-CCNC: 50 U/L (ref 35–104)
ALT SERPL W P-5'-P-CCNC: 16 U/L (ref 0–50)
ANION GAP SERPL CALCULATED.3IONS-SCNC: 9 MMOL/L (ref 7–15)
AST SERPL W P-5'-P-CCNC: 19 U/L (ref 0–45)
BILIRUB SERPL-MCNC: 0.3 MG/DL
BUN SERPL-MCNC: 16.2 MG/DL (ref 6–20)
CALCIUM SERPL-MCNC: 9.6 MG/DL (ref 8.6–10)
CHLORIDE SERPL-SCNC: 102 MMOL/L (ref 98–107)
CHOLEST SERPL-MCNC: 155 MG/DL
CREAT SERPL-MCNC: 1.03 MG/DL (ref 0.51–0.95)
DEPRECATED HCO3 PLAS-SCNC: 27 MMOL/L (ref 22–29)
EGFRCR SERPLBLD CKD-EPI 2021: 68 ML/MIN/1.73M2
GLUCOSE SERPL-MCNC: 93 MG/DL (ref 70–99)
HDLC SERPL-MCNC: 48 MG/DL
LDLC SERPL CALC-MCNC: 91 MG/DL
NONHDLC SERPL-MCNC: 107 MG/DL
POTASSIUM SERPL-SCNC: 4.7 MMOL/L (ref 3.4–5.3)
PROT SERPL-MCNC: 6.6 G/DL (ref 6.4–8.3)
SODIUM SERPL-SCNC: 138 MMOL/L (ref 135–145)
TRIGL SERPL-MCNC: 78 MG/DL

## 2023-11-03 ENCOUNTER — TELEPHONE (OUTPATIENT)
Dept: SURGERY | Facility: CLINIC | Age: 43
End: 2023-11-03
Payer: COMMERCIAL

## 2023-11-03 DIAGNOSIS — E11.9 TYPE 2 DIABETES MELLITUS WITHOUT COMPLICATION, WITHOUT LONG-TERM CURRENT USE OF INSULIN (H): ICD-10-CM

## 2023-11-03 NOTE — TELEPHONE ENCOUNTER
Medication Question or Refill    Contacts         Type Contact Phone/Fax    11/03/2023 12:59 PM CDT Phone (Incoming) Ronel Ryan (Self) [phone] (M)     Medication refill request - Ozempic - currently out and needed refill before next Thursday            What medication are you calling about (include dose and sig)?: Semaglutide, 1 MG/DOSE, (OZEMPIC) 4 MG/3ML pen    Preferred Pharmacy:     [facility]/pharmacy #[id number] [address] [address]  Phone: [phone] Fax: [phone]    Controlled Substance Agreement on file:   CSA -- Patient Level:    CSA: None found at the patient level.       Who prescribed the medication?: Kerwin Dunn    Do you need a refill? Yes    When did you use the medication last? 11/2/23    Patient offered an appointment? No - Scheduled 1/15/24    Do you have any questions or concerns?  No      Could we send this information to you in [facility] or would you prefer to receive a phone call?:   Patient would prefer a phone call   Okay to leave a detailed message?: Yes at Cell number on file:    Telephone Information:   Mobile [phone]

## 2023-11-17 ENCOUNTER — VIRTUAL VISIT (OUTPATIENT)
Dept: SURGERY | Facility: CLINIC | Age: 43
End: 2023-11-17
Payer: COMMERCIAL

## 2023-11-17 ENCOUNTER — LAB REQUISITION (OUTPATIENT)
Dept: LAB | Facility: CLINIC | Age: 43
End: 2023-11-17

## 2023-11-17 ENCOUNTER — TRANSFERRED RECORDS (OUTPATIENT)
Dept: HEALTH INFORMATION MANAGEMENT | Facility: CLINIC | Age: 43
End: 2023-11-17

## 2023-11-17 DIAGNOSIS — Z11.3 ENCOUNTER FOR SCREENING FOR INFECTIONS WITH A PREDOMINANTLY SEXUAL MODE OF TRANSMISSION: ICD-10-CM

## 2023-11-17 DIAGNOSIS — E66.01 MORBID OBESITY WITH BMI OF 40.0-44.9, ADULT (H): Primary | ICD-10-CM

## 2023-11-17 PROCEDURE — 87389 HIV-1 AG W/HIV-1&-2 AB AG IA: CPT | Performed by: FAMILY MEDICINE

## 2023-11-17 PROCEDURE — 97803 MED NUTRITION INDIV SUBSEQ: CPT | Mod: 95 | Performed by: DIETITIAN, REGISTERED

## 2023-11-17 NOTE — LETTER
11/17/2023         RE: Ronel Ryan  [address]  [facility] [address]        Dear Colleague,    Thank you for referring your patient, Ronel Ryan, to the [facility] [place]. Please see a copy of my visit note below.    Ronel Ryan is a 43 year old who is being evaluated via a billable video visit.      Medical  Weight Loss Follow-Up Diet Evaluation  Assessment:  Ronel is presenting today for a follow up weight management nutrition consultation.  This patient has had an initial appointment and was referred by Dr. Dunn for MNT as treatment for Morbid obesity.  Weight loss medication: Semaglutide- Ozempic  Pt's weight is 260 lb  Initial weight: 262 lb  Weight change: +2 lb         No data to display              BMI: There is no height or weight on file to calculate BMI.  Ideal body weight: 54.7 kg (120 lb 9.5 oz)  Adjusted ideal body weight: 80.3 kg (176 lb 15.9 oz)    Estimated RMR ([place]-St Jeor equation):   1821 kcals x 1.2 (sedentary) = 2186 kcals (for weight maintenance)  1821 kcals x 1.3 (light active) = 2504 kcals (for weight maintenance)  Recommended Protein Intake: 70-90 grams of protein/day  Patient Active Problem List:  Patient Active Problem List   Diagnosis     Binge eating     Eating disorder     Morbid obesity (H)     Multiple-type hyperlipidemia     Severe episode of recurrent major depressive disorder (H)     Arthralgia of hip     Hypertension     Trochanteric bursitis     History of urinary anomaly     Borderline personality disorder (H)     Attention deficit disorder     Asthma     Suicidal ideation     ACP (advance care planning)     Adjustment disorder with depressed mood     Anxiety     Moderate mixed bipolar I disorder (H)     Bipolar II disorder, most recent episode hypomanic (H)     Borderline high cholesterol     Care plan discussed with patient     Disorder of pelvis     Gait difficulty     Hx of diabetes  "mellitus     Hx of eating disorder     Morbid obesity with BMI of 50.0-59.9, adult (H)     Osteoarthritis     Pain of both hip joints     Relationship problems     Secondary amenorrhea     Soft tissue lesion of shoulder region     Vertigo     Greater trochanteric bursitis of both hips     Hip dysplasia     Left hip pain     Osteoarthritis of hip     Primary osteoarthritis of left hip     ADD (attention deficit disorder)     H/O urinary frequency     History of anxiety disorder     Hyperlipidemia     Mixed hyperlipidemia     Recurrent major depressive disorder (H24)     Type 2 diabetes mellitus (H)     Primary osteoarthritis of both hips     Degenerative joint disease (DJD) of hip     Status post total hip replacement, left     Bipolar 1 disorder (H)     Constipation     Gastroesophageal reflux disease with esophagitis without hemorrhage     Mixed incontinence     Primary localized osteoarthritis of pelvic region and thigh     Skin sensation disturbance     Nutrition History:   Weight loss history: has been working with Dr. Dunn for 4 years - has lost about 86 lbs  \"Trying to stay away from sugar and carbs\"  Tracks in [employer] - set at 1592 kcals  Binge Eating    Progress on goals from last visit: Still tracking her intake, but doesn't seem to be paying attention to portion sizes. She reports an incident recently of binge eating. Skipped breakfast, had a hair appointment and then got doughnuts at the gas station near - ate 6 doughnuts  Continue tracking intake in [employer], 8827-3895 kcals, 70-90 g protein   Continue exercise routine    Dietary Recall:  Breakfast: Regular Yogurt and blueberries and cinnamon raisin bread - water OR 1-2 eggs with a large handful of cheese and a couple slices of cinnamon raisin bread  Lunch: chipotle OR spinach salad (no dressing) with chicken OR brown rice and chicken with bell peppers  Dinner: not discussed  Beverages:   Water  Protein shake  Bought Cranberry juice " "recently  Exercise:   [employer] - goes to classes, bike, meets with a  every Saturday, 6 days per week   Nutrition Diagnosis:    Morbid obesity related to excessive energy intake as evidence by BMI of 44.9     Intervention:  Food and/or nutrient delivery: Aim for 3 meal per day, 25-30 g at each meal  Nutrition education: discussed/reviewed protein sources, portions, labels and how to increase protein at meals  Nutrition counseling: counseled on binge eating episode     Monitoring/Evaluation:    Goals:  Continue tracking intake in MyFitnesspal, 8201-5847 kcals,   Aim for 70-90 g protein per day or 20-30 g per meal  Continue exercise routine  Work on \"all or nothing mindset\" with treats, allow for a treat occasionally in your diet    Patient to follow up in 2 month(s) with bariatrician and 1 month(s) with RD    Video-Visit Details    Type of service:  Video Visit    Video Start Time (time video started): 9:31 am    Video End Time (time video stopped): 9:57 am    Originating Location (pt. Location): Home      Distant Location (provider location):  Off-site    Mode of Communication:  Video Conference via [facility]    Physician has received verbal consent for a Video Visit from the patient? Yes      Tanya Selby RD           Again, thank you for allowing me to participate in the care of your patient.        Sincerely,        Tanya Selby RD  "

## 2023-11-17 NOTE — PROGRESS NOTES
Ronel Ryan is a 43 year old who is being evaluated via a billable video visit.      Medical  Weight Loss Follow-Up Diet Evaluation  Assessment:  Ronel is presenting today for a follow up weight management nutrition consultation.  This patient has had an initial appointment and was referred by Dr. Dunn for MNT as treatment for Morbid obesity.  Weight loss medication: Semaglutide- Ozempic  Pt's weight is 260 lb  Initial weight: 262 lb  Weight change: +2 lb         No data to display              BMI: There is no height or weight on file to calculate BMI.  Ideal body weight: 54.7 kg (120 lb 9.5 oz)  Adjusted ideal body weight: 80.3 kg (176 lb 15.9 oz)    Estimated RMR ([place]-St Jeor equation):   1821 kcals x 1.2 (sedentary) = 2186 kcals (for weight maintenance)  1821 kcals x 1.3 (light active) = 2504 kcals (for weight maintenance)  Recommended Protein Intake: 70-90 grams of protein/day  Patient Active Problem List:  Patient Active Problem List   Diagnosis    Binge eating    Eating disorder    Morbid obesity (H)    Multiple-type hyperlipidemia    Severe episode of recurrent major depressive disorder (H)    Arthralgia of hip    Hypertension    Trochanteric bursitis    History of urinary anomaly    Borderline personality disorder (H)    Attention deficit disorder    Asthma    Suicidal ideation    ACP (advance care planning)    Adjustment disorder with depressed mood    Anxiety    Moderate mixed bipolar I disorder (H)    Bipolar II disorder, most recent episode hypomanic (H)    Borderline high cholesterol    Care plan discussed with patient    Disorder of pelvis    Gait difficulty    Hx of diabetes mellitus    Hx of eating disorder    Morbid obesity with BMI of 50.0-59.9, adult (H)    Osteoarthritis    Pain of both hip joints    Relationship problems    Secondary amenorrhea    Soft tissue lesion of shoulder region    Vertigo    Greater trochanteric bursitis of both hips    Hip dysplasia    Left hip pain     "Osteoarthritis of hip    Primary osteoarthritis of left hip    ADD (attention deficit disorder)    H/O urinary frequency    History of anxiety disorder    Hyperlipidemia    Mixed hyperlipidemia    Recurrent major depressive disorder (H24)    Type 2 diabetes mellitus (H)    Primary osteoarthritis of both hips    Degenerative joint disease (DJD) of hip    Status post total hip replacement, left    Bipolar 1 disorder (H)    Constipation    Gastroesophageal reflux disease with esophagitis without hemorrhage    Mixed incontinence    Primary localized osteoarthritis of pelvic region and thigh    Skin sensation disturbance     Nutrition History:   Weight loss history: has been working with Dr. Dunn for 4 years - has lost about 86 lbs  \"Trying to stay away from sugar and carbs\"  Tracks in [employer] - set at 1592 kcals  Binge Eating    Progress on goals from last visit: Still tracking her intake, but doesn't seem to be paying attention to portion sizes. She reports an incident recently of binge eating. Skipped breakfast, had a hair appointment and then got doughnuts at the gas station near - ate 6 doughnuts  Continue tracking intake in [employer], 8296-9384 kcals, 70-90 g protein   Continue exercise routine    Dietary Recall:  Breakfast: Regular Yogurt and blueberries and cinnamon raisin bread - water OR 1-2 eggs with a large handful of cheese and a couple slices of cinnamon raisin bread  Lunch: chipotle OR spinach salad (no dressing) with chicken OR brown rice and chicken with bell peppers  Dinner: not discussed  Beverages:   Water  Protein shake  Bought Cranberry juice recently  Exercise:   [facility] - goes to classes, bike, meets with a  every Saturday, 6 days per week   Nutrition Diagnosis:    Morbid obesity related to excessive energy intake as evidence by BMI of 44.9     Intervention:  Food and/or nutrient delivery: Aim for 3 meal per day, 25-30 g at each meal  Nutrition education: discussed/reviewed protein " "sources, portions, labels and how to increase protein at meals  Nutrition counseling: counseled on binge eating episode     Monitoring/Evaluation:    Goals:  Continue tracking intake in MyFitnesspal, 9353-9132 kcals,   Aim for 70-90 g protein per day or 20-30 g per meal  Continue exercise routine  Work on \"all or nothing mindset\" with treats, allow for a treat occasionally in your diet    Patient to follow up in 2 month(s) with bariatrician and 1 month(s) with RD    Video-Visit Details    Type of service:  Video Visit    Video Start Time (time video started): 9:31 am    Video End Time (time video stopped): 9:57 am    Originating Location (pt. Location): Home      Distant Location (provider location):  Off-site    Mode of Communication:  Video Conference via [facility]    Physician has received verbal consent for a Video Visit from the patient? Yes      Tanya Selby RD       "

## 2023-11-18 LAB — HIV 1+2 AB+HIV1 P24 AG SERPL QL IA: NONREACTIVE

## 2023-11-26 ENCOUNTER — HEALTH MAINTENANCE LETTER (OUTPATIENT)
Age: 43
End: 2023-11-26

## 2023-11-29 ENCOUNTER — MEDICAL CORRESPONDENCE (OUTPATIENT)
Dept: HEALTH INFORMATION MANAGEMENT | Facility: CLINIC | Age: 43
End: 2023-11-29
Payer: COMMERCIAL

## 2023-11-29 ENCOUNTER — TRANSCRIBE ORDERS (OUTPATIENT)
Dept: OTHER | Age: 43
End: 2023-11-29

## 2023-11-29 DIAGNOSIS — I89.0 LYMPHEDEMA: Primary | ICD-10-CM

## 2023-11-30 ENCOUNTER — LAB REQUISITION (OUTPATIENT)
Dept: LAB | Facility: CLINIC | Age: 43
End: 2023-11-30

## 2023-11-30 DIAGNOSIS — B97.7 PAPILLOMAVIRUS AS THE CAUSE OF DISEASES CLASSIFIED ELSEWHERE: ICD-10-CM

## 2023-11-30 LAB — T PALLIDUM AB SER QL: NONREACTIVE

## 2023-11-30 PROCEDURE — 87591 N.GONORRHOEAE DNA AMP PROB: CPT | Performed by: STUDENT IN AN ORGANIZED HEALTH CARE EDUCATION/TRAINING PROGRAM

## 2023-11-30 PROCEDURE — 87491 CHLMYD TRACH DNA AMP PROBE: CPT | Performed by: STUDENT IN AN ORGANIZED HEALTH CARE EDUCATION/TRAINING PROGRAM

## 2023-11-30 PROCEDURE — 86780 TREPONEMA PALLIDUM: CPT | Performed by: STUDENT IN AN ORGANIZED HEALTH CARE EDUCATION/TRAINING PROGRAM

## 2023-12-01 LAB
C TRACH DNA SPEC QL PROBE+SIG AMP: NEGATIVE
N GONORRHOEA DNA SPEC QL NAA+PROBE: NEGATIVE

## 2023-12-04 DIAGNOSIS — E11.9 TYPE 2 DIABETES MELLITUS WITHOUT COMPLICATION, WITHOUT LONG-TERM CURRENT USE OF INSULIN (H): ICD-10-CM

## 2023-12-04 NOTE — TELEPHONE ENCOUNTER
Spoke with the patient and she would like to stay at the 1mg dose of the Ozempic for now and then discuss if moving up would be appropriate at her next visit with him in January that is scheduled.  Kassandra Castellon RN

## 2023-12-04 NOTE — TELEPHONE ENCOUNTER
Medication Question or Refill    Contacts         Type Contact Phone/Fax    12/04/2023 11:22 AM CST Phone (Incoming) Ronel Ryan (Self)             What medication are you calling about (include dose and sig)?:     Semaglutide, 1 MG/DOSE, (OZEMPIC) 4 MG/3ML pen     Preferred Pharmacy:      [facility]/pharmacy #[id number] [place] [id number] [address] [address]  Phone: [phone] Fax: [phone]        Controlled Substance Agreement on file:   CSA -- Patient Level:    CSA: None found at the patient level.       Who prescribed the medication?: [place]     Do you need a refill? Yes    When did you use the medication last? Last Thur, needs for this Thur    Patient offered an appointment? Yes: 1/15    Do you have any questions or concerns?  Yes: Please call when complete      Could we send this information to you in [facility] or would you prefer to receive a phone call?:   Patient would prefer a phone call   Okay to leave a detailed message?: Yes at Cell number on file:    Telephone Information:   Mobile [phone]

## 2023-12-06 ENCOUNTER — VIRTUAL VISIT (OUTPATIENT)
Dept: SURGERY | Facility: CLINIC | Age: 43
End: 2023-12-06
Payer: COMMERCIAL

## 2023-12-06 DIAGNOSIS — E66.01 MORBID OBESITY WITH BMI OF 40.0-44.9, ADULT (H): Primary | ICD-10-CM

## 2023-12-06 PROCEDURE — 97803 MED NUTRITION INDIV SUBSEQ: CPT | Mod: 95 | Performed by: DIETITIAN, REGISTERED

## 2023-12-06 NOTE — LETTER
12/6/2023         RE: Ronel Ryan  [address]  [facility] [address]        Dear Colleague,    Thank you for referring your patient, Ronel Ryan, to the [facility] BARIATRICS CARE [place]. Please see a copy of my visit note below.    Ronel Ryan is a 43 year old who is being evaluated via a billable video visit.      Medical  Weight Loss Follow-Up Diet Evaluation  Assessment:  Ronel is presenting today for a follow up weight management nutrition consultation.  This patient has had an initial appointment and was referred by Dr. Dunn for MNT as treatment for Morbid obesity.  Weight loss medication: Semaglutide- Ozempic  Pt's weight is 260 lb  Initial weight: 262 lb  Weight change: +2 lb         No data to display              BMI: There is no height or weight on file to calculate BMI.  Ideal body weight: 54.7 kg (120 lb 9.5 oz)  Adjusted ideal body weight: 80.3 kg (176 lb 15.9 oz)    Estimated RMR ([place]-St Jeor equation):   1821 kcals x 1.2 (sedentary) = 2186 kcals (for weight maintenance)  1821 kcals x 1.3 (light active) = 2504 kcals (for weight maintenance)  Recommended Protein Intake: 70-90 grams of protein/day  Patient Active Problem List:  Patient Active Problem List   Diagnosis     Binge eating     Eating disorder     Morbid obesity (H)     Multiple-type hyperlipidemia     Severe episode of recurrent major depressive disorder (H)     Arthralgia of hip     Hypertension     Trochanteric bursitis     History of urinary anomaly     Borderline personality disorder (H)     Attention deficit disorder     Asthma     Suicidal ideation     ACP (advance care planning)     Adjustment disorder with depressed mood     Anxiety     Moderate mixed bipolar I disorder (H)     Bipolar II disorder, most recent episode hypomanic (H)     Borderline high cholesterol     Care plan discussed with patient     Disorder of pelvis     Gait difficulty     Hx of diabetes  "mellitus     Hx of eating disorder     Morbid obesity with BMI of 50.0-59.9, adult (H)     Osteoarthritis     Pain of both hip joints     Relationship problems     Secondary amenorrhea     Soft tissue lesion of shoulder region     Vertigo     Greater trochanteric bursitis of both hips     Hip dysplasia     Left hip pain     Osteoarthritis of hip     Primary osteoarthritis of left hip     ADD (attention deficit disorder)     H/O urinary frequency     History of anxiety disorder     Hyperlipidemia     Mixed hyperlipidemia     Recurrent major depressive disorder (H24)     Type 2 diabetes mellitus (H)     Primary osteoarthritis of both hips     Degenerative joint disease (DJD) of hip     Status post total hip replacement, left     Bipolar 1 disorder (H)     Constipation     Gastroesophageal reflux disease with esophagitis without hemorrhage     Mixed incontinence     Primary localized osteoarthritis of pelvic region and thigh     Skin sensation disturbance     Nutrition History:   Weight loss history: has been working with Dr. Dunn for 4 years - has lost about 86 lbs  \"Trying to stay away from sugar and carbs\"  Tracks in [employer] - set at 1592 kcals  Binge Eating    Progress on goals from last visit: She decided to started fasting for a week, fasting from 8 pm-12 noon the next day. Now she is back to eating regularly and plans to start eating breakfast, but needs to get groceries.  Just started going back to the gym on Monday.   +Bought pepperjack cheese sticks and Swiss cheese  Continue tracking intake in [employer], 1799-3726 kcals - met  Aim for 70-90 g protein per day or 20-30 g per meal - not met  Continue exercise routine - met, classes at gym, body pump class  Work on \"all or nothing mindset\" with treats, allow for a treat occasionally in your diet - working on this    Dietary Recall:  Breakfast: 1-2 Egg, cheese, bell peppers, mushrooms  Lunch: Turkey Cheese [place] and chips and 2 ice cream and " "cranberry  Dinner: Turkey burgers on 100% whole wheat bun with swiss cheese (~34 g protein)  Beverages:   Water  Protein shake  Cranberry juice recently - taking to prevent UTIs, she is going to switch to diet cranberry  Exercise:   [employer] - goes to classes, bike, meets with a  every Saturday, 6 days per week   Nutrition Diagnosis:    Morbid obesity related to excessive energy intake as evidence by BMI of 44.9     Intervention:  Food and/or nutrient delivery: Aim for 3 meal per day, 25-35 g at each meal  Nutrition education: discussed/reviewed protein sources, portions, labels and how to increase protein at meals, discussed fiber goals and sources    Monitoring/Evaluation:    Goals:  Continue tracking intake in MyFitnesspal, 9135-9507 kcals,   Aim for 70-90 g protein per day or 20-30 g per meal  Continue exercise routine  Switch to diet cranberry juice  Work on \"all or nothing mindset\" with treats, allow for a treat occasionally in your diet    Patient to follow up in 2 month(s) with bariatrician and 1 month(s) with RD    Video-Visit Details    Type of service:  Video Visit    Video Start Time (time video started): 1:30 pm    Video End Time (time video stopped): 1:58 pm    Originating Location (pt. Location): Home      Distant Location (provider location):  Off-site    Mode of Communication:  Video Conference via [facility]    Physician has received verbal consent for a Video Visit from the patient? Yes      Tanya Selby RD           Again, thank you for allowing me to participate in the care of your patient.        Sincerely,        Tanya Selby RD  "

## 2023-12-06 NOTE — PROGRESS NOTES
Ronel Ryan is a 43 year old who is being evaluated via a billable video visit.      Medical  Weight Loss Follow-Up Diet Evaluation  Assessment:  Ronel is presenting today for a follow up weight management nutrition consultation.  This patient has had an initial appointment and was referred by Dr. Dunn for MNT as treatment for Morbid obesity.  Weight loss medication: Semaglutide- Ozempic  Pt's weight is 260 lb  Initial weight: 262 lb  Weight change: +2 lb         No data to display              BMI: There is no height or weight on file to calculate BMI.  Ideal body weight: 54.7 kg (120 lb 9.5 oz)  Adjusted ideal body weight: 80.3 kg (176 lb 15.9 oz)    Estimated RMR ([place]-St Jeor equation):   1821 kcals x 1.2 (sedentary) = 2186 kcals (for weight maintenance)  1821 kcals x 1.3 (light active) = 2504 kcals (for weight maintenance)  Recommended Protein Intake: 70-90 grams of protein/day  Patient Active Problem List:  Patient Active Problem List   Diagnosis    Binge eating    Eating disorder    Morbid obesity (H)    Multiple-type hyperlipidemia    Severe episode of recurrent major depressive disorder (H)    Arthralgia of hip    Hypertension    Trochanteric bursitis    History of urinary anomaly    Borderline personality disorder (H)    Attention deficit disorder    Asthma    Suicidal ideation    ACP (advance care planning)    Adjustment disorder with depressed mood    Anxiety    Moderate mixed bipolar I disorder (H)    Bipolar II disorder, most recent episode hypomanic (H)    Borderline high cholesterol    Care plan discussed with patient    Disorder of pelvis    Gait difficulty    Hx of diabetes mellitus    Hx of eating disorder    Morbid obesity with BMI of 50.0-59.9, adult (H)    Osteoarthritis    Pain of both hip joints    Relationship problems    Secondary amenorrhea    Soft tissue lesion of shoulder region    Vertigo    Greater trochanteric bursitis of both hips    Hip dysplasia    Left hip pain     "Osteoarthritis of hip    Primary osteoarthritis of left hip    ADD (attention deficit disorder)    H/O urinary frequency    History of anxiety disorder    Hyperlipidemia    Mixed hyperlipidemia    Recurrent major depressive disorder (H24)    Type 2 diabetes mellitus (H)    Primary osteoarthritis of both hips    Degenerative joint disease (DJD) of hip    Status post total hip replacement, left    Bipolar 1 disorder (H)    Constipation    Gastroesophageal reflux disease with esophagitis without hemorrhage    Mixed incontinence    Primary localized osteoarthritis of pelvic region and thigh    Skin sensation disturbance     Nutrition History:   Weight loss history: has been working with Dr. Dunn for 4 years - has lost about 86 lbs  \"Trying to stay away from sugar and carbs\"  Tracks in [employer] - set at 1592 kcals  Binge Eating    Progress on goals from last visit: She decided to started fasting for a week, fasting from 8 pm-12 noon the next day. Now she is back to eating regularly and plans to start eating breakfast, but needs to get groceries.  Just started going back to the gym on Monday.   +Bought pepperjack cheese sticks and Swiss cheese  Continue tracking intake in [employer], 2830-1624 kcals - met  Aim for 70-90 g protein per day or 20-30 g per meal - not met  Continue exercise routine - met, classes at gym, body pump class  Work on \"all or nothing mindset\" with treats, allow for a treat occasionally in your diet - working on this    Dietary Recall:  Breakfast: 1-2 Egg, cheese, bell peppers, mushrooms  Lunch: Turkey Cheese [place] and chips and 2 ice cream and cranberry  Dinner: Turkey burgers on 100% whole wheat bun with swiss cheese (~34 g protein)  Beverages:   Water  Protein shake  Cranberry juice recently - taking to prevent UTIs, she is going to switch to diet cranberry  Exercise:   [facility] - goes to classes, bike, meets with a  every Saturday, 6 days per week   Nutrition Diagnosis:    Morbid " "obesity related to excessive energy intake as evidence by BMI of 44.9     Intervention:  Food and/or nutrient delivery: Aim for 3 meal per day, 25-35 g at each meal  Nutrition education: discussed/reviewed protein sources, portions, labels and how to increase protein at meals, discussed fiber goals and sources    Monitoring/Evaluation:    Goals:  Continue tracking intake in [employer], 0362-1587 kcals,   Aim for 70-90 g protein per day or 20-30 g per meal  Continue exercise routine  Switch to diet cranberry juice  Work on \"all or nothing mindset\" with treats, allow for a treat occasionally in your diet    Patient to follow up in 2 month(s) with bariatrician and 1 month(s) with RD    Video-Visit Details    Type of service:  Video Visit    Video Start Time (time video started): 1:30 pm    Video End Time (time video stopped): 1:58 pm    Originating Location (pt. Location): Home      Distant Location (provider location):  Off-site    Mode of Communication:  Video Conference via [facility]    Physician has received verbal consent for a Video Visit from the patient? Yes      Tanya Selby RD       "

## 2023-12-08 ENCOUNTER — MEDICAL CORRESPONDENCE (OUTPATIENT)
Dept: HEALTH INFORMATION MANAGEMENT | Facility: CLINIC | Age: 43
End: 2023-12-08
Payer: COMMERCIAL

## 2023-12-11 ENCOUNTER — TRANSCRIBE ORDERS (OUTPATIENT)
Dept: OTHER | Age: 43
End: 2023-12-11

## 2023-12-11 DIAGNOSIS — L98.7 EXCESS SKIN: Primary | ICD-10-CM

## 2023-12-11 DIAGNOSIS — I89.0 LYMPHEDEMA: ICD-10-CM

## 2024-01-05 ENCOUNTER — TELEPHONE (OUTPATIENT)
Dept: SURGERY | Facility: CLINIC | Age: 44
End: 2024-01-05
Payer: COMMERCIAL

## 2024-01-05 DIAGNOSIS — E11.9 TYPE 2 DIABETES MELLITUS WITHOUT COMPLICATION, WITHOUT LONG-TERM CURRENT USE OF INSULIN (H): ICD-10-CM

## 2024-01-05 NOTE — TELEPHONE ENCOUNTER
Pt. is receiving Ozempic from the pharmacy and would like administation timing instructions for the Ozempic. Please contact Pt. at [phone].

## 2024-01-05 NOTE — TELEPHONE ENCOUNTER
Talked with pt and answered her questions.    Isabela Llamas RN, CBN  [facility]  P [phone]  F [phone]

## 2024-01-05 NOTE — TELEPHONE ENCOUNTER
General Call    Contacts         Type Contact Phone/Fax    01/05/2024 02:27 PM CST Phone (Incoming) Ryan, Ronel S (Self) [phone] (M)          Reason for Call:  Ozempic      What are your questions or concerns:  pt states she has been waiting for her Ozempic to be refilled and pharmacy still has not received it       Could we send this information to you in [facility] or would you prefer to receive a phone call?:   Patient would prefer a phone call   Okay to leave a detailed message?: Yes at Cell number on file:    Telephone Information:   Mobile [phone]

## 2024-01-05 NOTE — TELEPHONE ENCOUNTER
Called pt back and let her know that we have not gotten any refill requests for her Ozempic. 1mg dose refilled and pt will discuss possible furture increase at her appt with  on 1/15/2024.    Isabela Llamas RN, CBN  [facility]  P [phone]  F [phone]

## 2024-01-10 NOTE — PROGRESS NOTES
"  The patient has been notified of following:     \"This telephone visit will be conducted via a call between you and your physician/provider. We have found that certain health care needs can be provided without the need for a physical exam.  This service lets us provide the care you need with a short phone conversation.  If a prescription is necessary we can send it directly to your pharmacy.  If lab work is needed we can place an order for that and you can then stop by our lab to have the test done at a later time.    Telephone visits are billed at different rates depending on your insurance coverage. During this emergency period, for some insurers they may be billed the same as an in-person visit.  Please reach out to your insurance provider with any questions.    If during the course of the call the physician/provider feels a telephone visit is not appropriate, you will not be charged for this service.\"    Patient has given verbal consent to a Telephone visit? Yes    What phone number would you like to be contacted at? [phone]    Patient would like to receive their AVS by [employer]    Are there any specific questions or needs that you would like addressed at your visit today? Discuss trulicity     Additional provider notes:   Bariatric Clinic Follow-Up Visit:    Ronel Ryan is a 41 year old  female with Body mass index is 43.6 kg/m .  presenting here today for follow-up on non-surgical efforts for weight loss. Original Intake visit occurred on 7/13/19 with a weight of 345 lbs and BMI of 59.2.  Along with diet and behavior changes, she has been using various combinations through the years of Trulicity/naltrexone and a previous use of topiramate from her pain clinic which can also assist her weight loss goals.  See her intake visit notes for details on identified contributors to weight gain in the past. Chart review shows Dietician calculated RMR of 1892kcal/day and protein intake goal of " Patient: [place]    Procedure Summary       Date: 01/10/24 Room / Location: [facility] ENDOSCOPY 2 / [facility] ENDOSCOPY    Anesthesia Start: 0946 Anesthesia Stop: 1020    Procedure: COLONOSCOPY with biopsy and polypectomy Diagnosis:       History of adenomatous polyp of colon      (History of adenomatous polyp of colon [Z86.010])    Surgeons: Ashley Mar MD Provider: Robin Henderson CRNA    Anesthesia Type: MAC ASA Status: 2            Anesthesia Type: MAC    Vitals  No vitals data found for the desired time range.          Post Anesthesia Care and Evaluation    Patient location during evaluation: bedside  Patient participation: complete - patient participated  Level of consciousness: awake and alert  Pain score: 0  Pain management: adequate    Airway patency: patent  Anesthetic complications: No anesthetic complications  PONV Status: none  Cardiovascular status: acceptable  Respiratory status: acceptable  Hydration status: acceptable     70-90g/day.    Weight:   Wt Readings from Last 3 Encounters:   05/06/22 115.2 kg (254 lb)   04/20/22 121.8 kg (268 lb 9.6 oz)   04/14/22 121.8 kg (268 lb 9.6 oz)    pounds      Comorbidities:  Patient Active Problem List   Diagnosis     Binge eating     Eating disorder     Morbid obesity (H)     Multiple-type hyperlipidemia     Severe episode of recurrent major depressive disorder (H)     Arthralgia of hip     Hypertension     Trochanteric bursitis     History of urinary anomaly     Borderline personality disorder (H)     Attention deficit disorder     Asthma     Suicidal ideation     ACP (advance care planning)     Adjustment disorder with depressed mood     Anxiety     Moderate mixed bipolar I disorder (H)     Bipolar II disorder, most recent episode hypomanic (H)     Borderline high cholesterol     Care plan discussed with patient     Disorder of pelvis     Gait difficulty     Hx of diabetes mellitus     Hx of eating disorder     Morbid obesity with BMI of 50.0-59.9, adult (H)     Osteoarthritis     Pain of both hip joints     Relationship problems     Secondary amenorrhea     Soft tissue lesion of shoulder region     Vertigo     Greater trochanteric bursitis of both hips     Hip dysplasia     Left hip pain     Osteoarthritis of hip     Primary osteoarthritis of left hip     ADD (attention deficit disorder)     H/O urinary frequency     History of anxiety disorder     Hyperlipidemia     Mixed hyperlipidemia     Recurrent major depressive disorder (H)     Type 2 diabetes mellitus (H)     Primary osteoarthritis of both hips     Degenerative joint disease (DJD) of hip     Status post total hip replacement, left     Bipolar 1 disorder (H)     Constipation     Gastroesophageal reflux disease with esophagitis without hemorrhage     Mixed incontinence     Primary localized osteoarthritis of pelvic region and thigh     Skin sensation disturbance       Current Outpatient Medications:      albuterol (PROAIR HFA/PROVENTIL  HFA/VENTOLIN HFA) 108 (90 BASE) MCG/ACT Inhaler, Inhale 1-2 puffs into the lungs every 4 hours as needed , Disp: , Rfl:      aspirin (ASA) 325 MG EC tablet, Take 1 tablet (325 mg) by mouth daily, Disp: 42 tablet, Rfl: 0     atorvastatin (LIPITOR) 40 MG tablet, Take 1 tablet by mouth every evening , Disp: , Rfl:      baclofen (LIORESAL) 10 MG tablet, TAKE 1 TABLET BY MOUTH TWICE A DAY AS NEEDED FOR MUSCLE SPASM, Disp: , Rfl:      blood glucose (NO BRAND SPECIFIED) lancets standard, Use to test blood sugar 2 times daily or as directed., Disp: 100 each, Rfl: 11     blood glucose monitoring (NO BRAND SPECIFIED) meter device kit, Use to test blood sugar 2 times daily or as directed., Disp: 1 kit, Rfl: 0     blood glucose monitoring (NO BRAND SPECIFIED) test strip, Use to test blood sugars 2 times daily or as directed, Disp: 100 strip, Rfl: 3     buPROPion (WELLBUTRIN XL) 300 MG 24 hr tablet, Take 300 mg by mouth daily , Disp: , Rfl:      Cholecalciferol 125 MCG (5000 UT) TABS, Take 125 mcg by mouth daily , Disp: , Rfl:      docusate sodium (COLACE) 100 MG capsule, Take 100 mg by mouth 3 times daily as needed for constipation, Disp: , Rfl:      dulaglutide (TRULICITY) 1.5 MG/0.5ML pen, Inject 1.5 mg Subcutaneous every 7 days, Disp: 2 mL, Rfl: 0     EPINEPHrine (EPIPEN/ADRENACLICK/OR ANY BX GENERIC EQUIV) 0.3 MG/0.3ML injection 2-pack, Inject 0.3 mLs (0.3 mg) into the muscle once as needed for anaphylaxis, Disp: 0.6 mL, Rfl: 0     hydrOXYzine (ATARAX) 25 MG tablet, Take 1 tablet (25 mg) by mouth every 6 hours as needed for itching or anxiety (with pain, moderate pain), Disp: 30 tablet, Rfl: 0     ibuprofen (ADVIL/MOTRIN) 600 MG tablet, Take 1 tablet (600 mg) by mouth every 6 hours as needed (mild), Disp: , Rfl: 0     levonorgestrel (LILETTA, 52 MG,) 19.5 MCG/DAY IUD, 1 each by Intrauterine route once, Disp: , Rfl:      meclizine (ANTIVERT) 25 MG tablet, Take 25 mg by mouth 4 times daily as needed for dizziness, Disp: ,  Rfl:      oxyCODONE (ROXICODONE) 5 MG tablet, Take 1-2 tablets (5-10 mg) by mouth every 6 hours as needed for breakthrough pain or moderate to severe pain Take 1 pill for moderate pain (4-6/10) and 2 pills for severe pain (7-10/10). Alternate with Tylenol. Maximum 6 pills per day, Disp: 26 tablet, Rfl: 0     polyethylene glycol (MIRALAX) 17 GM/Dose powder, Take 17 g by mouth daily Take while taking opioid medications and until bowels are back to normal routine. Hold for loose stools, Disp: 510 g, Rfl: 0     risperiDONE (RISPERDAL) 2 MG tablet, Take 2 mg by mouth At Bedtime , Disp: , Rfl:      tolterodine ER (DETROL LA) 4 MG 24 hr capsule, Take 4 mg by mouth daily, Disp: , Rfl:      topiramate (TOPAMAX) 100 MG tablet, Take 100 mg by mouth 2 times daily , Disp: , Rfl:      traZODone (DESYREL) 150 MG tablet, Take 150 mg by mouth At Bedtime, Disp: , Rfl:      acetaminophen (TYLENOL) 325 MG tablet, Take 3 tablets (975 mg) by mouth 3 times daily (Patient not taking: Reported on 5/6/2022), Disp: , Rfl: 0      Interim: Since our last visit, she has had hip surgery and chart review showed post op anemia c/w recent surgery, down to 8.1 on 4/11/22 (surgery 4/4/22). Today, she reports a good weight reduction from her perioperative hip surgery weight, back into the 250s.  Had to have her left hip procedure repeated due to dislocation while in the hospital. Right hip went well recently. A1c is 4.6% and they held it in hospital. In TCU had trulicity and sugars were all great.   Overall appetite is low.   Found out never had previous diabetes so has come off trulicity and doing fine. Has support from topamax and bupropion for appetite  Getting PT and OT in home. Twice weekly. Able to walk 1-2x daily walking w/ walker still and 100 yards is still a long walk for her.  Plan:   1.  Diet: continue mindful meals every 4.5-6 hours with 20grams of protein per meal, a good mix of veggies and hydrating well with water with 64-80oz/day.   2.  Exercise: continue PT, gentle walks.  3. Medication: Trulicity stopped, A1c excellent. It did have some appetite regulation benefits but your bupropion and topamax also have appetite suppressive effects as well so we'll see how mindful protein rich meals with these medicines helps stabilize this excellent 89 lb reduction in weight since 2019.  We could consider adding back naltrexone in the future once no longer requiring pain medication from your recent hip surgery. We'll get together in 3-4 months to see how things have progressed.  4.  Given blood loss at surgery, a bit of extra iron may be helpful this month of May: Geraldine MARINELLI, one wilks X 45 pills.        We discussed [facility] including:  -eating 3 meals daily  -reviewed metabolic needs for weight loss based on Resting Metabolic Rate  -protein goals supportive of healthy weight loss  -avoiding/limiting calorie containing beverages  -We discussed the importance of restorative sleep and stress management in maintaining a healthy weight.  -We discussed the National Weight Control Registry healthy weight maintenance strategies and ways to optimize metabolism.  -We discussed the importance of physical activity including cardiovascular and strength training in maintaining a healthier weight and explored viable options.    Most recent labs:  Lab Results   Component Value Date    WBC 5.1 04/04/2022    HGB 8.1 (L) 04/11/2022    HCT 40.2 04/04/2022    MCV 90 04/04/2022     04/04/2022     Lab Results   Component Value Date    CHOL 162 05/21/2021     Lab Results   Component Value Date    HDL 34 (L) 05/21/2021     No components found for: LDLCALC  Lab Results   Component Value Date    TRIG 115 05/21/2021     No results found for: CHOLHDL  Lab Results   Component Value Date    ALT 44 03/14/2022    AST 28 03/14/2022    ALKPHOS 68 03/14/2022     No results found for: HGBA1C  No components found for: PNXEWRUT83  No components found for: [id number]  No  "components found for: FERRITIN  No components found for: PTH  No components found for: 28618  No components found for: 7597  Lab Results   Component Value Date    TSH 1.14 05/21/2021     No results found for: TESTOSTERONE    DIETARY HISTORY    Positive Changes Since Last Visit: improving ambulation slowly. Easy fatigue likely related to recovery period and anemia post op (down to 8.1g/dl).   Struggling With: not much. Lower appetite.     Knowledgeable in Reading Food Labels: yes  Getting Adequate Protein: less since surgery as appetite has been low.     Stress management happy to have good recovery this time from hip replacement (last September had dislocation early on that required treatment).     PHYSICAL ACTIVITY PATTERNS:  Cardiovascular: PT/OT  Strength Training: same    REVIEW OF SYSTEMS  Doing well overall. Happy about recent A1c. .  PHYSICAL EXAM:  Vitals: Ht 1.626 m (5' 4\")   Wt 115.2 kg (254 lb)   BMI 43.60 kg/m    Weight:   Wt Readings from Last 3 Encounters:   05/06/22 115.2 kg (254 lb)   04/20/22 121.8 kg (268 lb 9.6 oz)   04/14/22 121.8 kg (268 lb 9.6 oz)         GEN: Pleasant on phone. No cough/dyspnea.NEURO: Alert and Oriented X3, fluent speech. .  .    Medical Decision Making:  Interim study results: labs reviewed 4/11/22. A1c 4.7% 4/4/22. Glucose 183mg/dl. .      30 minutes spent on the date of the encounter doing chart review, history and exam, documentation and further activities per the note   Kerwin Dunn MD  [facility]  9:07 AM  5/6/2022          "

## 2024-01-18 ENCOUNTER — VIRTUAL VISIT (OUTPATIENT)
Dept: SURGERY | Facility: CLINIC | Age: 44
End: 2024-01-18
Payer: COMMERCIAL

## 2024-01-18 DIAGNOSIS — E66.01 MORBID OBESITY WITH BMI OF 45.0-49.9, ADULT (H): Primary | ICD-10-CM

## 2024-01-18 PROCEDURE — 97803 MED NUTRITION INDIV SUBSEQ: CPT | Mod: 95 | Performed by: DIETITIAN, REGISTERED

## 2024-01-18 NOTE — PROGRESS NOTES
Ronel Ryan is a 43 year old who is being evaluated via a billable video visit.      Medical  Weight Loss Follow-Up Diet Evaluation  Assessment:  Ronel is presenting today for a follow up weight management nutrition consultation.  This patient has had an initial appointment and was referred by Dr. Dunn for MNT as treatment for Morbid obesity.  Weight loss medication: Semaglutide- Ozempic  Pt's weight is 260 lb  Initial weight: 262 lb  Weight change: +2 lb         No data to display              BMI: There is no height or weight on file to calculate BMI.  Ideal body weight: 54.7 kg (120 lb 9.5 oz)  Adjusted ideal body weight: 80.3 kg (176 lb 15.9 oz)    Estimated RMR ([place]-St Jeor equation):   1821 kcals x 1.2 (sedentary) = 2186 kcals (for weight maintenance)  1821 kcals x 1.3 (light active) = 2504 kcals (for weight maintenance)  Recommended Protein Intake: 70-90 grams of protein/day  Patient Active Problem List:  Patient Active Problem List   Diagnosis    Binge eating    Eating disorder    Morbid obesity (H)    Multiple-type hyperlipidemia    Severe episode of recurrent major depressive disorder (H)    Arthralgia of hip    Hypertension    Trochanteric bursitis    History of urinary anomaly    Borderline personality disorder (H)    Attention deficit disorder    Asthma    Suicidal ideation    ACP (advance care planning)    Adjustment disorder with depressed mood    Anxiety    Moderate mixed bipolar I disorder (H)    Bipolar II disorder, most recent episode hypomanic (H)    Borderline high cholesterol    Care plan discussed with patient    Disorder of pelvis    Gait difficulty    Hx of diabetes mellitus    Hx of eating disorder    Morbid obesity with BMI of 50.0-59.9, adult (H)    Osteoarthritis    Pain of both hip joints    Relationship problems    Secondary amenorrhea    Soft tissue lesion of shoulder region    Vertigo    Greater trochanteric bursitis of both hips    Hip dysplasia    Left hip pain     "Osteoarthritis of hip    Primary osteoarthritis of left hip    ADD (attention deficit disorder)    H/O urinary frequency    History of anxiety disorder    Hyperlipidemia    Mixed hyperlipidemia    Recurrent major depressive disorder (H24)    Type 2 diabetes mellitus (H)    Primary osteoarthritis of both hips    Degenerative joint disease (DJD) of hip    Status post total hip replacement, left    Bipolar 1 disorder (H)    Constipation    Gastroesophageal reflux disease with esophagitis without hemorrhage    Mixed incontinence    Primary localized osteoarthritis of pelvic region and thigh    Skin sensation disturbance     Nutrition History:   Weight loss history: has been working with Dr. Dunn for 4 years - has lost about 86 lbs  \"Trying to stay away from sugar and carbs\"  Tracks in [employer] - set at 1592 kcals  Binge Eating    Progress on goals from last visit: She was off track with her eating lately and has gained some weight per patient. Her niece, who was her PCA, often made brownies and brought treat foods over to her apartments and she struggled to decline or manage her intake of these foods. Her niece is no longer her PCA so she feels she is ready to get back on track and is better able to work on getting back to healthier habits.   Continue tracking intake in [employer], 6757-8422 kcals - not met  Aim for 70-90 g protein per day or 20-30 g per meal - not met  Continue exercise routine - not met  Switch to diet cranberry juice - mostly met  Work on \"all or nothing mindset\" with treats, allow for a treat occasionally in your diet - working on this    Dietary Recall:  She has not been eating meals, but has been snacking instead  Beverages:   Water  Tea  Cranberry juice recently - taking to prevent UTIs, she is going to switch to diet cranberry  Has had soda while out to eat  Exercise:   Has not been going to the [facility] lately - used to go every day with her previous PCA  Nutrition Diagnosis:    Morbid " "obesity related to excessive energy intake as evidence by BMI of 44.9     Intervention:  Food and/or nutrient delivery: Aim for 3 meal per day, 25-35 g at each meal  Nutrition education: discussed reading labels for sugars, added sugars and their meaning, calories, and artificial sweeteners  Nutrition counseling: goal setting    Monitoring/Evaluation:    Goals:  Continue exercise routine 2-3 days per week  Request a small portion of treats/dessert when offered  Switch to diet cranberry juice/sugar free juices  Work on \"all or nothing mindset\" with treats, allow for a treat occasionally in your diet    Patient to follow up in 4 month(s) with bariatrician and 1.5 month(s) with RD    Video-Visit Details    Type of service:  Video Visit    Video Start Time (time video started): 9:00 am    Video End Time (time video stopped): 9:25 am    Originating Location (pt. Location): Home      Distant Location (provider location):  Off-site    Mode of Communication:  Video Conference via [facility]    Physician has received verbal consent for a Video Visit from the patient? Yes      Tanya Selby RD       "

## 2024-01-18 NOTE — LETTER
1/18/2024         RE: Ronel Ryan  [address]  [facility] [address]        Dear Colleague,    Thank you for referring your patient, Ronel Ryan, to the [facility] [place]. Please see a copy of my visit note below.    Ronel Ryan is a 43 year old who is being evaluated via a billable video visit.      Medical  Weight Loss Follow-Up Diet Evaluation  Assessment:  Ronel is presenting today for a follow up weight management nutrition consultation.  This patient has had an initial appointment and was referred by Dr. Dunn for MNT as treatment for Morbid obesity.  Weight loss medication: Semaglutide- Ozempic  Pt's weight is 260 lb  Initial weight: 262 lb  Weight change: +2 lb         No data to display              BMI: There is no height or weight on file to calculate BMI.  Ideal body weight: 54.7 kg (120 lb 9.5 oz)  Adjusted ideal body weight: 80.3 kg (176 lb 15.9 oz)    Estimated RMR (Stanwood-St Jeor equation):   1821 kcals x 1.2 (sedentary) = 2186 kcals (for weight maintenance)  1821 kcals x 1.3 (light active) = 2504 kcals (for weight maintenance)  Recommended Protein Intake: 70-90 grams of protein/day  Patient Active Problem List:  Patient Active Problem List   Diagnosis     Binge eating     Eating disorder     Morbid obesity (H)     Multiple-type hyperlipidemia     Severe episode of recurrent major depressive disorder (H)     Arthralgia of hip     Hypertension     Trochanteric bursitis     History of urinary anomaly     Borderline personality disorder (H)     Attention deficit disorder     Asthma     Suicidal ideation     ACP (advance care planning)     Adjustment disorder with depressed mood     Anxiety     Moderate mixed bipolar I disorder (H)     Bipolar II disorder, most recent episode hypomanic (H)     Borderline high cholesterol     Care plan discussed with patient     Disorder of pelvis     Gait difficulty     Hx of diabetes  "mellitus     Hx of eating disorder     Morbid obesity with BMI of 50.0-59.9, adult (H)     Osteoarthritis     Pain of both hip joints     Relationship problems     Secondary amenorrhea     Soft tissue lesion of shoulder region     Vertigo     Greater trochanteric bursitis of both hips     Hip dysplasia     Left hip pain     Osteoarthritis of hip     Primary osteoarthritis of left hip     ADD (attention deficit disorder)     H/O urinary frequency     History of anxiety disorder     Hyperlipidemia     Mixed hyperlipidemia     Recurrent major depressive disorder (H24)     Type 2 diabetes mellitus (H)     Primary osteoarthritis of both hips     Degenerative joint disease (DJD) of hip     Status post total hip replacement, left     Bipolar 1 disorder (H)     Constipation     Gastroesophageal reflux disease with esophagitis without hemorrhage     Mixed incontinence     Primary localized osteoarthritis of pelvic region and thigh     Skin sensation disturbance     Nutrition History:   Weight loss history: has been working with Dr. Dunn for 4 years - has lost about 86 lbs  \"Trying to stay away from sugar and carbs\"  Tracks in Myfitnesspal - set at 1592 kcals  Binge Eating    Progress on goals from last visit: She was off track with her eating lately and has gained some weight per patient. Her niece, who was her PCA, often made brownies and brought treat foods over to her apartments and she struggled to decline or manage her intake of these foods. Her niece is no longer her PCA so she feels she is ready to get back on track and is better able to work on getting back to healthier habits.   Continue tracking intake in MyFitnesspal, 4224-0881 kcals - not met  Aim for 70-90 g protein per day or 20-30 g per meal - not met  Continue exercise routine - not met  Switch to diet cranberry juice - mostly met  Work on \"all or nothing mindset\" with treats, allow for a treat occasionally in your diet - working on this    Dietary " "Recall:  She has not been eating meals, but has been snacking instead  Beverages:   Water  Tea  Cranberry juice recently - taking to prevent UTIs, she is going to switch to diet cranberry  Has had soda while out to eat  Exercise:   Has not been going to the [facility] lately - used to go every day with her previous PCA  Nutrition Diagnosis:    Morbid obesity related to excessive energy intake as evidence by BMI of 44.9     Intervention:  Food and/or nutrient delivery: Aim for 3 meal per day, 25-35 g at each meal  Nutrition education: discussed reading labels for sugars, added sugars and their meaning, calories, and artificial sweeteners  Nutrition counseling: goal setting    Monitoring/Evaluation:    Goals:  Continue exercise routine 2-3 days per week  Request a small portion of treats/dessert when offered  Switch to diet cranberry juice/sugar free juices  Work on \"all or nothing mindset\" with treats, allow for a treat occasionally in your diet    Patient to follow up in 4 month(s) with bariatrician and 1.5 month(s) with RD    Video-Visit Details    Type of service:  Video Visit    Video Start Time (time video started): 9:00 am    Video End Time (time video stopped): 9:25 am    Originating Location (pt. Location): Home      Distant Location (provider location):  Off-site    Mode of Communication:  Video Conference via [facility]    Physician has received verbal consent for a Video Visit from the patient? Yes      Tanya Selby RD           Again, thank you for allowing me to participate in the care of your patient.        Sincerely,        Tanya Selby RD  "

## 2024-01-25 ENCOUNTER — DOCUMENTATION ONLY (OUTPATIENT)
Dept: SURGERY | Facility: CLINIC | Age: 44
End: 2024-01-25

## 2024-01-25 ENCOUNTER — VIRTUAL VISIT (OUTPATIENT)
Dept: SURGERY | Facility: CLINIC | Age: 44
End: 2024-01-25
Payer: COMMERCIAL

## 2024-01-25 VITALS — HEIGHT: 64 IN | BODY MASS INDEX: 46.1 KG/M2 | WEIGHT: 270 LBS

## 2024-01-25 DIAGNOSIS — E11.9 TYPE 2 DIABETES MELLITUS WITHOUT COMPLICATION, WITHOUT LONG-TERM CURRENT USE OF INSULIN (H): ICD-10-CM

## 2024-01-25 DIAGNOSIS — E66.01 CLASS 3 SEVERE OBESITY DUE TO EXCESS CALORIES WITH SERIOUS COMORBIDITY AND BODY MASS INDEX (BMI) OF 45.0 TO 49.9 IN ADULT (H): ICD-10-CM

## 2024-01-25 DIAGNOSIS — Z87.19 HISTORY OF FATTY INFILTRATION OF LIVER: ICD-10-CM

## 2024-01-25 DIAGNOSIS — R63.5 DRUG-INDUCED WEIGHT GAIN: ICD-10-CM

## 2024-01-25 DIAGNOSIS — E88.810 METABOLIC SYNDROME X: Primary | ICD-10-CM

## 2024-01-25 DIAGNOSIS — T50.905A DRUG-INDUCED WEIGHT GAIN: ICD-10-CM

## 2024-01-25 DIAGNOSIS — E66.813 CLASS 3 SEVERE OBESITY DUE TO EXCESS CALORIES WITH SERIOUS COMORBIDITY AND BODY MASS INDEX (BMI) OF 45.0 TO 49.9 IN ADULT (H): ICD-10-CM

## 2024-01-25 PROCEDURE — 99214 OFFICE O/P EST MOD 30 MIN: CPT | Mod: 95 | Performed by: EMERGENCY MEDICINE

## 2024-01-25 ASSESSMENT — PAIN SCALES - GENERAL: PAINLEVEL: NO PAIN (0)

## 2024-01-25 NOTE — PROGRESS NOTES
Bariatric Clinic Follow-Up Visit:    Ronel Ryan is a 43 year old  female with Body mass index is 46.35 kg/m .  presenting here today for follow-up on non-surgical efforts for weight loss. Original Intake visit occurred on 7/13/19 with a weight of 345 lbs and BMI of 59.2.  Along with diet and behavior changes, she has been using various combinations through the years of Trulicity/naltrexone and a previous use of topiramate from her pain clinic which can also assist her weight loss goals.  See her intake visit notes for details on identified contributors to weight gain in the past. Chart review shows Dietician calculated RMR of 1892kcal/day and protein intake goal of 70-90g/day.  Her Trulicity was stopped in hospital May of 2022 during hip surgery and she had weight gain of 30 lbs over the summer due to increased cravings off medication. We transitioned to Semaglutide therapy this summer of 2022 but then supplies ran out due to shortages in supply and Victoza was started in November 2022 (11/7/22) with plans to transition back in 2023 when supplies of Ozempic are more available. She was able to get back on Ozempic in late 2022 and was up to 1mg/week as of our 10/06/23 visit w/ good results/tolerance.    Dietician visit on 11/17/23 w/ RMR of 1821 kcal/day and protein goal of 70-90g/day. She's a user of [employer] w/ good tracking history of food and exercise.  Weight:   Wt Readings from Last 5 Encounters:   01/25/24 122.5 kg (270 lb)   10/06/23 118.7 kg (261 lb 9.6 oz)   06/22/23 116.1 kg (256 lb)   03/16/23 120.7 kg (266 lb)   02/01/23 126.1 kg (278 lb)    pounds  75 lb reduction from heaviest weight on chart from 7.31.19. this is a 21% total body weight reduction.  Some modest weight gain from spring weight this winter, up 14 lbs.     Comorbidities:  Patient Active Problem List   Diagnosis    Binge eating    Eating disorder    Morbid obesity (H)    Multiple-type hyperlipidemia    Severe episode of recurrent  major depressive disorder (H)    Arthralgia of hip    Hypertension    Trochanteric bursitis    History of urinary anomaly    Borderline personality disorder (H)    Attention deficit disorder    Asthma    Suicidal ideation    ACP (advance care planning)    Adjustment disorder with depressed mood    Anxiety    Moderate mixed bipolar I disorder (H)    Bipolar II disorder, most recent episode hypomanic (H)    Borderline high cholesterol    Care plan discussed with patient    Disorder of pelvis    Gait difficulty    Hx of diabetes mellitus    Hx of eating disorder    Morbid obesity with BMI of 50.0-59.9, adult (H)    Osteoarthritis    Pain of both hip joints    Relationship problems    Secondary amenorrhea    Soft tissue lesion of shoulder region    Vertigo    Greater trochanteric bursitis of both hips    Hip dysplasia    Left hip pain    Osteoarthritis of hip    Primary osteoarthritis of left hip    ADD (attention deficit disorder)    H/O urinary frequency    History of anxiety disorder    Hyperlipidemia    Mixed hyperlipidemia    Recurrent major depressive disorder (H24)    Type 2 diabetes mellitus (H)    Primary osteoarthritis of both hips    Degenerative joint disease (DJD) of hip    Status post total hip replacement, left    Bipolar 1 disorder (H)    Constipation    Gastroesophageal reflux disease with esophagitis without hemorrhage    Mixed incontinence    Primary localized osteoarthritis of pelvic region and thigh    Skin sensation disturbance       Current Outpatient Medications:     ACETAMINOPHEN EXTRA STRENGTH 500 MG tablet, , Disp: , Rfl:     albuterol (PROAIR HFA/PROVENTIL HFA/VENTOLIN HFA) 108 (90 Base) MCG/ACT inhaler, Inhale 2 puffs into the lungs every 6 hours as needed for shortness of breath, wheezing or cough, Disp: 18 g, Rfl: 0    atomoxetine (STRATTERA) 80 MG capsule, Take 80 mg by mouth daily, Disp: , Rfl:     blood glucose (NO BRAND SPECIFIED) lancets standard, Use to test blood sugar 2 times daily  "or as directed., Disp: 100 each, Rfl: 11    blood glucose monitoring (NO BRAND SPECIFIED) meter device kit, Use to test blood sugar 2 times daily or as directed., Disp: 1 kit, Rfl: 0    blood glucose monitoring (NO BRAND SPECIFIED) test strip, Use to test blood sugars 2 times daily or as directed, Disp: 100 strip, Rfl: 3    buPROPion (WELLBUTRIN XL) 300 MG 24 hr tablet, Take 300 mg by mouth daily , Disp: , Rfl:     cholecalciferol (VITAMIN D3) 125 mcg (5000 units) capsule, , Disp: , Rfl:     EPINEPHrine (ANY BX GENERIC EQUIV) 0.3 MG/0.3ML injection 2-pack, Inject 0.3 mg into the muscle, Disp: , Rfl:     EPINEPHrine (EPIPEN/ADRENACLICK/OR ANY BX GENERIC EQUIV) 0.3 MG/0.3ML injection 2-pack, Inject 0.3 mLs (0.3 mg) into the muscle once as needed for anaphylaxis, Disp: 0.6 mL, Rfl: 0    fish oil-omega-3 fatty acids 500 MG capsule, , Disp: , Rfl:     hydrOXYzine (ATARAX) 50 MG tablet, , Disp: , Rfl:     ibuprofen (ADVIL/MOTRIN) 600 MG tablet, Take 1 tablet (600 mg) by mouth every 6 hours as needed (mild), Disp: , Rfl: 0    insulin pen needle (31G X 6 MM) 31G X 6 MM miscellaneous, Use new pen needle for each autoinjection., Disp: 90 each, Rfl: 1    ketoconazole (EXTINA) 2 % external foam, , Disp: , Rfl:     levonorgestrel (LILETTA, 52 MG,) 19.5 MCG/DAY IUD, 1 each by Intrauterine route once, Disp: , Rfl:     mometasone (ELOCON) 0.1 % external cream, Apply topically daily, Disp: , Rfl:     risperiDONE (RISPERDAL) 3 MG tablet, , Disp: , Rfl:     Semaglutide, 1 MG/DOSE, (OZEMPIC) 4 MG/3ML pen, Inject 1 mg Subcutaneous every 7 days, Disp: 3 mL, Rfl: 0    sennosides (SENOKOT) 8.6 MG tablet, , Disp: , Rfl:     tolterodine ER (DETROL LA) 4 MG 24 hr capsule, Take 4 mg by mouth daily, Disp: , Rfl:     traZODone (DESYREL) 150 MG tablet, Take 150 mg by mouth At Bedtime, Disp: , Rfl:       Interim: Since our last visit, she has had \"great\" mental health and stable. Eating habits were under pressure after her PCA left for 6 weeks and " lost some of the encouragement for healthy eating/gym work. In her place, her Niece started coming over and bringing comfort food/snacks/brownies and wasn't working out. Now she's gone and her original PCA is back, back to the gym for first time in a month now and turning things around.   Using premiere protein drink once daily with lunch.   Ozempic 1mg/week tolerated well. Requests refills.  Her goals this year are to continue w/ non surgical weight loss options. She's consulting with Plastics regarding her lipedema issues, particularly in her arms, later this winter/early Spring.   Plan:   1.  Diet: to continue good weight reduction and reduce some of the sweet cravings caused by the month of treats from your Niece, you can do a 2 day jumpstart diet if interested (see below). Otherwise, the cravings should go down after a couple weeks of mindful diet again. Continue hydrating well with water to hit your 80-100oz/day water intake goal. Aiming for 1500-1600kcal/day if starting back up with walks/gym work and getting your 80 grams/day of lean protein.      2. Exercise: aiming for 3-4 days weekly of intentional movement  3. Medication: Ozempic helpful and well tolerated. We'll continue with the 1mg/week dose this year. Keeping metabolic disease/fatty liver risks in check w/ continued weight reduction/maintenance of the 21% reduction in weight you've had over the recent years.  4. Ramp tracking back up to get on track again.  5. Goals: stabilizing weight under 250 lbs this year.      We discussed [facility]ariat[facility] Basics including:  -eating 3 meals daily  -reviewed metabolic needs for weight loss based on Resting Metabolic Rate  -protein goals supportive of healthy weight loss  -avoiding/limiting calorie containing beverages  -We discussed the importance of restorative sleep and stress management in maintaining a healthy weight.  -We discussed the National Weight Control Registry healthy weight maintenance  "strategies and ways to optimize metabolism.  -We discussed the importance of physical activity including cardiovascular and strength training in maintaining a healthier weight and explored viable options.      Most recent labs:  Lab Results   Component Value Date    WBC 5.5 02/01/2023    HGB 13.9 02/01/2023    HCT 42.1 02/01/2023    MCV 93 02/01/2023     02/01/2023     Lab Results   Component Value Date    CHOL 155 10/17/2023     Lab Results   Component Value Date    HDL 48 (L) 10/17/2023     No components found for: \"LDLCALC\"  Lab Results   Component Value Date    TRIG 78 10/17/2023     No results found for: \"CHOLHDL\"  Lab Results   Component Value Date    ALT 16 10/17/2023    AST 19 10/17/2023    ALKPHOS 50 10/17/2023     No results found for: \"HGBA1C\"  Lab Results   Component Value Date    B12 368 11/09/2022     No components found for: \"VITDT1\"  Lab Results   Component Value Date    KOFFI 79 11/09/2022     Lab Results   Component Value Date    PTHI 77 08/23/2019     No results found for: \"ZN\"  Lab Results   Component Value Date    VIB1WB 108 08/23/2019     Lab Results   Component Value Date    TSH 1.14 05/21/2021   CT ABDOMEN PELVIS W CONTRAST  Order: [id number]  [facility]  CT ABDOMEN PELVIS W  10/3/2016 7:55 PM        INDICATION: Abdominal pain. UTI.  TECHNIQUE: CT abdomen and pelvis. Multiplanar reformation images (MPR). Dose reduction techniques were used.  IV CONTRAST: Omnipaque 350 129ml  COMPARISON: CT abdomen pelvis 04/15/2009 reviewed.    FINDINGS:  LUNG BASES: Negative.    ABDOMEN: No acute findings. Cholecystectomy. Minimal regional hepatic steatosis along the falciform ligament. Liver otherwise normal. Surgical clip right paracolic gutter. Kidneys, adrenal glands, spleen and pancreas are normal. Normal bilateral renal parenchymal enhancement without evidence of pyelonephritis. No adenopathy.    PELVIS: Minimal haziness about the urinary bladder, which can be seen with " "cystitis. Normal appendix. No evidence of diverticulitis or colitis. No focal fluid collection or abscess. IUD in good position. Right ovarian corpus luteum.    MUSCULOSKELETAL: Negative.    CONCLUSION:  1. Possible cystitis.  2. Normal appendix. No urinary stones.  Exam End: 10/03/16  7:55 PM    Specimen Collected: 10/03/16  7:55 PM    T ABDOMEN PELVIS W ORAL W IV CONTRAST  3/13/2015 8:47 AM        INDICATION: abd pain  TECHNIQUE: CT abdomen and pelvis. Multiplanar reformation images (MPR).   IV CONTRAST: Iohexol (Omni) 100 mL  COMPARISON: None.     FINDINGS:  LUNG BASES: Normal.     ABDOMEN: There is mild, diffuse fatty infiltration of the liver with more focal fatty replacement adjacent to the falciform ligament. The gallbladder has been removed. The spleen, pancreas, adrenal glands and both kidneys are normal. Aorta is normal   caliber and there is no adenopathy. Small bowel loops are normal.     PELVIS: A long, medial lead coiled appendix is normal. IUD is in place. Pelvic organs are otherwise normal. No free fluid or inflammatory changes. Focal retained stool in the rectosigmoid. Rest of the colon is unremarkable.     MUSCULOSKELETAL: No suspicious lesions on bone windows. Degenerative changes are noted superiorly within the left acetabulum with the sclerosis and subchondral cyst formation.     IMPRESSION:  CONCLUSION:  1.  No abnormalities are seen to explain abdominal pain. Specifically, no inflammatory changes, free fluid or obstruction.  2.  Patient has an IUD in place and the gallbladder has been removed.  3.  There is mild fatty infiltration of the liver with degenerative changes noted in the left hip.  No results found for: \"TEST\"    DIETARY HISTORY  Getting back on track again w/ improved environment for healthy eating      PHYSICAL ACTIVITY PATTERNS:  Cardiovascular: walking  Strength Training: back to the gym this week    REVIEW OF SYSTEMS  Feels well..  PHYSICAL EXAM:  Vitals: Ht 1.626 m (5' 4\")   " Wt 122.5 kg (270 lb)   BMI 46.35 kg/m    Weight:   Wt Readings from Last 3 Encounters:   01/25/24 122.5 kg (270 lb)   10/06/23 118.7 kg (261 lb 9.6 oz)   06/22/23 116.1 kg (256 lb)         GEN: Pleasant, well groomed, in no acute distress  HEENT:  normal facies/speech .  NECK: No swelling.  HEART: .  LUNGS: No respiratory difficulty noted. No cough. .  ABDOMEN: obese.  EXTREMITIES: No tremor. ..  NEURO: Alert and Oriented X3, fluent speech. .  SKIN: No visible rashes. .    Interim study results: good control:   Lab Results   Component Value Date    A1C 5.1 11/09/2022    A1C 4.7 04/04/2022    A1C 4.6 09/10/2021    A1C 6.7 08/23/2019    A1C 7.0 11/21/2018    A1C 6.0 12/31/2010     .      30 minutes spent by me on the date of the encounter doing chart review, history and exam, documentation and further activities per the note   Kerwin Dunn MD  [facility]  7:56 AM  1/25/2024

## 2024-01-25 NOTE — NURSING NOTE
Is the patient currently in the state of [place]? YES    Visit mode:VIDEO    If the visit is dropped, the patient can be reconnected by: VIDEO VISIT: Send to e-mail at: [email or web address]    Will anyone else be joining the visit? NO  (If patient encounters technical issues they should call [phone]    How would you like to obtain your AVS? MyChart    Are changes needed to the allergy or medication list? No, Pt stated no changes to allergies, and Pt stated no med changes    Reason for visit: RECHECK (MERRITT)    Mikki DAMON

## 2024-01-25 NOTE — PROGRESS NOTES
Virtual Visit Details    Type of service:  Video Visit   Video Start Time:  10:01am  Video End Time:10:32 AM    Originating Location (pt. Location): Home    Distant Location (provider location):  On-site  Platform used for Video Visit: Maryam

## 2024-01-25 NOTE — PATIENT INSTRUCTIONS
Plan:   1.  Diet: to continue good weight reduction and reduce some of the sweet cravings caused by the month of treats from your Niece, you can do a 2 day jumpstart diet if interested (see below). Otherwise, the cravings should go down after a couple weeks of mindful diet again. Continue hydrating well with water to hit your 80-100oz/day water intake goal. Aiming for 1500-1600kcal/day if starting back up with walks/gym work and getting your 80 grams/day of lean protein.      2. Exercise: aiming for 3-4 days weekly of intentional movement  3. Medication: Ozempic helpful and well tolerated. We'll continue with the 1mg/week dose this year. Keeping metabolic disease/fatty liver risks in check w/ continued weight reduction/maintenance of the 21% reduction in weight you've had over the recent years.  4. Ramp tracking back up to get on track again.  5. Goals: stabilizing weight under 250 lbs this year.            Weight Loss Jump Start Plan      The Jump Start Plan is a great way to quickly lose a few pounds and eliminate sugar cravings before beginning a long-term weight loss plan. It will help you get used to eating more protein and drinking more water, and it helps you reintroduce vegetables and enjoy their taste again. Plus it s very restrictive so once you start your long-term weight loss plan  following your new diet will seem easier and more generous in comparison! This 3 day Jump Start can be done before beginning a weight loss plan to drop a few pounds and also anytime throughout your diet to break out of a plateau.       THE JUMP START WEIGHT LOSS DIET:    Unlimited Protein. Choose from:  Roasted/sautéed/baked/grilled chicken breasts (skinless is ideal to start with but skin-on is fine if you use this for only one of your protein servings daily)  Venison  Canned tuna  Fish (broiled or baked)  Steak (filet mignon or sirloin ideally)  Shrimp (sautéed, grilled)  At least one egg daily but not more than 2 eggs per  day    It can be helpful to roast several chicken breasts the night before beginning the Jump Start and then eat them throughout the day, just re-warming as needed.  Note: Vegetarians can use an unflavored protein powder in a vegetable smoothie or juice. Unlimited tofu, lentils and beans may also be used for the protein source for vegetarians.    Unlimited, RAW Vegetables, but NO carrots or corn.  Choose from:  Lettuce  Green beans  Peas  Cucumbers  Celery  Bell peppers (red/green/yellow)  Zucchini  Cauliflower  Tomatoes (limit to 5 cherry tomatoes or one regular tomato daily)      This helps cut sweet cravings and helps keep the stools soft and gets you your vitamins.  Try to keep veggies raw during these 2 days. After Jump Start is complete, cook as many veggies as you can, just not during this phase.      Water intake:  Mandatory minimum of 64 oz of water each day, with a maximum of 100 oz (unless you re sweating a lot, in which case drink more).  A 10-12 oz glass of water every 3 hours when awake works best.    Juice requirement: Juice is not recommended during the long term weight loss plan but for this Jump Start three-day portion you should drink four, 6 oz. glasses of 100% pineapple juice or 100% randell juice per day, no more, no less (24 oz per day total).  Please measure carefully!  Drink the juice when you eat protein or veggies so your blood sugar doesn t spike as much. Your energy levels will also be much better throughout the 2 days.    Coffee/Tea: You can have up to 2 cups of coffee or tea daily.      No alcohol during this phase.  If you are dependent on alcohol or have had withdrawal before, notify your practitioner before starting the Jump Start.    Fresh fruits, up to two servings per day. Choose from:  One apple  20 grapes  One nectarine  One tangerine  One peach  15 cherries  3 oz of fresh pineapple  One slice of cantaloupe  1 inch wide slice of watermelon    If craving occurs for sweets, have  another glass of water and some veggies or protein.    Condiments are fine in moderation. Choose from:  A little olive oil or butter to cook your proteins  A dash of salt (try to limit)  Pepper  Garlic  Seasoning herbs  Mustard  Ketchup (no more than 1 Tablespoon daily)  Mayonnaise (max 2 Tablespoons daily)      Season your proteins so they taste good, just watch salt intake.  No jelly or peanut butter unless peanut butter is just crushed peanuts in the quantity listed in the Nuts section.    Nuts, up to one serving/1 oz. daily. Choose from:  15 Almonds  24 peanuts (unflavored/unsalted)  14 halves of walnuts  40 pistachios    No bread during the Jump Start.      No dairy products during the Jump Start.        Strategies for Success:  Start the morning with protein (plan ahead!). The more the better.  Cook 6 chicken breasts or put in crock put for pulled chicken over the day, etc.  Make it easy to grab the protein.    Get your water in, frequent small intake is better throughout the day.  If you feel severe hunger, start with a protein serving, followed by water and then a crunchy vegetable that requires chewing (this decreases the hunger hormone).  Schedule the juice intake like clockwork, and take it with protein, veggies or both to prevent blood sugar spikes and dips and prevent decreases in energy.  Record your intake on the sheet below.  Although this may be quite different from your usual diet, it s enough food!    Be intentional and think about what you are eating and feel the food fuel you.  Vigorous physical activity is not recommended during these 3 days but low intensity exercise less than 30 minutes (walking/hiking/yard work) is encouraged.  This often works best on the weekend when you can devote your full attention to complying with the diet and the food is readily available.  Try to get protein in at least every 3.5 hours during the day to avoid a hunger surge late in the day.   Use plenty of olive oil  when cooking eggs/meats; the fat will make you feel more satisfied.  Don t cook on high heat though as this transforms the olive oil into a less healthy oil.  Expect to urinate a lot over the 3 days.  Most of us have a lot of carbohydrate weight retention.  Remember, if you get the recommended water intake in you will be fully hydrated but still urinating a lot. Urine should be pale yellow and the urge should come every 3-4 hours, if not sooner, for portions of the three days.        Note: diabetic patients on insulin should check their blood sugars 4 times a day during this phase and record those values.  If low blood sugar occurs, rescue as you usually would with an extra serving of juice/fruit.          Three-Day Jump Start Food Checklist: [place] or check off after eating.    Protein Servings:  Unlimited!!! [place] how many each day.     Day 1:  1  2  3  4  5  6  7  8  9  10  ___________________________      Day 2:  1  2  3  4  5  6  7  8  9  10  ___________________________    Vegetables: RAW, Unlimited as noted above.  [place] servings.     Day 1:  1  2  3  4  5  6  7  8  9  10  ___________________________      Day 2:  1  2  3  4  5  6  7  8  9  10  ___________________________    Fruit:   Day 1:  Serving 1 was ____________________,  Serving 2 was:  ___________________     Day 2:  Serving 1 was ____________________,  Serving 2 was:  ___________________    Nuts:   Day 1 Serving was _________________________      Day 2 Serving was _________________________    Eggs:  At least one daily unless allergic, max of 2 daily. [place] servings.     Day 1:  1 egg          2 eggs     Day 2:  1 egg          2 eggs    Jump Start Juice:  Mandatory four servings daily, one serving is 6 oz. Measure carefully!     Pineapple or [place]     Day 1: 6 oz. 6 oz. 6 oz. 6 oz.     Day 2:  6 oz. 6 oz. 6 oz. 6 oz.    Water: Minimum of 6 glasses 10 oz. glasses daily (max of 11 glasses)     Day 1: 1st glass   2nd glass   3rd glass   4th  glass   5th glass   6th glass _________________     Day 2: 1st glass   2nd glass   3rd glass   4th glass   5th glass   6th glass _________________      Starting weight:  Day 1 morning weight:  _______________________ lbs.   Day 2 morning weight:  _______________________ lbs.  Day 3 morning weight:  _______________________ lbs.  Ending weight:  Day 4 morning weight:  _______________________ lbs.    LEAN PROTEIN SOURCES  Getting 20-30 grams of protein, 3 meals daily, is appropriate for most people, some need more but more than about 40 grams per meal is not useful.  General rule is drinking one ounce of water per gram of protein eaten over the course of the day:  70 grams of protein each day, drink 70 oz of water.  Protein Source Portion Calories Grams of Protein                           Nonfat, plain Greek yogurt    (10 grams sugar or less) 3/4 cup (6 oz)  12-17   Light Yogurt (10 grams sugar or less) 3/4 cup (6 oz)  6-8   Protein Shake 1 shake 110-180 15-30   Skim/1% Milk or lactose-free milk 1 cup ( 8 oz)  8   Plain or light, flavored soymilk 1 cup  7-8   Plain or light, hemp milk 1 cup 110 6   Fat Free or 1% Cottage Cheese 1/2 cup 90 15   Part skim ricotta cheese 1/2 cup 100 14   Part skim or reduced fat cheese slices 1 ounce 65-80 8     Mozzarella String Cheese 1 80 8   Canned tuna, chicken, crab or salmon  (canned in water)  1/2 cup 100 15-20   White fish (broiled, grilled, baked) 3 ounces 100 21   [place]/Tuna (broiled, grilled, baked) 3 ounces 150-180 21   Shrimp, Scallops, Lobster, Crab 3 ounces 100 21   Pork loin, Pork Tenderloin 3 ounces 150 21   Boneless, skinless chicken /turkey breast                          (broiled, grilled, baked) 3 ounces 120 21   [place], [place], [place], and Venison 3 ounces 120 21   Lean cuts of red meat and pork (sirloin,   round, tenderloin, flank, ground 93%-96%) 3 ounces 170 21   Lean or Extra Lean Ground Turkey 1/2 cup 150 20   90-95% Lean Turkey Burger 1  mckinley 140-180 21   Low-fat casserole with lean meat 3/4 cup 200 17   Luncheon Meats                                                        (turkey, lean ham, roast beef, chicken) 3 ounces 100 21   Egg (boiled, poached, scrambled) 1 Egg 60 7   Egg Substitute 1/2 cup 70 10   Nuts (limit to 1 serving per day)  3 Tbsp. 150 7   Nut [place] (peanut, almond)  Limit to 1 serving or less daily 1 Tbsp. 90 4   Soy Burger (varies) 1  15   Garbanzo, Black, [place] Beans 1/2 cup 110 7   Refried Beans 1/2 cup 100 7   Kidney and [place] beans 1/2 cup 110 7   Tempeh 3 oz 175 18   Vegan crumbles 1/2 cup 100 14   Tofu 1/2 cup 110 14   Chili (beans and extra lean beef or turkey) 1 cup 200 23   Lentil Stew/Soup 1 cup 150 12   Black Bean Soup 1 cup 175 12       On-the-Go Breakfast Ideas  As of 2015, the latest research shows what a huge impact eating breakfast has on losing weight and feeling your best. People lose more weight when they make breakfast their biggest meal of the day compared to Dinner, but even if you cannot go to that degree, getting a breakfast that has at least 20 grams of protein and even a moderate amount of fat is ideal for maintaining good energy through the day and limits overeating in the evening hours.  The following are some quick and easy suggestions for at least getting something of substance into your body in the morning.  Enjoy!    Eating breakfast within 90 minutes of waking up is an important part of taking care of your body on a restricted calorie diet plan.  After sleeping for hours, your body is in need of fuel.  An ideal breakfast is a combination of protein, whole-grain carbohydrates, or fruit.  Here s why:    -Protein digests very slowly in the body, helping you feel more satisfied.  -Whole grains provide dietary fiber, which also digests slowly and helps keep your gut clean.  -Fruit is a great source of vitamins, minerals, and fiber.     Each one of these breakfast combinations has between 200-300  calories and 15-20 grams of protein.  Feel free to mix and match!    Bone Broth (chicken bone broth or beef bone broth) is a great way to boost protein content. 8oz of bone broth will typically have 9-12grams of protein for 40kcal of energy.    Protein: Choose  -1/2 cup low-fat cottage cheese  -2 hard boiled eggs , or one cooked in olive oil (low/slow heat).  -1 low fat string cheese stick  -1 Tablespoon natural peanut butter  -[employer] vegetarian sausage mckinley (found in freezer section)  -1 slice lowfat cheese  -6 oz 2% or lowfat Greek yogurt, such as Fage or Oikos.    PLUS    Whole Grains:  Choose   -1 whole wheat English muffin  -1 whole wheat sol, half  -1/2 Fiber One frozen muffin, thawed  -1/2 Fiber One toaster pastry  -1 whole wheat bagel thin  -1/2 cup Kashi cereal  -1 Kashi waffle (or other whole grain high-fiber waffle)  Aim for whole grain/sprouted breads with at least 3g of fiber/slice if having bread. [place] is one such brand.    OR    Fruit: Choose  -1/3 cup blueberries  -1/2 banana (or a plantain- similar to a banana, yet smaller)  -1/2 cup cantaloupe cubes  -1 small apple  -1 small orange  -1/2 cup strawberries  -handful raspberries/blackberries (each berry is about 1 calorie).    *Adapted from [employer], Fall 20    Ten Breakfasts Under 250 calories    Ideally, getting between 350-600 calories  (depending on starting height and weight)for breakfast is ideal for avoiding hunger later in the day, adjust/add to the following accordingly:    One- 250 calories, 8.5 g protein  1 slice whole-grain toast   1 Tbsp peanut butter    banana    Two- 250 calories, 8 g protein    cup nonfat/lowfat yogurt  1/3rd cup diced no-sugar peaches  1/3rd cup cereal (like Special K, Cheerios, or bran flakes)    Three- 250 calories, 25 g protein  1 egg scrambled with 1 oz skim milk    cup shredded cheddar    whole grain English muffin  1 oz [place] rick  1 tsp margarine spread    Four- 225 calories, 25  g protein  1/2 cup Kashi Go-Lean cereal    cup skim milk mixed with 1 scoop Bariatric Advantage protein powder    cup no-sugar diced pears    Five- 250 calories, 20 g protein    cup oatmeal prepared with skim milk, 1 scoop protein powder, and sugar-free maple syrup    Six- 200 calories, 5 g protein  1 whole grain waffle, toasted  1 tablespoon creamy peanut or almond butter    Seven-  250 calories, 19 g protein  Breakfast sandwich: 1 slice whole grain toast, cut in half.  Add 1 scrambled egg and one slice cheddar  cheese.    Eight-  250 calories, 15 g protein  2 eggs scrambled with 1/3 cup frozen spinach (heat before adding to eggs) and 2 tablespoons low fat cream cheese.    Nine-  150 calories, 15 g protein  2/3rd cup cottage cheese    cup cantaloupe    Ten- 200 calories, 20 g protein  Fruit smoothie made with 4 oz. nonfat Greek yogurt,   cup berries, 1 scoop protein powder, and 4 oz skim milk.    Ten Lunches Under 250 Calories    Aim for lunch to be around 300-400 calories a day when trying to lose weight and get that protein in!    One- 200 calories, 11 g protein  1/3 cup tuna salad made with light perez on 1 slice whole grain bread  1 small peeled apple    Two- 250 calories, 16 g protein  1/3 cup lowfat cottage cheese    cup cooked green beans    small fruit cocktail (in natural juice)    Three- 200 calories, 11 g protein    grilled cheese sandwich on whole grain bread with lowfat cheese  2/3rd cup of tomato soup    Four- 250 calories, 22 g protein  Deli wrap: 1 oz sliced turkey, 1 oz sliced ham, 1 oz sliced chicken rolled up with 1 slice low-fat cheese  1 small orange    Five- 250 calories, 28 g protein  2/3rd cup chili with 1 oz shredded cheese  4 saltine crackers    Six- 250 calories, 22 g protein  1 cup fresh spinach with 2 oz chicken, 1/3rd cup mandarin oranges, and 2 tablespoons sliced almonds with 1 tablespoon  vinaigrette dressing    Seven- 200 calories, 11 g protein  1 Tbsp sugar-free preserves and 1 Tbsp  peanut butter on 1 slice whole grain toast    cup nonfat/lowfat Greek yogurt    Eight- 250 calories, 18 g protein  1 small soft-shell chicken taco with 1 oz shredded cheese, lettuce, tomato, salsa, and 1 Tbsp light sour cream    cup black beans    Nine- 225 calories, 13 g protein  2 ounces baked chicken  1/4 cup mashed potatoes    cup green beans    Ten- 200 calories, 21 g protein  Deli sol: 2 oz roast beef or other deli meat with 1 tsp Алекс[place]р mayonnaise and sliced tomato, onion, and lettuce  1/3rd cup cottage cheese      Ten Dinners Under 300 calories    If you're eating a large breakfast and medium lunch, keep dinner small.  300-400 calories is ideal for most people depending on their caloric needs.    One- 300 calories, 12 g protein  1-inch thick slice of turkey meatloaf    cup baked butternut squash    Two- 200 calories, 9 g protein  Bread-less BLT: 3 slices turkey rick, sliced tomato, wrapped in a large lettuce leaf    cup peeled fruit    Three- 275 calories, 36 g protein  3 oz roasted chicken    cup cooked broccoli    cup shredded cheddar cheese    cup unsweetened applesauce    Four- 200 calories, 25 g protein  3 oz baked tilapia  1/3rd cup cooked carrots    cup yogurt    Five- 250 calories, 20 g protein  Grilled ham  n  Swiss: spread 2 tsp ghee or butter on 1 slice of whole grain bread.  Cut bread in half, layer 2 oz deli ham with 1 piece of Swiss cheese and grill until cheese is melted.    cup cooked vegetables    Six- 250 calories, 18 g protein  Vegetarian cheeseburger: 1 [place] cheeseburger topped with lettuce, onion, tomato, and ketchup/mustard    cup sweet potato fries    Seven- 250 calories, 18 g protein  Pork pot roast: 2 oz roasted pork loin, 1/3rd cup roasted carrots,   medium potato, cooked with   cup gravy    Eight- 330 calories, 25 g protein  2 oz meatballs (about 2 small meatballs)    cup spaghetti sauce  1/2 piece toast topped with 1 tsp ghee or butterand topped with garlic powder, toasted in  oven    Nine- 250 calories, 16 g protein  Mexican pizza: one 8  corn tortilla topped with 2 oz chicken,   cup salsa, 2 tablespoons black beans, 2 tablespoons shredded cheese.  Bake until cheese is melted.    Ten- 250 calories, 22 g protein  Shrimp stir-butler: 3 oz cooked shrimp, 1/6th onion,   pepper,   cup chopped carrots sautéed in 1 tablespoon olive oil, topped with 2 tablespoons stir butler sauce and a pinch of sesame seeds        150 Calories or Less Snack Ideas   1 hardboiled egg with   cup berries  1 small apple with 1 hardboiled egg  10 almonds with   cup berries  2 clementines with 1 light string cheese  1 light string cheese with   sliced apple  1 light string cheese wrapped in 2 slices of turkey  4 100% whole wheat crackers (e.g. Triscuit) with 1 light string cheese    c. cottage cheese with   cup fruit and 1 Tbsp sunflower seeds     cup cottage cheese with   of an avocado     can tuna fish with 1 cup sliced cucumbers     cup roasted garbanzo beans with paprika and cayenne pepper    baked sweet potato with   cup chili beans or   cup cottage cheese  2 oz. nitrate free turkey slices with 1 cup carrots  1 container (6 oz) of low sugar (less than 10 grams of sugar) greek yogurt   3 Tablespoons of hummus with 1 cup sliced bell peppers   2 Tablespoons of hummus with 15 baby carrots  4 Tablespoons ranch dip made with plain Greek Yogurt and 3 mini cucumbers  1/4 cup nuts (any kind)  1 Tablespoon peanut butter with 1 stalk celery   1 dill pickle wrapped in 1-2 slices of deli ham with 1 tsp of mayonnaise/mustard.  Example Meal Plan for a 4234-9827 Calorie Diet:    In order to fuel your weight loss properly and avoid hunger-induced overeating later in the day, for your height and weight, you will enjoy the most success by following the diet below or similar with adjustments based on your particular tastes and preferences.  Exercise may influence speed, amount of weight loss further.     I recommend getting into a meal  routine and keeping it similar day to day in the beginning so you don t have to think too hard about what you re going to make/eat.  Keep snacks healthy, ideally containing protein and some vegetables.  Non-processed food is preferable to packaged items.  Eat at least a few crunchy green vegetables if having a snack, which should be 2-3 hours after your mealtimes(prepare these ahead of time for ease of use).  Drink 64 oz -80 oz of water daily for most, some of you will need more and we'll discuss it at your visit if that is the case.      When changing our diet,  we can often mistake thirst for hunger or just have some distracted eating habits that we need to break free from ('bored/mindless eating', screen time,work, driving,etc).  A glass of water and reconsideration of our hunger is often all that is needed.  Having the urge is not the problem, but watching it pass by without acting on it is the goal.    If you re having hunger problems, add a protein drink/snack to your morning hours or afternoon snack with at least 20grams of protein and not too much sugar (under 10g).  A carton of higher protein/low sugar yogurt can work as well.  If the urge to snack is overwhelming and not satiated, try going for a 10 minute walk/exercise, come home and drink a glass of water and if still hungry, have a  calorie snack (handful of raw/sprouted nuts, veggies and string cheese, protein bar, etc).  Savor it.    It is better to have a large breakfast, a moderate lunch and a smaller dinner to fuel your day.  People lose 10-15% more weight during their weight loss season with this strategy. Optimizing your protein intake at each meal will further keep you more satisfied while eating less food overall.  Getting exercise in early has also been shown to offer the best results (before breakfast ideally but anytime is the right time to exercise if that is not an option for you).    To make sure you re getting adequate vitamins and  minerals during weight loss, I recommend one complete multivitamin a day of your choice.  Consider a probiotic and taking some vitamin D 2000 IU daily.    Let supper be your last meal of the day and ideally try to have at least 12 hours between supper and breakfast the next day to tap into some beneficial overnight fasting dynamics.  Midnight snacks need to go away. Water in the evening is fine, unsweetened, non caffeinated herbal tea is helpful as well.  Consolidating your meals within a 8-12 hour period of your day will help tap into these additional metabolic benefits and tends to keep your appetite up for breakfast, further helping to stay on track.  For most of my patients, I don't recommend an intermittent fasting style diet (many find it hard to fit in their lifestyle) but an overnight fast is very doable for most patients and helps regulate our hunger drives a little better.  This makes it very important to nail good intake at all three meals to feel satisfied/energized and still lose weight.      If evening snacking desires are high, consider a glass of fiber supplement for some additional fullness (metamucil or similar). Most of us don't get the 25-30 grams per day of fiber that promotes good gut health/satiety.  Benefiber, metamucil, citrucel are reasonable/affordable options for most people.  Inulin, chicory, psyllium husk are reasonable options but start slow and low in the dose to avoid gas/bloating until your gut gets acclimated (ramping up to 5-10 grams per day of supplemental fiber after 3-4 weeks if needed).      Example Meal Plan:  Breakfast: 450-475 Calories  1 egg cooked on low in olive oil:   calories.  5oz Greek Yogurt (Fage plain classic: ~150 randi)  Handful of Berries of your choice (about a calorie per berry or 20-40cal per handful)    cup(cooked) of  old fashioned oatmeal or 1/2 cup(cooked) steel cut oats. (150 randi)  Sprinkle amount of brown sugar and a pat of butter. (40 randi)  Glass  of  Water  Black coffee or unsweetened Tea (0calories).      2-3 hours Later Snack: (195 calories).  Glass of water  One string Cheese (80 calories) or 4 oz creamed cottage cheese (115 calories) with  Crunchy Celery sticks (less than 10 calories per large stalk) 2 stalks. (20 calories)    of a  Large Banana or   of a Large Apple (60 calories):  eat second half at lunch or afternoon snack.     Lunch:300 -350 calories   Chicken Breast  (baked/broiled/roasted/grilled)  4-6 oz.  (125-180 randi), BBQ sauce/hot sauce/mustard/seasoning is free. Just use a reasonable amount. Or a can of tuna with 1 tablespoon mayonnaise.  Salad: lettuce, any other veggies (cucumbers, green peppers/celery you like and a small drizzle of dressing to just flavor.  Go as big on the veggies as you like,  as they are practically calorie free.   A whole, 8 inch cucumber is 45 calories, a whole green pepper is 23 calories, a stalk of celery is 9 calories.  [place] Dressing is 60 calories per tablespoon..so moderate your desired dressing or do a drizzle of olive oil and splash of balsamic vinegar on top,  Total calories unlikely to be over 150 even with dressing.  Glass of Water.    Option for lunch is meal replacement protein drink/smoothie.  Need at least 20 grams of protein and eat the rest of your apple/banana from the morning snack.      Afternoon Snack: 150-200 calories   Cheese Stick or cottage cheese again  and a fresh fruit OR  Granola Bar (protein Bar acceptable if under 200 calories OR  Homemade smoothies:  8oz skim milk,  a handful of berries (fresh or frozen and a serving of protein powder such as BiPro or Taniya sWhey for example.  If you don't like dairy, make with 8oz water, one small banana, handful of berries and the protein powder, add any veggies you want as well:  roughly 200 calories.   Glass of Water    Dinner: 325 calories  4oz of fresh, Atlantic salmon.  Broiled (salt/pepper/dill) for about 8-8.5 minutes (200calories)  or  4oz filet mignon steak or sirloin steak  Salad or vegetable sautéed lightly in olive oil or   Broccoli 1.5  cups chopped and steamed  or micro-waved in a little water (75 calories)  Glass of Water,    Cup of herbal tea (unsweetened, caffeine free)      Herbs and seasonings are encouraged to flavor your foods/vegetables.  Make your food delicious.      Tips for Success:  1.  Prepare proteins ahead of time (broil chicken breasts in bulk so you can grab and go), steel cut oats/lentils can be stored in casserole dish/bowl in the fridge for quick scoop in the morning and rewarm in microwave, make use of crock pot recipes (watch salt content).  Making meals that cover 3-4 future meals is an easy way to stay on track.  2.  Drink a 8-12 oz glass of water every 2-3 hours when awake.  We often mistake hunger for thirst, especially when losing weight.  3. Remember your Reward and Motivation when things get hard.  4.  Weigh yourself every morning and record, you'll stay on track better and learn how our biorhythms, diet and elimination patterns show up on the scale. Don't worry about 1 or 2 day patterns, but when on track you'll notice good trend downward of weight over 3-4 day segments.  Plateaus tend to resolve after 4-8 days in most cases if you stay consistent with your plan.  These are natural and part of weight loss, even if you're perfect with your plan execution.  5. Call if problems/concerns.  [employer] Real-Time is a great tool to stay in touch and provide weekly outside accountability. Check in with questions or if you want to brag.  6.  Find a handful of meals/foods that keep you on track and feeling good and get into a routine that is sustainable for you.  It's OK to have a routine that works for you.  7.  Consider taking a complete multivitamin just to make sure all micronutrients are adequate during weight loss.  8. If losing hair/brittle nails it usually means you are not taking enough protein.  Minimum goal is 60 grams  "daily of protein for smaller women, 80 grams a day for men. Consider taking Biotin as supplement or a \"Hair and Nail\" multivitamin.    "

## 2024-01-25 NOTE — LETTER
1/25/2024         RE: Ronel Ryan  [address]  [facility] [address]        Dear Colleague,    Thank you for referring your patient, Ronel Ryan, to the [facility] AND [facility]ARE [place]. Please see a copy of my visit note below.    Bariatric Clinic Follow-Up Visit:    Ronel Ryan is a 43 year old  female with Body mass index is 46.35 kg/m .  presenting here today for follow-up on non-surgical efforts for weight loss. Original Intake visit occurred on 7/13/19 with a weight of 345 lbs and BMI of 59.2.  Along with diet and behavior changes, she has been using various combinations through the years of Trulicity/naltrexone and a previous use of topiramate from her pain clinic which can also assist her weight loss goals.  See her intake visit notes for details on identified contributors to weight gain in the past. Chart review shows Dietician calculated RMR of 1892kcal/day and protein intake goal of 70-90g/day.  Her Trulicity was stopped in hospital May of 2022 during hip surgery and she had weight gain of 30 lbs over the summer due to increased cravings off medication. We transitioned to Semaglutide therapy this summer of 2022 but then supplies ran out due to shortages in supply and Victoza was started in November 2022 (11/7/22) with plans to transition back in 2023 when supplies of Ozempic are more available. She was able to get back on Ozempic in late 2022 and was up to 1mg/week as of our 10/06/23 visit w/ good results/tolerance.    Dietician visit on 11/17/23 w/ RMR of 1821 kcal/day and protein goal of 70-90g/day. She's a user of [employer] w/ good tracking history of food and exercise.  Weight:   Wt Readings from Last 5 Encounters:   01/25/24 122.5 kg (270 lb)   10/06/23 118.7 kg (261 lb 9.6 oz)   06/22/23 116.1 kg (256 lb)   03/16/23 120.7 kg (266 lb)   02/01/23 126.1 kg (278 lb)    pounds  75 lb reduction from heaviest weight on chart from 7.31.19. this  is a 21% total body weight reduction.  Some modest weight gain from spring weight this winter, up 14 lbs.     Comorbidities:  Patient Active Problem List   Diagnosis     Binge eating     Eating disorder     Morbid obesity (H)     Multiple-type hyperlipidemia     Severe episode of recurrent major depressive disorder (H)     Arthralgia of hip     Hypertension     Trochanteric bursitis     History of urinary anomaly     Borderline personality disorder (H)     Attention deficit disorder     Asthma     Suicidal ideation     ACP (advance care planning)     Adjustment disorder with depressed mood     Anxiety     Moderate mixed bipolar I disorder (H)     Bipolar II disorder, most recent episode hypomanic (H)     Borderline high cholesterol     Care plan discussed with patient     Disorder of pelvis     Gait difficulty     Hx of diabetes mellitus     Hx of eating disorder     Morbid obesity with BMI of 50.0-59.9, adult (H)     Osteoarthritis     Pain of both hip joints     Relationship problems     Secondary amenorrhea     Soft tissue lesion of shoulder region     Vertigo     Greater trochanteric bursitis of both hips     Hip dysplasia     Left hip pain     Osteoarthritis of hip     Primary osteoarthritis of left hip     ADD (attention deficit disorder)     H/O urinary frequency     History of anxiety disorder     Hyperlipidemia     Mixed hyperlipidemia     Recurrent major depressive disorder (H24)     Type 2 diabetes mellitus (H)     Primary osteoarthritis of both hips     Degenerative joint disease (DJD) of hip     Status post total hip replacement, left     Bipolar 1 disorder (H)     Constipation     Gastroesophageal reflux disease with esophagitis without hemorrhage     Mixed incontinence     Primary localized osteoarthritis of pelvic region and thigh     Skin sensation disturbance       Current Outpatient Medications:      ACETAMINOPHEN EXTRA STRENGTH 500 MG tablet, , Disp: , Rfl:      albuterol (PROAIR HFA/PROVENTIL  HFA/VENTOLIN HFA) 108 (90 Base) MCG/ACT inhaler, Inhale 2 puffs into the lungs every 6 hours as needed for shortness of breath, wheezing or cough, Disp: 18 g, Rfl: 0     atomoxetine (STRATTERA) 80 MG capsule, Take 80 mg by mouth daily, Disp: , Rfl:      blood glucose (NO BRAND SPECIFIED) lancets standard, Use to test blood sugar 2 times daily or as directed., Disp: 100 each, Rfl: 11     blood glucose monitoring (NO BRAND SPECIFIED) meter device kit, Use to test blood sugar 2 times daily or as directed., Disp: 1 kit, Rfl: 0     blood glucose monitoring (NO BRAND SPECIFIED) test strip, Use to test blood sugars 2 times daily or as directed, Disp: 100 strip, Rfl: 3     buPROPion (WELLBUTRIN XL) 300 MG 24 hr tablet, Take 300 mg by mouth daily , Disp: , Rfl:      cholecalciferol (VITAMIN D3) 125 mcg (5000 units) capsule, , Disp: , Rfl:      EPINEPHrine (ANY BX GENERIC EQUIV) 0.3 MG/0.3ML injection 2-pack, Inject 0.3 mg into the muscle, Disp: , Rfl:      EPINEPHrine (EPIPEN/ADRENACLICK/OR ANY BX GENERIC EQUIV) 0.3 MG/0.3ML injection 2-pack, Inject 0.3 mLs (0.3 mg) into the muscle once as needed for anaphylaxis, Disp: 0.6 mL, Rfl: 0     fish oil-omega-3 fatty acids 500 MG capsule, , Disp: , Rfl:      hydrOXYzine (ATARAX) 50 MG tablet, , Disp: , Rfl:      ibuprofen (ADVIL/MOTRIN) 600 MG tablet, Take 1 tablet (600 mg) by mouth every 6 hours as needed (mild), Disp: , Rfl: 0     insulin pen needle (31G X 6 MM) 31G X 6 MM miscellaneous, Use new pen needle for each autoinjection., Disp: 90 each, Rfl: 1     ketoconazole (EXTINA) 2 % external foam, , Disp: , Rfl:      levonorgestrel (LILETTA, 52 MG,) 19.5 MCG/DAY IUD, 1 each by Intrauterine route once, Disp: , Rfl:      mometasone (ELOCON) 0.1 % external cream, Apply topically daily, Disp: , Rfl:      risperiDONE (RISPERDAL) 3 MG tablet, , Disp: , Rfl:      Semaglutide, 1 MG/DOSE, (OZEMPIC) 4 MG/3ML pen, Inject 1 mg Subcutaneous every 7 days, Disp: 3 mL, Rfl: 0     sennosides  "(SENOKOT) 8.6 MG tablet, , Disp: , Rfl:      tolterodine ER (DETROL LA) 4 MG 24 hr capsule, Take 4 mg by mouth daily, Disp: , Rfl:      traZODone (DESYREL) 150 MG tablet, Take 150 mg by mouth At Bedtime, Disp: , Rfl:       Interim: Since our last visit, she has had \"great\" mental health and stable. Eating habits were under pressure after her PCA left for 6 weeks and lost some of the encouragement for healthy eating/gym work. In her place, her Niece started coming over and bringing comfort food/snacks/brownies and wasn't working out. Now she's gone and her original PCA is back, back to the gym for first time in a month now and turning things around.   Using premiere protein drink once daily with lunch.   Ozempic 1mg/week tolerated well. Requests refills.  Her goals this year are to continue w/ non surgical weight loss options. She's consulting with Plastics regarding her lipedema issues, particularly in her arms, later this winter/early Spring.   Plan:   1.  Diet: to continue good weight reduction and reduce some of the sweet cravings caused by the month of treats from your Niece, you can do a 2 day jumpstart diet if interested (see below). Otherwise, the cravings should go down after a couple weeks of mindful diet again. Continue hydrating well with water to hit your 80-100oz/day water intake goal. Aiming for 1500-1600kcal/day if starting back up with walks/gym work and getting your 80 grams/day of lean protein.      2. Exercise: aiming for 3-4 days weekly of intentional movement  3. Medication: Ozempic helpful and well tolerated. We'll continue with the 1mg/week dose this year. Keeping metabolic disease/fatty liver risks in check w/ continued weight reduction/maintenance of the 21% reduction in weight you've had over the recent years.  4. Ramp tracking back up to get on track again.  5. Goals: stabilizing weight under 250 lbs this year.      We discussed [facility] Bariatric Basics including:  -eating 3 meals " "daily  -reviewed metabolic needs for weight loss based on Resting Metabolic Rate  -protein goals supportive of healthy weight loss  -avoiding/limiting calorie containing beverages  -We discussed the importance of restorative sleep and stress management in maintaining a healthy weight.  -We discussed the National Weight Control Registry healthy weight maintenance strategies and ways to optimize metabolism.  -We discussed the importance of physical activity including cardiovascular and strength training in maintaining a healthier weight and explored viable options.      Most recent labs:  Lab Results   Component Value Date    WBC 5.5 02/01/2023    HGB 13.9 02/01/2023    HCT 42.1 02/01/2023    MCV 93 02/01/2023     02/01/2023     Lab Results   Component Value Date    CHOL 155 10/17/2023     Lab Results   Component Value Date    HDL 48 (L) 10/17/2023     No components found for: \"LDLCALC\"  Lab Results   Component Value Date    TRIG 78 10/17/2023     No results found for: \"CHOLHDL\"  Lab Results   Component Value Date    ALT 16 10/17/2023    AST 19 10/17/2023    ALKPHOS 50 10/17/2023     No results found for: \"HGBA1C\"  Lab Results   Component Value Date    B12 368 11/09/2022     No components found for: \"VITDT1\"  Lab Results   Component Value Date    KOFFI 79 11/09/2022     Lab Results   Component Value Date    PTHI 77 08/23/2019     No results found for: \"ZN\"  Lab Results   Component Value Date    VIB1WB 108 08/23/2019     Lab Results   Component Value Date    TSH 1.14 05/21/2021   CT ABDOMEN PELVIS W CONTRAST  Order: [id number]  Narrative    [facility] MEDICAL IMAGING  CT ABDOMEN PELVIS W  10/3/2016 7:55 PM        INDICATION: Abdominal pain. UTI.  TECHNIQUE: CT abdomen and pelvis. Multiplanar reformation images (MPR). Dose reduction techniques were used.  IV CONTRAST: Omnipaque 350 129ml  COMPARISON: CT abdomen pelvis 04/15/2009 reviewed.    FINDINGS:  LUNG BASES: Negative.    ABDOMEN: No acute findings. Cholecystectomy. " Minimal regional hepatic steatosis along the falciform ligament. Liver otherwise normal. Surgical clip right paracolic gutter. Kidneys, adrenal glands, spleen and pancreas are normal. Normal bilateral renal parenchymal enhancement without evidence of pyelonephritis. No adenopathy.    PELVIS: Minimal haziness about the urinary bladder, which can be seen with cystitis. Normal appendix. No evidence of diverticulitis or colitis. No focal fluid collection or abscess. IUD in good position. Right ovarian corpus luteum.    MUSCULOSKELETAL: Negative.    CONCLUSION:  1. Possible cystitis.  2. Normal appendix. No urinary stones.  Exam End: 10/03/16  7:55 PM    Specimen Collected: 10/03/16  7:55 PM    T ABDOMEN PELVIS W ORAL W IV CONTRAST  3/13/2015 8:47 AM        INDICATION: abd pain  TECHNIQUE: CT abdomen and pelvis. Multiplanar reformation images (MPR).   IV CONTRAST: Iohexol (Omni) 100 mL  COMPARISON: None.     FINDINGS:  LUNG BASES: Normal.     ABDOMEN: There is mild, diffuse fatty infiltration of the liver with more focal fatty replacement adjacent to the falciform ligament. The gallbladder has been removed. The spleen, pancreas, adrenal glands and both kidneys are normal. Aorta is normal   caliber and there is no adenopathy. Small bowel loops are normal.     PELVIS: A long, medial lead coiled appendix is normal. IUD is in place. Pelvic organs are otherwise normal. No free fluid or inflammatory changes. Focal retained stool in the rectosigmoid. Rest of the colon is unremarkable.     MUSCULOSKELETAL: No suspicious lesions on bone windows. Degenerative changes are noted superiorly within the left acetabulum with the sclerosis and subchondral cyst formation.     IMPRESSION:  CONCLUSION:  1.  No abnormalities are seen to explain abdominal pain. Specifically, no inflammatory changes, free fluid or obstruction.  2.  Patient has an IUD in place and the gallbladder has been removed.  3.  There is mild fatty infiltration of the  "liver with degenerative changes noted in the left hip.  No results found for: \"TEST\"    DIETARY HISTORY  Getting back on track again w/ improved environment for healthy eating      PHYSICAL ACTIVITY PATTERNS:  Cardiovascular: walking  Strength Training: back to the gym this week    REVIEW OF SYSTEMS  Feels well..  PHYSICAL EXAM:  Vitals: Ht 1.626 m (5' 4\")   Wt 122.5 kg (270 lb)   BMI 46.35 kg/m    Weight:   Wt Readings from Last 3 Encounters:   01/25/24 122.5 kg (270 lb)   10/06/23 118.7 kg (261 lb 9.6 oz)   06/22/23 116.1 kg (256 lb)         GEN: Pleasant, well groomed, in no acute distress  HEENT:  normal facies/speech .  NECK: No swelling.  HEART: .  LUNGS: No respiratory difficulty noted. No cough. .  ABDOMEN: obese.  EXTREMITIES: No tremor. ..  NEURO: Alert and Oriented X3, fluent speech. .  SKIN: No visible rashes. .    Interim study results: good control:   Lab Results   Component Value Date    A1C 5.1 11/09/2022    A1C 4.7 04/04/2022    A1C 4.6 09/10/2021    A1C 6.7 08/23/2019    A1C 7.0 11/21/2018    A1C 6.0 12/31/2010     .      30 minutes spent by me on the date of the encounter doing chart review, history and exam, documentation and further activities per the note   Kerwin Dunn MD  [facility]  7:56 AM  1/25/2024    Virtual Visit Details    Type of service:  Video Visit   Video Start Time:  10:01am  Video End Time:10:32 AM    Originating Location (pt. Location): Home    Distant Location (provider location):  On-site  Platform used for Video Visit: AmWell      Again, thank you for allowing me to participate in the care of your patient.        Sincerely,        Kerwin Dunn MD  "

## 2024-01-25 NOTE — PROGRESS NOTES
Lab orders faxed to pt PCP clinic to be drawn    Patsy Thorpe CMA  She/Her      [facility]  Surgery Clinic - [facility]  Weight Management Clinic - [address] [address]    Office: [phone]  Fax: [phone]

## 2024-02-02 ENCOUNTER — TELEPHONE (OUTPATIENT)
Dept: SURGERY | Facility: CLINIC | Age: 44
End: 2024-02-02
Payer: COMMERCIAL

## 2024-02-02 DIAGNOSIS — E66.01 CLASS 3 SEVERE OBESITY DUE TO EXCESS CALORIES WITH SERIOUS COMORBIDITY AND BODY MASS INDEX (BMI) OF 45.0 TO 49.9 IN ADULT (H): Primary | ICD-10-CM

## 2024-02-02 DIAGNOSIS — E66.813 CLASS 3 SEVERE OBESITY DUE TO EXCESS CALORIES WITH SERIOUS COMORBIDITY AND BODY MASS INDEX (BMI) OF 45.0 TO 49.9 IN ADULT (H): Primary | ICD-10-CM

## 2024-02-02 DIAGNOSIS — E11.9 TYPE II DIABETES MELLITUS, WELL CONTROLLED (H): ICD-10-CM

## 2024-02-02 NOTE — TELEPHONE ENCOUNTER
General Call    Contacts         Type Contact Phone/Fax    02/02/2024 02:48 PM CST Phone (Incoming) Ronel Ryan (Self) [phone] (M)          Reason for Call:  pt called to let Mona Mcgee know that she Does have coverage for Zepbound, so please call to discuss      Could we send this information to you in [facility] or would you prefer to receive a phone call?:   Patient would prefer a phone call   Okay to leave a detailed message?: Yes at Cell number on file:    Telephone Information:   Mobile [phone]

## 2024-02-02 NOTE — PROGRESS NOTES
Patient has done very well with diabetes control over the last 4 years or so, tolerating 1mg/week Ozempic well. Her weight loss has been excellent as well, further improving glycemic control and weight related health risks much better since her starting weight of 345 lbs, BMI of 59.2.  Weight is now 270 lbs, BMI of 46.3 w/ resistant Class III obesity. To Improve neoplastic disease risks/vascular/hepatorenal and metabolic risks, further weight loss support is indicated. Zepbound shows excellent clinic weight reduction success and given some plateau w/ semaglutide therapy, we'll titrate up from 5mg/week, eventually to 10mg/week if tolerated and re-evaluate her needs after 3-6 months of use. She continues to follow protein rich/vegetable rich calorie restricted diet and mindful walking and weight lifting routines to optimize her health:    Discussed with patient on phone call today.    Kerwin Dunn MD

## 2024-02-04 ENCOUNTER — HEALTH MAINTENANCE LETTER (OUTPATIENT)
Age: 44
End: 2024-02-04

## 2024-02-08 ENCOUNTER — TELEPHONE (OUTPATIENT)
Dept: SURGERY | Facility: CLINIC | Age: 44
End: 2024-02-08
Payer: COMMERCIAL

## 2024-02-08 DIAGNOSIS — E11.9 TYPE II DIABETES MELLITUS, WELL CONTROLLED (H): ICD-10-CM

## 2024-02-08 DIAGNOSIS — E66.01 CLASS 3 SEVERE OBESITY DUE TO EXCESS CALORIES WITH SERIOUS COMORBIDITY AND BODY MASS INDEX (BMI) OF 45.0 TO 49.9 IN ADULT (H): ICD-10-CM

## 2024-02-08 DIAGNOSIS — E66.813 CLASS 3 SEVERE OBESITY DUE TO EXCESS CALORIES WITH SERIOUS COMORBIDITY AND BODY MASS INDEX (BMI) OF 45.0 TO 49.9 IN ADULT (H): ICD-10-CM

## 2024-02-08 NOTE — TELEPHONE ENCOUNTER
General Call    Contacts         Type Contact Phone/Fax    02/08/2024 01:03 PM CST Phone (Incoming) Ronel Ryan (Self) [phone] (M)          Reason for Call:  Zepbound PA    What are your questions or concerns:  pt needs a PA for Zepbound per her ins and pharmacy  Could we send this information to you in [facility] or would you prefer to receive a phone call?:   Patient would prefer a phone call   Okay to leave a detailed message?: Yes at Cell number on file:    Telephone Information:   Mobile [phone]

## 2024-02-08 NOTE — TELEPHONE ENCOUNTER
PA has been approved from 2/5/2024 through 8/5/2024, however the [facility] pharmacy doesn't have it available anywhere in a 20 mile radius of the [facility] so it will need to be transferred elsewhere if she doesn't want to wait. Patient would like it sent to the [facility]'s near her house.    Kassandra Castellon RN

## 2024-02-09 ENCOUNTER — TELEPHONE (OUTPATIENT)
Dept: SURGERY | Facility: CLINIC | Age: 44
End: 2024-02-09
Payer: COMMERCIAL

## 2024-02-09 NOTE — TELEPHONE ENCOUNTER
Talked with pt, she just had some questions about the injection she took today.     Isabela Llamas RN, CBN  [facility]  P [phone]  F [phone]

## 2024-02-09 NOTE — TELEPHONE ENCOUNTER
General Call    Contacts         Type Contact Phone/Fax    02/09/2024 01:09 PM CST Phone (Incoming) Ronel Ryan (Self) [phone] (M)          Reason for Call:  Zepbound    What are your questions or concerns:  pt would like a call back regarding her Zepbound prescription    Could we send this information to you in [facility] or would you prefer to receive a phone call?:   Patient would prefer a phone call   Okay to leave a detailed message?: Yes at Cell number on file:    Telephone Information:   Mobile [phone]

## 2024-02-15 ENCOUNTER — OFFICE VISIT (OUTPATIENT)
Dept: PLASTIC SURGERY | Facility: AMBULATORY SURGERY CENTER | Age: 44
End: 2024-02-15
Attending: STUDENT IN AN ORGANIZED HEALTH CARE EDUCATION/TRAINING PROGRAM
Payer: COMMERCIAL

## 2024-02-15 VITALS
DIASTOLIC BLOOD PRESSURE: 70 MMHG | SYSTOLIC BLOOD PRESSURE: 130 MMHG | BODY MASS INDEX: 45.33 KG/M2 | WEIGHT: 265.5 LBS | HEIGHT: 64 IN

## 2024-02-15 DIAGNOSIS — L98.7 EXCESS SKIN: ICD-10-CM

## 2024-02-15 DIAGNOSIS — I89.0 LYMPHEDEMA: ICD-10-CM

## 2024-02-15 DIAGNOSIS — E88.1 LIPODYSTROPHY: Primary | ICD-10-CM

## 2024-02-15 PROCEDURE — 99203 OFFICE O/P NEW LOW 30 MIN: CPT | Performed by: PLASTIC SURGERY

## 2024-02-15 RX ORDER — BUPROPION HYDROCHLORIDE 150 MG/1
TABLET ORAL
COMMUNITY
Start: 2024-02-02 | End: 2024-05-20

## 2024-02-15 NOTE — LETTER
2/15/2024         RE: Ronel Ryan  [address]  [facility] [address]        Dear Colleague,    Thank you for referring your patient, Ronel Ryan, to the [facility] [place]. Please see a copy of my visit note below.    Chief complaint:  Bilateral brachial region lipodystrophy    History of present illness:  This is a 43 year old lady who presents with significant lipodystrophy on bilateral brachial regions.  She is interested in brachioplasty.  Patient used to weight approximately 350 pounds and now after diet and exercise, 265 pounds.  Her goal is to be at least 200 pounds.  Patient has been referred to me for evaluation for bilateral brachioplasty.    Past medical history:  Past Medical History:   Diagnosis Date     ACP (advance care planning) 10/18/2012    Patient has identified Health Care Agent(s): No Add Health Care Agents: No Patient has Advance Care Plan Documents (Health Care Directive, POLST): No,  .  Patient has identified Specific Treatment Preferences: Yes  Specific Treatment Preferences: a.) Code Status:  CPR/Attempt Resuscitation   She would like her mother  Calista Fernandes [phone] or Georgie Trejo [phone] to make decisions      ADD (attention deficit disorder) 11/27/2013     Adjustment disorder with depressed mood 12/5/2018     Allergic state      Anxiety      Arthralgia of hip 3/21/2016     Arthritis      Asthma      Attention deficit disorder 11/27/2013     Binge eating 11/27/2013     Bipolar 1 disorder (H) 11/8/2021     Bipolar 2 disorder (H)     mixed, one manic episode when combined wellbutrin and celexa. Now off Celexa.     Bipolar II disorder, most recent episode hypomanic (H) 4/29/2015     Borderline high cholesterol 10/23/2020     Borderline personality disorder (H)      Care plan discussed with patient 2/1/2012    This patient's Behavioral Health care has been restricted.  Please direct the patient to speak with the   provider before scheduling.     Cholelithiasis     2007 lap gil     Constipation 11/8/2021     Degenerative joint disease (DJD) of hip 9/9/2021     Depressive disorder      Diabetes (H)      Diabetes mellitus (H)     Dx in 2013, lost weight and in 2015 MD thought she might have been misdiagnosed.     Disorder of pelvis 9/14/2015     Eating disorder 3/13/2017     Gait difficulty 2/4/2019     Gastroesophageal reflux disease with esophagitis without hemorrhage 11/8/2021     GERD (gastroesophageal reflux disease)      Greater trochanteric bursitis of both hips 10/27/2015     H/O constipation      H/O hypercholesterolemia     on statin therapy     H/O urinary frequency 12/1/2016     Hip dysplasia      History of anxiety disorder 10/23/2020     History of urinary anomaly 12/1/2016     Hx of diabetes mellitus 5/21/2013     Hx of eating disorder 8/25/2016    Overview:  Reports distant laxative abuse and excessive exercise leading to 100 lb weight loss around 2013.      Hyperlipidemia 8/25/2016    Overview:  On statin therapy.     Hypertension      Left hip pain 3/21/2016     Major depression      Menstrual disorder     heavy and irregular bleeding, improved with IUD.     Mixed hyperlipidemia 3/21/2016     Mixed incontinence 11/8/2021     Moderate mixed bipolar I disorder (H) 5/10/2016    Overview:  Replacement Utility updated for latest IMO load     Morbid obesity (H) 12/22/2010     Morbid obesity with BMI of 50.0-59.9, adult (H) 8/25/2016    Overview:  BMI 52 at bariatric clinic intake 8/25/16.     Multiple-type hyperlipidemia 3/21/2016     Obese      Osteoarthritis 10/23/2020     Osteoarthritis of hip 6/15/2015     Pain of both hip joints 6/30/2015     Primary localized osteoarthritis of pelvic region and thigh 11/8/2021     Primary osteoarthritis of both hips 10/30/2020    Added automatically from request for surgery [id number]     Primary osteoarthritis of left hip 6/15/2015     PTSD (post-traumatic stress disorder)       Recurrent major depressive disorder (H24) 12/22/2010     Relationship problems 10/23/2020     Secondary amenorrhea 10/22/2012     Severe episode of recurrent major depressive disorder (H) 10/4/2012     Skin sensation disturbance 11/8/2021     Soft tissue lesion of shoulder region 2/27/2013     Status post total hip replacement, left 9/10/2021     Suicidal ideation 10/4/2017     Trochanteric bursitis 10/27/2015     Type 2 diabetes mellitus (H) 8/12/2014     Uncomplicated asthma      Urinary incontinence     on ditropan (showers/pools and stress incontinence).     vertigo      Vertigo 3/10/2017       Possible history of diabetes, possible history of high blood pressure, morbid obesity    Past surgical history:  Bilateral total hip arthroplasty, left knee surgery, left ankle external fixation, tonsillectomy    Allergies:  Cephalosporin (anaphylactic shock), penicillin, macrolides    Medications:    Current Outpatient Medications:      ACETAMINOPHEN EXTRA STRENGTH 500 MG tablet, , Disp: , Rfl:      albuterol (PROAIR HFA/PROVENTIL HFA/VENTOLIN HFA) 108 (90 Base) MCG/ACT inhaler, Inhale 2 puffs into the lungs every 6 hours as needed for shortness of breath, wheezing or cough, Disp: 18 g, Rfl: 0     atomoxetine (STRATTERA) 80 MG capsule, Take 80 mg by mouth daily, Disp: , Rfl:      blood glucose (NO BRAND SPECIFIED) lancets standard, Use to test blood sugar 2 times daily or as directed., Disp: 100 each, Rfl: 11     blood glucose monitoring (NO BRAND SPECIFIED) meter device kit, Use to test blood sugar 2 times daily or as directed., Disp: 1 kit, Rfl: 0     blood glucose monitoring (NO BRAND SPECIFIED) test strip, Use to test blood sugars 2 times daily or as directed, Disp: 100 strip, Rfl: 3     buPROPion (WELLBUTRIN XL) 150 MG 24 hr tablet, TAKE ONE (1) TABLET BY MOUTH EVERY MORNING ALONG WITH ONE 300MG TABLET, Disp: , Rfl:      buPROPion (WELLBUTRIN XL) 300 MG 24 hr tablet, Take 300 mg by mouth daily , Disp: , Rfl:       cholecalciferol (VITAMIN D3) 125 mcg (5000 units) capsule, , Disp: , Rfl:      EPINEPHrine (ANY BX GENERIC EQUIV) 0.3 MG/0.3ML injection 2-pack, Inject 0.3 mg into the muscle, Disp: , Rfl:      EPINEPHrine (EPIPEN/ADRENACLICK/OR ANY BX GENERIC EQUIV) 0.3 MG/0.3ML injection 2-pack, Inject 0.3 mLs (0.3 mg) into the muscle once as needed for anaphylaxis, Disp: 0.6 mL, Rfl: 0     fish oil-omega-3 fatty acids 500 MG capsule, , Disp: , Rfl:      hydrOXYzine (ATARAX) 50 MG tablet, , Disp: , Rfl:      ibuprofen (ADVIL/MOTRIN) 600 MG tablet, Take 1 tablet (600 mg) by mouth every 6 hours as needed (mild), Disp: , Rfl: 0     insulin pen needle (31G X 6 MM) 31G X 6 MM miscellaneous, Use new pen needle for each autoinjection., Disp: 90 each, Rfl: 1     ketoconazole (EXTINA) 2 % external foam, , Disp: , Rfl:      levonorgestrel (LILETTA, 52 MG,) 19.5 MCG/DAY IUD, 1 each by Intrauterine route once, Disp: , Rfl:      mometasone (ELOCON) 0.1 % external cream, Apply topically daily, Disp: , Rfl:      risperiDONE (RISPERDAL) 3 MG tablet, , Disp: , Rfl:      sennosides (SENOKOT) 8.6 MG tablet, , Disp: , Rfl:      [START ON 4/5/2024] tirzepatide-Weight Management (ZEPBOUND) 10 MG/0.5ML prefilled pen, Inject 0.5 mLs (10 mg) Subcutaneous every 7 days, Disp: 6 mL, Rfl: 1     tirzepatide-Weight Management (ZEPBOUND) 5 MG/0.5ML prefilled pen, Inject 0.5 mLs (5 mg) Subcutaneous every 7 days, Disp: 2 mL, Rfl: 0     [START ON 3/8/2024] tirzepatide-Weight Management (ZEPBOUND) 7.5 MG/0.5ML prefilled pen, Inject 0.5 mLs (7.5 mg) Subcutaneous every 7 days, Disp: 2 mL, Rfl: 0     tolterodine ER (DETROL LA) 4 MG 24 hr capsule, Take 4 mg by mouth daily, Disp: , Rfl:      traZODone (DESYREL) 150 MG tablet, Take 150 mg by mouth At Bedtime, Disp: , Rfl:     Family history:  Noncontributory    Social History:  Denies tobacco, denies alcohol    Review of systems:  General ROS: No complaints or constitutional symptoms  Skin: No complaints or symptoms  "  Hematologic/Lymphatic: No symptoms or complaints  Psychiatric: No symptoms or complaints  Endocrine: No excessive fatigue, no hypermetabolic symptoms reported  Respiratory ROS: No cough, shortness of breath, or wheezing  Cardiovascular ROS: No chest pain or dyspnea on exertion  Breast ROS: Denies palpable breast masses, denies nipple discharge, denies peau d'orange  Gastrointestinal ROS: No abdominal pain, nausea, diarrhea, or constipation  Musculoskeletal ROS: No recent injuries reported  Neurological ROS: No focal neurologic defects reported.      Physical exam:  /70   Ht 1.626 m (5' 4\")   Wt 120.4 kg (265 lb 8 oz)   BMI 45.57 kg/m    General: Alert, cooperative, appears stated age   Skin: Skin color, texture, turgor normal, no rashes or lesions   Lymphatic: No obvious adenopathy, no swelling   Eyes: No scleral icterus, pupils equal  HENT: No traumatic injury to the head or face, no gross abnormalities  Lungs: Normal respiratory effort, breath sounds equal bilaterally  Heart: Regular rate and rhythm  Breasts: Not examined  Abdomen: Soft, non-distended and non-tender to palpation  Neurologic: Grossly intact  Extremities: Bilateral upper extremity with significant lipodystrophy on bilateral brachial region and bilateral forearms.  There is loose skin and cellulite on both brachial regions and antebrachial region.                        ASSESSMENT:    This is a 43 year old lady with significant bilateral upper extremity lipodystrophy, especially on bilateral brachial regions.     Patient is currently losing weight with diet and exercise and medication.     PLAN:      Once the patient have achieved her desired goal which is around 200 pounds, she will be a good candidate for bilateral brachioplasty.    Risks were explained to the patient and they include but are not limited to scarring, dehiscence, infection, bleeding, hematoma, nerve injury producing permanent anesthesia on bilateral brachial regions, " contour irregularities, need for further surgeries.    Patient is highly motivated for surgery.  I have told her that I would like to see her again once she have achieved her desired goal which is around 200 pounds.  She told me that once she have achieved her goal she will contact me back for a new consultation and potential case request at that time.      Asael Gale MD, FACS   Diplomate American Board of Plastic Surgery  Diplomate American Board of Surgery  [facility]  Division of Plastic & Reconstructive Surgery  Office: [phone]   2/15/2024 at 1:43 PM        Again, thank you for allowing me to participate in the care of your patient.        Sincerely,        Asael Gale MD

## 2024-02-15 NOTE — PROGRESS NOTES
Chief complaint:  Bilateral brachial region lipodystrophy    History of present illness:  This is a 43 year old lady who presents with significant lipodystrophy on bilateral brachial regions.  She is interested in brachioplasty.  Patient used to weight approximately 350 pounds and now after diet and exercise, 265 pounds.  Her goal is to be at least 200 pounds.  Patient has been referred to me for evaluation for bilateral brachioplasty.    Past medical history:  Past Medical History:   Diagnosis Date    ACP (advance care planning) 10/18/2012    Patient has identified Health Care Agent(s): No Add Health Care Agents: No Patient has Advance Care Plan Documents (Health Care Directive, POLST): No,  .  Patient has identified Specific Treatment Preferences: Yes  Specific Treatment Preferences: a.) Code Status:  CPR/Attempt Resuscitation   She would like her mother  Calista Fernandes [phone] or Georgie Trejo [phone] to make decisions     ADD (attention deficit disorder) 11/27/2013    Adjustment disorder with depressed mood 12/5/2018    Allergic state     Anxiety     Arthralgia of hip 3/21/2016    Arthritis     Asthma     Attention deficit disorder 11/27/2013    Binge eating 11/27/2013    Bipolar 1 disorder (H) 11/8/2021    Bipolar 2 disorder (H)     mixed, one manic episode when combined wellbutrin and celexa. Now off Celexa.    Bipolar II disorder, most recent episode hypomanic (H) 4/29/2015    Borderline high cholesterol 10/23/2020    Borderline personality disorder (H)     Care plan discussed with patient 2/1/2012    This patient's Behavioral Health care has been restricted.  Please direct the patient to speak with the  provider before scheduling.    Cholelithiasis     2007 lap gil    Constipation 11/8/2021    Degenerative joint disease (DJD) of hip 9/9/2021    Depressive disorder     Diabetes (H)     Diabetes mellitus (H)     Dx in 2013, lost weight and in 2015 MD thought she might have been misdiagnosed.     Disorder of pelvis 9/14/2015    Eating disorder 3/13/2017    Gait difficulty 2/4/2019    Gastroesophageal reflux disease with esophagitis without hemorrhage 11/8/2021    GERD (gastroesophageal reflux disease)     Greater trochanteric bursitis of both hips 10/27/2015    H/O constipation     H/O hypercholesterolemia     on statin therapy    H/O urinary frequency 12/1/2016    Hip dysplasia     History of anxiety disorder 10/23/2020    History of urinary anomaly 12/1/2016    Hx of diabetes mellitus 5/21/2013    Hx of eating disorder 8/25/2016    Overview:  Reports distant laxative abuse and excessive exercise leading to 100 lb weight loss around 2013.     Hyperlipidemia 8/25/2016    Overview:  On statin therapy.    Hypertension     Left hip pain 3/21/2016    Major depression     Menstrual disorder     heavy and irregular bleeding, improved with IUD.    Mixed hyperlipidemia 3/21/2016    Mixed incontinence 11/8/2021    Moderate mixed bipolar I disorder (H) 5/10/2016    Overview:  Replacement Utility updated for latest IMO load    Morbid obesity (H) 12/22/2010    Morbid obesity with BMI of 50.0-59.9, adult (H) 8/25/2016    Overview:  BMI 52 at bariatric clinic intake 8/25/16.    Multiple-type hyperlipidemia 3/21/2016    Obese     Osteoarthritis 10/23/2020    Osteoarthritis of hip 6/15/2015    Pain of both hip joints 6/30/2015    Primary localized osteoarthritis of pelvic region and thigh 11/8/2021    Primary osteoarthritis of both hips 10/30/2020    Added automatically from request for surgery [id number]    Primary osteoarthritis of left hip 6/15/2015    PTSD (post-traumatic stress disorder)     Recurrent major depressive disorder (H24) 12/22/2010    Relationship problems 10/23/2020    Secondary amenorrhea 10/22/2012    Severe episode of recurrent major depressive disorder (H) 10/4/2012    Skin sensation disturbance 11/8/2021    Soft tissue lesion of shoulder region 2/27/2013    Status post total hip replacement, left  9/10/2021    Suicidal ideation 10/4/2017    Trochanteric bursitis 10/27/2015    Type 2 diabetes mellitus (H) 8/12/2014    Uncomplicated asthma     Urinary incontinence     on ditropan (showers/pools and stress incontinence).    vertigo     Vertigo 3/10/2017       Possible history of diabetes, possible history of high blood pressure, morbid obesity    Past surgical history:  Bilateral total hip arthroplasty, left knee surgery, left ankle external fixation, tonsillectomy    Allergies:  Cephalosporin (anaphylactic shock), penicillin, macrolides    Medications:    Current Outpatient Medications:     ACETAMINOPHEN EXTRA STRENGTH 500 MG tablet, , Disp: , Rfl:     albuterol (PROAIR HFA/PROVENTIL HFA/VENTOLIN HFA) 108 (90 Base) MCG/ACT inhaler, Inhale 2 puffs into the lungs every 6 hours as needed for shortness of breath, wheezing or cough, Disp: 18 g, Rfl: 0    atomoxetine (STRATTERA) 80 MG capsule, Take 80 mg by mouth daily, Disp: , Rfl:     blood glucose (NO BRAND SPECIFIED) lancets standard, Use to test blood sugar 2 times daily or as directed., Disp: 100 each, Rfl: 11    blood glucose monitoring (NO BRAND SPECIFIED) meter device kit, Use to test blood sugar 2 times daily or as directed., Disp: 1 kit, Rfl: 0    blood glucose monitoring (NO BRAND SPECIFIED) test strip, Use to test blood sugars 2 times daily or as directed, Disp: 100 strip, Rfl: 3    buPROPion (WELLBUTRIN XL) 150 MG 24 hr tablet, TAKE ONE (1) TABLET BY MOUTH EVERY MORNING ALONG WITH ONE 300MG TABLET, Disp: , Rfl:     buPROPion (WELLBUTRIN XL) 300 MG 24 hr tablet, Take 300 mg by mouth daily , Disp: , Rfl:     cholecalciferol (VITAMIN D3) 125 mcg (5000 units) capsule, , Disp: , Rfl:     EPINEPHrine (ANY BX GENERIC EQUIV) 0.3 MG/0.3ML injection 2-pack, Inject 0.3 mg into the muscle, Disp: , Rfl:     EPINEPHrine (EPIPEN/ADRENACLICK/OR ANY BX GENERIC EQUIV) 0.3 MG/0.3ML injection 2-pack, Inject 0.3 mLs (0.3 mg) into the muscle once as needed for anaphylaxis,  Disp: 0.6 mL, Rfl: 0    fish oil-omega-3 fatty acids 500 MG capsule, , Disp: , Rfl:     hydrOXYzine (ATARAX) 50 MG tablet, , Disp: , Rfl:     ibuprofen (ADVIL/MOTRIN) 600 MG tablet, Take 1 tablet (600 mg) by mouth every 6 hours as needed (mild), Disp: , Rfl: 0    insulin pen needle (31G X 6 MM) 31G X 6 MM miscellaneous, Use new pen needle for each autoinjection., Disp: 90 each, Rfl: 1    ketoconazole (EXTINA) 2 % external foam, , Disp: , Rfl:     levonorgestrel (LILETTA, 52 MG,) 19.5 MCG/DAY IUD, 1 each by Intrauterine route once, Disp: , Rfl:     mometasone (ELOCON) 0.1 % external cream, Apply topically daily, Disp: , Rfl:     risperiDONE (RISPERDAL) 3 MG tablet, , Disp: , Rfl:     sennosides (SENOKOT) 8.6 MG tablet, , Disp: , Rfl:     [START ON 4/5/2024] tirzepatide-Weight Management (ZEPBOUND) 10 MG/0.5ML prefilled pen, Inject 0.5 mLs (10 mg) Subcutaneous every 7 days, Disp: 6 mL, Rfl: 1    tirzepatide-Weight Management (ZEPBOUND) 5 MG/0.5ML prefilled pen, Inject 0.5 mLs (5 mg) Subcutaneous every 7 days, Disp: 2 mL, Rfl: 0    [START ON 3/8/2024] tirzepatide-Weight Management (ZEPBOUND) 7.5 MG/0.5ML prefilled pen, Inject 0.5 mLs (7.5 mg) Subcutaneous every 7 days, Disp: 2 mL, Rfl: 0    tolterodine ER (DETROL LA) 4 MG 24 hr capsule, Take 4 mg by mouth daily, Disp: , Rfl:     traZODone (DESYREL) 150 MG tablet, Take 150 mg by mouth At Bedtime, Disp: , Rfl:     Family history:  Noncontributory    Social History:  Denies tobacco, denies alcohol    Review of systems:  General ROS: No complaints or constitutional symptoms  Skin: No complaints or symptoms   Hematologic/Lymphatic: No symptoms or complaints  Psychiatric: No symptoms or complaints  Endocrine: No excessive fatigue, no hypermetabolic symptoms reported  Respiratory ROS: No cough, shortness of breath, or wheezing  Cardiovascular ROS: No chest pain or dyspnea on exertion  Breast ROS: Denies palpable breast masses, denies nipple discharge, denies peau  "d'orange  Gastrointestinal ROS: No abdominal pain, nausea, diarrhea, or constipation  Musculoskeletal ROS: No recent injuries reported  Neurological ROS: No focal neurologic defects reported.      Physical exam:  /70   Ht 1.626 m (5' 4\")   Wt 120.4 kg (265 lb 8 oz)   BMI 45.57 kg/m    General: Alert, cooperative, appears stated age   Skin: Skin color, texture, turgor normal, no rashes or lesions   Lymphatic: No obvious adenopathy, no swelling   Eyes: No scleral icterus, pupils equal  HENT: No traumatic injury to the head or face, no gross abnormalities  Lungs: Normal respiratory effort, breath sounds equal bilaterally  Heart: Regular rate and rhythm  Breasts: Not examined  Abdomen: Soft, non-distended and non-tender to palpation  Neurologic: Grossly intact  Extremities: Bilateral upper extremity with significant lipodystrophy on bilateral brachial region and bilateral forearms.  There is loose skin and cellulite on both brachial regions and antebrachial region.                        ASSESSMENT:    This is a 43 year old lady with significant bilateral upper extremity lipodystrophy, especially on bilateral brachial regions.     Patient is currently losing weight with diet and exercise and medication.     PLAN:      Once the patient have achieved her desired goal which is around 200 pounds, she will be a good candidate for bilateral brachioplasty.    Risks were explained to the patient and they include but are not limited to scarring, dehiscence, infection, bleeding, hematoma, nerve injury producing permanent anesthesia on bilateral brachial regions, contour irregularities, need for further surgeries.    Patient is highly motivated for surgery.  I have told her that I would like to see her again once she have achieved her desired goal which is around 200 pounds.  She told me that once she have achieved her goal she will contact me back for a new consultation and potential case request at that " time.      Asael Gale MD, FACS   Diplomate American Board of Plastic Surgery  Diplomate American Board of Surgery  [facility]  Division of Plastic & Reconstructive Surgery  Office: [phone]   2/15/2024 at 1:43 PM

## 2024-03-06 ENCOUNTER — VIRTUAL VISIT (OUTPATIENT)
Dept: SURGERY | Facility: CLINIC | Age: 44
End: 2024-03-06
Payer: COMMERCIAL

## 2024-03-06 DIAGNOSIS — E66.01 MORBID OBESITY WITH BMI OF 45.0-49.9, ADULT (H): Primary | ICD-10-CM

## 2024-03-06 PROCEDURE — 97803 MED NUTRITION INDIV SUBSEQ: CPT | Mod: 95 | Performed by: DIETITIAN, REGISTERED

## 2024-03-06 NOTE — PROGRESS NOTES
Ronel Ryan is a 43 year old who is being evaluated via a billable video visit.      Medical  Weight Loss Follow-Up Diet Evaluation  Assessment:  Ronel is presenting today for a follow up weight management nutrition consultation.  This patient has had an initial appointment and was referred by Dr. Dunn for MNT as treatment for Morbid obesity.  Weight loss medication: Zepbound  Pt's weight is 257 lb  Initial weight: 262 lb  Weight change: -5 lb        1/25/2024     9:38 AM   Changes and Difficulties   I have made the following changes to my diet since my last visit: Eating more carbs/ sugar   With regards to my diet, I am still struggling with: Struggling a lot, missed a day of Ozempic, feeling the effects of missing the dose   I have made the following changes to my activity/exercise since my last visit: Started exercising again after taking a month off   With regards to my activity/exercise, I am still struggling with: None     BMI: There is no height or weight on file to calculate BMI.  Ideal body weight: 54.7 kg (120 lb 9.5 oz)  Adjusted ideal body weight: 80.3 kg (176 lb 15.9 oz)    Estimated RMR ([place]-St Jeor equation):   1821 kcals x 1.2 (sedentary) = 2186 kcals (for weight maintenance)  1821 kcals x 1.3 (light active) = 2504 kcals (for weight maintenance)  Recommended Protein Intake: 70-90 grams of protein/day  Patient Active Problem List:  Patient Active Problem List   Diagnosis    Binge eating    Eating disorder    Morbid obesity (H)    Multiple-type hyperlipidemia    Severe episode of recurrent major depressive disorder (H)    Arthralgia of hip    Hypertension    Trochanteric bursitis    History of urinary anomaly    Borderline personality disorder (H)    Attention deficit disorder    Asthma    Suicidal ideation    ACP (advance care planning)    Adjustment disorder with depressed mood    Anxiety    Moderate mixed bipolar I disorder (H)    Bipolar II disorder, most recent episode hypomanic (H)  "   Borderline high cholesterol    Care plan discussed with patient    Disorder of pelvis    Gait difficulty    Hx of diabetes mellitus    Hx of eating disorder    Morbid obesity with BMI of 50.0-59.9, adult (H)    Osteoarthritis    Pain of both hip joints    Relationship problems    Secondary amenorrhea    Soft tissue lesion of shoulder region    Vertigo    Greater trochanteric bursitis of both hips    Hip dysplasia    Left hip pain    Osteoarthritis of hip    Primary osteoarthritis of left hip    ADD (attention deficit disorder)    H/O urinary frequency    History of anxiety disorder    Hyperlipidemia    Mixed hyperlipidemia    Recurrent major depressive disorder (H24)    Type 2 diabetes mellitus (H)    Primary osteoarthritis of both hips    Degenerative joint disease (DJD) of hip    Status post total hip replacement, left    Bipolar 1 disorder (H)    Constipation    Gastroesophageal reflux disease with esophagitis without hemorrhage    Mixed incontinence    Primary localized osteoarthritis of pelvic region and thigh    Skin sensation disturbance     Nutrition History:   Weight loss history: has been working with Dr. Dunn for 4 years - has lost about 86 lbs  \"Trying to stay away from sugar and carbs\"  Tracks in Myfitnesspal - set at 1700 kcals  Binge Eating    Progress on goals from last visit: Stopped eating meat except for Sundays for Lent. She feels she is doing better with managing her sweets/treats intake, though recently she reports eating a piece of cheesecake that she wasn't enjoying, but ate it anyway because she struggles with politely declining. She continues to track her intake in [facility] - reports she only at 1100 kcals yesterday - reviewed her tracking methods today.   Continue exercise routine 2-3 days per week - met, 6 days per week currently  Request a small portion of treats/dessert when offered - not met  Switch to diet cranberry juice/sugar free juices - met  Work on \"all or nothing mindset\" " "with treats, allow for a treat occasionally in your diet - not met, continues to try to avoid sweet completely    Dietary Recall:  Half bagel, yogurt, half banana  Lunch: chili with beans (no meat)  Half banana, hummus and celery  Dinner: protein shake    Yesterday:  Breakfast: handful of blueberries  Lunch yesterday: 6 in veggie delight subway sandwich, 2 slices of cheese, and half buttered pretzel  Beverages:   Water  Tea  Protein Shake: Premiere protein shakes, 2 per day   Cranberry juice recently - diet cranberry  Has had soda while out to eat  Exercise:   M-F going to the [employer] lately - used to go every day with her previous PCA. 6 days per week. 3 days of cardio, 3 days of strength  Nutrition Diagnosis:    Morbid obesity related to excessive energy intake as evidence by BMI of 44.9     Intervention:  Food and/or nutrient delivery: Aim for 3 meal per day, 25-35 g at each meal  Nutrition education: discussed tracking and how to estimate accurate portions and reset her nutrition goals in the nia to better fit her needs  Nutrition counseling: goal setting    Monitoring/Evaluation:    Goals:  Continue exercise routine 6 days per week  Continue tracking in [employer], 1700 kcals per day  Switch to diet cranberry juice/sugar free juices  Work on \"all or nothing mindset\" with treats, allow for a treat occasionally in your diet    Patient to follow up in 2.5 month(s) with bariatrician and 2.5 month(s) with RD    Video-Visit Details    Type of service:  Video Visit    Video Start Time (time video started): 9:33 am    Video End Time (time video stopped): 9:50 am    Originating Location (pt. Location): Home      Distant Location (provider location):  Off-site    Mode of Communication:  Video Conference via [facility]    Physician has received verbal consent for a Video Visit from the patient? Yes      Tanya Selby RD          "

## 2024-03-06 NOTE — LETTER
3/6/2024         RE: Ronel Ryan  [address]  [place] [address]        Dear Colleague,    Thank you for referring your patient, Ronel Ryan, to the [facility] [place]. Please see a copy of my visit note below.    Ronel Ryan is a 43 year old who is being evaluated via a billable video visit.      Medical  Weight Loss Follow-Up Diet Evaluation  Assessment:  Ronel is presenting today for a follow up weight management nutrition consultation.  This patient has had an initial appointment and was referred by Dr. Dunn for MNT as treatment for Morbid obesity.  Weight loss medication: Zepbound  Pt's weight is 257 lb  Initial weight: 262 lb  Weight change: -5 lb        1/25/2024     9:38 AM   Changes and Difficulties   I have made the following changes to my diet since my last visit: Eating more carbs/ sugar   With regards to my diet, I am still struggling with: Struggling a lot, missed a day of Ozempic, feeling the effects of missing the dose   I have made the following changes to my activity/exercise since my last visit: Started exercising again after taking a month off   With regards to my activity/exercise, I am still struggling with: None     BMI: There is no height or weight on file to calculate BMI.  Ideal body weight: 54.7 kg (120 lb 9.5 oz)  Adjusted ideal body weight: 80.3 kg (176 lb 15.9 oz)    Estimated RMR ([place]-St Jeor equation):   1821 kcals x 1.2 (sedentary) = 2186 kcals (for weight maintenance)  1821 kcals x 1.3 (light active) = 2504 kcals (for weight maintenance)  Recommended Protein Intake: 70-90 grams of protein/day  Patient Active Problem List:  Patient Active Problem List   Diagnosis     Binge eating     Eating disorder     Morbid obesity (H)     Multiple-type hyperlipidemia     Severe episode of recurrent major depressive disorder (H)     Arthralgia of hip     Hypertension     Trochanteric bursitis     History of  "urinary anomaly     Borderline personality disorder (H)     Attention deficit disorder     Asthma     Suicidal ideation     ACP (advance care planning)     Adjustment disorder with depressed mood     Anxiety     Moderate mixed bipolar I disorder (H)     Bipolar II disorder, most recent episode hypomanic (H)     Borderline high cholesterol     Care plan discussed with patient     Disorder of pelvis     Gait difficulty     Hx of diabetes mellitus     Hx of eating disorder     Morbid obesity with BMI of 50.0-59.9, adult (H)     Osteoarthritis     Pain of both hip joints     Relationship problems     Secondary amenorrhea     Soft tissue lesion of shoulder region     Vertigo     Greater trochanteric bursitis of both hips     Hip dysplasia     Left hip pain     Osteoarthritis of hip     Primary osteoarthritis of left hip     ADD (attention deficit disorder)     H/O urinary frequency     History of anxiety disorder     Hyperlipidemia     Mixed hyperlipidemia     Recurrent major depressive disorder (H24)     Type 2 diabetes mellitus (H)     Primary osteoarthritis of both hips     Degenerative joint disease (DJD) of hip     Status post total hip replacement, left     Bipolar 1 disorder (H)     Constipation     Gastroesophageal reflux disease with esophagitis without hemorrhage     Mixed incontinence     Primary localized osteoarthritis of pelvic region and thigh     Skin sensation disturbance     Nutrition History:   Weight loss history: has been working with Dr. Dunn for 4 years - has lost about 86 lbs  \"Trying to stay away from sugar and carbs\"  Tracks in Myfitness[employer] - set at 1700 kcals  Binge Eating    Progress on goals from last visit: Stopped eating meat except for Sundays for Lent. She feels she is doing better with managing her sweets/treats intake, though recently she reports eating a piece of cheesecake that she wasn't enjoying, but ate it anyway because she struggles with politely declining. She continues to " "track her intake in [facility] - reports she only at 1100 kcals yesterday - reviewed her tracking methods today.   Continue exercise routine 2-3 days per week - met, 6 days per week currently  Request a small portion of treats/dessert when offered - not met  Switch to diet cranberry juice/sugar free juices - met  Work on \"all or nothing mindset\" with treats, allow for a treat occasionally in your diet - not met, continues to try to avoid sweet completely    Dietary Recall:  Half bagel, yogurt, half banana  Lunch: chili with beans (no meat)  Half banana, hummus and celery  Dinner: protein shake    Yesterday:  Breakfast: handful of blueberries  Lunch yesterday: 6 in veggie delight subway sandwich, 2 slices of cheese, and half buttered pretzel  Beverages:   Water  Tea  Protein Shake: Premiere protein shakes, 2 per day   Cranberry juice recently - diet cranberry  Has had soda while out to eat  Exercise:   M-F going to the [facility] lately - used to go every day with her previous PCA. 6 days per week. 3 days of cardio, 3 days of strength  Nutrition Diagnosis:    Morbid obesity related to excessive energy intake as evidence by BMI of 44.9     Intervention:  Food and/or nutrient delivery: Aim for 3 meal per day, 25-35 g at each meal  Nutrition education: discussed tracking and how to estimate accurate portions and reset her nutrition goals in the nia to better fit her needs  Nutrition counseling: goal setting    Monitoring/Evaluation:    Goals:  Continue exercise routine 6 days per week  Continue tracking in [facility], 1700 kcals per day  Switch to diet cranberry juice/sugar free juices  Work on \"all or nothing mindset\" with treats, allow for a treat occasionally in your diet    Patient to follow up in 2.5 month(s) with bariatrician and 2.5 month(s) with RD    Video-Visit Details    Type of service:  Video Visit    Video Start Time (time video started): 9:33 am    Video End Time (time video stopped): 9:50 am    Originating Location " (pt. Location): Home      Distant Location (provider location):  Off-site    Mode of Communication:  Video Conference via [facility]    Physician has received verbal consent for a Video Visit from the patient? Yes      Tanya Selby RD              Again, thank you for allowing me to participate in the care of your patient.        Sincerely,        Tanya Selby RD

## 2024-04-17 ENCOUNTER — LAB REQUISITION (OUTPATIENT)
Dept: LAB | Facility: CLINIC | Age: 44
End: 2024-04-17

## 2024-04-17 DIAGNOSIS — R42 DIZZINESS AND GIDDINESS: ICD-10-CM

## 2024-04-17 PROCEDURE — 84443 ASSAY THYROID STIM HORMONE: CPT | Performed by: STUDENT IN AN ORGANIZED HEALTH CARE EDUCATION/TRAINING PROGRAM

## 2024-04-17 PROCEDURE — 82374 ASSAY BLOOD CARBON DIOXIDE: CPT | Performed by: STUDENT IN AN ORGANIZED HEALTH CARE EDUCATION/TRAINING PROGRAM

## 2024-04-18 LAB
ALBUMIN SERPL BCG-MCNC: 4.1 G/DL (ref 3.5–5.2)
ALP SERPL-CCNC: 42 U/L (ref 40–150)
ALT SERPL W P-5'-P-CCNC: 10 U/L (ref 0–50)
ANION GAP SERPL CALCULATED.3IONS-SCNC: 15 MMOL/L (ref 7–15)
AST SERPL W P-5'-P-CCNC: 28 U/L (ref 0–45)
BILIRUB SERPL-MCNC: 0.6 MG/DL
BUN SERPL-MCNC: 9.4 MG/DL (ref 6–20)
CALCIUM SERPL-MCNC: 9.4 MG/DL (ref 8.6–10)
CHLORIDE SERPL-SCNC: 102 MMOL/L (ref 98–107)
CREAT SERPL-MCNC: 1.22 MG/DL (ref 0.51–0.95)
DEPRECATED HCO3 PLAS-SCNC: 19 MMOL/L (ref 22–29)
EGFRCR SERPLBLD CKD-EPI 2021: 56 ML/MIN/1.73M2
GLUCOSE SERPL-MCNC: 80 MG/DL (ref 70–99)
POTASSIUM SERPL-SCNC: 4.2 MMOL/L (ref 3.4–5.3)
PROT SERPL-MCNC: 6.8 G/DL (ref 6.4–8.3)
SODIUM SERPL-SCNC: 136 MMOL/L (ref 135–145)
TSH SERPL DL<=0.005 MIU/L-ACNC: 1.55 UIU/ML (ref 0.3–4.2)

## 2024-05-08 ENCOUNTER — VIRTUAL VISIT (OUTPATIENT)
Dept: SURGERY | Facility: CLINIC | Age: 44
End: 2024-05-08
Payer: COMMERCIAL

## 2024-05-08 DIAGNOSIS — E11.9 TYPE 2 DIABETES MELLITUS WITHOUT COMPLICATION, WITHOUT LONG-TERM CURRENT USE OF INSULIN (H): Primary | ICD-10-CM

## 2024-05-08 DIAGNOSIS — E66.9 OBESITY: ICD-10-CM

## 2024-05-08 PROCEDURE — 97803 MED NUTRITION INDIV SUBSEQ: CPT | Mod: 95 | Performed by: DIETITIAN, REGISTERED

## 2024-05-08 NOTE — PROGRESS NOTES
Ronel Ryan is a 43 year old who is being evaluated via a billable video visit.        How would you like to obtain your AVS? MyChart  If the video visit is dropped, the invitation should be resent by: Text to cell phone: [phone]  Will anyone else be joining your video visit? No        Medical  Weight Loss Follow-Up Diet Evaluation  Assessment:  Ronel is presenting today for a follow up weight management nutrition consultation.  This patient has had an initial appointment and was referred by Dr. Dunn for MNT as treatment for Morbid obesity   Weight loss medication: other. Zepbound (10 mg)  Pt's weight is 231 lbs   Initial weight: 262 lbs  Weight change: 31 lbs, 11.8% weight loss        1/25/2024     9:38 AM   Changes and Difficulties   I have made the following changes to my diet since my last visit: Eating more carbs/ sugar   With regards to my diet, I am still struggling with: Struggling a lot, missed a day of Ozempic, feeling the effects of missing the dose   I have made the following changes to my activity/exercise since my last visit: Started exercising again after taking a month off   With regards to my activity/exercise, I am still struggling with: None     BMI: 39.65  Ideal body weight: 54.7 kg (120 lb 9.5 oz)  Adjusted ideal body weight: 81 kg (178 lb 8.9 oz)    Estimated RMR ([place]-St Jeor equation):   1821 kcals x 1.2 (sedentary) = 2186 kcals (for weight maintenance)  1821 kcals x 1.3 (light active) = 2504 kcals (for weight maintenance)  Recommended Protein Intake: 70-90 grams of protein/day  Patient Active Problem List:  Patient Active Problem List   Diagnosis    Binge eating    Eating disorder    Morbid obesity (H)    Multiple-type hyperlipidemia    Severe episode of recurrent major depressive disorder (H)    Arthralgia of hip    Hypertension    Trochanteric bursitis    History of urinary anomaly    Borderline personality disorder (H)    Attention deficit disorder    Asthma    Suicidal  "ideation    ACP (advance care planning)    Adjustment disorder with depressed mood    Anxiety    Moderate mixed bipolar I disorder (H)    Bipolar II disorder, most recent episode hypomanic (H)    Borderline high cholesterol    Care plan discussed with patient    Disorder of pelvis    Gait difficulty    Hx of diabetes mellitus    Hx of eating disorder    Morbid obesity with BMI of 50.0-59.9, adult (H)    Osteoarthritis    Pain of both hip joints    Relationship problems    Secondary amenorrhea    Soft tissue lesion of shoulder region    Vertigo    Greater trochanteric bursitis of both hips    Hip dysplasia    Left hip pain    Osteoarthritis of hip    Primary osteoarthritis of left hip    ADD (attention deficit disorder)    H/O urinary frequency    History of anxiety disorder    Hyperlipidemia    Mixed hyperlipidemia    Recurrent major depressive disorder (H24)    Type 2 diabetes mellitus (H)    Primary osteoarthritis of both hips    Degenerative joint disease (DJD) of hip    Status post total hip replacement, left    Bipolar 1 disorder (H)    Constipation    Gastroesophageal reflux disease with esophagitis without hemorrhage    Mixed incontinence    Primary localized osteoarthritis of pelvic region and thigh    Skin sensation disturbance     Progress on goals from last visit: Staged that she became vegan - March 11 - started after watching the \"What the Health\" Documentary. Noticed dizziness and poor energy level. [facility].  -noticed decreased hunger during the day with Zepbound   -explained the fast/fed state and fueling body properly for weight loss  +drinking Vegan Protein Shakes periodically   -encouraging to add  +does like Tofu scramble with vegetables  +limited bread, cereal and pasta  -encouraged patient to have a consistent eating pattern during the day - eating every 4-6 hours to help fuel weight loss properly    Goals (3/6/2024):  Continue exercise routine 6 days " "per week - not met  [facility] 1700 kcals per day - met  Has also using a food journal to write in  Switch to diet cranberry juice/sugar free juices - met  Work on \"all or nothing mindset\" with treats, allow for a treat occasionally in your diet - met    Dietary Recall:  Breakfast: skip  Lunch:skip  Dinner: 1/2 bowl of soup OR chili and a banana  Typical snacks: fruit  Eating out: 1x/month  Beverages:   Water ~64 ounces/day  Cranberry juice (NSA)  Exercise: Unable to complete at this time d/t dizziness - more inconsistent lately    Nutrition Diagnosis:    Obesity related to excessive energy intake as evidence by BMI of 39.65        Intervention:  Food and/or nutrient delivery: staying consistent with meals during the day. Trying to eat a protein and fibrous item every 4-6 hours.   Nutrition education: vegan protein sources  Handouts provided: Vegan Diet: General Info, Iron-Rich Diet and Protein-Sources for Weight Loss    Monitoring/Evaluation:    Goals:  Consistent with meals 4-6 hours  Focus on a vegan protein sources with meals  Continue exercise routine    Patient to follow up in 3 weeks with bariatrician and 2 month(s) with RD      Video-Visit Details    Type of service:  Video Visit    Video Start Time (time video started): 10:54 AM    Video End Time (time video stopped): 11:24 AM    Originating Location (pt. Location): Home      Distant Location (provider location):  Off-site    Mode of Communication:  Video Conference via [facility]    Physician has received verbal consent for a Video Visit from the patient? Yes      Ana Gomez RD        "

## 2024-05-08 NOTE — LETTER
5/8/2024         RE: Ronel Ryan  [address]  [place] [address]        Dear Colleague,    Thank you for referring your patient, Ronel Ryan, to the [facility] [place]. Please see a copy of my visit note below.    Ronel Ryan is a 43 year old who is being evaluated via a billable video visit.        How would you like to obtain your AVS? MyChart  If the video visit is dropped, the invitation should be resent by: Text to cell phone: [phone]  Will anyone else be joining your video visit? No        Medical  Weight Loss Follow-Up Diet Evaluation  Assessment:  Ronel is presenting today for a follow up weight management nutrition consultation.  This patient has had an initial appointment and was referred by Dr. Dunn for MNT as treatment for Morbid obesity   Weight loss medication: other. Zepbound (10 mg)  Pt's weight is 231 lbs   Initial weight: 262 lbs  Weight change: 31 lbs, 11.8% weight loss        1/25/2024     9:38 AM   Changes and Difficulties   I have made the following changes to my diet since my last visit: Eating more carbs/ sugar   With regards to my diet, I am still struggling with: Struggling a lot, missed a day of Ozempic, feeling the effects of missing the dose   I have made the following changes to my activity/exercise since my last visit: Started exercising again after taking a month off   With regards to my activity/exercise, I am still struggling with: None     BMI: 39.65  Ideal body weight: 54.7 kg (120 lb 9.5 oz)  Adjusted ideal body weight: 81 kg (178 lb 8.9 oz)    Estimated RMR ([place]-St Jeor equation):   1821 kcals x 1.2 (sedentary) = 2186 kcals (for weight maintenance)  1821 kcals x 1.3 (light active) = 2504 kcals (for weight maintenance)  Recommended Protein Intake: 70-90 grams of protein/day  Patient Active Problem List:  Patient Active Problem List   Diagnosis     Binge eating     Eating disorder     Morbid  "obesity (H)     Multiple-type hyperlipidemia     Severe episode of recurrent major depressive disorder (H)     Arthralgia of hip     Hypertension     Trochanteric bursitis     History of urinary anomaly     Borderline personality disorder (H)     Attention deficit disorder     Asthma     Suicidal ideation     ACP (advance care planning)     Adjustment disorder with depressed mood     Anxiety     Moderate mixed bipolar I disorder (H)     Bipolar II disorder, most recent episode hypomanic (H)     Borderline high cholesterol     Care plan discussed with patient     Disorder of pelvis     Gait difficulty     Hx of diabetes mellitus     Hx of eating disorder     Morbid obesity with BMI of 50.0-59.9, adult (H)     Osteoarthritis     Pain of both hip joints     Relationship problems     Secondary amenorrhea     Soft tissue lesion of shoulder region     Vertigo     Greater trochanteric bursitis of both hips     Hip dysplasia     Left hip pain     Osteoarthritis of hip     Primary osteoarthritis of left hip     ADD (attention deficit disorder)     H/O urinary frequency     History of anxiety disorder     Hyperlipidemia     Mixed hyperlipidemia     Recurrent major depressive disorder (H24)     Type 2 diabetes mellitus (H)     Primary osteoarthritis of both hips     Degenerative joint disease (DJD) of hip     Status post total hip replacement, left     Bipolar 1 disorder (H)     Constipation     Gastroesophageal reflux disease with esophagitis without hemorrhage     Mixed incontinence     Primary localized osteoarthritis of pelvic region and thigh     Skin sensation disturbance     Progress on goals from last visit: Staged that she became vegan - March 11 - started after watching the \"What the Health\" Documentary. Noticed dizziness and poor energy level. [facility].  -noticed decreased hunger during the day with Zepbound   -explained the fast/fed state and fueling body properly for weight " "loss  +drinking Vegan Protein Shakes periodically   -encouraging to add  +does like Tofu scramble with vegetables  +limited bread, cereal and pasta  -encouraged patient to have a consistent eating pattern during the day - eating every 4-6 hours to help fuel weight loss properly    Goals (3/6/2024):  Continue exercise routine 6 days per week - not met  Continue tracking in [employer], 1700 kcals per day - met  Has also using a food journal to write in  Switch to diet cranberry juice/sugar free juices - met  Work on \"all or nothing mindset\" with treats, allow for a treat occasionally in your diet - met    Dietary Recall:  Breakfast: skip  Lunch:skip  Dinner: 1/2 bowl of soup OR chili and a banana  Typical snacks: fruit  Eating out: 1x/month  Beverages:   Water ~64 ounces/day  Cranberry juice (NSA)  Exercise: Unable to complete at this time d/t dizziness - more inconsistent lately    Nutrition Diagnosis:    Obesity related to excessive energy intake as evidence by BMI of 39.65        Intervention:  Food and/or nutrient delivery: staying consistent with meals during the day. Trying to eat a protein and fibrous item every 4-6 hours.   Nutrition education: vegan protein sources  Handouts provided: Vegan Diet: General Info, Iron-Rich Diet and Protein-Sources for Weight Loss    Monitoring/Evaluation:    Goals:  Consistent with meals 4-6 hours  Focus on a vegan protein sources with meals  Continue exercise routine    Patient to follow up in 3 weeks with bariatrician and 2 month(s) with JOESPH      Video-Visit Details    Type of service:  Video Visit    Video Start Time (time video started): 10:54 AM    Video End Time (time video stopped): 11:24 AM    Originating Location (pt. Location): Home      Distant Location (provider location):  Off-site    Mode of Communication:  Video Conference via [facility]    Physician has received verbal consent for a Video Visit from the patient? Yes      Ana Gomez RD          Again, " thank you for allowing me to participate in the care of your patient.        Sincerely,        Ana Gomez RD

## 2024-05-20 ENCOUNTER — OFFICE VISIT (OUTPATIENT)
Dept: SURGERY | Facility: CLINIC | Age: 44
End: 2024-05-20
Payer: COMMERCIAL

## 2024-05-20 VITALS — WEIGHT: 233.8 LBS | BODY MASS INDEX: 39.91 KG/M2 | HEIGHT: 64 IN

## 2024-05-20 DIAGNOSIS — K59.03 DRUG-INDUCED CONSTIPATION: ICD-10-CM

## 2024-05-20 DIAGNOSIS — T50.905A DRUG-INDUCED WEIGHT GAIN: ICD-10-CM

## 2024-05-20 DIAGNOSIS — E66.1 CLASS 3 DRUG-INDUCED OBESITY WITH SERIOUS COMORBIDITY AND BODY MASS INDEX (BMI) OF 40.0 TO 44.9 IN ADULT (H): Primary | ICD-10-CM

## 2024-05-20 DIAGNOSIS — E11.9 TYPE II DIABETES MELLITUS, WELL CONTROLLED (H): ICD-10-CM

## 2024-05-20 DIAGNOSIS — E66.813 CLASS 3 DRUG-INDUCED OBESITY WITH SERIOUS COMORBIDITY AND BODY MASS INDEX (BMI) OF 40.0 TO 44.9 IN ADULT (H): Primary | ICD-10-CM

## 2024-05-20 DIAGNOSIS — R63.5 DRUG-INDUCED WEIGHT GAIN: ICD-10-CM

## 2024-05-20 PROCEDURE — 99214 OFFICE O/P EST MOD 30 MIN: CPT | Performed by: EMERGENCY MEDICINE

## 2024-05-20 RX ORDER — DOCUSATE SODIUM 100 MG/1
100 CAPSULE, LIQUID FILLED ORAL 2 TIMES DAILY PRN
Qty: 90 CAPSULE | Refills: 4 | Status: SHIPPED | OUTPATIENT
Start: 2024-05-20

## 2024-05-20 RX ORDER — POLYETHYLENE GLYCOL 3350 17 G/17G
1 POWDER, FOR SOLUTION ORAL DAILY
Qty: 357 G | Refills: 0 | Status: SHIPPED | OUTPATIENT
Start: 2024-05-20 | End: 2024-06-10

## 2024-05-20 RX ORDER — MAGNESIUM OXIDE 400 MG/1
400 TABLET ORAL DAILY
Qty: 90 TABLET | Refills: 3 | Status: SHIPPED | OUTPATIENT
Start: 2024-05-20 | End: 2025-05-20

## 2024-05-20 NOTE — LETTER
5/20/2024         RE: Ronel Ryan  [address]  [facility] [address]        Dear Colleague,    Thank you for referring your patient, Ronel Ryan, to the [facility] SURGERY [facility] AND BARIATRICS CARE [place]. Please see a copy of my visit note below.    Bariatric Clinic Follow-Up Visit:    Ronel Ryan is a 43 year old  female with Body mass index is 40.13 kg/m .  presenting here today for follow-up on non-surgical efforts for weight loss.  Original Intake visit occurred on 7/13/19 with a weight of 345 lbs and BMI of 59.2.  Along with diet and behavior changes, she has been using various combinations through the years of Trulicity/naltrexone and a previous use of topiramate from her pain clinic which can also assist her weight loss goals.  See her intake visit notes for details on identified contributors to weight gain in the past. Chart review shows Dietician calculated RMR of 1892kcal/day and protein intake goal of 70-90g/day.  Her Trulicity was stopped in hospital May of 2022 during hip surgery and she had weight gain of 30 lbs over the summer due to increased cravings off medication. We transitioned to Semaglutide therapy this summer of 2022 but then supplies ran out due to shortages in supply and Victoza was started in November 2022 (11/7/22) with plans to transition back in 2023 when supplies of Ozempic are more available. She was able to get back on Ozempic in late 2022 and was up to 1mg/week as of our 10/06/23 visit w/ good results/tolerance.  She was transitioned to Zepbound 2/8/24 for continued work on weight management as a means to improve her glycemic control/weight related health.  Some supply chain issues have limited ramping ability and she presents on 5/20/24 at 233 lbs on 10mg/week Zepbound.     Dietician visit on 11/17/23 w/ RMR of 1821 kcal/day and protein goal of 70-90g/day. She's a user of [employer] w/ good tracking history of food and  exercise.    Weight:   Wt Readings from Last 5 Encounters:   05/20/24 106.1 kg (233 lb 12.8 oz)   02/15/24 120.4 kg (265 lb 8 oz)   01/25/24 122.5 kg (270 lb)   10/06/23 118.7 kg (261 lb 9.6 oz)   06/22/23 116.1 kg (256 lb)    pounds  105 lbs down from highest weight on chart from 2019, a 30.4% total body weight reduction.    Comorbidities:  Patient Active Problem List   Diagnosis     Binge eating     Eating disorder     Morbid obesity (H)     Multiple-type hyperlipidemia     Severe episode of recurrent major depressive disorder (H)     Arthralgia of hip     Hypertension     Trochanteric bursitis     History of urinary anomaly     Borderline personality disorder (H)     Attention deficit disorder     Asthma     Suicidal ideation     ACP (advance care planning)     Adjustment disorder with depressed mood     Anxiety     Moderate mixed bipolar I disorder (H)     Bipolar II disorder, most recent episode hypomanic (H)     Borderline high cholesterol     Care plan discussed with patient     Disorder of pelvis     Gait difficulty     Hx of diabetes mellitus     Hx of eating disorder     Morbid obesity with BMI of 50.0-59.9, adult (H)     Osteoarthritis     Pain of both hip joints     Relationship problems     Secondary amenorrhea     Soft tissue lesion of shoulder region     Vertigo     Greater trochanteric bursitis of both hips     Hip dysplasia     Left hip pain     Osteoarthritis of hip     Primary osteoarthritis of left hip     ADD (attention deficit disorder)     H/O urinary frequency     History of anxiety disorder     Hyperlipidemia     Mixed hyperlipidemia     Recurrent major depressive disorder (H24)     Type 2 diabetes mellitus (H)     Primary osteoarthritis of both hips     Degenerative joint disease (DJD) of hip     Status post total hip replacement, left     Bipolar 1 disorder (H)     Constipation     Gastroesophageal reflux disease with esophagitis without hemorrhage     Mixed incontinence     Primary  localized osteoarthritis of pelvic region and thigh     Skin sensation disturbance       Current Outpatient Medications:      ACETAMINOPHEN EXTRA STRENGTH 500 MG tablet, , Disp: , Rfl:      albuterol (PROAIR HFA/PROVENTIL HFA/VENTOLIN HFA) 108 (90 Base) MCG/ACT inhaler, Inhale 2 puffs into the lungs every 6 hours as needed for shortness of breath, wheezing or cough, Disp: 18 g, Rfl: 0     blood glucose (NO BRAND SPECIFIED) lancets standard, Use to test blood sugar 2 times daily or as directed., Disp: 100 each, Rfl: 11     blood glucose monitoring (NO BRAND SPECIFIED) meter device kit, Use to test blood sugar 2 times daily or as directed., Disp: 1 kit, Rfl: 0     blood glucose monitoring (NO BRAND SPECIFIED) test strip, Use to test blood sugars 2 times daily or as directed, Disp: 100 strip, Rfl: 3     buPROPion (WELLBUTRIN XL) 300 MG 24 hr tablet, Take 300 mg by mouth daily , Disp: , Rfl:      cholecalciferol (VITAMIN D3) 125 mcg (5000 units) capsule, , Disp: , Rfl:      docusate sodium (COLACE) 100 MG capsule, Take 1 capsule (100 mg) by mouth 2 times daily as needed for constipation, Disp: 90 capsule, Rfl: 4     EPINEPHrine (ANY BX GENERIC EQUIV) 0.3 MG/0.3ML injection 2-pack, Inject 0.3 mg into the muscle, Disp: , Rfl:      EPINEPHrine (EPIPEN/ADRENACLICK/OR ANY BX GENERIC EQUIV) 0.3 MG/0.3ML injection 2-pack, Inject 0.3 mLs (0.3 mg) into the muscle once as needed for anaphylaxis, Disp: 0.6 mL, Rfl: 0     hydrOXYzine (ATARAX) 50 MG tablet, , Disp: , Rfl:      ibuprofen (ADVIL/MOTRIN) 600 MG tablet, Take 1 tablet (600 mg) by mouth every 6 hours as needed (mild), Disp: , Rfl: 0     ketoconazole (EXTINA) 2 % external foam, , Disp: , Rfl:      levonorgestrel (LILETTA, 52 MG,) 19.5 MCG/DAY IUD, 1 each by Intrauterine route once, Disp: , Rfl:      magnesium oxide (MAG-OX) 400 MG tablet, Take 1 tablet (400 mg) by mouth daily, Disp: 90 tablet, Rfl: 3     mometasone (ELOCON) 0.1 % external cream, Apply topically daily,  "Disp: , Rfl:      polyethylene glycol (MIRALAX) 17 GM/Dose powder, Take 17 g (1 Capful) by mouth daily for 21 days, Disp: 357 g, Rfl: 0     risperiDONE (RISPERDAL) 3 MG tablet, , Disp: , Rfl:      sennosides (SENOKOT) 8.6 MG tablet, , Disp: , Rfl:      tirzepatide-Weight Management (ZEPBOUND) 10 MG/0.5ML prefilled pen, Inject 0.5 mLs (10 mg) Subcutaneous every 7 days, Disp: 6 mL, Rfl: 1     traZODone (DESYREL) 150 MG tablet, Take 150 mg by mouth At Bedtime, Disp: , Rfl:       Interim: Since our last visit, she has had good weight loss. Zepbound 10mg daily tolerated OK but a bit under nourished at times and some constipation side effects.  Mental health is \"great\". Constipation however so bowel regimen rx'd.   [employer] 3 days of strength and cardio. Classes as well as on her own. Will use bike/walking the track.     Plan:   1.  Diet: aiming for 20-25 grams of lean protein per meal. Supplement with shakes or cottage cheese or plain greek yogurt if needed to reach daily goal of 75g/day of protein. Continue to separate beverages from meals and hydrating well between meals to hit the 80 oz a day of water (about 5 water bottles worth).     2. Exercise: continue good workouts and enjoy your summer.    3. Medication: Aiming to continue the 10mg/week Zepbound if tolerated, if shortages get in the way, we can either bump up to 12.5mg/week in the future or reduce down to 7.5, 5 or 2.5mg/week depending on what is available. As long as 16 days or less from your last shot, you should be able to maintain the current of injection    4. Follow up kidney health with primary    5. Goals: over 105 lbs down from introductory weight back in 2019, better than most bariatric surgery patients, over 30% total body weight reduction. Looking to stabilize weight around 200 lbs and for safest/best outcome for skin reductive surgery with Dr. Gale in the future.  6. Colace and max oxide for constipation, mindful hydration recommended.        We " "discussed [facility] Bariatric Basics including:  -eating 3 meals daily  -reviewed metabolic needs for weight loss based on Resting Metabolic Rate  -protein goals supportive of healthy weight loss  -avoiding/limiting calorie containing beverages  -We discussed the importance of restorative sleep and stress management in maintaining a healthy weight.  -We discussed the National Weight Control Registry healthy weight maintenance strategies and ways to optimize metabolism.  -We discussed the importance of physical activity including cardiovascular and strength training in maintaining a healthier weight and explored viable options.      Most recent labs:  Lab Results   Component Value Date    A1C 5.1 11/09/2022    A1C 4.7 04/04/2022    A1C 4.6 09/10/2021    A1C 6.7 08/23/2019    A1C 7.0 11/21/2018    A1C 6.0 12/31/2010       Lab Results   Component Value Date    WBC 5.5 02/01/2023    HGB 13.9 02/01/2023    HCT 42.1 02/01/2023    MCV 93 02/01/2023     02/01/2023     Lab Results   Component Value Date    CHOL 155 10/17/2023     Lab Results   Component Value Date    HDL 48 (L) 10/17/2023     No components found for: \"LDLCALC\"  Lab Results   Component Value Date    TRIG 78 10/17/2023     No results found for: \"CHOLHDL\"  Lab Results   Component Value Date    ALT 10 04/17/2024    AST 28 04/17/2024    ALKPHOS 42 04/17/2024     No results found for: \"HGBA1C\"  Lab Results   Component Value Date    B12 368 11/09/2022     No components found for: \"VITDT1\"  Lab Results   Component Value Date    KOFFI 79 11/09/2022     Lab Results   Component Value Date    PTHI 77 08/23/2019     No results found for: \"ZN\"  Lab Results   Component Value Date    VIB1WB 108 08/23/2019     Lab Results   Component Value Date    TSH 1.55 04/17/2024     No results found for: \"TEST\"    DIETARY HISTORY  Tracking technique: may be a bit under fed early on after injections, reviewed options for getting adequate protein and nourishment, utilization of " "shakes/supplemental soft proteins to hit protein target as she's likely a bit under the goal.   Positive Changes Since Last Visit: continues to work out regularly. Good energy overall.   Struggling With: mindful intake on Zepbound    Getting Adequate Protein: under goal  Sleep schedule: good.      PHYSICAL ACTIVITY PATTERNS:  Cardiovascular: walking, workouts in bicycle at [facility]   Strength Training: machines 3x weekly.    REVIEW OF SYSTEMS  Planning skin reductive surgery in the winter if weight is around 200 lbs.  No recent illnesses, mental health feels good.  PHYSICAL EXAM:  Vitals: /74   Ht 1.626 m (5' 4\")   Wt 106.1 kg (233 lb 12.8 oz)   BMI 40.13 kg/m    Weight:   Wt Readings from Last 3 Encounters:   05/20/24 106.1 kg (233 lb 12.8 oz)   02/15/24 120.4 kg (265 lb 8 oz)   01/25/24 122.5 kg (270 lb)         GEN: Pleasant, well groomed, in no acute distress  HEENT:  normal facies, mildy dry MMs .  NECK: No swelling.  HEART: RRR.  LUNGS: no cough/dypsnea.  ABDOMEN: nondistended. Mild suprapubic to LLQ pain to palpation. No guarding or rebound and likely in line with constipation hx. .  EXTREMITIES: No tremor. Ambulation is independent, easy and non ataxic..  NEURO: Alert and Oriented X3, fluent speech. .  SKIN: No visible rashes. .    Interim study results: Last Comprehensive Metabolic Panel:  Sodium   Date Value Ref Range Status   04/17/2024 136 135 - 145 mmol/L Final     Comment:     Reference intervals for this test were updated on 09/26/2023 to more accurately reflect our healthy population. There may be differences in the flagging of prior results with similar values performed with this method. Interpretation of those prior results can be made in the context of the updated reference intervals.    11/21/2018 136.6 132.0 - 142.0 mmol/L Final     Potassium   Date Value Ref Range Status   04/17/2024 4.2 3.4 - 5.3 mmol/L Final   04/11/2022 3.8 3.5 - 5.0 mmol/L Final   11/21/2018 3.8 3.2 - 4.6 mmol/dL Final "     Chloride   Date Value Ref Range Status   04/17/2024 102 98 - 107 mmol/L Final   04/11/2022 110 (H) 98 - 107 mmol/L Final   11/21/2018 103.4 98.0 - 110.0 mmol/L Final     Carbon Dioxide   Date Value Ref Range Status   11/21/2018 25.5 20.0 - 32.0 mmol/L Final     Carbon Dioxide (CO2)   Date Value Ref Range Status   04/17/2024 19 (L) 22 - 29 mmol/L Final   04/11/2022 23 22 - 31 mmol/L Final     Anion Gap   Date Value Ref Range Status   04/17/2024 15 7 - 15 mmol/L Final   04/11/2022 7 5 - 18 mmol/L Final   10/04/2017 10 3 - 14 mmol/L Final     Glucose   Date Value Ref Range Status   04/17/2024 80 70 - 99 mg/dL Final   04/11/2022 80 70 - 125 mg/dL Final   11/21/2018 143.5 (H) 70.0 - 99.0 mg'dL Final     Urea Nitrogen   Date Value Ref Range Status   04/17/2024 9.4 6.0 - 20.0 mg/dL Final   04/11/2022 9 8 - 22 mg/dL Final   11/21/2018 4.9 (L) 7.0 - 19.0 mg/dL Final     Creatinine   Date Value Ref Range Status   04/17/2024 1.22 (H) 0.51 - 0.95 mg/dL Final   11/21/2018 0.9 0.5 - 1.0 mg/dL Final     GFR Estimate   Date Value Ref Range Status   04/17/2024 56 (L) >60 mL/min/1.73m2 Final   05/21/2021 54 (L) >60 mL/min/1.73m2 Final   11/21/2018 74.1 >60.0 mL/min/1.7 m2 Final     Calcium   Date Value Ref Range Status   04/17/2024 9.4 8.6 - 10.0 mg/dL Final   11/21/2018 9.6 8.5 - 10.1 mg/dL Final     Bilirubin Total   Date Value Ref Range Status   04/17/2024 0.6 <=1.2 mg/dL Final   10/04/2017 0.7 0.2 - 1.3 mg/dL Final     Alkaline Phosphatase   Date Value Ref Range Status   04/17/2024 42 40 - 150 U/L Final     Comment:     Reference intervals for this test were updated on 11/14/2023 to more accurately reflect our healthy population. There may be differences in the flagging of prior results with similar values performed with this method. Interpretation of those prior results can be made in the context of the updated reference intervals.   10/04/2017 57 40 - 150 U/L Final     ALT   Date Value Ref Range Status   04/17/2024 10 0 -  50 U/L Final     Comment:     Reference intervals for this test were updated on 6/12/2023 to more accurately reflect our healthy population. There may be differences in the flagging of prior results with similar values performed with this method. Interpretation of those prior results can be made in the context of the updated reference intervals.     10/04/2017 14 0 - 50 U/L Final     AST   Date Value Ref Range Status   04/17/2024 28 0 - 45 U/L Final     Comment:     Reference intervals for this test were updated on 6/12/2023 to more accurately reflect our healthy population. There may be differences in the flagging of prior results with similar values performed with this method. Interpretation of those prior results can be made in the context of the updated reference intervals.   10/04/2017 11 0 - 45 U/L Final               .      35 minutes spent by me on the date of the encounter doing chart review, history and exam, documentation and further activities per the note   Kerwin Dunn MD  [facility]ariat[facility]  7:41 AM  5/20/2024      Again, thank you for allowing me to participate in the care of your patient.        Sincerely,        Kerwin Dunn MD

## 2024-05-20 NOTE — PATIENT INSTRUCTIONS
105 lbs down from highest weight on chart from 2019, a 30.4% total body weight reduction.      Plan:   1.  Diet: aiming for 20-25 grams of lean protein per meal. Supplement with shakes or cottage cheese or plain greek yogurt if needed to reach daily goal of 75g/day of protein. Continue to separate beverages from meals and hydrating well between meals to hit the 80 oz a day of water (about 5 water bottles worth).     2. Exercise: continue good workouts and enjoy your summer.    3. Medication: Aiming to continue the 10mg/week Zepbound if tolerated, if shortages get in the way, we can either bump up to 12.5mg/week in the future or reduce down to 7.5, 5 or 2.5mg/week depending on what is available. As long as 16 days or less from your last shot, you should be able to maintain the current of injection    4. Follow up kidney health with primary    5. Goals: over 105 lbs down from introductory weight back in 2019, better than most bariatric surgery patients, over 30% total body weight reduction. Looking to stabilize weight around 200 lbs and for safest/best outcome for skin reductive surgery with Dr. Gale in the future.          LEAN PROTEIN SOURCES  Getting 20-30 grams of protein, 3 meals daily, is appropriate for most people, some need more but more than about 40 grams per meal is not useful.  General rule is drinking one ounce of water per gram of protein eaten over the course of the day:  70 grams of protein each day, drink 70 oz of water.  Protein Source Portion Calories Grams of Protein                           Nonfat, plain Greek yogurt    (10 grams sugar or less) 3/4 cup (6 oz)  12-17   Light Yogurt (10 grams sugar or less) 3/4 cup (6 oz)  6-8   Protein Shake 1 shake 110-180 15-30   Skim/1% Milk or lactose-free milk 1 cup ( 8 oz)  8   Plain or light, flavored soymilk 1 cup  7-8   Plain or light, hemp milk 1 cup 110 6   Fat Free or 1% Cottage Cheese 1/2 cup 90 15   Part skim ricotta  cheese 1/2 cup 100 14   Part skim or reduced fat cheese slices 1 ounce 65-80 8     Mozzarella String Cheese 1 80 8   Canned tuna, chicken, crab or salmon  (canned in water)  1/2 cup 100 15-20   White fish (broiled, grilled, baked) 3 ounces 100 21   Benedict/Tuna (broiled, grilled, baked) 3 ounces 150-180 21   Shrimp, Scallops, Lobster, Crab 3 ounces 100 21   Pork loin, Pork Tenderloin 3 ounces 150 21   Boneless, skinless chicken /turkey breast                          (broiled, grilled, baked) 3 ounces 120 21   [place], [place], [place], and Venison 3 ounces 120 21   Lean cuts of red meat and pork (sirloin,   round, tenderloin, flank, ground 93%-96%) 3 ounces 170 21   Lean or Extra Lean Ground Turkey 1/2 cup 150 20   90-95% Lean [place] Burger 1 mckinley 140-180 21   Low-fat casserole with lean meat 3/4 cup 200 17   Luncheon Meats                                                        (turkey, lean ham, roast beef, chicken) 3 ounces 100 21   Egg (boiled, poached, scrambled) 1 Egg 60 7   Egg Substitute 1/2 cup 70 10   Nuts (limit to 1 serving per day)  3 Tbsp. 150 7   Nut [place] (peanut, almond)  Limit to 1 serving or less daily 1 Tbsp. 90 4   Soy Burger (varies) 1  15   Garbanzo, Black, [place] Beans 1/2 cup 110 7   Refried Beans 1/2 cup 100 7   Kidney and Lima beans 1/2 cup 110 7   Tempeh 3 oz 175 18   Vegan crumbles 1/2 cup 100 14   Tofu 1/2 cup 110 14   Chili (beans and extra lean beef or turkey) 1 cup 200 23   Lentil Stew/Soup 1 cup 150 12   Black Bean Soup 1 cup 175 12           Example Meal Plan for a 4273-0539 Calorie Diet:    In order to fuel your weight loss properly and avoid hunger-induced overeating later in the day, for your height and weight, you will enjoy the most success by following the diet below or similar with adjustments based on your particular tastes and preferences.  Exercise may influence speed, amount of weight loss further.     I recommend getting into a meal routine and keeping it similar day  to day in the beginning so you don t have to think too hard about what you re going to make/eat.  Keep snacks healthy, ideally containing protein and some vegetables.  Non-processed food is preferable to packaged items.  Eat at least a few crunchy green vegetables if having a snack, which should be 2-3 hours after your mealtimes(prepare these ahead of time for ease of use).  Drink 64 oz -80 oz of water daily for most, some of you will need more and we'll discuss it at your visit if that is the case.      When changing our diet,  we can often mistake thirst for hunger or just have some distracted eating habits that we need to break free from ('bored/mindless eating', screen time,work, driving,etc).  A glass of water and reconsideration of our hunger is often all that is needed.  Having the urge is not the problem, but watching it pass by without acting on it is the goal.    If you re having hunger problems, add a protein drink/snack to your morning hours or afternoon snack with at least 20grams of protein and not too much sugar (under 10g).  A carton of higher protein/low sugar yogurt can work as well.  If the urge to snack is overwhelming and not satiated, try going for a 10 minute walk/exercise, come home and drink a glass of water and if still hungry, have a  calorie snack (handful of raw/sprouted nuts, veggies and string cheese, protein bar, etc).  Savor it.    It is better to have a large breakfast, a moderate lunch and a smaller dinner to fuel your day.  People lose 10-15% more weight during their weight loss season with this strategy. Optimizing your protein intake at each meal will further keep you more satisfied while eating less food overall.  Getting exercise in early has also been shown to offer the best results (before breakfast ideally but anytime is the right time to exercise if that is not an option for you).    To make sure you re getting adequate vitamins and minerals during weight loss, I  recommend one complete multivitamin a day of your choice.  Consider a probiotic and taking some vitamin D 2000 IU daily.    Let supper be your last meal of the day and ideally try to have at least 12 hours between supper and breakfast the next day to tap into some beneficial overnight fasting dynamics.  Midnight snacks need to go away. Water in the evening is fine, unsweetened, non caffeinated herbal tea is helpful as well.  Consolidating your meals within a 8-12 hour period of your day will help tap into these additional metabolic benefits and tends to keep your appetite up for breakfast, further helping to stay on track.  For most of my patients, I don't recommend an intermittent fasting style diet (many find it hard to fit in their lifestyle) but an overnight fast is very doable for most patients and helps regulate our hunger drives a little better.  This makes it very important to nail good intake at all three meals to feel satisfied/energized and still lose weight.      If evening snacking desires are high, consider a glass of fiber supplement for some additional fullness (metamucil or similar). Most of us don't get the 25-30 grams per day of fiber that promotes good gut health/satiety.  Benefiber, metamucil, citrucel are reasonable/affordable options for most people.  Inulin, chicory, psyllium husk are reasonable options but start slow and low in the dose to avoid gas/bloating until your gut gets acclimated (ramping up to 5-10 grams per day of supplemental fiber after 3-4 weeks if needed).      Example Meal Plan:  Breakfast: 450-475 Calories  1 egg cooked on low in olive oil:   calories.  5oz Greek Yogurt (Fage plain classic: ~150 randi)  Handful of Berries of your choice (about a calorie per berry or 20-40cal per handful)    cup(cooked) of  old fashioned oatmeal or 1/2 cup(cooked) steel cut oats. (150 randi)  Sprinkle amount of brown sugar and a pat of butter. (40 randi)  Glass of  Water  Black coffee or  unsweetened Tea (0calories).      2-3 hours Later Snack: (195 calories).  Glass of water  One string Cheese (80 calories) or 4 oz creamed cottage cheese (115 calories) with  Crunchy Celery sticks (less than 10 calories per large stalk) 2 stalks. (20 calories)    of a  Large Banana or   of a Large Apple (60 calories):  eat second half at lunch or afternoon snack.     Lunch:300 -350 calories   Chicken Breast  (baked/broiled/roasted/grilled)  4-6 oz.  (125-180 randi), BBQ sauce/hot sauce/mustard/seasoning is free. Just use a reasonable amount. Or a can of tuna with 1 tablespoon mayonnaise.  Salad: lettuce, any other veggies (cucumbers, green peppers/celery you like and a small drizzle of dressing to just flavor.  Go as big on the veggies as you like,  as they are practically calorie free.   A whole, 8 inch cucumber is 45 calories, a whole green pepper is 23 calories, a stalk of celery is 9 calories.  [place] Dressing is 60 calories per tablespoon..so moderate your desired dressing or do a drizzle of olive oil and splash of balsamic vinegar on top,  Total calories unlikely to be over 150 even with dressing.  Glass of Water.    Option for lunch is meal replacement protein drink/smoothie.  Need at least 20 grams of protein and eat the rest of your apple/banana from the morning snack.      Afternoon Snack: 150-200 calories   Cheese Stick or cottage cheese again  and a fresh fruit OR  Granola Bar (protein Bar acceptable if under 200 calories OR  Homemade smoothies:  8oz skim milk,  a handful of berries (fresh or frozen and a serving of protein powder such as BiPro or Taniya sWhey for example.  If you don't like dairy, make with 8oz water, one small banana, handful of berries and the protein powder, add any veggies you want as well:  roughly 200 calories.   Glass of Water    Dinner: 325 calories  4oz of fresh, [place] salmon.  Broiled (salt/pepper/dill) for about 8-8.5 minutes (200calories) or  4oz filet nelson bello or  sirloin steak  Salad or vegetable sautéed lightly in olive oil or   Broccoli 1.5  cups chopped and steamed  or micro-waved in a little water (75 calories)  Glass of Water,    Cup of herbal tea (unsweetened, caffeine free)      Herbs and seasonings are encouraged to flavor your foods/vegetables.  Make your food delicious.      Tips for Success:  1.  Prepare proteins ahead of time (broil chicken breasts in bulk so you can grab and go), steel cut oats/lentils can be stored in casserole dish/bowl in the fridge for quick scoop in the morning and rewarm in microwave, make use of crock pot recipes (watch salt content).  Making meals that cover 3-4 future meals is an easy way to stay on track.  2.  Drink a 8-12 oz glass of water every 2-3 hours when awake.  We often mistake hunger for thirst, especially when losing weight.  3. Remember your Reward and Motivation when things get hard.  4.  Weigh yourself every morning and record, you'll stay on track better and learn how our biorhythms, diet and elimination patterns show up on the scale. Don't worry about 1 or 2 day patterns, but when on track you'll notice good trend downward of weight over 3-4 day segments.  Plateaus tend to resolve after 4-8 days in most cases if you stay consistent with your plan.  These are natural and part of weight loss, even if you're perfect with your plan execution.  5. Call if problems/concerns.  [employer] is a great tool to stay in touch and provide weekly outside accountability. Check in with questions or if you want to brag.  6.  Find a handful of meals/foods that keep you on track and feeling good and get into a routine that is sustainable for you.  It's OK to have a routine that works for you.  7.  Consider taking a complete multivitamin just to make sure all micronutrients are adequate during weight loss.  8. If losing hair/brittle nails it usually means you are not taking enough protein.  Minimum goal is 60 grams daily of protein for smaller  "women, 80 grams a day for men. Consider taking Biotin as supplement or a \"Hair and Nail\" multivitamin.            On-the-Go Breakfast Ideas  As of 2015, the latest research shows what a huge impact eating breakfast has on losing weight and feeling your best. People lose more weight when they make breakfast their biggest meal of the day compared to Dinner, but even if you cannot go to that degree, getting a breakfast that has at least 20 grams of protein and even a moderate amount of fat is ideal for maintaining good energy through the day and limits overeating in the evening hours.  The following are some quick and easy suggestions for at least getting something of substance into your body in the morning.  Enjoy!    Eating breakfast within 90 minutes of waking up is an important part of taking care of your body on a restricted calorie diet plan.  After sleeping for hours, your body is in need of fuel.  An ideal breakfast is a combination of protein, whole-grain carbohydrates, or fruit.  Here s why:    -Protein digests very slowly in the body, helping you feel more satisfied.  -Whole grains provide dietary fiber, which also digests slowly and helps keep your gut clean.  -Fruit is a great source of vitamins, minerals, and fiber.     Each one of these breakfast combinations has between 200-300 calories and 15-20 grams of protein.  Feel free to mix and match!    Bone Broth (chicken bone broth or beef bone broth) is a great way to boost protein content. 8oz of bone broth will typically have 9-12grams of protein for 40kcal of energy.    Protein: Choose  -1/2 cup low-fat cottage cheese  -2 hard boiled eggs , or one cooked in olive oil (low/slow heat).  -1 low fat string cheese stick  -1 Tablespoon natural peanut butter  -Pit My Pet vegetarian sausage mckinley (found in freezer section)  -1 slice lowfat cheese  -6 oz 2% or lowfat Greek yogurt, such as Fage or Oikos.    PLUS    Whole Grains:  Choose   -1 whole wheat " English muffin  -1 whole wheat sol, half  -1/2 Fiber One frozen muffin, thawed  -1/2 Fiber One toaster pastry  -1 whole wheat bagel thin  -1/2 cup Kashi cereal  -1 Kashi waffle (or other whole grain high-fiber waffle)  Aim for whole grain/sprouted breads with at least 3g of fiber/slice if having bread. [place] is one such brand.    OR    Fruit: Choose  -1/3 cup blueberries  -1/2 banana (or a plantain- similar to a banana, yet smaller)  -1/2 cup cantaloupe cubes  -1 small apple  -1 small orange  -1/2 cup strawberries  -handful raspberries/blackberries (each berry is about 1 calorie).    *Adapted from [employer], Fall 20    Ten Breakfasts Under 250 calories    Ideally, getting between 350-600 calories  (depending on starting height and weight)for breakfast is ideal for avoiding hunger later in the day, adjust/add to the following accordingly:    One- 250 calories, 8.5 g protein  1 slice whole-grain toast   1 Tbsp peanut butter    banana    Two- 250 calories, 8 g protein    cup nonfat/lowfat yogurt  1/3rd cup diced no-sugar peaches  1/3rd cup cereal (like Special K, Cheerios, or bran flakes)    Three- 250 calories, 25 g protein  1 egg scrambled with 1 oz skim milk    cup shredded cheddar    whole grain English muffin  1 oz [place] rick  1 tsp margarine spread    Four- 225 calories, 25 g protein  1/2 cup Kashi Go-Lean cereal    cup skim milk mixed with 1 scoop Bariatric Advantage protein powder    cup no-sugar diced pears    Five- 250 calories, 20 g protein    cup oatmeal prepared with skim milk, 1 scoop protein powder, and sugar-free maple syrup    Six- 200 calories, 5 g protein  1 whole grain waffle, toasted  1 tablespoon creamy peanut or almond butter    Seven-  250 calories, 19 g protein  Breakfast sandwich: 1 slice whole grain toast, cut in half.  Add 1 scrambled egg and one slice cheddar  cheese.    Eight-  250 calories, 15 g protein  2 eggs scrambled with 1/3 cup frozen spinach (heat before adding to  eggs) and 2 tablespoons low fat cream cheese.    Nine-  150 calories, 15 g protein  2/3rd cup cottage cheese    cup cantaloupe    Ten- 200 calories, 20 g protein  Fruit smoothie made with 4 oz. nonfat Greek yogurt,   cup berries, 1 scoop protein powder, and 4 oz skim milk.    Ten Lunches Under 250 Calories    Aim for lunch to be around 300-400 calories a day when trying to lose weight and get that protein in!    One- 200 calories, 11 g protein  1/3 cup tuna salad made with light perez on 1 slice whole grain bread  1 small peeled apple    Two- 250 calories, 16 g protein  1/3 cup lowfat cottage cheese    cup cooked green beans    small fruit cocktail (in natural juice)    Three- 200 calories, 11 g protein    grilled cheese sandwich on whole grain bread with lowfat cheese  2/3rd cup of tomato soup    Four- 250 calories, 22 g protein  Deli wrap: 1 oz sliced turkey, 1 oz sliced ham, 1 oz sliced chicken rolled up with 1 slice low-fat cheese  1 small orange    Five- 250 calories, 28 g protein  2/3rd cup chili with 1 oz shredded cheese  4 saltine crackers    Six- 250 calories, 22 g protein  1 cup fresh spinach with 2 oz chicken, 1/3rd cup mandarin oranges, and 2 tablespoons sliced almonds with 1 tablespoon  vinaigrette dressing    Seven- 200 calories, 11 g protein  1 Tbsp sugar-free preserves and 1 Tbsp peanut butter on 1 slice whole grain toast    cup nonfat/lowfat Greek yogurt    Eight- 250 calories, 18 g protein  1 small soft-shell chicken taco with 1 oz shredded cheese, lettuce, tomato, salsa, and 1 Tbsp light sour cream    cup black beans    Nine- 225 calories, 13 g protein  2 ounces baked chicken  1/4 cup mashed potatoes    cup green beans    Ten- 200 calories, 21 g protein  Deli sol: 2 oz roast beef or other deli meat with 1 tsp Александр mayonnaise and sliced tomato, onion, and lettuce  1/3rd cup cottage cheese      Ten Dinners Under 300 calories    If you're eating a large breakfast and medium lunch, keep dinner small.   300-400 calories is ideal for most people depending on their caloric needs.    One- 300 calories, 12 g protein  1-inch thick slice of turkey meatloaf    cup baked butternut squash    Two- 200 calories, 9 g protein  Bread-less BLT: 3 slices turkey rick, sliced tomato, wrapped in a large lettuce leaf    cup peeled fruit    Three- 275 calories, 36 g protein  3 oz roasted chicken    cup cooked broccoli    cup shredded cheddar cheese    cup unsweetened applesauce    Four- 200 calories, 25 g protein  3 oz baked tilapia  1/3rd cup cooked carrots    cup yogurt    Five- 250 calories, 20 g protein  Grilled ham  n  Swiss: spread 2 tsp ghee or butter on 1 slice of whole grain bread.  Cut bread in half, layer 2 oz deli ham with 1 piece of Swiss cheese and grill until cheese is melted.    cup cooked vegetables    Six- 250 calories, 18 g protein  Vegetarian cheeseburger: 1 [place] cheeseburger topped with lettuce, onion, tomato, and ketchup/mustard    cup sweet potato fries    Seven- 250 calories, 18 g protein  Pork pot roast: 2 oz roasted pork loin, 1/3rd cup roasted carrots,   medium potato, cooked with   cup gravy    Eight- 330 calories, 25 g protein  2 oz meatballs (about 2 small meatballs)    cup spaghetti sauce  1/2 piece toast topped with 1 tsp ghee or butterand topped with garlic powder, toasted in oven    Nine- 250 calories, 16 g protein  Mexican pizza: one 8  corn tortilla topped with 2 oz chicken,   cup salsa, 2 tablespoons black beans, 2 tablespoons shredded cheese.  Bake until cheese is melted.    Ten- 250 calories, 22 g protein  Shrimp stir-butler: 3 oz cooked shrimp, 1/6th onion,   pepper,   cup chopped carrots sautéed in 1 tablespoon olive oil, topped with 2 tablespoons stir butler sauce and a pinch of sesame seeds        150 Calories or Less Snack Ideas   1 hardboiled egg with   cup berries  1 small apple with 1 hardboiled egg  10 almonds with   cup berries  2 clementines with 1 light string cheese  1 light string cheese  with   sliced apple  1 light string cheese wrapped in 2 slices of turkey  4 100% whole wheat crackers (e.g. Triscuit) with 1 light string cheese    c. cottage cheese with   cup fruit and 1 Tbsp sunflower seeds     cup cottage cheese with   of an avocado     can tuna fish with 1 cup sliced cucumbers     cup roasted garbanzo beans with paprika and cayenne pepper    baked sweet potato with   cup chili beans or   cup cottage cheese  2 oz. nitrate free turkey slices with 1 cup carrots  1 container (6 oz) of low sugar (less than 10 grams of sugar) greek yogurt   3 Tablespoons of hummus with 1 cup sliced bell peppers   2 Tablespoons of hummus with 15 baby carrots  4 Tablespoons ranch dip made with plain Greek Yogurt and 3 mini cucumbers  1/4 cup nuts (any kind)  1 Tablespoon peanut butter with 1 stalk celery   1 dill pickle wrapped in 1-2 slices of deli ham with 1 tsp of mayonnaise/mustard.        Zepbound (Tirzepatide) is a very effective satiety boosting appetite suppressant that elevates satiety hormones GLP1 and GIP. It needs to be ramped up slowly to be tolerated adequately.  About 1/10 people will not tolerate this medication. Each month, you move up to a higher dose until eventually reaching the 10mg/week dose if tolerated with further ramping to follow if needed. If intolerant or severe side effects, a dose decrease would be wise, so keep me posted if not tolerated the ramping well. This may be a longer term medication based on individual needs/physiology and appetite control.     Injections can be given after cleansing the skin with alcohol prep pad or swab (available OTC).     Stop Zepbound if severe abdominal pain/vomiting/rash/throat swelling or constant nausea that prevents adequate food/water intake. Stop 2-3 weeks prior to any planned general anesthesia surgeries to reduce risk for something called a post operative ileus.     Gallstones can occur in about 1% of patients on this medication so update me if  increase right upper abdominal pain after eating.     Start meals with protein first, separate beverages from meals by 20 minutes and work hard in between meals to get your 64-75 oz of water daily to reduce risks for severe constipation. Consider a fiber supplement like powdered psyllium husk in 12 oz water each night, stool softerners as needed and Miralax or milk of Magnesia if more than 3 days have passed without a Bowel Movement.     Check out [facility] for patient resources.  If you have weekends off, I recommend dosing Friday evenings.     Some people starve on this medication if not mindful about food intake. I recommend starting meals with the protein part of your meal first, chew thoroughly and separate beverages from meals by about 20 minutes to make sure you get your nourishment in first. Include vegetables/complex carbohydrates and unsaturated fat as part of your balanced diet but group these at the end of the meal, after your protein is mostly gone. Satiety will kick in too early if drinking too much with meals and under-nourishment can result.     It's not a bad idea to take a complete multivitamin most days of the week if using this medication. Adequate hydration is essential for feeling your best, efficient fat burning, waste elimination and constipation prevention. For those without fluid restrictions due to other disease, the goal is at least one ounce of water per gram of protein consumed with a  minimum of 64oz/day goal. Stool softners and fiber supplements as well as occasional laxative use (miralax, etc) may be necessary if getting too constipated.    Pancreatitis is a very rare but potentially serious side effect. Stop Zepbound if severe mid abdominal pain/burning in nature or if unable to eat/drink due to severe nausea/discomfort.   People with strong history of pancreatitis without clear cause should stay clear of this medication as should those planning to get pregnant, those with strong  personal or family history for medullary thyroid cancer or Multiple Endocrine Neoplasia (rare).     Stop Zepbound at least 2 weeks prior to any planned surgery.    Kind Regards,  Kerwin Dunn MD  [facility]

## 2024-05-20 NOTE — PROGRESS NOTES
Bariatric Clinic Follow-Up Visit:    Ronel Ryan is a 43 year old  female with Body mass index is 40.13 kg/m .  presenting here today for follow-up on non-surgical efforts for weight loss.  Original Intake visit occurred on 7/13/19 with a weight of 345 lbs and BMI of 59.2.  Along with diet and behavior changes, she has been using various combinations through the years of Trulicity/naltrexone and a previous use of topiramate from her pain clinic which can also assist her weight loss goals.  See her intake visit notes for details on identified contributors to weight gain in the past. Chart review shows Dietician calculated RMR of 1892kcal/day and protein intake goal of 70-90g/day.  Her Trulicity was stopped in hospital May of 2022 during hip surgery and she had weight gain of 30 lbs over the summer due to increased cravings off medication. We transitioned to Semaglutide therapy this summer of 2022 but then supplies ran out due to shortages in supply and Victoza was started in November 2022 (11/7/22) with plans to transition back in 2023 when supplies of Ozempic are more available. She was able to get back on Ozempic in late 2022 and was up to 1mg/week as of our 10/06/23 visit w/ good results/tolerance.  She was transitioned to Zepbound 2/8/24 for continued work on weight management as a means to improve her glycemic control/weight related health.  Some supply chain issues have limited ramping ability and she presents on 5/20/24 at 233 lbs on 10mg/week Zepbound.     Dietician visit on 11/17/23 w/ RMR of 1821 kcal/day and protein goal of 70-90g/day. She's a user of [employer] w/ good tracking history of food and exercise.    Weight:   Wt Readings from Last 5 Encounters:   05/20/24 106.1 kg (233 lb 12.8 oz)   02/15/24 120.4 kg (265 lb 8 oz)   01/25/24 122.5 kg (270 lb)   10/06/23 118.7 kg (261 lb 9.6 oz)   06/22/23 116.1 kg (256 lb)    pounds  105 lbs down from highest weight on chart from 2019, a 30.4% total  body weight reduction.    Comorbidities:  Patient Active Problem List   Diagnosis    Binge eating    Eating disorder    Morbid obesity (H)    Multiple-type hyperlipidemia    Severe episode of recurrent major depressive disorder (H)    Arthralgia of hip    Hypertension    Trochanteric bursitis    History of urinary anomaly    Borderline personality disorder (H)    Attention deficit disorder    Asthma    Suicidal ideation    ACP (advance care planning)    Adjustment disorder with depressed mood    Anxiety    Moderate mixed bipolar I disorder (H)    Bipolar II disorder, most recent episode hypomanic (H)    Borderline high cholesterol    Care plan discussed with patient    Disorder of pelvis    Gait difficulty    Hx of diabetes mellitus    Hx of eating disorder    Morbid obesity with BMI of 50.0-59.9, adult (H)    Osteoarthritis    Pain of both hip joints    Relationship problems    Secondary amenorrhea    Soft tissue lesion of shoulder region    Vertigo    Greater trochanteric bursitis of both hips    Hip dysplasia    Left hip pain    Osteoarthritis of hip    Primary osteoarthritis of left hip    ADD (attention deficit disorder)    H/O urinary frequency    History of anxiety disorder    Hyperlipidemia    Mixed hyperlipidemia    Recurrent major depressive disorder (H24)    Type 2 diabetes mellitus (H)    Primary osteoarthritis of both hips    Degenerative joint disease (DJD) of hip    Status post total hip replacement, left    Bipolar 1 disorder (H)    Constipation    Gastroesophageal reflux disease with esophagitis without hemorrhage    Mixed incontinence    Primary localized osteoarthritis of pelvic region and thigh    Skin sensation disturbance       Current Outpatient Medications:     ACETAMINOPHEN EXTRA STRENGTH 500 MG tablet, , Disp: , Rfl:     albuterol (PROAIR HFA/PROVENTIL HFA/VENTOLIN HFA) 108 (90 Base) MCG/ACT inhaler, Inhale 2 puffs into the lungs every 6 hours as needed for shortness of breath, wheezing or  cough, Disp: 18 g, Rfl: 0    blood glucose (NO BRAND SPECIFIED) lancets standard, Use to test blood sugar 2 times daily or as directed., Disp: 100 each, Rfl: 11    blood glucose monitoring (NO BRAND SPECIFIED) meter device kit, Use to test blood sugar 2 times daily or as directed., Disp: 1 kit, Rfl: 0    blood glucose monitoring (NO BRAND SPECIFIED) test strip, Use to test blood sugars 2 times daily or as directed, Disp: 100 strip, Rfl: 3    buPROPion (WELLBUTRIN XL) 300 MG 24 hr tablet, Take 300 mg by mouth daily , Disp: , Rfl:     cholecalciferol (VITAMIN D3) 125 mcg (5000 units) capsule, , Disp: , Rfl:     docusate sodium (COLACE) 100 MG capsule, Take 1 capsule (100 mg) by mouth 2 times daily as needed for constipation, Disp: 90 capsule, Rfl: 4    EPINEPHrine (ANY BX GENERIC EQUIV) 0.3 MG/0.3ML injection 2-pack, Inject 0.3 mg into the muscle, Disp: , Rfl:     EPINEPHrine (EPIPEN/ADRENACLICK/OR ANY BX GENERIC EQUIV) 0.3 MG/0.3ML injection 2-pack, Inject 0.3 mLs (0.3 mg) into the muscle once as needed for anaphylaxis, Disp: 0.6 mL, Rfl: 0    hydrOXYzine (ATARAX) 50 MG tablet, , Disp: , Rfl:     ibuprofen (ADVIL/MOTRIN) 600 MG tablet, Take 1 tablet (600 mg) by mouth every 6 hours as needed (mild), Disp: , Rfl: 0    ketoconazole (EXTINA) 2 % external foam, , Disp: , Rfl:     levonorgestrel (LILETTA, 52 MG,) 19.5 MCG/DAY IUD, 1 each by Intrauterine route once, Disp: , Rfl:     magnesium oxide (MAG-OX) 400 MG tablet, Take 1 tablet (400 mg) by mouth daily, Disp: 90 tablet, Rfl: 3    mometasone (ELOCON) 0.1 % external cream, Apply topically daily, Disp: , Rfl:     polyethylene glycol (MIRALAX) 17 GM/Dose powder, Take 17 g (1 Capful) by mouth daily for 21 days, Disp: 357 g, Rfl: 0    risperiDONE (RISPERDAL) 3 MG tablet, , Disp: , Rfl:     sennosides (SENOKOT) 8.6 MG tablet, , Disp: , Rfl:     tirzepatide-Weight Management (ZEPBOUND) 10 MG/0.5ML prefilled pen, Inject 0.5 mLs (10 mg) Subcutaneous every 7 days, Disp: 6 mL,  "Rfl: 1    traZODone (DESYREL) 150 MG tablet, Take 150 mg by mouth At Bedtime, Disp: , Rfl:       Interim: Since our last visit, she has had good weight loss. Zepbound 10mg daily tolerated OK but a bit under nourished at times and some constipation side effects.  Mental health is \"great\". Constipation however so bowel regimen rx'd.   [employer] 3 days of strength and cardio. Classes as well as on her own. Will use bike/walking the track.     Plan:   1.  Diet: aiming for 20-25 grams of lean protein per meal. Supplement with shakes or cottage cheese or plain greek yogurt if needed to reach daily goal of 75g/day of protein. Continue to separate beverages from meals and hydrating well between meals to hit the 80 oz a day of water (about 5 water bottles worth).     2. Exercise: continue good workouts and enjoy your summer.    3. Medication: Aiming to continue the 10mg/week Zepbound if tolerated, if shortages get in the way, we can either bump up to 12.5mg/week in the future or reduce down to 7.5, 5 or 2.5mg/week depending on what is available. As long as 16 days or less from your last shot, you should be able to maintain the current of injection    4. Follow up kidney health with primary    5. Goals: over 105 lbs down from introductory weight back in 2019, better than most bariatric surgery patients, over 30% total body weight reduction. Looking to stabilize weight around 200 lbs and for safest/best outcome for skin reductive surgery with Dr. Gale in the future.  6. Colace and max oxide for constipation, mindful hydration recommended.        We discussed [facility]ariat[facility] Basics including:  -eating 3 meals daily  -reviewed metabolic needs for weight loss based on Resting Metabolic Rate  -protein goals supportive of healthy weight loss  -avoiding/limiting calorie containing beverages  -We discussed the importance of restorative sleep and stress management in maintaining a healthy weight.  -We discussed the National Weight " "Control Registry healthy weight maintenance strategies and ways to optimize metabolism.  -We discussed the importance of physical activity including cardiovascular and strength training in maintaining a healthier weight and explored viable options.      Most recent labs:  Lab Results   Component Value Date    A1C 5.1 11/09/2022    A1C 4.7 04/04/2022    A1C 4.6 09/10/2021    A1C 6.7 08/23/2019    A1C 7.0 11/21/2018    A1C 6.0 12/31/2010       Lab Results   Component Value Date    WBC 5.5 02/01/2023    HGB 13.9 02/01/2023    HCT 42.1 02/01/2023    MCV 93 02/01/2023     02/01/2023     Lab Results   Component Value Date    CHOL 155 10/17/2023     Lab Results   Component Value Date    HDL 48 (L) 10/17/2023     No components found for: \"LDLCALC\"  Lab Results   Component Value Date    TRIG 78 10/17/2023     No results found for: \"CHOLHDL\"  Lab Results   Component Value Date    ALT 10 04/17/2024    AST 28 04/17/2024    ALKPHOS 42 04/17/2024     No results found for: \"HGBA1C\"  Lab Results   Component Value Date    B12 368 11/09/2022     No components found for: \"VITDT1\"  Lab Results   Component Value Date    KOFFI 79 11/09/2022     Lab Results   Component Value Date    PTHI 77 08/23/2019     No results found for: \"ZN\"  Lab Results   Component Value Date    VIB1WB 108 08/23/2019     Lab Results   Component Value Date    TSH 1.55 04/17/2024     No results found for: \"TEST\"    DIETARY HISTORY  Tracking technique: may be a bit under fed early on after injections, reviewed options for getting adequate protein and nourishment, utilization of shakes/supplemental soft proteins to hit protein target as she's likely a bit under the goal.   Positive Changes Since Last Visit: continues to work out regularly. Good energy overall.   Struggling With: mindful intake on Zepbound    Getting Adequate Protein: under goal  Sleep schedule: good.      PHYSICAL ACTIVITY PATTERNS:  Cardiovascular: walking, workouts in bicycle at [employer]   Strength " "Training: machines 3x weekly.    REVIEW OF SYSTEMS  Planning skin reductive surgery in the winter if weight is around 200 lbs.  No recent illnesses, mental health feels good.  PHYSICAL EXAM:  Vitals: Ht 1.626 m (5' 4\")   Wt 106.1 kg (233 lb 12.8 oz)   BMI 40.13 kg/m    Weight:   Wt Readings from Last 3 Encounters:   05/20/24 106.1 kg (233 lb 12.8 oz)   02/15/24 120.4 kg (265 lb 8 oz)   01/25/24 122.5 kg (270 lb)         GEN: Pleasant, well groomed, in no acute distress  HEENT:  normal facies, mildy dry MMs .  NECK: No swelling.  HEART: RRR.  LUNGS: no cough/dypsnea.  ABDOMEN: nondistended. Mild suprapubic to LLQ pain to palpation. No guarding or rebound and likely in line with constipation hx. .  EXTREMITIES: No tremor. Ambulation is independent, easy and non ataxic..lipodystrophy of upper arms with substantial loose/hanging skin from triceps that interferes with reliable BP measurement. Pulse palpable without hyperdynamic features radially.  NEURO: Alert and Oriented X3, fluent speech. .  SKIN: No visible rashes. .    Interim study results: Last Comprehensive Metabolic Panel:  Sodium   Date Value Ref Range Status   04/17/2024 136 135 - 145 mmol/L Final     Comment:     Reference intervals for this test were updated on 09/26/2023 to more accurately reflect our healthy population. There may be differences in the flagging of prior results with similar values performed with this method. Interpretation of those prior results can be made in the context of the updated reference intervals.    11/21/2018 136.6 132.0 - 142.0 mmol/L Final     Potassium   Date Value Ref Range Status   04/17/2024 4.2 3.4 - 5.3 mmol/L Final   04/11/2022 3.8 3.5 - 5.0 mmol/L Final   11/21/2018 3.8 3.2 - 4.6 mmol/dL Final     Chloride   Date Value Ref Range Status   04/17/2024 102 98 - 107 mmol/L Final   04/11/2022 110 (H) 98 - 107 mmol/L Final   11/21/2018 103.4 98.0 - 110.0 mmol/L Final     Carbon Dioxide   Date Value Ref Range Status "   11/21/2018 25.5 20.0 - 32.0 mmol/L Final     Carbon Dioxide (CO2)   Date Value Ref Range Status   04/17/2024 19 (L) 22 - 29 mmol/L Final   04/11/2022 23 22 - 31 mmol/L Final     Anion Gap   Date Value Ref Range Status   04/17/2024 15 7 - 15 mmol/L Final   04/11/2022 7 5 - 18 mmol/L Final   10/04/2017 10 3 - 14 mmol/L Final     Glucose   Date Value Ref Range Status   04/17/2024 80 70 - 99 mg/dL Final   04/11/2022 80 70 - 125 mg/dL Final   11/21/2018 143.5 (H) 70.0 - 99.0 mg'dL Final     Urea Nitrogen   Date Value Ref Range Status   04/17/2024 9.4 6.0 - 20.0 mg/dL Final   04/11/2022 9 8 - 22 mg/dL Final   11/21/2018 4.9 (L) 7.0 - 19.0 mg/dL Final     Creatinine   Date Value Ref Range Status   04/17/2024 1.22 (H) 0.51 - 0.95 mg/dL Final   11/21/2018 0.9 0.5 - 1.0 mg/dL Final     GFR Estimate   Date Value Ref Range Status   04/17/2024 56 (L) >60 mL/min/1.73m2 Final   05/21/2021 54 (L) >60 mL/min/1.73m2 Final   11/21/2018 74.1 >60.0 mL/min/1.7 m2 Final     Calcium   Date Value Ref Range Status   04/17/2024 9.4 8.6 - 10.0 mg/dL Final   11/21/2018 9.6 8.5 - 10.1 mg/dL Final     Bilirubin Total   Date Value Ref Range Status   04/17/2024 0.6 <=1.2 mg/dL Final   10/04/2017 0.7 0.2 - 1.3 mg/dL Final     Alkaline Phosphatase   Date Value Ref Range Status   04/17/2024 42 40 - 150 U/L Final     Comment:     Reference intervals for this test were updated on 11/14/2023 to more accurately reflect our healthy population. There may be differences in the flagging of prior results with similar values performed with this method. Interpretation of those prior results can be made in the context of the updated reference intervals.   10/04/2017 57 40 - 150 U/L Final     ALT   Date Value Ref Range Status   04/17/2024 10 0 - 50 U/L Final     Comment:     Reference intervals for this test were updated on 6/12/2023 to more accurately reflect our healthy population. There may be differences in the flagging of prior results with similar values  performed with this method. Interpretation of those prior results can be made in the context of the updated reference intervals.     10/04/2017 14 0 - 50 U/L Final     AST   Date Value Ref Range Status   04/17/2024 28 0 - 45 U/L Final     Comment:     Reference intervals for this test were updated on 6/12/2023 to more accurately reflect our healthy population. There may be differences in the flagging of prior results with similar values performed with this method. Interpretation of those prior results can be made in the context of the updated reference intervals.   10/04/2017 11 0 - 45 U/L Final               .      35 minutes spent by me on the date of the encounter doing chart review, history and exam, documentation and further activities per the note   Kerwin Dunn MD  [facility]  7:41 AM  5/20/2024

## 2024-06-01 ENCOUNTER — TRANSFERRED RECORDS (OUTPATIENT)
Dept: HEALTH INFORMATION MANAGEMENT | Facility: CLINIC | Age: 44
End: 2024-06-01
Payer: COMMERCIAL

## 2024-06-04 ENCOUNTER — MEDICAL CORRESPONDENCE (OUTPATIENT)
Dept: HEALTH INFORMATION MANAGEMENT | Facility: CLINIC | Age: 44
End: 2024-06-04
Payer: COMMERCIAL

## 2024-06-06 ENCOUNTER — TRANSCRIBE ORDERS (OUTPATIENT)
Dept: OTHER | Age: 44
End: 2024-06-06

## 2024-06-06 DIAGNOSIS — R25.9 INVOLUNTARY MOVEMENTS: Primary | ICD-10-CM

## 2024-06-12 DIAGNOSIS — E66.1 CLASS 3 DRUG-INDUCED OBESITY WITH SERIOUS COMORBIDITY AND BODY MASS INDEX (BMI) OF 40.0 TO 44.9 IN ADULT (H): Primary | ICD-10-CM

## 2024-06-12 DIAGNOSIS — E11.9 TYPE 2 DIABETES MELLITUS WITHOUT COMPLICATION, WITHOUT LONG-TERM CURRENT USE OF INSULIN (H): ICD-10-CM

## 2024-06-12 DIAGNOSIS — E66.813 CLASS 3 DRUG-INDUCED OBESITY WITH SERIOUS COMORBIDITY AND BODY MASS INDEX (BMI) OF 40.0 TO 44.9 IN ADULT (H): Primary | ICD-10-CM

## 2024-06-12 DIAGNOSIS — Z87.19 HISTORY OF FATTY INFILTRATION OF LIVER: ICD-10-CM

## 2024-06-12 DIAGNOSIS — E88.810 METABOLIC SYNDROME X: ICD-10-CM

## 2024-06-12 NOTE — PROGRESS NOTES
Shortages and lower efficacy of 10mg/week Zepbound such that we'll ramp up to the 12.5mg/week dose 7 days after her last dose of 10mg/week.   Kerwin Dunn MD

## 2024-06-13 ENCOUNTER — LAB REQUISITION (OUTPATIENT)
Dept: LAB | Facility: CLINIC | Age: 44
End: 2024-06-13

## 2024-06-13 DIAGNOSIS — Z79.899 OTHER LONG TERM (CURRENT) DRUG THERAPY: ICD-10-CM

## 2024-06-13 PROCEDURE — 80053 COMPREHEN METABOLIC PANEL: CPT | Performed by: STUDENT IN AN ORGANIZED HEALTH CARE EDUCATION/TRAINING PROGRAM

## 2024-06-13 PROCEDURE — 80061 LIPID PANEL: CPT | Performed by: STUDENT IN AN ORGANIZED HEALTH CARE EDUCATION/TRAINING PROGRAM

## 2024-06-13 PROCEDURE — 84146 ASSAY OF PROLACTIN: CPT | Performed by: STUDENT IN AN ORGANIZED HEALTH CARE EDUCATION/TRAINING PROGRAM

## 2024-06-14 PROBLEM — R26.9 GAIT DIFFICULTY: Status: RESOLVED | Noted: 2019-02-04 | Resolved: 2024-06-14

## 2024-06-14 PROBLEM — R42 VERTIGO: Status: RESOLVED | Noted: 2017-03-10 | Resolved: 2024-06-14

## 2024-06-14 PROBLEM — R20.9 SKIN SENSATION DISTURBANCE: Status: RESOLVED | Noted: 2021-11-08 | Resolved: 2024-06-14

## 2024-06-14 PROBLEM — Z86.59 HISTORY OF ANXIETY DISORDER: Status: RESOLVED | Noted: 2020-10-23 | Resolved: 2024-06-14

## 2024-06-14 PROBLEM — K59.00 CONSTIPATION: Status: RESOLVED | Noted: 2021-11-08 | Resolved: 2024-06-14

## 2024-06-14 PROBLEM — M16.10 PRIMARY LOCALIZED OSTEOARTHRITIS OF PELVIC REGION AND THIGH: Status: RESOLVED | Noted: 2021-11-08 | Resolved: 2024-06-14

## 2024-06-14 PROBLEM — F31.9 BIPOLAR 1 DISORDER (H): Status: RESOLVED | Noted: 2021-11-08 | Resolved: 2024-06-14

## 2024-06-14 PROBLEM — E78.9 BORDERLINE HIGH CHOLESTEROL: Status: RESOLVED | Noted: 2020-10-23 | Resolved: 2024-06-14

## 2024-06-14 LAB
ALBUMIN SERPL BCG-MCNC: 3.8 G/DL (ref 3.5–5.2)
ALP SERPL-CCNC: 42 U/L (ref 40–150)
ALT SERPL W P-5'-P-CCNC: 8 U/L (ref 0–50)
ANION GAP SERPL CALCULATED.3IONS-SCNC: 11 MMOL/L (ref 7–15)
AST SERPL W P-5'-P-CCNC: 18 U/L (ref 0–45)
BILIRUB SERPL-MCNC: 0.4 MG/DL
BUN SERPL-MCNC: 5.4 MG/DL (ref 6–20)
CALCIUM SERPL-MCNC: 8.7 MG/DL (ref 8.6–10)
CHLORIDE SERPL-SCNC: 107 MMOL/L (ref 98–107)
CHOLEST SERPL-MCNC: 163 MG/DL
CREAT SERPL-MCNC: 0.97 MG/DL (ref 0.51–0.95)
DEPRECATED HCO3 PLAS-SCNC: 21 MMOL/L (ref 22–29)
EGFRCR SERPLBLD CKD-EPI 2021: 74 ML/MIN/1.73M2
FASTING STATUS PATIENT QL REPORTED: ABNORMAL
FASTING STATUS PATIENT QL REPORTED: ABNORMAL
GLUCOSE SERPL-MCNC: 84 MG/DL (ref 70–99)
HDLC SERPL-MCNC: 43 MG/DL
LDLC SERPL CALC-MCNC: 94 MG/DL
NONHDLC SERPL-MCNC: 120 MG/DL
POTASSIUM SERPL-SCNC: 4.3 MMOL/L (ref 3.4–5.3)
PROLACTIN SERPL 3RD IS-MCNC: 76 NG/ML (ref 5–23)
PROT SERPL-MCNC: 6.3 G/DL (ref 6.4–8.3)
SODIUM SERPL-SCNC: 139 MMOL/L (ref 135–145)
TRIGL SERPL-MCNC: 130 MG/DL

## 2024-06-23 NOTE — PROGRESS NOTES
NEUROLOGY CONSULTATION NOTE       [facility] NEUROLOGY [place]  [address] [place] [address]  Tel: [phone]  Fax: [phone]  [email or web address]     Ronel Ryan,  1980, MRN [id number]  PCP: Arvind Lane  Date: 2024     ASSESSMENT & PLAN     Visit Diagnosis  Myoclonus     Seizure-like activity  Patient is a 43-year-old female with history of depression with anxiety, eating disorder, bipolar disorder, HTN, HLD, osteoarthritis and GERD for more than 20 years history of generalized body twitches that usually occur at night and wake her up.  This likely is medication effect.  Electrolyte abnormality also can result in similar symptoms.  Recent lab work was unremarkable.  I have recommended:    1.  MRI brain  2.  EEG  3.  If above workup is negative no further intervention will be needed from neurology standpoint.  4.  Follow-up the day she is scheduled for EEG    Thank you again for this referral, please feel free to contact me if you have any questions.    Jason Proctor MD  [facility]     REASON FOR CONSULTATION Consult For        HISTORY OF PRESENT ILLNESS     We have been requested by Dr. Antony to evaluate Ronel Ryan who is a 43 year old  female for myoclonic twitches    Patient is a 43-year-old female with history of depression with anxiety, eating disorder, bipolar disorder, HTN, HLD, osteoarthritis, GERD who was referred for evaluation of generalized body twitches.  Her symptoms started more than 20 years ago that occur intermittently.  She was told in the past it was due to anxiety.  However lately she started experiencing these episodes during which she gets a generalized body twitching that last for few seconds.  She does not fall down or have a tonic-clonic activity.  She also denies tongue biting or any bowel or bladder incontinence.  She is not confused afterwards.  She does not think stress is playing a  role.    Previously she was evaluated in our clinic in 2020 for numbness in the right thigh that would come and go and she also had some tremors and monophasic jerking movement in the arm that would happen mostly at night.  This would wake her up and sound similar to her recent issues.     PROBLEM LIST   Patient Active Problem List   Diagnosis    Binge eating    Eating disorder    Morbid obesity (H)    Multiple-type hyperlipidemia    Severe episode of recurrent major depressive disorder (H)    Hypertension    Trochanteric bursitis    Borderline personality disorder (H)    Attention deficit disorder    Asthma    Suicidal ideation    ACP (advance care planning)    Adjustment disorder with depressed mood    Anxiety    Moderate mixed bipolar I disorder (H)    Bipolar II disorder, most recent episode hypomanic (H)    Care plan discussed with patient    Hx of diabetes mellitus    Osteoarthritis    Relationship problems    Secondary amenorrhea    Hip dysplasia    Osteoarthritis of hip    Hyperlipidemia    Mixed hyperlipidemia    Type 2 diabetes mellitus (H)    Primary osteoarthritis of both hips    Degenerative joint disease (DJD) of hip    Status post total hip replacement, left    Gastroesophageal reflux disease with esophagitis without hemorrhage    Mixed incontinence         PAST MEDICAL & SURGICAL HISTORY     Past Medical History:   Patient  has a past medical history of ACP (advance care planning) (10/18/2012), ADD (attention deficit disorder) (11/27/2013), Adjustment disorder with depressed mood (12/5/2018), Allergic state, Anxiety, Arthralgia of hip (3/21/2016), Arthritis, Asthma, Attention deficit disorder (11/27/2013), Binge eating (11/27/2013), Bipolar 1 disorder (H) (11/8/2021), Bipolar 2 disorder (H), Bipolar II disorder, most recent episode hypomanic (H) (4/29/2015), Borderline high cholesterol (10/23/2020), Borderline personality disorder (H), Care plan discussed with patient (2/1/2012), Cholelithiasis,  Constipation (11/8/2021), Degenerative joint disease (DJD) of hip (9/9/2021), Depressive disorder, Diabetes (H), Diabetes mellitus (H), Disorder of pelvis (9/14/2015), Eating disorder (3/13/2017), Gait difficulty (2/4/2019), Gastroesophageal reflux disease with esophagitis without hemorrhage (11/8/2021), GERD (gastroesophageal reflux disease), Greater trochanteric bursitis of both hips (10/27/2015), H/O constipation, H/O hypercholesterolemia, H/O urinary frequency (12/1/2016), Hip dysplasia, History of anxiety disorder (10/23/2020), History of urinary anomaly (12/1/2016), diabetes mellitus (5/21/2013), eating disorder (8/25/2016), Hyperlipidemia (8/25/2016), Hypertension, Left hip pain (3/21/2016), Major depression, Menstrual disorder, Mixed hyperlipidemia (3/21/2016), Mixed incontinence (11/8/2021), Moderate mixed bipolar I disorder (H) (5/10/2016), Morbid obesity (H) (12/22/2010), Morbid obesity with BMI of 50.0-59.9, adult (H) (8/25/2016), Multiple-type hyperlipidemia (3/21/2016), Obese, Osteoarthritis (10/23/2020), Osteoarthritis of hip (6/15/2015), Pain of both hip joints (6/30/2015), Primary localized osteoarthritis of pelvic region and thigh (11/8/2021), Primary osteoarthritis of both hips (10/30/2020), Primary osteoarthritis of left hip (6/15/2015), PTSD (post-traumatic stress disorder), Recurrent major depressive disorder (H24) (12/22/2010), Relationship problems (10/23/2020), Secondary amenorrhea (10/22/2012), Severe episode of recurrent major depressive disorder (H) (10/4/2012), Skin sensation disturbance (11/8/2021), Soft tissue lesion of shoulder region (2/27/2013), Status post total hip replacement, left (9/10/2021), Suicidal ideation (10/4/2017), Trochanteric bursitis (10/27/2015), Type 2 diabetes mellitus (H) (8/12/2014), Uncomplicated asthma, Urinary incontinence, vertigo, and Vertigo (3/10/2017).    Surgical History:  She  has a past surgical history that includes Tonsillectomy; Cyst Removal; Ankle  Fracture Surgery (Right); Colonoscopy; Mammo Stereotactic Core Biopsy Left (Left, 06/15/2020); Colonoscopy w/wo Brush **Performed** (N/A, 06/08/2021); orthopedic surgery; Arthroscopic reconstruction anterior cruciate ligament (Left); Closed reduction hip (Left, 9/9/2021); Arthroplasty hip (Left, 9/9/2021); Cholecystectomy; and Arthroplasty hip (Right, 4/4/2022).     SOCIAL HISTORY     Reviewed, and she  reports that she has never smoked. She has never used smokeless tobacco. She reports that she does not drink alcohol and does not use drugs.     FAMILY HISTORY     Reviewed, and family history includes Arthritis in her brother, maternal grandmother, and mother; Breast Cancer in her mother; Cancer in her maternal grandmother and mother; Cerebrovascular Disease in her maternal grandfather, maternal grandmother, and mother; Diabetes in her brother, brother, father, and mother; Heart Disease in her maternal grandfather; Hyperlipidemia in her brother, maternal grandfather, and mother; Hypertension in her brother, maternal grandfather, maternal grandmother, and mother; Mental Illness in her brother and brother; Obesity in her brother, maternal grandfather, maternal grandmother, mother, and sister; Pacemaker in her maternal grandfather; Seizure Disorder in her maternal grandfather; Substance Abuse in her maternal grandfather.     ALLERGIES     Allergies   Allergen Reactions    Cephalexin Anaphylaxis    Erythromycin Anaphylaxis and Hives     Other reaction(s): Unknown    Penicillins Hives     hives  Other reaction(s): Unknown         REVIEW OF SYSTEMS     A 12 point review of system was performed and was negative except as outlined in the history of present illness.     HOME MEDICATIONS     Current Outpatient Rx   Medication Sig Dispense Refill    albuterol (PROAIR HFA/PROVENTIL HFA/VENTOLIN HFA) 108 (90 Base) MCG/ACT inhaler Inhale 2 puffs into the lungs every 6 hours as needed for shortness of breath, wheezing or cough 18  g 0    buPROPion (WELLBUTRIN XL) 300 MG 24 hr tablet Take 300 mg by mouth daily       cholecalciferol (VITAMIN D3) 125 mcg (5000 units) capsule       docusate sodium (COLACE) 100 MG capsule Take 1 capsule (100 mg) by mouth 2 times daily as needed for constipation 90 capsule 4    EPINEPHrine (ANY BX GENERIC EQUIV) 0.3 MG/0.3ML injection 2-pack Inject 0.3 mg into the muscle      EPINEPHrine (EPIPEN/ADRENACLICK/OR ANY BX GENERIC EQUIV) 0.3 MG/0.3ML injection 2-pack Inject 0.3 mLs (0.3 mg) into the muscle once as needed for anaphylaxis 0.6 mL 0    levonorgestrel (LILETTA, 52 MG,) 19.5 MCG/DAY IUD 1 each by Intrauterine route once      LORazepam (ATIVAN) 1 MG tablet Take 1 tablet 30 minutes before MRI scan 1 tablet 0    magnesium oxide (MAG-OX) 400 MG tablet Take 1 tablet (400 mg) by mouth daily 90 tablet 3    meclizine (ANTIVERT) 25 MG tablet Take 1 tablet by mouth daily at 2 pm      risperiDONE (RISPERDAL) 3 MG tablet       SENEXON-S 8.6-50 MG tablet 1-2 tabs twice a day as needed for constipation Orally for 30 days      sennosides (SENOKOT) 8.6 MG tablet       tirzepatide-Weight Management (ZEPBOUND) 12.5 MG/0.5ML prefilled pen Inject 0.5 mLs (12.5 mg) Subcutaneous every 7 days for 180 days 6 mL 1    traZODone (DESYREL) 150 MG tablet Take 150 mg by mouth At Bedtime      ACETAMINOPHEN EXTRA STRENGTH 500 MG tablet  (Patient not taking: Reported on 6/27/2024)      blood glucose (NO BRAND SPECIFIED) lancets standard Use to test blood sugar 2 times daily or as directed. (Patient not taking: Reported on 6/27/2024) 100 each 11    blood glucose monitoring (NO BRAND SPECIFIED) meter device kit Use to test blood sugar 2 times daily or as directed. (Patient not taking: Reported on 6/27/2024) 1 kit 0    blood glucose monitoring (NO BRAND SPECIFIED) test strip Use to test blood sugars 2 times daily or as directed (Patient not taking: Reported on 6/27/2024) 100 strip 3    hydrOXYzine (ATARAX) 50 MG tablet  (Patient not taking:  Reported on 6/27/2024)      ibuprofen (ADVIL/MOTRIN) 600 MG tablet Take 1 tablet (600 mg) by mouth every 6 hours as needed (mild) (Patient not taking: Reported on 6/27/2024)  0    ketoconazole (EXTINA) 2 % external foam  (Patient not taking: Reported on 6/27/2024)      mometasone (ELOCON) 0.1 % external cream Apply topically daily (Patient not taking: Reported on 6/27/2024)           PHYSICAL EXAM     Vital signs  /83 (BP Location: Left arm, Patient Position: Sitting, Cuff Size: Adult Regular)   Pulse 80   Wt 102.8 kg (226 lb 11.2 oz)   BMI 38.91 kg/m      Weight:   226 lbs 11.2 oz    Obese young female who is alert and oriented vital signs are reviewed and documented in electronic medical record.  Neck supple.  Neurologically speech was normal.  Cranial nerves II through XII are intact motor strength 5/5 reflexes 1+ toes downgoing.  No dysmetria noted on finger-nose testing gait normal Romberg negative     PERTINENT DIAGNOSTIC STUDIES     Following studies were reviewed:     No recent imaging studies to review     PERTINENT LABS  Following labs were reviewed:  Lab Requisition on 06/13/2024   Component Date Value Ref Range Status    Prolactin 06/13/2024 76 (H)  5 - 23 ng/mL Final    Cholesterol 06/13/2024 163  <200 mg/dL Final    Triglycerides 06/13/2024 130  <150 mg/dL Final    Direct Measure HDL 06/13/2024 43 (L)  >=50 mg/dL Final    LDL Cholesterol Calculated 06/13/2024 94  <=100 mg/dL Final    Non HDL Cholesterol 06/13/2024 120  <130 mg/dL Final    Patient Fasting > 8hrs? 06/13/2024 Unknown   Final    Sodium 06/13/2024 139  135 - 145 mmol/L Final    Potassium 06/13/2024 4.3  3.4 - 5.3 mmol/L Final    Carbon Dioxide (CO2) 06/13/2024 21 (L)  22 - 29 mmol/L Final    Anion Gap 06/13/2024 11  7 - 15 mmol/L Final    Urea Nitrogen 06/13/2024 5.4 (L)  6.0 - 20.0 mg/dL Final    Creatinine 06/13/2024 0.97 (H)  0.51 - 0.95 mg/dL Final    GFR Estimate 06/13/2024 74  >60 mL/min/1.73m2 Final    Calcium 06/13/2024 8.7   8.6 - 10.0 mg/dL Final    Chloride 06/13/2024 107  98 - 107 mmol/L Final    Glucose 06/13/2024 84  70 - 99 mg/dL Final    Alkaline Phosphatase 06/13/2024 42  40 - 150 U/L Final    AST 06/13/2024 18  0 - 45 U/L Final    ALT 06/13/2024 8  0 - 50 U/L Final    Protein Total 06/13/2024 6.3 (L)  6.4 - 8.3 g/dL Final    Albumin 06/13/2024 3.8  3.5 - 5.2 g/dL Final    Bilirubin Total 06/13/2024 0.4  <=1.2 mg/dL Final    Patient Fasting > 8hrs? 06/13/2024 Unknown   Final   Lab Requisition on 04/17/2024   Component Date Value Ref Range Status    Sodium 04/17/2024 136  135 - 145 mmol/L Final    Potassium 04/17/2024 4.2  3.4 - 5.3 mmol/L Final    Carbon Dioxide (CO2) 04/17/2024 19 (L)  22 - 29 mmol/L Final    Anion Gap 04/17/2024 15  7 - 15 mmol/L Final    Urea Nitrogen 04/17/2024 9.4  6.0 - 20.0 mg/dL Final    Creatinine 04/17/2024 1.22 (H)  0.51 - 0.95 mg/dL Final    GFR Estimate 04/17/2024 56 (L)  >60 mL/min/1.73m2 Final    Calcium 04/17/2024 9.4  8.6 - 10.0 mg/dL Final    Chloride 04/17/2024 102  98 - 107 mmol/L Final    Glucose 04/17/2024 80  70 - 99 mg/dL Final    Alkaline Phosphatase 04/17/2024 42  40 - 150 U/L Final    AST 04/17/2024 28  0 - 45 U/L Final    ALT 04/17/2024 10  0 - 50 U/L Final    Protein Total 04/17/2024 6.8  6.4 - 8.3 g/dL Final    Albumin 04/17/2024 4.1  3.5 - 5.2 g/dL Final    Bilirubin Total 04/17/2024 0.6  <=1.2 mg/dL Final    TSH 04/17/2024 1.55  0.30 - 4.20 uIU/mL Final        Total time spent for face to face visit, reviewing labs/imaging studies, counseling and coordination of care was: 1 Hour spent on the date of the encounter doing chart review, review of outside records, review of test results, interpretation of tests, patient visit, and documentation     This note was dictated using voice recognition software.  Any grammatical or context distortions are unintentional and inherent to the software.    Orders Placed This Encounter   Procedures    MR Brain w/o & w Contrast    EEG      New  Prescriptions    LORAZEPAM (ATIVAN) 1 MG TABLET    Take 1 tablet 30 minutes before MRI scan      Modified Medications    No medications on file

## 2024-06-27 ENCOUNTER — OFFICE VISIT (OUTPATIENT)
Dept: NEUROLOGY | Facility: CLINIC | Age: 44
End: 2024-06-27
Attending: FAMILY MEDICINE
Payer: COMMERCIAL

## 2024-06-27 VITALS
BODY MASS INDEX: 38.91 KG/M2 | WEIGHT: 226.7 LBS | DIASTOLIC BLOOD PRESSURE: 83 MMHG | SYSTOLIC BLOOD PRESSURE: 117 MMHG | HEART RATE: 80 BPM

## 2024-06-27 DIAGNOSIS — R56.9 SEIZURE-LIKE ACTIVITY (H): ICD-10-CM

## 2024-06-27 PROCEDURE — 99205 OFFICE O/P NEW HI 60 MIN: CPT | Performed by: PSYCHIATRY & NEUROLOGY

## 2024-06-27 PROCEDURE — G2211 COMPLEX E/M VISIT ADD ON: HCPCS | Performed by: PSYCHIATRY & NEUROLOGY

## 2024-06-27 RX ORDER — MECLIZINE HYDROCHLORIDE 25 MG/1
1 TABLET ORAL
COMMUNITY
Start: 2024-05-13

## 2024-06-27 RX ORDER — LORAZEPAM 1 MG/1
TABLET ORAL
Qty: 1 TABLET | Refills: 0 | Status: SHIPPED | OUTPATIENT
Start: 2024-06-27 | End: 2024-09-11

## 2024-06-27 RX ORDER — DOCUSATE SODIUM 50MG AND SENNOSIDES 8.6MG 8.6; 5 MG/1; MG/1
TABLET, FILM COATED ORAL
COMMUNITY
End: 2024-09-11

## 2024-06-27 NOTE — LETTER
2024      Ronel Ryan  [address]  [facility] [address]      Dear Colleague,    Thank you for referring your patient, Ronel Ryan, to the [facility] [place]. Please see a copy of my visit note below.    NEUROLOGY CONSULTATION NOTE       [facility] [place]  [address] [place] [address]  Tel: [phone]  Fax: [phone]  [email or web address]     Ronel Ryan,  1980, MRN [id number]  PCP: Arvind Lane  Date: 2024     ASSESSMENT & PLAN     Visit Diagnosis  Myoclonus     Seizure-like activity  Patient is a 43-year-old female with history of depression with anxiety, eating disorder, bipolar disorder, HTN, HLD, osteoarthritis and GERD for more than 20 years history of generalized body twitches that usually occur at night and wake her up.  This likely is medication effect.  Electrolyte abnormality also can result in similar symptoms.  Recent lab work was unremarkable.  I have recommended:    1.  MRI brain  2.  EEG  3.  If above workup is negative no further intervention will be needed from neurology standpoint.  4.  Follow-up the day she is scheduled for EEG    Thank you again for this referral, please feel free to contact me if you have any questions.    Jason Proctor MD  [facility], [place]     REASON FOR CONSULTATION Consult For        HISTORY OF PRESENT ILLNESS     We have been requested by Dr. Antony to evaluate Ronel Ryan who is a 43 year old  female for myoclonic twitches    Patient is a 43-year-old female with history of depression with anxiety, eating disorder, bipolar disorder, HTN, HLD, osteoarthritis, GERD who was referred for evaluation of generalized body twitches.  Her symptoms started more than 20 years ago that occur intermittently.  She was told in the past it was due to anxiety.  However lately she started experiencing these episodes during which she gets a generalized  body twitching that last for few seconds.  She does not fall down or have a tonic-clonic activity.  She also denies tongue biting or any bowel or bladder incontinence.  She is not confused afterwards.  She does not think stress is playing a role.    Previously she was evaluated in our clinic in 2020 for numbness in the right thigh that would come and go and she also had some tremors and monophasic jerking movement in the arm that would happen mostly at night.  This would wake her up and sound similar to her recent issues.     PROBLEM LIST   Patient Active Problem List   Diagnosis     Binge eating     Eating disorder     Morbid obesity (H)     Multiple-type hyperlipidemia     Severe episode of recurrent major depressive disorder (H)     Hypertension     Trochanteric bursitis     Borderline personality disorder (H)     Attention deficit disorder     Asthma     Suicidal ideation     ACP (advance care planning)     Adjustment disorder with depressed mood     Anxiety     Moderate mixed bipolar I disorder (H)     Bipolar II disorder, most recent episode hypomanic (H)     Care plan discussed with patient     Hx of diabetes mellitus     Osteoarthritis     Relationship problems     Secondary amenorrhea     Hip dysplasia     Osteoarthritis of hip     Hyperlipidemia     Mixed hyperlipidemia     Type 2 diabetes mellitus (H)     Primary osteoarthritis of both hips     Degenerative joint disease (DJD) of hip     Status post total hip replacement, left     Gastroesophageal reflux disease with esophagitis without hemorrhage     Mixed incontinence         PAST MEDICAL & SURGICAL HISTORY     Past Medical History:   Patient  has a past medical history of ACP (advance care planning) (10/18/2012), ADD (attention deficit disorder) (11/27/2013), Adjustment disorder with depressed mood (12/5/2018), Allergic state, Anxiety, Arthralgia of hip (3/21/2016), Arthritis, Asthma, Attention deficit disorder (11/27/2013), Binge eating (11/27/2013),  Bipolar 1 disorder (H) (11/8/2021), Bipolar 2 disorder (H), Bipolar II disorder, most recent episode hypomanic (H) (4/29/2015), Borderline high cholesterol (10/23/2020), Borderline personality disorder (H), Care plan discussed with patient (2/1/2012), Cholelithiasis, Constipation (11/8/2021), Degenerative joint disease (DJD) of hip (9/9/2021), Depressive disorder, Diabetes (H), Diabetes mellitus (H), Disorder of pelvis (9/14/2015), Eating disorder (3/13/2017), Gait difficulty (2/4/2019), Gastroesophageal reflux disease with esophagitis without hemorrhage (11/8/2021), GERD (gastroesophageal reflux disease), Greater trochanteric bursitis of both hips (10/27/2015), H/O constipation, H/O hypercholesterolemia, H/O urinary frequency (12/1/2016), Hip dysplasia, History of anxiety disorder (10/23/2020), History of urinary anomaly (12/1/2016), diabetes mellitus (5/21/2013), eating disorder (8/25/2016), Hyperlipidemia (8/25/2016), Hypertension, Left hip pain (3/21/2016), Major depression, Menstrual disorder, Mixed hyperlipidemia (3/21/2016), Mixed incontinence (11/8/2021), Moderate mixed bipolar I disorder (H) (5/10/2016), Morbid obesity (H) (12/22/2010), Morbid obesity with BMI of 50.0-59.9, adult (H) (8/25/2016), Multiple-type hyperlipidemia (3/21/2016), Obese, Osteoarthritis (10/23/2020), Osteoarthritis of hip (6/15/2015), Pain of both hip joints (6/30/2015), Primary localized osteoarthritis of pelvic region and thigh (11/8/2021), Primary osteoarthritis of both hips (10/30/2020), Primary osteoarthritis of left hip (6/15/2015), PTSD (post-traumatic stress disorder), Recurrent major depressive disorder (H24) (12/22/2010), Relationship problems (10/23/2020), Secondary amenorrhea (10/22/2012), Severe episode of recurrent major depressive disorder (H) (10/4/2012), Skin sensation disturbance (11/8/2021), Soft tissue lesion of shoulder region (2/27/2013), Status post total hip replacement, left (9/10/2021), Suicidal ideation  (10/4/2017), Trochanteric bursitis (10/27/2015), Type 2 diabetes mellitus (H) (8/12/2014), Uncomplicated asthma, Urinary incontinence, vertigo, and Vertigo (3/10/2017).    Surgical History:  She  has a past surgical history that includes Tonsillectomy; Cyst Removal; Ankle Fracture Surgery (Right); Colonoscopy; Mammo Stereotactic Core Biopsy Left (Left, 06/15/2020); Colonoscopy w/wo Brush **Performed** (N/A, 06/08/2021); orthopedic surgery; Arthroscopic reconstruction anterior cruciate ligament (Left); Closed reduction hip (Left, 9/9/2021); Arthroplasty hip (Left, 9/9/2021); Cholecystectomy; and Arthroplasty hip (Right, 4/4/2022).     SOCIAL HISTORY     Reviewed, and she  reports that she has never smoked. She has never used smokeless tobacco. She reports that she does not drink alcohol and does not use drugs.     FAMILY HISTORY     Reviewed, and family history includes Arthritis in her brother, maternal grandmother, and mother; Breast Cancer in her mother; Cancer in her maternal grandmother and mother; Cerebrovascular Disease in her maternal grandfather, maternal grandmother, and mother; Diabetes in her brother, brother, father, and mother; Heart Disease in her maternal grandfather; Hyperlipidemia in her brother, maternal grandfather, and mother; Hypertension in her brother, maternal grandfather, maternal grandmother, and mother; Mental Illness in her brother and brother; Obesity in her brother, maternal grandfather, maternal grandmother, mother, and sister; Pacemaker in her maternal grandfather; Seizure Disorder in her maternal grandfather; Substance Abuse in her maternal grandfather.     ALLERGIES     Allergies   Allergen Reactions     Cephalexin Anaphylaxis     Erythromycin Anaphylaxis and Hives     Other reaction(s): Unknown     Penicillins Hives     hives  Other reaction(s): Unknown         REVIEW OF SYSTEMS     A 12 point review of system was performed and was negative except as outlined in the history of  present illness.     HOME MEDICATIONS     Current Outpatient Rx   Medication Sig Dispense Refill     albuterol (PROAIR HFA/PROVENTIL HFA/VENTOLIN HFA) 108 (90 Base) MCG/ACT inhaler Inhale 2 puffs into the lungs every 6 hours as needed for shortness of breath, wheezing or cough 18 g 0     buPROPion (WELLBUTRIN XL) 300 MG 24 hr tablet Take 300 mg by mouth daily        cholecalciferol (VITAMIN D3) 125 mcg (5000 units) capsule        docusate sodium (COLACE) 100 MG capsule Take 1 capsule (100 mg) by mouth 2 times daily as needed for constipation 90 capsule 4     EPINEPHrine (ANY BX GENERIC EQUIV) 0.3 MG/0.3ML injection 2-pack Inject 0.3 mg into the muscle       EPINEPHrine (EPIPEN/ADRENACLICK/OR ANY BX GENERIC EQUIV) 0.3 MG/0.3ML injection 2-pack Inject 0.3 mLs (0.3 mg) into the muscle once as needed for anaphylaxis 0.6 mL 0     levonorgestrel (LILETTA, 52 MG,) 19.5 MCG/DAY IUD 1 each by Intrauterine route once       LORazepam (ATIVAN) 1 MG tablet Take 1 tablet 30 minutes before MRI scan 1 tablet 0     magnesium oxide (MAG-OX) 400 MG tablet Take 1 tablet (400 mg) by mouth daily 90 tablet 3     meclizine (ANTIVERT) 25 MG tablet Take 1 tablet by mouth daily at 2 pm       risperiDONE (RISPERDAL) 3 MG tablet        SENEXON-S 8.6-50 MG tablet 1-2 tabs twice a day as needed for constipation Orally for 30 days       sennosides (SENOKOT) 8.6 MG tablet        tirzepatide-Weight Management (ZEPBOUND) 12.5 MG/0.5ML prefilled pen Inject 0.5 mLs (12.5 mg) Subcutaneous every 7 days for 180 days 6 mL 1     traZODone (DESYREL) 150 MG tablet Take 150 mg by mouth At Bedtime       ACETAMINOPHEN EXTRA STRENGTH 500 MG tablet  (Patient not taking: Reported on 6/27/2024)       blood glucose (NO BRAND SPECIFIED) lancets standard Use to test blood sugar 2 times daily or as directed. (Patient not taking: Reported on 6/27/2024) 100 each 11     blood glucose monitoring (NO BRAND SPECIFIED) meter device kit Use to test blood sugar 2 times daily or  as directed. (Patient not taking: Reported on 6/27/2024) 1 kit 0     blood glucose monitoring (NO BRAND SPECIFIED) test strip Use to test blood sugars 2 times daily or as directed (Patient not taking: Reported on 6/27/2024) 100 strip 3     hydrOXYzine (ATARAX) 50 MG tablet  (Patient not taking: Reported on 6/27/2024)       ibuprofen (ADVIL/MOTRIN) 600 MG tablet Take 1 tablet (600 mg) by mouth every 6 hours as needed (mild) (Patient not taking: Reported on 6/27/2024)  0     ketoconazole (EXTINA) 2 % external foam  (Patient not taking: Reported on 6/27/2024)       mometasone (ELOCON) 0.1 % external cream Apply topically daily (Patient not taking: Reported on 6/27/2024)           PHYSICAL EXAM     Vital signs  /83 (BP Location: Left arm, Patient Position: Sitting, Cuff Size: Adult Regular)   Pulse 80   Wt 102.8 kg (226 lb 11.2 oz)   BMI 38.91 kg/m      Weight:   226 lbs 11.2 oz    Obese young female who is alert and oriented vital signs are reviewed and documented in electronic medical record.  Neck supple.  Neurologically speech was normal.  Cranial nerves II through XII are intact motor strength 5/5 reflexes 1+ toes downgoing.  No dysmetria noted on finger-nose testing gait normal Romberg negative     PERTINENT DIAGNOSTIC STUDIES     Following studies were reviewed:     No recent imaging studies to review     PERTINENT LABS  Following labs were reviewed:  Lab Requisition on 06/13/2024   Component Date Value Ref Range Status     Prolactin 06/13/2024 76 (H)  5 - 23 ng/mL Final     Cholesterol 06/13/2024 163  <200 mg/dL Final     Triglycerides 06/13/2024 130  <150 mg/dL Final     Direct Measure HDL 06/13/2024 43 (L)  >=50 mg/dL Final     LDL Cholesterol Calculated 06/13/2024 94  <=100 mg/dL Final     Non HDL Cholesterol 06/13/2024 120  <130 mg/dL Final     Patient Fasting > 8hrs? 06/13/2024 Unknown   Final     Sodium 06/13/2024 139  135 - 145 mmol/L Final     Potassium 06/13/2024 4.3  3.4 - 5.3 mmol/L Final      Carbon Dioxide (CO2) 06/13/2024 21 (L)  22 - 29 mmol/L Final     Anion Gap 06/13/2024 11  7 - 15 mmol/L Final     Urea Nitrogen 06/13/2024 5.4 (L)  6.0 - 20.0 mg/dL Final     Creatinine 06/13/2024 0.97 (H)  0.51 - 0.95 mg/dL Final     GFR Estimate 06/13/2024 74  >60 mL/min/1.73m2 Final     Calcium 06/13/2024 8.7  8.6 - 10.0 mg/dL Final     Chloride 06/13/2024 107  98 - 107 mmol/L Final     Glucose 06/13/2024 84  70 - 99 mg/dL Final     Alkaline Phosphatase 06/13/2024 42  40 - 150 U/L Final     AST 06/13/2024 18  0 - 45 U/L Final     ALT 06/13/2024 8  0 - 50 U/L Final     Protein Total 06/13/2024 6.3 (L)  6.4 - 8.3 g/dL Final     Albumin 06/13/2024 3.8  3.5 - 5.2 g/dL Final     Bilirubin Total 06/13/2024 0.4  <=1.2 mg/dL Final     Patient Fasting > 8hrs? 06/13/2024 Unknown   Final   Lab Requisition on 04/17/2024   Component Date Value Ref Range Status     Sodium 04/17/2024 136  135 - 145 mmol/L Final     Potassium 04/17/2024 4.2  3.4 - 5.3 mmol/L Final     Carbon Dioxide (CO2) 04/17/2024 19 (L)  22 - 29 mmol/L Final     Anion Gap 04/17/2024 15  7 - 15 mmol/L Final     Urea Nitrogen 04/17/2024 9.4  6.0 - 20.0 mg/dL Final     Creatinine 04/17/2024 1.22 (H)  0.51 - 0.95 mg/dL Final     GFR Estimate 04/17/2024 56 (L)  >60 mL/min/1.73m2 Final     Calcium 04/17/2024 9.4  8.6 - 10.0 mg/dL Final     Chloride 04/17/2024 102  98 - 107 mmol/L Final     Glucose 04/17/2024 80  70 - 99 mg/dL Final     Alkaline Phosphatase 04/17/2024 42  40 - 150 U/L Final     AST 04/17/2024 28  0 - 45 U/L Final     ALT 04/17/2024 10  0 - 50 U/L Final     Protein Total 04/17/2024 6.8  6.4 - 8.3 g/dL Final     Albumin 04/17/2024 4.1  3.5 - 5.2 g/dL Final     Bilirubin Total 04/17/2024 0.6  <=1.2 mg/dL Final     TSH 04/17/2024 1.55  0.30 - 4.20 uIU/mL Final        Total time spent for face to face visit, reviewing labs/imaging studies, counseling and coordination of care was: 1 Hour spent on the date of the encounter doing chart review, review of  outside records, review of test results, interpretation of tests, patient visit, and documentation     This note was dictated using voice recognition software.  Any grammatical or context distortions are unintentional and inherent to the software.    Orders Placed This Encounter   Procedures     MR Brain w/o & w Contrast     EEG      New Prescriptions    LORAZEPAM (ATIVAN) 1 MG TABLET    Take 1 tablet 30 minutes before MRI scan      Modified Medications    No medications on file                Again, thank you for allowing me to participate in the care of your patient.        Sincerely,        Jason Proctor MD

## 2024-07-01 NOTE — PROGRESS NOTES
Ronel Ryan is a 43 year old who is being evaluated via a billable video visit.      How would you like to obtain your AVS? MyChart  If the video visit is dropped, the invitation should be resent by: Text to cell phone: [phone]  Will anyone else be joining your video visit? No        Medical  Weight Loss Follow-Up Diet Evaluation  Assessment:  Ronel is presenting today for a follow up weight management nutrition consultation.  This patient has had an initial appointment and was referred by Dr. Dunn for MNT as treatment for Obesity    Weight loss medication:  Zepbound .   Pt's weight is 226.7 lbs  Initial weight: 262 lbs  Weight change: 35.3 lbs, 13.5% weight loss        1/25/2024     9:38 AM   Changes and Difficulties   I have made the following changes to my diet since my last visit: Eating more carbs/ sugar   With regards to my diet, I am still struggling with: Struggling a lot, missed a day of Ozempic, feeling the effects of missing the dose   I have made the following changes to my activity/exercise since my last visit: Started exercising again after taking a month off   With regards to my activity/exercise, I am still struggling with: None     BMI: 38.91  Ideal body weight: 54.7 kg (120 lb 9.5 oz)  Adjusted ideal body weight: 74 kg (163 lb 0.6 oz)    Estimated RMR ([place]-St Jeor equation):   1821 kcals x 1.2 (sedentary) = 2186 kcals (for weight maintenance)  1821 kcals x 1.3 (light active) = 2504 kcals (for weight maintenance)  Recommended Protein Intake: 70-90 grams of protein/day  Patient Active Problem List:  Patient Active Problem List   Diagnosis    Binge eating    Eating disorder    Morbid obesity (H)    Multiple-type hyperlipidemia    Severe episode of recurrent major depressive disorder (H)    Hypertension    Trochanteric bursitis    Borderline personality disorder (H)    Attention deficit disorder    Asthma    Suicidal ideation    ACP (advance care planning)    Adjustment disorder  "with depressed mood    Anxiety    Moderate mixed bipolar I disorder (H)    Bipolar II disorder, most recent episode hypomanic (H)    Care plan discussed with patient    Hx of diabetes mellitus    Osteoarthritis    Relationship problems    Secondary amenorrhea    Hip dysplasia    Osteoarthritis of hip    Hyperlipidemia    Mixed hyperlipidemia    Type 2 diabetes mellitus (H)    Primary osteoarthritis of both hips    Degenerative joint disease (DJD) of hip    Status post total hip replacement, left    Gastroesophageal reflux disease with esophagitis without hemorrhage    Mixed incontinence   Diabetes: T2DM dx    Nutrition History:  Stated that she became vegan - March 11 - started after watching the \"What the Health\" Documentary. Noticed dizziness and poor energy level. [facility].  +drinking Vegan Protein Shakes periodically  +does like Tofu scramble with vegetables  +limited bread, cereal and pasta  -encouraged patient to have a consistent eating pattern during the day - eating every 4-6 hours to help fuel weight loss properly    Progress on goals from last visit: Patient stated that she was just let go from her job this week. Patient stated that things have been good and bad with her nutrition. Patient is not always following a vegan diet - having chicken and cheese at times. Patient stated that she has stage 2 CKD - patient was told she does not have to alter her diet at this time with the dx. Patient stated that she is getting less than 1,000 kcals per day - patient requested simple vegan meals - provided a list of different food options.     Goals (5/8/2024):  Consistent with meals 4-6 hours  Focus on a vegan protein sources with meals  Continue exercise routine    Dietary Recall:  *trying to limit corn, carrots and bread  Breakfast: skipped OR cinnamon raisin toast  Lunch: [employer] - burrito bowl with rice, beans, corn, with salsas  Dinner: salad (garden) - no protein " added  Typical snacks: limited  Eating out: burger emilie - impossible burger, fries and pop  Beverages:   Water  Pop and lemonade -occasionally  Nutrition Diagnosis:    Obesity related to excessive energy intake as evidence by skipping meals, limited protein intake and BMI of 39.65        Intervention:  Food and/or nutrient delivery: aim to increase consistency with meals. Aim to have a protein source with all meals. Try to limit sodium intake to 500-700 mg per meal  Nutrition education: dietitian recommended vegan meals, meal delivery programs, plant based protein powders, Tips for a Low-Sodium Diet    Monitoring/Evaluation:    Goals:  Try to have three meals per day  Aim to have a protein source with every meal    Patient to follow up in 2 month(s) with bariatrician and 3 month(s) with JOESPH      Video-Visit Details    Type of service:  Video Visit    Video Start Time (time video started): 10:59 AM    Video End Time (time video stopped): 11:18 AM    Originating Location (pt. Location): Home      Distant Location (provider location):  Off-site    Mode of Communication:  Video Conference via [facility]    Physician has received verbal consent for a Video Visit from the patient? Yes      Ana Gomez RD

## 2024-07-03 ENCOUNTER — VIRTUAL VISIT (OUTPATIENT)
Dept: SURGERY | Facility: CLINIC | Age: 44
End: 2024-07-03
Payer: COMMERCIAL

## 2024-07-03 DIAGNOSIS — E11.9 TYPE 2 DIABETES MELLITUS WITHOUT COMPLICATION, WITHOUT LONG-TERM CURRENT USE OF INSULIN (H): Primary | ICD-10-CM

## 2024-07-03 DIAGNOSIS — E66.9 OBESITY (BMI 30-39.9): ICD-10-CM

## 2024-07-03 DIAGNOSIS — E11.9 TYPE II DIABETES MELLITUS, WELL CONTROLLED (H): ICD-10-CM

## 2024-07-03 PROCEDURE — 97803 MED NUTRITION INDIV SUBSEQ: CPT | Mod: 95 | Performed by: DIETITIAN, REGISTERED

## 2024-07-03 NOTE — LETTER
7/3/2024      Ronel Ryan  [address]  [facility] [address]      Dear Colleague,    Thank you for referring your patient, Ronel Ryan, to the [facility] [place]. Please see a copy of my visit note below.    Ronel Ryan is a 43 year old who is being evaluated via a billable video visit.      How would you like to obtain your AVS? MyChart  If the video visit is dropped, the invitation should be resent by: Text to cell phone: [phone]  Will anyone else be joining your video visit? No        Medical  Weight Loss Follow-Up Diet Evaluation  Assessment:  Ronel is presenting today for a follow up weight management nutrition consultation.  This patient has had an initial appointment and was referred by Dr. Dunn for MNT as treatment for Obesity    Weight loss medication:  Zepbound .   Pt's weight is 226.7 lbs  Initial weight: 262 lbs  Weight change: 35.3 lbs, 13.5% weight loss        1/25/2024     9:38 AM   Changes and Difficulties   I have made the following changes to my diet since my last visit: Eating more carbs/ sugar   With regards to my diet, I am still struggling with: Struggling a lot, missed a day of Ozempic, feeling the effects of missing the dose   I have made the following changes to my activity/exercise since my last visit: Started exercising again after taking a month off   With regards to my activity/exercise, I am still struggling with: None     BMI: 38.91  Ideal body weight: 54.7 kg (120 lb 9.5 oz)  Adjusted ideal body weight: 74 kg (163 lb 0.6 oz)    Estimated RMR ([place]-St Jeor equation):   1821 kcals x 1.2 (sedentary) = 2186 kcals (for weight maintenance)  1821 kcals x 1.3 (light active) = 2504 kcals (for weight maintenance)  Recommended Protein Intake: 70-90 grams of protein/day  Patient Active Problem List:  Patient Active Problem List   Diagnosis     Binge eating     Eating disorder     Morbid obesity (H)      "Multiple-type hyperlipidemia     Severe episode of recurrent major depressive disorder (H)     Hypertension     Trochanteric bursitis     Borderline personality disorder (H)     Attention deficit disorder     Asthma     Suicidal ideation     ACP (advance care planning)     Adjustment disorder with depressed mood     Anxiety     Moderate mixed bipolar I disorder (H)     Bipolar II disorder, most recent episode hypomanic (H)     Care plan discussed with patient     Hx of diabetes mellitus     Osteoarthritis     Relationship problems     Secondary amenorrhea     Hip dysplasia     Osteoarthritis of hip     Hyperlipidemia     Mixed hyperlipidemia     Type 2 diabetes mellitus (H)     Primary osteoarthritis of both hips     Degenerative joint disease (DJD) of hip     Status post total hip replacement, left     Gastroesophageal reflux disease with esophagitis without hemorrhage     Mixed incontinence   Diabetes: T2DM dx    Nutrition History:  Stated that she became vegan - March 11 - started after watching the \"What the Health\" Documentary. Noticed dizziness and poor energy level. [facility].  +drinking Vegan Protein Shakes periodically  +does like Tofu scramble with vegetables  +limited bread, cereal and pasta  -encouraged patient to have a consistent eating pattern during the day - eating every 4-6 hours to help fuel weight loss properly    Progress on goals from last visit: Patient stated that she was just let go from her job this week. Patient stated that things have been good and bad with her nutrition. Patient is not always following a vegan diet - having chicken and cheese at times. Patient stated that she has stage 2 CKD - patient was told she does not have to alter her diet at this time with the dx. Patient stated that she is getting less than 1,000 kcals per day - patient requested simple vegan meals - provided a list of different food options.     Goals " (5/8/2024):  Consistent with meals 4-6 hours  Focus on a vegan protein sources with meals  Continue exercise routine    Dietary Recall:  *trying to limit corn, carrots and bread  Breakfast: skipped OR cinnamon raisin toast  Lunch: chipotle - burrito bowl with rice, beans, corn, with salsas  Dinner: salad (garden) - no protein added  Typical snacks: limited  Eating out: burger emilie - impossible burger, fries and pop  Beverages:   Water  Pop and lemonade -occasionally  Nutrition Diagnosis:    Obesity related to excessive energy intake as evidence by skipping meals, limited protein intake and BMI of 39.65        Intervention:  Food and/or nutrient delivery: aim to increase consistency with meals. Aim to have a protein source with all meals. Try to limit sodium intake to 500-700 mg per meal  Nutrition education: dietitian recommended vegan meals, meal delivery programs, plant based protein powders, Tips for a Low-Sodium Diet    Monitoring/Evaluation:    Goals:  Try to have three meals per day  Aim to have a protein source with every meal    Patient to follow up in 2 month(s) with bariatrician and 3 month(s) with JOESPH      Video-Visit Details    Type of service:  Video Visit    Video Start Time (time video started): 10:59 AM    Video End Time (time video stopped): 11:18 AM    Originating Location (pt. Location): Home      Distant Location (provider location):  Off-site    Mode of Communication:  Video Conference via Lorena Gaxiola    Physician has received verbal consent for a Video Visit from the patient? Yes      Ana Gomez RD           Again, thank you for allowing me to participate in the care of your patient.        Sincerely,        Ana Gomez RD

## 2024-07-03 NOTE — PATIENT INSTRUCTIONS
https://[employer].Torqeedo/2-week-vegan-meal-plan-plant-based/    https://www.F[employer].com/article/887740/7-day-vegan-meal-plan-1200-calories/    https://[employer].UQM Technologies/    https://www.DeepStream Technologies.com/    Plant-Based Protein Supplements     Type Serving  Calories Protein   Grams Sugar Grams  Where Available    Garden of Life Raw Protein Powder Powder 1 scoop 110 22 0 Whole Foods, Vitamin Shoppe, wwwParclick.com   Orgain Organic Protein Powder Powder 2 scoops 150 21 0 [employer], [employer], [employer]   Planted by Unjury Powder 1 scoop 130 20 3 [employer]  www.amazon.com   Protein and Greens by Nava Powder  1 scoop 120 20 0 [employer], Target, Grocery/Pharmacy   Essentials Shake by Nava Powder 1 scoop 130 20 0 [employer], Target, Grocery/Pharmacy     Plant Fusion Orgain Plant Protein Powder 1 scoop 120 20 0 www.plantfusion.net   Orgain Grass Fed Shake Shake 1 bottle 140 20 4 Target, [employer]   Evolve Shake 1 bottle 150 20 4 Target, [place], Wal[employer]   Sun Warrior Powder 1 scoop 100 17-18 0 [employer], Whole Foods   Whole Foods Fit Protein  Powder 1 scoop 110 17 0 Whole Foods   Garden of Life Plant Protein  Powder 1 scoop 90 15 0 Whole Foods, Vitamin Shoppe,   www.amazon.com     Look for (per serving):  -100-200 calories  -15-30 grams protein   -Less than 10 grams total carbohydrate  -10/10 Rule

## 2024-07-26 ENCOUNTER — TELEPHONE (OUTPATIENT)
Dept: SURGERY | Facility: CLINIC | Age: 44
End: 2024-07-26
Payer: COMMERCIAL

## 2024-07-26 NOTE — TELEPHONE ENCOUNTER
Pt. Is on Zepbound and at first, on the 10mg dosage was going well and that ended after a month and Pt.'s dosage was increased to 12.5mg dosage. Pt. has gained 7 pounds in 5 days and would like to speak to a nurse. Please advise Pt. at [phone]. Okay to leave a detailed message.

## 2024-07-26 NOTE — TELEPHONE ENCOUNTER
Spoke with pt and reassured her. I reminded her to follow our recommendations for diet, get some exercise, drink plenty of fluids, make sure she's getting enough sleep and don't weigh herself every day. She is just nervous about it but did say that she doesn't feel like her clothes are tighter. Pt verbalized understanding.     Isabela Llamas RN, CBN  [facility]  P [phone]  F [phone]

## 2024-07-30 ENCOUNTER — HOSPITAL ENCOUNTER (OUTPATIENT)
Dept: MRI IMAGING | Facility: HOSPITAL | Age: 44
Discharge: HOME | End: 2024-07-30
Attending: PSYCHIATRY & NEUROLOGY | Admitting: PSYCHIATRY & NEUROLOGY
Payer: COMMERCIAL

## 2024-07-30 DIAGNOSIS — D35.2 PITUITARY MICROADENOMA (H): Primary | ICD-10-CM

## 2024-07-30 DIAGNOSIS — R56.9 SEIZURE-LIKE ACTIVITY (H): ICD-10-CM

## 2024-07-30 PROCEDURE — 70553 MRI BRAIN STEM W/O & W/DYE: CPT

## 2024-07-30 PROCEDURE — 255N000002 HC RX 255 OP 636: Performed by: PSYCHIATRY & NEUROLOGY

## 2024-07-30 PROCEDURE — A9585 GADOBUTROL INJECTION: HCPCS | Performed by: PSYCHIATRY & NEUROLOGY

## 2024-07-30 RX ORDER — GADOBUTROL 604.72 MG/ML
0.1 INJECTION INTRAVENOUS ONCE
Status: COMPLETED | OUTPATIENT
Start: 2024-07-30 | End: 2024-07-30

## 2024-07-30 RX ADMIN — GADOBUTROL 10 ML: 604.72 INJECTION INTRAVENOUS at 11:59

## 2024-07-31 NOTE — RESULT ENCOUNTER NOTE
Dear Ronel    As we discussed on the phone, MRI brain shows no abnormality to account for possible seizures.  Incidentally 2 mm area noted in the pituitary gland which is nonspecific but to clarify I would recommend MRI pituitary gland and also some endocrine labs.  I have entered the order, you can go to the lab to get the lab work done and someone from central scheduling will call you to schedule a follow-up MRI scan.    Regards,  Jason Proctor MD

## 2024-08-01 ENCOUNTER — ANCILLARY PROCEDURE (OUTPATIENT)
Dept: NEUROLOGY | Facility: CLINIC | Age: 44
End: 2024-08-01
Attending: PSYCHIATRY & NEUROLOGY
Payer: COMMERCIAL

## 2024-08-01 DIAGNOSIS — R56.9 SEIZURE-LIKE ACTIVITY (H): ICD-10-CM

## 2024-08-01 NOTE — PROGRESS NOTES
Bariatric Clinic Follow-Up Visit:    Ronel Ryan is a 43 year old  female with Body mass index is 39.77 kg/m .  presenting here today for follow-up on non-surgical efforts for weight loss. Original Intake visit occurred on 7/13/19 with a weight of 345 lbs and BMI of 59.2.  Along with diet and behavior changes, she has been using various combinations through the years of Trulicity/naltrexone and a previous use of topiramate from her pain clinic which can also assist her weight loss goals.  See her intake visit notes for details on identified contributors to weight gain in the past. Chart review shows Dietician calculated RMR of 1892kcal/day and protein intake goal of 70-90g/day.  Her Trulicity was stopped in hospital May of 2022 during hip surgery and she had weight gain of 30 lbs over the summer due to increased cravings off medication. We transitioned to Semaglutide therapy this summer of 2022 but then supplies ran out due to shortages in supply and Victoza was started in November 2022 (11/7/22) with plans to transition back in 2023 when supplies of Ozempic are more available. She was able to get back on Ozempic in late 2022 and was up to 1mg/week as of our 10/06/23 visit w/ good results/tolerance.  She was transitioned to Zepbound 2/8/24 for continued work on weight management as a means to improve her glycemic control/weight related health.  Some supply chain issues have limited ramping ability and she presents on 5/20/24 at 233 lbs on 10mg/week Zepbound. Ramped up to 12.5mg/week due to shortages in June of '24.  She had some poor response in the interim with lower efficacy and on 8/2/24 has weight of 231 lbs but this was more related to her decision to go Vegan and working on her dietary tools so we'll keep her steady at the 12.5mg/week dose in light of her overall excellent weight reduction.     Dietician visit on 11/17/23 w/ RMR of 1821 kcal/day and protein goal of 70-90g/day. She's a user of  Neil w/ good tracking history of food and exercise.    Weight:   Wt Readings from Last 5 Encounters:   08/02/24 105.1 kg (231 lb 11.2 oz)   06/27/24 102.8 kg (226 lb 11.2 oz)   05/20/24 106.1 kg (233 lb 12.8 oz)   02/15/24 120.4 kg (265 lb 8 oz)   01/25/24 122.5 kg (270 lb)    pounds    110 lbs from peak weight on chart of 341 lbs back in 2019 and 114 lbs down from introductory weight, a 33% total body weight reduction. Down 39 lbs in 2024 a 16% total body weight reduction.  Comorbidities:  Patient Active Problem List   Diagnosis    Binge eating    Eating disorder    Morbid obesity (H)    Multiple-type hyperlipidemia    Severe episode of recurrent major depressive disorder (H)    Hypertension    Trochanteric bursitis    Borderline personality disorder (H)    Attention deficit disorder    Asthma    Suicidal ideation    ACP (advance care planning)    Adjustment disorder with depressed mood    Anxiety    Moderate mixed bipolar I disorder (H)    Bipolar II disorder, most recent episode hypomanic (H)    Care plan discussed with patient    Hx of diabetes mellitus    Osteoarthritis    Relationship problems    Secondary amenorrhea    Hip dysplasia    Osteoarthritis of hip    Hyperlipidemia    Mixed hyperlipidemia    Type 2 diabetes mellitus (H)    Primary osteoarthritis of both hips    Degenerative joint disease (DJD) of hip    Status post total hip replacement, left    Gastroesophageal reflux disease with esophagitis without hemorrhage    Mixed incontinence       Current Outpatient Medications:     ACETAMINOPHEN EXTRA STRENGTH 500 MG tablet, , Disp: , Rfl:     albuterol (PROAIR HFA/PROVENTIL HFA/VENTOLIN HFA) 108 (90 Base) MCG/ACT inhaler, Inhale 2 puffs into the lungs every 6 hours as needed for shortness of breath, wheezing or cough, Disp: 18 g, Rfl: 0    buPROPion (WELLBUTRIN XL) 300 MG 24 hr tablet, Take 300 mg by mouth daily , Disp: , Rfl:     cholecalciferol (VITAMIN D3) 125 mcg (5000 units) capsule, , Disp:  , Rfl:     docusate sodium (COLACE) 100 MG capsule, Take 1 capsule (100 mg) by mouth 2 times daily as needed for constipation, Disp: 90 capsule, Rfl: 4    EPINEPHrine (ANY BX GENERIC EQUIV) 0.3 MG/0.3ML injection 2-pack, Inject 0.3 mg into the muscle, Disp: , Rfl:     EPINEPHrine (EPIPEN/ADRENACLICK/OR ANY BX GENERIC EQUIV) 0.3 MG/0.3ML injection 2-pack, Inject 0.3 mLs (0.3 mg) into the muscle once as needed for anaphylaxis, Disp: 0.6 mL, Rfl: 0    ibuprofen (ADVIL/MOTRIN) 600 MG tablet, Take 1 tablet (600 mg) by mouth every 6 hours as needed (mild), Disp: , Rfl: 0    levonorgestrel (LILETTA, 52 MG,) 19.5 MCG/DAY IUD, 1 each by Intrauterine route once, Disp: , Rfl:     LORazepam (ATIVAN) 1 MG tablet, Take 1 tablet 30 minutes before MRI scan, Disp: 1 tablet, Rfl: 0    magnesium oxide (MAG-OX) 400 MG tablet, Take 1 tablet (400 mg) by mouth daily, Disp: 90 tablet, Rfl: 3    meclizine (ANTIVERT) 25 MG tablet, Take 1 tablet by mouth daily at 2 pm, Disp: , Rfl:     risperiDONE (RISPERDAL) 3 MG tablet, , Disp: , Rfl:     SENEXON-S 8.6-50 MG tablet, 1-2 tabs twice a day as needed for constipation Orally for 30 days, Disp: , Rfl:     sennosides (SENOKOT) 8.6 MG tablet, , Disp: , Rfl:     tirzepatide-Weight Management (ZEPBOUND) 12.5 MG/0.5ML prefilled pen, Inject 0.5 mLs (12.5 mg) Subcutaneous every 7 days for 180 days, Disp: 6 mL, Rfl: 1    traZODone (DESYREL) 150 MG tablet, Take 150 mg by mouth At Bedtime, Disp: , Rfl:     blood glucose (NO BRAND SPECIFIED) lancets standard, Use to test blood sugar 2 times daily or as directed. (Patient not taking: Reported on 6/27/2024), Disp: 100 each, Rfl: 11    blood glucose monitoring (NO BRAND SPECIFIED) meter device kit, Use to test blood sugar 2 times daily or as directed. (Patient not taking: Reported on 6/27/2024), Disp: 1 kit, Rfl: 0    blood glucose monitoring (NO BRAND SPECIFIED) test strip, Use to test blood sugars 2 times daily or as directed (Patient not taking: Reported on  6/27/2024), Disp: 100 strip, Rfl: 3    hydrOXYzine (ATARAX) 50 MG tablet, , Disp: , Rfl:     ketoconazole (EXTINA) 2 % external foam, , Disp: , Rfl:     mometasone (ELOCON) 0.1 % external cream, Apply topically daily (Patient not taking: Reported on 6/27/2024), Disp: , Rfl:       Interim: Since our last visit, she has maintained a 33% total body weight reduction. Has been Vegan since March. For Lent gave up meat/dairy.   Has possible microadenoma of pituitary that is following up later this month with dedicated study with Neurology. Prolactin elevation likely more due to Risperdal use but some adenoma effects can be contributing with further workup pending.     Plan:   1.  Diet: aiming for 1500-1600kcal/day with 75-90 grams of lean protein and 80 oz of water daily to feel your best if ocntinuing to lose weight. Follow up with dietician to help Vegan lifestyle optimize protein intake.     2. Exercise: continue active workouts,finding fun activities and moving your body for over 150minutes/week  3. Medication: Zepbound 12.5mg/week to continue.     4. No labs needed, follow up as planned with Pituitary MRI later this month and Neuro follow up.    5. Goals: down over 33% total bodyweight and nearly 20 BMI points from introductory visit!.       We discussed [facility] Bariatric Basics including:  -eating 3 meals daily  -reviewed metabolic needs for weight loss based on Resting Metabolic Rate  -protein goals supportive of healthy weight loss  -avoiding/limiting calorie containing beverages  -We discussed the importance of restorative sleep and stress management in maintaining a healthy weight.  -We discussed the National Weight Control Registry healthy weight maintenance strategies and ways to optimize metabolism.  -We discussed the importance of physical activity including cardiovascular and strength training in maintaining a healthier weight and explored viable options.      Most recent labs:  Lab Results   Component  "Value Date    WBC 5.5 02/01/2023    HGB 13.9 02/01/2023    HCT 42.1 02/01/2023    MCV 93 02/01/2023     02/01/2023     Lab Results   Component Value Date    CHOL 163 06/13/2024     Lab Results   Component Value Date    HDL 43 (L) 06/13/2024     No components found for: \"LDLCALC\"  Lab Results   Component Value Date    TRIG 130 06/13/2024     No results found for: \"CHOLHDL\"  Lab Results   Component Value Date    ALT 8 06/13/2024    AST 18 06/13/2024    ALKPHOS 42 06/13/2024     No results found for: \"HGBA1C\"  Lab Results   Component Value Date    B12 368 11/09/2022     No components found for: \"VITDT1\"  Lab Results   Component Value Date    KOFFI 79 11/09/2022     Lab Results   Component Value Date    PTHI 77 08/23/2019     No results found for: \"ZN\"  Lab Results   Component Value Date    VIB1WB 108 08/23/2019     Lab Results   Component Value Date    TSH 1.55 04/17/2024     No results found for: \"TEST\"    DIETARY HISTORY  Reviewed macro needs on Vegan diet.      PHYSICAL ACTIVITY PATTERNS:  Cardiovascular: walking/gym 3x weekly.   Strength Training: same    REVIEW OF SYSTEMS  Tolerates meds well..  PHYSICAL EXAM:  Vitals: /78   Ht 1.626 m (5' 4\")   Wt 105.1 kg (231 lb 11.2 oz)   BMI 39.77 kg/m    Weight:   Wt Readings from Last 3 Encounters:   08/02/24 105.1 kg (231 lb 11.2 oz)   06/27/24 102.8 kg (226 lb 11.2 oz)   05/20/24 106.1 kg (233 lb 12.8 oz)         GEN: Pleasant, well groomed, in no acute distress  HEENT:  normal facies .  NECK: No swelling.  .  LUNGS: No respiratory difficulty noted. No cough. .  ABDOMEN: .  EXTREMITIES: No tremor. Ambulation..  NEURO: Alert and Oriented X3, fluent speech.  SKIN: No visible rashes. .    Interim study results.      30 minutes spent by me on the date of the encounter doing chart review, history and exam, documentation and further activities per the note   Kerwin Dunn MD  [facility]  4:23 PM  8/1/2024  "

## 2024-08-02 ENCOUNTER — OFFICE VISIT (OUTPATIENT)
Dept: SURGERY | Facility: CLINIC | Age: 44
End: 2024-08-02
Payer: COMMERCIAL

## 2024-08-02 VITALS
HEIGHT: 64 IN | DIASTOLIC BLOOD PRESSURE: 78 MMHG | WEIGHT: 231.7 LBS | SYSTOLIC BLOOD PRESSURE: 124 MMHG | BODY MASS INDEX: 39.56 KG/M2

## 2024-08-02 DIAGNOSIS — E66.01 CLASS 2 SEVERE OBESITY DUE TO EXCESS CALORIES WITH SERIOUS COMORBIDITY AND BODY MASS INDEX (BMI) OF 39.0 TO 39.9 IN ADULT (H): Primary | ICD-10-CM

## 2024-08-02 DIAGNOSIS — E66.812 CLASS 2 SEVERE OBESITY DUE TO EXCESS CALORIES WITH SERIOUS COMORBIDITY AND BODY MASS INDEX (BMI) OF 39.0 TO 39.9 IN ADULT (H): Primary | ICD-10-CM

## 2024-08-02 PROCEDURE — 99214 OFFICE O/P EST MOD 30 MIN: CPT | Performed by: EMERGENCY MEDICINE

## 2024-08-02 NOTE — PATIENT INSTRUCTIONS
Plan:  Great work with over 33% total body weight reduction since 2019, and down 16% total body weight this year alone.   Zepbound 12.5mg/week is a good dose for you right now as you adjust to your new Vegan lifestyle. Follow up with dietician to optimize nutrition on Vegan diet and making sure you're hitting your 75-90 grams of lean protein daily to stay well nourished.   We're now in the gradual phase of weight reduction and steady downward progress every month or two is a good result.

## 2024-08-02 NOTE — LETTER
8/2/2024      Ronel Ryan  [address]  [facility] [address]      Dear Colleague,    Thank you for referring your patient, Ronel Ryan, to the [facility] SUR[facility] AND BARIATRICS CARE [place]. Please see a copy of my visit note below.    Bariatric Clinic Follow-Up Visit:    Ronel Ryan is a 43 year old  female with Body mass index is 39.77 kg/m .  presenting here today for follow-up on non-surgical efforts for weight loss. Original Intake visit occurred on 7/13/19 with a weight of 345 lbs and BMI of 59.2.  Along with diet and behavior changes, she has been using various combinations through the years of Trulicity/naltrexone and a previous use of topiramate from her pain clinic which can also assist her weight loss goals.  See her intake visit notes for details on identified contributors to weight gain in the past. Chart review shows Dietician calculated RMR of 1892kcal/day and protein intake goal of 70-90g/day.  Her Trulicity was stopped in hospital May of 2022 during hip surgery and she had weight gain of 30 lbs over the summer due to increased cravings off medication. We transitioned to Semaglutide therapy this summer of 2022 but then supplies ran out due to shortages in supply and Victoza was started in November 2022 (11/7/22) with plans to transition back in 2023 when supplies of Ozempic are more available. She was able to get back on Ozempic in late 2022 and was up to 1mg/week as of our 10/06/23 visit w/ good results/tolerance.  She was transitioned to Zepbound 2/8/24 for continued work on weight management as a means to improve her glycemic control/weight related health.  Some supply chain issues have limited ramping ability and she presents on 5/20/24 at 233 lbs on 10mg/week Zepbound. Ramped up to 12.5mg/week due to shortages in June of '24.  She had some poor response in the interim with lower efficacy and on 8/2/24 has weight of 231 lbs but this was more related to  her decision to go Vegan and working on her dietary tools so we'll keep her steady at the 12.5mg/week dose in light of her overall excellent weight reduction.     Dietician visit on 11/17/23 w/ RMR of 1821 kcal/day and protein goal of 70-90g/day. She's a user of [employer] w/ good tracking history of food and exercise.    Weight:   Wt Readings from Last 5 Encounters:   08/02/24 105.1 kg (231 lb 11.2 oz)   06/27/24 102.8 kg (226 lb 11.2 oz)   05/20/24 106.1 kg (233 lb 12.8 oz)   02/15/24 120.4 kg (265 lb 8 oz)   01/25/24 122.5 kg (270 lb)    pounds    110 lbs from peak weight on chart of 341 lbs back in 2019 and 114 lbs down from introductory weight, a 33% total body weight reduction. Down 39 lbs in 2024 a 16% total body weight reduction.  Comorbidities:  Patient Active Problem List   Diagnosis     Binge eating     Eating disorder     Morbid obesity (H)     Multiple-type hyperlipidemia     Severe episode of recurrent major depressive disorder (H)     Hypertension     Trochanteric bursitis     Borderline personality disorder (H)     Attention deficit disorder     Asthma     Suicidal ideation     ACP (advance care planning)     Adjustment disorder with depressed mood     Anxiety     Moderate mixed bipolar I disorder (H)     Bipolar II disorder, most recent episode hypomanic (H)     Care plan discussed with patient     Hx of diabetes mellitus     Osteoarthritis     Relationship problems     Secondary amenorrhea     Hip dysplasia     Osteoarthritis of hip     Hyperlipidemia     Mixed hyperlipidemia     Type 2 diabetes mellitus (H)     Primary osteoarthritis of both hips     Degenerative joint disease (DJD) of hip     Status post total hip replacement, left     Gastroesophageal reflux disease with esophagitis without hemorrhage     Mixed incontinence       Current Outpatient Medications:      ACETAMINOPHEN EXTRA STRENGTH 500 MG tablet, , Disp: , Rfl:      albuterol (PROAIR HFA/PROVENTIL HFA/VENTOLIN HFA) 108 (90 Base)  MCG/ACT inhaler, Inhale 2 puffs into the lungs every 6 hours as needed for shortness of breath, wheezing or cough, Disp: 18 g, Rfl: 0     buPROPion (WELLBUTRIN XL) 300 MG 24 hr tablet, Take 300 mg by mouth daily , Disp: , Rfl:      cholecalciferol (VITAMIN D3) 125 mcg (5000 units) capsule, , Disp: , Rfl:      docusate sodium (COLACE) 100 MG capsule, Take 1 capsule (100 mg) by mouth 2 times daily as needed for constipation, Disp: 90 capsule, Rfl: 4     EPINEPHrine (ANY BX GENERIC EQUIV) 0.3 MG/0.3ML injection 2-pack, Inject 0.3 mg into the muscle, Disp: , Rfl:      EPINEPHrine (EPIPEN/ADRENACLICK/OR ANY BX GENERIC EQUIV) 0.3 MG/0.3ML injection 2-pack, Inject 0.3 mLs (0.3 mg) into the muscle once as needed for anaphylaxis, Disp: 0.6 mL, Rfl: 0     ibuprofen (ADVIL/MOTRIN) 600 MG tablet, Take 1 tablet (600 mg) by mouth every 6 hours as needed (mild), Disp: , Rfl: 0     levonorgestrel (LILETTA, 52 MG,) 19.5 MCG/DAY IUD, 1 each by Intrauterine route once, Disp: , Rfl:      LORazepam (ATIVAN) 1 MG tablet, Take 1 tablet 30 minutes before MRI scan, Disp: 1 tablet, Rfl: 0     magnesium oxide (MAG-OX) 400 MG tablet, Take 1 tablet (400 mg) by mouth daily, Disp: 90 tablet, Rfl: 3     meclizine (ANTIVERT) 25 MG tablet, Take 1 tablet by mouth daily at 2 pm, Disp: , Rfl:      risperiDONE (RISPERDAL) 3 MG tablet, , Disp: , Rfl:      SENEXON-S 8.6-50 MG tablet, 1-2 tabs twice a day as needed for constipation Orally for 30 days, Disp: , Rfl:      sennosides (SENOKOT) 8.6 MG tablet, , Disp: , Rfl:      tirzepatide-Weight Management (ZEPBOUND) 12.5 MG/0.5ML prefilled pen, Inject 0.5 mLs (12.5 mg) Subcutaneous every 7 days for 180 days, Disp: 6 mL, Rfl: 1     traZODone (DESYREL) 150 MG tablet, Take 150 mg by mouth At Bedtime, Disp: , Rfl:      blood glucose (NO BRAND SPECIFIED) lancets standard, Use to test blood sugar 2 times daily or as directed. (Patient not taking: Reported on 6/27/2024), Disp: 100 each, Rfl: 11     blood glucose  monitoring (NO BRAND SPECIFIED) meter device kit, Use to test blood sugar 2 times daily or as directed. (Patient not taking: Reported on 6/27/2024), Disp: 1 kit, Rfl: 0     blood glucose monitoring (NO BRAND SPECIFIED) test strip, Use to test blood sugars 2 times daily or as directed (Patient not taking: Reported on 6/27/2024), Disp: 100 strip, Rfl: 3     hydrOXYzine (ATARAX) 50 MG tablet, , Disp: , Rfl:      ketoconazole (EXTINA) 2 % external foam, , Disp: , Rfl:      mometasone (ELOCON) 0.1 % external cream, Apply topically daily (Patient not taking: Reported on 6/27/2024), Disp: , Rfl:       Interim: Since our last visit, she has maintained a 33% total body weight reduction. Has been Vegan since March. For Lent gave up meat/dairy.   Has possible microadenoma of pituitary that is following up later this month with dedicated study with Neurology. Prolactin elevation likely more due to Risperdal use but some adenoma effects can be contributing with further workup pending.     Plan:   1.  Diet: aiming for 1500-1600kcal/day with 75-90 grams of lean protein and 80 oz of water daily to feel your best if ocntinuing to lose weight. Follow up with dietician to help Vegan lifestyle optimize protein intake.     2. Exercise: continue active workouts,finding fun activities and moving your body for over 150minutes/week  3. Medication: Zepbound 12.5mg/week to continue.     4. No labs needed, follow up as planned with Pituitary MRI later this month and Neuro follow up.    5. Goals: down over 33% total bodyweight and nearly 20 BMI points from introductory visit!.       We discussed [facility] Bariatric Basics including:  -eating 3 meals daily  -reviewed metabolic needs for weight loss based on Resting Metabolic Rate  -protein goals supportive of healthy weight loss  -avoiding/limiting calorie containing beverages  -We discussed the importance of restorative sleep and stress management in maintaining a healthy weight.  -We  "discussed the National Weight Control Registry healthy weight maintenance strategies and ways to optimize metabolism.  -We discussed the importance of physical activity including cardiovascular and strength training in maintaining a healthier weight and explored viable options.      Most recent labs:  Lab Results   Component Value Date    WBC 5.5 02/01/2023    HGB 13.9 02/01/2023    HCT 42.1 02/01/2023    MCV 93 02/01/2023     02/01/2023     Lab Results   Component Value Date    CHOL 163 06/13/2024     Lab Results   Component Value Date    HDL 43 (L) 06/13/2024     No components found for: \"LDLCALC\"  Lab Results   Component Value Date    TRIG 130 06/13/2024     No results found for: \"CHOLHDL\"  Lab Results   Component Value Date    ALT 8 06/13/2024    AST 18 06/13/2024    ALKPHOS 42 06/13/2024     No results found for: \"HGBA1C\"  Lab Results   Component Value Date    B12 368 11/09/2022     No components found for: \"VITDT1\"  Lab Results   Component Value Date    KOFFI 79 11/09/2022     Lab Results   Component Value Date    PTHI 77 08/23/2019     No results found for: \"ZN\"  Lab Results   Component Value Date    VIB1WB 108 08/23/2019     Lab Results   Component Value Date    TSH 1.55 04/17/2024     No results found for: \"TEST\"    DIETARY HISTORY  Reviewed macro needs on Vegan diet.      PHYSICAL ACTIVITY PATTERNS:  Cardiovascular: walking/gym 3x weekly.   Strength Training: same    REVIEW OF SYSTEMS  Tolerates meds well..  PHYSICAL EXAM:  Vitals: /78   Ht 1.626 m (5' 4\")   Wt 105.1 kg (231 lb 11.2 oz)   BMI 39.77 kg/m    Weight:   Wt Readings from Last 3 Encounters:   08/02/24 105.1 kg (231 lb 11.2 oz)   06/27/24 102.8 kg (226 lb 11.2 oz)   05/20/24 106.1 kg (233 lb 12.8 oz)         GEN: Pleasant, well groomed, in no acute distress  HEENT:  normal facies .  NECK: No swelling.  .  LUNGS: No respiratory difficulty noted. No cough. .  ABDOMEN: .  EXTREMITIES: No tremor. Ambulation..  NEURO: Alert and Oriented " X3, fluent speech.  SKIN: No visible rashes. .    Interim study results.      30 minutes spent by me on the date of the encounter doing chart review, history and exam, documentation and further activities per the note   Kerwin Dunn MD  [facility]  4:23 PM  8/1/2024      Again, thank you for allowing me to participate in the care of your patient.        Sincerely,        Kerwin Dunn MD

## 2024-08-03 NOTE — PROGRESS NOTES
NEUROLOGY FOLLOW UP VISIT  NOTE       [facility] NEUROLOGY [place]  [address] [place] [address]  Tel: [phone]  Fax: [phone]  [email or web address]     Ronel Ryan,  1980, MRN [id number]  PCP: Arvind Lane  Date: 2024      ASSESSMENT & PLAN     Visit Diagnosis  Seizure-like activity (H)  Pituitary microadenoma (H)     Seizure-like activity  Sellar mass  43-year-old female with history of depression with anxiety, eating disorder, bipolar disorder, HTN, HLD, osteoarthritis, GERD with more than 20 years history of generalized body twitches that usually occur at night.  She had MRI of the brain, lab work and EEG that was normal.  MRI brain however showed hypodensity in the posterior aspect of pituitary gland raising the possibility of a microadenoma.  Endocrine studies are pending.  I have recommended:    1.  Repeat MRI brain with sella protocol  2.  Lab work to include prolactin, testosterone, LH, insulin growth factor, human growth factor, FSH, cortisol, ACTH.  3.  If above tests are negative no further intervention will be needed from neurology standpoint    Thank you again for this referral, please feel free to contact me if you have any questions.    Jason Proctor MD  [facility]     HISTORY OF PRESENT ILLNESS     Patient is a 43-year-old female with history of depression with anxiety, eating disorder, bipolar disorder, HTN, HLD, osteoarthritis, GERD who reported 20-year history of generalized body twitches that usually occur at night and wake her up.  I suspected medication effect.  Lab work did not suggest any electrolyte abnormality.  MRI brain was done that did not show any convincing evidence of an epileptogenic focus but a 2 mm hypodensity in the posterior aspect of the pituitary gland was noted raising the possibility of a microadenoma and a MRI with pituitary protocol is scheduled for 2024.  I had ordered endocrine lab  work including prolactin, testosterone, LH, insulin growth factor, human growth hormone, FSH, cortisol and adrenal corticotropin.  Those tests are still pending EEG was normal.  She was also told to get MRI brain with sella protocol and it is pending.     PROBLEM LIST   Patient Active Problem List   Diagnosis    Binge eating    Eating disorder    Morbid obesity (H)    Multiple-type hyperlipidemia    Severe episode of recurrent major depressive disorder (H)    Hypertension    Trochanteric bursitis    Borderline personality disorder (H)    Attention deficit disorder    Asthma    Suicidal ideation    ACP (advance care planning)    Adjustment disorder with depressed mood    Anxiety    Moderate mixed bipolar I disorder (H)    Bipolar II disorder, most recent episode hypomanic (H)    Care plan discussed with patient    Hx of diabetes mellitus    Osteoarthritis    Relationship problems    Secondary amenorrhea    Hip dysplasia    Osteoarthritis of hip    Hyperlipidemia    Mixed hyperlipidemia    Type 2 diabetes mellitus (H)    Primary osteoarthritis of both hips    Degenerative joint disease (DJD) of hip    Status post total hip replacement, left    Gastroesophageal reflux disease with esophagitis without hemorrhage    Mixed incontinence         PAST MEDICAL & SURGICAL HISTORY     Past Medical History:   Patient  has a past medical history of ACP (advance care planning) (10/18/2012), ADD (attention deficit disorder) (11/27/2013), Adjustment disorder with depressed mood (12/5/2018), Allergic state, Anxiety, Arthralgia of hip (3/21/2016), Arthritis, Asthma, Attention deficit disorder (11/27/2013), Binge eating (11/27/2013), Bipolar 1 disorder (H) (11/8/2021), Bipolar 2 disorder (H), Bipolar II disorder, most recent episode hypomanic (H) (4/29/2015), Borderline high cholesterol (10/23/2020), Borderline personality disorder (H), Care plan discussed with patient (2/1/2012), Cholelithiasis, Constipation (11/8/2021), Degenerative  joint disease (DJD) of hip (9/9/2021), Depressive disorder, Diabetes (H), Diabetes mellitus (H), Disorder of pelvis (9/14/2015), Eating disorder (3/13/2017), Gait difficulty (2/4/2019), Gastroesophageal reflux disease with esophagitis without hemorrhage (11/8/2021), GERD (gastroesophageal reflux disease), Greater trochanteric bursitis of both hips (10/27/2015), H/O constipation, H/O hypercholesterolemia, H/O urinary frequency (12/1/2016), Hip dysplasia, History of anxiety disorder (10/23/2020), History of urinary anomaly (12/1/2016), diabetes mellitus (5/21/2013), eating disorder (8/25/2016), Hyperlipidemia (8/25/2016), Hypertension, Left hip pain (3/21/2016), Major depression, Menstrual disorder, Mixed hyperlipidemia (3/21/2016), Mixed incontinence (11/8/2021), Moderate mixed bipolar I disorder (H) (5/10/2016), Morbid obesity (H) (12/22/2010), Morbid obesity with BMI of 50.0-59.9, adult (H) (8/25/2016), Multiple-type hyperlipidemia (3/21/2016), Obese, Osteoarthritis (10/23/2020), Osteoarthritis of hip (6/15/2015), Pain of both hip joints (6/30/2015), Primary localized osteoarthritis of pelvic region and thigh (11/8/2021), Primary osteoarthritis of both hips (10/30/2020), Primary osteoarthritis of left hip (6/15/2015), PTSD (post-traumatic stress disorder), Recurrent major depressive disorder (H24) (12/22/2010), Relationship problems (10/23/2020), Secondary amenorrhea (10/22/2012), Severe episode of recurrent major depressive disorder (H) (10/4/2012), Skin sensation disturbance (11/8/2021), Soft tissue lesion of shoulder region (2/27/2013), Status post total hip replacement, left (9/10/2021), Suicidal ideation (10/4/2017), Trochanteric bursitis (10/27/2015), Type 2 diabetes mellitus (H) (8/12/2014), Uncomplicated asthma, Urinary incontinence, vertigo, and Vertigo (3/10/2017).    Surgical History:  She  has a past surgical history that includes Tonsillectomy; Cyst Removal; Ankle Fracture Surgery (Right); Colonoscopy;  Mammo Stereotactic Core Biopsy Left (Left, 06/15/2020); Colonoscopy w/wo Brush **Performed** (N/A, 06/08/2021); orthopedic surgery; Arthroscopic reconstruction anterior cruciate ligament (Left); Closed reduction hip (Left, 9/9/2021); Arthroplasty hip (Left, 9/9/2021); Cholecystectomy; and Arthroplasty hip (Right, 4/4/2022).     SOCIAL HISTORY     Reviewed, and she  reports that she has never smoked. She has never used smokeless tobacco. She reports that she does not drink alcohol and does not use drugs.     FAMILY HISTORY     Reviewed, and family history includes Arthritis in her brother, maternal grandmother, and mother; Breast Cancer in her mother; Cancer in her maternal grandmother and mother; Cerebrovascular Disease in her maternal grandfather, maternal grandmother, and mother; Diabetes in her brother, brother, father, and mother; Heart Disease in her maternal grandfather; Hyperlipidemia in her brother, maternal grandfather, and mother; Hypertension in her brother, maternal grandfather, maternal grandmother, and mother; Mental Illness in her brother and brother; Obesity in her brother, maternal grandfather, maternal grandmother, mother, and sister; Pacemaker in her maternal grandfather; Seizure Disorder in her maternal grandfather; Substance Abuse in her maternal grandfather.     ALLERGIES     Allergies   Allergen Reactions    Cephalexin Anaphylaxis    Erythromycin Anaphylaxis and Hives     Other reaction(s): Unknown    Penicillins Hives     hives  Other reaction(s): Unknown         REVIEW OF SYSTEMS     A 12 point review of system was performed and was negative except as outlined in the history of present illness.     HOME MEDICATIONS     Current Outpatient Rx   Medication Sig Dispense Refill    ACETAMINOPHEN EXTRA STRENGTH 500 MG tablet       albuterol (PROAIR HFA/PROVENTIL HFA/VENTOLIN HFA) 108 (90 Base) MCG/ACT inhaler Inhale 2 puffs into the lungs every 6 hours as needed for shortness of breath, wheezing or  cough 18 g 0    blood glucose (NO BRAND SPECIFIED) lancets standard Use to test blood sugar 2 times daily or as directed. (Patient not taking: Reported on 6/27/2024) 100 each 11    blood glucose monitoring (NO BRAND SPECIFIED) meter device kit Use to test blood sugar 2 times daily or as directed. (Patient not taking: Reported on 6/27/2024) 1 kit 0    blood glucose monitoring (NO BRAND SPECIFIED) test strip Use to test blood sugars 2 times daily or as directed (Patient not taking: Reported on 6/27/2024) 100 strip 3    buPROPion (WELLBUTRIN XL) 300 MG 24 hr tablet Take 300 mg by mouth daily       cholecalciferol (VITAMIN D3) 125 mcg (5000 units) capsule       docusate sodium (COLACE) 100 MG capsule Take 1 capsule (100 mg) by mouth 2 times daily as needed for constipation 90 capsule 4    EPINEPHrine (ANY BX GENERIC EQUIV) 0.3 MG/0.3ML injection 2-pack Inject 0.3 mg into the muscle      EPINEPHrine (EPIPEN/ADRENACLICK/OR ANY BX GENERIC EQUIV) 0.3 MG/0.3ML injection 2-pack Inject 0.3 mLs (0.3 mg) into the muscle once as needed for anaphylaxis 0.6 mL 0    hydrOXYzine (ATARAX) 50 MG tablet  (Patient not taking: Reported on 6/27/2024)      ibuprofen (ADVIL/MOTRIN) 600 MG tablet Take 1 tablet (600 mg) by mouth every 6 hours as needed (mild)  0    ketoconazole (EXTINA) 2 % external foam  (Patient not taking: Reported on 6/27/2024)      levonorgestrel (LILETTA, 52 MG,) 19.5 MCG/DAY IUD 1 each by Intrauterine route once      LORazepam (ATIVAN) 1 MG tablet Take 1 tablet 30 minutes before MRI scan 1 tablet 0    magnesium oxide (MAG-OX) 400 MG tablet Take 1 tablet (400 mg) by mouth daily 90 tablet 3    meclizine (ANTIVERT) 25 MG tablet Take 1 tablet by mouth daily at 2 pm      mometasone (ELOCON) 0.1 % external cream Apply topically daily (Patient not taking: Reported on 6/27/2024)      risperiDONE (RISPERDAL) 3 MG tablet       SENEXON-S 8.6-50 MG tablet 1-2 tabs twice a day as needed for constipation Orally for 30 days       sennosides (SENOKOT) 8.6 MG tablet       tirzepatide-Weight Management (ZEPBOUND) 12.5 MG/0.5ML prefilled pen Inject 0.5 mLs (12.5 mg) Subcutaneous every 7 days for 180 days 6 mL 1    traZODone (DESYREL) 150 MG tablet Take 150 mg by mouth At Bedtime           PHYSICAL EXAM     Vital signs  There were no vitals taken for this visit.    Weight:   0 lbs 0 oz    Obese young female who is alert and oriented vital signs are reviewed and documented in electronic medical record. Neck supple. Neurologically speech was normal. Cranial nerves II through XII are intact motor strength 5/5 reflexes 1+ toes downgoing. No dysmetria noted on finger-nose testing gait normal Romberg negative      PERTINENT DIAGNOSTIC STUDIES     Following studies were reviewed:     MRI BRAIN 7/30/2024  1.  No convincing evidence of epileptogenic focus identified.  2.  2 mm hypodensity in the posterior left aspect of the pituitary gland, could represent a microadenoma. Consider further evaluation with dedicated pituitary imaging if clinically indicated.  3.  No acute intracranial findings.    EEG 8/6/2024  Normal awake     PERTINENT LABS  Following labs were reviewed:  Lab Requisition on 06/13/2024   Component Date Value Ref Range Status    Prolactin 06/13/2024 76 (H)  5 - 23 ng/mL Final    Cholesterol 06/13/2024 163  <200 mg/dL Final    Triglycerides 06/13/2024 130  <150 mg/dL Final    Direct Measure HDL 06/13/2024 43 (L)  >=50 mg/dL Final    LDL Cholesterol Calculated 06/13/2024 94  <=100 mg/dL Final    Non HDL Cholesterol 06/13/2024 120  <130 mg/dL Final    Patient Fasting > 8hrs? 06/13/2024 Unknown   Final    Sodium 06/13/2024 139  135 - 145 mmol/L Final    Potassium 06/13/2024 4.3  3.4 - 5.3 mmol/L Final    Carbon Dioxide (CO2) 06/13/2024 21 (L)  22 - 29 mmol/L Final    Anion Gap 06/13/2024 11  7 - 15 mmol/L Final    Urea Nitrogen 06/13/2024 5.4 (L)  6.0 - 20.0 mg/dL Final    Creatinine 06/13/2024 0.97 (H)  0.51 - 0.95 mg/dL Final    GFR Estimate  06/13/2024 74  >60 mL/min/1.73m2 Final    Calcium 06/13/2024 8.7  8.6 - 10.0 mg/dL Final    Chloride 06/13/2024 107  98 - 107 mmol/L Final    Glucose 06/13/2024 84  70 - 99 mg/dL Final    Alkaline Phosphatase 06/13/2024 42  40 - 150 U/L Final    AST 06/13/2024 18  0 - 45 U/L Final    ALT 06/13/2024 8  0 - 50 U/L Final    Protein Total 06/13/2024 6.3 (L)  6.4 - 8.3 g/dL Final    Albumin 06/13/2024 3.8  3.5 - 5.2 g/dL Final    Bilirubin Total 06/13/2024 0.4  <=1.2 mg/dL Final    Patient Fasting > 8hrs? 06/13/2024 Unknown   Final         Total time spent for face to face visit, reviewing labs/imaging studies, counseling and coordination of care was: 30 Minutes spent on the date of the encounter doing chart review, review of outside records, review of test results, interpretation of tests, patient visit, and documentation     The longitudinal plan of care for the diagnosis(es)/condition(s) as documented were addressed during this visit. Due to the added complexity in care, I will continue to support Ronel in the subsequent management and with ongoing continuity of care.    This note was dictated using voice recognition software.  Any grammatical or context distortions are unintentional and inherent to the software.    No orders of the defined types were placed in this encounter.     New Prescriptions    No medications on file     Modified Medications    No medications on file

## 2024-08-06 ENCOUNTER — OFFICE VISIT (OUTPATIENT)
Dept: NEUROLOGY | Facility: CLINIC | Age: 44
End: 2024-08-06
Attending: PSYCHIATRY & NEUROLOGY
Payer: COMMERCIAL

## 2024-08-06 ENCOUNTER — LAB (OUTPATIENT)
Dept: LAB | Facility: HOSPITAL | Age: 44
End: 2024-08-06
Payer: COMMERCIAL

## 2024-08-06 VITALS
DIASTOLIC BLOOD PRESSURE: 58 MMHG | WEIGHT: 231 LBS | HEART RATE: 84 BPM | BODY MASS INDEX: 39.44 KG/M2 | SYSTOLIC BLOOD PRESSURE: 98 MMHG | HEIGHT: 64 IN

## 2024-08-06 DIAGNOSIS — D35.2 PITUITARY MICROADENOMA (H): ICD-10-CM

## 2024-08-06 DIAGNOSIS — R56.9 SEIZURE-LIKE ACTIVITY (H): Primary | ICD-10-CM

## 2024-08-06 LAB
CORTIS SERPL-MCNC: 8.7 UG/DL
FSH SERPL IRP2-ACNC: 9.7 MIU/ML
LH SERPL-ACNC: 5.3 MIU/ML
PROLACTIN SERPL 3RD IS-MCNC: 66 NG/ML (ref 5–23)
SHBG SERPL-SCNC: 40 NMOL/L (ref 30–135)

## 2024-08-06 PROCEDURE — 84146 ASSAY OF PROLACTIN: CPT

## 2024-08-06 PROCEDURE — 36415 COLL VENOUS BLD VENIPUNCTURE: CPT

## 2024-08-06 PROCEDURE — 84270 ASSAY OF SEX HORMONE GLOBUL: CPT

## 2024-08-06 PROCEDURE — 83002 ASSAY OF GONADOTROPIN (LH): CPT

## 2024-08-06 PROCEDURE — 82533 TOTAL CORTISOL: CPT

## 2024-08-06 PROCEDURE — 82024 ASSAY OF ACTH: CPT

## 2024-08-06 PROCEDURE — 83003 ASSAY GROWTH HORMONE (HGH): CPT

## 2024-08-06 PROCEDURE — 99213 OFFICE O/P EST LOW 20 MIN: CPT | Performed by: PSYCHIATRY & NEUROLOGY

## 2024-08-06 PROCEDURE — 84403 ASSAY OF TOTAL TESTOSTERONE: CPT

## 2024-08-06 PROCEDURE — 84305 ASSAY OF SOMATOMEDIN: CPT

## 2024-08-06 PROCEDURE — 83001 ASSAY OF GONADOTROPIN (FSH): CPT

## 2024-08-06 PROCEDURE — G2211 COMPLEX E/M VISIT ADD ON: HCPCS | Performed by: PSYCHIATRY & NEUROLOGY

## 2024-08-06 NOTE — LETTER
2024      Ronel Ryan  [address]  [facility] [address]      Dear Colleague,    Thank you for referring your patient, Ronel Ryan, to the [facility] [place]. Please see a copy of my visit note below.    NEUROLOGY FOLLOW UP VISIT  NOTE       [facility] [place]  [address] [place] [address]  Tel: [phone]  Fax: [phone]  [email or web address]     Ronel Ryan,  1980, MRN [id number]  PCP: Arvind Lane  Date: 2024      ASSESSMENT & PLAN     Visit Diagnosis  Seizure-like activity (H)  Pituitary microadenoma (H)     Seizure-like activity  Sellar mass  43-year-old female with history of depression with anxiety, eating disorder, bipolar disorder, HTN, HLD, osteoarthritis, GERD with more than 20 years history of generalized body twitches that usually occur at night.  She had MRI of the brain, lab work and EEG that was normal.  MRI brain however showed hypodensity in the posterior aspect of pituitary gland raising the possibility of a microadenoma.  Endocrine studies are pending.  I have recommended:    1.  Repeat MRI brain with sella protocol  2.  Lab work to include prolactin, testosterone, LH, insulin growth factor, human growth factor, FSH, cortisol, ACTH.  3.  If above tests are negative no further intervention will be needed from neurology standpoint    Thank you again for this referral, please feel free to contact me if you have any questions.    Jason Proctor MD  [facility] NEUROLOGY, [place]     HISTORY OF PRESENT ILLNESS     Patient is a 43-year-old female with history of depression with anxiety, eating disorder, bipolar disorder, HTN, HLD, osteoarthritis, GERD who reported 20-year history of generalized body twitches that usually occur at night and wake her up.  I suspected medication effect.  Lab work did not suggest any electrolyte abnormality.  MRI brain was done that did not show any  convincing evidence of an epileptogenic focus but a 2 mm hypodensity in the posterior aspect of the pituitary gland was noted raising the possibility of a microadenoma and a MRI with pituitary protocol is scheduled for 8/27/2024.  I had ordered endocrine lab work including prolactin, testosterone, LH, insulin growth factor, human growth hormone, FSH, cortisol and adrenal corticotropin.  Those tests are still pending EEG was normal.  She was also told to get MRI brain with sella protocol and it is pending.     PROBLEM LIST   Patient Active Problem List   Diagnosis     Binge eating     Eating disorder     Morbid obesity (H)     Multiple-type hyperlipidemia     Severe episode of recurrent major depressive disorder (H)     Hypertension     Trochanteric bursitis     Borderline personality disorder (H)     Attention deficit disorder     Asthma     Suicidal ideation     ACP (advance care planning)     Adjustment disorder with depressed mood     Anxiety     Moderate mixed bipolar I disorder (H)     Bipolar II disorder, most recent episode hypomanic (H)     Care plan discussed with patient     Hx of diabetes mellitus     Osteoarthritis     Relationship problems     Secondary amenorrhea     Hip dysplasia     Osteoarthritis of hip     Hyperlipidemia     Mixed hyperlipidemia     Type 2 diabetes mellitus (H)     Primary osteoarthritis of both hips     Degenerative joint disease (DJD) of hip     Status post total hip replacement, left     Gastroesophageal reflux disease with esophagitis without hemorrhage     Mixed incontinence         PAST MEDICAL & SURGICAL HISTORY     Past Medical History:   Patient  has a past medical history of ACP (advance care planning) (10/18/2012), ADD (attention deficit disorder) (11/27/2013), Adjustment disorder with depressed mood (12/5/2018), Allergic state, Anxiety, Arthralgia of hip (3/21/2016), Arthritis, Asthma, Attention deficit disorder (11/27/2013), Binge eating (11/27/2013), Bipolar 1  disorder (H) (11/8/2021), Bipolar 2 disorder (H), Bipolar II disorder, most recent episode hypomanic (H) (4/29/2015), Borderline high cholesterol (10/23/2020), Borderline personality disorder (H), Care plan discussed with patient (2/1/2012), Cholelithiasis, Constipation (11/8/2021), Degenerative joint disease (DJD) of hip (9/9/2021), Depressive disorder, Diabetes (H), Diabetes mellitus (H), Disorder of pelvis (9/14/2015), Eating disorder (3/13/2017), Gait difficulty (2/4/2019), Gastroesophageal reflux disease with esophagitis without hemorrhage (11/8/2021), GERD (gastroesophageal reflux disease), Greater trochanteric bursitis of both hips (10/27/2015), H/O constipation, H/O hypercholesterolemia, H/O urinary frequency (12/1/2016), Hip dysplasia, History of anxiety disorder (10/23/2020), History of urinary anomaly (12/1/2016), diabetes mellitus (5/21/2013), eating disorder (8/25/2016), Hyperlipidemia (8/25/2016), Hypertension, Left hip pain (3/21/2016), Major depression, Menstrual disorder, Mixed hyperlipidemia (3/21/2016), Mixed incontinence (11/8/2021), Moderate mixed bipolar I disorder (H) (5/10/2016), Morbid obesity (H) (12/22/2010), Morbid obesity with BMI of 50.0-59.9, adult (H) (8/25/2016), Multiple-type hyperlipidemia (3/21/2016), Obese, Osteoarthritis (10/23/2020), Osteoarthritis of hip (6/15/2015), Pain of both hip joints (6/30/2015), Primary localized osteoarthritis of pelvic region and thigh (11/8/2021), Primary osteoarthritis of both hips (10/30/2020), Primary osteoarthritis of left hip (6/15/2015), PTSD (post-traumatic stress disorder), Recurrent major depressive disorder (H24) (12/22/2010), Relationship problems (10/23/2020), Secondary amenorrhea (10/22/2012), Severe episode of recurrent major depressive disorder (H) (10/4/2012), Skin sensation disturbance (11/8/2021), Soft tissue lesion of shoulder region (2/27/2013), Status post total hip replacement, left (9/10/2021), Suicidal ideation (10/4/2017),  Trochanteric bursitis (10/27/2015), Type 2 diabetes mellitus (H) (8/12/2014), Uncomplicated asthma, Urinary incontinence, vertigo, and Vertigo (3/10/2017).    Surgical History:  She  has a past surgical history that includes Tonsillectomy; Cyst Removal; Ankle Fracture Surgery (Right); Colonoscopy; Mammo Stereotactic Core Biopsy Left (Left, 06/15/2020); Colonoscopy w/wo Brush **Performed** (N/A, 06/08/2021); orthopedic surgery; Arthroscopic reconstruction anterior cruciate ligament (Left); Closed reduction hip (Left, 9/9/2021); Arthroplasty hip (Left, 9/9/2021); Cholecystectomy; and Arthroplasty hip (Right, 4/4/2022).     SOCIAL HISTORY     Reviewed, and she  reports that she has never smoked. She has never used smokeless tobacco. She reports that she does not drink alcohol and does not use drugs.     FAMILY HISTORY     Reviewed, and family history includes Arthritis in her brother, maternal grandmother, and mother; Breast Cancer in her mother; Cancer in her maternal grandmother and mother; Cerebrovascular Disease in her maternal grandfather, maternal grandmother, and mother; Diabetes in her brother, brother, father, and mother; Heart Disease in her maternal grandfather; Hyperlipidemia in her brother, maternal grandfather, and mother; Hypertension in her brother, maternal grandfather, maternal grandmother, and mother; Mental Illness in her brother and brother; Obesity in her brother, maternal grandfather, maternal grandmother, mother, and sister; Pacemaker in her maternal grandfather; Seizure Disorder in her maternal grandfather; Substance Abuse in her maternal grandfather.     ALLERGIES     Allergies   Allergen Reactions     Cephalexin Anaphylaxis     Erythromycin Anaphylaxis and Hives     Other reaction(s): Unknown     Penicillins Hives     hives  Other reaction(s): Unknown         REVIEW OF SYSTEMS     A 12 point review of system was performed and was negative except as outlined in the history of present illness.      HOME MEDICATIONS     Current Outpatient Rx   Medication Sig Dispense Refill     ACETAMINOPHEN EXTRA STRENGTH 500 MG tablet        albuterol (PROAIR HFA/PROVENTIL HFA/VENTOLIN HFA) 108 (90 Base) MCG/ACT inhaler Inhale 2 puffs into the lungs every 6 hours as needed for shortness of breath, wheezing or cough 18 g 0     blood glucose (NO BRAND SPECIFIED) lancets standard Use to test blood sugar 2 times daily or as directed. (Patient not taking: Reported on 6/27/2024) 100 each 11     blood glucose monitoring (NO BRAND SPECIFIED) meter device kit Use to test blood sugar 2 times daily or as directed. (Patient not taking: Reported on 6/27/2024) 1 kit 0     blood glucose monitoring (NO BRAND SPECIFIED) test strip Use to test blood sugars 2 times daily or as directed (Patient not taking: Reported on 6/27/2024) 100 strip 3     buPROPion (WELLBUTRIN XL) 300 MG 24 hr tablet Take 300 mg by mouth daily        cholecalciferol (VITAMIN D3) 125 mcg (5000 units) capsule        docusate sodium (COLACE) 100 MG capsule Take 1 capsule (100 mg) by mouth 2 times daily as needed for constipation 90 capsule 4     EPINEPHrine (ANY BX GENERIC EQUIV) 0.3 MG/0.3ML injection 2-pack Inject 0.3 mg into the muscle       EPINEPHrine (EPIPEN/ADRENACLICK/OR ANY BX GENERIC EQUIV) 0.3 MG/0.3ML injection 2-pack Inject 0.3 mLs (0.3 mg) into the muscle once as needed for anaphylaxis 0.6 mL 0     hydrOXYzine (ATARAX) 50 MG tablet  (Patient not taking: Reported on 6/27/2024)       ibuprofen (ADVIL/MOTRIN) 600 MG tablet Take 1 tablet (600 mg) by mouth every 6 hours as needed (mild)  0     ketoconazole (EXTINA) 2 % external foam  (Patient not taking: Reported on 6/27/2024)       levonorgestrel (LILETTA, 52 MG,) 19.5 MCG/DAY IUD 1 each by Intrauterine route once       LORazepam (ATIVAN) 1 MG tablet Take 1 tablet 30 minutes before MRI scan 1 tablet 0     magnesium oxide (MAG-OX) 400 MG tablet Take 1 tablet (400 mg) by mouth daily 90 tablet 3     meclizine  (ANTIVERT) 25 MG tablet Take 1 tablet by mouth daily at 2 pm       mometasone (ELOCON) 0.1 % external cream Apply topically daily (Patient not taking: Reported on 6/27/2024)       risperiDONE (RISPERDAL) 3 MG tablet        SENEXON-S 8.6-50 MG tablet 1-2 tabs twice a day as needed for constipation Orally for 30 days       sennosides (SENOKOT) 8.6 MG tablet        tirzepatide-Weight Management (ZEPBOUND) 12.5 MG/0.5ML prefilled pen Inject 0.5 mLs (12.5 mg) Subcutaneous every 7 days for 180 days 6 mL 1     traZODone (DESYREL) 150 MG tablet Take 150 mg by mouth At Bedtime           PHYSICAL EXAM     Vital signs  There were no vitals taken for this visit.    Weight:   0 lbs 0 oz    Obese young female who is alert and oriented vital signs are reviewed and documented in electronic medical record. Neck supple. Neurologically speech was normal. Cranial nerves II through XII are intact motor strength 5/5 reflexes 1+ toes downgoing. No dysmetria noted on finger-nose testing gait normal Romberg negative      PERTINENT DIAGNOSTIC STUDIES     Following studies were reviewed:     MRI BRAIN 7/30/2024  1.  No convincing evidence of epileptogenic focus identified.  2.  2 mm hypodensity in the posterior left aspect of the pituitary gland, could represent a microadenoma. Consider further evaluation with dedicated pituitary imaging if clinically indicated.  3.  No acute intracranial findings.    EEG 8/6/2024  Normal awake     PERTINENT LABS  Following labs were reviewed:  Lab Requisition on 06/13/2024   Component Date Value Ref Range Status     Prolactin 06/13/2024 76 (H)  5 - 23 ng/mL Final     Cholesterol 06/13/2024 163  <200 mg/dL Final     Triglycerides 06/13/2024 130  <150 mg/dL Final     Direct Measure HDL 06/13/2024 43 (L)  >=50 mg/dL Final     LDL Cholesterol Calculated 06/13/2024 94  <=100 mg/dL Final     Non HDL Cholesterol 06/13/2024 120  <130 mg/dL Final     Patient Fasting > 8hrs? 06/13/2024 Unknown   Final     Sodium  06/13/2024 139  135 - 145 mmol/L Final     Potassium 06/13/2024 4.3  3.4 - 5.3 mmol/L Final     Carbon Dioxide (CO2) 06/13/2024 21 (L)  22 - 29 mmol/L Final     Anion Gap 06/13/2024 11  7 - 15 mmol/L Final     Urea Nitrogen 06/13/2024 5.4 (L)  6.0 - 20.0 mg/dL Final     Creatinine 06/13/2024 0.97 (H)  0.51 - 0.95 mg/dL Final     GFR Estimate 06/13/2024 74  >60 mL/min/1.73m2 Final     Calcium 06/13/2024 8.7  8.6 - 10.0 mg/dL Final     Chloride 06/13/2024 107  98 - 107 mmol/L Final     Glucose 06/13/2024 84  70 - 99 mg/dL Final     Alkaline Phosphatase 06/13/2024 42  40 - 150 U/L Final     AST 06/13/2024 18  0 - 45 U/L Final     ALT 06/13/2024 8  0 - 50 U/L Final     Protein Total 06/13/2024 6.3 (L)  6.4 - 8.3 g/dL Final     Albumin 06/13/2024 3.8  3.5 - 5.2 g/dL Final     Bilirubin Total 06/13/2024 0.4  <=1.2 mg/dL Final     Patient Fasting > 8hrs? 06/13/2024 Unknown   Final         Total time spent for face to face visit, reviewing labs/imaging studies, counseling and coordination of care was: 30 Minutes spent on the date of the encounter doing chart review, review of outside records, review of test results, interpretation of tests, patient visit, and documentation     The longitudinal plan of care for the diagnosis(es)/condition(s) as documented were addressed during this visit. Due to the added complexity in care, I will continue to support Ronel in the subsequent management and with ongoing continuity of care.    This note was dictated using voice recognition software.  Any grammatical or context distortions are unintentional and inherent to the software.    No orders of the defined types were placed in this encounter.     New Prescriptions    No medications on file     Modified Medications    No medications on file                 Again, thank you for allowing me to participate in the care of your patient.        Sincerely,        Jason Proctor MD

## 2024-08-06 NOTE — NURSING NOTE
Chief Complaint   Patient presents with    Results     Discuss EEG results. MRI for pituitary is scheduled on 8/27/24.     Joie Mathis LPN on 8/6/2024 at 9:10 AM

## 2024-08-07 LAB
ACTH PLAS-MCNC: 36 PG/ML
GH SERPL-MCNC: 4.9 UG/L

## 2024-08-08 LAB
TESTOST FREE SERPL-MCNC: 0.19 NG/DL
TESTOST SERPL-MCNC: 12 NG/DL (ref 8–60)

## 2024-08-09 LAB — IGF-I BLD-MCNC: 233 NG/ML (ref 69–253)

## 2024-08-09 NOTE — RESULT ENCOUNTER NOTE
Alexandru Rodney    Endocrine tests are normal except for elevated prolactin that was noted to even 3 years ago.  Further recommendation after MRI scan is completed    Regards,  Jason Proctor MD

## 2024-08-27 ENCOUNTER — HOSPITAL ENCOUNTER (OUTPATIENT)
Dept: MRI IMAGING | Facility: HOSPITAL | Age: 44
Discharge: HOME | End: 2024-08-27
Attending: PSYCHIATRY & NEUROLOGY | Admitting: PSYCHIATRY & NEUROLOGY
Payer: COMMERCIAL

## 2024-08-27 DIAGNOSIS — D35.2 PITUITARY MICROADENOMA (H): ICD-10-CM

## 2024-08-27 PROCEDURE — 70553 MRI BRAIN STEM W/O & W/DYE: CPT

## 2024-08-27 PROCEDURE — A9585 GADOBUTROL INJECTION: HCPCS | Performed by: PSYCHIATRY & NEUROLOGY

## 2024-08-27 PROCEDURE — 255N000002 HC RX 255 OP 636: Performed by: PSYCHIATRY & NEUROLOGY

## 2024-08-27 RX ORDER — GADOBUTROL 604.72 MG/ML
0.1 INJECTION INTRAVENOUS ONCE
Status: COMPLETED | OUTPATIENT
Start: 2024-08-27 | End: 2024-08-27

## 2024-08-27 RX ADMIN — GADOBUTROL 10 ML: 604.72 INJECTION INTRAVENOUS at 14:12

## 2024-08-28 NOTE — RESULT ENCOUNTER NOTE
Dear Ronel    MRI pituitary gland suggests cyst and findings are not typical for microadenoma.  However your endocrine studies show an elevated prolactin level that was present 3 years ago.  I would recommend to make an appointment to discuss next steps.    Regards,  Jason Proctor MD

## 2024-09-01 ENCOUNTER — HEALTH MAINTENANCE LETTER (OUTPATIENT)
Age: 44
End: 2024-09-01

## 2024-09-03 ENCOUNTER — TELEPHONE (OUTPATIENT)
Dept: SURGERY | Facility: CLINIC | Age: 44
End: 2024-09-03
Payer: COMMERCIAL

## 2024-09-03 VITALS — BODY MASS INDEX: 40.51 KG/M2 | WEIGHT: 236 LBS

## 2024-09-03 NOTE — TELEPHONE ENCOUNTER
Prior Authorization Retail Medication Request    Medication/Dose: Zepbound 12.5 mg  New/renewal/insurance change PA/secondary ins. PA:  renewal  Rationale:  Been taking the Zepbound and it has been working well for her.  Following with the [facility]  Patient will take last dose on 9/6/2024 and then she will be out.    Insurance   [facility]  Ins[facility] ID:  [id number]    Pharmacy Information (if different than what is on RX)  [facility]  [facility]  Phone:  [phone]  Fax: [phone]

## 2024-09-04 NOTE — TELEPHONE ENCOUNTER
PA Initiation    Medication: ZEPBOUND 12.5 MG/0.5ML SC SOAJ  Insurance Company: [employer] - Phone [phone] Fax [phone]  [facility]illing [facility] Rx[facility] [address] [place] [address]  Filling Pharmacy Phone: [phone]  Start Date: 9/4/2024

## 2024-09-06 NOTE — TELEPHONE ENCOUNTER
Prior Authorization Approval    Medication: ZEPBOUND 12.5 MG/0.5ML SC SOAJ  Authorization Effective Date: 9/6/2024  Authorization Expiration Date: 9/6/2025  Approved Dose/Quantity: 2 ml/28 ds  Reference #: [id number]   Insurance Company: [employer] - Phone [phone] Fax [phone]  Expected CoPay: $    CoPay Card Available:      Financial Assistance Needed: NA  Which Pharmacy is filling the prescription: [facility], [place] - [address] [place] [address]  Pharmacy Notified: Yes  Patient Notified: Yes, via [facility]

## 2024-09-09 PROBLEM — E22.1 HYPERPROLACTINEMIA (H): Status: ACTIVE | Noted: 2024-09-09

## 2024-09-09 PROBLEM — D35.2 PITUITARY MICROADENOMA (H): Status: ACTIVE | Noted: 2024-09-09

## 2024-09-09 NOTE — PROGRESS NOTES
NEUROLOGY FOLLOW UP VISIT  NOTE       [facility] NEUROLOGY [place]  [address] [place] [address]  Tel: [phone]  Fax: [phone]  [email or web address]     Ronel Ryan,  1980, MRN [id number]  PCP: Arvind Lane  Date: 2024      ASSESSMENT & PLAN     Visit Diagnosis  Pituitary microadenoma (H)  Hyperprolactinemia (H24)     Pituitary cyst  44-year-old female with history of depression with anxiety, eating disorder, bipolar disorder, HTN, HLD, osteoarthritis, GERD who was initially evaluated for more than 20 years history of generalized body twitches that occur at night.  She had MRI of the brain, EEG that was normal but MRI showed hypodensity in the posterior aspect of the pituitary gland raising the possibility of a microadenoma.  She had MRI of the pituitary gland that suggested a cyst and was not typical for pituitary microadenoma but she had an endocrine studies done and was noted to have elevated prolactin.  She reports that even 3 years ago she was noted to have elevated prolactin as she was on Risperdal and had galactorrhea.  Dose of Risperdal was adjusted and since she had no such issues.  I do suspect her elevated prolactin level is due to respite all and at present I have recommended no intervention.  I plan on seeing her back in follow-up in 1 year when MRI of the pituitary gland with sella protocol will be repeated.  She will also discuss with her psychiatrist to consider a different antipsychotic for her bipolar disorder.  Follow-up will be in 1 year.    Thank you again for this referral, please feel free to contact me if you have any questions.    Jason Proctor MD  [facility] NEUROLOGY, [place]     HISTORY OF PRESENT ILLNESS     Patient is a 44-year-old female with history of depression and anxiety, eating disorder, bipolar disorder, HTN, HLD, osteoarthritis, GERD who was last seen on 2024 with more than 20 years history of generalized body  twitches that usually occur at night.  She had MRI of the brain and lab work in addition to EEG to delineate the cause of myoclonic twitches and to rule out seizure that were normal.  MRI brain however showed hypodensity in the posterior aspect of the pituitary gland raising the possibility of a microadenoma.  Subsequently she had MRI of the pituitary gland that suggest cyst and was not typical for microadenoma but her endocrine studies showed elevated prolactin level that was present even 3 years ago.  Rest of the endocrine studies were normal.  Patient reports that she had galactorrhea more than 3 years ago and dose of Risperdal was adjusted that seem to help.  She is still on Risperdal that likely is the cause for elevated prolactin    Previously she was evaluated in our clinic in 2020 for numbness in the right thigh that would come and go and she also had some tremors and monophasic jerking movement in the arm that would happen mostly at night.  This would wake her up and sound similar to her recent issues.     PROBLEM LIST   Patient Active Problem List   Diagnosis    Binge eating    Eating disorder    Morbid obesity (H)    Multiple-type hyperlipidemia    Severe episode of recurrent major depressive disorder (H)    Hypertension    Trochanteric bursitis    Borderline personality disorder (H)    Attention deficit disorder    Asthma    Suicidal ideation    ACP (advance care planning)    Adjustment disorder with depressed mood    Anxiety    Moderate mixed bipolar I disorder (H)    Bipolar II disorder, most recent episode hypomanic (H)    Care plan discussed with patient    Hx of diabetes mellitus    Osteoarthritis    Relationship problems    Secondary amenorrhea    Hip dysplasia    Osteoarthritis of hip    Hyperlipidemia    Mixed hyperlipidemia    Type 2 diabetes mellitus (H)    Primary osteoarthritis of both hips    Degenerative joint disease (DJD) of hip    Status post total hip replacement, left     Gastroesophageal reflux disease with esophagitis without hemorrhage    Mixed incontinence    Pituitary microadenoma (H)    Hyperprolactinemia (H24)         PAST MEDICAL & SURGICAL HISTORY     Past Medical History:   Patient  has a past medical history of ACP (advance care planning) (10/18/2012), ADD (attention deficit disorder) (11/27/2013), Adjustment disorder with depressed mood (12/5/2018), Allergic state, Anxiety, Arthralgia of hip (3/21/2016), Arthritis, Asthma, Attention deficit disorder (11/27/2013), Binge eating (11/27/2013), Bipolar 1 disorder (H) (11/8/2021), Bipolar 2 disorder (H), Bipolar II disorder, most recent episode hypomanic (H) (4/29/2015), Borderline high cholesterol (10/23/2020), Borderline personality disorder (H), Care plan discussed with patient (2/1/2012), Cholelithiasis, Constipation (11/8/2021), Degenerative joint disease (DJD) of hip (9/9/2021), Depressive disorder, Diabetes (H), Diabetes mellitus (H), Disorder of pelvis (9/14/2015), Eating disorder (3/13/2017), Gait difficulty (2/4/2019), Gastroesophageal reflux disease with esophagitis without hemorrhage (11/8/2021), GERD (gastroesophageal reflux disease), Greater trochanteric bursitis of both hips (10/27/2015), H/O constipation, H/O hypercholesterolemia, H/O urinary frequency (12/1/2016), Hip dysplasia, History of anxiety disorder (10/23/2020), History of urinary anomaly (12/1/2016), diabetes mellitus (5/21/2013), eating disorder (8/25/2016), Hyperlipidemia (8/25/2016), Hypertension, Left hip pain (3/21/2016), Major depression, Menstrual disorder, Mixed hyperlipidemia (3/21/2016), Mixed incontinence (11/8/2021), Moderate mixed bipolar I disorder (H) (5/10/2016), Morbid obesity (H) (12/22/2010), Morbid obesity with BMI of 50.0-59.9, adult (H) (8/25/2016), Multiple-type hyperlipidemia (3/21/2016), Obese, Osteoarthritis (10/23/2020), Osteoarthritis of hip (6/15/2015), Pain of both hip joints (6/30/2015), Primary localized osteoarthritis  of pelvic region and thigh (11/8/2021), Primary osteoarthritis of both hips (10/30/2020), Primary osteoarthritis of left hip (6/15/2015), PTSD (post-traumatic stress disorder), Recurrent major depressive disorder (H24) (12/22/2010), Relationship problems (10/23/2020), Secondary amenorrhea (10/22/2012), Severe episode of recurrent major depressive disorder (H) (10/4/2012), Skin sensation disturbance (11/8/2021), Soft tissue lesion of shoulder region (2/27/2013), Status post total hip replacement, left (9/10/2021), Suicidal ideation (10/4/2017), Trochanteric bursitis (10/27/2015), Type 2 diabetes mellitus (H) (8/12/2014), Uncomplicated asthma, Urinary incontinence, vertigo, and Vertigo (3/10/2017).    Surgical History:  She  has a past surgical history that includes Tonsillectomy; Cyst Removal; Ankle Fracture Surgery (Right); Colonoscopy; Mammo Stereotactic Core Biopsy Left (Left, 06/15/2020); Colonoscopy w/wo Brush **Performed** (N/A, 06/08/2021); orthopedic surgery; Arthroscopic reconstruction anterior cruciate ligament (Left); Closed reduction hip (Left, 9/9/2021); Arthroplasty hip (Left, 9/9/2021); Cholecystectomy; and Arthroplasty hip (Right, 4/4/2022).     SOCIAL HISTORY     Reviewed, and she  reports that she has never smoked. She has never used smokeless tobacco. She reports that she does not drink alcohol and does not use drugs.     FAMILY HISTORY     Reviewed, and family history includes Arthritis in her brother, maternal grandmother, and mother; Breast Cancer in her mother; Cancer in her maternal grandmother and mother; Cerebrovascular Disease in her maternal grandfather, maternal grandmother, and mother; Diabetes in her brother, brother, father, and mother; Heart Disease in her maternal grandfather; Hyperlipidemia in her brother, maternal grandfather, and mother; Hypertension in her brother, maternal grandfather, maternal grandmother, and mother; Mental Illness in her brother and brother; Obesity in her  brother, maternal grandfather, maternal grandmother, mother, and sister; Pacemaker in her maternal grandfather; Seizure Disorder in her maternal grandfather; Substance Abuse in her maternal grandfather.     ALLERGIES     Allergies   Allergen Reactions    Cephalexin Anaphylaxis    Erythromycin Anaphylaxis and Hives     Other reaction(s): Unknown    Penicillins Hives     hives  Other reaction(s): Unknown         REVIEW OF SYSTEMS     A 12 point review of system was performed and was negative except as outlined in the history of present illness.     HOME MEDICATIONS     Current Outpatient Rx   Medication Sig Dispense Refill    albuterol (PROAIR HFA/PROVENTIL HFA/VENTOLIN HFA) 108 (90 Base) MCG/ACT inhaler Inhale 2 puffs into the lungs every 6 hours as needed for shortness of breath, wheezing or cough 18 g 0    buPROPion (WELLBUTRIN XL) 300 MG 24 hr tablet Take 300 mg by mouth daily       cholecalciferol (VITAMIN D3) 125 mcg (5000 units) capsule       docusate sodium (COLACE) 100 MG capsule Take 1 capsule (100 mg) by mouth 2 times daily as needed for constipation 90 capsule 4    EPINEPHrine (ANY BX GENERIC EQUIV) 0.3 MG/0.3ML injection 2-pack Inject 0.3 mg into the muscle      EPINEPHrine (EPIPEN/ADRENACLICK/OR ANY BX GENERIC EQUIV) 0.3 MG/0.3ML injection 2-pack Inject 0.3 mLs (0.3 mg) into the muscle once as needed for anaphylaxis 0.6 mL 0    ketoconazole (EXTINA) 2 % external foam       levonorgestrel (LILETTA, 52 MG,) 19.5 MCG/DAY IUD 1 each by Intrauterine route once      LORazepam (ATIVAN) 0.5 MG tablet 0.5 mg every 6 hours as needed for anxiety.      magnesium oxide (MAG-OX) 400 MG tablet Take 1 tablet (400 mg) by mouth daily 90 tablet 3    meclizine (ANTIVERT) 25 MG tablet Take 1 tablet by mouth daily at 2 pm      risperiDONE (RISPERDAL) 3 MG tablet       sennosides (SENOKOT) 8.6 MG tablet       tirzepatide-Weight Management (ZEPBOUND) 12.5 MG/0.5ML prefilled pen Inject 0.5 mLs (12.5 mg) Subcutaneous every 7 days  "for 180 days 6 mL 1    traZODone (DESYREL) 150 MG tablet Take 150 mg by mouth At Bedtime           PHYSICAL EXAM     Vital signs  /68   Pulse 79   Ht 1.626 m (5' 4\")   Wt 107 kg (236 lb)   BMI 40.51 kg/m      Weight:   236 lbs 0 oz    Obese young female who is alert and oriented vital signs are reviewed and documented in electronic medical record. Neck supple. Neurologically speech was normal. Cranial nerves II through XII are intact motor strength 5/5 reflexes 1+ toes downgoing. No dysmetria noted on finger-nose testing gait normal Romberg negative      PERTINENT DIAGNOSTIC STUDIES     Following studies were reviewed:     MRI PITUITARY GLAND 8/27/2024   1.  Findings in the pituitary gland are most suggestive of a small pituitary cyst such as a pars intermedia cyst. There is essentially complete lack of enhancement in this area as opposed to hypoenhancement which would be more suggestive of a   microadenoma. Correlate with lab markers.    MRI BRAIN 7/30/2024  1.  No convincing evidence of epileptogenic focus identified.  2.  2 mm hypodensity in the posterior left aspect of the pituitary gland, could represent a microadenoma. Consider further evaluation with dedicated pituitary imaging if clinically indicated.  3.  No acute intracranial findings.     EEG 8/6/2024  Normal awake     PERTINENT LABS  Following labs were reviewed:  Lab on 08/06/2024   Component Date Value Ref Range Status    Adrenal Corticotropin 08/06/2024 36  <47 pg/mL Final    Cortisol 08/06/2024 8.7    ug/dL Final    FSH 08/06/2024 9.7  mIU/mL Final    Human Growth Hormone 08/06/2024 4.9  <7.0 ug/L Final    Insulin Like Growth Factor 1 08/06/2024 233  69 - 253 ng/mL Final    Luteinizing Hormone 08/06/2024 5.3  mIU/mL Final    Prolactin 08/06/2024 66 (H)  5 - 23 ng/mL Final    Sex Hormone Binding Globulin 08/06/2024 40  30 - 135 nmol/L Final    Free Testosterone Calculated 08/06/2024 0.19  ng/dL Final    Testosterone Total 08/06/2024 12  8 - " 60 ng/dL Final   Lab Requisition on 06/13/2024   Component Date Value Ref Range Status    Prolactin 06/13/2024 76 (H)  5 - 23 ng/mL Final    Cholesterol 06/13/2024 163  <200 mg/dL Final    Triglycerides 06/13/2024 130  <150 mg/dL Final    Direct Measure HDL 06/13/2024 43 (L)  >=50 mg/dL Final    LDL Cholesterol Calculated 06/13/2024 94  <=100 mg/dL Final    Non HDL Cholesterol 06/13/2024 120  <130 mg/dL Final    Patient Fasting > 8hrs? 06/13/2024 Unknown   Final    Sodium 06/13/2024 139  135 - 145 mmol/L Final    Potassium 06/13/2024 4.3  3.4 - 5.3 mmol/L Final    Carbon Dioxide (CO2) 06/13/2024 21 (L)  22 - 29 mmol/L Final    Anion Gap 06/13/2024 11  7 - 15 mmol/L Final    Urea Nitrogen 06/13/2024 5.4 (L)  6.0 - 20.0 mg/dL Final    Creatinine 06/13/2024 0.97 (H)  0.51 - 0.95 mg/dL Final    GFR Estimate 06/13/2024 74  >60 mL/min/1.73m2 Final    Calcium 06/13/2024 8.7  8.6 - 10.0 mg/dL Final    Chloride 06/13/2024 107  98 - 107 mmol/L Final    Glucose 06/13/2024 84  70 - 99 mg/dL Final    Alkaline Phosphatase 06/13/2024 42  40 - 150 U/L Final    AST 06/13/2024 18  0 - 45 U/L Final    ALT 06/13/2024 8  0 - 50 U/L Final    Protein Total 06/13/2024 6.3 (L)  6.4 - 8.3 g/dL Final    Albumin 06/13/2024 3.8  3.5 - 5.2 g/dL Final    Bilirubin Total 06/13/2024 0.4  <=1.2 mg/dL Final    Patient Fasting > 8hrs? 06/13/2024 Unknown   Final         Total time spent for face to face visit, reviewing labs/imaging studies, counseling and coordination of care was: 30 Minutes spent on the date of the encounter doing chart review, review of outside records, review of test results, interpretation of tests, patient visit, and documentation     The longitudinal plan of care for the diagnosis(es)/condition(s) as documented were addressed during this visit. Due to the added complexity in care, I will continue to support Ronel in the subsequent management and with ongoing continuity of care.    This note was dictated using voice recognition  software.  Any grammatical or context distortions are unintentional and inherent to the software.    Orders Placed This Encounter   Procedures    MR Pituitary w/o & w Contrast      New Prescriptions    No medications on file     Modified Medications    No medications on file

## 2024-09-11 ENCOUNTER — OFFICE VISIT (OUTPATIENT)
Dept: NEUROLOGY | Facility: CLINIC | Age: 44
End: 2024-09-11
Payer: COMMERCIAL

## 2024-09-11 VITALS
HEART RATE: 79 BPM | WEIGHT: 236 LBS | SYSTOLIC BLOOD PRESSURE: 107 MMHG | DIASTOLIC BLOOD PRESSURE: 68 MMHG | BODY MASS INDEX: 40.29 KG/M2 | HEIGHT: 64 IN

## 2024-09-11 DIAGNOSIS — D35.2 PITUITARY MICROADENOMA (H): Primary | ICD-10-CM

## 2024-09-11 DIAGNOSIS — E22.1 HYPERPROLACTINEMIA (H): ICD-10-CM

## 2024-09-11 PROCEDURE — G2211 COMPLEX E/M VISIT ADD ON: HCPCS | Performed by: PSYCHIATRY & NEUROLOGY

## 2024-09-11 PROCEDURE — 99214 OFFICE O/P EST MOD 30 MIN: CPT | Performed by: PSYCHIATRY & NEUROLOGY

## 2024-09-11 RX ORDER — LORAZEPAM 0.5 MG/1
0.5 TABLET ORAL EVERY 6 HOURS PRN
COMMUNITY
Start: 2024-09-05

## 2024-09-11 NOTE — LETTER
2024      Ronel Ryan  [address]  [facility] [address]      Dear Colleague,    Thank you for referring your patient, Ronel Ryan, to the [facility] [place]. Please see a copy of my visit note below.    NEUROLOGY FOLLOW UP VISIT  NOTE       [facility] [place]  [address] [place] [address]  Tel: [phone]  Fax: [phone]  [email or web address]     Ronel Ryan,  1980, MRN [id number]  PCP: Arvind Lane  Date: 2024      ASSESSMENT & PLAN     Visit Diagnosis  Pituitary microadenoma (H)  Hyperprolactinemia (H24)     Pituitary cyst  44-year-old female with history of depression with anxiety, eating disorder, bipolar disorder, HTN, HLD, osteoarthritis, GERD who was initially evaluated for more than 20 years history of generalized body twitches that occur at night.  She had MRI of the brain, EEG that was normal but MRI showed hypodensity in the posterior aspect of the pituitary gland raising the possibility of a microadenoma.  She had MRI of the pituitary gland that suggested a cyst and was not typical for pituitary microadenoma but she had an endocrine studies done and was noted to have elevated prolactin.  She reports that even 3 years ago she was noted to have elevated prolactin as she was on Risperdal and had galactorrhea.  Dose of Risperdal was adjusted and since she had no such issues.  I do suspect her elevated prolactin level is due to respite all and at present I have recommended no intervention.  I plan on seeing her back in follow-up in 1 year when MRI of the pituitary gland with sella protocol will be repeated.  She will also discuss with her psychiatrist to consider a different antipsychotic for her bipolar disorder.  Follow-up will be in 1 year.    Thank you again for this referral, please feel free to contact me if you have any questions.    Jason Proctor MD  [facility]UR[facility], [place]      HISTORY OF PRESENT ILLNESS     Patient is a 44-year-old female with history of depression and anxiety, eating disorder, bipolar disorder, HTN, HLD, osteoarthritis, GERD who was last seen on 8/6/2024 with more than 20 years history of generalized body twitches that usually occur at night.  She had MRI of the brain and lab work in addition to EEG to delineate the cause of myoclonic twitches and to rule out seizure that were normal.  MRI brain however showed hypodensity in the posterior aspect of the pituitary gland raising the possibility of a microadenoma.  Subsequently she had MRI of the pituitary gland that suggest cyst and was not typical for microadenoma but her endocrine studies showed elevated prolactin level that was present even 3 years ago.  Rest of the endocrine studies were normal.  Patient reports that she had galactorrhea more than 3 years ago and dose of Risperdal was adjusted that seem to help.  She is still on Risperdal that likely is the cause for elevated prolactin    Previously she was evaluated in our clinic in 2020 for numbness in the right thigh that would come and go and she also had some tremors and monophasic jerking movement in the arm that would happen mostly at night.  This would wake her up and sound similar to her recent issues.     PROBLEM LIST   Patient Active Problem List   Diagnosis     Binge eating     Eating disorder     Morbid obesity (H)     Multiple-type hyperlipidemia     Severe episode of recurrent major depressive disorder (H)     Hypertension     Trochanteric bursitis     Borderline personality disorder (H)     Attention deficit disorder     Asthma     Suicidal ideation     ACP (advance care planning)     Adjustment disorder with depressed mood     Anxiety     Moderate mixed bipolar I disorder (H)     Bipolar II disorder, most recent episode hypomanic (H)     Care plan discussed with patient     Hx of diabetes mellitus     Osteoarthritis     Relationship problems      Secondary amenorrhea     Hip dysplasia     Osteoarthritis of hip     Hyperlipidemia     Mixed hyperlipidemia     Type 2 diabetes mellitus (H)     Primary osteoarthritis of both hips     Degenerative joint disease (DJD) of hip     Status post total hip replacement, left     Gastroesophageal reflux disease with esophagitis without hemorrhage     Mixed incontinence     Pituitary microadenoma (H)     Hyperprolactinemia (H24)         PAST MEDICAL & SURGICAL HISTORY     Past Medical History:   Patient  has a past medical history of ACP (advance care planning) (10/18/2012), ADD (attention deficit disorder) (11/27/2013), Adjustment disorder with depressed mood (12/5/2018), Allergic state, Anxiety, Arthralgia of hip (3/21/2016), Arthritis, Asthma, Attention deficit disorder (11/27/2013), Binge eating (11/27/2013), Bipolar 1 disorder (H) (11/8/2021), Bipolar 2 disorder (H), Bipolar II disorder, most recent episode hypomanic (H) (4/29/2015), Borderline high cholesterol (10/23/2020), Borderline personality disorder (H), Care plan discussed with patient (2/1/2012), Cholelithiasis, Constipation (11/8/2021), Degenerative joint disease (DJD) of hip (9/9/2021), Depressive disorder, Diabetes (H), Diabetes mellitus (H), Disorder of pelvis (9/14/2015), Eating disorder (3/13/2017), Gait difficulty (2/4/2019), Gastroesophageal reflux disease with esophagitis without hemorrhage (11/8/2021), GERD (gastroesophageal reflux disease), Greater trochanteric bursitis of both hips (10/27/2015), H/O constipation, H/O hypercholesterolemia, H/O urinary frequency (12/1/2016), Hip dysplasia, History of anxiety disorder (10/23/2020), History of urinary anomaly (12/1/2016), diabetes mellitus (5/21/2013), eating disorder (8/25/2016), Hyperlipidemia (8/25/2016), Hypertension, Left hip pain (3/21/2016), Major depression, Menstrual disorder, Mixed hyperlipidemia (3/21/2016), Mixed incontinence (11/8/2021), Moderate mixed bipolar I disorder (H) (5/10/2016),  Morbid obesity (H) (12/22/2010), Morbid obesity with BMI of 50.0-59.9, adult (H) (8/25/2016), Multiple-type hyperlipidemia (3/21/2016), Obese, Osteoarthritis (10/23/2020), Osteoarthritis of hip (6/15/2015), Pain of both hip joints (6/30/2015), Primary localized osteoarthritis of pelvic region and thigh (11/8/2021), Primary osteoarthritis of both hips (10/30/2020), Primary osteoarthritis of left hip (6/15/2015), PTSD (post-traumatic stress disorder), Recurrent major depressive disorder (H24) (12/22/2010), Relationship problems (10/23/2020), Secondary amenorrhea (10/22/2012), Severe episode of recurrent major depressive disorder (H) (10/4/2012), Skin sensation disturbance (11/8/2021), Soft tissue lesion of shoulder region (2/27/2013), Status post total hip replacement, left (9/10/2021), Suicidal ideation (10/4/2017), Trochanteric bursitis (10/27/2015), Type 2 diabetes mellitus (H) (8/12/2014), Uncomplicated asthma, Urinary incontinence, vertigo, and Vertigo (3/10/2017).    Surgical History:  She  has a past surgical history that includes Tonsillectomy; Cyst Removal; Ankle Fracture Surgery (Right); Colonoscopy; Mammo Stereotactic Core Biopsy Left (Left, 06/15/2020); Colonoscopy w/wo Brush **Performed** (N/A, 06/08/2021); orthopedic surgery; Arthroscopic reconstruction anterior cruciate ligament (Left); Closed reduction hip (Left, 9/9/2021); Arthroplasty hip (Left, 9/9/2021); Cholecystectomy; and Arthroplasty hip (Right, 4/4/2022).     SOCIAL HISTORY     Reviewed, and she  reports that she has never smoked. She has never used smokeless tobacco. She reports that she does not drink alcohol and does not use drugs.     FAMILY HISTORY     Reviewed, and family history includes Arthritis in her brother, maternal grandmother, and mother; Breast Cancer in her mother; Cancer in her maternal grandmother and mother; Cerebrovascular Disease in her maternal grandfather, maternal grandmother, and mother; Diabetes in her brother,  brother, father, and mother; Heart Disease in her maternal grandfather; Hyperlipidemia in her brother, maternal grandfather, and mother; Hypertension in her brother, maternal grandfather, maternal grandmother, and mother; Mental Illness in her brother and brother; Obesity in her brother, maternal grandfather, maternal grandmother, mother, and sister; Pacemaker in her maternal grandfather; Seizure Disorder in her maternal grandfather; Substance Abuse in her maternal grandfather.     ALLERGIES     Allergies   Allergen Reactions     Cephalexin Anaphylaxis     Erythromycin Anaphylaxis and Hives     Other reaction(s): Unknown     Penicillins Hives     hives  Other reaction(s): Unknown         REVIEW OF SYSTEMS     A 12 point review of system was performed and was negative except as outlined in the history of present illness.     HOME MEDICATIONS     Current Outpatient Rx   Medication Sig Dispense Refill     albuterol (PROAIR HFA/PROVENTIL HFA/VENTOLIN HFA) 108 (90 Base) MCG/ACT inhaler Inhale 2 puffs into the lungs every 6 hours as needed for shortness of breath, wheezing or cough 18 g 0     buPROPion (WELLBUTRIN XL) 300 MG 24 hr tablet Take 300 mg by mouth daily        cholecalciferol (VITAMIN D3) 125 mcg (5000 units) capsule        docusate sodium (COLACE) 100 MG capsule Take 1 capsule (100 mg) by mouth 2 times daily as needed for constipation 90 capsule 4     EPINEPHrine (ANY BX GENERIC EQUIV) 0.3 MG/0.3ML injection 2-pack Inject 0.3 mg into the muscle       EPINEPHrine (EPIPEN/ADRENACLICK/OR ANY BX GENERIC EQUIV) 0.3 MG/0.3ML injection 2-pack Inject 0.3 mLs (0.3 mg) into the muscle once as needed for anaphylaxis 0.6 mL 0     ketoconazole (EXTINA) 2 % external foam        levonorgestrel (LILETTA, 52 MG,) 19.5 MCG/DAY IUD 1 each by Intrauterine route once       LORazepam (ATIVAN) 0.5 MG tablet 0.5 mg every 6 hours as needed for anxiety.       magnesium oxide (MAG-OX) 400 MG tablet Take 1 tablet (400 mg) by mouth daily  "90 tablet 3     meclizine (ANTIVERT) 25 MG tablet Take 1 tablet by mouth daily at 2 pm       risperiDONE (RISPERDAL) 3 MG tablet        sennosides (SENOKOT) 8.6 MG tablet        tirzepatide-Weight Management (ZEPBOUND) 12.5 MG/0.5ML prefilled pen Inject 0.5 mLs (12.5 mg) Subcutaneous every 7 days for 180 days 6 mL 1     traZODone (DESYREL) 150 MG tablet Take 150 mg by mouth At Bedtime           PHYSICAL EXAM     Vital signs  /68   Pulse 79   Ht 1.626 m (5' 4\")   Wt 107 kg (236 lb)   BMI 40.51 kg/m      Weight:   236 lbs 0 oz    Obese young female who is alert and oriented vital signs are reviewed and documented in electronic medical record. Neck supple. Neurologically speech was normal. Cranial nerves II through XII are intact motor strength 5/5 reflexes 1+ toes downgoing. No dysmetria noted on finger-nose testing gait normal Romberg negative      PERTINENT DIAGNOSTIC STUDIES     Following studies were reviewed:     MRI PITUITARY GLAND 8/27/2024   1.  Findings in the pituitary gland are most suggestive of a small pituitary cyst such as a pars intermedia cyst. There is essentially complete lack of enhancement in this area as opposed to hypoenhancement which would be more suggestive of a   microadenoma. Correlate with lab markers.    MRI BRAIN 7/30/2024  1.  No convincing evidence of epileptogenic focus identified.  2.  2 mm hypodensity in the posterior left aspect of the pituitary gland, could represent a microadenoma. Consider further evaluation with dedicated pituitary imaging if clinically indicated.  3.  No acute intracranial findings.     EEG 8/6/2024  Normal awake     PERTINENT LABS  Following labs were reviewed:  Lab on 08/06/2024   Component Date Value Ref Range Status     Adrenal Corticotropin 08/06/2024 36  <47 pg/mL Final     Cortisol 08/06/2024 8.7    ug/dL Final     FSH 08/06/2024 9.7  mIU/mL Final     Human Growth Hormone 08/06/2024 4.9  <7.0 ug/L Final     Insulin Like Growth Factor 1 " 08/06/2024 233  69 - 253 ng/mL Final     Luteinizing Hormone 08/06/2024 5.3  mIU/mL Final     Prolactin 08/06/2024 66 (H)  5 - 23 ng/mL Final     Sex Hormone Binding Globulin 08/06/2024 40  30 - 135 nmol/L Final     Free Testosterone Calculated 08/06/2024 0.19  ng/dL Final     Testosterone Total 08/06/2024 12  8 - 60 ng/dL Final   Lab Requisition on 06/13/2024   Component Date Value Ref Range Status     Prolactin 06/13/2024 76 (H)  5 - 23 ng/mL Final     Cholesterol 06/13/2024 163  <200 mg/dL Final     Triglycerides 06/13/2024 130  <150 mg/dL Final     Direct Measure HDL 06/13/2024 43 (L)  >=50 mg/dL Final     LDL Cholesterol Calculated 06/13/2024 94  <=100 mg/dL Final     Non HDL Cholesterol 06/13/2024 120  <130 mg/dL Final     Patient Fasting > 8hrs? 06/13/2024 Unknown   Final     Sodium 06/13/2024 139  135 - 145 mmol/L Final     Potassium 06/13/2024 4.3  3.4 - 5.3 mmol/L Final     Carbon Dioxide (CO2) 06/13/2024 21 (L)  22 - 29 mmol/L Final     Anion Gap 06/13/2024 11  7 - 15 mmol/L Final     Urea Nitrogen 06/13/2024 5.4 (L)  6.0 - 20.0 mg/dL Final     Creatinine 06/13/2024 0.97 (H)  0.51 - 0.95 mg/dL Final     GFR Estimate 06/13/2024 74  >60 mL/min/1.73m2 Final     Calcium 06/13/2024 8.7  8.6 - 10.0 mg/dL Final     Chloride 06/13/2024 107  98 - 107 mmol/L Final     Glucose 06/13/2024 84  70 - 99 mg/dL Final     Alkaline Phosphatase 06/13/2024 42  40 - 150 U/L Final     AST 06/13/2024 18  0 - 45 U/L Final     ALT 06/13/2024 8  0 - 50 U/L Final     Protein Total 06/13/2024 6.3 (L)  6.4 - 8.3 g/dL Final     Albumin 06/13/2024 3.8  3.5 - 5.2 g/dL Final     Bilirubin Total 06/13/2024 0.4  <=1.2 mg/dL Final     Patient Fasting > 8hrs? 06/13/2024 Unknown   Final         Total time spent for face to face visit, reviewing labs/imaging studies, counseling and coordination of care was: 30 Minutes spent on the date of the encounter doing chart review, review of outside records, review of test results, interpretation of  tests, patient visit, and documentation     The longitudinal plan of care for the diagnosis(es)/condition(s) as documented were addressed during this visit. Due to the added complexity in care, I will continue to support Ronel in the subsequent management and with ongoing continuity of care.    This note was dictated using voice recognition software.  Any grammatical or context distortions are unintentional and inherent to the software.    Orders Placed This Encounter   Procedures     MR Pituitary w/o & w Contrast      New Prescriptions    No medications on file     Modified Medications    No medications on file                 Again, thank you for allowing me to participate in the care of your patient.        Sincerely,        Jason Proctor MD

## 2024-09-11 NOTE — NURSING NOTE
Chief Complaint   Patient presents with    Seizures like activity     Patient stated that she is here for test results. Follow up     Jennifer Bonilla on 9/11/2024 at 11:29 AM

## 2024-10-24 ENCOUNTER — LAB REQUISITION (OUTPATIENT)
Dept: LAB | Facility: CLINIC | Age: 44
End: 2024-10-24

## 2024-10-24 DIAGNOSIS — R87.810 CERVICAL HIGH RISK HUMAN PAPILLOMAVIRUS (HPV) DNA TEST POSITIVE: ICD-10-CM

## 2024-10-24 PROCEDURE — 87624 HPV HI-RISK TYP POOLED RSLT: CPT | Performed by: STUDENT IN AN ORGANIZED HEALTH CARE EDUCATION/TRAINING PROGRAM

## 2024-10-24 PROCEDURE — G0145 SCR C/V CYTO,THINLAYER,RESCR: HCPCS | Performed by: STUDENT IN AN ORGANIZED HEALTH CARE EDUCATION/TRAINING PROGRAM

## 2024-10-29 LAB
HPV HR 12 DNA CVX QL NAA+PROBE: NEGATIVE
HPV16 DNA CVX QL NAA+PROBE: NEGATIVE
HPV18 DNA CVX QL NAA+PROBE: NEGATIVE
HUMAN PAPILLOMA VIRUS FINAL DIAGNOSIS: NORMAL

## 2024-10-31 LAB
BKR AP ASSOCIATED HPV REPORT: NORMAL
BKR LAB AP GYN ADEQUACY: NORMAL
BKR LAB AP GYN INTERPRETATION: NORMAL
BKR LAB AP LMP: NORMAL
BKR LAB AP PREVIOUS ABNL DX: NORMAL
BKR LAB AP PREVIOUS ABNORMAL: NORMAL
PATH REPORT.COMMENTS IMP SPEC: NORMAL
PATH REPORT.COMMENTS IMP SPEC: NORMAL
PATH REPORT.RELEVANT HX SPEC: NORMAL

## 2024-10-31 NOTE — TELEPHONE ENCOUNTER
FUTURE VISIT INFORMATION      FUTURE VISIT INFORMATION:  Date: 1/28/24  Time: 8:00am  Location: [facility] [place]  REFERRAL INFORMATION:  Reason for visit/diagnosis  Arm Lift (Brachioplasty)   RECORDS REQUESTED FROM:       No recs to collect per pt

## 2024-12-04 DIAGNOSIS — E11.9 TYPE II DIABETES MELLITUS, WELL CONTROLLED (H): ICD-10-CM

## 2024-12-04 DIAGNOSIS — E66.812 CLASS 2 SEVERE OBESITY DUE TO EXCESS CALORIES WITH SERIOUS COMORBIDITY AND BODY MASS INDEX (BMI) OF 39.0 TO 39.9 IN ADULT (H): Primary | ICD-10-CM

## 2024-12-04 DIAGNOSIS — Z87.19 HISTORY OF FATTY INFILTRATION OF LIVER: ICD-10-CM

## 2024-12-04 DIAGNOSIS — E66.01 CLASS 2 SEVERE OBESITY DUE TO EXCESS CALORIES WITH SERIOUS COMORBIDITY AND BODY MASS INDEX (BMI) OF 39.0 TO 39.9 IN ADULT (H): Primary | ICD-10-CM

## 2024-12-04 NOTE — PROGRESS NOTES
A bit over restricted on report this week at the 12.5mg/week dose now that she's expanded her protein portfolio after coming of Vegan style diet. Will drop from 12.5mg/week to 10mg/week of Zepbound.   Kerwin Dunn MD

## 2025-01-04 ENCOUNTER — HEALTH MAINTENANCE LETTER (OUTPATIENT)
Age: 45
End: 2025-01-04

## 2025-01-09 ENCOUNTER — VIRTUAL VISIT (OUTPATIENT)
Dept: SURGERY | Facility: CLINIC | Age: 45
End: 2025-01-09
Payer: COMMERCIAL

## 2025-01-09 VITALS — HEIGHT: 64 IN | WEIGHT: 228 LBS | BODY MASS INDEX: 38.93 KG/M2

## 2025-01-09 DIAGNOSIS — E66.1 CLASS 2 DRUG-INDUCED OBESITY WITH SERIOUS COMORBIDITY AND BODY MASS INDEX (BMI) OF 39.0 TO 39.9 IN ADULT: ICD-10-CM

## 2025-01-09 DIAGNOSIS — E66.9 OBESITY (BMI 30-39.9): Primary | ICD-10-CM

## 2025-01-09 DIAGNOSIS — E11.9 TYPE II DIABETES MELLITUS, WELL CONTROLLED (H): ICD-10-CM

## 2025-01-09 DIAGNOSIS — E66.812 CLASS 2 DRUG-INDUCED OBESITY WITH SERIOUS COMORBIDITY AND BODY MASS INDEX (BMI) OF 39.0 TO 39.9 IN ADULT: ICD-10-CM

## 2025-01-09 DIAGNOSIS — Z86.39 HISTORY OF MORBID OBESITY: ICD-10-CM

## 2025-01-09 DIAGNOSIS — K59.01 SLOW TRANSIT CONSTIPATION: Primary | ICD-10-CM

## 2025-01-09 DIAGNOSIS — Z87.19 HISTORY OF FATTY INFILTRATION OF LIVER: ICD-10-CM

## 2025-01-09 RX ORDER — ASPIRIN 81 MG
100 TABLET, DELAYED RELEASE (ENTERIC COATED) ORAL DAILY
Qty: 30 TABLET | Refills: 3 | Status: SHIPPED | OUTPATIENT
Start: 2025-01-09 | End: 2025-05-09

## 2025-01-09 RX ORDER — BISACODYL 10 MG
10 SUPPOSITORY, RECTAL RECTAL DAILY PRN
Qty: 4 SUPPOSITORY | Refills: 0 | Status: SHIPPED | OUTPATIENT
Start: 2025-01-09 | End: 2025-01-13

## 2025-01-09 RX ORDER — POLYETHYLENE GLYCOL 3350 17 G/17G
1 POWDER, FOR SOLUTION ORAL DAILY
Qty: 116 G | Refills: 0 | Status: SHIPPED | OUTPATIENT
Start: 2025-01-09 | End: 2025-01-16

## 2025-01-09 RX ORDER — KETOCONAZOLE 20 MG/ML
SHAMPOO, SUSPENSION TOPICAL
COMMUNITY
Start: 2024-12-13

## 2025-01-09 ASSESSMENT — PAIN SCALES - GENERAL: PAINLEVEL_OUTOF10: NO PAIN (0)

## 2025-01-09 NOTE — PROGRESS NOTES
Virtual Visit Details    Type of service:  Video Visit   Video Start Time: Bariatric Clinic Follow-Up Visit:    Ronel Ryan is a 44 year old  female with Body mass index is 39.14 kg/m .  presenting here today for follow-up on non-surgical efforts for weight loss. Original Intake visit occurred on 7/13/19 with a weight of 345 lbs and BMI of 59.2.  Along with diet and behavior changes, she has been using various combinations through the years of Trulicity/naltrexone and a previous use of topiramate from her pain clinic which can also assist her weight loss goals.  See her intake visit notes for details on identified contributors to weight gain in the past. Chart review shows Dietician calculated RMR of 1892kcal/day and protein intake goal of 70-90g/day.  Her Trulicity was stopped in hospital May of 2022 during hip surgery and she had weight gain of 30 lbs over the summer due to increased cravings off medication. We transitioned to Semaglutide therapy this summer of 2022 but then supplies ran out due to shortages in supply and Victoza was started in November 2022 (11/7/22) with plans to transition back in 2023 when supplies of Ozempic are more available. She was able to get back on Ozempic in late 2022 and was up to 1mg/week as of our 10/06/23 visit w/ good results/tolerance.  She was transitioned to Zepbound 2/8/24 for continued work on weight management as a means to improve her glycemic control/weight related health.  Some supply chain issues have limited ramping ability and she presents on 5/20/24 at 233 lbs on 10mg/week Zepbound. Ramped up to 12.5mg/week due to shortages in June of '24.  She had some poor response in the interim with lower efficacy and on 8/2/24 has weight of 231 lbs but this was more related to her decision to go Vegan and working on her dietary tools so we'll keep her steady at the 12.5mg/week dose in light of her overall excellent weight reduction.  Back down to 10mg/week at our 1/9/25   visit:  she is still losing weight, down to 228 lbs despite now working near a food court at work and more temptation.      Dietician visit on 11/17/23 w/ RMR of 1821 kcal/day and protein goal of 70-90g/day. She's a user of [employer] w/ good tracking history of food and exercise.    Current meds include risperidone which can drive hunger but required for current mental health treatment.  Her bupropion, for mental health, can curb appetite mildly on it's own but not as much as when mixed with naltrexone.     Weight:   Wt Readings from Last 5 Encounters:   01/09/25 103.4 kg (228 lb)   09/11/24 107 kg (236 lb)   09/03/24 107 kg (236 lb)   08/06/24 104.8 kg (231 lb)   08/02/24 105.1 kg (231 lb 11.2 oz)    pounds    117 lbs down from introductory weight, a 33.9% total body weight reduction.  BMI now under 40. Started at BMI of 59.2. 20 BMI point reduction.   Comorbidities:  Patient Active Problem List   Diagnosis    Binge eating    Eating disorder    Morbid obesity (H)    Multiple-type hyperlipidemia    Severe episode of recurrent major depressive disorder (H)    Hypertension    Trochanteric bursitis    Borderline personality disorder (H)    Attention deficit disorder    Asthma    Suicidal ideation    ACP (advance care planning)    Adjustment disorder with depressed mood    Anxiety    Moderate mixed bipolar I disorder (H)    Bipolar II disorder, most recent episode hypomanic (H)    Care plan discussed with patient    Hx of diabetes mellitus    Osteoarthritis    Relationship problems    Secondary amenorrhea    Hip dysplasia    Osteoarthritis of hip    Hyperlipidemia    Mixed hyperlipidemia    Type 2 diabetes mellitus (H)    Primary osteoarthritis of both hips    Degenerative joint disease (DJD) of hip    Status post total hip replacement, left    Gastroesophageal reflux disease with esophagitis without hemorrhage    Mixed incontinence    Pituitary microadenoma (H)    Hyperprolactinemia       Current Outpatient  Medications:     albuterol (PROAIR HFA/PROVENTIL HFA/VENTOLIN HFA) 108 (90 Base) MCG/ACT inhaler, Inhale 2 puffs into the lungs every 6 hours as needed for shortness of breath, wheezing or cough, Disp: 18 g, Rfl: 0    buPROPion (WELLBUTRIN XL) 300 MG 24 hr tablet, Take 300 mg by mouth daily , Disp: , Rfl:     cholecalciferol (VITAMIN D3) 125 mcg (5000 units) capsule, , Disp: , Rfl:     docusate sodium (COLACE) 100 MG capsule, Take 1 capsule (100 mg) by mouth 2 times daily as needed for constipation, Disp: 90 capsule, Rfl: 4    EPINEPHrine (ANY BX GENERIC EQUIV) 0.3 MG/0.3ML injection 2-pack, Inject 0.3 mg into the muscle, Disp: , Rfl:     EPINEPHrine (EPIPEN/ADRENACLICK/OR ANY BX GENERIC EQUIV) 0.3 MG/0.3ML injection 2-pack, Inject 0.3 mLs (0.3 mg) into the muscle once as needed for anaphylaxis, Disp: 0.6 mL, Rfl: 0    ketoconazole (EXTINA) 2 % external foam, , Disp: , Rfl:     ketoconazole (NIZORAL) 2 % external shampoo, APPLY 5 TO 10ML TO WET SCALP, LATHER, LEAVE ON 3-5 MINUTES, AND RINSE. APPLY TWICE WEEKLY FOR 2-4 WEEKS EXTERNALLY, Disp: , Rfl:     levonorgestrel (LILETTA, 52 MG,) 19.5 MCG/DAY IUD, 1 each by Intrauterine route once, Disp: , Rfl:     LORazepam (ATIVAN) 0.5 MG tablet, 0.5 mg every 6 hours as needed for anxiety., Disp: , Rfl:     magnesium oxide (MAG-OX) 400 MG tablet, Take 1 tablet (400 mg) by mouth daily, Disp: 90 tablet, Rfl: 3    meclizine (ANTIVERT) 25 MG tablet, Take 1 tablet by mouth daily at 2 pm, Disp: , Rfl:     risperiDONE (RISPERDAL) 3 MG tablet, , Disp: , Rfl:     sennosides (SENOKOT) 8.6 MG tablet, , Disp: , Rfl:     tirzepatide-Weight Management (ZEPBOUND) 10 MG/0.5ML prefilled pen, Inject 0.5 mLs (10 mg) subcutaneously every 7 days., Disp: 6 mL, Rfl: 1    traZODone (DESYREL) 150 MG tablet, Take 150 mg by mouth At Bedtime, Disp: , Rfl:     Lab Results   Component Value Date    A1C 5.1 11/09/2022    A1C 4.7 04/04/2022    A1C 4.6 09/10/2021    A1C 6.7 08/23/2019    A1C 7.0 11/21/2018     A1C 6.0 12/31/2010       Interim: Since our last visit, she has new job at [employer], more snackiness in current job. More tempting to eat     Plan:   1.  Diet: aiming for 1400-1550kcal/day with 75-90 grams of lean protein daily (max of 130g/day) to nourish this last phase of weight loss season. Hydrate well with water to reduce constipation risks and feel your best/eliminate wastes well:  80 oz of water daily up to 100 oz of water daily.    2. Exercise: aim for 3 intentional walks weekly that last at least 20 minutes. More is fine as is adding back a couple days weekly of strength work.    3. Medication: Zepbound 10mg/week going well. We'll stay at that dose.  Due to constipation:  after a hot shower, take one dulcolax suppository (rectally) and drink 30 ml of milk of magnesia.  If no results after 3 hours. Repeat 30 ml of milk of magnesia.   The next day, start 3 days of miralax: one scoop in 12 oz of water daily.  To keep things going regularly, use colace and metamucil daily and with your walks and water intake all should be well.     4. No labs needed until this summer.    5. Goals: down 117 lbs, 20 BMI points. Aiming to get weight under 200 lbs this year and then shift into weight stabilization phase.     6. Previous diabetes well controlled with recent testing. A1c of 5.3%. medication and weight loss has improved it greatly and may be in remission (but still on medication that can keep levels normal).       We discussed [facility] Bariat[facility] Basics including:  -eating 3 meals daily  -reviewed metabolic needs for weight loss based on Resting Metabolic Rate  -protein goals supportive of healthy weight loss  -avoiding/limiting calorie containing beverages  -We discussed the importance of restorative sleep and stress management in maintaining a healthy weight.  -We discussed the National Weight Control Registry healthy weight maintenance strategies and ways to optimize metabolism.  -We discussed the  "importance of physical activity including cardiovascular and strength training in maintaining a healthier weight and explored viable options.      Most recent labs:  Lab Results   Component Value Date    WBC 5.5 02/01/2023    HGB 13.9 02/01/2023    HCT 42.1 02/01/2023    MCV 93 02/01/2023     02/01/2023     Lab Results   Component Value Date    CHOL 163 06/13/2024     Lab Results   Component Value Date    HDL 43 (L) 06/13/2024     No components found for: \"LDLCALC\"  Lab Results   Component Value Date    TRIG 130 06/13/2024     No results found for: \"CHOLHDL\"  Lab Results   Component Value Date    ALT 8 06/13/2024    AST 18 06/13/2024    ALKPHOS 42 06/13/2024     No results found for: \"HGBA1C\"  Lab Results   Component Value Date    B12 368 11/09/2022     No components found for: \"VITDT1\"  Lab Results   Component Value Date    KOFFI 79 11/09/2022     Lab Results   Component Value Date    PTHI 77 08/23/2019     No results found for: \"ZN\"  Lab Results   Component Value Date    VIB1WB 108 08/23/2019     Lab Results   Component Value Date    TSH 1.55 04/17/2024     No results found for: \"TEST\"    DIETARY HISTORY  More tempted/convenience eating.      PHYSICAL ACTIVITY PATTERNS:  Cardiovascular: limited walking  Strength Training: not currently doing gym workouts.    REVIEW OF SYSTEMS  Feels good. Some fatigue. Constipation, no good BM in a week \"been eating a lot of cheese\". .  PHYSICAL EXAM:  Vitals: Ht 1.626 m (5' 4\")   Wt 103.4 kg (228 lb)   BMI 39.14 kg/m    Weight:   Wt Readings from Last 3 Encounters:   01/09/25 103.4 kg (228 lb)   09/11/24 107 kg (236 lb)   09/03/24 107 kg (236 lb)         GEN: Pleasant, well groomed, in no acute distress  HEENT:  normal facies.  .  NECK: No swelling.  HEART: .  LUNGS: No respiratory difficulty noted. No cough. .  ABDOMEN: .  EXTREMITIES: No tremor. Semirecumbent on her couch..  NEURO: Alert and Oriented X3, fluent speech. .  SKIN: No visible rashes. .    Interim study " results: reviewed in chart. .      33 minutes spent by me on the date of the encounter doing chart review, history and exam, documentation and further activities per the note   Kerwin Dunn MD  [facility]  12:53 PM  1/9/2025  Video End Time:2:08 PM    Originating Location (pt. Location): Home    Distant Location (provider location):  On-site  Platform used for Video Visit: Maryam

## 2025-01-09 NOTE — LETTER
1/9/2025      Ronel Ryan  [address]  [facility] [address]      Dear Colleague,    Thank you for referring your patient, Ronel Ryan, to the [facility] [place]. Please see a copy of my visit note below.    Ronel Ryan is a 44 year old who is being evaluated via a billable video visit.      How would you like to obtain your AVS? MyChart  If the video visit is dropped, the invitation should be resent by: Text to cell phone: [phone]  Will anyone else be joining your video visit? No        Medical  Weight Loss Follow-Up Diet Evaluation  Assessment:  Ronel is presenting today for a follow up weight management nutrition consultation.  This patient has had an initial appointment and was referred by Dr. Dunn for MNT as treatment for Obesity    Weight loss medication:  Zepbound .   Pt's weight is 228 lbs  Initial weight: 262 lbs  Weight change: 34 lbs, 13.2% weight loss        1/9/2025     9:59 AM   Changes and Difficulties   I have made the following changes to my diet since my last visit: eating more sugar and potatoe chips...   With regards to my diet, I am still struggling with: temptation--food court at work   I have made the following changes to my activity/exercise since my last visit: haven't been doing any   With regards to my activity/exercise, I am still struggling with: motivation     BMI: 39.14  Ideal body weight: 54.7 kg (120 lb 9.5 oz)  Adjusted ideal body weight: 74 kg (163 lb 0.6 oz)    Estimated RMR ([place]-St Jeor equation):   1821 kcals x 1.2 (sedentary) = 2186 kcals (for weight maintenance)  1821 kcals x 1.3 (light active) = 2504 kcals (for weight maintenance)  Recommended Protein Intake: 70-90 grams of protein/day  Patient Active Problem List:  Patient Active Problem List   Diagnosis     Binge eating     Eating disorder     Morbid obesity (H)     Multiple-type hyperlipidemia     Severe episode of recurrent major  "depressive disorder (H)     Hypertension     Trochanteric bursitis     Borderline personality disorder (H)     Attention deficit disorder     Asthma     Suicidal ideation     ACP (advance care planning)     Adjustment disorder with depressed mood     Anxiety     Moderate mixed bipolar I disorder (H)     Bipolar II disorder, most recent episode hypomanic (H)     Care plan discussed with patient     Hx of diabetes mellitus     Osteoarthritis     Relationship problems     Secondary amenorrhea     Hip dysplasia     Osteoarthritis of hip     Hyperlipidemia     Mixed hyperlipidemia     Type 2 diabetes mellitus (H)     Primary osteoarthritis of both hips     Degenerative joint disease (DJD) of hip     Status post total hip replacement, left     Gastroesophageal reflux disease with esophagitis without hemorrhage     Mixed incontinence     Pituitary microadenoma (H)     Hyperprolactinemia   Diabetes: T2DM dx  No recent A1c to view     Nutrition History:  Stated that she became vegan - March 11 - started after watching the \"What the Health\" Documentary. Noticed dizziness and poor energy level. [facility].  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 Noted on her off days she orders chipolte for dinner. Reports she is no longer vegan and incorporates cheese, eggs and yogurt (octo-ovo vegetarian). Reports she would like some ideas on what to cook at home.         Dietary Recall:  *trying to limit corn, carrots and bread VEGETARIAN - octo ovo   Breakfast: skipped   Lunch: food court at the mall (pizza, chinese food, fast " food)   Dinner: Chipolte   Typical snacks: limited  Eating out: 5x per week (food court at the mall, chipolte)  Beverages:   Water  Pop and lemonade -occasionally  Nutrition Diagnosis:    Obesity related to excessive energy intake as evidence by skipping meals, limited protein intake and BMI of 39.14      Intervention:  Food and/or nutrient delivery: aim to increase consistency with meals. Aim to have a protein source with all meals. Limit saturated fats   Nutrition education: dicussed unsaturated fats vs saturated fast plant based protein powders, Tips for a Low-Sodium Diet    Monitoring/Evaluation:    Goals:  Aim to have breakfast (yogurt or eggs in the AM with fruit)  Limit eating out to 2x per week   Choose unsaturated fats (olive oils vs butter/margin)    Patient to follow up with bariatrician this date      Video-Visit Details    Type of service:  Video Visit    Video Start Time (time video started): 10:06a    Video End Time (time video stopped): 10:19a    Originating Location (pt. Location): Home      Distant Location (provider location):  Off-site    Mode of Communication:  Video Conference via [facility]    Physician has received verbal consent for a Video Visit from the patient? Yes      Pat Marmolejo RD             Again, thank you for allowing me to participate in the care of your patient.        Sincerely,        Pat Marmolejo RD    Electronically signed

## 2025-01-09 NOTE — LETTER
1/9/2025      Ronel Ryan  [address]  [facility] [address]      Dear Colleague,    Thank you for referring your patient, Ronel Ryan, to the [facility] [place]. Please see a copy of my visit note below.    Virtual Visit Details    Type of service:  Video Visit   Video Start Time: Bariatric Clinic Follow-Up Visit:    Ronel Ryan is a 44 year old  female with Body mass index is 39.14 kg/m .  presenting here today for follow-up on non-surgical efforts for weight loss. Original Intake visit occurred on 7/13/19 with a weight of 345 lbs and BMI of 59.2.  Along with diet and behavior changes, she has been using various combinations through the years of Trulicity/naltrexone and a previous use of topiramate from her pain clinic which can also assist her weight loss goals.  See her intake visit notes for details on identified contributors to weight gain in the past. Chart review shows Dietician calculated RMR of 1892kcal/day and protein intake goal of 70-90g/day.  Her Trulicity was stopped in hospital May of 2022 during hip surgery and she had weight gain of 30 lbs over the summer due to increased cravings off medication. We transitioned to Semaglutide therapy this summer of 2022 but then supplies ran out due to shortages in supply and Victoza was started in November 2022 (11/7/22) with plans to transition back in 2023 when supplies of Ozempic are more available. She was able to get back on Ozempic in late 2022 and was up to 1mg/week as of our 10/06/23 visit w/ good results/tolerance.  She was transitioned to Zepbound 2/8/24 for continued work on weight management as a means to improve her glycemic control/weight related health.  Some supply chain issues have limited ramping ability and she presents on 5/20/24 at 233 lbs on 10mg/week Zepbound. Ramped up to 12.5mg/week due to shortages in June of '24.  She had some poor response in the interim with lower  efficacy and on 8/2/24 has weight of 231 lbs but this was more related to her decision to go Vegan and working on her dietary tools so we'll keep her steady at the 12.5mg/week dose in light of her overall excellent weight reduction.  Back down to 10mg/week at our 1/9/25  visit:  she is still losing weight, down to 228 lbs despite now working near a food court at work and more temptation.      Dietician visit on 11/17/23 w/ RMR of 1821 kcal/day and protein goal of 70-90g/day. She's a user of [employer] w/ good tracking history of food and exercise.    Current meds include risperidone which can drive hunger but required for current mental health treatment.  Her bupropion, for mental health, can curb appetite mildly on it's own but not as much as when mixed with naltrexone.     Weight:   Wt Readings from Last 5 Encounters:   01/09/25 103.4 kg (228 lb)   09/11/24 107 kg (236 lb)   09/03/24 107 kg (236 lb)   08/06/24 104.8 kg (231 lb)   08/02/24 105.1 kg (231 lb 11.2 oz)    pounds    117 lbs down from introductory weight, a 33.9% total body weight reduction.  BMI now under 40. Started at BMI of 59.2. 20 BMI point reduction.   Comorbidities:  Patient Active Problem List   Diagnosis     Binge eating     Eating disorder     Morbid obesity (H)     Multiple-type hyperlipidemia     Severe episode of recurrent major depressive disorder (H)     Hypertension     Trochanteric bursitis     Borderline personality disorder (H)     Attention deficit disorder     Asthma     Suicidal ideation     ACP (advance care planning)     Adjustment disorder with depressed mood     Anxiety     Moderate mixed bipolar I disorder (H)     Bipolar II disorder, most recent episode hypomanic (H)     Care plan discussed with patient     Hx of diabetes mellitus     Osteoarthritis     Relationship problems     Secondary amenorrhea     Hip dysplasia     Osteoarthritis of hip     Hyperlipidemia     Mixed hyperlipidemia     Type 2 diabetes mellitus (H)      Primary osteoarthritis of both hips     Degenerative joint disease (DJD) of hip     Status post total hip replacement, left     Gastroesophageal reflux disease with esophagitis without hemorrhage     Mixed incontinence     Pituitary microadenoma (H)     Hyperprolactinemia       Current Outpatient Medications:      albuterol (PROAIR HFA/PROVENTIL HFA/VENTOLIN HFA) 108 (90 Base) MCG/ACT inhaler, Inhale 2 puffs into the lungs every 6 hours as needed for shortness of breath, wheezing or cough, Disp: 18 g, Rfl: 0     buPROPion (WELLBUTRIN XL) 300 MG 24 hr tablet, Take 300 mg by mouth daily , Disp: , Rfl:      cholecalciferol (VITAMIN D3) 125 mcg (5000 units) capsule, , Disp: , Rfl:      docusate sodium (COLACE) 100 MG capsule, Take 1 capsule (100 mg) by mouth 2 times daily as needed for constipation, Disp: 90 capsule, Rfl: 4     EPINEPHrine (ANY BX GENERIC EQUIV) 0.3 MG/0.3ML injection 2-pack, Inject 0.3 mg into the muscle, Disp: , Rfl:      EPINEPHrine (EPIPEN/ADRENACLICK/OR ANY BX GENERIC EQUIV) 0.3 MG/0.3ML injection 2-pack, Inject 0.3 mLs (0.3 mg) into the muscle once as needed for anaphylaxis, Disp: 0.6 mL, Rfl: 0     ketoconazole (EXTINA) 2 % external foam, , Disp: , Rfl:      ketoconazole (NIZORAL) 2 % external shampoo, APPLY 5 TO 10ML TO WET SCALP, LATHER, LEAVE ON 3-5 MINUTES, AND RINSE. APPLY TWICE WEEKLY FOR 2-4 WEEKS EXTERNALLY, Disp: , Rfl:      levonorgestrel (LILETTA, 52 MG,) 19.5 MCG/DAY IUD, 1 each by Intrauterine route once, Disp: , Rfl:      LORazepam (ATIVAN) 0.5 MG tablet, 0.5 mg every 6 hours as needed for anxiety., Disp: , Rfl:      magnesium oxide (MAG-OX) 400 MG tablet, Take 1 tablet (400 mg) by mouth daily, Disp: 90 tablet, Rfl: 3     meclizine (ANTIVERT) 25 MG tablet, Take 1 tablet by mouth daily at 2 pm, Disp: , Rfl:      risperiDONE (RISPERDAL) 3 MG tablet, , Disp: , Rfl:      sennosides (SENOKOT) 8.6 MG tablet, , Disp: , Rfl:      tirzepatide-Weight Management (ZEPBOUND) 10 MG/0.5ML  prefilled pen, Inject 0.5 mLs (10 mg) subcutaneously every 7 days., Disp: 6 mL, Rfl: 1     traZODone (DESYREL) 150 MG tablet, Take 150 mg by mouth At Bedtime, Disp: , Rfl:     Lab Results   Component Value Date    A1C 5.1 11/09/2022    A1C 4.7 04/04/2022    A1C 4.6 09/10/2021    A1C 6.7 08/23/2019    A1C 7.0 11/21/2018    A1C 6.0 12/31/2010       Interim: Since our last visit, she has new job at [employer], more snackiness in current job. More tempting to eat     Plan:   1.  Diet: aiming for 1400-1550kcal/day with 75-90 grams of lean protein daily (max of 130g/day) to nourish this last phase of weight loss season. Hydrate well with water to reduce constipation risks and feel your best/eliminate wastes well:  80 oz of water daily up to 100 oz of water daily.    2. Exercise: aim for 3 intentional walks weekly that last at least 20 minutes. More is fine as is adding back a couple days weekly of strength work.    3. Medication: Zepbound 10mg/week going well. We'll stay at that dose.  Due to constipation:  after a hot shower, take one dulcolax suppository (rectally) and drink 30 ml of milk of magnesia.  If no results after 3 hours. Repeat 30 ml of milk of magnesia.   The next day, start 3 days of miralax: one scoop in 12 oz of water daily.  To keep things going regularly, use colace and metamucil daily and with your walks and water intake all should be well.     4. No labs needed until this summer.    5. Goals: down 117 lbs, 20 BMI points. Aiming to get weight under 200 lbs this year and then shift into weight stabilization phase.     6. Previous diabetes well controlled with recent testing. A1c of 5.3%. medication and weight loss has improved it greatly and may be in remission (but still on medication that can keep levels normal).       We discussed [facility] Bariatric Basics including:  -eating 3 meals daily  -reviewed metabolic needs for weight loss based on Resting Metabolic Rate  -protein goals supportive of  "healthy weight loss  -avoiding/limiting calorie containing beverages  -We discussed the importance of restorative sleep and stress management in maintaining a healthy weight.  -We discussed the National Weight Control Registry healthy weight maintenance strategies and ways to optimize metabolism.  -We discussed the importance of physical activity including cardiovascular and strength training in maintaining a healthier weight and explored viable options.      Most recent labs:  Lab Results   Component Value Date    WBC 5.5 02/01/2023    HGB 13.9 02/01/2023    HCT 42.1 02/01/2023    MCV 93 02/01/2023     02/01/2023     Lab Results   Component Value Date    CHOL 163 06/13/2024     Lab Results   Component Value Date    HDL 43 (L) 06/13/2024     No components found for: \"LDLCALC\"  Lab Results   Component Value Date    TRIG 130 06/13/2024     No results found for: \"CHOLHDL\"  Lab Results   Component Value Date    ALT 8 06/13/2024    AST 18 06/13/2024    ALKPHOS 42 06/13/2024     No results found for: \"HGBA1C\"  Lab Results   Component Value Date    B12 368 11/09/2022     No components found for: \"VITDT1\"  Lab Results   Component Value Date    KOFFI 79 11/09/2022     Lab Results   Component Value Date    PTHI 77 08/23/2019     No results found for: \"ZN\"  Lab Results   Component Value Date    VIB1WB 108 08/23/2019     Lab Results   Component Value Date    TSH 1.55 04/17/2024     No results found for: \"TEST\"    DIETARY HISTORY  More tempted/convenience eating.      PHYSICAL ACTIVITY PATTERNS:  Cardiovascular: limited walking  Strength Training: not currently doing gym workouts.    REVIEW OF SYSTEMS  Feels good. Some fatigue. Constipation, no good BM in a week \"been eating a lot of cheese\". .  PHYSICAL EXAM:  Vitals: Ht 1.626 m (5' 4\")   Wt 103.4 kg (228 lb)   BMI 39.14 kg/m    Weight:   Wt Readings from Last 3 Encounters:   01/09/25 103.4 kg (228 lb)   09/11/24 107 kg (236 lb)   09/03/24 107 kg (236 lb)         GEN: " Pleasant, well groomed, in no acute distress  HEENT:  normal facies.  .  NECK: No swelling.  HEART: .  LUNGS: No respiratory difficulty noted. No cough. .  ABDOMEN: .  EXTREMITIES: No tremor. Semirecumbent on her couch..  NEURO: Alert and Oriented X3, fluent speech. .  SKIN: No visible rashes. .    Interim study results: reviewed in chart. .      33 minutes spent by me on the date of the encounter doing chart review, history and exam, documentation and further activities per the note   Kerwin Dunn MD  [facility]  12:53 PM  1/9/2025  Video End Time:2:08 PM    Originating Location (pt. Location): Home    Distant Location (provider location):  On-site  Platform used for Video Visit: Maryam      Again, thank you for allowing me to participate in the care of your patient.        Sincerely,        Kerwin Dunn MD    Electronically signed

## 2025-01-09 NOTE — PROGRESS NOTES
Ronel Ryan is a 44 year old who is being evaluated via a billable video visit.      How would you like to obtain your AVS? MyChart  If the video visit is dropped, the invitation should be resent by: Text to cell phone: [phone]  Will anyone else be joining your video visit? No        Medical  Weight Loss Follow-Up Diet Evaluation  Assessment:  Ronel is presenting today for a follow up weight management nutrition consultation.  This patient has had an initial appointment and was referred by Dr. Dunn for MNT as treatment for Obesity    Weight loss medication:  Zepbound .   Pt's weight is 228 lbs  Initial weight: 262 lbs  Weight change: 34 lbs, 13.2% weight loss        1/9/2025     9:59 AM   Changes and Difficulties   I have made the following changes to my diet since my last visit: eating more sugar and potatoe chips...   With regards to my diet, I am still struggling with: temptation--food court at work   I have made the following changes to my activity/exercise since my last visit: haven't been doing any   With regards to my activity/exercise, I am still struggling with: motivation     BMI: 39.14  Ideal body weight: 54.7 kg (120 lb 9.5 oz)  Adjusted ideal body weight: 74 kg (163 lb 0.6 oz)    Estimated RMR ([place]-St Jeor equation):   1821 kcals x 1.2 (sedentary) = 2186 kcals (for weight maintenance)  1821 kcals x 1.3 (light active) = 2504 kcals (for weight maintenance)  Recommended Protein Intake: 70-90 grams of protein/day  Patient Active Problem List:  Patient Active Problem List   Diagnosis    Binge eating    Eating disorder    Morbid obesity (H)    Multiple-type hyperlipidemia    Severe episode of recurrent major depressive disorder (H)    Hypertension    Trochanteric bursitis    Borderline personality disorder (H)    Attention deficit disorder    Asthma    Suicidal ideation    ACP (advance care planning)    Adjustment disorder with depressed mood    Anxiety    Moderate mixed bipolar I disorder  "(H)    Bipolar II disorder, most recent episode hypomanic (H)    Care plan discussed with patient    Hx of diabetes mellitus    Osteoarthritis    Relationship problems    Secondary amenorrhea    Hip dysplasia    Osteoarthritis of hip    Hyperlipidemia    Mixed hyperlipidemia    Type 2 diabetes mellitus (H)    Primary osteoarthritis of both hips    Degenerative joint disease (DJD) of hip    Status post total hip replacement, left    Gastroesophageal reflux disease with esophagitis without hemorrhage    Mixed incontinence    Pituitary microadenoma (H)    Hyperprolactinemia   Diabetes: T2DM dx  No recent A1c to view     Nutrition History:  Stated that she became vegan - March 11 - started after watching the \"What the Health\" Documentary. Noticed dizziness and poor energy level. [facility].  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 Noted on her off days she orders chipolte for dinner. Reports she is no longer vegan and incorporates cheese, eggs and yogurt (octo-ovo vegetarian). Reports she would like some ideas on what to cook at home.         Dietary Recall:  *trying to limit corn, carrots and bread VEGETARIAN - octo ovo   Breakfast: skipped   Lunch: food court at the [employer] (pizza, chinese food, fast food)   Dinner: Chipolte   Typical snacks: limited  Eating out: 5x per week (food court at the [employer], chipolte)  Beverages:   Water  Pop and lemonade -occasionally  Nutrition Diagnosis:    Obesity related to excessive energy intake as evidence by skipping meals, limited protein intake and BMI of " 39.14      Intervention:  Food and/or nutrient delivery: aim to increase consistency with meals. Aim to have a protein source with all meals. Limit saturated fats   Nutrition education: dicussed unsaturated fats vs saturated fast plant based protein powders, Tips for a Low-Sodium Diet    Monitoring/Evaluation:    Goals:  Aim to have breakfast (yogurt or eggs in the AM with fruit)  Limit eating out to 2x per week   Choose unsaturated fats (olive oils vs butter/margin)    Patient to follow up with bariatrician this date      Video-Visit Details    Type of service:  Video Visit    Video Start Time (time video started): 10:06a    Video End Time (time video stopped): 10:19a    Originating Location (pt. Location): Home      Distant Location (provider location):  Off-site    Mode of Communication:  Video Conference via [facility]    Physician has received verbal consent for a Video Visit from the patient? Yes      Pat Marmolejo RD

## 2025-01-09 NOTE — PATIENT INSTRUCTIONS
".Patient Education   Learning About Foods With Healthy Fats  What foods contain healthy fats?     The foods you eat contain nutrients, such as vitamins and minerals. Fat is a nutrient. Your body needs the right amount to stay healthy and work as it should. You can use the list below to help you make choices about which foods to eat.  Here are some foods that contain healthy fats.  Unsaturated fats and oils  Canola oil  Corn oil  Flaxseed oil  Olive oil  Peanut oil  Safflower oil  Sunflower oil  Nuts and seeds  Almonds  Saúl seeds  Flaxseeds (ground)  Hazelnuts  Nut butters (such as peanut and almond)  Pumpkin seeds  Sunflower seeds  Walnuts  Fruits  Avocados  Olives  Work with your doctor to find out how much of this nutrient you need. Depending on your health, you may need more or less of it in your diet.  Where can you learn more?  Go to https://www.[employer].net/patiented  Enter F360 in the search box to learn more about \"Learning About Foods With Healthy Fats.\"  Current as of: September 20, 2023  Content Version: 14.3    2024 [employer].   Care instructions adapted under license by your healthcare professional. If you have questions about a medical condition or this instruction, always ask your healthcare professional. [employer] disclaims any warranty or liability for your use of this information.         "

## 2025-01-09 NOTE — PATIENT INSTRUCTIONS
117 lbs down from introductory weight, a 33.9% total body weight reduction.  Plan:   1.  Diet: aiming for 1400-1550kcal/day with 75-90 grams of lean protein daily (max of 130g/day) to nourish this last phase of weight loss season. Hydrate well with water to reduce constipation risks and feel your best/eliminate wastes well:  80 oz of water daily up to 100 oz of water daily.    2. Exercise: aim for 3 intentional walks weekly that last at least 20 minutes. More is fine as is adding back a couple days weekly of strength work.    3. Medication: Zepbound 10mg/week going well. We'll stay at that dose.  Due to constipation:  after a hot shower, take one dulcolax suppository (rectally) and drink 30 ml of milk of magnesia.  If no results after 3 hours. Repeat 30 ml of milk of magnesia.   The next day, start 3 days of miralax: one scoop in 12 oz of water daily.  To keep things going regularly, use colace and metamucil daily and with your walks and water intake all should be well.     4. No labs needed until this summer.    5. Goals: down 117 lbs, 20 BMI points. Aiming to get weight under 200 lbs this year and then shift into weight stabilization phase.     6. Previous diabetes well controlled with recent testing. A1c of 5.3%.         Zepbound/Mounjaro (Tirzepatide) is a very effective satiety boosting appetite suppressant that elevates satiety hormones GLP1 and GIP. It needs to be ramped up slowly to be tolerated adequately.  It helps release insulin in response to food when blood sugar runs higher than normal and is very helpful for diabetics in managing blood sugar levels with low risks for any low blood sugars.  About 1/10 people will not tolerate this medication. Each month, you move up to a higher dose until eventually reaching the 10mg/week dose if tolerated with further ramping to follow if needed. If intolerant or severe side effects, a dose decrease would be wise, so keep me posted if not tolerated the ramping well.  This may be a longer term medication based on individual needs/physiology and appetite control.     Injections can be given after cleansing the skin with alcohol prep pad or swab (available OTC).     Stop Zepbound if severe abdominal pain/vomiting/rash/throat swelling or constant nausea that prevents adequate food/water intake. Stop 2-3 weeks prior to any planned general anesthesia surgeries to reduce risk for something called a post operative ileus.     Gallstones can occur in about 1% of patients on this medication so update me if increase right upper abdominal pain after eating.     Start meals with protein first, separate beverages from meals by 20 minutes and work hard in between meals to get your 64-75 oz of water daily to reduce risks for severe constipation. Consider a fiber supplement like powdered psyllium husk in 12 oz water each night, stool softerners as needed and Miralax or milk of Magnesia if more than 3 days have passed without a Bowel Movement. Some other options include:  For Prevention and Treatment of Constipation when on Semaglutide     From least aggressive to most aggressive:     Move: Wallking is essential - the more we move, the more our bowels move  Water: Drink water - 64oz or more a day  Go when you need to go. Don't wait. The longer you wait, the harder it gets.  Fiber: Fruit, raw veggies, nuts, whole grains, prune each night, flax/mariely seeds added into meals can all be helpful.   Stool Softeners: if constipation is mild and for maintenance  Gentle laxatives: Miralax, senokot, dulcolax , Smooth move tea as needed     More aggressive (and typically won't get to this point)  Milk of Magnesia  Mag Citrate (what you drink before a colonoscopy)  Suppositories  Enema      Check out [facility] for patient resources.  If you have weekends off, I recommend dosing Friday evenings.     Some people starve on this medication if not mindful about food intake. I recommend starting meals with the  protein part of your meal first, chew thoroughly and separate beverages from meals by about 20 minutes to make sure you get your nourishment in first. Include vegetables/complex carbohydrates and unsaturated fat as part of your balanced diet but group these at the end of the meal, after your protein is mostly gone. Satiety will kick in too early if drinking too much with meals and under-nourishment can result.     It's not a bad idea to take a complete multivitamin most days of the week if using this medication. Lower iron content tends to be less constipating.     Adequate hydration is essential for feeling your best, efficient fat burning, waste elimination and constipation prevention. For those without fluid restrictions due to other disease, the goal is at least one ounce of water per gram of protein consumed with a  minimum of 64oz/day goal.     Pancreatitis is a very rare but potentially serious side effect. Stop Zepbound if severe mid abdominal pain/burning in nature or if unable to eat/drink due to severe nausea/discomfort.   People with strong history of pancreatitis without clear cause should stay clear of this medication as should those planning to get pregnant, those with strong personal or family history for medullary thyroid cancer or Multiple Endocrine Neoplasia (rare).     Stop Zepbound at least 2 weeks prior to any planned surgery.  Stop until fully recovered if unexpected/emergent surgery is needed with anticipation that re-ramping will be needed if off longer than 14-16 days since last dose.    Kind Regards,  Kerwin Dunn MD  [facility]    LEAN PROTEIN SOURCES  Getting 20-30 grams of protein, 3 meals daily, is appropriate for most people, some need more but more than about 40 grams per meal is not useful.  General rule is drinking one ounce of water per gram of protein eaten over the course of the day:  70 grams of protein each day, drink 70 oz of  water.  Protein Source Portion Calories Grams of Protein                           Nonfat, plain Greek yogurt    (10 grams sugar or less) 3/4 cup (6 oz)  12-17   Light Yogurt (10 grams sugar or less) 3/4 cup (6 oz)  6-8   Protein Shake 1 shake 110-180 15-30   Skim/1% Milk or lactose-free milk 1 cup ( 8 oz)  8   Plain or light, flavored soymilk 1 cup  7-8   Plain or light, hemp milk 1 cup 110 6   Fat Free or 1% Cottage Cheese 1/2 cup 90 15   Part skim ricotta cheese 1/2 cup 100 14   Part skim or reduced fat cheese slices 1 ounce 65-80 8     Mozzarella String Cheese 1 80 8   Canned tuna, chicken, crab or salmon  (canned in water)  1/2 cup 100 15-20   White fish (broiled, grilled, baked) 3 ounces 100 21   [place]/Tuna (broiled, grilled, baked) 3 ounces 150-180 21   Shrimp, Scallops, Lobster, Crab 3 ounces 100 21   Pork loin, Pork Tenderloin 3 ounces 150 21   Boneless, skinless chicken /turkey breast                          (broiled, grilled, baked) 3 ounces 120 21   [place], [place], [place], and Venison 3 ounces 120 21   Lean cuts of red meat and pork (sirloin,   round, tenderloin, flank, ground 93%-96%) 3 ounces 170 21   Lean or Extra Lean Ground Turkey 1/2 cup 150 20   90-95% Lean [place] Burger 1 mckinley 140-180 21   Low-fat casserole with lean meat 3/4 cup 200 17   Luncheon Meats                                                        (turkey, lean ham, roast beef, chicken) 3 ounces 100 21   Egg (boiled, poached, scrambled) 1 Egg 60 7   Egg Substitute 1/2 cup 70 10   Nuts (limit to 1 serving per day)  3 Tbsp. 150 7   Nut [place] (peanut, almond)  Limit to 1 serving or less daily 1 Tbsp. 90 4   Soy Burger (varies) 1  15   Garbanzo, Black, [place] Beans 1/2 cup 110 7   Refried Beans 1/2 cup 100 7   Kidney and Lima beans 1/2 cup 110 7   Tempeh 3 oz 175 18   Vegan crumbles 1/2 cup 100 14   Tofu 1/2 cup 110 14   Chili (beans and extra lean beef or turkey) 1 cup 200 23   Lentil Stew/Soup 1 cup 150 12    Black Bean Soup 1 cup 175 12           Example Meal Plan for a 1512-4498 Calorie Diet:    In order to fuel your weight loss properly and avoid hunger-induced overeating later in the day, for your height and weight, you will enjoy the most success by following the diet below or similar with adjustments based on your particular tastes and preferences.  Exercise may influence speed, amount of weight loss further.     I recommend getting into a meal routine and keeping it similar day to day in the beginning so you don t have to think too hard about what you re going to make/eat.  Keep snacks healthy, ideally containing protein and some vegetables.  Non-processed food is preferable to packaged items.  Eat at least a few crunchy green vegetables if having a snack, which should be 2-3 hours after your mealtimes(prepare these ahead of time for ease of use).  Drink 64 oz -80 oz of water daily for most, some of you will need more and we'll discuss it at your visit if that is the case.      When changing our diet,  we can often mistake thirst for hunger or just have some distracted eating habits that we need to break free from ('bored/mindless eating', screen time,work, driving,etc).  A glass of water and reconsideration of our hunger is often all that is needed.  Having the urge is not the problem, but watching it pass by without acting on it is the goal.    If you re having hunger problems, add a protein drink/snack to your morning hours or afternoon snack with at least 20grams of protein and not too much sugar (under 10g).  A carton of higher protein/low sugar yogurt can work as well.  If the urge to snack is overwhelming and not satiated, try going for a 10 minute walk/exercise, come home and drink a glass of water and if still hungry, have a  calorie snack (handful of raw/sprouted nuts, veggies and string cheese, protein bar, etc).  Savor it.    It is better to have a large breakfast, a moderate lunch and a  smaller dinner to fuel your day.  People lose 10-15% more weight during their weight loss season with this strategy. Optimizing your protein intake at each meal will further keep you more satisfied while eating less food overall.  Getting exercise in early has also been shown to offer the best results (before breakfast ideally but anytime is the right time to exercise if that is not an option for you).    To make sure you re getting adequate vitamins and minerals during weight loss, I recommend one complete multivitamin a day of your choice.  Consider a probiotic and taking some vitamin D 2000 IU daily.    Let supper be your last meal of the day and ideally try to have at least 12 hours between supper and breakfast the next day to tap into some beneficial overnight fasting dynamics.  Midnight snacks need to go away. Water in the evening is fine, unsweetened, non caffeinated herbal tea is helpful as well.  Consolidating your meals within a 8-12 hour period of your day will help tap into these additional metabolic benefits and tends to keep your appetite up for breakfast, further helping to stay on track.  For most of my patients, I don't recommend an intermittent fasting style diet (many find it hard to fit in their lifestyle) but an overnight fast is very doable for most patients and helps regulate our hunger drives a little better.  This makes it very important to nail good intake at all three meals to feel satisfied/energized and still lose weight.      If evening snacking desires are high, consider a glass of fiber supplement for some additional fullness (metamucil or similar). Most of us don't get the 25-30 grams per day of fiber that promotes good gut health/satiety.  Benefiber, metamucil, citrucel are reasonable/affordable options for most people.  Inulin, chicory, psyllium husk are reasonable options but start slow and low in the dose to avoid gas/bloating until your gut gets acclimated (ramping up to 5-10  grams per day of supplemental fiber after 3-4 weeks if needed).      Example Meal Plan:  Breakfast: 450-475 Calories  1 egg cooked on low in olive oil:   calories.  5oz Greek Yogurt ([place]age plain classic: ~150 randi)  Handful of Berries of your choice (about a calorie per berry or 20-40cal per handful)    cup(cooked) of  old fashioned oatmeal or 1/2 cup(cooked) steel cut oats. (150 randi)  Sprinkle amount of brown sugar and a pat of butter. (40 randi)  Glass of  Water  Black coffee or unsweetened Tea (0calories).      2-3 hours Later Snack: (195 calories).  Glass of water  One string Cheese (80 calories) or 4 oz creamed cottage cheese (115 calories) with  Crunchy Celery sticks (less than 10 calories per large stalk) 2 stalks. (20 calories)    of a  Large Banana or   of a Large Apple (60 calories):  eat second half at lunch or afternoon snack.     Lunch:300 -350 calories   Chicken Breast  (baked/broiled/roasted/grilled)  4-6 oz.  (125-180 randi), BBQ sauce/hot sauce/mustard/seasoning is free. Just use a reasonable amount. Or a can of tuna with 1 tablespoon mayonnaise.  Salad: lettuce, any other veggies (cucumbers, green peppers/celery you like and a small drizzle of dressing to just flavor.  Go as big on the veggies as you like,  as they are practically calorie free.   A whole, 8 inch cucumber is 45 calories, a whole green pepper is 23 calories, a stalk of celery is 9 calories.  [place] Dressing is 60 calories per tablespoon..so moderate your desired dressing or do a drizzle of olive oil and splash of balsamic vinegar on top,  Total calories unlikely to be over 150 even with dressing.  Glass of Water.    Option for lunch is meal replacement protein drink/smoothie.  Need at least 20 grams of protein and eat the rest of your apple/banana from the morning snack.      Afternoon Snack: 150-200 calories   Cheese Stick or cottage cheese again  and a fresh fruit OR  Granola Bar (protein Bar acceptable if under 200  calories OR  Homemade smoothies:  8oz skim milk,  a handful of berries (fresh or frozen and a serving of protein powder such as BiPro or Taniya sWhey for example.  If you don't like dairy, make with 8oz water, one small banana, handful of berries and the protein powder, add any veggies you want as well:  roughly 200 calories.   Glass of Water    Dinner: 325 calories  4oz of fresh, [place] salmon.  Broiled (salt/pepper/dill) for about 8-8.5 minutes (200calories) or  4oz filet mignon steak or sirloin steak  Salad or vegetable sautéed lightly in olive oil or   Broccoli 1.5  cups chopped and steamed  or micro-waved in a little water (75 calories)  Glass of Water,    Cup of herbal tea (unsweetened, caffeine free)      Herbs and seasonings are encouraged to flavor your foods/vegetables.  Make your food delicious.      Tips for Success:  1.  Prepare proteins ahead of time (broil chicken breasts in bulk so you can grab and go), steel cut oats/lentils can be stored in casserole dish/bowl in the fridge for quick scoop in the morning and rewarm in microwave, make use of crock pot recipes (watch salt content).  Making meals that cover 3-4 future meals is an easy way to stay on track.  2.  Drink a 8-12 oz glass of water every 2-3 hours when awake.  We often mistake hunger for thirst, especially when losing weight.  3. Remember your Reward and Motivation when things get hard.  4.  Weigh yourself every morning and record, you'll stay on track better and learn how our biorhythms, diet and elimination patterns show up on the scale. Don't worry about 1 or 2 day patterns, but when on track you'll notice good trend downward of weight over 3-4 day segments.  Plateaus tend to resolve after 4-8 days in most cases if you stay consistent with your plan.  These are natural and part of weight loss, even if you're perfect with your plan execution.  5. Call if problems/concerns.  [employer] is a great tool to stay in touch and provide weekly  "outside accountability. Check in with questions or if you want to brag.  6.  Find a handful of meals/foods that keep you on track and feeling good and get into a routine that is sustainable for you.  It's OK to have a routine that works for you.  7.  Consider taking a complete multivitamin just to make sure all micronutrients are adequate during weight loss.  8. If losing hair/brittle nails it usually means you are not taking enough protein.  Minimum goal is 60 grams daily of protein for smaller women, 80 grams a day for men. Consider taking Biotin as supplement or a \"Hair and Nail\" multivitamin.  Exercise Guidance    Nearly everything that bothers us gets better when the proper amount of exercise can be done regularly for what our body tolerates.  Getting to that level safely and without injury is the key.  When it comes to weight loss, exercise is especially important in maintaining the weight loss.  Unfortunately, one of the harsh realities is that substantial weight loss slows our metabolism, often anywhere from 5-20%.  But as far as importance in weight reduction, our appetite can usually easily keep up with our exericse and mindful diet for how active we are is the main determining factor of weight reduction.    Our brain always remembers our heaviest weight and we can return to that if we're not mindful and moving regularly.  Our biology doesn't understand the concept of having too much energy, only not having enough.  As such, when we lose weight, it's thought that the brain interprets this as we're ill or in a famine and dials back our metabolism to limit further weight loss.  This is why exercise is so important in keeping the weight off and is the main reason people have some weight regain from their low weight point after weight loss.  We have to make up that 10-20% of calories not being burned.Since we can restrict our intake for only so long, exercise becomes very important in our long term healthy " weigh maintenance to balance out the occasional indiscretion with our diet.    Generally, for every 5% body weight reduction in a weight loss season, a person needs to add  kilocalories of exercise in their daily routine to keep that weight off for the long term.  This is why it's vital to be starting your fitness regimen during weight loss season, so that routine is well established as you move into your maintenance period.    Additionally, all sorts of good enzymes and genes turn on with exercise and our stress, sleep, mood and bodies feel better when we can get to the point of making ourselves a little sweaty and short of breath 35-50 minutes most days of the week. But we have to start with what we can do first and give ourselves permission to work our way up to this goal.    Who isn't ready for exercise? Well, if you get severe dizziness/palpitations, chest pain or short of breath/faint with even minimal activity like walking across a room or you're having to pause while going up a flight of stairs, then getting your heart and/or lungs fully evaluated prior to starting an exercise regimen is recommended. Everyone else can probably start a program, but everyone may start at a different point:  Some can set a 5-10 minute walking goal and others will be able to ride their bike for an hour.  Some only can do chair yoga and some decide to train for a triathlon. Starting with what your body can do is the important part and not asking to do more than you can comfortably do is the goal. Building up eventually to 150 minutes/week or more is the future goal- but anything over 10 minutes/day makes a difference in how our body works and feels. Our stress/sleep/heart/brain/organ function and risks for many types of cancer improve with exercise so having that tool to assist our health goals is vital.     Start with where you're at and look to add 5-10% more each week until you're at that 150 minutes or more a week goal  (or 75 minutes/week or more of vigorous exercise). Moderate exercise can be estimated as the pace you can carry on a conversation and vigorous is the pace at which you can get 3-5 words out before having to take a breath.  If you're using heart rate monitoring, Moderate is about 55-60% of your maximum heart rate and vigorous about 75%. (Max heart rate estimated as 220 beats minus your age:  Example: 220-age of 43 =176 Beats per minute (BPM) maximum. 0.6X 176= 105 BPM (moderate), 132 BMP(vigorous)).    If you like to count steps, the 10,000 steps per day does correlate well with weight maintenance, but try to make at least 20-25% of those steps at a brisk pace (like you are about to miss your bus).      Let's move!  Kerwin Dunn MD.   High Intensity Interval Training (HIIT):    To feel our best, our bodies need to move. Our mental health, sleep, memory, immune function, stress coping and metabolic health depend on exercise and its benefits for optimizing how our body works best. In the modern world, most do not get nearly enough exercise.  However, it's important to understand that ANYTHING is better than nothing and if you can get started with a routine for fitness, even a little bit has some benefit compared to none.  The more you do, the better (up to 20 hours weekly, beyond that the benefit tails off), but the NIH guidelines for all adults in [place] is to work up to 150-200 minutes of moderate aerobic activity each week to improve our health.  If you're a person that struggles with time pressure/lack of interest then those 2.5-3 hours weekly may be too much to ask.  So, we have to be very efficient in how we exercise to reap the benefits. It turns out that INTENSITY matters. When we use Vigorous rather than Moderate aerobic activity (Vigorous defined by a heart rate of 70-85% of calculated maximum heart rate; max heart rate equals 220 beats minus your age or the level of effort where you could talk 2-4 words  "before needing to take a breath), the amount of time required to reap the benefits of exercise is cut in half:  75-90 minutes/week.      A recent study showed that a 10 minute interval workout using a 3-4 minute warm up and then 20 second \"maximum effort\" followed by 1 minute, 40 seconds of recovery and then repeated two more times was as effective in improving fitness as a 30 minute brisk walk.  They utilized exercise bikes or stairs for the effort but you could adapt to whatever geography/gym/pool/bike/hill/staircase that you may have as long as you can safely do the effort without injury.   As your fitness improves, intervals of 30 seconds to 2 minutes of increased effort followed by 1-2 minutes of recovery are options as well. The fitter you become, the harder and more intervals you'll be able to do.  Start with what you CAN do, not what you WANT to do and that will allow your body to adapt/develop fitness down the road to reach your goals.  Give yourself permission to develop a base of fitness the first 3 weeks and then you should be able to ramp up from there nicely and progressively each week.    Jumping right into a High Intensity Interval workout if you've not been having some level of exercise/fitness recently can increase your risk of injury.  To help develop some base fitness here are some options that would give you a 3-4 week base development, assuming you can walk or ride a stationary bike but haven't been doing much the last few months. 3-4 sessions each week of:      Week Warm up Interval: 3-6 repeats Cooldown   1 3-5 minutes easy walk/spin 20 seconds brisk but doable pace then recover with 90 seconds easy 2-3 minute easy walk/spin   2 3-5 minutes easy walk/spin 25 seconds brisk, 90 seconds recovery 2-3 minute easy walk/spin   3 3-5 minutes easy walk/spin 30 seconds brisk, 90 seconds recovery 2-3 minute easy walk/spin   4 3-5 minutes easy walk/spin 40 seconds brisk, 60 seconds recovery 2-3 minute " easy walk/spin     *for base development, keep resistance steady and just  the speed. Once you've completed base phase, feel free to add a little extra resistance to the faster phase to increase vigorousness/heart rate.  During base phase you should be still able to talk comfortable/sing during faster pedaling/walking.     After completing the base phase, start ramping up workouts on the bike/walking. Have one easy pace workout but of longer duration (add 2-3 minutes each week to the time) each week.  One workout weekly should have an interval workout where you push your intensity working up to those 15-30 second maximum efforts and recovering fully and then repeating for at least 3-4 intervals.  Cool down/easy pedal at the end for 1-2 minutes.     Consider a spin class if you have the means/access and remember, it's OK to rest if needed. Make the workout yours and don't focus on other people's abilities, getting started will develop your own fitness every week.  Jogging plans such as the Couch to 10k or any aerobic training program make use of similar intervals and can help you reach a fitter and more capable body regardless of where/how you perform the intervals (walking/biking/swimming/elliptical/row machine/erg arm machine, peddler, raking, lawn mowing,etc).  Go for it.

## 2025-01-09 NOTE — NURSING NOTE
Current patient location: [address]   [facility] [address]    Is the patient currently in the state of [place]? YES    Visit mode: VIDEO    If the visit is dropped, the patient can be reconnected by:VIDEO VISIT: Send to e-mail at: [email or web address]    Will anyone else be joining the visit? NO  (If patient encounters technical issues they should call [phone]    Are changes needed to the allergy or medication list? No    Are refills needed on medications prescribed by this physician? NO    Rooming Documentation:  Questionnaire(s) completed    Reason for visit: RECHECK    Zka DAMON

## 2025-01-27 NOTE — PROGRESS NOTES
PLASTIC SURGERY HISTORY AND PHYSICAL    Chief Complaint: brachioplasty  Referring Provider: Referred Self      HPI:   Ronel is a 44 year old female PMH ADD, Bipolar, depression, asthma, GERD, obesity s/p significant weight loss now desiring skin removal. Patient was originally seen by Dr. Gale 1 year ago with plans to return at her goal weight.     Highest weight was 350lbs, currently 233lbs and still losing weight and goal weight is around 200lbs. Weight loss with diet, exercise and medications. Sees medical weight management. On zepbound.     Her biggest concerns are her arms. Eventually she will want her stomach and thigh skin when she is at her goal weight.     Complains of arm skin hurting when she lays down and it feels heavy causing her discomfort. Has trouble finding clothes that fit due to arm size. Denies rashes.     Pt denies PMH of DM and HTN/HLD have resolved with weight loss.     PMH:   Past Medical History:   Diagnosis Date    ACP (advance care planning) 10/18/2012    Patient has identified Health Care Agent(s): No Add Health Care Agents: No Patient has Advance Care Plan Documents (Health Care Directive, POLST): No,  .  Patient has identified Specific Treatment Preferences: Yes  Specific Treatment Preferences: a.) Code Status:  CPR/Attempt Resuscitation   She would like her mother  Calista Fernandes [phone] or Georgie Trejo [phone] to make decisions     ADD (attention deficit disorder) 11/27/2013    Adjustment disorder with depressed mood 12/5/2018    Allergic state     Anxiety     Arthralgia of hip 3/21/2016    Arthritis     Asthma     Attention deficit disorder 11/27/2013    Binge eating 11/27/2013    Bipolar 1 disorder (H) 11/8/2021    Bipolar 2 disorder (H)     mixed, one manic episode when combined wellbutrin and celexa. Now off Celexa.    Bipolar II disorder, most recent episode hypomanic (H) 4/29/2015    Borderline high cholesterol 10/23/2020    Borderline personality disorder  (H)     Care plan discussed with patient 2/1/2012    This patient's Behavioral Health care has been restricted.  Please direct the patient to speak with the  provider before scheduling.    Cholelithiasis     2007 lap gil    Constipation 11/8/2021    Degenerative joint disease (DJD) of hip 9/9/2021    Depressive disorder     Diabetes (H)     Diabetes mellitus (H)     Dx in 2013, lost weight and in 2015 MD thought she might have been misdiagnosed.    Disorder of pelvis 9/14/2015    Eating disorder 3/13/2017    Gait difficulty 2/4/2019    Gastroesophageal reflux disease with esophagitis without hemorrhage 11/8/2021    GERD (gastroesophageal reflux disease)     Greater trochanteric bursitis of both hips 10/27/2015    H/O constipation     H/O hypercholesterolemia     on statin therapy    H/O urinary frequency 12/1/2016    Hip dysplasia     History of anxiety disorder 10/23/2020    History of urinary anomaly 12/1/2016    Hx of diabetes mellitus 5/21/2013    Hx of eating disorder 8/25/2016    Overview:  Reports distant laxative abuse and excessive exercise leading to 100 lb weight loss around 2013.     Hyperlipidemia 8/25/2016    Overview:  On statin therapy.    Hypertension     Left hip pain 3/21/2016    Major depression     Menstrual disorder     heavy and irregular bleeding, improved with IUD.    Mixed hyperlipidemia 3/21/2016    Mixed incontinence 11/8/2021    Moderate mixed bipolar I disorder (H) 5/10/2016    Overview:  Replacement Utility updated for latest IMO load    Morbid obesity (H) 12/22/2010    Morbid obesity with BMI of 50.0-59.9, adult (H) 8/25/2016    Overview:  BMI 52 at bariatric clinic intake 8/25/16.    Multiple-type hyperlipidemia 3/21/2016    Obese     Osteoarthritis 10/23/2020    Osteoarthritis of hip 6/15/2015    Pain of both hip joints 6/30/2015    Primary localized osteoarthritis of pelvic region and thigh 11/8/2021    Primary osteoarthritis of both hips 10/30/2020    Added automatically from  request for surgery [id number]    Primary osteoarthritis of left hip 6/15/2015    PTSD (post-traumatic stress disorder)     Recurrent major depressive disorder 12/22/2010    Relationship problems 10/23/2020    Secondary amenorrhea 10/22/2012    Severe episode of recurrent major depressive disorder (H) 10/4/2012    Skin sensation disturbance 11/8/2021    Soft tissue lesion of shoulder region 2/27/2013    Status post total hip replacement, left 9/10/2021    Suicidal ideation 10/4/2017    Trochanteric bursitis 10/27/2015    Type 2 diabetes mellitus (H) 8/12/2014    Uncomplicated asthma     Urinary incontinence     on ditropan (showers/pools and stress incontinence).    vertigo     Vertigo 3/10/2017   Denies diabetes and HTN/HLD has resolved    PSH:   Past Surgical History:   Procedure Laterality Date    ANKLE FRACTURE SURGERY Right     ARTHROPLASTY HIP Left 9/9/2021    ARTHROPLASTY HIP Right 4/4/2022    ARTHROSCOPIC RECONSTRUCTION ANTERIOR CRUCIATE LIGAMENT Left     CHOLECYSTECTOMY      CLOSED REDUCTION HIP Left 9/9/2021    COLONOSCOPY      CYST REMOVAL      MAMMO STEREOTACTIC CORE BIOPSY LEFT Left 06/15/2020    ORTHOPEDIC SURGERY      TONSILLECTOMY      ZZHC COLONOSCOPY W/WO BRUSH/WASH N/A 06/08/2021   Bilateral hip replacements    FH:   Family History   Problem Relation Age of Onset    Diabetes Mother     Cancer Mother     Breast Cancer Mother         Unsure of age    Hypertension Mother     Hyperlipidemia Mother     Arthritis Mother     Obesity Mother     Cerebrovascular Disease Mother     Diabetes Brother     Diabetes Father     Mental Illness Brother     Mental Illness Brother     Diabetes Brother     Hypertension Brother     Arthritis Brother     Obesity Brother     Hyperlipidemia Brother     Obesity Sister     Cancer Maternal Grandmother     Obesity Maternal Grandmother     Hypertension Maternal Grandmother     Arthritis Maternal Grandmother     Cerebrovascular Disease Maternal Grandmother     Obesity Maternal  Grandfather     Substance Abuse Maternal Grandfather     Heart Disease Maternal Grandfather     Hypertension Maternal Grandfather     Hyperlipidemia Maternal Grandfather     Cerebrovascular Disease Maternal Grandfather     Pacemaker Maternal Grandfather     Seizure Disorder Maternal Grandfather         SH:   Social History     Tobacco Use    Smoking status: Never    Smokeless tobacco: Never   Substance Use Topics    Alcohol use: No    Drug use: No      Work/school: [employer]    MEDS:     Current Outpatient Medications:     albuterol (PROAIR HFA/PROVENTIL HFA/VENTOLIN HFA) 108 (90 Base) MCG/ACT inhaler, Inhale 2 puffs into the lungs every 6 hours as needed for shortness of breath, wheezing or cough, Disp: 18 g, Rfl: 0    buPROPion (WELLBUTRIN XL) 300 MG 24 hr tablet, Take 300 mg by mouth daily , Disp: , Rfl:     cholecalciferol (VITAMIN D3) 125 mcg (5000 units) capsule, , Disp: , Rfl:     docusate sodium (COLACE) 100 MG capsule, Take 1 capsule (100 mg) by mouth 2 times daily as needed for constipation, Disp: 90 capsule, Rfl: 4    docusate sodium (COLACE) 100 MG tablet, Take 1 tablet (100 mg) by mouth daily., Disp: 30 tablet, Rfl: 3    EPINEPHrine (ANY BX GENERIC EQUIV) 0.3 MG/0.3ML injection 2-pack, Inject 0.3 mg into the muscle, Disp: , Rfl:     EPINEPHrine (EPIPEN/ADRENACLICK/OR ANY BX GENERIC EQUIV) 0.3 MG/0.3ML injection 2-pack, Inject 0.3 mLs (0.3 mg) into the muscle once as needed for anaphylaxis, Disp: 0.6 mL, Rfl: 0    ketoconazole (EXTINA) 2 % external foam, , Disp: , Rfl:     ketoconazole (NIZORAL) 2 % external shampoo, APPLY 5 TO 10ML TO WET SCALP, LATHER, LEAVE ON 3-5 MINUTES, AND RINSE. APPLY TWICE WEEKLY FOR 2-4 WEEKS EXTERNALLY, Disp: , Rfl:     levonorgestrel (LILETTA, 52 MG,) 19.5 MCG/DAY IUD, 1 each by Intrauterine route once, Disp: , Rfl:     LORazepam (ATIVAN) 0.5 MG tablet, 0.5 mg every 6 hours as needed for anxiety., Disp: , Rfl:     magnesium oxide (MAG-OX) 400 MG tablet, Take 1 tablet (400  "mg) by mouth daily, Disp: 90 tablet, Rfl: 3    meclizine (ANTIVERT) 25 MG tablet, Take 1 tablet by mouth daily at 2 pm, Disp: , Rfl:     psyllium (METAMUCIL/KONSYL) 58.6 % powder, Mix one teaspoon into 12 oz of water daily., Disp: 425 g, Rfl: 1    risperiDONE (RISPERDAL) 3 MG tablet, , Disp: , Rfl:     sennosides (SENOKOT) 8.6 MG tablet, , Disp: , Rfl:     tirzepatide-Weight Management (ZEPBOUND) 10 MG/0.5ML prefilled pen, Inject 0.5 mLs (10 mg) subcutaneously every 7 days., Disp: 6 mL, Rfl: 1    traZODone (DESYREL) 150 MG tablet, Take 150 mg by mouth At Bedtime, Disp: , Rfl:        ALLERGIES:     Allergies   Allergen Reactions    Cephalexin Anaphylaxis    Erythromycin Anaphylaxis and Hives     Other reaction(s): Unknown    Penicillins Hives     hives  Other reaction(s): Unknown        ROS: Denies chest pain, shortness of breath, MI, CVA, DVT, PE, and bleeding disorders.     PHYSICAL EXAMINATION:   /74 (BP Location: Left arm, Patient Position: Sitting, Cuff Size: Adult Large)   Pulse 86   Ht 1.626 m (5' 4\")   Wt 105.7 kg (233 lb)   SpO2 100%   BMI 39.99 kg/m    BMI: Body mass index is 39.99 kg/m .  General: No acute distress.   Abdomen: no exam today.   Extremities: bilateral upper arms with significant excess skin and subcutaneous adipose tissue. Right side with more excess than left. Skin soft, no dermal edema. Hanging skin R 12cm, L 11cm.      ASSESSMENT:   Ronel is a 44 year old female PMH ADD, Bipolar, depression, asthma, GERD, obesity s/p significant weight loss now desiring skin removal.     PLAN:   -continue weight loss until at goal weight, then return for visit to discuss planning surgery.   -she has lost significant weight and arm skin is quite heavy and seems to affect her ability to be active and perform her ADLs. Skin removal will likely allow her to be more active.   -explained that this surgery is often not covered by insurance but we will submit it as part of the scheduling process. I " explained the scheduling process and PA process. Today I will send her an informational quote. No orders placed.   -RTC at goal weight and will place orders at that time. Can see myself or Dr. Pena if she prefers at her next visit. We look forward to helping her in the future.     IVAN Murray-C  Plastic and Reconstructive Surgery    40 minutes spent on the date of the encounter doing chart review, history and physical, dressing changes, documentation and further activity as noted above.

## 2025-01-28 ENCOUNTER — PRE VISIT (OUTPATIENT)
Dept: PLASTIC SURGERY | Facility: CLINIC | Age: 45
End: 2025-01-28

## 2025-01-28 ENCOUNTER — OFFICE VISIT (OUTPATIENT)
Dept: PLASTIC SURGERY | Facility: CLINIC | Age: 45
End: 2025-01-28
Payer: COMMERCIAL

## 2025-01-28 VITALS
SYSTOLIC BLOOD PRESSURE: 107 MMHG | HEIGHT: 64 IN | DIASTOLIC BLOOD PRESSURE: 74 MMHG | WEIGHT: 233 LBS | OXYGEN SATURATION: 100 % | HEART RATE: 86 BPM | BODY MASS INDEX: 39.78 KG/M2

## 2025-01-28 DIAGNOSIS — L98.7 EXCESS SKIN OF UPPER EXTREMITY: Primary | ICD-10-CM

## 2025-01-28 ASSESSMENT — PAIN SCALES - GENERAL: PAINLEVEL_OUTOF10: NO PAIN (0)

## 2025-01-28 NOTE — LETTER
1/28/2025       RE: Ronel Ryan  [address]  [facility] [address]     Dear Colleague,    Thank you for referring your patient, Ronel Ryan, to the [facility] [place] at [facility]. Please see a copy of my visit note below.    PLASTIC SURGERY HISTORY AND PHYSICAL    Chief Complaint: brachioplasty  Referring Provider: Referred Self      HPI:   Ronel is a 44 year old female PMH ADD, Bipolar, depression, asthma, GERD, obesity s/p significant weight loss now desiring skin removal. Patient was originally seen by Dr. Gale 1 year ago with plans to return at her goal weight.     Highest weight was 350lbs, currently 233lbs and still losing weight and goal weight is around 200lbs. Weight loss with diet, exercise and medications. Sees medical weight management. On zepbound.     Her biggest concerns are her arms. Eventually she will want her stomach and thigh skin when she is at her goal weight.     Complains of arm skin hurting when she lays down and it feels heavy causing her discomfort. Has trouble finding clothes that fit due to arm size. Denies rashes.     Pt denies PMH of DM and HTN/HLD have resolved with weight loss.     PMH:   Past Medical History:   Diagnosis Date     ACP (advance care planning) 10/18/2012    Patient has identified Health Care Agent(s): No Add Health Care Agents: No Patient has Advance Care Plan Documents (Health Care Directive, POLST): No,  .  Patient has identified Specific Treatment Preferences: Yes  Specific Treatment Preferences: a.) Code Status:  CPR/Attempt Resuscitation   She would like her mother  Calista Fernandes [phone] or Georgie Trejo [phone] to make decisions      ADD (attention deficit disorder) 11/27/2013     Adjustment disorder with depressed mood 12/5/2018     Allergic state      Anxiety      Arthralgia of hip 3/21/2016     Arthritis      Asthma       Attention deficit disorder 11/27/2013     Binge eating 11/27/2013     Bipolar 1 disorder (H) 11/8/2021     Bipolar 2 disorder (H)     mixed, one manic episode when combined wellbutrin and celexa. Now off Celexa.     Bipolar II disorder, most recent episode hypomanic (H) 4/29/2015     Borderline high cholesterol 10/23/2020     Borderline personality disorder (H)      Care plan discussed with patient 2/1/2012    This patient's Behavioral Health care has been restricted.  Please direct the patient to speak with the  provider before scheduling.     Cholelithiasis     2007 lap gil     Constipation 11/8/2021     Degenerative joint disease (DJD) of hip 9/9/2021     Depressive disorder      Diabetes (H)      Diabetes mellitus (H)     Dx in 2013, lost weight and in 2015 MD thought she might have been misdiagnosed.     Disorder of pelvis 9/14/2015     Eating disorder 3/13/2017     Gait difficulty 2/4/2019     Gastroesophageal reflux disease with esophagitis without hemorrhage 11/8/2021     GERD (gastroesophageal reflux disease)      Greater trochanteric bursitis of both hips 10/27/2015     H/O constipation      H/O hypercholesterolemia     on statin therapy     H/O urinary frequency 12/1/2016     Hip dysplasia      History of anxiety disorder 10/23/2020     History of urinary anomaly 12/1/2016     Hx of diabetes mellitus 5/21/2013     Hx of eating disorder 8/25/2016    Overview:  Reports distant laxative abuse and excessive exercise leading to 100 lb weight loss around 2013.      Hyperlipidemia 8/25/2016    Overview:  On statin therapy.     Hypertension      Left hip pain 3/21/2016     Major depression      Menstrual disorder     heavy and irregular bleeding, improved with IUD.     Mixed hyperlipidemia 3/21/2016     Mixed incontinence 11/8/2021     Moderate mixed bipolar I disorder (H) 5/10/2016    Overview:  Replacement Utility updated for latest IMO load     Morbid obesity (H) 12/22/2010     Morbid obesity with BMI of  50.0-59.9, adult (H) 8/25/2016    Overview:  BMI 52 at bariatric clinic intake 8/25/16.     Multiple-type hyperlipidemia 3/21/2016     Obese      Osteoarthritis 10/23/2020     Osteoarthritis of hip 6/15/2015     Pain of both hip joints 6/30/2015     Primary localized osteoarthritis of pelvic region and thigh 11/8/2021     Primary osteoarthritis of both hips 10/30/2020    Added automatically from request for surgery [id number]     Primary osteoarthritis of left hip 6/15/2015     PTSD (post-traumatic stress disorder)      Recurrent major depressive disorder 12/22/2010     Relationship problems 10/23/2020     Secondary amenorrhea 10/22/2012     Severe episode of recurrent major depressive disorder (H) 10/4/2012     Skin sensation disturbance 11/8/2021     Soft tissue lesion of shoulder region 2/27/2013     Status post total hip replacement, left 9/10/2021     Suicidal ideation 10/4/2017     Trochanteric bursitis 10/27/2015     Type 2 diabetes mellitus (H) 8/12/2014     Uncomplicated asthma      Urinary incontinence     on ditropan (showers/pools and stress incontinence).     vertigo      Vertigo 3/10/2017   Denies diabetes and HTN/HLD has resolved    PSH:   Past Surgical History:   Procedure Laterality Date     ANKLE FRACTURE SURGERY Right      ARTHROPLASTY HIP Left 9/9/2021     ARTHROPLASTY HIP Right 4/4/2022     ARTHROSCOPIC RECONSTRUCTION ANTERIOR CRUCIATE LIGAMENT Left      CHOLECYSTECTOMY       CLOSED REDUCTION HIP Left 9/9/2021     COLONOSCOPY       CYST REMOVAL       MAMMO STEREOTACTIC CORE BIOPSY LEFT Left 06/15/2020     ORTHOPEDIC SURGERY       TONSILLECTOMY       ZZHC COLONOSCOPY W/WO BRUSH/WASH N/A 06/08/2021   Bilateral hip replacements    FH:   Family History   Problem Relation Age of Onset     Diabetes Mother      Cancer Mother      Breast Cancer Mother         Unsure of age     Hypertension Mother      Hyperlipidemia Mother      Arthritis Mother      Obesity Mother      Cerebrovascular Disease Mother       Diabetes Brother      Diabetes Father      Mental Illness Brother      Mental Illness Brother      Diabetes Brother      Hypertension Brother      Arthritis Brother      Obesity Brother      Hyperlipidemia Brother      Obesity Sister      Cancer Maternal Grandmother      Obesity Maternal Grandmother      Hypertension Maternal Grandmother      Arthritis Maternal Grandmother      Cerebrovascular Disease Maternal Grandmother      Obesity Maternal Grandfather      Substance Abuse Maternal Grandfather      Heart Disease Maternal Grandfather      Hypertension Maternal Grandfather      Hyperlipidemia Maternal Grandfather      Cerebrovascular Disease Maternal Grandfather      Pacemaker Maternal Grandfather      Seizure Disorder Maternal Grandfather         SH:   Social History     Tobacco Use     Smoking status: Never     Smokeless tobacco: Never   Substance Use Topics     Alcohol use: No     Drug use: No      Work/school: [employer]:     Current Outpatient Medications:      albuterol (PROAIR HFA/PROVENTIL HFA/VENTOLIN HFA) 108 (90 Base) MCG/ACT inhaler, Inhale 2 puffs into the lungs every 6 hours as needed for shortness of breath, wheezing or cough, Disp: 18 g, Rfl: 0     buPROPion (WELLBUTRIN XL) 300 MG 24 hr tablet, Take 300 mg by mouth daily , Disp: , Rfl:      cholecalciferol (VITAMIN D3) 125 mcg (5000 units) capsule, , Disp: , Rfl:      docusate sodium (COLACE) 100 MG capsule, Take 1 capsule (100 mg) by mouth 2 times daily as needed for constipation, Disp: 90 capsule, Rfl: 4     docusate sodium (COLACE) 100 MG tablet, Take 1 tablet (100 mg) by mouth daily., Disp: 30 tablet, Rfl: 3     EPINEPHrine (ANY BX GENERIC EQUIV) 0.3 MG/0.3ML injection 2-pack, Inject 0.3 mg into the muscle, Disp: , Rfl:      EPINEPHrine (EPIPEN/ADRENACLICK/OR ANY BX GENERIC EQUIV) 0.3 MG/0.3ML injection 2-pack, Inject 0.3 mLs (0.3 mg) into the muscle once as needed for anaphylaxis, Disp: 0.6 mL, Rfl: 0     ketoconazole (EXTINA) 2 %  "external foam, , Disp: , Rfl:      ketoconazole (NIZORAL) 2 % external shampoo, APPLY 5 TO 10ML TO WET SCALP, LATHER, LEAVE ON 3-5 MINUTES, AND RINSE. APPLY TWICE WEEKLY FOR 2-4 WEEKS EXTERNALLY, Disp: , Rfl:      levonorgestrel (LILETTA, 52 MG,) 19.5 MCG/DAY IUD, 1 each by Intrauterine route once, Disp: , Rfl:      LORazepam (ATIVAN) 0.5 MG tablet, 0.5 mg every 6 hours as needed for anxiety., Disp: , Rfl:      magnesium oxide (MAG-OX) 400 MG tablet, Take 1 tablet (400 mg) by mouth daily, Disp: 90 tablet, Rfl: 3     meclizine (ANTIVERT) 25 MG tablet, Take 1 tablet by mouth daily at 2 pm, Disp: , Rfl:      psyllium (METAMUCIL/KONSYL) 58.6 % powder, Mix one teaspoon into 12 oz of water daily., Disp: 425 g, Rfl: 1     risperiDONE (RISPERDAL) 3 MG tablet, , Disp: , Rfl:      sennosides (SENOKOT) 8.6 MG tablet, , Disp: , Rfl:      tirzepatide-Weight Management (ZEPBOUND) 10 MG/0.5ML prefilled pen, Inject 0.5 mLs (10 mg) subcutaneously every 7 days., Disp: 6 mL, Rfl: 1     traZODone (DESYREL) 150 MG tablet, Take 150 mg by mouth At Bedtime, Disp: , Rfl:        ALLERGIES:     Allergies   Allergen Reactions     Cephalexin Anaphylaxis     Erythromycin Anaphylaxis and Hives     Other reaction(s): Unknown     Penicillins Hives     hives  Other reaction(s): Unknown        ROS: Denies chest pain, shortness of breath, MI, CVA, DVT, PE, and bleeding disorders.     PHYSICAL EXAMINATION:   /74 (BP Location: Left arm, Patient Position: Sitting, Cuff Size: Adult Large)   Pulse 86   Ht 1.626 m (5' 4\")   Wt 105.7 kg (233 lb)   SpO2 100%   BMI 39.99 kg/m    BMI: Body mass index is 39.99 kg/m .  General: No acute distress.   Abdomen: no exam today.   Extremities: bilateral upper arms with significant excess skin and subcutaneous adipose tissue. Right side with more excess than left. Skin soft, no dermal edema. Hanging skin R 12cm, L 11cm.      ASSESSMENT:   Ronel is a 44 year old female PMH ADD, Bipolar, depression, asthma, " GERD, obesity s/p significant weight loss now desiring skin removal.     PLAN:   -continue weight loss until at goal weight, then return for visit to discuss planning surgery.   -she has lost significant weight and arm skin is quite heavy and seems to affect her ability to be active and perform her ADLs. Skin removal will likely allow her to be more active.   -explained that this surgery is often not covered by insurance but we will submit it as part of the scheduling process. I explained the scheduling process and PA process. Today I will send her an informational quote. No orders placed.   -RTC at goal weight and will place orders at that time. Can see myself or Dr. Pena if she prefers at her next visit. We look forward to helping her in the future.     Leana Caba PA-C  Plastic and Reconstructive Surgery    40 minutes spent on the date of the encounter doing chart review, history and physical, dressing changes, documentation and further activity as noted above.      Again, thank you for allowing me to participate in the care of your patient.      Sincerely,    Leana Caba PA-C

## 2025-01-28 NOTE — NURSING NOTE
"Chief Complaint   Patient presents with    Consult     Arm lift.       Vitals:    01/28/25 0755   BP: 107/74   BP Location: Left arm   Patient Position: Sitting   Cuff Size: Adult Large   Pulse: 86   SpO2: 100%   Weight: 105.7 kg (233 lb)   Height: 1.626 m (5' 4\")       Body mass index is 39.99 kg/m .        Aravind Li EMT    "

## 2025-02-12 ENCOUNTER — TELEPHONE (OUTPATIENT)
Dept: SURGERY | Facility: CLINIC | Age: 45
End: 2025-02-12
Payer: COMMERCIAL

## 2025-02-12 DIAGNOSIS — E66.812 CLASS 2 SEVERE OBESITY DUE TO EXCESS CALORIES WITH SERIOUS COMORBIDITY AND BODY MASS INDEX (BMI) OF 39.0 TO 39.9 IN ADULT (H): ICD-10-CM

## 2025-02-12 DIAGNOSIS — E11.9 TYPE II DIABETES MELLITUS, WELL CONTROLLED (H): ICD-10-CM

## 2025-02-12 DIAGNOSIS — E66.01 CLASS 2 SEVERE OBESITY DUE TO EXCESS CALORIES WITH SERIOUS COMORBIDITY AND BODY MASS INDEX (BMI) OF 39.0 TO 39.9 IN ADULT (H): ICD-10-CM

## 2025-02-12 DIAGNOSIS — Z87.19 HISTORY OF FATTY INFILTRATION OF LIVER: Primary | ICD-10-CM

## 2025-02-12 RX ORDER — TIRZEPATIDE 12.5 MG/.5ML
12.5 INJECTION, SOLUTION SUBCUTANEOUS
Qty: 2 ML | Refills: 1 | Status: SHIPPED | OUTPATIENT
Start: 2025-02-12 | End: 2025-04-09

## 2025-02-12 NOTE — TELEPHONE ENCOUNTER
Medication Question or Refill        What medication are you calling about (include dose and sig)?: Zepbound 10 mg dose    Preferred Pharmacy:   [facility] - [place] - [address] [place] [address] N  [address] [facility] [address]  Phone: [phone] Fax: [phone]      Controlled Substance Agreement on file:   CSA -- Patient Level:    CSA: None found at the patient level.       Who prescribed the medication?: Dr Dunn    Do you need a refill? No    When did you use the medication last? 2/11/25    Patient offered an appointment? No    Do you have any questions or concerns?  Yes: pt has 3 wks left of this dose. Pt would like to know if she should finish this before increasing to the 12.5 mg dose. Please advise.      Could we send this information to you in [place] or would you prefer to receive a phone call?:   Patient would prefer a phone call   Okay to leave a detailed message?: Yes at Cell number on file:    Telephone Information:   Mobile [phone]

## 2025-03-02 ENCOUNTER — HEALTH MAINTENANCE LETTER (OUTPATIENT)
Age: 45
End: 2025-03-02

## 2025-04-09 ENCOUNTER — TELEPHONE (OUTPATIENT)
Dept: SURGERY | Facility: CLINIC | Age: 45
End: 2025-04-09
Payer: COMMERCIAL

## 2025-04-09 NOTE — TELEPHONE ENCOUNTER
will be calling the pt.    Isabela Llamas RN, CBN  [facility]  P [phone]  F [phone]

## 2025-04-09 NOTE — TELEPHONE ENCOUNTER
General Call    Contacts       Contact Date/Time Type Contact Phone/Fax    04/09/2025 02:31 PM CDT Phone (Incoming) Ronel Ryan (Self) [phone] (M)          Reason for Call:  call back     What are your questions or concerns:  pt would like a call back regarding Zepbound and some side effects. She is having shortness of breath and fullness, started about 2 days ago      Could we send this information to you in [facility] or would you prefer to receive a phone call?:   Patient would prefer a phone call   Okay to leave a detailed message?: Yes at Cell number on file:    Telephone Information:   Mobile [phone]      4/9/2025  2:45 PM  Talked with patient on the phone. Normal affect/mood. Describes some occ fullness/faster heart beats since starting adderall 10mg last week. No reflux. No dehydration/vomiting or evidence for Zepbound contribution. Reviewed needs for good hydration through the day and I'd recommend she hold her adderall and see if sx improve and if seems to correlate to Adderall, let her prescriber know it's been poorly tolerated thus far at low doses.   Kerwin Dunn MD

## 2025-05-13 ENCOUNTER — LAB REQUISITION (OUTPATIENT)
Dept: LAB | Facility: CLINIC | Age: 45
End: 2025-05-13

## 2025-05-13 DIAGNOSIS — R68.89 OTHER GENERAL SYMPTOMS AND SIGNS: ICD-10-CM

## 2025-05-13 PROCEDURE — 82607 VITAMIN B-12: CPT | Performed by: STUDENT IN AN ORGANIZED HEALTH CARE EDUCATION/TRAINING PROGRAM

## 2025-05-13 PROCEDURE — 84146 ASSAY OF PROLACTIN: CPT | Performed by: STUDENT IN AN ORGANIZED HEALTH CARE EDUCATION/TRAINING PROGRAM

## 2025-05-13 PROCEDURE — 84443 ASSAY THYROID STIM HORMONE: CPT | Performed by: STUDENT IN AN ORGANIZED HEALTH CARE EDUCATION/TRAINING PROGRAM

## 2025-05-14 LAB
PROLACTIN SERPL 3RD IS-MCNC: 68 NG/ML (ref 5–23)
TSH SERPL DL<=0.005 MIU/L-ACNC: 1.63 UIU/ML (ref 0.3–4.2)
VIT B12 SERPL-MCNC: 304 PG/ML (ref 232–1245)

## 2025-06-11 ENCOUNTER — OFFICE VISIT (OUTPATIENT)
Dept: SURGERY | Facility: CLINIC | Age: 45
End: 2025-06-11
Payer: COMMERCIAL

## 2025-06-11 DIAGNOSIS — E66.811 CLASS 1 DRUG-INDUCED OBESITY WITH BODY MASS INDEX (BMI) OF 34.0 TO 34.9 IN ADULT, UNSPECIFIED WHETHER SERIOUS COMORBIDITY PRESENT: ICD-10-CM

## 2025-06-11 DIAGNOSIS — E11.9 TYPE 2 DIABETES MELLITUS WITHOUT COMPLICATION, WITHOUT LONG-TERM CURRENT USE OF INSULIN (H): ICD-10-CM

## 2025-06-11 DIAGNOSIS — E66.1 CLASS 2 DRUG-INDUCED OBESITY WITH SERIOUS COMORBIDITY AND BODY MASS INDEX (BMI) OF 39.0 TO 39.9 IN ADULT: ICD-10-CM

## 2025-06-11 DIAGNOSIS — L98.7 EXCESS SKIN: ICD-10-CM

## 2025-06-11 DIAGNOSIS — Z86.39 HISTORY OF MORBID OBESITY: Primary | ICD-10-CM

## 2025-06-11 DIAGNOSIS — E66.1 CLASS 1 DRUG-INDUCED OBESITY WITH BODY MASS INDEX (BMI) OF 34.0 TO 34.9 IN ADULT, UNSPECIFIED WHETHER SERIOUS COMORBIDITY PRESENT: ICD-10-CM

## 2025-06-11 DIAGNOSIS — E66.812 CLASS 2 DRUG-INDUCED OBESITY WITH SERIOUS COMORBIDITY AND BODY MASS INDEX (BMI) OF 39.0 TO 39.9 IN ADULT: ICD-10-CM

## 2025-06-11 DIAGNOSIS — D35.2 PITUITARY MICROADENOMA (H): ICD-10-CM

## 2025-06-11 PROCEDURE — 99214 OFFICE O/P EST MOD 30 MIN: CPT | Performed by: EMERGENCY MEDICINE

## 2025-06-11 PROCEDURE — 3074F SYST BP LT 130 MM HG: CPT | Performed by: EMERGENCY MEDICINE

## 2025-06-11 PROCEDURE — 3078F DIAST BP <80 MM HG: CPT | Performed by: EMERGENCY MEDICINE

## 2025-06-11 NOTE — PATIENT INSTRUCTIONS
142 lbs down from peak weight, a 41% total body  weight reduction and now in lowest risk category of excess weight, about 25 BMI points lower than at the start.

## 2025-06-11 NOTE — LETTER
6/11/2025      Ronel Ryan  [address]  [place] [address]      Dear Colleague,    Thank you for referring your patient, Ronel Ryan, to the [facility] [place]. Please see a copy of my visit note below.    Bariatric Clinic Follow-Up Visit:    Ronel Ryan is a 44 year old  female with Body mass index is 34.84 kg/m .  presenting here today for follow-up on non-surgical efforts for weight loss.Original Intake visit occurred on 7/13/19 with a weight of 345 lbs and BMI of 59.2 with co-morbid type II diabetes (see graph below w/ peak A1c of 7% in 2018).  Along with diet and behavior changes, early on in her course she'd used various combinations through the years with Trulicity/naltrexone and a as well as previous topiramate from her pain clinic which can also assist her weight loss goals.   Her Trulicity was stopped in hospital May of 2022 during hip surgery and she had weight gain of 30 lbs over the summer due to increased cravings off medication. We transitioned to Semaglutide therapy the summer of 2022 but then supplies ran out due to shortages so Victoza was started in November 2022 (11/7/22).   She was able to get back on Ozempic in late 2022 and was up to 1mg/week as of our 10/06/23 visit w/ good results/tolerance.  She was transitioned to Zepbound 2/8/24 for continued work on weight management as a means to improve her glycemic control/weight related health.  Some supply chain issues limited early ramping ability and on 5/20/24 she was at 233 lbs on 10mg/week Zepbound.   Zepbound ramped up to 12.5mg/week due to shortages of 10mg/week Zepbound in June of '24.      She had some poor response in the interim with lower efficacy and on 8/2/24 has weight of 231 lbs but this was more related to struggling with a new Vegan style diet and she was kept at the 12.5mg/week dose in light of her overall excellent weight reduction.  Back down to 10mg/week  at our 1/9/25  visit, down to 228 lbs despite now working near a food court at work and more temptation.      Dietician visit on 11/17/23 w/ RMR of 1821 kcal/day and protein goal of 70-90g/day. She's a user of [employer] w/ good tracking history of food and exercise.     Current meds include risperidone which can drive hunger but required for current mental health treatment.  Her bupropion, for mental health, can curb appetite mildly on it's own but not as much as when mixed with naltrexone.     Weight:   Wt Readings from Last 5 Encounters:   06/11/25 92.1 kg (203 lb)   01/28/25 105.7 kg (233 lb)   01/09/25 103.4 kg (228 lb)   09/11/24 107 kg (236 lb)   09/03/24 107 kg (236 lb)    pounds  142 lbs down from peak weight, a 41% total body  weight reduction and now in lowest risk category of excess weight, about 25 BMI points lower than at the start.   Lab Results   Lab Results   Component Value Date    A1C 5.1 11/09/2022    A1C 4.7 04/04/2022    A1C 4.6 09/10/2021    A1C 6.7 08/23/2019    A1C 7.0 11/21/2018    A1C 6.0 12/31/2010            Comorbidities:  Patient Active Problem List   Diagnosis     Binge eating     Eating disorder     Morbid obesity (H)     Multiple-type hyperlipidemia     Severe episode of recurrent major depressive disorder (H)     Hypertension     Trochanteric bursitis     Borderline personality disorder (H)     Attention deficit disorder     Asthma     Suicidal ideation     ACP (advance care planning)     Adjustment disorder with depressed mood     Anxiety     Moderate mixed bipolar I disorder (H)     Bipolar II disorder, most recent episode hypomanic (H)     Care plan discussed with patient     Hx of diabetes mellitus     Osteoarthritis     Relationship problems     Secondary amenorrhea     Hip dysplasia     Osteoarthritis of hip     Hyperlipidemia     Mixed hyperlipidemia     Type 2 diabetes mellitus (H)     Primary osteoarthritis of both hips     Degenerative joint disease (DJD) of hip      Status post total hip replacement, left     Gastroesophageal reflux disease with esophagitis without hemorrhage     Mixed incontinence     Pituitary microadenoma (H)     Hyperprolactinemia       Current Outpatient Medications:      albuterol (PROAIR HFA/PROVENTIL HFA/VENTOLIN HFA) 108 (90 Base) MCG/ACT inhaler, Inhale 2 puffs into the lungs every 6 hours as needed for shortness of breath, wheezing or cough, Disp: 18 g, Rfl: 0     buPROPion (WELLBUTRIN XL) 300 MG 24 hr tablet, Take 300 mg by mouth daily , Disp: , Rfl:      cholecalciferol (VITAMIN D3) 125 mcg (5000 units) capsule, , Disp: , Rfl:      docusate sodium (COLACE) 100 MG capsule, Take 1 capsule (100 mg) by mouth 2 times daily as needed for constipation, Disp: 90 capsule, Rfl: 4     EPINEPHrine (ANY BX GENERIC EQUIV) 0.3 MG/0.3ML injection 2-pack, Inject 0.3 mg into the muscle, Disp: , Rfl:      EPINEPHrine (EPIPEN/ADRENACLICK/OR ANY BX GENERIC EQUIV) 0.3 MG/0.3ML injection 2-pack, Inject 0.3 mLs (0.3 mg) into the muscle once as needed for anaphylaxis, Disp: 0.6 mL, Rfl: 0     ketoconazole (EXTINA) 2 % external foam, , Disp: , Rfl:      ketoconazole (NIZORAL) 2 % external shampoo, APPLY 5 TO 10ML TO WET SCALP, LATHER, LEAVE ON 3-5 MINUTES, AND RINSE. APPLY TWICE WEEKLY FOR 2-4 WEEKS EXTERNALLY, Disp: , Rfl:      levonorgestrel (LILETTA, 52 MG,) 19.5 MCG/DAY IUD, 1 each by Intrauterine route once, Disp: , Rfl:      LORazepam (ATIVAN) 0.5 MG tablet, 0.5 mg every 6 hours as needed for anxiety., Disp: , Rfl:      meclizine (ANTIVERT) 25 MG tablet, Take 1 tablet by mouth daily at 2 pm, Disp: , Rfl:      psyllium (METAMUCIL/KONSYL) 58.6 % powder, Mix one teaspoon into 12 oz of water daily., Disp: 425 g, Rfl: 1     risperiDONE (RISPERDAL) 3 MG tablet, , Disp: , Rfl:      sennosides (SENOKOT) 8.6 MG tablet, , Disp: , Rfl:      tirzepatide-Weight Management (ZEPBOUND) 15 MG/0.5ML prefilled pen, Inject 0.5 mLs (15 mg) subcutaneously every 7 days., Disp: 6 mL, Rfl:  1     traZODone (DESYREL) 150 MG tablet, Take 150 mg by mouth At Bedtime, Disp: , Rfl:       Interim: Since our last visit, she has been dealing with some hip inflammation after some falls.  Able to run half a mile now.   Continues to follow with Neurology for small since pituitary microadenoma/elevated prolactin levels.   Off adderall and on provigil.   Zepbound 15mg/week doing well. We'll continue long term if tolerated, titrating down if needed in stabiliation. Given her diabetes hx, Mounjaro would be reasonable alternative if her insurance changes formularies.   Plan:   1.  Diet: aiming for 1300kcal/day with 75-85 grams of lean protein daily and 70 oz of water daily.  2. Exercise: continue rehab of legs/hips, consider cycling over jogging right now but light walk/jog as tolerated but keep to 1 mile or less.     3. Medication: Zepbound 15mg/week going well. We'll stay there.    4. No labs today.    5. Goals: 41% total body weight reduction, aiming to stabilize BMI under 30 in the next 9-18 months.       We discussed [facility] Basics including:  -eating 3 meals daily  -reviewed metabolic needs for weight loss based on Resting Metabolic Rate  -protein goals supportive of healthy weight loss  -avoiding/limiting calorie containing beverages  -We discussed the importance of restorative sleep and stress management in maintaining a healthy weight.  -We discussed the National Weight Control Registry healthy weight maintenance strategies and ways to optimize metabolism.  -We discussed the importance of physical activity including cardiovascular and strength training in maintaining a healthier weight and explored viable options.      Most recent labs:  Lab Results   Component Value Date    WBC 5.5 02/01/2023    HGB 13.9 02/01/2023    HCT 42.1 02/01/2023    MCV 93 02/01/2023     02/01/2023     Lab Results   Component Value Date    CHOL 163 06/13/2024     Lab Results   Component Value Date    HDL 43 (L)  "06/13/2024     No components found for: \"LDLCALC\"  Lab Results   Component Value Date    TRIG 130 06/13/2024     No results found for: \"CHOLHDL\"  Lab Results   Component Value Date    ALT 8 06/13/2024    AST 18 06/13/2024    ALKPHOS 42 06/13/2024     No results found for: \"HGBA1C\"  Lab Results   Component Value Date    B12 304 05/13/2025     No components found for: \"VITDT1\"  Lab Results   Component Value Date    KOFFI 79 11/09/2022     Lab Results   Component Value Date    PTHI 77 08/23/2019     No results found for: \"ZN\"  Lab Results   Component Value Date    VIB1WB 108 08/23/2019     Lab Results   Component Value Date    TSH 1.63 05/13/2025     No results found for: \"TEST\"    DIETARY HISTORY  Tracking technique: tracks w/ phone  Positive Changes Since Last Visit: protein targets going well  Struggling With: not much    Getting Adequate Protein: yes  Sleep schedule: good.      PHYSICAL ACTIVITY PATTERNS:  Cardiovascular: walking/light jog  Strength Training: PT    REVIEW OF SYSTEMS  Feels well. No upset stomach/vomiting/pain/constipation..  PHYSICAL EXAM:  Vitals: /68   Ht 1.626 m (5' 4\")   Wt 92.1 kg (203 lb)   BMI 34.84 kg/m    Weight:   Wt Readings from Last 3 Encounters:   06/11/25 92.1 kg (203 lb)   01/28/25 105.7 kg (233 lb)   01/09/25 103.4 kg (228 lb)         GEN: Pleasant, well groomed, in no acute distress  HEENT:  normal facies, mildly dehydrated mouth (body water calculated at 44% from Tanita scale, slightly less than ideal 45-60%).  .  NECK: No swelling.  HEART: RRR  LUNGS: No respiratory difficulty noted. No cough. .  ABDOMEN: .abdoiminal pannus without rash today.  EXTREMITIES: No tremor. Ambulation is indendent..  NEURO: Alert and Oriented X3, fluent speech. .  SKIN: No visible rashes. .    Interim study results: .      30 minutes spent by me on the date of the encounter doing chart review, history and exam, documentation and further activities per the note   Kerwin Dunn MD  [facility]  8:15 AM  6/11/2025    Again, thank you for allowing me to participate in the care of your patient.        Sincerely,        Kerwin Dunn MD    Electronically signed

## 2025-06-11 NOTE — PROGRESS NOTES
Bariatric Clinic Follow-Up Visit:    Ronel Ryan is a 44 year old  female with Body mass index is 35.36 kg/m .  presenting here today for follow-up on non-surgical efforts for weight loss.Original Intake visit occurred on 7/13/19 with a weight of 345 lbs and BMI of 59.2 with co-morbid type II diabetes (see graph below w/ peak A1c of 7% in 2018).  Along with diet and behavior changes, early on in her course she'd used various combinations through the years with Trulicity/naltrexone and a as well as previous topiramate from her pain clinic which can also assist her weight loss goals.   Her Trulicity was stopped in hospital May of 2022 during hip surgery and she had weight gain of 30 lbs over the summer due to increased cravings off medication. We transitioned to Semaglutide therapy the summer of 2022 but then supplies ran out due to shortages so Victoza was started in November 2022 (11/7/22).   She was able to get back on Ozempic in late 2022 and was up to 1mg/week as of our 10/06/23 visit w/ good results/tolerance.  She was transitioned to Zepbound 2/8/24 for continued work on weight management as a means to improve her glycemic control/weight related health.  Some supply chain issues limited early ramping ability and on 5/20/24 she was at 233 lbs on 10mg/week Zepbound.   Zepbound ramped up to 12.5mg/week due to shortages of 10mg/week Zepbound in June of '24.      She had some poor response in the interim with lower efficacy and on 8/2/24 has weight of 231 lbs but this was more related to struggling with a new Vegan style diet and she was kept at the 12.5mg/week dose in light of her overall excellent weight reduction.  Back down to 10mg/week at our 1/9/25  visit, down to 228 lbs despite now working near a food court at work and more temptation.      Dietician visit on 11/17/23 w/ RMR of 1821 kcal/day and protein goal of 70-90g/day. She's a user of [employer] w/ good tracking history of food and exercise.      Current meds include risperidone which can drive hunger but required for current mental health treatment.  Her bupropion, for mental health, can curb appetite mildly on it's own but not as much as when mixed with naltrexone.     Weight:   Wt Readings from Last 5 Encounters:   06/11/25 93.4 kg (206 lb)   01/28/25 105.7 kg (233 lb)   01/09/25 103.4 kg (228 lb)   09/11/24 107 kg (236 lb)   09/03/24 107 kg (236 lb)    pounds  142 lbs down from peak weight, a 41% total body  weight reduction and now in lowest risk category of excess weight, about 25 BMI points lower than at the start.   Lab Results   Lab Results   Component Value Date    A1C 5.1 11/09/2022    A1C 4.7 04/04/2022    A1C 4.6 09/10/2021    A1C 6.7 08/23/2019    A1C 7.0 11/21/2018    A1C 6.0 12/31/2010            Comorbidities:  Patient Active Problem List   Diagnosis    Binge eating    Eating disorder    Morbid obesity (H)    Multiple-type hyperlipidemia    Severe episode of recurrent major depressive disorder (H)    Hypertension    Trochanteric bursitis    Borderline personality disorder (H)    Attention deficit disorder    Asthma    Suicidal ideation    ACP (advance care planning)    Adjustment disorder with depressed mood    Anxiety    Moderate mixed bipolar I disorder (H)    Bipolar II disorder, most recent episode hypomanic (H)    Care plan discussed with patient    Hx of diabetes mellitus    Osteoarthritis    Relationship problems    Secondary amenorrhea    Hip dysplasia    Osteoarthritis of hip    Hyperlipidemia    Mixed hyperlipidemia    Type 2 diabetes mellitus (H)    Primary osteoarthritis of both hips    Degenerative joint disease (DJD) of hip    Status post total hip replacement, left    Gastroesophageal reflux disease with esophagitis without hemorrhage    Mixed incontinence    Pituitary microadenoma (H)    Hyperprolactinemia       Current Outpatient Medications:     albuterol (PROAIR HFA/PROVENTIL HFA/VENTOLIN HFA) 108 (90 Base) MCG/ACT  inhaler, Inhale 2 puffs into the lungs every 6 hours as needed for shortness of breath, wheezing or cough, Disp: 18 g, Rfl: 0    buPROPion (WELLBUTRIN XL) 300 MG 24 hr tablet, Take 300 mg by mouth daily , Disp: , Rfl:     cholecalciferol (VITAMIN D3) 125 mcg (5000 units) capsule, , Disp: , Rfl:     docusate sodium (COLACE) 100 MG capsule, Take 1 capsule (100 mg) by mouth 2 times daily as needed for constipation, Disp: 90 capsule, Rfl: 4    EPINEPHrine (ANY BX GENERIC EQUIV) 0.3 MG/0.3ML injection 2-pack, Inject 0.3 mg into the muscle, Disp: , Rfl:     EPINEPHrine (EPIPEN/ADRENACLICK/OR ANY BX GENERIC EQUIV) 0.3 MG/0.3ML injection 2-pack, Inject 0.3 mLs (0.3 mg) into the muscle once as needed for anaphylaxis, Disp: 0.6 mL, Rfl: 0    ketoconazole (EXTINA) 2 % external foam, , Disp: , Rfl:     ketoconazole (NIZORAL) 2 % external shampoo, APPLY 5 TO 10ML TO WET SCALP, LATHER, LEAVE ON 3-5 MINUTES, AND RINSE. APPLY TWICE WEEKLY FOR 2-4 WEEKS EXTERNALLY, Disp: , Rfl:     levonorgestrel (LILETTA, 52 MG,) 19.5 MCG/DAY IUD, 1 each by Intrauterine route once, Disp: , Rfl:     LORazepam (ATIVAN) 0.5 MG tablet, 0.5 mg every 6 hours as needed for anxiety., Disp: , Rfl:     meclizine (ANTIVERT) 25 MG tablet, Take 1 tablet by mouth daily at 2 pm, Disp: , Rfl:     psyllium (METAMUCIL/KONSYL) 58.6 % powder, Mix one teaspoon into 12 oz of water daily., Disp: 425 g, Rfl: 1    risperiDONE (RISPERDAL) 3 MG tablet, , Disp: , Rfl:     sennosides (SENOKOT) 8.6 MG tablet, , Disp: , Rfl:     tirzepatide-Weight Management (ZEPBOUND) 15 MG/0.5ML prefilled pen, Inject 0.5 mLs (15 mg) subcutaneously every 7 days., Disp: 6 mL, Rfl: 1    traZODone (DESYREL) 150 MG tablet, Take 150 mg by mouth At Bedtime, Disp: , Rfl:     cyanocobalamin (VITAMIN B-12) 500 MCG tablet, Take 500 mcg by mouth daily., Disp: , Rfl:       Interim: Since our last visit, she has been dealing with some hip inflammation after some falls.  Able to run half a mile now.  "  Continues to follow with Neurology for small since pituitary microadenoma/elevated prolactin levels.   Off adderall and on provigil.   Zepbound 15mg/week doing well. We'll continue long term if tolerated, titrating down if needed in stabiliation. Given her diabetes hx, Mounjaro would be reasonable alternative if her insurance changes formularies.   Plan:   1.  Diet: aiming for 1300kcal/day with 75-85 grams of lean protein daily and 70 oz of water daily.  2. Exercise: continue rehab of legs/hips, consider cycling over jogging right now but light walk/jog as tolerated but keep to 1 mile or less.     3. Medication: Zepbound 15mg/week going well. We'll stay there.    4. No labs today.    5. Goals: 41% total body weight reduction, aiming to stabilize BMI under 30 in the next 9-18 months.       We discussed [facility]ariat[facility] Basics including:  -eating 3 meals daily  -reviewed metabolic needs for weight loss based on Resting Metabolic Rate  -protein goals supportive of healthy weight loss  -avoiding/limiting calorie containing beverages  -We discussed the importance of restorative sleep and stress management in maintaining a healthy weight.  -We discussed the National Weight Control Registry healthy weight maintenance strategies and ways to optimize metabolism.  -We discussed the importance of physical activity including cardiovascular and strength training in maintaining a healthier weight and explored viable options.      Most recent labs:  Lab Results   Component Value Date    WBC 5.5 02/01/2023    HGB 13.9 02/01/2023    HCT 42.1 02/01/2023    MCV 93 02/01/2023     02/01/2023     Lab Results   Component Value Date    CHOL 163 06/13/2024     Lab Results   Component Value Date    HDL 43 (L) 06/13/2024     No components found for: \"LDLCALC\"  Lab Results   Component Value Date    TRIG 130 06/13/2024     No results found for: \"CHOLHDL\"  Lab Results   Component Value Date    ALT 8 06/13/2024    AST 18 06/13/2024 " "   ALKPHOS 42 06/13/2024     No results found for: \"HGBA1C\"  Lab Results   Component Value Date    B12 304 05/13/2025     No components found for: \"VITDT1\"  Lab Results   Component Value Date    KOFFI 79 11/09/2022     Lab Results   Component Value Date    PTHI 77 08/23/2019     No results found for: \"ZN\"  Lab Results   Component Value Date    VIB1WB 108 08/23/2019     Lab Results   Component Value Date    TSH 1.63 05/13/2025     No results found for: \"TEST\"    DIETARY HISTORY  Tracking technique: tracks w/ phone  Positive Changes Since Last Visit: protein targets going well  Struggling With: not much    Getting Adequate Protein: yes  Sleep schedule: good.      PHYSICAL ACTIVITY PATTERNS:  Cardiovascular: walking/light jog  Strength Training: PT    REVIEW OF SYSTEMS  Feels well. No upset stomach/vomiting/pain/constipation..  PHYSICAL EXAM:  Vitals: /68   Ht 1.626 m (5' 4\")   Wt 93.4 kg (206 lb)   BMI 35.36 kg/m    Weight:   Wt Readings from Last 3 Encounters:   06/11/25 93.4 kg (206 lb)   01/28/25 105.7 kg (233 lb)   01/09/25 103.4 kg (228 lb)         GEN: Pleasant, well groomed, in no acute distress  HEENT:  normal facies, mildly dehydrated mouth (body water calculated at 44% from Tanita scale, slightly less than ideal 45-60%).  .  NECK: No swelling.  HEART: RRR  LUNGS: No respiratory difficulty noted. No cough. .  ABDOMEN: .abdoiminal pannus without rash today.  EXTREMITIES: No tremor. Ambulation is indendent..  NEURO: Alert and Oriented X3, fluent speech. .  SKIN: No visible rashes. .    Interim study results: .      30 minutes spent by me on the date of the encounter doing chart review, history and exam, documentation and further activities per the note   Kerwin Dunn MD  [facility]  8:15 AM  6/11/2025  "

## 2025-06-12 ENCOUNTER — PATIENT OUTREACH (OUTPATIENT)
Dept: CARE COORDINATION | Facility: CLINIC | Age: 45
End: 2025-06-12
Payer: COMMERCIAL

## 2025-06-12 VITALS
WEIGHT: 206 LBS | HEIGHT: 64 IN | SYSTOLIC BLOOD PRESSURE: 110 MMHG | DIASTOLIC BLOOD PRESSURE: 68 MMHG | BODY MASS INDEX: 35.17 KG/M2

## 2025-06-16 ENCOUNTER — PATIENT OUTREACH (OUTPATIENT)
Dept: CARE COORDINATION | Facility: CLINIC | Age: 45
End: 2025-06-16
Payer: COMMERCIAL

## 2025-07-16 ENCOUNTER — TELEPHONE (OUTPATIENT)
Dept: NEUROLOGY | Facility: CLINIC | Age: 45
End: 2025-07-16
Payer: COMMERCIAL

## 2025-07-16 DIAGNOSIS — D35.2 PITUITARY MICROADENOMA (H): Primary | ICD-10-CM

## 2025-07-16 RX ORDER — METHYLPREDNISOLONE 32 MG/1
32 TABLET ORAL DAILY
Qty: 1 TABLET | Refills: 0 | Status: SHIPPED | OUTPATIENT
Start: 2025-07-16

## 2025-07-16 RX ORDER — DIPHENHYDRAMINE HCL 25 MG
25 CAPSULE ORAL EVERY 6 HOURS PRN
Qty: 10 CAPSULE | Refills: 0 | Status: SHIPPED | OUTPATIENT
Start: 2025-07-16

## 2025-07-16 NOTE — TELEPHONE ENCOUNTER
[facility] Call Center    Phone Message    May a detailed message be left on voicemail: yes     Reason for Call: Other:       Pt requesting call back to discuss getting a MRI scheduled. Pt is allergic to contrast and when the pt called to get the MRI scheduled, imaging stated that they would need a pre med ordered and to contact her doctor.     Pt's call back # [phone]    Action Taken: Message routed to:  Other: [facility]    Travel Screening: Not Applicable

## 2025-07-16 NOTE — TELEPHONE ENCOUNTER
New MRI brain order and prescription for prednisone and Benadryl sent to patient's pharmacy as premedication given history of contrast allergy

## 2025-07-16 NOTE — TELEPHONE ENCOUNTER
Would you be able to modify or reorder MRI to include pre-med? Pt reports they are allergic to contrast.    Mariola BELLE RN  [facility]

## 2025-07-16 NOTE — TELEPHONE ENCOUNTER
RN called Ronel and discussed premedications for MRI. Patient verbalized understanding, will contact imaging to schedule MRI.     Corie ALVAREZ RN, BSN  [facility]

## 2025-07-17 ENCOUNTER — TELEPHONE (OUTPATIENT)
Dept: NEUROLOGY | Facility: CLINIC | Age: 45
End: 2025-07-17
Payer: COMMERCIAL

## 2025-07-17 NOTE — TELEPHONE ENCOUNTER
Patient had called yesterday and I had ordered prednisone, Benadryl as premedication before the MRI scan

## 2025-07-17 NOTE — TELEPHONE ENCOUNTER
[facility] Call Center    Phone Message    May a detailed message be left on voicemail: yes     Reason for Call: Other: Pt has scheduled MR Brain w/o & w Contrast, but states that they are allergic to contrast, and would like to do this MR without it    Contact pt at [phone]    Action Taken: Message routed to:  Other: [facility]    Travel Screening: Not Applicable     Date of Service:

## 2025-07-17 NOTE — TELEPHONE ENCOUNTER
Attempted to call [facility]. Informed her that Dr. Proctor put in order yesterday for premedication for MRI scan due to her allergy to contrast. Prescriptions should be at her pharmacy. Advised to call back with any further questions or concerns.    Mariola BELLE RN  [facility]

## 2025-07-17 NOTE — TELEPHONE ENCOUNTER
Patient reports they are allergic to contrast. Would you like to order brain MRI with pre-med or without contrast? What would your recommendation be?    Mariola BELLE RN  [facility]

## 2025-07-22 ENCOUNTER — TELEPHONE (OUTPATIENT)
Dept: NEUROLOGY | Facility: CLINIC | Age: 45
End: 2025-07-22
Payer: COMMERCIAL

## 2025-07-22 DIAGNOSIS — D35.2 PITUITARY MICROADENOMA (H): ICD-10-CM

## 2025-07-22 NOTE — TELEPHONE ENCOUNTER
[facility] Call [facility]    Phone Message    May a detailed message be left on voicemail: yes     Reason for Call: Medication Refill Request    Has the patient contacted the pharmacy for the refill? Yes   Name of medication being requested: methylPREDNISolone (MEDROL) 32 MG tablet    Provider who prescribed the medication: Jason Proctor MD  Pharmacy: [facility] [place]    Patient went to  medication from pharmacy and the pharmacy told her they never received the RX for this medication. Patient requesting RX be sent again.     Action Taken: Message routed to:  Other: [facility]    Travel Screening: Not Applicable     Date of Service:

## 2025-07-22 NOTE — TELEPHONE ENCOUNTER
Called and spoke to pharmacy, rx was sent on 7/16/25 and was picked up on 7/16/25.    Called and spoke to pt, relayed this message to her and confirmed the directions with her. Pt confirms understanding. She believes she must have it at home somewhere ad will look for it. She will call back if any difficulty finding it.    Joie Mathis, LPN on 7/22/2025 at 4:03 PM

## 2025-08-12 ENCOUNTER — TELEPHONE (OUTPATIENT)
Dept: NEUROLOGY | Facility: CLINIC | Age: 45
End: 2025-08-12
Payer: COMMERCIAL

## 2025-08-20 ENCOUNTER — OFFICE VISIT (OUTPATIENT)
Dept: SURGERY | Facility: CLINIC | Age: 45
End: 2025-08-20
Payer: COMMERCIAL

## 2025-08-20 ENCOUNTER — LAB (OUTPATIENT)
Dept: LAB | Facility: CLINIC | Age: 45
End: 2025-08-20
Payer: COMMERCIAL

## 2025-08-20 VITALS
WEIGHT: 184.4 LBS | HEIGHT: 64 IN | DIASTOLIC BLOOD PRESSURE: 78 MMHG | SYSTOLIC BLOOD PRESSURE: 118 MMHG | BODY MASS INDEX: 31.48 KG/M2

## 2025-08-20 DIAGNOSIS — E66.811 CLASS 1 DRUG-INDUCED OBESITY WITH BODY MASS INDEX (BMI) OF 31.0 TO 31.9 IN ADULT, UNSPECIFIED WHETHER SERIOUS COMORBIDITY PRESENT: ICD-10-CM

## 2025-08-20 DIAGNOSIS — E66.811 CLASS 1 OBESITY DUE TO EXCESS CALORIES WITH SERIOUS COMORBIDITY AND BODY MASS INDEX (BMI) OF 31.0 TO 31.9 IN ADULT: ICD-10-CM

## 2025-08-20 DIAGNOSIS — K92.1 BLACK STOOL: ICD-10-CM

## 2025-08-20 DIAGNOSIS — T50.905A WEIGHT GAIN DUE TO MEDICATION: ICD-10-CM

## 2025-08-20 DIAGNOSIS — E11.9 TYPE 2 DIABETES MELLITUS WITHOUT COMPLICATION, WITHOUT LONG-TERM CURRENT USE OF INSULIN (H): ICD-10-CM

## 2025-08-20 DIAGNOSIS — R63.5 WEIGHT GAIN DUE TO MEDICATION: ICD-10-CM

## 2025-08-20 DIAGNOSIS — E66.09 CLASS 1 OBESITY DUE TO EXCESS CALORIES WITH SERIOUS COMORBIDITY AND BODY MASS INDEX (BMI) OF 31.0 TO 31.9 IN ADULT: ICD-10-CM

## 2025-08-20 DIAGNOSIS — Z86.39 HISTORY OF MORBID OBESITY: ICD-10-CM

## 2025-08-20 DIAGNOSIS — Z86.39 HISTORY OF MORBID OBESITY: Primary | ICD-10-CM

## 2025-08-20 DIAGNOSIS — E66.1 CLASS 1 DRUG-INDUCED OBESITY WITH BODY MASS INDEX (BMI) OF 31.0 TO 31.9 IN ADULT, UNSPECIFIED WHETHER SERIOUS COMORBIDITY PRESENT: ICD-10-CM

## 2025-08-20 LAB
ALBUMIN SERPL BCG-MCNC: 4.1 G/DL (ref 3.5–5.2)
ALP SERPL-CCNC: 43 U/L (ref 40–150)
ALT SERPL W P-5'-P-CCNC: <5 U/L (ref 0–50)
ANION GAP SERPL CALCULATED.3IONS-SCNC: 12 MMOL/L (ref 7–15)
AST SERPL W P-5'-P-CCNC: 19 U/L (ref 0–45)
BILIRUB SERPL-MCNC: 0.9 MG/DL
BUN SERPL-MCNC: 8 MG/DL (ref 6–20)
CALCIUM SERPL-MCNC: 10.1 MG/DL (ref 8.8–10.4)
CHLORIDE SERPL-SCNC: 103 MMOL/L (ref 98–107)
CREAT SERPL-MCNC: 1.09 MG/DL (ref 0.51–0.95)
EGFRCR SERPLBLD CKD-EPI 2021: 64 ML/MIN/1.73M2
ERYTHROCYTE [DISTWIDTH] IN BLOOD BY AUTOMATED COUNT: 11.6 % (ref 10–15)
EST. AVERAGE GLUCOSE BLD GHB EST-MCNC: 71 MG/DL
GLUCOSE SERPL-MCNC: 83 MG/DL (ref 70–99)
HBA1C MFR BLD: 4.1 % (ref 0–5.6)
HCO3 SERPL-SCNC: 23 MMOL/L (ref 22–29)
HCT VFR BLD AUTO: 39.9 % (ref 35–47)
HGB BLD-MCNC: 14 G/DL (ref 11.7–15.7)
MCH RBC QN AUTO: 32.2 PG (ref 26.5–33)
MCHC RBC AUTO-ENTMCNC: 35.1 G/DL (ref 31.5–36.5)
MCV RBC AUTO: 91.7 FL (ref 78–100)
PLATELET # BLD AUTO: 231 10E3/UL (ref 150–450)
POTASSIUM SERPL-SCNC: 3.8 MMOL/L (ref 3.4–5.3)
PROT SERPL-MCNC: 6.8 G/DL (ref 6.4–8.3)
RBC # BLD AUTO: 4.35 10E6/UL (ref 3.8–5.2)
SODIUM SERPL-SCNC: 138 MMOL/L (ref 135–145)
WBC # BLD AUTO: 4.29 10E3/UL (ref 4–11)

## 2025-08-20 PROCEDURE — 80053 COMPREHEN METABOLIC PANEL: CPT

## 2025-08-20 PROCEDURE — 85027 COMPLETE CBC AUTOMATED: CPT

## 2025-08-20 PROCEDURE — 36415 COLL VENOUS BLD VENIPUNCTURE: CPT

## 2025-08-20 PROCEDURE — 83036 HEMOGLOBIN GLYCOSYLATED A1C: CPT

## 2025-08-22 ENCOUNTER — APPOINTMENT (OUTPATIENT)
Dept: LAB | Facility: CLINIC | Age: 45
End: 2025-08-22
Payer: COMMERCIAL

## 2025-08-26 LAB — HEMOCCULT STL QL IA: NEGATIVE

## (undated) DEVICE — BLADE SAW SAGITTAL STRK WIDE 25.4X85X1.2MM 2108-151-000

## (undated) DEVICE — DRSG STERI STRIP 1X5" R1548

## (undated) DEVICE — DECANTER VIAL 2006S

## (undated) DEVICE — GLOVE BIOGEL PI ORTHOPRO SZ 7.5 47675

## (undated) DEVICE — RETRIVER SUTURE LASSO HOFFEE BLUE 710000

## (undated) DEVICE — GLOVE BIOGEL PI ULTRATOUCH G SZ 7.0 42170

## (undated) DEVICE — SUTURE VICRYL+ 2-0 27IN CT-1 UND VCP259H

## (undated) DEVICE — DRAPE IOBAN INCISE 23X17" 6650EZ

## (undated) DEVICE — SOL NACL 0.9% IRRIG 1000ML BOTTLE 2F7124

## (undated) DEVICE — ESU ELEC BLADE 6" COATED E1450-6

## (undated) DEVICE — DRSG MEPILEX BORDER FLEX 3X3" 595200

## (undated) DEVICE — SU ETHIBOND 1 CT-1 30" X425H

## (undated) DEVICE — BONE CLEANING TIP INTERPULSE  0210-010-000

## (undated) DEVICE — SUCTION SLEEVE NEPTUNE 2 165MM 0703-005-165

## (undated) DEVICE — GLOVE BIOGEL PI ULTRATOUCH G SZ 7.5 42175

## (undated) DEVICE — DRAPE SHEET REV FOLD 3/4 9349

## (undated) DEVICE — SUCTION IRR SYSTEM W/O TIP INTERPULSE HANDPIECE 0210-100-000

## (undated) DEVICE — GLOVE BIOGEL INDICATOR 7.5 LF 41675

## (undated) DEVICE — HOOD FLYTE SURGICOOL

## (undated) DEVICE — SU VICRYL+ 0 27IN CT-2 UND VCP270H

## (undated) DEVICE — A3 SUPPLIES- SEE NURSING INFO PAGE

## (undated) DEVICE — GLOVE UNDER INDICATOR PI SZ 7.0 LF 41670

## (undated) DEVICE — GOWN IMPERVIOUS BREATHABLE 2XL/XLONG

## (undated) DEVICE — MAT FLOOR SURGICAL 40X38 0702140238

## (undated) DEVICE — SU STRATAFIX MONOCRYL 3-0 SPIRAL PS-2 30CM SXMP1B106

## (undated) DEVICE — PLATE GROUNDING ADULT W/CORD 9165L

## (undated) DEVICE — PILLOW HIP ABDUCTION CONCAVE 1.9

## (undated) DEVICE — CUSTOM PACK TOTAL HIP SOP5BTHHEA

## (undated) DEVICE — DRSG AQUACEL AG HYDROFIBER  3.5X10" 422605

## (undated) DEVICE — HOLDER LIMB VELCRO OR 0814-1533

## (undated) DEVICE — GLOVE UNDER INDICATOR PI SZ 8.5 LF 41685

## (undated) DEVICE — GUIDE PIN LONG ACETABULAR: Type: IMPLANTABLE DEVICE | Site: HIP | Status: NON-FUNCTIONAL

## (undated) DEVICE — GLOVE SURG PI ULTRA TOUCH M SZ 8 LF

## (undated) DEVICE — SU STRATAFIX PDS PLUS 1 CT-1 18" SXPP1A404

## (undated) DEVICE — SU FIBERWIRE 2 38" T-8 NDL  AR-7206

## (undated) DEVICE — SUCTION MANIFOLD NEPTUNE 2 SYS 4 PORT 0702-020-000

## (undated) DEVICE — SOL WATER IRRIG 1000ML BOTTLE 2F7114

## (undated) DEVICE — DRAPE POUCH INSTRUMENT 3 POCKET 1018L

## (undated) DEVICE — SOL NACL 0.9% INJ 1000ML BAG 2B1324X

## (undated) DEVICE — GOWN IMPERVIOUS BREATHABLE SMART XLG 89045

## (undated) DEVICE — HOOD FLYTE SURGICOOL WITH PEEL AWAY

## (undated) DEVICE — DRSG STERI STRIP 1/2X4" R1547

## (undated) DEVICE — DRSG MEPILEX BORDER 3X3" 295200

## (undated) DEVICE — DRSG AQUACEL AG 3.5X12" HYDROFIBER 420670

## (undated) RX ORDER — NITROGLYCERIN 0.4 MG/1
TABLET SUBLINGUAL
Status: DISPENSED
Start: 2017-10-20

## (undated) RX ORDER — DOBUTAMINE HYDROCHLORIDE 200 MG/100ML
INJECTION INTRAVENOUS
Status: DISPENSED
Start: 2017-10-02

## (undated) RX ORDER — DEXAMETHASONE SODIUM PHOSPHATE 10 MG/ML
INJECTION, SOLUTION INTRAMUSCULAR; INTRAVENOUS
Status: DISPENSED
Start: 2022-04-04

## (undated) RX ORDER — FENTANYL CITRATE 50 UG/ML
INJECTION, SOLUTION INTRAMUSCULAR; INTRAVENOUS
Status: DISPENSED
Start: 2022-04-04

## (undated) RX ORDER — METOPROLOL TARTRATE 50 MG
TABLET ORAL
Status: DISPENSED
Start: 2017-10-20

## (undated) RX ORDER — BUPIVACAINE HYDROCHLORIDE 5 MG/ML
INJECTION, SOLUTION EPIDURAL; INTRACAUDAL
Status: DISPENSED
Start: 2022-04-04

## (undated) RX ORDER — ONDANSETRON 2 MG/ML
INJECTION INTRAMUSCULAR; INTRAVENOUS
Status: DISPENSED
Start: 2022-04-04

## (undated) RX ORDER — EPHEDRINE SULFATE 50 MG/ML
INJECTION, SOLUTION INTRAMUSCULAR; INTRAVENOUS; SUBCUTANEOUS
Status: DISPENSED
Start: 2022-04-04

## (undated) RX ORDER — PROPOFOL 10 MG/ML
INJECTION, EMULSION INTRAVENOUS
Status: DISPENSED
Start: 2022-04-04